# Patient Record
Sex: MALE | Race: WHITE | Employment: UNEMPLOYED
[De-identification: names, ages, dates, MRNs, and addresses within clinical notes are randomized per-mention and may not be internally consistent; named-entity substitution may affect disease eponyms.]

---

## 2017-01-05 DIAGNOSIS — M19.90 ARTHRITIS: ICD-10-CM

## 2017-01-05 RX ORDER — IBUPROFEN 800 MG/1
TABLET ORAL
Qty: 90 TABLET | Refills: 0 | Status: SHIPPED | OUTPATIENT
Start: 2017-01-05 | End: 2017-02-14 | Stop reason: SDUPTHER

## 2017-01-17 DIAGNOSIS — M54.16 LUMBAR RADICULAR PAIN: ICD-10-CM

## 2017-01-17 DIAGNOSIS — J44.9 CHRONIC OBSTRUCTIVE PULMONARY DISEASE, UNSPECIFIED COPD TYPE (HCC): ICD-10-CM

## 2017-01-17 DIAGNOSIS — J32.9 CHRONIC SINUSITIS, UNSPECIFIED LOCATION: ICD-10-CM

## 2017-01-18 RX ORDER — GABAPENTIN 400 MG/1
CAPSULE ORAL
Qty: 90 CAPSULE | Refills: 0 | Status: SHIPPED | OUTPATIENT
Start: 2017-01-18 | End: 2017-02-14 | Stop reason: SDUPTHER

## 2017-01-18 RX ORDER — MOMETASONE FUROATE AND FORMOTEROL FUMARATE DIHYDRATE 200; 5 UG/1; UG/1
AEROSOL RESPIRATORY (INHALATION)
Qty: 1 INHALER | Refills: 0 | Status: SHIPPED | OUTPATIENT
Start: 2017-01-18 | End: 2017-02-14 | Stop reason: SDUPTHER

## 2017-01-18 RX ORDER — FLUTICASONE PROPIONATE 50 MCG
SPRAY, SUSPENSION (ML) NASAL
Qty: 1 BOTTLE | Refills: 0 | Status: SHIPPED | OUTPATIENT
Start: 2017-01-18 | End: 2017-02-14 | Stop reason: ALTCHOICE

## 2017-01-18 RX ORDER — BUDESONIDE AND FORMOTEROL FUMARATE DIHYDRATE 160; 4.5 UG/1; UG/1
AEROSOL RESPIRATORY (INHALATION)
Qty: 1 INHALER | Refills: 0 | Status: SHIPPED | OUTPATIENT
Start: 2017-01-18 | End: 2017-01-19

## 2017-02-14 ENCOUNTER — OFFICE VISIT (OUTPATIENT)
Dept: INTERNAL MEDICINE | Facility: CLINIC | Age: 52
End: 2017-02-14

## 2017-02-14 VITALS
BODY MASS INDEX: 25.92 KG/M2 | HEART RATE: 85 BPM | WEIGHT: 175 LBS | HEIGHT: 69 IN | DIASTOLIC BLOOD PRESSURE: 90 MMHG | SYSTOLIC BLOOD PRESSURE: 140 MMHG

## 2017-02-14 DIAGNOSIS — E78.5 DYSLIPIDEMIA: ICD-10-CM

## 2017-02-14 DIAGNOSIS — M54.16 LUMBAR RADICULAR PAIN: ICD-10-CM

## 2017-02-14 DIAGNOSIS — R91.1 LUNG NODULE: Primary | ICD-10-CM

## 2017-02-14 DIAGNOSIS — I10 ESSENTIAL HYPERTENSION: ICD-10-CM

## 2017-02-14 DIAGNOSIS — K21.9 GASTROESOPHAGEAL REFLUX DISEASE WITHOUT ESOPHAGITIS: ICD-10-CM

## 2017-02-14 DIAGNOSIS — M19.90 ARTHRITIS: ICD-10-CM

## 2017-02-14 DIAGNOSIS — J44.9 CHRONIC OBSTRUCTIVE PULMONARY DISEASE, UNSPECIFIED COPD TYPE (HCC): ICD-10-CM

## 2017-02-14 PROCEDURE — 99214 OFFICE O/P EST MOD 30 MIN: CPT | Performed by: INTERNAL MEDICINE

## 2017-02-14 RX ORDER — ATORVASTATIN CALCIUM 80 MG/1
80 TABLET, FILM COATED ORAL DAILY
Qty: 30 TABLET | Refills: 3 | Status: SHIPPED | OUTPATIENT
Start: 2017-02-14 | End: 2017-02-14

## 2017-02-14 RX ORDER — AMLODIPINE BESYLATE 10 MG/1
TABLET ORAL
Qty: 90 TABLET | Refills: 1 | Status: SHIPPED | OUTPATIENT
Start: 2017-02-14 | End: 2017-06-07 | Stop reason: SDUPTHER

## 2017-02-14 RX ORDER — ATORVASTATIN CALCIUM 80 MG/1
TABLET, FILM COATED ORAL
Qty: 90 TABLET | Status: CANCELLED | OUTPATIENT
Start: 2017-02-14

## 2017-02-14 RX ORDER — ATORVASTATIN CALCIUM 80 MG/1
TABLET, FILM COATED ORAL
Qty: 90 TABLET | Refills: 1 | Status: SHIPPED | OUTPATIENT
Start: 2017-02-14 | End: 2017-03-15 | Stop reason: SDUPTHER

## 2017-02-14 RX ORDER — IBUPROFEN 800 MG/1
TABLET ORAL
Qty: 90 TABLET | Refills: 0 | Status: SHIPPED | OUTPATIENT
Start: 2017-02-14 | End: 2017-03-15 | Stop reason: SDUPTHER

## 2017-02-14 RX ORDER — ALBUTEROL SULFATE 90 UG/1
AEROSOL, METERED RESPIRATORY (INHALATION)
Qty: 1 INHALER | Refills: 5 | Status: SHIPPED | OUTPATIENT
Start: 2017-02-14 | End: 2017-08-04 | Stop reason: SDUPTHER

## 2017-02-14 RX ORDER — OMEPRAZOLE 20 MG/1
CAPSULE, DELAYED RELEASE ORAL
Qty: 90 CAPSULE | Refills: 1 | Status: SHIPPED | OUTPATIENT
Start: 2017-02-14 | End: 2017-05-10 | Stop reason: SDUPTHER

## 2017-02-14 RX ORDER — GABAPENTIN 400 MG/1
CAPSULE ORAL
Qty: 90 CAPSULE | Refills: 3 | Status: SHIPPED | OUTPATIENT
Start: 2017-02-14 | End: 2017-05-10 | Stop reason: SDUPTHER

## 2017-02-15 RX ORDER — AMLODIPINE BESYLATE 10 MG/1
TABLET ORAL
Qty: 30 TABLET | OUTPATIENT
Start: 2017-02-15

## 2017-02-16 DIAGNOSIS — M54.16 LUMBAR RADICULAR PAIN: ICD-10-CM

## 2017-02-16 DIAGNOSIS — J32.9 CHRONIC SINUSITIS, UNSPECIFIED LOCATION: ICD-10-CM

## 2017-02-18 RX ORDER — GABAPENTIN 400 MG/1
CAPSULE ORAL
Qty: 90 CAPSULE | OUTPATIENT
Start: 2017-02-18

## 2017-02-18 RX ORDER — FLUTICASONE PROPIONATE 50 MCG
SPRAY, SUSPENSION (ML) NASAL
OUTPATIENT
Start: 2017-02-18

## 2017-02-18 RX ORDER — MOMETASONE FUROATE AND FORMOTEROL FUMARATE DIHYDRATE 200; 5 UG/1; UG/1
AEROSOL RESPIRATORY (INHALATION)
OUTPATIENT
Start: 2017-02-18

## 2017-03-03 ENCOUNTER — HOSPITAL ENCOUNTER (OUTPATIENT)
Dept: CT IMAGING | Age: 52
Discharge: HOME | End: 2017-03-03
Payer: MEDICAID

## 2017-03-03 DIAGNOSIS — R91.1 LUNG NODULE: ICD-10-CM

## 2017-03-03 PROCEDURE — 71250 CT THORAX DX C-: CPT

## 2017-03-14 DIAGNOSIS — E78.5 DYSLIPIDEMIA: ICD-10-CM

## 2017-03-15 DIAGNOSIS — M19.90 ARTHRITIS: ICD-10-CM

## 2017-03-15 RX ORDER — ATORVASTATIN CALCIUM 80 MG/1
TABLET, FILM COATED ORAL
Qty: 90 TABLET | Refills: 0 | Status: SHIPPED | OUTPATIENT
Start: 2017-03-15 | End: 2017-10-02

## 2017-03-16 RX ORDER — IBUPROFEN 800 MG/1
TABLET ORAL
Qty: 90 TABLET | Refills: 2 | Status: SHIPPED | OUTPATIENT
Start: 2017-03-16 | End: 2017-05-10 | Stop reason: SDUPTHER

## 2017-03-29 ENCOUNTER — OFFICE VISIT (OUTPATIENT)
Dept: PAIN MANAGEMENT | Age: 52
End: 2017-03-29
Payer: MEDICAID

## 2017-03-29 VITALS
OXYGEN SATURATION: 95 % | SYSTOLIC BLOOD PRESSURE: 143 MMHG | WEIGHT: 164.6 LBS | RESPIRATION RATE: 16 BRPM | HEART RATE: 76 BPM | BODY MASS INDEX: 24.38 KG/M2 | HEIGHT: 69 IN | DIASTOLIC BLOOD PRESSURE: 94 MMHG

## 2017-03-29 DIAGNOSIS — G89.29 GROIN PAIN, CHRONIC, RIGHT: Chronic | ICD-10-CM

## 2017-03-29 DIAGNOSIS — G57.91 ILIOINGUINAL NEURALGIA, RIGHT: Primary | ICD-10-CM

## 2017-03-29 DIAGNOSIS — R10.31 GROIN PAIN, CHRONIC, RIGHT: Chronic | ICD-10-CM

## 2017-03-29 DIAGNOSIS — Z87.19 STATUS POST INGUINAL HERNIA REPAIR USING SYNTHETIC PATCH: ICD-10-CM

## 2017-03-29 DIAGNOSIS — Z98.890 STATUS POST INGUINAL HERNIA REPAIR USING SYNTHETIC PATCH: ICD-10-CM

## 2017-03-29 PROCEDURE — 99213 OFFICE O/P EST LOW 20 MIN: CPT | Performed by: ANESTHESIOLOGY

## 2017-03-29 ASSESSMENT — ENCOUNTER SYMPTOMS: BACK PAIN: 1

## 2017-04-03 ENCOUNTER — TELEPHONE (OUTPATIENT)
Dept: PAIN MANAGEMENT | Age: 52
End: 2017-04-03

## 2017-04-10 ENCOUNTER — HOSPITAL ENCOUNTER (OUTPATIENT)
Age: 52
Discharge: HOME | End: 2017-04-10
Payer: MEDICAID

## 2017-04-10 ENCOUNTER — HOSPITAL ENCOUNTER (OUTPATIENT)
Age: 52
Setting detail: OUTPATIENT SURGERY
Discharge: HOME | End: 2017-04-10
Attending: ANESTHESIOLOGY | Admitting: ANESTHESIOLOGY
Payer: MEDICAID

## 2017-04-10 VITALS
WEIGHT: 159 LBS | DIASTOLIC BLOOD PRESSURE: 90 MMHG | RESPIRATION RATE: 15 BRPM | OXYGEN SATURATION: 97 % | BODY MASS INDEX: 23.55 KG/M2 | HEIGHT: 69 IN | SYSTOLIC BLOOD PRESSURE: 125 MMHG | HEART RATE: 76 BPM | TEMPERATURE: 97.5 F

## 2017-04-10 DIAGNOSIS — E78.5 DYSLIPIDEMIA: ICD-10-CM

## 2017-04-10 PROBLEM — G57.91 ILIOINGUINAL NEURALGIA OF RIGHT SIDE: Chronic | Status: ACTIVE | Noted: 2017-04-10

## 2017-04-10 LAB
ALBUMIN SERPL-MCNC: 4.1 G/DL (ref 3.5–5.2)
ALBUMIN/GLOBULIN RATIO: NORMAL (ref 1–2.5)
ALP BLD-CCNC: 78 U/L (ref 40–129)
ALT SERPL-CCNC: 17 U/L (ref 5–41)
ANION GAP SERPL CALCULATED.3IONS-SCNC: 12 MMOL/L (ref 9–17)
AST SERPL-CCNC: 16 U/L
BILIRUB SERPL-MCNC: 0.64 MG/DL (ref 0.3–1.2)
BUN BLDV-MCNC: 8 MG/DL (ref 6–20)
BUN/CREAT BLD: 10 (ref 9–20)
CALCIUM SERPL-MCNC: 8.9 MG/DL (ref 8.6–10.4)
CHLORIDE BLD-SCNC: 105 MMOL/L (ref 98–107)
CHOLESTEROL/HDL RATIO: 1.9
CHOLESTEROL: 167 MG/DL
CO2: 25 MMOL/L (ref 20–31)
CREAT SERPL-MCNC: 0.81 MG/DL (ref 0.7–1.2)
GFR AFRICAN AMERICAN: >60 ML/MIN
GFR NON-AFRICAN AMERICAN: >60 ML/MIN
GFR SERPL CREATININE-BSD FRML MDRD: NORMAL ML/MIN/{1.73_M2}
GFR SERPL CREATININE-BSD FRML MDRD: NORMAL ML/MIN/{1.73_M2}
GLUCOSE BLD-MCNC: 87 MG/DL (ref 70–99)
HDLC SERPL-MCNC: 86 MG/DL
LDL CHOLESTEROL: 66 MG/DL (ref 0–130)
POTASSIUM SERPL-SCNC: 4.1 MMOL/L (ref 3.7–5.3)
SODIUM BLD-SCNC: 142 MMOL/L (ref 135–144)
TOTAL PROTEIN: 6.8 G/DL (ref 6.4–8.3)
TRIGL SERPL-MCNC: 76 MG/DL
VLDLC SERPL CALC-MCNC: NORMAL MG/DL (ref 1–30)

## 2017-04-10 PROCEDURE — 3600000052 HC PAIN LEVEL 2 BASE: Performed by: ANESTHESIOLOGY

## 2017-04-10 PROCEDURE — 36415 COLL VENOUS BLD VENIPUNCTURE: CPT

## 2017-04-10 PROCEDURE — 80061 LIPID PANEL: CPT

## 2017-04-10 PROCEDURE — 64425 NJX AA&/STRD II IH NERVES: CPT | Performed by: ANESTHESIOLOGY

## 2017-04-10 PROCEDURE — 99152 MOD SED SAME PHYS/QHP 5/>YRS: CPT | Performed by: ANESTHESIOLOGY

## 2017-04-10 PROCEDURE — 2580000003 HC RX 258: Performed by: ANESTHESIOLOGY

## 2017-04-10 PROCEDURE — 7100000011 HC PHASE II RECOVERY - ADDTL 15 MIN: Performed by: ANESTHESIOLOGY

## 2017-04-10 PROCEDURE — 7100000010 HC PHASE II RECOVERY - FIRST 15 MIN: Performed by: ANESTHESIOLOGY

## 2017-04-10 PROCEDURE — 76942 ECHO GUIDE FOR BIOPSY: CPT | Performed by: ANESTHESIOLOGY

## 2017-04-10 PROCEDURE — 6360000002 HC RX W HCPCS: Performed by: ANESTHESIOLOGY

## 2017-04-10 PROCEDURE — 2500000003 HC RX 250 WO HCPCS: Performed by: ANESTHESIOLOGY

## 2017-04-10 PROCEDURE — 80053 COMPREHEN METABOLIC PANEL: CPT

## 2017-04-10 PROCEDURE — 99153 MOD SED SAME PHYS/QHP EA: CPT | Performed by: ANESTHESIOLOGY

## 2017-04-10 PROCEDURE — 3600000053 HC PAIN LEVEL 2 ADDL 15 MIN: Performed by: ANESTHESIOLOGY

## 2017-04-10 RX ORDER — FENTANYL CITRATE 50 UG/ML
INJECTION, SOLUTION INTRAMUSCULAR; INTRAVENOUS PRN
Status: DISCONTINUED | OUTPATIENT
Start: 2017-04-10 | End: 2017-04-10 | Stop reason: HOSPADM

## 2017-04-10 RX ORDER — METHYLPREDNISOLONE ACETATE 40 MG/ML
INJECTION, SUSPENSION INTRA-ARTICULAR; INTRALESIONAL; INTRAMUSCULAR; SOFT TISSUE PRN
Status: DISCONTINUED | OUTPATIENT
Start: 2017-04-10 | End: 2017-04-10 | Stop reason: HOSPADM

## 2017-04-10 RX ORDER — BUPIVACAINE HYDROCHLORIDE 5 MG/ML
INJECTION, SOLUTION EPIDURAL; INTRACAUDAL PRN
Status: DISCONTINUED | OUTPATIENT
Start: 2017-04-10 | End: 2017-04-10 | Stop reason: HOSPADM

## 2017-04-10 RX ORDER — SODIUM CHLORIDE 0.9 % (FLUSH) 0.9 %
10 SYRINGE (ML) INJECTION PRN
Status: DISCONTINUED | OUTPATIENT
Start: 2017-04-10 | End: 2017-04-10 | Stop reason: HOSPADM

## 2017-04-10 RX ORDER — SODIUM CHLORIDE 0.9 % (FLUSH) 0.9 %
10 SYRINGE (ML) INJECTION EVERY 12 HOURS SCHEDULED
Status: DISCONTINUED | OUTPATIENT
Start: 2017-04-10 | End: 2017-04-10

## 2017-04-10 RX ORDER — MIDAZOLAM HYDROCHLORIDE 1 MG/ML
INJECTION INTRAMUSCULAR; INTRAVENOUS PRN
Status: DISCONTINUED | OUTPATIENT
Start: 2017-04-10 | End: 2017-04-10 | Stop reason: HOSPADM

## 2017-04-10 RX ADMIN — Medication 10 ML: at 11:46

## 2017-04-10 ASSESSMENT — PAIN SCALES - GENERAL: PAINLEVEL_OUTOF10: 0

## 2017-04-10 ASSESSMENT — PAIN - FUNCTIONAL ASSESSMENT: PAIN_FUNCTIONAL_ASSESSMENT: 0-10

## 2017-04-10 ASSESSMENT — PAIN DESCRIPTION - DESCRIPTORS: DESCRIPTORS: BURNING;SORE;SHARP

## 2017-05-10 DIAGNOSIS — M19.90 ARTHRITIS: ICD-10-CM

## 2017-05-10 DIAGNOSIS — K21.9 GASTROESOPHAGEAL REFLUX DISEASE WITHOUT ESOPHAGITIS: ICD-10-CM

## 2017-05-10 DIAGNOSIS — M54.16 LUMBAR RADICULAR PAIN: ICD-10-CM

## 2017-05-10 RX ORDER — IBUPROFEN 800 MG/1
TABLET ORAL
Qty: 90 TABLET | Refills: 2 | Status: SHIPPED | OUTPATIENT
Start: 2017-05-10 | End: 2017-08-04 | Stop reason: SDUPTHER

## 2017-05-10 RX ORDER — OMEPRAZOLE 20 MG/1
CAPSULE, DELAYED RELEASE ORAL
Qty: 90 CAPSULE | Refills: 3 | Status: SHIPPED | OUTPATIENT
Start: 2017-05-10 | End: 2017-06-07 | Stop reason: SDUPTHER

## 2017-05-10 RX ORDER — IBUPROFEN 800 MG/1
TABLET ORAL
Qty: 90 TABLET | OUTPATIENT
Start: 2017-05-10

## 2017-05-10 RX ORDER — IBUPROFEN 800 MG/1
TABLET ORAL
Qty: 90 TABLET | Refills: 2 | Status: CANCELLED | OUTPATIENT
Start: 2017-05-10

## 2017-05-10 RX ORDER — GABAPENTIN 400 MG/1
CAPSULE ORAL
Qty: 90 CAPSULE | Refills: 0 | Status: SHIPPED | OUTPATIENT
Start: 2017-05-10 | End: 2017-07-27

## 2017-05-19 ENCOUNTER — TELEPHONE (OUTPATIENT)
Dept: INTERNAL MEDICINE | Age: 52
End: 2017-05-19

## 2017-05-19 DIAGNOSIS — F11.91 HISTORY OF NARCOTIC USE: Primary | ICD-10-CM

## 2017-06-07 DIAGNOSIS — I10 ESSENTIAL HYPERTENSION: ICD-10-CM

## 2017-06-07 DIAGNOSIS — K21.9 GASTROESOPHAGEAL REFLUX DISEASE WITHOUT ESOPHAGITIS: ICD-10-CM

## 2017-06-07 RX ORDER — OMEPRAZOLE 20 MG/1
CAPSULE, DELAYED RELEASE ORAL
Qty: 90 CAPSULE | Refills: 0 | Status: SHIPPED | OUTPATIENT
Start: 2017-06-07 | End: 2017-11-28

## 2017-06-07 RX ORDER — AMLODIPINE BESYLATE 10 MG/1
TABLET ORAL
Qty: 90 TABLET | Refills: 0 | Status: SHIPPED | OUTPATIENT
Start: 2017-06-07 | End: 2017-09-29 | Stop reason: SDUPTHER

## 2017-06-12 ENCOUNTER — HOSPITAL ENCOUNTER (OUTPATIENT)
Age: 52
Setting detail: SPECIMEN
Discharge: HOME | End: 2017-06-12
Payer: MEDICAID

## 2017-06-12 ENCOUNTER — NURSE ONLY (OUTPATIENT)
Dept: INTERNAL MEDICINE | Age: 52
End: 2017-06-12
Payer: MEDICAID

## 2017-06-12 DIAGNOSIS — F11.91 HISTORY OF NARCOTIC USE: Primary | ICD-10-CM

## 2017-06-12 DIAGNOSIS — F11.91 HISTORY OF NARCOTIC USE: ICD-10-CM

## 2017-06-15 ENCOUNTER — TELEPHONE (OUTPATIENT)
Dept: INTERNAL MEDICINE | Age: 52
End: 2017-06-15

## 2017-06-15 DIAGNOSIS — Z02.83 ENCOUNTER FOR DRUG SCREENING: Primary | ICD-10-CM

## 2017-06-15 LAB
-: NORMAL
REASON FOR REJECTION: NORMAL
ZZ NTE CLEAN UP: ORDERED TEST: NORMAL
ZZ NTE WITH NAME CLEAN UP: SPECIMEN SOURCE: NORMAL

## 2017-06-19 ENCOUNTER — HOSPITAL ENCOUNTER (OUTPATIENT)
Age: 52
Setting detail: SPECIMEN
Discharge: HOME | End: 2017-06-19
Payer: MEDICAID

## 2017-06-19 DIAGNOSIS — F11.91 HISTORY OF NARCOTIC USE: ICD-10-CM

## 2017-06-19 DIAGNOSIS — Z02.83 ENCOUNTER FOR DRUG SCREENING: ICD-10-CM

## 2017-06-19 LAB
AMPHETAMINE SCREEN URINE: NEGATIVE
BARBITURATE SCREEN URINE: NEGATIVE
BENZODIAZEPINE SCREEN, URINE: NEGATIVE
BUPRENORPHINE URINE: NORMAL
CANNABINOID SCREEN URINE: NEGATIVE
COCAINE METABOLITE, URINE: NEGATIVE
MDMA URINE: NORMAL
METHADONE SCREEN, URINE: NEGATIVE
METHAMPHETAMINE, URINE: NORMAL
OPIATES, URINE: NEGATIVE
OXYCODONE SCREEN URINE: NEGATIVE
PHENCYCLIDINE, URINE: NEGATIVE
PROPOXYPHENE, URINE: NORMAL
TEST INFORMATION: NORMAL
TRICYCLIC ANTIDEPRESSANTS, UR: NORMAL

## 2017-06-19 PROCEDURE — 80307 DRUG TEST PRSMV CHEM ANLYZR: CPT

## 2017-06-21 LAB — TOXICOLOGY,BLOOD: POSITIVE

## 2017-07-27 ENCOUNTER — HOSPITAL ENCOUNTER (EMERGENCY)
Age: 52
Discharge: HOME | End: 2017-07-27
Attending: EMERGENCY MEDICINE
Payer: MEDICAID

## 2017-07-27 VITALS
OXYGEN SATURATION: 98 % | BODY MASS INDEX: 22.22 KG/M2 | DIASTOLIC BLOOD PRESSURE: 90 MMHG | WEIGHT: 150 LBS | HEIGHT: 69 IN | TEMPERATURE: 98 F | SYSTOLIC BLOOD PRESSURE: 136 MMHG | HEART RATE: 79 BPM | RESPIRATION RATE: 14 BRPM

## 2017-07-27 DIAGNOSIS — T30.0 BURN: Primary | ICD-10-CM

## 2017-07-27 PROCEDURE — 6370000000 HC RX 637 (ALT 250 FOR IP): Performed by: EMERGENCY MEDICINE

## 2017-07-27 PROCEDURE — 99283 EMERGENCY DEPT VISIT LOW MDM: CPT

## 2017-07-27 RX ORDER — BACITRACIN ZINC AND POLYMYXIN B SULFATE 500; 1000 [USP'U]/G; [USP'U]/G
OINTMENT TOPICAL
Qty: 15 G | Refills: 0 | Status: SHIPPED | OUTPATIENT
Start: 2017-07-27 | End: 2018-10-04

## 2017-07-27 RX ORDER — GINSENG 100 MG
CAPSULE ORAL ONCE
Status: COMPLETED | OUTPATIENT
Start: 2017-07-27 | End: 2017-07-27

## 2017-07-27 RX ADMIN — BACITRACIN: 500 OINTMENT TOPICAL at 08:20

## 2017-08-04 DIAGNOSIS — M19.90 ARTHRITIS: ICD-10-CM

## 2017-08-04 DIAGNOSIS — J44.9 CHRONIC OBSTRUCTIVE PULMONARY DISEASE, UNSPECIFIED COPD TYPE (HCC): ICD-10-CM

## 2017-08-05 RX ORDER — IBUPROFEN 800 MG/1
TABLET ORAL
Qty: 90 TABLET | Refills: 1 | Status: SHIPPED | OUTPATIENT
Start: 2017-08-05 | End: 2017-09-29 | Stop reason: SDUPTHER

## 2017-08-05 RX ORDER — MOMETASONE FUROATE AND FORMOTEROL FUMARATE DIHYDRATE 200; 5 UG/1; UG/1
AEROSOL RESPIRATORY (INHALATION)
Qty: 1 INHALER | Refills: 0 | Status: SHIPPED | OUTPATIENT
Start: 2017-08-05 | End: 2018-11-01 | Stop reason: SDUPTHER

## 2017-09-29 DIAGNOSIS — E78.5 DYSLIPIDEMIA: ICD-10-CM

## 2017-09-29 DIAGNOSIS — I10 ESSENTIAL HYPERTENSION: ICD-10-CM

## 2017-09-29 DIAGNOSIS — M19.90 ARTHRITIS: ICD-10-CM

## 2017-09-29 DIAGNOSIS — J44.9 CHRONIC OBSTRUCTIVE PULMONARY DISEASE, UNSPECIFIED COPD TYPE (HCC): ICD-10-CM

## 2017-09-29 NOTE — TELEPHONE ENCOUNTER
E-scribing request for medications pt has no future appt. Last seen 2/14/17. Please advise.      Health Maintenance   Topic Date Due    Flu vaccine (1) 09/01/2017    Lipid screen  04/10/2022    Colon cancer screen colonoscopy  12/30/2024    DTaP/Tdap/Td vaccine (2 - Td) 08/16/2026    Pneumococcal med risk  Completed    Hepatitis C screen  Completed    HIV screen  Completed       No results found for: LABA1C          ( goal A1C is < 7)   No results found for: LABMICR  LDL Cholesterol (mg/dL)   Date Value   04/10/2017 66       (goal LDL is <100)   AST (U/L)   Date Value   04/10/2017 16     ALT (U/L)   Date Value   04/10/2017 17     BUN (mg/dL)   Date Value   04/10/2017 8     BP Readings from Last 3 Encounters:   07/27/17 (!) 136/90   04/10/17 (!) 125/90   03/29/17 (!) 143/94          (goal 120/80)      Next Visit Date:  Visit date not found    Patient Active Problem List:     COPD (chronic obstructive pulmonary disease) (HCC)     Smoking addiction     Depression     Alcohol abuse     Dyslipidemia     Lung nodule     DDD (degenerative disc disease), lumbar     Groin pain, chronic, right     Ilioinguinal neuralgia of right side

## 2017-10-02 RX ORDER — ATORVASTATIN CALCIUM 80 MG/1
TABLET, FILM COATED ORAL
Qty: 90 TABLET | Refills: 0 | Status: SHIPPED | OUTPATIENT
Start: 2017-10-02 | End: 2017-10-31 | Stop reason: SDUPTHER

## 2017-10-02 RX ORDER — IBUPROFEN 800 MG/1
TABLET ORAL
Qty: 90 TABLET | Refills: 0 | Status: SHIPPED | OUTPATIENT
Start: 2017-10-02 | End: 2017-11-22 | Stop reason: SDUPTHER

## 2017-10-02 RX ORDER — AMLODIPINE BESYLATE 10 MG/1
TABLET ORAL
Qty: 90 TABLET | Refills: 0 | Status: SHIPPED | OUTPATIENT
Start: 2017-10-02 | End: 2017-11-22 | Stop reason: SDUPTHER

## 2017-10-31 DIAGNOSIS — E78.5 DYSLIPIDEMIA: ICD-10-CM

## 2017-11-01 RX ORDER — ATORVASTATIN CALCIUM 80 MG/1
TABLET, FILM COATED ORAL
Qty: 90 TABLET | Refills: 1 | Status: SHIPPED | OUTPATIENT
Start: 2017-11-01 | End: 2018-12-15 | Stop reason: SDUPTHER

## 2017-11-24 DIAGNOSIS — K21.9 GASTROESOPHAGEAL REFLUX DISEASE WITHOUT ESOPHAGITIS: ICD-10-CM

## 2017-11-27 NOTE — TELEPHONE ENCOUNTER
Health Maintenance   Topic Date Due    Flu vaccine (1) 09/01/2017    Lipid screen  04/10/2022    Colon cancer screen colonoscopy  12/30/2024    DTaP/Tdap/Td vaccine (2 - Td) 08/16/2026    Pneumococcal med risk  Completed    Hepatitis C screen  Completed    HIV screen  Completed             (applicable per patient's age: Cancer Screenings, Depression Screening, Fall Risk Screening, Immunizations)    LDL Cholesterol (mg/dL)   Date Value   04/10/2017 66     AST (U/L)   Date Value   04/10/2017 16     ALT (U/L)   Date Value   04/10/2017 17     BUN (mg/dL)   Date Value   04/10/2017 8      (goal A1C is < 7)   (goal LDL is <100) need 30-50% reduction from baseline     BP Readings from Last 3 Encounters:   07/27/17 (!) 136/90   04/10/17 (!) 125/90   03/29/17 (!) 143/94    (goal /80)      All Future Testing planned in [facility]:  Lab Frequency Next Occurrence   Toxicology Screen, Serum Once 12/29/2017       Next Visit Date:  No future appointments.      Message left with pharmacy that pt will need to call and schedule a future appt with clinic to get any further refills        Patient Active Problem List:     COPD (chronic obstructive pulmonary disease) ([facility] [address]     Smoking addiction     Depression     Alcohol abuse     Dyslipidemia     Lung nodule     DDD (degenerative disc disease), lumbar     Groin pain, chronic, right     Ilioinguinal neuralgia of right side

## 2017-11-28 RX ORDER — OMEPRAZOLE 20 MG/1
CAPSULE, DELAYED RELEASE ORAL
Qty: 90 CAPSULE | Refills: 0 | Status: SHIPPED | OUTPATIENT
Start: 2017-11-28 | End: 2018-08-22 | Stop reason: SDUPTHER

## 2018-06-13 ENCOUNTER — APPOINTMENT (OUTPATIENT)
Dept: GENERAL RADIOLOGY | Age: 53
End: 2018-06-13
Payer: MEDICAID

## 2018-06-13 ENCOUNTER — APPOINTMENT (OUTPATIENT)
Dept: NUCLEAR MEDICINE | Age: 53
End: 2018-06-13
Payer: MEDICAID

## 2018-06-13 ENCOUNTER — HOSPITAL ENCOUNTER (OUTPATIENT)
Age: 53
Setting detail: OBSERVATION
Discharge: HOME | End: 2018-06-13
Attending: EMERGENCY MEDICINE | Admitting: EMERGENCY MEDICINE
Payer: MEDICAID

## 2018-06-13 VITALS
RESPIRATION RATE: 18 BRPM | OXYGEN SATURATION: 95 % | DIASTOLIC BLOOD PRESSURE: 88 MMHG | SYSTOLIC BLOOD PRESSURE: 132 MMHG | HEIGHT: 69 IN | BODY MASS INDEX: 22.22 KG/M2 | HEART RATE: 74 BPM | TEMPERATURE: 97.2 F | WEIGHT: 150 LBS

## 2018-06-13 DIAGNOSIS — R55 SYNCOPE AND COLLAPSE: Primary | ICD-10-CM

## 2018-06-13 LAB
-: NORMAL
ABSOLUTE EOS #: 0.17 K/UL (ref 0–0.44)
ABSOLUTE IMMATURE GRANULOCYTE: 0.08 K/UL (ref 0–0.3)
ABSOLUTE LYMPH #: 2.35 K/UL (ref 1.1–3.7)
ABSOLUTE MONO #: 1.34 K/UL (ref 0.1–1.2)
ACETAMINOPHEN LEVEL: <5 UG/ML (ref 10–30)
AMORPHOUS: NORMAL
AMPHETAMINE SCREEN URINE: NEGATIVE
ANION GAP SERPL CALCULATED.3IONS-SCNC: 20 MMOL/L (ref 9–17)
BACTERIA: NORMAL
BARBITURATE SCREEN URINE: NEGATIVE
BASOPHILS # BLD: 1 % (ref 0–2)
BASOPHILS ABSOLUTE: 0.07 K/UL (ref 0–0.2)
BENZODIAZEPINE SCREEN, URINE: NEGATIVE
BILIRUBIN URINE: ABNORMAL
BUN BLDV-MCNC: 8 MG/DL (ref 6–20)
BUN/CREAT BLD: ABNORMAL (ref 9–20)
BUPRENORPHINE URINE: ABNORMAL
CALCIUM SERPL-MCNC: 8.9 MG/DL (ref 8.6–10.4)
CANNABINOID SCREEN URINE: POSITIVE
CASTS UA: NORMAL /LPF (ref 0–8)
CHLORIDE BLD-SCNC: 93 MMOL/L (ref 98–107)
CO2: 19 MMOL/L (ref 20–31)
COCAINE METABOLITE, URINE: NEGATIVE
COLOR: ABNORMAL
COMMENT UA: ABNORMAL
CREAT SERPL-MCNC: 0.82 MG/DL (ref 0.7–1.2)
CRYSTALS, UA: NORMAL /HPF
DIFFERENTIAL TYPE: ABNORMAL
EKG ATRIAL RATE: 107 BPM
EKG ATRIAL RATE: 73 BPM
EKG P AXIS: 17 DEGREES
EKG P AXIS: 26 DEGREES
EKG P-R INTERVAL: 142 MS
EKG P-R INTERVAL: 158 MS
EKG Q-T INTERVAL: 362 MS
EKG Q-T INTERVAL: 424 MS
EKG QRS DURATION: 84 MS
EKG QRS DURATION: 98 MS
EKG QTC CALCULATION (BAZETT): 467 MS
EKG QTC CALCULATION (BAZETT): 483 MS
EKG R AXIS: -7 DEGREES
EKG R AXIS: 7 DEGREES
EKG T AXIS: 10 DEGREES
EKG T AXIS: 7 DEGREES
EKG VENTRICULAR RATE: 107 BPM
EKG VENTRICULAR RATE: 73 BPM
EOSINOPHILS RELATIVE PERCENT: 2 % (ref 1–4)
EPITHELIAL CELLS UA: NORMAL /HPF (ref 0–5)
ETHANOL PERCENT: <0.01 %
ETHANOL: <10 MG/DL
GFR AFRICAN AMERICAN: >60 ML/MIN
GFR NON-AFRICAN AMERICAN: >60 ML/MIN
GFR SERPL CREATININE-BSD FRML MDRD: ABNORMAL ML/MIN/{1.73_M2}
GFR SERPL CREATININE-BSD FRML MDRD: ABNORMAL ML/MIN/{1.73_M2}
GLUCOSE BLD-MCNC: 164 MG/DL (ref 70–99)
GLUCOSE URINE: NEGATIVE
HCT VFR BLD CALC: 43.8 % (ref 40.7–50.3)
HEMOGLOBIN: 14.7 G/DL (ref 13–17)
IMMATURE GRANULOCYTES: 1 %
KETONES, URINE: ABNORMAL
LEUKOCYTE ESTERASE, URINE: NEGATIVE
LV EF: 47 %
LV EF: 50 %
LVEF MODALITY: NORMAL
LVEF MODALITY: NORMAL
LYMPHOCYTES # BLD: 27 % (ref 24–43)
MCH RBC QN AUTO: 32 PG (ref 25.2–33.5)
MCHC RBC AUTO-ENTMCNC: 33.6 G/DL (ref 28.4–34.8)
MCV RBC AUTO: 95.2 FL (ref 82.6–102.9)
MDMA URINE: ABNORMAL
METHADONE SCREEN, URINE: NEGATIVE
METHAMPHETAMINE, URINE: ABNORMAL
MONOCYTES # BLD: 16 % (ref 3–12)
MUCUS: NORMAL
NITRITE, URINE: NEGATIVE
NRBC AUTOMATED: 0 PER 100 WBC
OPIATES, URINE: NEGATIVE
OTHER OBSERVATIONS UA: NORMAL
OXYCODONE SCREEN URINE: NEGATIVE
PDW BLD-RTO: 13.2 % (ref 11.8–14.4)
PH UA: 5.5 (ref 5–8)
PHENCYCLIDINE, URINE: NEGATIVE
PLATELET # BLD: 176 K/UL (ref 138–453)
PLATELET ESTIMATE: ABNORMAL
PMV BLD AUTO: 10.5 FL (ref 8.1–13.5)
POC TROPONIN I: 0.01 NG/ML (ref 0–0.1)
POC TROPONIN I: 0.01 NG/ML (ref 0–0.1)
POC TROPONIN INTERP: NORMAL
POC TROPONIN INTERP: NORMAL
POTASSIUM SERPL-SCNC: 3.1 MMOL/L (ref 3.7–5.3)
PROPOXYPHENE, URINE: ABNORMAL
PROTEIN UA: ABNORMAL
RBC # BLD: 4.6 M/UL (ref 4.21–5.77)
RBC # BLD: ABNORMAL 10*6/UL
RBC UA: NORMAL /HPF (ref 0–4)
RENAL EPITHELIAL, UA: NORMAL /HPF
SALICYLATE LEVEL: <1 MG/DL (ref 3–10)
SEG NEUTROPHILS: 53 % (ref 36–65)
SEGMENTED NEUTROPHILS ABSOLUTE COUNT: 4.56 K/UL (ref 1.5–8.1)
SERUM OSMOLALITY: 289 MOSM/KG (ref 275–295)
SODIUM BLD-SCNC: 132 MMOL/L (ref 135–144)
SPECIFIC GRAVITY UA: 1.03 (ref 1–1.03)
TEST INFORMATION: ABNORMAL
TOXIC TRICYCLIC SC,BLOOD: NEGATIVE
TRICHOMONAS: NORMAL
TRICYCLIC ANTIDEPRESSANTS, UR: ABNORMAL
TURBIDITY: CLEAR
URINE HGB: NEGATIVE
UROBILINOGEN, URINE: NORMAL
WBC # BLD: 8.6 K/UL (ref 3.5–11.3)
WBC # BLD: ABNORMAL 10*3/UL
WBC UA: NORMAL /HPF (ref 0–5)
YEAST: NORMAL

## 2018-06-13 PROCEDURE — 6360000002 HC RX W HCPCS: Performed by: EMERGENCY MEDICINE

## 2018-06-13 PROCEDURE — 84484 ASSAY OF TROPONIN QUANT: CPT

## 2018-06-13 PROCEDURE — 87086 URINE CULTURE/COLONY COUNT: CPT

## 2018-06-13 PROCEDURE — 93306 TTE W/DOPPLER COMPLETE: CPT

## 2018-06-13 PROCEDURE — 93005 ELECTROCARDIOGRAM TRACING: CPT

## 2018-06-13 PROCEDURE — 85025 COMPLETE CBC W/AUTO DIFF WBC: CPT

## 2018-06-13 PROCEDURE — G0480 DRUG TEST DEF 1-7 CLASSES: HCPCS

## 2018-06-13 PROCEDURE — 3430000000 HC RX DIAGNOSTIC RADIOPHARMACEUTICAL: Performed by: INTERNAL MEDICINE

## 2018-06-13 PROCEDURE — 80307 DRUG TEST PRSMV CHEM ANLYZR: CPT

## 2018-06-13 PROCEDURE — 94664 DEMO&/EVAL PT USE INHALER: CPT

## 2018-06-13 PROCEDURE — 78452 HT MUSCLE IMAGE SPECT MULT: CPT

## 2018-06-13 PROCEDURE — 93017 CV STRESS TEST TRACING ONLY: CPT

## 2018-06-13 PROCEDURE — 2580000003 HC RX 258: Performed by: EMERGENCY MEDICINE

## 2018-06-13 PROCEDURE — 71046 X-RAY EXAM CHEST 2 VIEWS: CPT

## 2018-06-13 PROCEDURE — 6360000002 HC RX W HCPCS: Performed by: INTERNAL MEDICINE

## 2018-06-13 PROCEDURE — 83930 ASSAY OF BLOOD OSMOLALITY: CPT

## 2018-06-13 PROCEDURE — 6370000000 HC RX 637 (ALT 250 FOR IP): Performed by: EMERGENCY MEDICINE

## 2018-06-13 PROCEDURE — A9500 TC99M SESTAMIBI: HCPCS | Performed by: INTERNAL MEDICINE

## 2018-06-13 PROCEDURE — G0378 HOSPITAL OBSERVATION PER HR: HCPCS

## 2018-06-13 PROCEDURE — 80048 BASIC METABOLIC PNL TOTAL CA: CPT

## 2018-06-13 PROCEDURE — 99285 EMERGENCY DEPT VISIT HI MDM: CPT

## 2018-06-13 PROCEDURE — 2580000003 HC RX 258: Performed by: INTERNAL MEDICINE

## 2018-06-13 PROCEDURE — 81001 URINALYSIS AUTO W/SCOPE: CPT

## 2018-06-13 PROCEDURE — 6370000000 HC RX 637 (ALT 250 FOR IP): Performed by: INTERNAL MEDICINE

## 2018-06-13 PROCEDURE — 6370000000 HC RX 637 (ALT 250 FOR IP)

## 2018-06-13 PROCEDURE — 94640 AIRWAY INHALATION TREATMENT: CPT

## 2018-06-13 RX ORDER — 0.9 % SODIUM CHLORIDE 0.9 %
1000 INTRAVENOUS SOLUTION INTRAVENOUS ONCE
Status: COMPLETED | OUTPATIENT
Start: 2018-06-13 | End: 2018-06-13

## 2018-06-13 RX ORDER — POTASSIUM CHLORIDE 20 MEQ/1
TABLET, EXTENDED RELEASE ORAL
Status: COMPLETED
Start: 2018-06-13 | End: 2018-06-13

## 2018-06-13 RX ORDER — ATORVASTATIN CALCIUM 80 MG/1
80 TABLET, FILM COATED ORAL NIGHTLY
Status: DISCONTINUED | OUTPATIENT
Start: 2018-06-13 | End: 2018-06-13 | Stop reason: HOSPADM

## 2018-06-13 RX ORDER — POTASSIUM CHLORIDE 20MEQ/15ML
40 LIQUID (ML) ORAL ONCE
Status: COMPLETED | OUTPATIENT
Start: 2018-06-13 | End: 2018-06-13

## 2018-06-13 RX ORDER — SODIUM CHLORIDE 0.9 % (FLUSH) 0.9 %
10 SYRINGE (ML) INJECTION PRN
Status: DISCONTINUED | OUTPATIENT
Start: 2018-06-13 | End: 2018-06-13 | Stop reason: ALTCHOICE

## 2018-06-13 RX ORDER — AMLODIPINE BESYLATE 10 MG/1
10 TABLET ORAL DAILY
Status: DISCONTINUED | OUTPATIENT
Start: 2018-06-13 | End: 2018-06-13 | Stop reason: HOSPADM

## 2018-06-13 RX ORDER — SODIUM CHLORIDE 0.9 % (FLUSH) 0.9 %
10 SYRINGE (ML) INJECTION PRN
Status: DISCONTINUED | OUTPATIENT
Start: 2018-06-13 | End: 2018-06-13 | Stop reason: HOSPADM

## 2018-06-13 RX ORDER — AMINOPHYLLINE DIHYDRATE 25 MG/ML
100 INJECTION, SOLUTION INTRAVENOUS
Status: DISCONTINUED | OUTPATIENT
Start: 2018-06-13 | End: 2018-06-13 | Stop reason: RX

## 2018-06-13 RX ORDER — ALBUTEROL SULFATE 90 UG/1
1 AEROSOL, METERED RESPIRATORY (INHALATION) EVERY 4 HOURS PRN
Status: DISCONTINUED | OUTPATIENT
Start: 2018-06-13 | End: 2018-06-13 | Stop reason: HOSPADM

## 2018-06-13 RX ORDER — SODIUM CHLORIDE 0.9 % (FLUSH) 0.9 %
10 SYRINGE (ML) INJECTION EVERY 12 HOURS SCHEDULED
Status: DISCONTINUED | OUTPATIENT
Start: 2018-06-13 | End: 2018-06-13 | Stop reason: HOSPADM

## 2018-06-13 RX ORDER — ACETAMINOPHEN 325 MG/1
650 TABLET ORAL EVERY 4 HOURS PRN
Status: DISCONTINUED | OUTPATIENT
Start: 2018-06-13 | End: 2018-06-13 | Stop reason: HOSPADM

## 2018-06-13 RX ORDER — NITROGLYCERIN 0.4 MG/1
0.4 TABLET SUBLINGUAL EVERY 5 MIN PRN
Status: DISCONTINUED | OUTPATIENT
Start: 2018-06-13 | End: 2018-06-13 | Stop reason: ALTCHOICE

## 2018-06-13 RX ORDER — POTASSIUM CHLORIDE 1.5 G/1.77G
20 POWDER, FOR SOLUTION ORAL 2 TIMES DAILY
Status: DISCONTINUED | OUTPATIENT
Start: 2018-06-13 | End: 2018-06-13 | Stop reason: HOSPADM

## 2018-06-13 RX ORDER — METOPROLOL TARTRATE 5 MG/5ML
2.5 INJECTION INTRAVENOUS PRN
Status: DISCONTINUED | OUTPATIENT
Start: 2018-06-13 | End: 2018-06-13 | Stop reason: ALTCHOICE

## 2018-06-13 RX ORDER — PANTOPRAZOLE SODIUM 40 MG/1
40 TABLET, DELAYED RELEASE ORAL
Status: DISCONTINUED | OUTPATIENT
Start: 2018-06-13 | End: 2018-06-13 | Stop reason: HOSPADM

## 2018-06-13 RX ORDER — SODIUM CHLORIDE 9 MG/ML
INJECTION, SOLUTION INTRAVENOUS ONCE
Status: COMPLETED | OUTPATIENT
Start: 2018-06-13 | End: 2018-06-13

## 2018-06-13 RX ORDER — IBUPROFEN 400 MG/1
600 TABLET ORAL EVERY 8 HOURS PRN
Status: DISCONTINUED | OUTPATIENT
Start: 2018-06-13 | End: 2018-06-13 | Stop reason: HOSPADM

## 2018-06-13 RX ORDER — POTASSIUM CHLORIDE 1.5 G/1.77G
20 POWDER, FOR SOLUTION ORAL 2 TIMES DAILY
Status: DISCONTINUED | OUTPATIENT
Start: 2018-06-13 | End: 2018-06-13

## 2018-06-13 RX ORDER — POTASSIUM CHLORIDE 29.8 MG/ML
20 INJECTION INTRAVENOUS ONCE
Status: DISCONTINUED | OUTPATIENT
Start: 2018-06-13 | End: 2018-06-13

## 2018-06-13 RX ORDER — POTASSIUM CHLORIDE 7.45 MG/ML
20 INJECTION INTRAVENOUS ONCE
Status: COMPLETED | OUTPATIENT
Start: 2018-06-13 | End: 2018-06-13

## 2018-06-13 RX ADMIN — SODIUM CHLORIDE, PRESERVATIVE FREE 10 ML: 5 INJECTION INTRAVENOUS at 12:20

## 2018-06-13 RX ADMIN — POTASSIUM CHLORIDE 40 MEQ: 40 SOLUTION ORAL at 13:00

## 2018-06-13 RX ADMIN — Medication 10 ML: at 10:31

## 2018-06-13 RX ADMIN — SODIUM CHLORIDE: 9 INJECTION, SOLUTION INTRAVENOUS at 10:31

## 2018-06-13 RX ADMIN — REGADENOSON 0.4 MG: 0.08 INJECTION, SOLUTION INTRAVENOUS at 10:38

## 2018-06-13 RX ADMIN — Medication 10 ML: at 10:39

## 2018-06-13 RX ADMIN — SODIUM CHLORIDE, PRESERVATIVE FREE 10 ML: 5 INJECTION INTRAVENOUS at 10:40

## 2018-06-13 RX ADMIN — POTASSIUM CHLORIDE: 1500 TABLET, EXTENDED RELEASE ORAL at 04:10

## 2018-06-13 RX ADMIN — MOMETASONE FUROATE AND FORMOTEROL FUMARATE DIHYDRATE 2 PUFF: 200; 5 AEROSOL RESPIRATORY (INHALATION) at 09:18

## 2018-06-13 RX ADMIN — TETRAKIS(2-METHOXYISOBUTYLISOCYANIDE)COPPER(I) TETRAFLUOROBORATE 13 MILLICURIE: 1 INJECTION, POWDER, LYOPHILIZED, FOR SOLUTION INTRAVENOUS at 10:40

## 2018-06-13 RX ADMIN — AMLODIPINE BESYLATE 10 MG: 10 TABLET ORAL at 12:40

## 2018-06-13 RX ADMIN — TETRAKIS(2-METHOXYISOBUTYLISOCYANIDE)COPPER(I) TETRAFLUOROBORATE 44 MILLICURIE: 1 INJECTION, POWDER, LYOPHILIZED, FOR SOLUTION INTRAVENOUS at 12:20

## 2018-06-13 RX ADMIN — POTASSIUM CHLORIDE 20 MEQ: 10 INJECTION, SOLUTION INTRAVENOUS at 12:52

## 2018-06-13 RX ADMIN — Medication 10 ML: at 12:42

## 2018-06-13 RX ADMIN — SODIUM CHLORIDE 1000 ML: 9 INJECTION, SOLUTION INTRAVENOUS at 12:42

## 2018-06-13 RX ADMIN — PANTOPRAZOLE SODIUM 40 MG: 40 TABLET, DELAYED RELEASE ORAL at 12:40

## 2018-06-13 ASSESSMENT — ENCOUNTER SYMPTOMS
PHOTOPHOBIA: 0
APNEA: 0
CHOKING: 0
SHORTNESS OF BREATH: 0
CHEST TIGHTNESS: 0
VOMITING: 0
COUGH: 0
RHINORRHEA: 0
ABDOMINAL PAIN: 0
NAUSEA: 0

## 2018-06-13 ASSESSMENT — PAIN - FUNCTIONAL ASSESSMENT: PAIN_FUNCTIONAL_ASSESSMENT: 0-10

## 2018-06-13 NOTE — ED PROVIDER NOTES
[facility] ED  Emergency Department Encounter  Emergency Medicine Resident     Pt Name: Josue Balwdin  MRN: [id number]  [place] 1965  Date of evaluation: 6/13/18  PCP:  Laci Guadarrama MD    [address] [place] [address]       Chief Complaint   Patient presents with    Loss of Consciousness       HISTORY OF PRESENT ILLNESS  (Location/Symptom, Timing/Onset, Context/Setting, Quality, Duration, Modifying Factors, Severity.)      Josue Baldwin is a 52 y.o. male who presents with Syncopal episode. Patient states he was at work, in the back cutting potatoes, when the next thing he knew he woke up and had EMS tinea around him. States he was feeling well prior to going to work today. Denies any prodromal symptoms, stating he was feeling well when he went to work today. No fevers, chills, chest pain, shortness of breath, no recent illnesses, no sick contacts. One episode before. No personal or family history of seizures. No family history of early cardiac death. Patient denies any cardiac history, no history of MI or stent placement. Patient states that he is a daily smoker, has high blood pressure and high cholesterol. He does drink alcohol daily, and says that he had 2 beers at approximately noon on 6/12. Denies IV drug abuse. Per EMS, patient was a little disoriented when he came to, but has since been alert and oriented. He did not lose bowel or bladder function, did not bite his tongue. No recent surgeries, no recent travel, no leg swelling, no shortness of breath. PAST MEDICAL / SURGICAL / SOCIAL / FAMILY HISTORY      has a past medical history of Alcohol abuse; Anxiety; Arthritis; COPD (chronic obstructive pulmonary disease) ([address] DDD (degenerative disc disease), lumbar; Depression; Heart burn; Hemorrhoids; HTN (hypertension); Ilioinguinal neuralgia of right side; Psychiatric problem; Seizures ([address] and Wears glasses. has a past surgical history that includes hernia repair;  Toe Surgery; Hemorrhoid surgery (3/19/15); Nerve Block (Right, 10/10/2016); other surgical history (Right, 04/10/2017); and Nerve Block (Right, 4/10/2017). Social History     Social History    Marital status:      Spouse name: N/A    Number of children: N/A    Years of education: N/A     Occupational History    Not on file. Social History Main Topics    Smoking status: Current Every Day Smoker     Packs/day: 1.00     Years: 38.00     Types: Cigarettes    Smokeless tobacco: Never Used      Comment: 1 PPD    Alcohol use 7.2 oz/week     12 Cans of beer per week      Comment: recovering alcholic, drinks occasionally    Drug use: No      Comment: recovering drug addict    Sexual activity: Not on file     Other Topics Concern    Not on file     Social History Narrative    No narrative on file       Family History   Problem Relation Age of Onset    Cancer Mother      colon ca       Allergies:  Patient has no known allergies. Home Medications:  Prior to Admission medications    Medication Sig Start Date End Date Taking? Authorizing Provider   VENTOLIN  (90 Base) MCG/ACT inhaler USE TWO PUFFS BY MOUTH TWICE A DAY AND AS NEEDED 12/26/17  Yes Ryan Song MD   ibuprofen (ADVIL;MOTRIN) 800 MG tablet TAKE 1 TABLET BY MOUTH EVERY 8 HOURS AS NEEDED FOR PAIN 12/26/17  Yes Ryan Song MD   omeprazole (PRILOSEC) 20 MG delayed release capsule TAKE ONE CAPSULE BY MOUTH ONCE A DAY 11/28/17  Yes Kofi Traylor MD   amLODIPine (NORVASC) 10 MG tablet TAKE 1 TABLET BY MOUTH DAILY 11/24/17  Yes Kofi Traylor MD   atorvastatin (LIPITOR) 80 MG tablet TAKE ONE TABLET BY MOUTH ONCE A DAY 11/1/17  Yes Kofi Traylor MD   DULERA 200-5 MCG/ACT inhaler USE TWO PUFFS BY MOUTH TWICE A DAY 8/5/17  Yes Kofi Traylor MD   bacitracin-polymyxin b (POLYSPORIN) 500-91758 UNIT/GM ointment Apply topically 2 times daily.  7/27/17  Yes Aric Kunz MD       REVIEW OF SYSTEMS    (2-9 systems for level 4, 10 or more for level 5)      Review of Systems   Constitutional: Negative for activity change, appetite change, chills, fatigue and fever. HENT: Negative for congestion and rhinorrhea. Eyes: Negative for photophobia and visual disturbance. Respiratory: Negative for apnea, cough, choking, chest tightness and shortness of breath. Cardiovascular: Negative for chest pain, palpitations and leg swelling. Gastrointestinal: Negative for abdominal pain, nausea and vomiting. Musculoskeletal: Negative for gait problem. Skin: Negative for rash and wound. Neurological: Positive for tremors and syncope. Negative for weakness and light-headedness. PHYSICAL EXAM   (up to 7 for level 4, 8 or more for level 5)      INITIAL VITALS:   /88   Pulse 105   Temp 98.6 °F (37 °C)   Resp 18   Ht 5' 9\" (1.753 m)   Wt 150 lb (68 kg)   SpO2 93%   BMI 22.15 kg/m²     Physical Exam   Constitutional: He is oriented to person, place, and time. He appears well-developed and well-nourished. No distress. HENT:   Head: Normocephalic and atraumatic. Mouth/Throat: Oropharynx is clear and moist. No oropharyngeal exudate. Eyes: Pupils are equal, round, and reactive to light. Neck: Normal range of motion. Neck supple. Cardiovascular: Normal rate. No murmur heard. Pulmonary/Chest: Effort normal. No respiratory distress. He has no wheezes. He has no rales. Abdominal: He exhibits no distension. There is no tenderness. Musculoskeletal: Normal range of motion. He exhibits no edema, tenderness or deformity. Neurological: He is alert and oriented to person, place, and time. No cranial nerve deficit. Bilateral upper extremity intention tremor, which patient states baseline, normal finger-to-nose, normal rapid alternating movements,   Skin: Skin is warm. No erythema. Abrasion noted to the left anterior shin which patient states is old   Nursing note and vitals reviewed.       DIFFERENTIAL  DIAGNOSIS PLAN (LABS / IMAGING / EKG):  Orders Placed This Encounter   Procedures    Urine Culture    XR CHEST STANDARD (2 VW)    Urinalysis    Basic Metabolic Panel    CBC Auto Differential    TOX SCR, BLD, ED    Urine Drug Screen    Osmolality    POCT troponin    POCT troponin    EKG 12 Lead    Insert peripheral IV    PATIENT STATUS (FROM ED OR OR/PROCEDURAL) Observation       MEDICATIONS ORDERED:  No orders of the defined types were placed in this encounter.       DDX: Syncope, atypical ACS, seizure, electrolyte abnormality, alcohol abuse, drug use    DIAGNOSTIC RESULTS / EMERGENCY DEPARTMENT COURSE / MDM     LABS:  Results for orders placed or performed during the hospital encounter of 10/78/74   Basic Metabolic Panel   Result Value Ref Range    Glucose 164 (H) 70 - 99 mg/dL    BUN 8 6 - 20 mg/dL    CREATININE 0.82 0.70 - 1.20 mg/dL    Bun/Cre Ratio NOT REPORTED 9 - 20    Calcium 8.9 8.6 - 10.4 mg/dL    Sodium 132 (L) 135 - 144 mmol/L    Potassium 3.1 (L) 3.7 - 5.3 mmol/L    Chloride 93 (L) 98 - 107 mmol/L    CO2 19 (L) 20 - 31 mmol/L    Anion Gap 20 (H) 9 - 17 mmol/L    GFR Non-African American >60 >60 mL/min    GFR African American >60 >60 mL/min    GFR Comment          GFR Staging NOT REPORTED    CBC Auto Differential   Result Value Ref Range    WBC 8.6 3.5 - 11.3 k/uL    RBC 4.60 4.21 - 5.77 m/uL    Hemoglobin 14.7 13.0 - 17.0 g/dL    Hematocrit 43.8 40.7 - 50.3 %    MCV 95.2 82.6 - 102.9 fL    MCH 32.0 25.2 - 33.5 pg    MCHC 33.6 28.4 - 34.8 g/dL    RDW 13.2 11.8 - 14.4 %    Platelets 253 692 - 493 k/uL    MPV 10.5 8.1 - 13.5 fL    NRBC Automated 0.0 0.0 per 100 WBC    Differential Type NOT REPORTED     Seg Neutrophils 53 36 - 65 %    Lymphocytes 27 24 - 43 %    Monocytes 16 (H) 3 - 12 %    Eosinophils % 2 1 - 4 %    Basophils 1 0 - 2 %    Immature Granulocytes 1 (H) 0 %    Segs Absolute 4.56 1.50 - 8.10 k/uL    Absolute Lymph # 2.35 1.10 - 3.70 k/uL    Absolute Mono # 1.34 (H) 0.10 - 1.20 k/uL Absolute Eos # 0.17 0.00 - 0.44 k/uL    Basophils # 0.07 0.00 - 0.20 k/uL    Absolute Immature Granulocyte 0.08 0.00 - 0.30 k/uL    WBC Morphology NOT REPORTED     RBC Morphology NOT REPORTED     Platelet Estimate NOT REPORTED    TOX SCR, BLD, ED   Result Value Ref Range    Ethanol <10 <10 mg/dL    Ethanol percent <7.400 %    Salicylate Lvl <1 (L) 3 - 10 mg/dL    Acetaminophen Level <5 (L) 10 - 30 ug/mL    Toxic Tricyclic Sc,Blood NEGATIVE NEG   Urine Drug Screen   Result Value Ref Range    Amphetamine Screen, Ur NEGATIVE NEG    Barbiturate Screen, Ur NEGATIVE NEG    Benzodiazepine Screen, Urine NEGATIVE NEG    Cocaine Metabolite, Urine NEGATIVE NEG    Methadone Screen, Urine NEGATIVE NEG    Opiates, Urine NEGATIVE NEG    Phencyclidine, Urine NEGATIVE NEG    Propoxyphene, Urine NOT REPORTED NEG    Cannabinoid Scrn, Ur POSITIVE (A) NEG    Oxycodone Screen, Ur NEGATIVE NEG    Methamphetamine, Urine NOT REPORTED NEG    Tricyclic Antidepressants, Ur NOT REPORTED NEG    MDMA URINE NOT REPORTED NEG    Buprenorphine Urine NOT REPORTED NEG    Test Information       Assay provides medical screening only. The absence of expected drug(s) and/or   Osmolality   Result Value Ref Range    Serum Osmolality 289 275 - 295 mOsm/kg   POCT troponin   Result Value Ref Range    POC Troponin I 0.01 0.00 - 0.10 ng/mL    POC Troponin Interp       The Troponin-I (POC) results cannot be compared to the Troponin-T results. RADIOLOGY:  No results found.       EKG  EKG Interpretation    Interpreted by me    Rhythm: normal sinus   Rate: Tachycardic  Axis: normal  Ectopy: none  Conduction: normal  ST Segments: no acute change  T Waves: no acute change  Q Waves: none    Clinical Impression: Nonspecific EKG    All EKG's are interpreted by the Emergency Department Physician who either signs or Co-signs this chart in the absence of a cardiologist.    [facility]/EMERGENCY DEPARTMENT COURSE:  349 AM: 58-year-old male presenting with syncopal episode at work.  Was initially disoriented per EMS, but on arrival is alert and oriented ×4. Patient is well-appearing, only vital sign abnormality is mild tachycardia, patient does not clinically appear dehydrated, does not have any risk factors for PE and unlikely given completely asymptomatic prior. Patient does have multiple cardiac risk factors, we'll perform cardiac workup, tox screen, [place]. Mild electrolyte disturbances, otherwise workup has been negative. Discussed that patient should be admitted for evaluation by cardiology given risk factors, lack of prior episodes of syncope is no clear source, as patient did not have prodromal symptoms. Patient agreeable to admission. In discussion with attending, will place patient in ETU for eval by cardiology. PROCEDURES:  None    CONSULTS:  IP CONSULT TO CARDIOLOGY    CRITICAL CARE:  None    FINAL IMPRESSION      1.  Syncope and collapse        DISPOSITION / PLAN     DISPOSITION Admitted    PATIENT REFERRED TO:  [facility] Stewart [address] [place] [address] [address] [phone]            DISCHARGE MEDICATIONS:  Current Discharge Medication List          Joshua Saavedra MD  Emergency Medicine Resident    (Please note that portions of this note were completed with a voice recognition program.  Efforts were made to edit the dictations but occasionally words are mis-transcribed.)       Joshua Saavedra MD  06/13/18 0224

## 2018-06-13 NOTE — DISCHARGE SUMMARY
well, passing flatus, urinating adequately, ambulating and had adequate analgesia on oral pain medications. He is medically stable to be discharged. Clinical course has improved. I feel the patient can be safely discharged to home with outpatient follow up. Instructions have been given for the patient to return to the ED for any worsening of the symptoms, including but not limited to increased pain, shortness of breath, weakness, or any deterioration of their current condition . Disposition: Home    Condition: Good      Patient stable and ready for discharge home. I have discussed plan of care with patient and they are in understanding. They were instructed to read discharge paperwork. All of their questions and concerns were addressed.      Patient states that they understand the plan and agree with the plan      Time Spent: 0        Rivas Mccullough MD  Emergency Medicine Resident Physician

## 2018-06-13 NOTE — ED PROVIDER NOTES
Care assumed from Dr. Nelson Daly,     [address] presents with LOC without prodrome. Plan for admission and Syncope workup.  Pt boarding currently           Naida Angeles MD  06/13/18 3328

## 2018-06-13 NOTE — ED PROVIDER NOTES
and 100-110  Axis: normal  Ectopy: none  Conduction: normal  ST Segments: depression in  v3  T Waves: non specific changes  Q Waves: none    Clinical Impression: non-specific EKG    Imtiaz Van M.D,  Attending Emergency  Physician           Tianna Antunez MD  06/13/18 4687

## 2018-06-13 NOTE — ED NOTES
Pt resting in bed. RR and unlabored. NAD noted. Awaiting clean bed.      Elio Kirk, JOHN  06/13/18 1083

## 2018-06-13 NOTE — H&P
[facility]  CDU / OBSERVATION eNCOUnter  Resident Note     Pt Name: Laney Astudillo  MRN: [id number]  [place] 1965  Date of evaluation: 6/13/18  Patient's PCP is :  Timmy Clancy MD    [address] [place] [address]       Chief Complaint   Patient presents with    Loss of Consciousness         HISTORY OF PRESENT ILLNESS    Laney Astudillo is a 52 y.o. male who presents acute syncopal episode without prodromal symptoms. Denies chest pain or associated symptoms. Location/Symptom: syncope  Timing/Onset: prior to arrival  Provocation: none  Quality: none  Radiation: none  Severity: none  Timing/Duration: none    Modifying Factors: none    REVIEW OF SYSTEMS       General ROS - No fevers, No malaise  Psychological ROS - No Headache, No suicidal thoughts  Ophthalmic ROS - No discharge, No changes in vision  ENT ROS -  No sore throat, No rhinorrhea,   Respiratory ROS - no cough, No shortness of breath, or wheezing  Cardiovascular ROS - No chest pain or dyspnea on exertion  Gastrointestinal ROS - No abdominal pain, change in bowel habits, or black or bloody stools  Genito-Urinary ROS - No dysuria, trouble voiding, or hematuria  Musculoskeletal ROS - No myalgias, No arthalgias  Neurological ROS - No focal weakness or loss of sensation  Dermatological ROS - No lesions, No rash    (PQRS) Advance directives on face sheet per hospital policy. No change unless specifically mentioned in chart    PAST MEDICAL HISTORY    has a past medical history of Alcohol abuse; Anxiety; Arthritis; COPD (chronic obstructive pulmonary disease) ([address] DDD (degenerative disc disease), lumbar; Depression; Heart burn; Hemorrhoids; HTN (hypertension); Ilioinguinal neuralgia of right side; Psychiatric problem; Seizures ([address] and Wears glasses. I have reviewed the past medical history with the patient and it is pertinent to this complaint. SURGICAL HISTORY      has a past surgical history that includes hernia repair;  Toe Surgery; Hemorrhoid surgery (3/19/15); Nerve Block (Right, 10/10/2016); other surgical history (Right, 04/10/2017); and Nerve Block (Right, 4/10/2017). I have reviewed and agree with Surgical History entered    CURRENT MEDICATIONS       mometasone-formoterol (DULERA) 200-5 MCG/ACT inhaler 2 puff BID   atorvastatin (LIPITOR) tablet 80 mg Nightly   amLODIPine (NORVASC) tablet 10 mg Daily   pantoprazole (PROTONIX) tablet 40 mg QAM AC   albuterol sulfate  (90 Base) MCG/ACT inhaler 1 puff Q4H PRN   ibuprofen (ADVIL;MOTRIN) tablet 600 mg Q8H PRN   sodium chloride flush 0.9 % injection 10 mL 2 times per day   sodium chloride flush 0.9 % injection 10 mL PRN   acetaminophen (TYLENOL) tablet 650 mg Q4H PRN   potassium chloride (KLOR-CON) packet 20 mEq BID   0.9 % sodium chloride bolus Once       All medication charted and reviewed. ALLERGIES     has No Known Allergies. FAMILY HISTORY     indicated that his mother is alive. He indicated that his father is . family history includes Cancer in his mother. The patient denies any pertinent family history. I have reviewed and agree with the family history entered. I have reviewed the Family History and it is not significant to the case    SOCIAL HISTORY      reports that he has been smoking Cigarettes. He has a 38.00 pack-year smoking history. He has never used smokeless tobacco. He reports that he drinks about 7.2 oz of alcohol per week . He reports that he does not use drugs. I have reviewed and agree with all Social.    PHYSICAL EXAM     INITIAL VITALS:  height is 5' 9\" (1.753 m) and weight is 150 lb (68 kg). His oral temperature is 97.2 °F (36.2 °C). His blood pressure is 132/88 and his pulse is 74. His respiration is 18 and oxygen saturation is 95%.       CONSTITUTIONAL: AOx4, no apparent distress, appears stated age   HEAD: normocephalic, atraumatic   EYES: PERRLA, EOMI    ENT: moist mucous membranes, uvula midline   NECK: supple, symmetric

## 2018-06-13 NOTE — CONSULTS
DULERA 200-5 MCG/ACT inhaler USE TWO PUFFS BY MOUTH TWICE A DAY 8/5/17  Yes Adonay Min MD   bacitracin-polymyxin b (POLYSPORIN) 500-19767 UNIT/GM ointment Apply topically 2 times daily. 7/27/17  Yes Janae Petty MD   amLODIPine (NORVASC) 10 MG tablet TAKE 1 TABLET BY MOUTH DAILY 11/24/17   Ernesto Tapia MD   atorvastatin (LIPITOR) 80 MG tablet TAKE ONE TABLET BY MOUTH ONCE A DAY 11/1/17   Ernesto Tapia MD        No current facility-administered medications for this encounter. Allergies:  Patient has no known allergies. Social History:   reports that he has been smoking Cigarettes. He has a 38.00 pack-year smoking history. He has never used smokeless tobacco. He reports that he drinks about 7.2 oz of alcohol per week . He reports that he does not use drugs. Family History: family history includes Cancer in his mother. No h/o sudden cardiac death. No for premature CAD    REVIEW OF SYSTEMS:    Negative apart from stated in HPI    PHYSICAL EXAM:      /88   Pulse 74   Temp 97.2 °F (36.2 °C) (Oral)   Resp 18   Ht 5' 9\" (1.753 m)   Wt 150 lb (68 kg)   SpO2 95%   BMI 22.15 kg/m²    No intake or output data in the 24 hours ending 06/13/18 0941  PHYSICAL EXAM:      /88   Pulse 74   Temp 97.2 °F (36.2 °C) (Oral)   Resp 18   Ht 5' 9\" (1.753 m)   Wt 150 lb (68 kg)   SpO2 95%   BMI 22.15 kg/m²      HEENT: AXOX3, PERRL  Neck: Supple no JVD  Heart: Normal S1, S2, no murmur regular rhythm  Chest: CTA bilateral  Abdo: Soft not tender normal BS  LE: No edema , palpable pulses bilateral  Neuro: Grossly no focal deficit    DATA:    Diagnostics:    EKG: normal sinus rhythm, nonspecific ST and T waves changes. ECHO:  not obtained. Stress Test: not obtained. Cardiac Angiography: not obtained.     Labs:     CBC:   Recent Labs      06/13/18   0230   WBC  8.6   HGB  14.7   HCT  43.8   PLT  176     BMP:   Recent Labs      06/13/18   0230   NA  132*   K  3.1*   CO2  19*   BUN  8 CREATININE  0.82   LABGLOM  >60   GLUCOSE  164*     Pro-BNP:  No results for input(s): PROBNP in the last 72 hours. BNP: No results for input(s): BNP in the last 72 hours. PT/INR: No results for input(s): PROTIME, INR in the last 72 hours. APTT:No results for input(s): APTT in the last 72 hours. CARDIAC ENZYMES:  Recent Labs      06/13/18   0148  06/13/18   0346   TROPONINI  0.01  0.01     No results for input(s): TROPONINT in the last 72 hours. FASTING LIPID PANEL:  Lab Results   Component Value Date    HDL 86 04/10/2017    TRIG 76 04/10/2017     LIVER PROFILE:No results for input(s): AST, ALT, LABALBU in the last 72 hours. Patient's Active Problem List  Active Problems:    Syncope and collapse  Resolved Problems:    * No resolved hospital problems. *        IMPRESSION:    1. Syncope - likely volume depletion  2. Hypokalemia  3. h/o HTN    RECOMMENDATIONS:  1. Echo  2. Stress test  3. IV fluids  4. Replace K and Mg         Discussed with patient and Nurse.         Mariana Zarate MD MSc. [address] [facility]  Cardiology/Electrophysiology  [facility]  [phone]

## 2018-06-13 NOTE — ED NOTES
Bed: 34  Expected date: 6/13/18  Expected time: 1:29 AM  Means of arrival: [facility]  Comments:  SOM[facility] [address] Raad Narvaez RN  06/13/18 4958

## 2018-06-14 LAB
CULTURE: NO GROWTH
CULTURE: NORMAL
Lab: NORMAL
SPECIMEN DESCRIPTION: NORMAL
STATUS: NORMAL

## 2018-08-22 DIAGNOSIS — K21.9 GASTROESOPHAGEAL REFLUX DISEASE WITHOUT ESOPHAGITIS: ICD-10-CM

## 2018-08-22 RX ORDER — OMEPRAZOLE 20 MG/1
CAPSULE, DELAYED RELEASE ORAL
Qty: 90 CAPSULE | Refills: 0 | Status: SHIPPED | OUTPATIENT
Start: 2018-08-22 | End: 2019-05-10

## 2018-08-22 NOTE — TELEPHONE ENCOUNTER
escrib request for Omeprazole 20 MG Oral Capsule Delayed Release patient does not have scheduled apt. February of 2017 was last time pt was seen pc to number in chart and they stated wrong number and asked number be removed so they no longer receive calls. Health Maintenance   Topic Date Due    Shingles Vaccine (1 of 2 - 2 Dose Series) 01/04/2013    Flu vaccine (1) 09/01/2018    Lipid screen  04/10/2022    Colon cancer screen colonoscopy  12/30/2024    DTaP/Tdap/Td vaccine (2 - Td) 08/16/2026    Pneumococcal med risk  Completed    Hepatitis C screen  Completed    HIV screen  Completed             (applicable per patient's age: Cancer Screenings, Depression Screening, Fall Risk Screening, Immunizations)    LDL Cholesterol (mg/dL)   Date Value   04/10/2017 66     AST (U/L)   Date Value   04/10/2017 16     ALT (U/L)   Date Value   04/10/2017 17     BUN (mg/dL)   Date Value   06/13/2018 8      (goal A1C is < 7)   (goal LDL is <100) need 30-50% reduction from baseline     BP Readings from Last 3 Encounters:   06/13/18 132/88   07/27/17 (!) 136/90   04/10/17 (!) 125/90    (goal /80)      All Future Testing planned in [facility]:      Next Visit Date:  No future appointments.          Patient Active Problem List:     COPD (chronic obstructive pulmonary disease) (HCC)     Smoking addiction     Depression     Alcohol abuse     Dyslipidemia     Lung nodule     DDD (degenerative disc disease), lumbar     Groin pain, chronic, right     Ilioinguinal neuralgia of right side     Syncope and collapse

## 2018-09-20 DIAGNOSIS — I10 ESSENTIAL HYPERTENSION: ICD-10-CM

## 2018-09-21 RX ORDER — AMLODIPINE BESYLATE 10 MG/1
TABLET ORAL
Qty: 90 TABLET | Refills: 0 | Status: SHIPPED | OUTPATIENT
Start: 2018-09-21 | End: 2018-10-10 | Stop reason: SDUPTHER

## 2018-09-21 NOTE — TELEPHONE ENCOUNTER
Refill on Norvasc. Left VM for patient to call and schedule appt. Last visit:2/14/17  Last Med refill:11/24/17    Next Visit Date:  No future appointments.     Health Maintenance   Topic Date Due    Shingles Vaccine (1 of 2 - 2 Dose Series) 01/04/2013    Flu vaccine (1) 09/01/2018    Lipid screen  04/10/2022    Colon cancer screen colonoscopy  12/30/2024    DTaP/Tdap/Td vaccine (2 - Td) 08/16/2026    Pneumococcal med risk  Completed    Hepatitis C screen  Completed    HIV screen  Completed       No results found for: LABA1C          ( goal A1C is < 7)   No results found for: LABMICR  LDL Cholesterol (mg/dL)   Date Value   04/10/2017 66   01/15/2016 165 (H)       (goal LDL is <100)   AST (U/L)   Date Value   04/10/2017 16     ALT (U/L)   Date Value   04/10/2017 17     BUN (mg/dL)   Date Value   06/13/2018 8     BP Readings from Last 3 Encounters:   06/13/18 132/88   07/27/17 (!) 136/90   04/10/17 (!) 125/90          (goal 120/80)    All Future Testing planned in [facility]              Patient Active Problem List:     COPD (chronic obstructive pulmonary disease) (HCC)     Smoking addiction     Depression     Alcohol abuse     Dyslipidemia     Lung nodule     DDD (degenerative disc disease), lumbar     Groin pain, chronic, right     Ilioinguinal neuralgia of right side     Syncope and collapse

## 2018-10-04 ENCOUNTER — HOSPITAL ENCOUNTER (EMERGENCY)
Age: 53
Discharge: HOME | End: 2018-10-04
Attending: EMERGENCY MEDICINE
Payer: MEDICAID

## 2018-10-04 VITALS
WEIGHT: 160 LBS | RESPIRATION RATE: 16 BRPM | OXYGEN SATURATION: 98 % | HEIGHT: 69 IN | DIASTOLIC BLOOD PRESSURE: 102 MMHG | HEART RATE: 91 BPM | SYSTOLIC BLOOD PRESSURE: 141 MMHG | TEMPERATURE: 98 F | BODY MASS INDEX: 23.7 KG/M2

## 2018-10-04 DIAGNOSIS — L03.319 CELLULITIS OF TRUNK, UNSPECIFIED SITE OF TRUNK: ICD-10-CM

## 2018-10-04 DIAGNOSIS — R21 RASH AND OTHER NONSPECIFIC SKIN ERUPTION: Primary | ICD-10-CM

## 2018-10-04 PROCEDURE — 99281 EMR DPT VST MAYX REQ PHY/QHP: CPT

## 2018-10-04 PROCEDURE — 6370000000 HC RX 637 (ALT 250 FOR IP): Performed by: EMERGENCY MEDICINE

## 2018-10-04 RX ORDER — CEPHALEXIN 250 MG/1
500 CAPSULE ORAL ONCE
Status: COMPLETED | OUTPATIENT
Start: 2018-10-04 | End: 2018-10-04

## 2018-10-04 RX ORDER — PREDNISONE 20 MG/1
60 TABLET ORAL ONCE
Status: COMPLETED | OUTPATIENT
Start: 2018-10-04 | End: 2018-10-04

## 2018-10-04 RX ORDER — CEPHALEXIN 500 MG/1
500 CAPSULE ORAL 4 TIMES DAILY
Qty: 28 CAPSULE | Refills: 0 | Status: SHIPPED | OUTPATIENT
Start: 2018-10-04 | End: 2018-10-11

## 2018-10-04 RX ORDER — PREDNISONE 10 MG/1
TABLET ORAL
Qty: 20 TABLET | Refills: 0 | Status: SHIPPED | OUTPATIENT
Start: 2018-10-04 | End: 2018-10-14

## 2018-10-04 RX ADMIN — PREDNISONE 60 MG: 20 TABLET ORAL at 08:53

## 2018-10-04 RX ADMIN — CEPHALEXIN 500 MG: 250 CAPSULE ORAL at 08:54

## 2018-10-04 NOTE — ED PROVIDER NOTES
muscle tone. Skin: Rash noted. He is not diaphoretic. There is erythema. Psychiatric: He has a normal mood and affect. His behavior is normal. Thought content normal.   Nursing note and vitals reviewed. DIAGNOSTIC RESULTS     EKG: All EKG's are interpreted by the Emergency Department Physician who either signs or Co-signs this chart in the absence of a cardiologist.      RADIOLOGY:   All plain film, CT, MRI, and formal ultrasound images (except ED bedside ultrasound) are read by the radiologist, see reports below, unless otherwise noted in the medical decision-making or here. No orders to display           LABS:  Labs Reviewed - No data to display      EMERGENCY DEPARTMENT COURSE:   Medical Decision Making:  Rash, red, blanching, erupting, itchy. Consideration for bug bites, but patient denies fleas, bed bugs, scabies and states friend has no sx. Definite exposure to some agent, will treat sx. Concern for superinfection under belt buckle area, will start kelfex. Otherwise, low suspicion for infectious cause, will give steroids, hydroxyzine, trial of premethrin. F/u pcp. Patient instructed to follow-up with PCP or return to the nearest emergency department for uncontrolled fevers, vomiting, shortness of breath, chest pain, or any other concerns. Patient acknowledges plan, voices understanding of it and is in agreement with it. Pre-hypertension/Hypertension: Patient has been informed thatthey may have pre-hypertension or Hypertension based on a blood pressure reading in the Emergency Department. It was recommended patient calls the primary care provider listed on discharge instructions or a physician of patient's choice this week to arrange follow up for further evaluation of possible pre-hypertension or Hypertension. CRITICAL CARE:       CONSULTS:  None      FINAL IMPRESSION      1. Rash and other nonspecific skin eruption    2.  Cellulitis of trunk, unspecified site of trunk

## 2018-10-07 ASSESSMENT — ENCOUNTER SYMPTOMS
VOMITING: 0
EYE PAIN: 0
SHORTNESS OF BREATH: 0
SORE THROAT: 0
COUGH: 0
ABDOMINAL PAIN: 0
DIARRHEA: 0
NAUSEA: 0
BACK PAIN: 0

## 2018-10-10 ENCOUNTER — OFFICE VISIT (OUTPATIENT)
Dept: INTERNAL MEDICINE | Age: 53
End: 2018-10-10
Payer: MEDICAID

## 2018-10-10 VITALS
HEART RATE: 90 BPM | BODY MASS INDEX: 23.33 KG/M2 | SYSTOLIC BLOOD PRESSURE: 150 MMHG | WEIGHT: 158 LBS | DIASTOLIC BLOOD PRESSURE: 100 MMHG

## 2018-10-10 DIAGNOSIS — I10 ESSENTIAL HYPERTENSION: ICD-10-CM

## 2018-10-10 DIAGNOSIS — Z00.00 HEALTH CARE MAINTENANCE: ICD-10-CM

## 2018-10-10 DIAGNOSIS — L24.9 IRRITANT CONTACT DERMATITIS, UNSPECIFIED TRIGGER: ICD-10-CM

## 2018-10-10 DIAGNOSIS — J44.9 CHRONIC OBSTRUCTIVE PULMONARY DISEASE, UNSPECIFIED COPD TYPE (HCC): ICD-10-CM
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
## 2018-10-10 DIAGNOSIS — Z23 FLU VACCINE NEED: ICD-10-CM

## 2018-10-10 DIAGNOSIS — Z23 NEED FOR SHINGLES VACCINE: ICD-10-CM

## 2018-10-10 DIAGNOSIS — J44.9 CHRONIC OBSTRUCTIVE PULMONARY DISEASE, UNSPECIFIED COPD TYPE (HCC): Primary | ICD-10-CM

## 2018-10-10 DIAGNOSIS — R91.1 LUNG NODULE: Primary | ICD-10-CM

## 2018-10-10 DIAGNOSIS — Z87.891 PERSONAL HISTORY OF TOBACCO USE: ICD-10-CM

## 2018-10-10 LAB — HBA1C MFR BLD: 5.5 %

## 2018-10-10 PROCEDURE — 83036 HEMOGLOBIN GLYCOSYLATED A1C: CPT | Performed by: STUDENT IN AN ORGANIZED HEALTH CARE EDUCATION/TRAINING PROGRAM

## 2018-10-10 PROCEDURE — 3023F SPIROM DOC REV: CPT | Performed by: STUDENT IN AN ORGANIZED HEALTH CARE EDUCATION/TRAINING PROGRAM

## 2018-10-10 PROCEDURE — 96160 PT-FOCUSED HLTH RISK ASSMT: CPT | Performed by: STUDENT IN AN ORGANIZED HEALTH CARE EDUCATION/TRAINING PROGRAM

## 2018-10-10 PROCEDURE — G0296 VISIT TO DETERM LDCT ELIG: HCPCS | Performed by: STUDENT IN AN ORGANIZED HEALTH CARE EDUCATION/TRAINING PROGRAM

## 2018-10-10 PROCEDURE — 3017F COLORECTAL CA SCREEN DOC REV: CPT | Performed by: STUDENT IN AN ORGANIZED HEALTH CARE EDUCATION/TRAINING PROGRAM

## 2018-10-10 PROCEDURE — 4004F PT TOBACCO SCREEN RCVD TLK: CPT | Performed by: STUDENT IN AN ORGANIZED HEALTH CARE EDUCATION/TRAINING PROGRAM

## 2018-10-10 PROCEDURE — G8427 DOCREV CUR MEDS BY ELIG CLIN: HCPCS | Performed by: STUDENT IN AN ORGANIZED HEALTH CARE EDUCATION/TRAINING PROGRAM

## 2018-10-10 PROCEDURE — G8484 FLU IMMUNIZE NO ADMIN: HCPCS | Performed by: STUDENT IN AN ORGANIZED HEALTH CARE EDUCATION/TRAINING PROGRAM

## 2018-10-10 PROCEDURE — G8420 CALC BMI NORM PARAMETERS: HCPCS | Performed by: STUDENT IN AN ORGANIZED HEALTH CARE EDUCATION/TRAINING PROGRAM

## 2018-10-10 PROCEDURE — 99214 OFFICE O/P EST MOD 30 MIN: CPT | Performed by: STUDENT IN AN ORGANIZED HEALTH CARE EDUCATION/TRAINING PROGRAM

## 2018-10-10 PROCEDURE — 99211 OFF/OP EST MAY X REQ PHY/QHP: CPT | Performed by: INTERNAL MEDICINE

## 2018-10-10 PROCEDURE — G8926 SPIRO NO PERF OR DOC: HCPCS | Performed by: STUDENT IN AN ORGANIZED HEALTH CARE EDUCATION/TRAINING PROGRAM

## 2018-10-10 RX ORDER — PERMETHRIN 1 MG/100ML
LOTION TOPICAL
Refills: 0 | COMMUNITY
Start: 2018-10-04 | End: 2019-02-11

## 2018-10-10 RX ORDER — ALBUTEROL SULFATE 90 UG/1
2 AEROSOL, METERED RESPIRATORY (INHALATION) EVERY 4 HOURS PRN
Qty: 1 INHALER | Refills: 0 | Status: SHIPPED | OUTPATIENT
Start: 2018-10-10 | End: 2018-11-01 | Stop reason: SDUPTHER

## 2018-10-10 RX ORDER — CALAMINE 8% AND ZINC OXIDE 8% 160 MG/ML
1 LOTION TOPICAL 2 TIMES DAILY
Qty: 1 BOTTLE | Refills: 3 | Status: CANCELLED | OUTPATIENT
Start: 2018-10-10

## 2018-10-10 RX ORDER — CETIRIZINE HYDROCHLORIDE 10 MG/1
10 TABLET ORAL DAILY
Qty: 30 TABLET | Refills: 1 | Status: CANCELLED | OUTPATIENT
Start: 2018-10-10

## 2018-10-10 RX ORDER — HYDROXYZINE HYDROCHLORIDE 25 MG/1
25 TABLET, FILM COATED ORAL EVERY 8 HOURS PRN
Qty: 30 TABLET | Refills: 0 | Status: SHIPPED | OUTPATIENT
Start: 2018-10-10 | End: 2018-10-20

## 2018-10-10 RX ORDER — AMLODIPINE BESYLATE 10 MG/1
10 TABLET ORAL DAILY
Qty: 90 TABLET | Refills: 2 | Status: SHIPPED | OUTPATIENT
Start: 2018-10-10 | End: 2018-11-01 | Stop reason: SDUPTHER

## 2018-10-10 ASSESSMENT — PATIENT HEALTH QUESTIONNAIRE - PHQ9
2. FEELING DOWN, DEPRESSED OR HOPELESS: 3
4. FEELING TIRED OR HAVING LITTLE ENERGY: 2
6. FEELING BAD ABOUT YOURSELF - OR THAT YOU ARE A FAILURE OR HAVE LET YOURSELF OR YOUR FAMILY DOWN: 1
SUM OF ALL RESPONSES TO PHQ9 QUESTIONS 1 & 2: 3
10. IF YOU CHECKED OFF ANY PROBLEMS, HOW DIFFICULT HAVE THESE PROBLEMS MADE IT FOR YOU TO DO YOUR WORK, TAKE CARE OF THINGS AT HOME, OR GET ALONG WITH OTHER PEOPLE: 0
9. THOUGHTS THAT YOU WOULD BE BETTER OFF DEAD, OR OF HURTING YOURSELF: 0
SUM OF ALL RESPONSES TO PHQ QUESTIONS 1-9: 15
5. POOR APPETITE OR OVEREATING: 3
3. TROUBLE FALLING OR STAYING ASLEEP: 3
8. MOVING OR SPEAKING SO SLOWLY THAT OTHER PEOPLE COULD HAVE NOTICED. OR THE OPPOSITE, BEING SO FIGETY OR RESTLESS THAT YOU HAVE BEEN MOVING AROUND A LOT MORE THAN USUAL: 0
7. TROUBLE CONCENTRATING ON THINGS, SUCH AS READING THE NEWSPAPER OR WATCHING TELEVISION: 3
1. LITTLE INTEREST OR PLEASURE IN DOING THINGS: 0
SUM OF ALL RESPONSES TO PHQ QUESTIONS 1-9: 15

## 2018-10-10 NOTE — PATIENT INSTRUCTIONS
CT lung screening you will contacted by scheduling to schedule this within 5-7 business days if not you may contact them at [phone]. You have been given a lab order no need to schedule you are required to fast for 12 hours so nothing to eat, drink or smoke 12 hours prior to completing lab orders. Please complete these labs prior to your next visit. Your medications for this visit were escribed to your preferred pharmacy. Avs was given and reviewed appt card given with next appt.  MS

## 2018-10-11 ENCOUNTER — TELEPHONE (OUTPATIENT)
Dept: ONCOLOGY | Age: 53
End: 2018-10-11

## 2018-10-11 DIAGNOSIS — Z87.891 PERSONAL HISTORY OF NICOTINE DEPENDENCE: Primary | ICD-10-CM

## 2018-10-23 RX ORDER — PREDNISONE 20 MG/1
40 TABLET ORAL DAILY
Qty: 10 TABLET | Refills: 0 | Status: SHIPPED | OUTPATIENT
Start: 2018-10-23 | End: 2018-10-31 | Stop reason: SDUPTHER

## 2018-10-23 NOTE — TELEPHONE ENCOUNTER
PC to [phone] number and it just continued to ring, then called cell phone number and left message for pt to contact office in regards to message from physician.

## 2018-10-31 ENCOUNTER — TELEPHONE (OUTPATIENT)
Dept: INTERNAL MEDICINE | Age: 53
End: 2018-10-31

## 2018-10-31 RX ORDER — PREDNISONE 20 MG/1
40 TABLET ORAL DAILY
Qty: 10 TABLET | Refills: 0 | Status: SHIPPED | OUTPATIENT
Start: 2018-10-31 | End: 2019-05-10

## 2018-10-31 NOTE — TELEPHONE ENCOUNTER
PC from he states that he finished his prednisone and that his bites on his stomach are worse. He states while he was taking the prednisone it was healing but now that he is out they are worse than they were before.  Pt has appt tomorrow (11/1/18)

## 2018-10-31 NOTE — TELEPHONE ENCOUNTER
george request for PredniSONE 20 MG Oral Tablet future appointment scheduled.     Health Maintenance   Topic Date Due    Shingles Vaccine (1 of 2 - 2 Dose Series) 01/04/2013    Low dose CT lung screening  03/03/2018    Flu vaccine (1) 09/01/2018    Lipid screen  04/10/2022    Colon cancer screen colonoscopy  12/30/2024    DTaP/Tdap/Td vaccine (2 - Td) 08/16/2026    Pneumococcal med risk  Completed    Hepatitis C screen  Completed    HIV screen  Completed             (applicable per patient's age: Cancer Screenings, Depression Screening, Fall Risk Screening, Immunizations)    Hemoglobin A1C (%)   Date Value   10/10/2018 5.5     LDL Cholesterol (mg/dL)   Date Value   04/10/2017 66     AST (U/L)   Date Value   04/10/2017 16     ALT (U/L)   Date Value   04/10/2017 17     BUN (mg/dL)   Date Value   06/13/2018 8      (goal A1C is < 7)   (goal LDL is <100) need 30-50% reduction from baseline     BP Readings from Last 3 Encounters:   10/10/18 (!) 150/100   10/04/18 (!) 141/102   06/13/18 132/88    (goal /80)      All Future Testing planned in [facility]:  Lab Frequency Next Occurrence   Lipid Panel Once 11/18/2018   TSH With Reflex Ft4 Once 11/18/2018   CT LUNG SCREENING (ANNUAL) Once 12/20/2018   CT LUNG SCREENING (ANNUAL) Once 10/11/2018       Next Visit Date:  Future Appointments  Date Time Provider Mason Vizcaino   11/1/2018 9:00 AM Manuel Leong MD [facility]   11/6/2018 11:15 AM STC CT RM 1 [facility] CT SCAN 145 [facility] Radiolog            Patient Active Problem List:     COPD (chronic obstructive pulmonary disease) ([facility] [address]     Smoking addiction     Depression     Alcohol abuse     Dyslipidemia     Lung nodule     DDD (degenerative disc disease), lumbar     Groin pain, chronic, right     Ilioinguinal neuralgia of right side     Syncope and collapse

## 2018-11-01 ENCOUNTER — OFFICE VISIT (OUTPATIENT)
Dept: INTERNAL MEDICINE | Age: 53
End: 2018-11-01
Payer: MEDICAID

## 2018-11-01 VITALS
DIASTOLIC BLOOD PRESSURE: 90 MMHG | BODY MASS INDEX: 20.44 KG/M2 | SYSTOLIC BLOOD PRESSURE: 140 MMHG | HEART RATE: 81 BPM | WEIGHT: 138 LBS | HEIGHT: 69 IN

## 2018-11-01 DIAGNOSIS — Z87.891 PERSONAL HISTORY OF TOBACCO USE: ICD-10-CM

## 2018-11-01 DIAGNOSIS — I10 ESSENTIAL HYPERTENSION: ICD-10-CM

## 2018-11-01 DIAGNOSIS — J44.9 CHRONIC OBSTRUCTIVE PULMONARY DISEASE, UNSPECIFIED COPD TYPE (HCC): ICD-10-CM

## 2018-11-01 DIAGNOSIS — L25.9 CONTACT DERMATITIS, UNSPECIFIED CONTACT DERMATITIS TYPE, UNSPECIFIED TRIGGER: Primary | ICD-10-CM

## 2018-11-01 DIAGNOSIS — M54.16 LUMBAR RADICULOPATHY: ICD-10-CM

## 2018-11-01 PROCEDURE — 3017F COLORECTAL CA SCREEN DOC REV: CPT | Performed by: INTERNAL MEDICINE

## 2018-11-01 PROCEDURE — G0296 VISIT TO DETERM LDCT ELIG: HCPCS | Performed by: INTERNAL MEDICINE

## 2018-11-01 PROCEDURE — 99211 OFF/OP EST MAY X REQ PHY/QHP: CPT | Performed by: INTERNAL MEDICINE

## 2018-11-01 PROCEDURE — 4004F PT TOBACCO SCREEN RCVD TLK: CPT | Performed by: INTERNAL MEDICINE

## 2018-11-01 PROCEDURE — 99214 OFFICE O/P EST MOD 30 MIN: CPT | Performed by: INTERNAL MEDICINE

## 2018-11-01 PROCEDURE — G8926 SPIRO NO PERF OR DOC: HCPCS | Performed by: INTERNAL MEDICINE

## 2018-11-01 PROCEDURE — G8484 FLU IMMUNIZE NO ADMIN: HCPCS | Performed by: INTERNAL MEDICINE

## 2018-11-01 PROCEDURE — G8420 CALC BMI NORM PARAMETERS: HCPCS | Performed by: INTERNAL MEDICINE

## 2018-11-01 PROCEDURE — G8427 DOCREV CUR MEDS BY ELIG CLIN: HCPCS | Performed by: INTERNAL MEDICINE

## 2018-11-01 PROCEDURE — 3023F SPIROM DOC REV: CPT | Performed by: INTERNAL MEDICINE

## 2018-11-01 RX ORDER — GABAPENTIN 100 MG/1
100 CAPSULE ORAL 3 TIMES DAILY
Qty: 90 CAPSULE | Refills: 2 | Status: SHIPPED | OUTPATIENT
Start: 2018-11-01 | End: 2019-01-24 | Stop reason: SDUPTHER

## 2018-11-01 RX ORDER — LISINOPRIL 5 MG/1
5 TABLET ORAL DAILY
Qty: 30 TABLET | Refills: 3 | Status: SHIPPED | OUTPATIENT
Start: 2018-11-01 | End: 2019-02-21 | Stop reason: SDUPTHER

## 2018-11-01 RX ORDER — ALBUTEROL SULFATE 90 UG/1
2 AEROSOL, METERED RESPIRATORY (INHALATION) EVERY 4 HOURS PRN
Qty: 1 INHALER | Refills: 0 | Status: SHIPPED | OUTPATIENT
Start: 2018-11-01 | End: 2018-11-02

## 2018-11-01 RX ORDER — AMLODIPINE BESYLATE 10 MG/1
10 TABLET ORAL DAILY
Qty: 90 TABLET | Refills: 2 | Status: SHIPPED | OUTPATIENT
Start: 2018-11-01 | End: 2019-05-10

## 2018-11-01 RX ORDER — TRIAMCINOLONE ACETONIDE 1 MG/G
CREAM TOPICAL
Qty: 80 G | Refills: 2 | Status: SHIPPED | OUTPATIENT
Start: 2018-11-01 | End: 2019-02-07 | Stop reason: SDUPTHER

## 2018-11-01 NOTE — PROGRESS NOTES
Nerve Block (Right, 4/10/2017). Medications:    Current Outpatient Prescriptions   Medication Sig Dispense Refill    predniSONE (DELTASONE) 20 MG tablet Take 2 tablets by mouth daily 10 tablet 0    Umeclidinium Bromide (INCRUSE ELLIPTA) 62.5 MCG/INH AEPB Inhale 1 puff into the lungs daily 1 each 2    HM LICE TREATMENT 1 % liquid FOLLOW DIRECTIONS ON PACKAGE  0    albuterol sulfate HFA (VENTOLIN HFA) 108 (90 Base) MCG/ACT inhaler Inhale 2 puffs into the lungs every 4 hours as needed for Wheezing 1 Inhaler 0    amLODIPine (NORVASC) 10 MG tablet Take 1 tablet by mouth daily 90 tablet 2    omeprazole (PRILOSEC) 20 MG delayed release capsule TAKE ONE CAPSULE BY MOUTH ONCE A DAY 90 capsule 0    ibuprofen (ADVIL;MOTRIN) 800 MG tablet TAKE 1 TABLET BY MOUTH EVERY 8 HOURS AS NEEDED FOR PAIN 90 tablet 0    atorvastatin (LIPITOR) 80 MG tablet TAKE ONE TABLET BY MOUTH ONCE A DAY 90 tablet 1    DULERA 200-5 MCG/ACT inhaler USE TWO PUFFS BY MOUTH TWICE A DAY 1 Inhaler 0     No current facility-administered medications for this visit. Allergies:  Patient has no known allergies. Social History:   reports that he has been smoking Cigarettes. He has a 38.00 pack-year smoking history. He has never used smokeless tobacco. He reports that he drinks about 7.2 oz of alcohol per week . He reports that he does not use drugs. Family History: family history includes Cancer in his mother. REVIEW OF SYSTEMS:      Constitutional: Negative for fever and fatigue. HENT: Negative for congestion and sore throat. Eyes: Negative for eye pain and visual disturbance. Respiratory: Negative for chest tightness and shortness of breath. Cardiovascular: Negative for chest pain and orthopnea . Gastrointestinal: Negative for vomiting, abdominal pain, constipation and diarrhea. Endocrine: Negative for cold intolerance, heat intolerance, polydipsia and polyuria. Genitourinary: Negative for dysuria and frequency.

## 2018-11-02 ENCOUNTER — TELEPHONE (OUTPATIENT)
Dept: INTERNAL MEDICINE | Age: 53
End: 2018-11-02

## 2018-11-02 ENCOUNTER — HOSPITAL ENCOUNTER (OUTPATIENT)
Age: 53
Setting detail: SPECIMEN
Discharge: HOME | End: 2018-11-02
Payer: MEDICAID

## 2018-11-02 DIAGNOSIS — J44.9 CHRONIC OBSTRUCTIVE PULMONARY DISEASE, UNSPECIFIED COPD TYPE (HCC): Primary | ICD-10-CM

## 2018-11-02 DIAGNOSIS — Z00.00 HEALTH CARE MAINTENANCE: ICD-10-CM

## 2018-11-02 LAB
CHOLESTEROL/HDL RATIO: 2.2
CHOLESTEROL: 220 MG/DL
HDLC SERPL-MCNC: 98 MG/DL
LDL CHOLESTEROL: 106 MG/DL (ref 0–130)
TRIGL SERPL-MCNC: 80 MG/DL
TSH SERPL DL<=0.05 MIU/L-ACNC: 0.37 MIU/L (ref 0.3–5)
VLDLC SERPL CALC-MCNC: ABNORMAL MG/DL (ref 1–30)

## 2018-11-02 PROCEDURE — 36415 COLL VENOUS BLD VENIPUNCTURE: CPT

## 2018-11-02 PROCEDURE — 84443 ASSAY THYROID STIM HORMONE: CPT

## 2018-11-02 PROCEDURE — 80061 LIPID PANEL: CPT

## 2018-11-02 RX ORDER — FLUTICASONE FUROATE AND VILANTEROL 200; 25 UG/1; UG/1
1 POWDER RESPIRATORY (INHALATION) DAILY
Qty: 1 EACH | Refills: 2 | Status: SHIPPED | OUTPATIENT
Start: 2018-11-02 | End: 2018-11-05

## 2018-11-02 RX ORDER — ALBUTEROL SULFATE 90 UG/1
2 AEROSOL, METERED RESPIRATORY (INHALATION) EVERY 6 HOURS PRN
Qty: 1 INHALER | Refills: 3 | Status: SHIPPED | OUTPATIENT
Start: 2018-11-02 | End: 2018-11-05 | Stop reason: CLARIF

## 2018-11-05 RX ORDER — ALBUTEROL SULFATE 90 UG/1
2 AEROSOL, METERED RESPIRATORY (INHALATION) EVERY 6 HOURS PRN
Qty: 1 INHALER | Refills: 3 | COMMUNITY
Start: 2018-11-05 | End: 2019-04-16 | Stop reason: SDUPTHER

## 2018-12-15 DIAGNOSIS — E78.5 DYSLIPIDEMIA: ICD-10-CM

## 2018-12-17 NOTE — TELEPHONE ENCOUNTER
george request for atorvastatin (LIPITOR) 80 MG tablet future appointment scheduled.     Health Maintenance   Topic Date Due    Shingles Vaccine (1 of 2 - 2 Dose Series) 01/04/2013    Low dose CT lung screening  03/03/2018    Flu vaccine (1) 09/01/2018    Potassium monitoring  06/13/2019    Creatinine monitoring  06/13/2019    Lipid screen  11/02/2023    Colon cancer screen colonoscopy  12/30/2024    DTaP/Tdap/Td vaccine (2 - Td) 08/16/2026    Pneumococcal med risk  Completed    Hepatitis C screen  Completed    HIV screen  Completed             (applicable per patient's age: Cancer Screenings, Depression Screening, Fall Risk Screening, Immunizations)    Hemoglobin A1C (%)   Date Value   10/10/2018 5.5     LDL Cholesterol (mg/dL)   Date Value   11/02/2018 106     AST (U/L)   Date Value   04/10/2017 16     ALT (U/L)   Date Value   04/10/2017 17     BUN (mg/dL)   Date Value   06/13/2018 8      (goal A1C is < 7)   (goal LDL is <100) need 30-50% reduction from baseline     BP Readings from Last 3 Encounters:   11/01/18 (!) 140/90   10/10/18 (!) 150/100   10/04/18 (!) 141/102    (goal /80)      All Future Testing planned in [facility]:  Lab Frequency Next Occurrence   CT LUNG SCREENING (ANNUAL) Once 12/20/2018   CT LUNG SCREENING (ANNUAL) Once 10/11/2018   CT Lung Screening (Annual) Once 02/16/2019       Next Visit Date:  Future Appointments  Date Time Provider Mason Vizcaino   1/11/2019 3:30 PM Quirino Fall MD [facility]            Patient Active Problem List:     COPD (chronic obstructive pulmonary disease) ([facility] [address]     Smoking addiction     Depression     Alcohol abuse     Dyslipidemia     Lung nodule     DDD (degenerative disc disease), lumbar     Groin pain, chronic, right     Ilioinguinal neuralgia of right side     Syncope and collapse

## 2018-12-18 RX ORDER — ATORVASTATIN CALCIUM 80 MG/1
TABLET, FILM COATED ORAL
Qty: 90 TABLET | Refills: 0 | Status: SHIPPED | OUTPATIENT
Start: 2018-12-18 | End: 2019-01-24 | Stop reason: SDUPTHER

## 2019-01-02 RX ORDER — FLUTICASONE FUROATE 100 UG/1
1 POWDER RESPIRATORY (INHALATION) DAILY
Qty: 30 EACH | Refills: 2 | Status: SHIPPED | OUTPATIENT
Start: 2019-01-02 | End: 2019-02-22 | Stop reason: SDUPTHER

## 2019-01-24 DIAGNOSIS — E78.5 DYSLIPIDEMIA: ICD-10-CM

## 2019-01-24 DIAGNOSIS — M54.16 LUMBAR RADICULOPATHY: ICD-10-CM

## 2019-01-24 RX ORDER — ATORVASTATIN CALCIUM 80 MG/1
TABLET, FILM COATED ORAL
Qty: 90 TABLET | Refills: 0 | Status: SHIPPED | OUTPATIENT
Start: 2019-01-24 | End: 2019-05-10

## 2019-01-25 RX ORDER — GABAPENTIN 100 MG/1
100 CAPSULE ORAL 3 TIMES DAILY
Qty: 90 CAPSULE | Refills: 0 | Status: SHIPPED | OUTPATIENT
Start: 2019-01-25 | End: 2019-02-22 | Stop reason: SDUPTHER

## 2019-02-07 DIAGNOSIS — L25.9 CONTACT DERMATITIS, UNSPECIFIED CONTACT DERMATITIS TYPE, UNSPECIFIED TRIGGER: ICD-10-CM

## 2019-02-07 RX ORDER — TRIAMCINOLONE ACETONIDE 1 MG/G
CREAM TOPICAL
Qty: 80 G | Refills: 1 | Status: SHIPPED | OUTPATIENT
Start: 2019-02-07 | End: 2019-04-15 | Stop reason: SDUPTHER

## 2019-02-11 ENCOUNTER — TELEPHONE (OUTPATIENT)
Dept: ADMINISTRATIVE | Age: 54
End: 2019-02-11

## 2019-02-11 DIAGNOSIS — B86 SCABIES: Primary | ICD-10-CM

## 2019-02-11 RX ORDER — PERMETHRIN 50 MG/G
CREAM TOPICAL
Qty: 2 TUBE | Refills: 1 | OUTPATIENT
Start: 2019-02-11 | End: 2019-05-10 | Stop reason: ALTCHOICE

## 2019-02-21 DIAGNOSIS — I10 ESSENTIAL HYPERTENSION: ICD-10-CM

## 2019-02-22 DIAGNOSIS — M54.16 LUMBAR RADICULOPATHY: ICD-10-CM

## 2019-02-22 RX ORDER — FLUTICASONE FUROATE 100 UG/1
1 POWDER RESPIRATORY (INHALATION) DAILY
Qty: 30 EACH | Refills: 2 | Status: SHIPPED | OUTPATIENT
Start: 2019-02-22 | End: 2019-04-16 | Stop reason: SDUPTHER

## 2019-02-22 RX ORDER — LISINOPRIL 5 MG/1
5 TABLET ORAL DAILY
Qty: 30 TABLET | Refills: 2 | Status: SHIPPED | OUTPATIENT
Start: 2019-02-22 | End: 2019-05-10

## 2019-02-23 RX ORDER — GABAPENTIN 100 MG/1
100 CAPSULE ORAL 3 TIMES DAILY
Qty: 90 CAPSULE | Refills: 2 | Status: SHIPPED | OUTPATIENT
Start: 2019-02-23 | End: 2019-05-10 | Stop reason: SDUPTHER

## 2019-04-15 ENCOUNTER — TELEPHONE (OUTPATIENT)
Dept: INTERNAL MEDICINE | Age: 54
End: 2019-04-15

## 2019-04-15 DIAGNOSIS — L25.9 CONTACT DERMATITIS, UNSPECIFIED CONTACT DERMATITIS TYPE, UNSPECIFIED TRIGGER: ICD-10-CM

## 2019-04-15 DIAGNOSIS — J44.9 CHRONIC OBSTRUCTIVE PULMONARY DISEASE, UNSPECIFIED COPD TYPE (HCC): ICD-10-CM

## 2019-04-15 NOTE — TELEPHONE ENCOUNTER
pc said he's in [facility] and has no medication he said his blood pressures have been normal with out med's but wants dr Yanna Odell opinion. he does need some cream for stomach rash and inhalers which are pended and hell call us back in 24 hrs for information      Next Visit Date: it also said hell make appointment as soon as he has privlidges to leave facilty  No future appointments.     Health Maintenance   Topic Date Due    Shingles Vaccine (1 of 2) 01/04/2013    Low dose CT lung screening  03/03/2018    Potassium monitoring  06/13/2019    Creatinine monitoring  06/13/2019    Flu vaccine (Season Ended) 09/01/2019    Lipid screen  11/02/2023    Colon cancer screen colonoscopy  12/30/2024    DTaP/Tdap/Td vaccine (2 - Td) 08/16/2026    Pneumococcal 0-64 years Vaccine  Completed    Hepatitis C screen  Completed    HIV screen  Completed       Hemoglobin A1C (%)   Date Value   10/10/2018 5.5             ( goal A1C is < 7)   No results found for: LABMICR  LDL Cholesterol (mg/dL)   Date Value   11/02/2018 106   04/10/2017 66       (goal LDL is <100)   AST (U/L)   Date Value   04/10/2017 16     ALT (U/L)   Date Value   04/10/2017 17     BUN (mg/dL)   Date Value   06/13/2018 8     BP Readings from Last 3 Encounters:   11/01/18 (!) 140/90   10/10/18 (!) 150/100   10/04/18 (!) 141/102          (goal 120/80)    All Future Testing planned in [facility]  Lab Frequency Next Occurrence   CT LUNG SCREENING (ANNUAL) Once 10/11/2018   CT Lung Screening (Annual) Once 11/01/2019               Patient Active Problem List:     COPD (chronic obstructive pulmonary disease) (HCC)     Smoking addiction     Depression     Alcohol abuse     Dyslipidemia     Lung nodule     DDD (degenerative disc disease), lumbar     Groin pain, chronic, right     Ilioinguinal neuralgia of right side     Syncope and collapse

## 2019-04-16 RX ORDER — TRIAMCINOLONE ACETONIDE 1 MG/G
CREAM TOPICAL
Qty: 80 G | Refills: 1 | Status: SHIPPED | OUTPATIENT
Start: 2019-04-16 | End: 2019-06-20 | Stop reason: ALTCHOICE

## 2019-04-16 RX ORDER — ALBUTEROL SULFATE 90 UG/1
2 AEROSOL, METERED RESPIRATORY (INHALATION) EVERY 6 HOURS PRN
Qty: 1 INHALER | Refills: 3 | Status: SHIPPED | OUTPATIENT
Start: 2019-04-16 | End: 2019-05-10 | Stop reason: SDUPTHER

## 2019-04-16 NOTE — TELEPHONE ENCOUNTER
Inhalers and Kenalog cream ordered. No need to start antihypertensive if blood pressure is running normal.  Continue to monitor blood pressure. If BP  increases then we will send him medication.

## 2019-04-17 NOTE — TELEPHONE ENCOUNTER
Writer unable to contact patient at [phone] or [phone]. Phone numbers are no longer in sevice.

## 2019-04-18 ENCOUNTER — TELEPHONE (OUTPATIENT)
Dept: INTERNAL MEDICINE | Age: 54
End: 2019-04-18

## 2019-04-18 NOTE — LETTER
[address]  [address] [facility] [address] [facility]  Phone: [phone]  Fax: [phone]    Ezio Holley MD        April 29, 2019    [facility] [address]      Dear Laurel Luu:    We have tried to contact you on numerous occasions in regards to a call you placed about a letter and have been unable to reach you. If you could contact us at [phone] (option 3 then option 1) your earliest convenience we would greatly appreciate it. If you have any questions or concerns, please don't hesitate to call.     Sincerely,        Ezio Holley MD

## 2019-04-18 NOTE — TELEPHONE ENCOUNTER
PC from pt stating that Kenalog cream is causing him to have rash,     Requesting alternative therapy    States prior to using cream he was just itchy, now cream has caused bumps on stomach and pt is itching skin raw--     Pt unable to come into appointment at this time--didn't wish to schedule f/u at this time     Please review and advise

## 2019-04-18 NOTE — TELEPHONE ENCOUNTER
Pt calling since he no longer needs his BP medication at this time. He needs a letter stating so faxed to Nurse Sarbjit Dickson [phone]  At [facility]. If he doesn't have the letter he can get in trouble for refusing to take medication and be kicked out of the program. Pt said he will make an appt as soon as he can when he gets out. Pt can be reached at [phone] it is a min phone though. He would also liked his medication list sent over as well.

## 2019-04-19 NOTE — TELEPHONE ENCOUNTER
I'm unable to give him any letter stopping his blood pressure medication.   He can discuss this in next appointment

## 2019-04-20 ENCOUNTER — TELEPHONE (OUTPATIENT)
Dept: INTERNAL MEDICINE | Age: 54
End: 2019-04-20

## 2019-04-20 NOTE — TELEPHONE ENCOUNTER
We received a prior authorization request for the member and product listed above.  The [address] [place] [address] Prior Notification Team is not able to review this request because requested  medication does not require prior authorization

## 2019-04-25 NOTE — TELEPHONE ENCOUNTER
Writer attempted to contact patient at [phone] which is a minute phone. Patient may not have reloaded so unable to leave message at this time.

## 2019-05-09 DIAGNOSIS — B86 SCABIES: ICD-10-CM

## 2019-05-10 ENCOUNTER — OFFICE VISIT (OUTPATIENT)
Dept: INTERNAL MEDICINE | Age: 54
End: 2019-05-10
Payer: MEDICAID

## 2019-05-10 ENCOUNTER — HOSPITAL ENCOUNTER (OUTPATIENT)
Age: 54
Setting detail: SPECIMEN
Discharge: HOME | End: 2019-05-10
Payer: MEDICAID

## 2019-05-10 VITALS
DIASTOLIC BLOOD PRESSURE: 88 MMHG | SYSTOLIC BLOOD PRESSURE: 122 MMHG | WEIGHT: 150 LBS | BODY MASS INDEX: 22.15 KG/M2 | HEART RATE: 83 BPM

## 2019-05-10 DIAGNOSIS — F17.200 SMOKING ADDICTION: ICD-10-CM

## 2019-05-10 DIAGNOSIS — J44.9 CHRONIC OBSTRUCTIVE PULMONARY DISEASE, UNSPECIFIED COPD TYPE (HCC): Primary | ICD-10-CM

## 2019-05-10 DIAGNOSIS — Z00.00 HEALTH CARE MAINTENANCE: ICD-10-CM

## 2019-05-10 DIAGNOSIS — I10 ESSENTIAL HYPERTENSION: ICD-10-CM

## 2019-05-10 DIAGNOSIS — M54.16 LUMBAR RADICULOPATHY: ICD-10-CM

## 2019-05-10 DIAGNOSIS — R91.1 LUNG NODULE: ICD-10-CM

## 2019-05-10 DIAGNOSIS — R10.31 GROIN PAIN, CHRONIC, RIGHT: Chronic | ICD-10-CM

## 2019-05-10 DIAGNOSIS — R21 RASH: ICD-10-CM

## 2019-05-10 DIAGNOSIS — L25.9 CONTACT DERMATITIS, UNSPECIFIED CONTACT DERMATITIS TYPE, UNSPECIFIED TRIGGER: ICD-10-CM

## 2019-05-10 DIAGNOSIS — G89.29 GROIN PAIN, CHRONIC, RIGHT: Chronic | ICD-10-CM

## 2019-05-10 LAB
ABSOLUTE EOS #: 0.15 K/UL (ref 0–0.44)
ABSOLUTE IMMATURE GRANULOCYTE: <0.03 K/UL (ref 0–0.3)
ABSOLUTE LYMPH #: 2.24 K/UL (ref 1.1–3.7)
ABSOLUTE MONO #: 0.85 K/UL (ref 0.1–1.2)
ALBUMIN SERPL-MCNC: 4.3 G/DL (ref 3.5–5.2)
ALBUMIN SERPL-MCNC: 4.3 G/DL (ref 3.5–5.2)
ALBUMIN/GLOBULIN RATIO: 1.5 (ref 1–2.5)
ALBUMIN/GLOBULIN RATIO: 1.6 (ref 1–2.5)
ALP BLD-CCNC: 82 U/L (ref 40–129)
ALP BLD-CCNC: 83 U/L (ref 40–129)
ALT SERPL-CCNC: 12 U/L (ref 5–41)
ALT SERPL-CCNC: 12 U/L (ref 5–41)
ANION GAP SERPL CALCULATED.3IONS-SCNC: 12 MMOL/L (ref 9–17)
ANION GAP SERPL CALCULATED.3IONS-SCNC: 9 MMOL/L (ref 9–17)
AST SERPL-CCNC: 12 U/L
AST SERPL-CCNC: 12 U/L
BASOPHILS # BLD: 1 % (ref 0–2)
BASOPHILS ABSOLUTE: 0.06 K/UL (ref 0–0.2)
BILIRUB SERPL-MCNC: 0.39 MG/DL (ref 0.3–1.2)
BILIRUB SERPL-MCNC: 0.41 MG/DL (ref 0.3–1.2)
BUN BLDV-MCNC: 6 MG/DL (ref 6–20)
BUN BLDV-MCNC: 6 MG/DL (ref 6–20)
BUN/CREAT BLD: ABNORMAL (ref 9–20)
BUN/CREAT BLD: ABNORMAL (ref 9–20)
CALCIUM SERPL-MCNC: 9.4 MG/DL (ref 8.6–10.4)
CALCIUM SERPL-MCNC: 9.4 MG/DL (ref 8.6–10.4)
CHLORIDE BLD-SCNC: 105 MMOL/L (ref 98–107)
CHLORIDE BLD-SCNC: 107 MMOL/L (ref 98–107)
CHOLESTEROL, FASTING: 116 MG/DL
CHOLESTEROL/HDL RATIO: 2.4
CO2: 24 MMOL/L (ref 20–31)
CO2: 24 MMOL/L (ref 20–31)
CREAT SERPL-MCNC: 0.67 MG/DL (ref 0.7–1.2)
CREAT SERPL-MCNC: 0.68 MG/DL (ref 0.7–1.2)
DIFFERENTIAL TYPE: NORMAL
EOSINOPHILS RELATIVE PERCENT: 2 % (ref 1–4)
GFR AFRICAN AMERICAN: >60 ML/MIN
GFR AFRICAN AMERICAN: >60 ML/MIN
GFR NON-AFRICAN AMERICAN: >60 ML/MIN
GFR NON-AFRICAN AMERICAN: >60 ML/MIN
GFR SERPL CREATININE-BSD FRML MDRD: ABNORMAL ML/MIN/{1.73_M2}
GLUCOSE FASTING: 85 MG/DL (ref 70–99)
GLUCOSE FASTING: 87 MG/DL (ref 70–99)
HAV IGM SER IA-ACNC: NONREACTIVE
HCT VFR BLD CALC: 45.6 % (ref 40.7–50.3)
HDLC SERPL-MCNC: 48 MG/DL
HEMOGLOBIN: 14.9 G/DL (ref 13–17)
HEPATITIS B CORE IGM ANTIBODY: NONREACTIVE
HEPATITIS B SURFACE ANTIGEN: NONREACTIVE
HEPATITIS C ANTIBODY: NONREACTIVE
HEPATITIS C ANTIBODY: NONREACTIVE
HIV AG/AB: NONREACTIVE
IMMATURE GRANULOCYTES: 0 %
LDL CHOLESTEROL: 45 MG/DL (ref 0–130)
LYMPHOCYTES # BLD: 31 % (ref 24–43)
MCH RBC QN AUTO: 31.5 PG (ref 25.2–33.5)
MCHC RBC AUTO-ENTMCNC: 32.7 G/DL (ref 28.4–34.8)
MCV RBC AUTO: 96.4 FL (ref 82.6–102.9)
MONOCYTES # BLD: 12 % (ref 3–12)
NRBC AUTOMATED: 0 PER 100 WBC
PDW BLD-RTO: 12.1 % (ref 11.8–14.4)
PLATELET # BLD: 294 K/UL (ref 138–453)
PLATELET ESTIMATE: NORMAL
PMV BLD AUTO: 10.1 FL (ref 8.1–13.5)
POTASSIUM SERPL-SCNC: 4.1 MMOL/L (ref 3.7–5.3)
POTASSIUM SERPL-SCNC: 4.2 MMOL/L (ref 3.7–5.3)
RBC # BLD: 4.73 M/UL (ref 4.21–5.77)
RBC # BLD: NORMAL 10*6/UL
SEG NEUTROPHILS: 54 % (ref 36–65)
SEGMENTED NEUTROPHILS ABSOLUTE COUNT: 4.03 K/UL (ref 1.5–8.1)
SODIUM BLD-SCNC: 140 MMOL/L (ref 135–144)
SODIUM BLD-SCNC: 141 MMOL/L (ref 135–144)
T3 FREE: 3.42 PG/ML (ref 2.02–4.43)
THYROXINE, FREE: 1.34 NG/DL (ref 0.93–1.7)
TOTAL PROTEIN: 7 G/DL (ref 6.4–8.3)
TOTAL PROTEIN: 7.1 G/DL (ref 6.4–8.3)
TRIGLYCERIDE, FASTING: 115 MG/DL
TSH SERPL DL<=0.05 MIU/L-ACNC: 0.83 MIU/L (ref 0.3–5)
VLDLC SERPL CALC-MCNC: NORMAL MG/DL (ref 1–30)
WBC # BLD: 7.4 K/UL (ref 3.5–11.3)
WBC # BLD: NORMAL 10*3/UL

## 2019-05-10 PROCEDURE — 36415 COLL VENOUS BLD VENIPUNCTURE: CPT

## 2019-05-10 PROCEDURE — 85025 COMPLETE CBC W/AUTO DIFF WBC: CPT

## 2019-05-10 PROCEDURE — 84481 FREE ASSAY (FT-3): CPT

## 2019-05-10 PROCEDURE — 87389 HIV-1 AG W/HIV-1&-2 AB AG IA: CPT

## 2019-05-10 PROCEDURE — 86803 HEPATITIS C AB TEST: CPT

## 2019-05-10 PROCEDURE — 80053 COMPREHEN METABOLIC PANEL: CPT

## 2019-05-10 PROCEDURE — 80061 LIPID PANEL: CPT

## 2019-05-10 PROCEDURE — 4004F PT TOBACCO SCREEN RCVD TLK: CPT | Performed by: INTERNAL MEDICINE

## 2019-05-10 PROCEDURE — G8926 SPIRO NO PERF OR DOC: HCPCS | Performed by: INTERNAL MEDICINE

## 2019-05-10 PROCEDURE — G8420 CALC BMI NORM PARAMETERS: HCPCS | Performed by: INTERNAL MEDICINE

## 2019-05-10 PROCEDURE — 3017F COLORECTAL CA SCREEN DOC REV: CPT | Performed by: INTERNAL MEDICINE

## 2019-05-10 PROCEDURE — 84439 ASSAY OF FREE THYROXINE: CPT

## 2019-05-10 PROCEDURE — 3023F SPIROM DOC REV: CPT | Performed by: INTERNAL MEDICINE

## 2019-05-10 PROCEDURE — 99211 OFF/OP EST MAY X REQ PHY/QHP: CPT | Performed by: INTERNAL MEDICINE

## 2019-05-10 PROCEDURE — 80074 ACUTE HEPATITIS PANEL: CPT

## 2019-05-10 PROCEDURE — 99214 OFFICE O/P EST MOD 30 MIN: CPT | Performed by: INTERNAL MEDICINE

## 2019-05-10 PROCEDURE — G8427 DOCREV CUR MEDS BY ELIG CLIN: HCPCS | Performed by: INTERNAL MEDICINE

## 2019-05-10 PROCEDURE — 84443 ASSAY THYROID STIM HORMONE: CPT

## 2019-05-10 RX ORDER — GABAPENTIN 300 MG/1
300 CAPSULE ORAL NIGHTLY
Qty: 30 CAPSULE | Refills: 2 | Status: SHIPPED | OUTPATIENT
Start: 2019-05-10 | End: 2019-07-02 | Stop reason: SDUPTHER

## 2019-05-10 RX ORDER — CLOTRIMAZOLE AND BETAMETHASONE DIPROPIONATE 10; .64 MG/G; MG/G
CREAM TOPICAL
Qty: 30 G | Refills: 2 | Status: SHIPPED | OUTPATIENT
Start: 2019-05-10 | End: 2019-06-20 | Stop reason: ALTCHOICE

## 2019-05-10 RX ORDER — ALBUTEROL SULFATE 90 UG/1
2 AEROSOL, METERED RESPIRATORY (INHALATION) EVERY 6 HOURS PRN
Qty: 1 INHALER | Refills: 3 | Status: SHIPPED | OUTPATIENT
Start: 2019-05-10 | End: 2020-03-03 | Stop reason: SDUPTHER

## 2019-05-10 RX ORDER — PERMETHRIN 50 MG/G
CREAM TOPICAL
Qty: 120 G | Refills: 0 | Status: SHIPPED | OUTPATIENT
Start: 2019-05-10 | End: 2020-01-14

## 2019-05-10 NOTE — PATIENT INSTRUCTIONS
Dr would like to see you in 3 month(s), you were placed on a wait list and will be called to schedule. Please call the office at [phone] to schedule if needed to be seen sooner. Labs given to patient, they will have them done before their next visit. Order for Full PFT faxed to [address] they will all pt for appt. Please call [phone] in not heard within 2 weeks.      Jerzy Light

## 2019-05-10 NOTE — PROGRESS NOTES
HYPERTENSION visit     BP Readings from Last 3 Encounters:   11/01/18 (!) 140/90   10/10/18 (!) 150/100   10/04/18 (!) 141/102       LDL Cholesterol (mg/dL)   Date Value   11/02/2018 106     HDL (mg/dL)   Date Value   11/02/2018 98     BUN (mg/dL)   Date Value   06/13/2018 8     CREATININE (mg/dL)   Date Value   06/13/2018 0.82     Glucose (mg/dL)   Date Value   06/13/2018 164 (H)              Have you changed or started any medications since your last visit including any over-the-counter medicines, vitamins, or herbal medicines? no   Have you stopped taking any of your medications? Is so, why? -  no  Are you having any side effects from any of your medications? - no  How often do you miss doses of your medication? rare      Have you seen any other physician or provider since your last visit?  no   Have you had any other diagnostic tests since your last visit?  no   Have you been seen in the emergency room and/or had an admission in a hospital since we last saw you?  no   Have you had your routine dental cleaning in the past 6 months?  no     Do you have an active MyChart account? If no, what is the barrier?   Yes    Patient Care Team:  Elva Silva MD as PCP - General (Internal Medicine)  Elva Silva MD as PCP - S Attributed Provider  Tobin Tolbert DO as Consulting Physician (General Surgery)  Britt Light RN as Nurse Navigator    Medical History Review  Past Medical, Family, and Social History reviewed and does contribute to the patient presenting condition    Health Maintenance   Topic Date Due    Shingles Vaccine (1 of 2) 01/04/2013    Low dose CT lung screening  03/03/2018    Potassium monitoring  06/13/2019    Creatinine monitoring  06/13/2019    Flu vaccine (Season Ended) 09/01/2019    Lipid screen  11/02/2023    Colon cancer screen colonoscopy  12/30/2024    DTaP/Tdap/Td vaccine (2 - Td) 08/16/2026    Pneumococcal 0-64 years Vaccine  Completed    Hepatitis C screen  Completed  HIV screen  Completed

## 2019-05-10 NOTE — PROGRESS NOTES
[facility]  [facility] [address] [facility]  [address]  Dept: [phone]      Today's Date: 5/10/2019  Patient Name: Rowena Wade  Patient's age: 62 y. o., 1965        CHIEF COMPLAINT:    Chief Complaint   Patient presents with    Other     had hernia surgery, was told scar tissue wrapped around nerves    Discuss Medications     changes to medication asking to change gabapentin, possibly change the medication -- asking about something for anxiety    Rash     on stomach for months, extremely itchy     Tremors     still shaky        History Obtained From:  patient    HISTORY OF PRESENT ILLNESS:      The patient is a 62 y.o. old  male and is here to follow-up for COPD, depression, alcohol use, groin pain. His COPD is stable with current inhalers. He states that he has quit drinking and has been clean for close to 90 days. He continues to have groin pain for which he has been using gabapentin. He wants to take it only nighttime as in daytime it makes him sleepy. He also has a rash below his umbilicus. It appears to be contact dermatitis however he has tried Kenalog which did not help. He is advised to avoid continuous exposure to his belt buckle from irritating the area.   We will try Lotrisone    Patient Active Problem List   Diagnosis    COPD (chronic obstructive pulmonary disease) ([address]    Smoking addiction    Depression    Alcohol abuse    Dyslipidemia    Lung nodule    DDD (degenerative disc disease), lumbar    Groin pain, chronic, right    Ilioinguinal neuralgia of right side    Syncope and collapse       Past Medical History:   has a past medical history of Alcohol abuse, Anxiety, Arthritis, COPD (chronic obstructive pulmonary disease) ([address] DDD (degenerative disc disease), lumbar, Depression, Heart burn, Hemorrhoids, HTN (hypertension), Ilioinguinal neuralgia of right side, Psychiatric problem, Seizures ([address] and Wears glasses. Past Surgical History:   has a past surgical history that includes hernia repair; Toe Surgery; Hemorrhoid surgery (3/19/15); Nerve Block (Right, 10/10/2016); other surgical history (Right, 04/10/2017); and Anesthesia Nerve Block (Right, 4/10/2017). Medications:    Current Outpatient Medications   Medication Sig Dispense Refill    albuterol sulfate HFA (VENTOLIN HFA) 108 (90 Base) MCG/ACT inhaler Inhale 2 puffs into the lungs every 6 hours as needed for Wheezing 1 Inhaler 3    gabapentin (NEURONTIN) 100 MG capsule Take 1 capsule by mouth 3 times daily for 90 days. . 90 capsule 2    Umeclidinium Bromide (INCRUSE ELLIPTA) 62.5 MCG/INH AEPB Inhale 1 puff into the lungs daily 1 each 2    triamcinolone (KENALOG) 0.1 % cream Apply topically 2 times daily. 80 g 1    fluticasone (ARNUITY ELLIPTA) 100 MCG/ACT AEPB Inhale 1 puff into the lungs daily 30 each 2    lisinopril (PRINIVIL;ZESTRIL) 5 MG tablet Take 1 tablet by mouth daily 30 tablet 2    permethrin (ELIMITE) 5 % cream Apply all over body except for face. Repeat in 14 days if needed 2 Tube 1    atorvastatin (LIPITOR) 80 MG tablet TAKE ONE TABLET BY MOUTH ONCE A DAY 90 tablet 0    amLODIPine (NORVASC) 10 MG tablet Take 1 tablet by mouth daily 90 tablet 2    predniSONE (DELTASONE) 20 MG tablet Take 2 tablets by mouth daily 10 tablet 0    omeprazole (PRILOSEC) 20 MG delayed release capsule TAKE ONE CAPSULE BY MOUTH ONCE A DAY 90 capsule 0    ibuprofen (ADVIL;MOTRIN) 800 MG tablet TAKE 1 TABLET BY MOUTH EVERY 8 HOURS AS NEEDED FOR PAIN 90 tablet 0     No current facility-administered medications for this visit. Allergies:  Patient has no known allergies. Social History:   reports that he has been smoking cigarettes. He has a 38.00 pack-year smoking history. He has never used smokeless tobacco. He reports that he drank about 7.2 oz of alcohol per week. He reports that he does not use drugs.      Family History: family history includes hours. Lab Results   Component Value Date    WBC 8.6 06/13/2018    HGB 14.7 06/13/2018    HCT 43.8 06/13/2018    MCV 95.2 06/13/2018     06/13/2018     Lab Results   Component Value Date    TSH 0.37 11/02/2018     Lab Results   Component Value Date    LABA1C 5.5 10/10/2018     No results found for: LABMICR, [id number]  No components found for: CHLPL  Lab Results   Component Value Date    TRIG 80 11/02/2018     Lab Results   Component Value Date    HDL 98 11/02/2018     Lab Results   Component Value Date    LDLCHOLESTEROL 106 11/02/2018     The 10-year ASCVD risk score ([place], et al., 2013) is: 7.5%    Values used to calculate the score:      Age: 62 years      Sex: Male      Is Non- : No      Diabetic: No      Tobacco smoker: Yes      Systolic Blood Pressure: 068 mmHg      Is BP treated: Yes      HDL Cholesterol: 98 mg/dL      Total Cholesterol: 220 mg/dL    Health Maintenance Due   Topic Date Due    Shingles Vaccine (1 of 2) 01/04/2013    Low dose CT lung screening  03/03/2018        ASSESSMENT AND PLAN    1. Chronic obstructive pulmonary disease, unspecified COPD type ([address]    - Full PFT Study With Bronchodilator; Future  - Umeclidinium Bromide (INCRUSE ELLIPTA) 62.5 MCG/INH AEPB; Inhale 1 puff into the lungs daily  Dispense: 1 each; Refill: 2  - albuterol sulfate HFA (VENTOLIN HFA) 108 (90 Base) MCG/ACT inhaler; Inhale 2 puffs into the lungs every 6 hours as needed for Wheezing  Dispense: 1 Inhaler; Refill: 3  - fluticasone (ARNUITY ELLIPTA) 100 MCG/ACT AEPB; Inhale 1 puff into the lungs daily  Dispense: 30 each; Refill: 2    2. Smoking addiction      3. Lung nodule      4. Groin pain, chronic, right      5. Lumbar radiculopathy    - gabapentin (NEURONTIN) 300 MG capsule; Take 1 capsule by mouth nightly for 90 days. Dispense: 30 capsule; Refill: 2    6. Contact dermatitis, unspecified contact dermatitis type, unspecified trigger      7.  Rash    - clotrimazole-betamethasone (LOTRISONE) 1-0.05 % cream; Apply topically 2 times daily. Dispense: 30 g; Refill: 2    8. Essential hypertension      9. Health care maintenance    - Hepatitis C Antibody; Future      · HTN controlled with out meds   · Alcohol use - in remission   · change gabapantin to 300 mg nightly   · PFT   · Smoking cessation   · Lotrisone for rash   · CMP   · Irving Gunjan received counseling on the following healthy behaviors: exercise and medication adherence  · Discussed use, benefit, and side effects of prescribed medications. Barriers to medication compliance addressed. All patient questions answered. Pt voiced understanding. · The return visit should be in 3 months      Sahra Nance MD  Attending and Faculty Internal Medicine  [address] [place] [address]  [address] [place] [address]  Dept: [phone]  5/10/19      This note is created with the assistance of a speech-recognition program. While intending to generate a document that actually reflects the content of the visit, the document can still have some mistakes which may not have been identified and corrected by editing.

## 2019-05-13 ENCOUNTER — TELEPHONE (OUTPATIENT)
Dept: INTERNAL MEDICINE | Age: 54
End: 2019-05-13

## 2019-05-13 NOTE — TELEPHONE ENCOUNTER
Patient requesting a statement faxed to [facility] which states he was taken off his blood pressure medications. PC to patient's nurse Samantha Márquez who states an updated medication list will be appropriate. Medication list faxed to [phone] with confirmation received.

## 2019-05-28 ENCOUNTER — TELEPHONE (OUTPATIENT)
Dept: INTERNAL MEDICINE | Age: 54
End: 2019-05-28

## 2019-05-28 NOTE — TELEPHONE ENCOUNTER
Labs were reviewed and they were in normal range. He was negative for HIV and hepatitis C also. Sundeep Smart

## 2019-06-20 ENCOUNTER — TELEPHONE (OUTPATIENT)
Dept: INTERNAL MEDICINE | Age: 54
End: 2019-06-20

## 2019-06-20 DIAGNOSIS — L25.9 CONTACT DERMATITIS, UNSPECIFIED CONTACT DERMATITIS TYPE, UNSPECIFIED TRIGGER: Primary | ICD-10-CM

## 2019-06-20 RX ORDER — BETAMETHASONE DIPROPIONATE 0.5 MG/G
CREAM TOPICAL
Qty: 100 G | Refills: 5 | Status: SHIPPED | OUTPATIENT
Start: 2019-06-20 | End: 2020-03-03 | Stop reason: ALTCHOICE

## 2019-06-20 NOTE — TELEPHONE ENCOUNTER
Pt calling about bumps on are coming back. They itch really bad the cream he has been using helped a lot and was almost gone till recently. The bumps started to show 2 days ago on stomach. They look like little zits. Pt states he has been seen a few times about this. He wants to know if he can get a stronger cream or a different cream. Pt is using Lotrisone cream at this time. Please advise.

## 2019-06-20 NOTE — TELEPHONE ENCOUNTER
PC to patient, made him aware new cream was sent to pharmacy. He stated he will call them and have it delivered tomorrow. Pt voiced no further concerns at this time.

## 2019-06-24 ENCOUNTER — HOSPITAL ENCOUNTER (OUTPATIENT)
Dept: PULMONOLOGY | Age: 54
Discharge: HOME | End: 2019-06-24
Payer: MEDICAID

## 2019-06-24 DIAGNOSIS — J44.9 CHRONIC OBSTRUCTIVE PULMONARY DISEASE, UNSPECIFIED COPD TYPE (HCC): ICD-10-CM

## 2019-06-24 PROCEDURE — 94060 EVALUATION OF WHEEZING: CPT

## 2019-06-24 PROCEDURE — 94728 AIRWY RESIST BY OSCILLOMETRY: CPT

## 2019-06-24 PROCEDURE — 6360000002 HC RX W HCPCS: Performed by: INTERNAL MEDICINE

## 2019-06-24 PROCEDURE — 94729 DIFFUSING CAPACITY: CPT

## 2019-06-24 PROCEDURE — 94727 GAS DIL/WSHOT DETER LNG VOL: CPT

## 2019-06-24 PROCEDURE — 94726 PLETHYSMOGRAPHY LUNG VOLUMES: CPT

## 2019-06-24 PROCEDURE — 94664 DEMO&/EVAL PT USE INHALER: CPT

## 2019-06-24 RX ORDER — ALBUTEROL SULFATE 2.5 MG/3ML
2.5 SOLUTION RESPIRATORY (INHALATION) ONCE
Status: COMPLETED | OUTPATIENT
Start: 2019-06-24 | End: 2019-06-24

## 2019-06-24 RX ADMIN — ALBUTEROL SULFATE 2.5 MG: 2.5 SOLUTION RESPIRATORY (INHALATION) at 11:59

## 2019-07-01 NOTE — PROCEDURES
[address]                 [address] [place], [address]                               PULMONARY FUNCTION    PATIENT NAME: Claudia Oreilly                     :        1965  MED REC NO:   [id number]                              ROOM:  ACCOUNT NO:   [de-identified]                           ADMIT DATE: 2019  PROVIDER:     Suzi Garcia    DATE OF PROCEDURE:  2019    Flow volume loop showed adequate effort and tracing. Spirometry is  within normal limits. There is no improvement with bronchodilator  therapy. Static lung volumes reveal mild to moderate air trapping. The  DLCO is normal.    IMPRESSION:  There is evidence of moderate air trapping which can be  seen in early signs of obstructive airways disease. Having said this,  the FEV1/FVC ratio is normal.  Clinical correlation recommended.         Germain Andres    D: 2019 19:15:13       T: 2019 6:30:50     [id number]  Job#: [id number]     Doc#: [id number]    CC:

## 2019-07-02 DIAGNOSIS — J44.9 CHRONIC OBSTRUCTIVE PULMONARY DISEASE, UNSPECIFIED COPD TYPE (HCC): ICD-10-CM

## 2019-07-02 DIAGNOSIS — E78.5 DYSLIPIDEMIA: ICD-10-CM

## 2019-07-02 DIAGNOSIS — R21 RASH: ICD-10-CM

## 2019-07-02 DIAGNOSIS — M54.16 LUMBAR RADICULOPATHY: ICD-10-CM

## 2019-07-02 NOTE — TELEPHONE ENCOUNTER
Refill request for pended medications. If appropriate please send medication(s) to patients pharmacy. Next appt:  None currently.  Patient on wait list until 09/10/2019   Last seen 05/10/2019      Health Maintenance   Topic Date Due    Shingles Vaccine (1 of 2) 01/04/2013    Low dose CT lung screening  03/03/2018    Flu vaccine (1) 09/01/2019    Lipid screen  05/10/2024    Colon cancer screen colonoscopy  12/30/2024    DTaP/Tdap/Td vaccine (2 - Td) 08/16/2026    Pneumococcal 0-64 years Vaccine  Completed    Hepatitis C screen  Completed    HIV screen  Completed       Hemoglobin A1C (%)   Date Value   10/10/2018 5.5             ( goal A1C is < 7)   No results found for: LABMICR  LDL Cholesterol (mg/dL)   Date Value   05/10/2019 45       (goal LDL is <100)   AST (U/L)   Date Value   05/10/2019 12   05/10/2019 12     ALT (U/L)   Date Value   05/10/2019 12   05/10/2019 12     BUN (mg/dL)   Date Value   05/10/2019 6   05/10/2019 6     BP Readings from Last 3 Encounters:   05/10/19 122/88   11/01/18 (!) 140/90   10/10/18 (!) 150/100          (goal 120/80)          Patient Active Problem List:     COPD (chronic obstructive pulmonary disease) (HCC)     Smoking addiction     Depression     Alcohol abuse     Dyslipidemia     Lung nodule     DDD (degenerative disc disease), lumbar     Groin pain, chronic, right     Ilioinguinal neuralgia of right side     Syncope and collapse

## 2019-07-16 RX ORDER — GABAPENTIN 300 MG/1
300 CAPSULE ORAL NIGHTLY
Qty: 30 CAPSULE | Refills: 0 | Status: SHIPPED | OUTPATIENT
Start: 2019-07-16 | End: 2019-08-27

## 2019-07-16 RX ORDER — UMECLIDINIUM 62.5 UG/1
AEROSOL, POWDER ORAL
Qty: 1 EACH | Refills: 1 | Status: SHIPPED | OUTPATIENT
Start: 2019-07-16 | End: 2019-10-21 | Stop reason: SDUPTHER

## 2019-07-16 RX ORDER — CLOTRIMAZOLE AND BETAMETHASONE DIPROPIONATE 10; .64 MG/G; MG/G
CREAM TOPICAL
Qty: 30 G | Refills: 0 | Status: SHIPPED | OUTPATIENT
Start: 2019-07-16 | End: 2019-10-02

## 2019-07-16 RX ORDER — ATORVASTATIN CALCIUM 80 MG/1
TABLET, FILM COATED ORAL
Qty: 90 TABLET | Refills: 0 | Status: SHIPPED | OUTPATIENT
Start: 2019-07-16 | End: 2019-10-14 | Stop reason: SDUPTHER

## 2019-08-27 ENCOUNTER — OFFICE VISIT (OUTPATIENT)
Dept: INTERNAL MEDICINE | Age: 54
End: 2019-08-27
Payer: MEDICAID

## 2019-08-27 VITALS
BODY MASS INDEX: 23.52 KG/M2 | SYSTOLIC BLOOD PRESSURE: 112 MMHG | WEIGHT: 158.8 LBS | HEIGHT: 69 IN | HEART RATE: 86 BPM | DIASTOLIC BLOOD PRESSURE: 82 MMHG

## 2019-08-27 DIAGNOSIS — R91.1 INCIDENTAL LUNG NODULE, GREATER THAN OR EQUAL TO 8MM: ICD-10-CM

## 2019-08-27 DIAGNOSIS — M54.16 LUMBAR RADICULOPATHY: ICD-10-CM

## 2019-08-27 DIAGNOSIS — G89.29 GROIN PAIN, CHRONIC, RIGHT: ICD-10-CM

## 2019-08-27 DIAGNOSIS — L25.9 CONTACT DERMATITIS, UNSPECIFIED CONTACT DERMATITIS TYPE, UNSPECIFIED TRIGGER: ICD-10-CM

## 2019-08-27 DIAGNOSIS — Z23 NEED FOR PROPHYLACTIC VACCINATION AND INOCULATION AGAINST VARICELLA: ICD-10-CM

## 2019-08-27 DIAGNOSIS — R10.31 GROIN PAIN, CHRONIC, RIGHT: ICD-10-CM

## 2019-08-27 DIAGNOSIS — F17.200 SMOKING ADDICTION: ICD-10-CM

## 2019-08-27 DIAGNOSIS — J44.9 MODERATE COPD (CHRONIC OBSTRUCTIVE PULMONARY DISEASE) (HCC): Primary | ICD-10-CM

## 2019-08-27 DIAGNOSIS — F10.10 ALCOHOL ABUSE: ICD-10-CM

## 2019-08-27 PROCEDURE — 99211 OFF/OP EST MAY X REQ PHY/QHP: CPT | Performed by: INTERNAL MEDICINE

## 2019-08-27 PROCEDURE — G8926 SPIRO NO PERF OR DOC: HCPCS | Performed by: STUDENT IN AN ORGANIZED HEALTH CARE EDUCATION/TRAINING PROGRAM

## 2019-08-27 PROCEDURE — 99213 OFFICE O/P EST LOW 20 MIN: CPT | Performed by: STUDENT IN AN ORGANIZED HEALTH CARE EDUCATION/TRAINING PROGRAM

## 2019-08-27 PROCEDURE — 3017F COLORECTAL CA SCREEN DOC REV: CPT | Performed by: STUDENT IN AN ORGANIZED HEALTH CARE EDUCATION/TRAINING PROGRAM

## 2019-08-27 PROCEDURE — 4004F PT TOBACCO SCREEN RCVD TLK: CPT | Performed by: STUDENT IN AN ORGANIZED HEALTH CARE EDUCATION/TRAINING PROGRAM

## 2019-08-27 PROCEDURE — G8427 DOCREV CUR MEDS BY ELIG CLIN: HCPCS | Performed by: STUDENT IN AN ORGANIZED HEALTH CARE EDUCATION/TRAINING PROGRAM

## 2019-08-27 PROCEDURE — 3023F SPIROM DOC REV: CPT | Performed by: STUDENT IN AN ORGANIZED HEALTH CARE EDUCATION/TRAINING PROGRAM

## 2019-08-27 PROCEDURE — G8420 CALC BMI NORM PARAMETERS: HCPCS | Performed by: STUDENT IN AN ORGANIZED HEALTH CARE EDUCATION/TRAINING PROGRAM

## 2019-08-27 RX ORDER — GABAPENTIN 100 MG/1
100 CAPSULE ORAL NIGHTLY
Qty: 90 CAPSULE | Refills: 0 | Status: SHIPPED | OUTPATIENT
Start: 2019-08-27 | End: 2019-11-20 | Stop reason: SDUPTHER

## 2019-08-27 NOTE — PROGRESS NOTES
Visit Information    Have you changed or started any medications since your last visit including any over-the-counter medicines, vitamins, or herbal medicines? no   Have you stopped taking any of your medications? Is so, why? -  no  Are you having any side effects from any of your medications? - yes - Gabapentin causing tiredness    Have you seen any other physician or provider since your last visit?  no   Have you had any other diagnostic tests since your last visit? yes - Labs, PFT   Have you been seen in the emergency room and/or had an admission in a hospital since we last saw you?  no   Have you had your routine dental cleaning in the past 6 months?  no     Do you have an active MyChart account? If no, what is the barrier?   Yes    Patient Care Team:  Yobani Eller MD as PCP - General (Internal Medicine)  Sarah Eddy DO as Consulting Physician (General Surgery)  Nalini Desouza RN as Nurse Navigator    Medical History Review  Past Medical, Family, and Social History reviewed and does not contribute to the patient presenting condition    Health Maintenance   Topic Date Due    Shingles Vaccine (1 of 2) 01/04/2013    Low dose CT lung screening  03/03/2018    Flu vaccine (1) 09/01/2019    Lipid screen  05/10/2024    Colon cancer screen colonoscopy  12/30/2024    DTaP/Tdap/Td vaccine (2 - Td) 08/16/2026    Pneumococcal 0-64 years Vaccine  Completed    Hepatitis C screen  Completed    HIV screen  Completed
per day  - Advised and educated about benefits and modalities that help with smoking cessation  - Not interested at this time    7. Groin pain, chronic, right  - Gabapentin 100 mg OD    8. Lung nodule 9 mm 3/3/2017  - Needs annual follow-up with Chest CT lung screening  -Patient educated about the importance of follow-up            FOLLOW UP:   1. Sofia Ramsey received counseling on the following healthy behaviors: nutrition and exercise    2. Reviewed prior labs and health maintenance. 3.  Discussed use, benefit, and side effects of prescribed medications. Barriers to medication compliance addressed. All patient questions answered. Pt voiced understanding. 4.  Continue current medications, diet and exercise. No orders of the defined types were placed in this encounter. Completed Refills               Requested Prescriptions     Pending Prescriptions Disp Refills    zoster recombinant adjuvanted vaccine (SHINGRIX) 50 MCG/0.5ML SUSR injection 1 each 1     Sig: Inject 0.5 mLs into the muscle once for 1 dose 50 MCG IM then repeat 2-6 months. 5. Patient given educational materials - see patient instructions    6. Was a self-tracking handout given in paper form or via Posterous? Yes  If yes, see orders or list here. No orders of the defined types were placed in this encounter. No follow-ups on file. Patient voiced understanding and agreed to treatment plan. This note is created with the assistance of a speech-recognition program. While intending to generate a document that actually reflects the content of the visit, the document can still have some mistakes which may not have been identified and corrected by editing.     Dr Dana Randhawa MD  PGY-1, Department of Internal Medicine  [address]                    8/27/2019, 4:34 PM

## 2019-10-02 ENCOUNTER — TELEPHONE (OUTPATIENT)
Dept: INTERNAL MEDICINE | Age: 54
End: 2019-10-02

## 2019-10-14 DIAGNOSIS — E78.5 DYSLIPIDEMIA: ICD-10-CM

## 2019-10-15 RX ORDER — ZOSTER VACCINE LIVE 19400 [PFU]/.65ML
INJECTION, POWDER, LYOPHILIZED, FOR SUSPENSION SUBCUTANEOUS
Qty: 1 EACH | Refills: 0 | Status: SHIPPED | OUTPATIENT
Start: 2019-10-15 | End: 2020-01-14

## 2019-10-15 RX ORDER — ATORVASTATIN CALCIUM 80 MG/1
TABLET, FILM COATED ORAL
Qty: 90 TABLET | Refills: 0 | Status: SHIPPED | OUTPATIENT
Start: 2019-10-15 | End: 2020-01-09

## 2019-10-21 DIAGNOSIS — J44.9 CHRONIC OBSTRUCTIVE PULMONARY DISEASE, UNSPECIFIED COPD TYPE (HCC): ICD-10-CM

## 2019-10-22 RX ORDER — UMECLIDINIUM 62.5 UG/1
AEROSOL, POWDER ORAL
Qty: 30 EACH | Refills: 1 | Status: SHIPPED | OUTPATIENT
Start: 2019-10-22 | End: 2019-10-23 | Stop reason: SDUPTHER

## 2019-10-23 DIAGNOSIS — J44.9 CHRONIC OBSTRUCTIVE PULMONARY DISEASE, UNSPECIFIED COPD TYPE (HCC): ICD-10-CM

## 2019-10-26 DIAGNOSIS — J44.9 CHRONIC OBSTRUCTIVE PULMONARY DISEASE, UNSPECIFIED COPD TYPE (HCC): ICD-10-CM

## 2019-10-28 DIAGNOSIS — M54.16 LUMBAR RADICULOPATHY: ICD-10-CM

## 2019-10-29 RX ORDER — GABAPENTIN 300 MG/1
300 CAPSULE ORAL NIGHTLY
Qty: 30 CAPSULE | Refills: 0 | OUTPATIENT
Start: 2019-10-29 | End: 2020-01-27

## 2019-10-29 RX ORDER — UMECLIDINIUM 62.5 UG/1
AEROSOL, POWDER ORAL
Qty: 30 EACH | Refills: 1 | Status: SHIPPED | OUTPATIENT
Start: 2019-10-29 | End: 2020-01-14

## 2019-11-20 DIAGNOSIS — M54.16 LUMBAR RADICULOPATHY: ICD-10-CM

## 2019-11-20 DIAGNOSIS — G89.29 GROIN PAIN, CHRONIC, RIGHT: ICD-10-CM

## 2019-11-20 DIAGNOSIS — R10.31 GROIN PAIN, CHRONIC, RIGHT: ICD-10-CM

## 2019-11-20 RX ORDER — GABAPENTIN 100 MG/1
100 CAPSULE ORAL NIGHTLY
Qty: 90 CAPSULE | Refills: 0 | Status: SHIPPED | OUTPATIENT
Start: 2019-11-20 | End: 2020-01-14 | Stop reason: SINTOL

## 2020-01-07 NOTE — TELEPHONE ENCOUNTER
Request for atorvastatin - medication pended. Please fill if appropriate.       Next Visit Date:  Future Appointments   Date Time Provider Mason Vizcaino   1/14/2020  2:30 PM Pippa Turner MD [facility] [address] Maintenance   Topic Date Due    Shingles Vaccine (1 of 2) 01/04/2013    Low dose CT lung screening  03/03/2018    Flu vaccine (1) 09/01/2019    Lipid screen  05/10/2020    Colon cancer screen colonoscopy  12/30/2024    DTaP/Tdap/Td vaccine (2 - Td) 08/16/2026    Pneumococcal 0-64 years Vaccine  Completed    Hepatitis C screen  Completed    HIV screen  Completed       Hemoglobin A1C (%)   Date Value   10/10/2018 5.5             ( goal A1C is < 7)   No results found for: LABMICR  LDL Cholesterol (mg/dL)   Date Value   05/10/2019 45       (goal LDL is <100)   AST (U/L)   Date Value   05/10/2019 12   05/10/2019 12     ALT (U/L)   Date Value   05/10/2019 12   05/10/2019 12     BUN (mg/dL)   Date Value   05/10/2019 6   05/10/2019 6     BP Readings from Last 3 Encounters:   08/27/19 112/82   05/10/19 122/88   11/01/18 (!) 140/90          (goal 120/80)    All Future Testing planned in [facility]  Lab Frequency Next Occurrence         Patient Active Problem List:     COPD (chronic obstructive pulmonary disease) (HCC)     Smoking addiction     Depression     Alcohol abuse     Dyslipidemia     Lung nodule     DDD (degenerative disc disease), lumbar     Groin pain, chronic, right     Ilioinguinal neuralgia of right side     Syncope and collapse

## 2020-01-09 RX ORDER — ATORVASTATIN CALCIUM 80 MG/1
TABLET, FILM COATED ORAL
Qty: 90 TABLET | Refills: 0 | Status: SHIPPED | OUTPATIENT
Start: 2020-01-09 | End: 2020-01-14 | Stop reason: ALTCHOICE

## 2020-01-14 ENCOUNTER — OFFICE VISIT (OUTPATIENT)
Dept: INTERNAL MEDICINE | Age: 55
End: 2020-01-14
Payer: MEDICAID

## 2020-01-14 VITALS
BODY MASS INDEX: 23.55 KG/M2 | OXYGEN SATURATION: 98 % | HEIGHT: 69 IN | HEART RATE: 75 BPM | WEIGHT: 159 LBS | DIASTOLIC BLOOD PRESSURE: 85 MMHG | SYSTOLIC BLOOD PRESSURE: 127 MMHG

## 2020-01-14 PROCEDURE — 3017F COLORECTAL CA SCREEN DOC REV: CPT | Performed by: STUDENT IN AN ORGANIZED HEALTH CARE EDUCATION/TRAINING PROGRAM

## 2020-01-14 PROCEDURE — G8427 DOCREV CUR MEDS BY ELIG CLIN: HCPCS | Performed by: STUDENT IN AN ORGANIZED HEALTH CARE EDUCATION/TRAINING PROGRAM

## 2020-01-14 PROCEDURE — G8482 FLU IMMUNIZE ORDER/ADMIN: HCPCS | Performed by: STUDENT IN AN ORGANIZED HEALTH CARE EDUCATION/TRAINING PROGRAM

## 2020-01-14 PROCEDURE — 99213 OFFICE O/P EST LOW 20 MIN: CPT | Performed by: STUDENT IN AN ORGANIZED HEALTH CARE EDUCATION/TRAINING PROGRAM

## 2020-01-14 PROCEDURE — 99211 OFF/OP EST MAY X REQ PHY/QHP: CPT | Performed by: STUDENT IN AN ORGANIZED HEALTH CARE EDUCATION/TRAINING PROGRAM

## 2020-01-14 PROCEDURE — G8926 SPIRO NO PERF OR DOC: HCPCS | Performed by: STUDENT IN AN ORGANIZED HEALTH CARE EDUCATION/TRAINING PROGRAM

## 2020-01-14 PROCEDURE — 3023F SPIROM DOC REV: CPT | Performed by: STUDENT IN AN ORGANIZED HEALTH CARE EDUCATION/TRAINING PROGRAM

## 2020-01-14 PROCEDURE — 4004F PT TOBACCO SCREEN RCVD TLK: CPT | Performed by: STUDENT IN AN ORGANIZED HEALTH CARE EDUCATION/TRAINING PROGRAM

## 2020-01-14 PROCEDURE — G8420 CALC BMI NORM PARAMETERS: HCPCS | Performed by: STUDENT IN AN ORGANIZED HEALTH CARE EDUCATION/TRAINING PROGRAM

## 2020-01-14 RX ORDER — TIZANIDINE 4 MG/1
4 TABLET ORAL NIGHTLY PRN
Qty: 30 TABLET | Refills: 0 | Status: SHIPPED | OUTPATIENT
Start: 2020-01-14 | End: 2020-02-12 | Stop reason: SDUPTHER

## 2020-01-14 RX ORDER — IBUPROFEN 800 MG/1
800 TABLET ORAL
Qty: 90 TABLET | Refills: 0 | Status: SHIPPED | OUTPATIENT
Start: 2020-01-14 | End: 2020-01-14

## 2020-01-14 RX ORDER — IBUPROFEN 800 MG/1
800 TABLET ORAL PRN
Qty: 90 TABLET | Refills: 0 | Status: SHIPPED | OUTPATIENT
Start: 2020-01-14 | End: 2020-02-12 | Stop reason: SDUPTHER

## 2020-01-14 ASSESSMENT — PATIENT HEALTH QUESTIONNAIRE - PHQ9
1. LITTLE INTEREST OR PLEASURE IN DOING THINGS: 0
SUM OF ALL RESPONSES TO PHQ QUESTIONS 1-9: 0
SUM OF ALL RESPONSES TO PHQ QUESTIONS 1-9: 0
2. FEELING DOWN, DEPRESSED OR HOPELESS: 0
SUM OF ALL RESPONSES TO PHQ9 QUESTIONS 1 & 2: 0

## 2020-01-14 NOTE — PROGRESS NOTES
[facility]  [facility] [address] [address]  Dept: [phone]  Dept Fax: [phone]    Office Progress/Follow Up Note  Date of patient's visit: 1/14/2020  Patient's Name:  Mildred Castano YOB: 1965            Patient Care Team:  Lina Guerra MD as PCP - General (Internal Medicine)  April Coats DO as Consulting Physician (General Surgery)  Denise Painter, RN as Nurse Navigator    REASON FOR VISIT: Routine outpatient follow up    HISTORY OF PRESENT ILLNESS:      Chief Complaint   Patient presents with    Follow-up    COPD    Health Maintenance    Discuss Medications     Patient is not allowed to be on Gabapentin due to policy change at living house. Would like to discuss alternatives.  Back Pain     Lower left pain, does not radiate. Patient states he lifts at work and will have pain after sitting then standing. Repetitive movement. Causes affect at work.  Wrist Pain     Left wrist pain - believes to be from grabbing bags at work. Has tried OTC Ibuprofen.  Orders     Would like cholesterol checked to determine if atorvastatin is still needed. History was obtained from the patient. Mildred Castano is a 60 y.o. is here for a follow up visit. He doesn't have any complain or problems. Nevertheless, he reported that he is doing much better with the groin pain. 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 He states that the pain usually is severe when he gets up from lying down and sitting, he has to take 5-7 steps before the pain goes away. He is also in constant pain when he is lifting heavy weight at work. He says that its painful when he goes to lie down at night and has to change his position multiple times to get comfortable.  He is also very concerned about feeling tired all day, he thinks it is from gabapentin and wants something else for pain. He gets a little short of breath when climbing stairs but uses rescue inhalers only once or twice a week. For the rash on the belly, he was advised to wear belt with a different belt buckle and he says that he feels much better now and there is no rash. Patient Active Problem List   Diagnosis    COPD (chronic obstructive pulmonary disease) ([address]    Smoking addiction    Depression    Alcohol abuse    Dyslipidemia    Lung nodule    DDD (degenerative disc disease), lumbar    Groin pain, chronic, right    Ilioinguinal neuralgia of right side    Syncope and collapse         Health Maintenance Due   Topic Date Due    Low dose CT lung screening  03/03/2018    Shingles Vaccine (2 of 3) 12/03/2019         No Known Allergies      MEDICATIONS:      Current Outpatient Medications   Medication Sig Dispense Refill    gabapentin (NEURONTIN) 100 MG capsule Take 1 capsule by mouth nightly for 90 days. 90 capsule 0    Umeclidinium Bromide (INCRUSE ELLIPTA) 62.5 MCG/INH AEPB INHALE 1 PUFF INTO THE LUNGS DAILY 30 each 1    augmented betamethasone dipropionate (DIPROLENE-AF) 0.05 % cream Apply topically 2 times daily. 100 g 5    albuterol sulfate HFA (VENTOLIN HFA) 108 (90 Base) MCG/ACT inhaler Inhale 2 puffs into the lungs every 6 hours as needed for Wheezing 1 Inhaler 3    fluticasone (ARNUITY ELLIPTA) 100 MCG/ACT AEPB Inhale 1 puff into the lungs daily 30 each 2    atorvastatin (LIPITOR) 80 MG tablet TAKE ONE TABLET BY MOUTH ONCE A DAY (Patient not taking: Reported on 1/14/2020) 90 tablet 0    ZOSTAVAX 72908 UNT/0.65ML injection INJECT 0.5 MLS INTO THE MUSCLE ONCE FOR ONE DOSE THEN REPEAT 2-6 MONTHS (Patient not taking: Reported on 1/14/2020) 1 each 0    permethrin (ELIMITE) 5 % cream APPLY ALL OVER THE BODY EXCEPT FACE (Patient not taking: Reported on 8/27/2019) 120 g 0     No current facility-administered medications for this visit.         SOCIAL HISTORY    Reviewed and no change  05/10/2019       BMP:    Lab Results   Component Value Date     05/10/2019     05/10/2019    K 4.2 05/10/2019    K 4.1 05/10/2019     05/10/2019     05/10/2019    CO2 24 05/10/2019    CO2 24 05/10/2019    BUN 6 05/10/2019    BUN 6 05/10/2019    CREATININE 0.67 05/10/2019    CREATININE 0.68 05/10/2019    GLUCOSE 164 06/13/2018       HEMOGLOBIN A1C:   Lab Results   Component Value Date    LABA1C 5.5 10/10/2018       FASTING LIPID PANEL:  Lab Results   Component Value Date    CHOL 220 (H) 11/02/2018    HDL 48 05/10/2019    TRIG 80 11/02/2018       ASSESSMENT AND PLAN:    There are no diagnoses linked to this encounter.    · Back pain   -Zanaflex 4 mg as needed before sleeping  - Ibuprofen 800 mg PO As needed    ·  Alcohol abuse   -in rehab for 11 mths  - In [facility]     · Chronic obstructive pulmonary disease, unspecified COPD type ([address]  - PFT results -Evidence of moderate air trapping, sign of obstructive disease, Normal FEV 1/FVC  -Umeclidinium Bromide (INCRUSE ELLIPTA) 62.5 MCG/INH   -albuterol sulfate HFA (VENTOLIN HFA) 108 (90 Base) MCG/ACT  - fluticasone (ARNUITY ELLIPTA) 100 MCG/ACT AEPB;     · Lumbar radiculopathy  - Chronic back and groin pain  -Feeling much better, says the pain comes every once in a while  -No numbness   -Due to drowsiness will stop Gabapentin       · Contact dermatitis, unspecified contact dermatitis type, unspecified trigger  - Resolved, no itching   -Clotrimazole as needed     · Smoking addiction  - One pack per day  - Advised and educated about benefits and modalities that help with smoking cessation  - Not interested at this time    · Dyslipidemia   - Lipid panel was within normal range   - Will stop Statin and recheck lipid panel in 2 mths     · Groin pain, chronic, right  - Patient states it is much better and would like to stop Gabapentin       ·  Lung nodule 9 mm 3/3/2017  - Needs annual follow-up with Chest CT lung screening  -Patient agreeable

## 2020-01-14 NOTE — PROGRESS NOTES
Patient  is here for follow-up. He had a history of hypertension but has been controlled off medication since he quit drinking and smoking. He has a lung nodule that will need to be monitored. He has been on high-dose Lipitor 80 mg for years. He has no history of diabetes or coronary artery disease or strokes. Vacherie risk score is low. He has not been taking the Lipitor. We will get a lipid panel checked and decide if he needs to restart Lipitor. He has chronic groin pain and has been on gabapentin at a low dose but he still very sedated during the day. We will stop that medication. His main concern is low back stiffness especially at night when he is laying down and in the morning when he gets up. It worsens sometimes during the day as he is doing a mechanical job. He has no radiculopathy. He had previous MRIs which have shown degenerative changes. We will stop gabapentin. Switch to tizanidine at night. During the day can take some ibuprofen with food. Advised he might want to do physical therapy. Attending Physician Statement  I have discussed the care of Phoebe Ball, including pertinent history and exam findings,  with the resident. I have reviewed the key elements of all parts of the encounter with the resident. I agree with the assessment, plan and orders as documented by the resident.   (GE Modifier)

## 2020-01-15 ENCOUNTER — TELEPHONE (OUTPATIENT)
Dept: INTERNAL MEDICINE | Age: 55
End: 2020-01-15

## 2020-01-15 NOTE — TELEPHONE ENCOUNTER
Patient calling stating he lives in a recovery facility where the nurses distribute medications to him. Tizanidine directions are to take PRN NIGHTLY - issue being patient works swing shifts, so sometimes his nights are in the morning & the nurses are unable to give him the medication in the morning due to the instructions on the bottle. Regarding the ibuprofen, they would like to know how many can patient take a day? Instructions just say to take one PRN & nurse states there is usually something listed stating how many the patient can take in a day. Tizanidine instructions need to be changed to once daily PRN (I am able to give verbal on this if okayed by you). Please clarify cap on ibuprofen.          Sofía Story, NARAN - [phone]

## 2020-01-28 ENCOUNTER — HOSPITAL ENCOUNTER (OUTPATIENT)
Age: 55
Discharge: HOME | End: 2020-01-28
Payer: MEDICAID

## 2020-01-28 ENCOUNTER — HOSPITAL ENCOUNTER (OUTPATIENT)
Dept: CT IMAGING | Age: 55
Discharge: HOME | End: 2020-01-30
Payer: MEDICAID

## 2020-01-28 LAB
CHOLESTEROL/HDL RATIO: 4.7
CHOLESTEROL: 210 MG/DL
HDLC SERPL-MCNC: 45 MG/DL
LDL CHOLESTEROL: 147 MG/DL (ref 0–130)
TRIGL SERPL-MCNC: 92 MG/DL
VLDLC SERPL CALC-MCNC: ABNORMAL MG/DL (ref 1–30)

## 2020-01-28 PROCEDURE — 71250 CT THORAX DX C-: CPT

## 2020-01-28 PROCEDURE — 36415 COLL VENOUS BLD VENIPUNCTURE: CPT

## 2020-01-28 PROCEDURE — 80061 LIPID PANEL: CPT

## 2020-02-05 ENCOUNTER — TELEPHONE (OUTPATIENT)
Dept: INTERNAL MEDICINE | Age: 55
End: 2020-02-05

## 2020-02-07 NOTE — TELEPHONE ENCOUNTER
Pt is calling again about his back pain. His pain is  Left side lower back above the waist line, pain is sharp stabbing pain. Walking is the relief he gets but it takes him a few steps to get there. Pt doesn't want any narcotic pain medication as he is staying in a treatment center and he doesn't want to lose progress. He wants testing to see what is going on. Medication that was prescribe at last appt seems to help a bit but it is starting to effect his work and he doesn't want to lose his job due to back pain.

## 2020-02-12 RX ORDER — IBUPROFEN 800 MG/1
800 TABLET ORAL PRN
Qty: 90 TABLET | Refills: 0 | Status: SHIPPED | OUTPATIENT
Start: 2020-02-12 | End: 2020-03-03 | Stop reason: SDUPTHER

## 2020-02-12 RX ORDER — TIZANIDINE 4 MG/1
4 TABLET ORAL NIGHTLY PRN
Qty: 30 TABLET | Refills: 0 | Status: SHIPPED | OUTPATIENT
Start: 2020-02-12 | End: 2020-03-03 | Stop reason: SINTOL

## 2020-03-03 ENCOUNTER — OFFICE VISIT (OUTPATIENT)
Dept: INTERNAL MEDICINE | Age: 55
End: 2020-03-03
Payer: MEDICAID

## 2020-03-03 VITALS
HEIGHT: 69 IN | WEIGHT: 158 LBS | BODY MASS INDEX: 23.4 KG/M2 | SYSTOLIC BLOOD PRESSURE: 127 MMHG | OXYGEN SATURATION: 98 % | DIASTOLIC BLOOD PRESSURE: 90 MMHG | HEART RATE: 75 BPM

## 2020-03-03 PROBLEM — F10.11 HISTORY OF ALCOHOL ABUSE: Status: ACTIVE | Noted: 2020-03-03

## 2020-03-03 PROCEDURE — 4004F PT TOBACCO SCREEN RCVD TLK: CPT | Performed by: STUDENT IN AN ORGANIZED HEALTH CARE EDUCATION/TRAINING PROGRAM

## 2020-03-03 PROCEDURE — G8427 DOCREV CUR MEDS BY ELIG CLIN: HCPCS | Performed by: STUDENT IN AN ORGANIZED HEALTH CARE EDUCATION/TRAINING PROGRAM

## 2020-03-03 PROCEDURE — 3023F SPIROM DOC REV: CPT | Performed by: STUDENT IN AN ORGANIZED HEALTH CARE EDUCATION/TRAINING PROGRAM

## 2020-03-03 PROCEDURE — 3017F COLORECTAL CA SCREEN DOC REV: CPT | Performed by: STUDENT IN AN ORGANIZED HEALTH CARE EDUCATION/TRAINING PROGRAM

## 2020-03-03 PROCEDURE — 99214 OFFICE O/P EST MOD 30 MIN: CPT | Performed by: STUDENT IN AN ORGANIZED HEALTH CARE EDUCATION/TRAINING PROGRAM

## 2020-03-03 PROCEDURE — G8926 SPIRO NO PERF OR DOC: HCPCS | Performed by: STUDENT IN AN ORGANIZED HEALTH CARE EDUCATION/TRAINING PROGRAM

## 2020-03-03 PROCEDURE — G8420 CALC BMI NORM PARAMETERS: HCPCS | Performed by: STUDENT IN AN ORGANIZED HEALTH CARE EDUCATION/TRAINING PROGRAM

## 2020-03-03 PROCEDURE — G8482 FLU IMMUNIZE ORDER/ADMIN: HCPCS | Performed by: STUDENT IN AN ORGANIZED HEALTH CARE EDUCATION/TRAINING PROGRAM

## 2020-03-03 PROCEDURE — 99211 OFF/OP EST MAY X REQ PHY/QHP: CPT | Performed by: INTERNAL MEDICINE

## 2020-03-03 RX ORDER — BETAMETHASONE DIPROPIONATE 0.5 MG/G
CREAM TOPICAL
Qty: 100 G | Refills: 5 | Status: CANCELLED | OUTPATIENT
Start: 2020-03-03

## 2020-03-03 RX ORDER — IBUPROFEN 800 MG/1
800 TABLET ORAL EVERY 8 HOURS PRN
Qty: 90 TABLET | Refills: 0 | Status: SHIPPED | OUTPATIENT
Start: 2020-03-03 | End: 2020-05-03

## 2020-03-03 RX ORDER — TIZANIDINE 2 MG/1
2 TABLET ORAL NIGHTLY PRN
Qty: 30 TABLET | Refills: 0 | Status: SHIPPED | OUTPATIENT
Start: 2020-03-03 | End: 2020-04-03

## 2020-03-03 RX ORDER — BLOOD-GLUCOSE METER
EACH MISCELLANEOUS
Qty: 1 EACH | Refills: 0 | Status: SHIPPED | OUTPATIENT
Start: 2020-03-03 | End: 2021-08-11

## 2020-03-03 RX ORDER — ALBUTEROL SULFATE 90 UG/1
2 AEROSOL, METERED RESPIRATORY (INHALATION) EVERY 6 HOURS PRN
Qty: 1 INHALER | Refills: 3 | Status: SHIPPED | OUTPATIENT
Start: 2020-03-03 | End: 2020-06-07

## 2020-03-03 NOTE — PROGRESS NOTES
[facility]  [facility] [address] [address]  Dept: [phone]  Dept Fax: [phone]    Office Progress/Follow Up Note  Date of patient's visit: 3/3/2020  Patient's Name:  Minerva Carrasco YOB: 1965            Patient Care Team:  Alla Loredo MD as PCP - General (Internal Medicine)  Simona Blake DO as Consulting Physician (General Surgery)  Desiderio Primrose, RN as Nurse Navigator    REASON FOR VISIT: Routine outpatient follow up    HISTORY OF PRESENT ILLNESS:      Chief Complaint   Patient presents with    Chronic Back Pain      ,    COPD    Discuss Medications       History was obtained from the patient. Minerva Carrasco is a 60 y.o. is here for a follow up visit. He reported he has been having back pain, mostly on the lower left side, ongoing for > 6 mths, intermittant. He says that it happens mostly when he gets up in the morning and he would have to take a couple of steps before he can stand up straight. He  Denies any radiation down the leg. Denies loss of bowel or bladder control. He also says that the pain agravates on lifting heavy objects at work. Patient states tizanidine helped but it would make him dizzy on waking up in the morning so he stopped taking it. He has been normotensive since he stopped drinking alcohol, is off medications, he has abstained from drinking alcohol for the past 13 mths. Chest CT, showed a stable lung nodule, given the stable size probably benign  He says that his shortness of breath has improved, he just gets slightly dyspneic going up a flight of stairs.         Patient Active Problem List   Diagnosis    COPD (chronic obstructive pulmonary disease) ([facility] [address]    Smoking addiction    Depression    Alcohol abuse    Dyslipidemia    Lung nodule    DDD (degenerative disc disease), lumbar    Groin pain, chronic, right    Ilioinguinal neuralgia of right side    Syncope and collapse         Health (71.7 kg)   Height: 5' 9\" (1.753 m)     BP Readings from Last 3 Encounters:   03/03/20 (!) 127/90   01/14/20 127/85   08/27/19 112/82      General appearance - alert, well appearing  Mental status - alert, oriented to person, place, and time  Chest - clear to auscultation, no wheezes, rales or rhonchi, symmetric air entry  Heart - normal rate, regular rhythm, normal S1, S2  Musculoskeletal- tenderness over the left lower back,   Abdomen - soft, nontender  Neurological - alert, oriented, normal speech, no focal findings or movement disorder noted, normal muscle tone, no tremors, strength 5/5  Skin - no rash on the lower abdomen    LABORATORY FINDINGS:    CBC:  Lab Results   Component Value Date    WBC 7.4 05/10/2019    HGB 14.9 05/10/2019     05/10/2019       BMP:    Lab Results   Component Value Date     05/10/2019     05/10/2019    K 4.2 05/10/2019    K 4.1 05/10/2019     05/10/2019     05/10/2019    CO2 24 05/10/2019    CO2 24 05/10/2019    BUN 6 05/10/2019    BUN 6 05/10/2019    CREATININE 0.67 05/10/2019    CREATININE 0.68 05/10/2019    GLUCOSE 164 06/13/2018       HEMOGLOBIN A1C:   Lab Results   Component Value Date    LABA1C 5.5 10/10/2018       FASTING LIPID PANEL:  Lab Results   Component Value Date    CHOL 210 (H) 01/28/2020    HDL 45 01/28/2020    TRIG 92 01/28/2020       ASSESSMENT AND PLAN:    · Lower Back pain  -MRI lumbar spinefrom 2016 showed scoliosis, multilevel degenerative joint disease, multilevel disc bulge and multilevel stenosis  -Zanaflex 2 mg Nightly  - Ibuprofen 800 mg PO TID as needed  -referred to physical therapy and neurosurgery     ·  Alcohol abuse   -in rehab for 13 mths  - In remission     · Chronic obstructive pulmonary disease, unspecified COPD type (HCC)  - PFT results -Evidence of moderate air trapping, sign of obstructive disease, Normal FEV 1/FVC  -Umeclidinium Bromide (INCRUSE ELLIPTA) 62.5 MCG/INH   -albuterol sulfate HFA (VENTOLIN HFA) 108 (90 Base) MCG/ACT  - fluticasone (ARNUITY ELLIPTA) 100 MCG/ACT AEPB      · Smoking addiction  - One pack per day  - Advised and educated about benefits and modalities that help with smoking cessation  - Not interested at this time        ·  Lung nodule 9 mm 3/3/2017  -Repeat CT scan on 1/28/2020 showed a stable nodule  FOLLOW UP AND INSTRUCTIONS:   · No follow-ups on file. · Lidia Rodarte received counseling on the following healthy behaviors: exercise, medication adherence and tobacco cessation    · Discussed use, benefit, and side effects of prescribed medications. Barriers to medication compliance addressed. All patient questions answered. Pt voiced understanding.      · Patient given educational materials - see patient instructions    Zainab Hardwick MD  PGY-1  [facility]  3/3/2020, 2:42 PM

## 2020-03-03 NOTE — PATIENT INSTRUCTIONS
Follow-up appointment scheduled for 6/9/20 at 1:00p, AVS given to patient. Order for mammogram faxed to scheduling, they will contact patient with an appt original order given to patient along with scheduling phone number     Order for Neurosurgery, Physical Therapy faxed to [address] [place] [address] they will all pt for appt. Please call [phone] in not heard within 2 weeks. Continue motrin and zanaflex. Stop 4mg zanaflex and take 2mg nightly prn.   Follow up with neurosurgery and physical therapy  We will contact you about need to get MRI prior to NS appointment

## 2020-03-12 ENCOUNTER — HOSPITAL ENCOUNTER (OUTPATIENT)
Dept: GENERAL RADIOLOGY | Age: 55
Discharge: HOME | End: 2020-03-14
Payer: MEDICAID

## 2020-03-12 ENCOUNTER — HOSPITAL ENCOUNTER (OUTPATIENT)
Age: 55
Discharge: HOME | End: 2020-03-14
Payer: MEDICAID

## 2020-03-12 ENCOUNTER — OFFICE VISIT (OUTPATIENT)
Dept: NEUROSURGERY | Age: 55
End: 2020-03-12
Payer: MEDICAID

## 2020-03-12 VITALS
WEIGHT: 160 LBS | HEART RATE: 70 BPM | SYSTOLIC BLOOD PRESSURE: 116 MMHG | DIASTOLIC BLOOD PRESSURE: 76 MMHG | BODY MASS INDEX: 24.25 KG/M2 | HEIGHT: 68 IN | OXYGEN SATURATION: 94 %

## 2020-03-12 PROCEDURE — G8482 FLU IMMUNIZE ORDER/ADMIN: HCPCS | Performed by: NURSE PRACTITIONER

## 2020-03-12 PROCEDURE — G8427 DOCREV CUR MEDS BY ELIG CLIN: HCPCS | Performed by: NURSE PRACTITIONER

## 2020-03-12 PROCEDURE — 4004F PT TOBACCO SCREEN RCVD TLK: CPT | Performed by: NURSE PRACTITIONER

## 2020-03-12 PROCEDURE — 72082 X-RAY EXAM ENTIRE SPI 2/3 VW: CPT

## 2020-03-12 PROCEDURE — 72100 X-RAY EXAM L-S SPINE 2/3 VWS: CPT

## 2020-03-12 PROCEDURE — 3017F COLORECTAL CA SCREEN DOC REV: CPT | Performed by: NURSE PRACTITIONER

## 2020-03-12 PROCEDURE — 99203 OFFICE O/P NEW LOW 30 MIN: CPT | Performed by: NURSE PRACTITIONER

## 2020-03-12 PROCEDURE — G8420 CALC BMI NORM PARAMETERS: HCPCS | Performed by: NURSE PRACTITIONER

## 2020-03-12 NOTE — PROGRESS NOTES
[facility] [address] [address] [address] Olvera  MOB # 2 Radha Hua  [address] [place] [id number]  Dept: [phone]    Patient:  Lacho Louise  YOB: 1965  Date: 3/12/20    The patient is a 60 y.o. male who presents today for consult of the following problems:     Chief Complaint   Patient presents with    New Patient     Chronic Left sided low back pain without sciatica Lumbar Radiculopathy         HPI:     Lacho Louise is a 60 y.o. male on whom neurosurgical consultation was requested by Almas Dobbins MD for management of back and leg pain. Pt has had low back pain for many years. Pain is present in the lower back and leg pain for many years. Pain varies in intensity, can be upwards of 8/10. Described as an ache. Occasional cramping. Pain is worse with prolonged ambulation. Does have aching to his lower back with any prolonged sitting or standing. No saddle anesthesia or changes to bowel/bladder. Has not had any recent imaging or conservative measures. Previous imaging done that did show fairly significant stenosis. Symptoms will sometimes interfere with sleeping, cramping will occasionally wake him up. Groin pain: No  Saddle anesthesia: No  Hip Pain: No-stiffness  Bowel/bladder incontinence: No  Constipation or Urinary retention: No    LIMITATIONS    Pain significantly limiting from a daily standpoint. Assistive devices: none  Daily pharmacologic pain control include: ibuprofen, occasional zanaflex  Back versus leg: all back    MANAGEMENT     Prior Surgery: No  Prior to 1yr ago:   In the last year:    Physical Therapy: No   Chiropractic Interventions: No   Injections: No  Improvement: n/a    Types of injections/responses: n/a    History:     Past Medical History:   Diagnosis Date    Alcohol abuse 6/4/2014    Anxiety     Arthritis     COPD (chronic obstructive pulmonary disease) (HCC)     ASTHMA    DDD (degenerative disc disease), lumbar 9/6/2016    Depression     Heart burn     Hemorrhoids     HTN (hypertension) 1/19/2015    Ilioinguinal neuralgia of right side 4/10/2017    Psychiatric problem     depression /anxiety    Seizures ([address]     withdrawal from alcohol 2 years ago    Wears glasses     READING     Past Surgical History:   Procedure Laterality Date    ANESTHESIA NERVE BLOCK Right 4/10/2017    NERVE BLOCK US RIGHT SIDED ILIOINGUINAL performed by Carter Proctor MD at [address] [place] [address]  3/19/15    HERNIA REPAIR      NERVE BLOCK Right 10/10/2016    right groin    OTHER SURGICAL HISTORY Right 04/10/2017    US nerve block to R groin    TOE SURGERY      SCREW RIGHT BIG TOE     Family History   Problem Relation Age of Onset    Cancer Mother         colon ca     Current Outpatient Medications on File Prior to Visit   Medication Sig Dispense Refill    Umeclidinium Bromide (INCRUSE ELLIPTA) 62.5 MCG/INH AEPB INHALE 1 PUFF INTO THE LUNGS DAILY 30 each 1    ibuprofen (ADVIL;MOTRIN) 800 MG tablet Take 1 tablet by mouth every 8 hours as needed for Pain (As needed) 90 tablet 0    fluticasone (ARNUITY ELLIPTA) 100 MCG/ACT AEPB Inhale 1 puff into the lungs daily 30 each 2    albuterol sulfate HFA (VENTOLIN HFA) 108 (90 Base) MCG/ACT inhaler Inhale 2 puffs into the lungs every 6 hours as needed for Wheezing 1 Inhaler 3    tiZANidine (ZANAFLEX) 2 MG tablet Take 1 tablet by mouth nightly as needed (spasms) 30 tablet 0    Heating Pads (COMFORT-HEAL HEATING PAD) PADS Use on back as needed 1 each 0     No current facility-administered medications on file prior to visit.       Social History     Tobacco Use    Smoking status: Current Every Day Smoker     Packs/day: 1.00     Years: 38.00     Pack years: 38.00     Types: Cigarettes    Smokeless tobacco: Never Used    Tobacco comment: 1 PPD   Substance Use Topics    Alcohol use: Not Currently     Alcohol/week: 12.0 standard drinks     Types: 12 Cans of beer per week     Comment: recovering alcholic, drinks occasionally    Drug use: No     Comment: recovering drug addict       Allergies   Allergen Reactions    Nickel      Contact dermatitis       Review of Systems  Constitutional: Negative for activity change and appetite change. HENT: Negative for ear pain and facial swelling. Eyes: Negative for discharge and itching. Respiratory: Negative for choking and chest tightness. Cardiovascular: Negative for chest pain and leg swelling. Gastrointestinal: Negative for nausea and abdominal pain. Endocrine: Negative for cold intolerance and heat intolerance. Genitourinary: Negative for frequency and flank pain. Musculoskeletal: Negative for myalgias and joint swelling. Skin: Negative for rash and wound. Allergic/Immunologic: Negative for environmental allergies and food allergies. Hematological: Negative for adenopathy. Does not bruise/bleed easily. Psychiatric/Behavioral: Negative for self-injury. The patient is not nervous/anxious. Physical Exam:      /76 (Site: Right Upper Arm, Position: Sitting, Cuff Size: Medium Adult)   Pulse 70   Ht 5' 8\" (1.727 m)   Wt 160 lb (72.6 kg)   SpO2 94%   BMI 24.33 kg/m²   Estimated body mass index is 24.33 kg/m² as calculated from the following:    Height as of this encounter: 5' 8\" (1.727 m). Weight as of this encounter: 160 lb (72.6 kg). General:  Nemesio Rose is a 60y.o. year old male who appears his stated age. HEENT: Normocephalic atraumatic. Neck supple. Chest: regular rate; pulses equal  Abdomen: Soft nontender nondistended.    Ext: DP and PT pulses 2+, good cap refill  Neuro    Mentation  Appropriate affect  Registration intact  Orientation intact    Cranial Nerves:   Pupils equal and reactive to light  Extraocular motion intact  Face and shrug symmetric  Tongue midline  No dysarthria  v1-3 sensation symmetric, masseter tone symmetric  Hearing symmetric and intact    Sensation: intact    Motor  L deltoid 5/5; R deltoid 5/5  L biceps 5/5; R biceps 5/5  L triceps 5/5; R triceps 5/5  L wrist extension 5/5; R wrist extension 5/5  L intrinsics 5/5; R intrinsics 5/5     L iliopsoas 5/5 , R iliopsoas 5/5  L quadriceps 5/5; R quadriceps 5/5  L Dorsiflexion 5/5; R dorsiflexion 5/5  L Plantarflexion 5/5; R plantarflexion 5/5  L EHL 5/5; R EHL 5/5    Reflexes  L Brachioradialis 2+/4; R brachioradialis 2+/4  L Biceps 2+/4; R Biceps 2+/4  L Triceps 2+/4; R Triceps 2+/4  L Patellar 2+/4: R Patellar 2+/4  L Achilles 2+/4; R Achilles 2+/4    hoffmans L: neg  hoffmans R: neg  Clonus L: neg  Clonus R: neg  Babinski L: neg  Babinski R: neg    ROMARIO: negative  Straight leg raise: negative  SI joint tenderness: negative    +Cramping to right thigh with SLR    Studies Review:     No recent imaging    Assessment and Plan:      1. Scoliosis of lumbar spine, unspecified scoliosis type    2. Lumbar stenosis with neurogenic claudication          Plan: Patient presents with concerns regarding axial low back pain as well as claudication symptoms. This is been an ongoing issue for many years, was evaluated previously in 2016, but was actively drinking at that time and did have other concomitant medical issues that needed to be addressed. Now that the patient is sober, has been noticing his symptoms more. Occupation requires that he is standing for prolonged periods of time, this is very difficult. Developed significant aching to his lower back. Has been having increasing cramping to his bilateral thighs and calves. Previous imaging did show significant stenosis as well as spinal deformity. Patient has not however had any recent conservative measures to address these issues. Recommend initiating aggressive physical therapy, will obtain lumbar x-rays as well as scoliosis series to evaluate extent of spinal deformity. We will see the patient back in 6 weeks for reevaluation.     Followup: Return in about 6 weeks (around 4/23/2020), or if symptoms worsen or fail to improve. Prescriptions Ordered:  No orders of the defined types were placed in this encounter. Orders Placed:  Orders Placed This Encounter   Procedures    XR SPINE ENTIRE (2-3 VWS)     Standing Status:   Future     Number of Occurrences:   1     Standing Expiration Date:   3/12/2021     Scheduling Instructions:      Standing AP/lateral; include C2 to pelvis including both femoral heads     Order Specific Question:   Reason for exam:     Answer:   scoliosis    XR LUMBAR SPINE (2-3 VIEWS)     Standing Status:   Future     Number of Occurrences:   1     Standing Expiration Date:   3/12/2021     Scheduling Instructions:      Standing AP/lateral     Order Specific Question:   Reason for exam:     Answer:   stenosis    [facility]     Referral Priority:   Routine     Referral Type:   Eval and Treat     Referral Reason:   Specialty Services Required     Requested Specialty:   Physical Therapy     Number of Visits Requested:   1        Electronically signed by DONNY Cheney CNP on 3/12/2020 at 3:03 PM    Please note that this chart was generated using voice recognition Dragon dictation software. Although every effort was made to ensure the accuracy of this automated transcription, some errors in transcription may have occurred.

## 2020-04-03 RX ORDER — TIZANIDINE 2 MG/1
2 TABLET ORAL NIGHTLY PRN
Qty: 30 TABLET | Refills: 0 | Status: SHIPPED | OUTPATIENT
Start: 2020-04-03 | End: 2020-05-26

## 2020-04-23 ENCOUNTER — VIRTUAL VISIT (OUTPATIENT)
Dept: NEUROSURGERY | Age: 55
End: 2020-04-23
Payer: MEDICAID

## 2020-04-23 VITALS — WEIGHT: 165 LBS | HEIGHT: 69 IN | BODY MASS INDEX: 24.44 KG/M2

## 2020-04-23 PROCEDURE — 99214 OFFICE O/P EST MOD 30 MIN: CPT | Performed by: NEUROLOGICAL SURGERY

## 2020-04-23 NOTE — TELEPHONE ENCOUNTER
Refill request for Anurity Ellipta. If appropriate please send medication(s) to patients pharmacy.     Next appt: 06/09/2020      Health Maintenance   Topic Date Due    Shingles Vaccine (2 of 3) 12/03/2019    Low dose CT lung screening  01/28/2021    Colon cancer screen colonoscopy  12/30/2024    Lipid screen  01/28/2025    DTaP/Tdap/Td vaccine (2 - Td) 08/16/2026    Flu vaccine  Completed    Pneumococcal 0-64 years Vaccine  Completed    Hepatitis C screen  Completed    HIV screen  Completed    Hepatitis A vaccine  Aged Out    Hepatitis B vaccine  Aged Out    Hib vaccine  Aged Out    Meningococcal (ACWY) vaccine  Aged Out       Hemoglobin A1C (%)   Date Value   10/10/2018 5.5             ( goal A1C is < 7)   No results found for: LABMICR  LDL Cholesterol (mg/dL)   Date Value   01/28/2020 147 (H)       (goal LDL is <100)   AST (U/L)   Date Value   05/10/2019 12   05/10/2019 12     ALT (U/L)   Date Value   05/10/2019 12   05/10/2019 12     BUN (mg/dL)   Date Value   05/10/2019 6   05/10/2019 6     BP Readings from Last 3 Encounters:   03/12/20 116/76   03/03/20 (!) 127/90   01/14/20 127/85          (goal 120/80)          Patient Active Problem List:     COPD (chronic obstructive pulmonary disease) (HCC)     Smoking addiction     Depression     Dyslipidemia     Lung nodule     DDD (degenerative disc disease), lumbar     Groin pain, chronic, right     Ilioinguinal neuralgia of right side     Syncope and collapse     History of alcohol abuse

## 2020-04-23 NOTE — PROGRESS NOTES
([id number]) 08/16/2016    Tdap (Boostrix, Adacel) 08/16/2016    Zoster Live (Zostavax) 10/08/2019   ,   Health Maintenance   Topic Date Due    Shingles Vaccine (2 of 3) 12/03/2019    Low dose CT lung screening  01/28/2021    Colon cancer screen colonoscopy  12/30/2024    Lipid screen  01/28/2025    DTaP/Tdap/Td vaccine (2 - Td) 08/16/2026    Flu vaccine  Completed    Pneumococcal 0-64 years Vaccine  Completed    Hepatitis C screen  Completed    HIV screen  Completed    Hepatitis A vaccine  Aged Out    Hepatitis B vaccine  Aged Out    Hib vaccine  Aged Out    Meningococcal (ACWY) vaccine  Aged Out       PHYSICAL EXAMINATION   no acute distress  Moves all extremities with normal range of motion  Normal gait  Able to squat    Radiology: MRI lumbar spine reviewed, spinal stenosis worst at L 3 4 with moderate degree of central spinal stenosis and more severe right foraminal stenosis. X-ray 3/2020 scoliosis: Moderate levoscoliosis of the lumbar spine with apex at L2.  ASSESSMENT/PLAN:  This is a 57-year-old man with spinal spondylosis with neurogenic claudication  And also a lumbar scoliosis. His pain has improved due to a recent change in his job requiring less exertion of his low back. Though his back pain is improved he still has significant amount of claudication symptoms    I will order a new MRI lumbar spine since the last one was 4 years ago    And also recommend physical therapy to improve his core strength and reduce back pain  Cintia Glez is a 60 y.o. male being evaluated by a Virtual Visit (video visit) encounter to address concerns as mentioned above. A caregiver was present when appropriate. Due to this being a TeleHealth encounter (During LNLAX-26 public health emergency), evaluation of the following organ systems was limited: Vitals/Constitutional/EENT/Resp/CV/GI//MS/Neuro/Skin/Heme-Lymph-Imm.   Pursuant to the emergency declaration under the [address] Emergencies Act, 1135 waiver authority and the Coronavirus Preparedness and Response Supplemental Appropriations Act, this Virtual Visit was conducted with patient's (and/or legal guardian's) consent, to reduce the patient's risk of exposure to COVID-19 and provide necessary medical care. The patient (and/or legal guardian) has also been advised to contact this office for worsening conditions or problems, and seek emergency medical treatment and/or call 911 if deemed necessary. Patient identification was verified at the start of the visit: Yes    Total time spent on this encounter: 30    Services were provided through a video synchronous discussion virtually to substitute for in-person clinic visit. Patient and provider were located at their individual homes. --Orion Slade DO on 4/23/2020 at 8:58 AM    An electronic signature was used to authenticate this note.

## 2020-04-23 NOTE — TELEPHONE ENCOUNTER
Refill request for incruse Ellipta. If appropriate please send medication(s) to patients pharmacy.     Next appt: 06/09/2020      Health Maintenance   Topic Date Due    Shingles Vaccine (2 of 3) 12/03/2019    Low dose CT lung screening  01/28/2021    Colon cancer screen colonoscopy  12/30/2024    Lipid screen  01/28/2025    DTaP/Tdap/Td vaccine (2 - Td) 08/16/2026    Flu vaccine  Completed    Pneumococcal 0-64 years Vaccine  Completed    Hepatitis C screen  Completed    HIV screen  Completed    Hepatitis A vaccine  Aged Out    Hepatitis B vaccine  Aged Out    Hib vaccine  Aged Out    Meningococcal (ACWY) vaccine  Aged Out       Hemoglobin A1C (%)   Date Value   10/10/2018 5.5             ( goal A1C is < 7)   No results found for: LABMICR  LDL Cholesterol (mg/dL)   Date Value   01/28/2020 147 (H)       (goal LDL is <100)   AST (U/L)   Date Value   05/10/2019 12   05/10/2019 12     ALT (U/L)   Date Value   05/10/2019 12   05/10/2019 12     BUN (mg/dL)   Date Value   05/10/2019 6   05/10/2019 6     BP Readings from Last 3 Encounters:   03/12/20 116/76   03/03/20 (!) 127/90   01/14/20 127/85          (goal 120/80)          Patient Active Problem List:     COPD (chronic obstructive pulmonary disease) (HCC)     Smoking addiction     Depression     Dyslipidemia     Lung nodule     DDD (degenerative disc disease), lumbar     Groin pain, chronic, right     Ilioinguinal neuralgia of right side     Syncope and collapse     History of alcohol abuse

## 2020-04-26 RX ORDER — UMECLIDINIUM 62.5 UG/1
AEROSOL, POWDER ORAL
Qty: 30 EACH | Refills: 1 | Status: SHIPPED | OUTPATIENT
Start: 2020-04-26 | End: 2020-05-26

## 2020-04-26 RX ORDER — FLUTICASONE FUROATE 100 UG/1
POWDER RESPIRATORY (INHALATION)
Qty: 30 EACH | Refills: 2 | Status: SHIPPED | OUTPATIENT
Start: 2020-04-26 | End: 2020-06-11

## 2020-05-03 RX ORDER — IBUPROFEN 800 MG/1
800 TABLET ORAL EVERY 8 HOURS PRN
Qty: 90 TABLET | Refills: 0 | Status: SHIPPED | OUTPATIENT
Start: 2020-05-03 | End: 2020-05-26

## 2020-05-26 RX ORDER — TIZANIDINE 2 MG/1
2 TABLET ORAL NIGHTLY PRN
Qty: 30 TABLET | Refills: 0 | Status: SHIPPED | OUTPATIENT
Start: 2020-05-26 | End: 2021-08-11

## 2020-05-26 RX ORDER — IBUPROFEN 800 MG/1
TABLET ORAL
Qty: 90 TABLET | Refills: 0 | Status: SHIPPED | OUTPATIENT
Start: 2020-05-26 | End: 2020-07-28 | Stop reason: SDUPTHER

## 2020-05-26 RX ORDER — UMECLIDINIUM 62.5 UG/1
AEROSOL, POWDER ORAL
Qty: 30 EACH | Refills: 1 | Status: SHIPPED | OUTPATIENT
Start: 2020-05-26 | End: 2020-06-24

## 2020-05-26 NOTE — TELEPHONE ENCOUNTER
Refill request for pended medications. If appropriate please send medication(s) to patients pharmacy.     Next appt: 6/9/2020      Health Maintenance   Topic Date Due    Shingles Vaccine (2 of 3) 12/03/2019    Low dose CT lung screening  01/28/2021    Colon cancer screen colonoscopy  12/30/2024    Lipid screen  01/28/2025    DTaP/Tdap/Td vaccine (2 - Td) 08/16/2026    Flu vaccine  Completed    Pneumococcal 0-64 years Vaccine  Completed    Hepatitis C screen  Completed    HIV screen  Completed    Hepatitis A vaccine  Aged Out    Hepatitis B vaccine  Aged Out    Hib vaccine  Aged Out    Meningococcal (ACWY) vaccine  Aged Out       Hemoglobin A1C (%)   Date Value   10/10/2018 5.5             ( goal A1C is < 7)   No results found for: LABMICR  LDL Cholesterol (mg/dL)   Date Value   01/28/2020 147 (H)       (goal LDL is <100)   AST (U/L)   Date Value   05/10/2019 12   05/10/2019 12     ALT (U/L)   Date Value   05/10/2019 12   05/10/2019 12     BUN (mg/dL)   Date Value   05/10/2019 6   05/10/2019 6     BP Readings from Last 3 Encounters:   03/12/20 116/76   03/03/20 (!) 127/90   01/14/20 127/85          (goal 120/80)          Patient Active Problem List:     COPD (chronic obstructive pulmonary disease) (HCC)     Smoking addiction     Depression     Dyslipidemia     Lung nodule     DDD (degenerative disc disease), lumbar     Groin pain, chronic, right     Ilioinguinal neuralgia of right side     Syncope and collapse     History of alcohol abuse

## 2020-06-07 RX ORDER — ALBUTEROL SULFATE 90 UG/1
2 AEROSOL, METERED RESPIRATORY (INHALATION) EVERY 6 HOURS PRN
Qty: 18 G | Refills: 3 | Status: SHIPPED | OUTPATIENT
Start: 2020-06-07 | End: 2020-09-11

## 2020-06-08 ENCOUNTER — HOSPITAL ENCOUNTER (OUTPATIENT)
Dept: MRI IMAGING | Age: 55
Discharge: HOME | End: 2020-06-10
Payer: MEDICAID

## 2020-06-08 PROCEDURE — 72148 MRI LUMBAR SPINE W/O DYE: CPT

## 2020-06-09 ENCOUNTER — OFFICE VISIT (OUTPATIENT)
Dept: NEUROSURGERY | Age: 55
End: 2020-06-09
Payer: MEDICAID

## 2020-06-09 VITALS
BODY MASS INDEX: 24.63 KG/M2 | SYSTOLIC BLOOD PRESSURE: 124 MMHG | WEIGHT: 162.5 LBS | TEMPERATURE: 98.6 F | DIASTOLIC BLOOD PRESSURE: 84 MMHG | HEIGHT: 68 IN | HEART RATE: 66 BPM | OXYGEN SATURATION: 96 %

## 2020-06-09 PROCEDURE — 99214 OFFICE O/P EST MOD 30 MIN: CPT | Performed by: NEUROLOGICAL SURGERY

## 2020-06-09 NOTE — PROGRESS NOTES
Department of Neurosurgery                                                 Follow up patient clinic note      Reason for Consult:  Lumbar radiculopathy  Requesting Physician:  Shravan Harris MD  Neurosurgeon: Frantz Mills DO      History Obtained From:  patient    CHIEF COMPLAINT:         Chief Complaint   Patient presents with    Follow-up     1 month       HISTORY OF PRESENT ILLNESS:       The patient is a 60 y.o. male who presents for follow-up after virtual visit  For low back pain and left-sided buttock pain. Briefly patient has about now 6 month of low left back pain, left buttock pain. 2 years ago he had claudication type symptoms such as leg weakness, numbness, worsened with walking which has stabilized in last 6 months. Back pain best 6/10, worst 10/10. Back pain worse end of day, after working, any excersion. Pain in left greater trochanter has been about for  2 weeks very sharp. He denies fever chills night sweats. He finds the pain in his back and left buttock is making very difficult to have a normal life. He it also makes him difficult to work. He reports his back pain and buttock pain is about a similar intensity.      PAST MEDICAL HISTORY :       Past Medical History:        Diagnosis Date    Alcohol abuse 6/4/2014    Anxiety     Arthritis     COPD (chronic obstructive pulmonary disease) ([address]     ASTHMA    DDD (degenerative disc disease), lumbar 9/6/2016    Depression     Heart burn     Hemorrhoids     HTN (hypertension) 1/19/2015    Ilioinguinal neuralgia of right side 4/10/2017    Psychiatric problem     depression /anxiety    Seizures ([address]     withdrawal from alcohol 2 years ago    Wears glasses     READING       Past Surgical History:        Procedure Laterality Date    ANESTHESIA NERVE BLOCK Right 4/10/2017    NERVE BLOCK US RIGHT SIDED ILIOINGUINAL performed by Trish Sewell MD at [address] [place] [address]  3/19/15    HERNIA REPAIR      NERVE BLOCK Right

## 2020-06-11 RX ORDER — FLUTICASONE FUROATE 100 UG/1
POWDER RESPIRATORY (INHALATION)
Qty: 30 EACH | Refills: 2 | Status: SHIPPED | OUTPATIENT
Start: 2020-06-11 | End: 2020-09-07

## 2020-06-19 ENCOUNTER — HOSPITAL ENCOUNTER (OUTPATIENT)
Dept: PAIN MANAGEMENT | Age: 55
Discharge: HOME | End: 2020-06-19
Payer: MEDICAID

## 2020-06-19 PROCEDURE — 99203 OFFICE O/P NEW LOW 30 MIN: CPT

## 2020-06-19 PROCEDURE — 99204 OFFICE O/P NEW MOD 45 MIN: CPT | Performed by: PAIN MEDICINE

## 2020-06-19 ASSESSMENT — ENCOUNTER SYMPTOMS
EYE REDNESS: 0
COUGH: 0
DIARRHEA: 0
NAUSEA: 0
PHOTOPHOBIA: 0
CONSTIPATION: 0
VOMITING: 0
ABDOMINAL PAIN: 0
BACK PAIN: 1
BOWEL INCONTINENCE: 0
SHORTNESS OF BREATH: 0

## 2020-06-24 RX ORDER — UMECLIDINIUM 62.5 UG/1
AEROSOL, POWDER ORAL
Qty: 30 EACH | Refills: 1 | Status: SHIPPED | OUTPATIENT
Start: 2020-06-24 | End: 2020-07-28 | Stop reason: SDUPTHER

## 2020-07-22 NOTE — TELEPHONE ENCOUNTER
Request for incruse - med pended. Please fill if appropriate.       Next Visit Date:  Future Appointments   Date Time Provider Mason Vizcaino   8/11/2020 10:00 AM Bonita Palacios DO [facility] [address] Maintenance   Topic Date Due    Shingles Vaccine (2 of 3) 12/03/2019    Flu vaccine (1) 09/01/2020    Low dose CT lung screening  01/28/2021    Colon cancer screen colonoscopy  12/30/2024    Lipid screen  01/28/2025    DTaP/Tdap/Td vaccine (2 - Td) 08/16/2026    Pneumococcal 0-64 years Vaccine  Completed    Hepatitis C screen  Completed    HIV screen  Completed    Hepatitis A vaccine  Aged Out    Hepatitis B vaccine  Aged Out    Hib vaccine  Aged Out    Meningococcal (ACWY) vaccine  Aged Out       Hemoglobin A1C (%)   Date Value   10/10/2018 5.5             ( goal A1C is < 7)   No results found for: LABMICR  LDL Cholesterol (mg/dL)   Date Value   01/28/2020 147 (H)       (goal LDL is <100)   AST (U/L)   Date Value   05/10/2019 12   05/10/2019 12     ALT (U/L)   Date Value   05/10/2019 12   05/10/2019 12     BUN (mg/dL)   Date Value   05/10/2019 6   05/10/2019 6     BP Readings from Last 3 Encounters:   06/09/20 124/84   03/12/20 116/76   03/03/20 (!) 127/90          (goal 120/80)    All Future Testing planned in [facility]  Lab Frequency Next Occurrence   EMG Once 08/08/2020   XR LUMBAR SPINE FLEXION AND EXTENSION ONLY Once 06/09/2021   DEXA BONE DENSITY AXIAL SKELETON Once 06/09/2021   XR LUMBAR SPINE (MIN 6 VIEWS) Once 06/09/2021   XR SPINE ENTIRE (2-3 VIEWS) Once 06/09/2021   Saline lock IV Once 12/19/2020   Fluoro For Surgical Procedures Once 12/19/2020   Lumbar Epidural Steroid Injection/Caudal Once 06/19/2020         Patient Active Problem List:     COPD (chronic obstructive pulmonary disease) (HCC)     Smoking addiction     Depression     Dyslipidemia     Lung nodule     DDD (degenerative disc disease), lumbar     Groin pain, chronic, right     Ilioinguinal neuralgia of right side Syncope and collapse     History of alcohol abuse     Lumbar radiculopathy

## 2020-07-22 NOTE — TELEPHONE ENCOUNTER
Request for ibu - med pended. Please fill if appropriate.       Next Visit Date:  Future Appointments   Date Time Provider Mason Giffordi   8/11/2020 10:00 AM Russ Rae DO [facility] [address] Maintenance   Topic Date Due    Shingles Vaccine (2 of 3) 12/03/2019    Flu vaccine (1) 09/01/2020    Low dose CT lung screening  01/28/2021    Colon cancer screen colonoscopy  12/30/2024    Lipid screen  01/28/2025    DTaP/Tdap/Td vaccine (2 - Td) 08/16/2026    Pneumococcal 0-64 years Vaccine  Completed    Hepatitis C screen  Completed    HIV screen  Completed    Hepatitis A vaccine  Aged Out    Hepatitis B vaccine  Aged Out    Hib vaccine  Aged Out    Meningococcal (ACWY) vaccine  Aged Out       Hemoglobin A1C (%)   Date Value   10/10/2018 5.5             ( goal A1C is < 7)   No results found for: LABMICR  LDL Cholesterol (mg/dL)   Date Value   01/28/2020 147 (H)       (goal LDL is <100)   AST (U/L)   Date Value   05/10/2019 12   05/10/2019 12     ALT (U/L)   Date Value   05/10/2019 12   05/10/2019 12     BUN (mg/dL)   Date Value   05/10/2019 6   05/10/2019 6     BP Readings from Last 3 Encounters:   06/09/20 124/84   03/12/20 116/76   03/03/20 (!) 127/90          (goal 120/80)    All Future Testing planned in [facility]  Lab Frequency Next Occurrence   EMG Once 08/08/2020   XR LUMBAR SPINE FLEXION AND EXTENSION ONLY Once 06/09/2021   DEXA BONE DENSITY AXIAL SKELETON Once 06/09/2021   XR LUMBAR SPINE (MIN 6 VIEWS) Once 06/09/2021   XR SPINE ENTIRE (2-3 VIEWS) Once 06/09/2021   Saline lock IV Once 12/19/2020   Fluoro For Surgical Procedures Once 12/19/2020   Lumbar Epidural Steroid Injection/Caudal Once 06/19/2020         Patient Active Problem List:     COPD (chronic obstructive pulmonary disease) (HCC)     Smoking addiction     Depression     Dyslipidemia     Lung nodule     DDD (degenerative disc disease), lumbar     Groin pain, chronic, right     Ilioinguinal neuralgia of right side     Syncope and collapse     History of alcohol abuse     Lumbar radiculopathy

## 2020-07-28 ENCOUNTER — HOSPITAL ENCOUNTER (OUTPATIENT)
Age: 55
Discharge: HOME | End: 2020-07-30
Payer: MEDICAID

## 2020-07-28 ENCOUNTER — HOSPITAL ENCOUNTER (OUTPATIENT)
Dept: GENERAL RADIOLOGY | Age: 55
Discharge: HOME | End: 2020-07-30
Payer: MEDICAID

## 2020-07-28 PROCEDURE — 72082 X-RAY EXAM ENTIRE SPI 2/3 VW: CPT

## 2020-07-28 PROCEDURE — 72114 X-RAY EXAM L-S SPINE BENDING: CPT

## 2020-07-28 RX ORDER — UMECLIDINIUM 62.5 UG/1
AEROSOL, POWDER ORAL
Qty: 30 EACH | Refills: 1 | Status: SHIPPED | OUTPATIENT
Start: 2020-07-28 | End: 2020-08-19

## 2020-07-28 RX ORDER — IBUPROFEN 800 MG/1
TABLET ORAL
Qty: 90 TABLET | Refills: 0 | Status: SHIPPED | OUTPATIENT
Start: 2020-07-28 | End: 2020-09-01

## 2020-08-11 ENCOUNTER — OFFICE VISIT (OUTPATIENT)
Dept: NEUROSURGERY | Age: 55
End: 2020-08-11
Payer: MEDICAID

## 2020-08-11 VITALS
SYSTOLIC BLOOD PRESSURE: 112 MMHG | HEIGHT: 68 IN | OXYGEN SATURATION: 97 % | WEIGHT: 165 LBS | TEMPERATURE: 98 F | DIASTOLIC BLOOD PRESSURE: 76 MMHG | BODY MASS INDEX: 25.01 KG/M2 | HEART RATE: 72 BPM

## 2020-08-11 PROBLEM — M41.50 SCOLIOSIS DUE TO DEGENERATIVE DISEASE OF SPINE IN ADULT PATIENT: Status: ACTIVE | Noted: 2020-08-11

## 2020-08-11 PROCEDURE — G8427 DOCREV CUR MEDS BY ELIG CLIN: HCPCS | Performed by: NEUROLOGICAL SURGERY

## 2020-08-11 PROCEDURE — G8419 CALC BMI OUT NRM PARAM NOF/U: HCPCS | Performed by: NEUROLOGICAL SURGERY

## 2020-08-11 PROCEDURE — 99214 OFFICE O/P EST MOD 30 MIN: CPT | Performed by: NEUROLOGICAL SURGERY

## 2020-08-11 PROCEDURE — 3017F COLORECTAL CA SCREEN DOC REV: CPT | Performed by: NEUROLOGICAL SURGERY

## 2020-08-11 PROCEDURE — 4004F PT TOBACCO SCREEN RCVD TLK: CPT | Performed by: NEUROLOGICAL SURGERY

## 2020-08-11 NOTE — PROGRESS NOTES
Department of Neurosurgery                                                      Follow up visit      History Obtained From:  patient    CHIEF COMPLAINT:         Chief Complaint   Patient presents with    Follow-up     6 weeks     Results     EMG 08/11/20 PM 06/19/20 Bone Density 06/18/20 XR 07/28/20       HISTORY OF PRESENT ILLNESS:       The patient is a 60 y.o. male who presents for follow-up for general scoliosis. He has had a EMG done last week. He reports he left leg fall asleep occasionally but that has improved. Most of his pain is left side of low back that radiate to left buttock and hip area, that is described as sharp. The imaging was reported  negative for radiculopathy. He report his leg weakness has not been much of a problem, he reports that improved after he stopped drinking. He takes ibuprofen for pain. He is able to work however at a reduced capacity with his pain.     PAST MEDICAL HISTORY :       Past Medical History:        Diagnosis Date    Alcohol abuse 6/4/2014    Anxiety     Arthritis     COPD (chronic obstructive pulmonary disease) ([address]     ASTHMA    DDD (degenerative disc disease), lumbar 9/6/2016    Depression     Heart burn     Hemorrhoids     HTN (hypertension) 1/19/2015    Ilioinguinal neuralgia of right side 4/10/2017    Psychiatric problem     depression /anxiety    Seizures ([address]     withdrawal from alcohol 2 years ago    Wears glasses     READING       Past Surgical History:        Procedure Laterality Date    ANESTHESIA NERVE BLOCK Right 4/10/2017    NERVE BLOCK US RIGHT SIDED ILIOINGUINAL performed by Alfonzo Osorio MD at [address] [place] [address]  3/19/15    HERNIA REPAIR      NERVE BLOCK Right 10/10/2016    right groin    OTHER SURGICAL HISTORY Right 04/10/2017    US nerve block to R groin    TOE SURGERY      SCREW RIGHT BIG TOE       Social History:   Social History     Socioeconomic History    Marital status:      Spouse name: Not on file    Number of children: Not on file    Years of education: Not on file    Highest education level: Not on file   Occupational History    Not on file   Social Needs    Financial resource strain: Not on file    Food insecurity     Worry: Not on file     Inability: Not on file    Transportation needs     Medical: Not on file     Non-medical: Not on file   Tobacco Use    Smoking status: Current Every Day Smoker     Packs/day: 1.00     Years: 38.00     Pack years: 38.00     Types: Cigarettes    Smokeless tobacco: Never Used    Tobacco comment: 1 PPD   Substance and Sexual Activity    Alcohol use: Not Currently     Alcohol/week: 12.0 standard drinks     Types: 12 Cans of beer per week     Comment: recovering alcholic, drinks occasionally    Drug use: No     Comment: recovering drug addict    Sexual activity: Never   Lifestyle    Physical activity     Days per week: Not on file     Minutes per session: Not on file    Stress: Not on file   Relationships    Social connections     Talks on phone: Not on file     Gets together: Not on file     Attends Voodoo service: Not on file     Active member of club or organization: Not on file     Attends meetings of clubs or organizations: Not on file     Relationship status: Not on file    Intimate partner violence     Fear of current or ex partner: Not on file     Emotionally abused: Not on file     Physically abused: Not on file     Forced sexual activity: Not on file   Other Topics Concern    Not on file   Social History Narrative    Not on file       Family History:       Problem Relation Age of Onset    Cancer Mother         colon ca       Allergies:  Nickel    Home Medications:  Prior to Admission medications    Medication Sig Start Date End Date Taking?  Authorizing Provider   INCRUSE ELLIPTA 62.5 MCG/INH AEPB INHALE 1 PUFF INTO THE LUNGS DAILY 7/28/20  Yes Riley Turner MD   ibuprofen (ADVIL;MOTRIN) 800 MG tablet TAKE 1 TABLET BY MOUTH AS NEEDED FOR PAIN (AS NEEDED) 7/28/20  Yes Nila Turner MD   ARNUITY ELLIPTA 100 MCG/ACT AEPB INHALE 1 PUFF INTO THE LUNGS DAILY 6/11/20  Yes Nila Turner MD   albuterol sulfate  (90 Base) MCG/ACT inhaler INHALE 2 PUFFS INTO THE LUNGS EVERY 6 HOURS AS NEEDED FOR WHEEZING 6/7/20  Yes Nila Turner MD   Heating Pads (COMFORT-HEAL HEATING PAD) PADS Use on back as needed 3/3/20  Yes Keiry Cantu MD   tiZANidine (ZANAFLEX) 2 MG tablet TAKE 1 TABLET BY MOUTH NIGHTLY AS NEEDED (SPASMS)  Patient not taking: Reported on 8/11/2020 5/26/20   Sanchez Phelps MD       Current Medications:   No current facility-administered medications for this visit.        PHYSICAL EXAM:       /76 (Site: Left Upper Arm, Position: Sitting, Cuff Size: Medium Adult)   Pulse 72   Temp 98 °F (36.7 °C) (Temporal)   Ht 5' 8\" (1.727 m)   Wt 165 lb (74.8 kg)   SpO2 97%   BMI 25.09 kg/m²     Gen: NAD  HEENT: moist mucus membranes  Cardio: RRR  Pulm: chest rise symmetrically  GI: abd soft  Ext: no edema  Skin: warm    Neuro:    AOX3  CN 2-12 grossly intact  Speech articulate  Motor 5/5  No pronator drift  Sensation symmetrical   Left bisi test positive   Right PSIS pain patient  No pain pelvic extraction  Radiology Review: None new      ASSESSMENT AND PLAN:       Patient Active Problem List   Diagnosis    COPD (chronic obstructive pulmonary disease) (HCC)    Smoking addiction    Depression    Dyslipidemia    Lung nodule    DDD (degenerative disc disease), lumbar    Groin pain, chronic, right    Ilioinguinal neuralgia of right side    Syncope and collapse    History of alcohol abuse    Lumbar radiculopathy         A/P:  This is a 60 y.o. male with degenerative scoliosis with left sided pain around PSIS, with some S1/L5 referred pain vs radiculitis, EMG Negative  Recommend that he continues pain management for injection around the left PSIS  Seems his pain is mostly in the lateral left

## 2020-08-19 RX ORDER — UMECLIDINIUM 62.5 UG/1
AEROSOL, POWDER ORAL
Qty: 30 EACH | Refills: 1 | Status: SHIPPED | OUTPATIENT
Start: 2020-08-19 | End: 2020-09-16

## 2020-08-19 NOTE — TELEPHONE ENCOUNTER
Request for incruse - med pended. Please fill if appropriate.       Next Visit Date:  Future Appointments   Date Time Provider Mason Vizcaino   8/28/2020  8:20 AM Joanie Carbajal MD [address]   9/22/2020 10:00 AM Pia Green DO [facility] [address] Maintenance   Topic Date Due    Shingles Vaccine (2 of 3) 12/03/2019    Flu vaccine (1) 09/01/2020    Low dose CT lung screening  01/28/2021    Diabetes screen  05/10/2022    Colon cancer screen colonoscopy  12/30/2024    Lipid screen  01/28/2025    DTaP/Tdap/Td vaccine (2 - Td) 08/16/2026    Pneumococcal 0-64 years Vaccine  Completed    Hepatitis C screen  Completed    HIV screen  Completed    Hepatitis A vaccine  Aged Out    Hepatitis B vaccine  Aged Out    Hib vaccine  Aged Out    Meningococcal (ACWY) vaccine  Aged Out       Hemoglobin A1C (%)   Date Value   10/10/2018 5.5             ( goal A1C is < 7)   No results found for: LABMICR  LDL Cholesterol (mg/dL)   Date Value   01/28/2020 147 (H)       (goal LDL is <100)   AST (U/L)   Date Value   05/10/2019 12   05/10/2019 12     ALT (U/L)   Date Value   05/10/2019 12   05/10/2019 12     BUN (mg/dL)   Date Value   05/10/2019 6   05/10/2019 6     BP Readings from Last 3 Encounters:   08/11/20 112/76   06/09/20 124/84   03/12/20 116/76          (goal 120/80)    All Future Testing planned in [facility]  Lab Frequency Next Occurrence   XR LUMBAR SPINE FLEXION AND EXTENSION ONLY Once 06/09/2021   Saline lock IV Once 12/19/2020   Fluoro For Surgical Procedures Once 12/19/2020   Lumbar Epidural Steroid Injection/Caudal Once 06/19/2020         Patient Active Problem List:     COPD (chronic obstructive pulmonary disease) ([address]     Smoking addiction     Depression     Dyslipidemia     Lung nodule     DDD (degenerative disc disease), lumbar     Groin pain, chronic, right     Ilioinguinal neuralgia of right side     Syncope and collapse     History of alcohol abuse     Lumbar radiculopathy     Scoliosis due to degenerative disease of spine in adult patient

## 2020-08-25 NOTE — TELEPHONE ENCOUNTER
Request for ibu - med pended. Please filli f appropriate.       Next Visit Date:  Future Appointments   Date Time Provider Mason Vizcaino   8/28/2020  8:20 AM Rosaline Steele MD [address]   9/22/2020 10:00 AM Jayleneantonietta Hailesin, DO [facility] [address] Maintenance   Topic Date Due    Shingles Vaccine (2 of 3) 12/03/2019    Flu vaccine (1) 09/01/2020    Low dose CT lung screening  01/28/2021    Diabetes screen  05/10/2022    Colon cancer screen colonoscopy  12/30/2024    Lipid screen  01/28/2025    DTaP/Tdap/Td vaccine (2 - Td) 08/16/2026    Pneumococcal 0-64 years Vaccine  Completed    Hepatitis C screen  Completed    HIV screen  Completed    Hepatitis A vaccine  Aged Out    Hepatitis B vaccine  Aged Out    Hib vaccine  Aged Out    Meningococcal (ACWY) vaccine  Aged Out       Hemoglobin A1C (%)   Date Value   10/10/2018 5.5             ( goal A1C is < 7)   No results found for: LABMICR  LDL Cholesterol (mg/dL)   Date Value   01/28/2020 147 (H)       (goal LDL is <100)   AST (U/L)   Date Value   05/10/2019 12   05/10/2019 12     ALT (U/L)   Date Value   05/10/2019 12   05/10/2019 12     BUN (mg/dL)   Date Value   05/10/2019 6   05/10/2019 6     BP Readings from Last 3 Encounters:   08/11/20 112/76   06/09/20 124/84   03/12/20 116/76          (goal 120/80)    All Future Testing planned in [facility]  Lab Frequency Next Occurrence   EMG Once 06/09/2021   XR LUMBAR SPINE FLEXION AND EXTENSION ONLY Once 06/09/2021   Saline lock IV Once 12/19/2020   Fluoro For Surgical Procedures Once 12/19/2020   Lumbar Epidural Steroid Injection/Caudal Once 06/19/2020         Patient Active Problem List:     COPD (chronic obstructive pulmonary disease) ([address]     Smoking addiction     Depression     Dyslipidemia     Lung nodule     DDD (degenerative disc disease), lumbar     Groin pain, chronic, right     Ilioinguinal neuralgia of right side     Syncope and collapse     History of alcohol abuse Lumbar radiculopathy     Scoliosis due to degenerative disease of spine in adult patient

## 2020-09-01 RX ORDER — IBUPROFEN 800 MG/1
TABLET ORAL
Qty: 90 TABLET | Refills: 0 | Status: SHIPPED | OUTPATIENT
Start: 2020-09-01 | End: 2020-11-27

## 2020-09-02 NOTE — TELEPHONE ENCOUNTER
Request for arnuity ellipta - med pended. Please fill if appropriate.       Next Visit Date:  Future Appointments   Date Time Provider Mason Vizcaino   9/22/2020 10:00 AM Pauline Blancas [facility] [address] Maintenance   Topic Date Due    Shingles Vaccine (2 of 3) 12/03/2019    Flu vaccine (1) 09/01/2020    Low dose CT lung screening  01/28/2021    Diabetes screen  05/10/2022    Colon cancer screen colonoscopy  12/30/2024    Lipid screen  01/28/2025    DTaP/Tdap/Td vaccine (2 - Td) 08/16/2026    Pneumococcal 0-64 years Vaccine  Completed    Hepatitis C screen  Completed    HIV screen  Completed    Hepatitis A vaccine  Aged Out    Hepatitis B vaccine  Aged Out    Hib vaccine  Aged Out    Meningococcal (ACWY) vaccine  Aged Out       Hemoglobin A1C (%)   Date Value   10/10/2018 5.5             ( goal A1C is < 7)   No results found for: LABMICR  LDL Cholesterol (mg/dL)   Date Value   01/28/2020 147 (H)       (goal LDL is <100)   AST (U/L)   Date Value   05/10/2019 12   05/10/2019 12     ALT (U/L)   Date Value   05/10/2019 12   05/10/2019 12     BUN (mg/dL)   Date Value   05/10/2019 6   05/10/2019 6     BP Readings from Last 3 Encounters:   08/11/20 112/76   06/09/20 124/84   03/12/20 116/76          (goal 120/80)    All Future Testing planned in [facility]  Lab Frequency Next Occurrence   EMG Once 06/09/2021   XR LUMBAR SPINE FLEXION AND EXTENSION ONLY Once 06/09/2021   Saline lock IV Once 12/19/2020   Fluoro For Surgical Procedures Once 12/19/2020   Lumbar Epidural Steroid Injection/Caudal Once 06/19/2020         Patient Active Problem List:     COPD (chronic obstructive pulmonary disease) ([facility] [address]     Smoking addiction     Depression     Dyslipidemia     Lung nodule     DDD (degenerative disc disease), lumbar     Groin pain, chronic, right     Ilioinguinal neuralgia of right side     Syncope and collapse     History of alcohol abuse     Lumbar radiculopathy     Scoliosis due to degenerative disease of spine in adult patient

## 2020-09-07 RX ORDER — FLUTICASONE FUROATE 100 UG/1
POWDER RESPIRATORY (INHALATION)
Qty: 30 EACH | Refills: 2 | Status: SHIPPED | OUTPATIENT
Start: 2020-09-07

## 2020-09-08 NOTE — TELEPHONE ENCOUNTER
pain, chronic, right     Ilioinguinal neuralgia of right side     Syncope and collapse     History of alcohol abuse     Lumbar radiculopathy     Scoliosis due to degenerative disease of spine in adult patient

## 2020-09-11 RX ORDER — ALBUTEROL SULFATE 90 UG/1
2 AEROSOL, METERED RESPIRATORY (INHALATION) EVERY 6 HOURS PRN
Qty: 18 G | Refills: 3 | Status: SHIPPED | OUTPATIENT
Start: 2020-09-11 | End: 2020-12-10

## 2020-09-15 NOTE — TELEPHONE ENCOUNTER
Request for incruse - med pended. Please fill if appropriate.       Next Visit Date:  Future Appointments   Date Time Provider Mason Vizcaino   9/22/2020 10:00 AM Liban Perdomo [facility] [address] Maintenance   Topic Date Due    Shingles Vaccine (2 of 3) 12/03/2019    Flu vaccine (1) 09/01/2020    Low dose CT lung screening  01/28/2021    Diabetes screen  05/10/2022    Colon cancer screen colonoscopy  12/30/2024    Lipid screen  01/28/2025    DTaP/Tdap/Td vaccine (2 - Td) 08/16/2026    Pneumococcal 0-64 years Vaccine  Completed    Hepatitis C screen  Completed    HIV screen  Completed    Hepatitis A vaccine  Aged Out    Hepatitis B vaccine  Aged Out    Hib vaccine  Aged Out    Meningococcal (ACWY) vaccine  Aged Out       Hemoglobin A1C (%)   Date Value   10/10/2018 5.5             ( goal A1C is < 7)   No results found for: LABMICR  LDL Cholesterol (mg/dL)   Date Value   01/28/2020 147 (H)       (goal LDL is <100)   AST (U/L)   Date Value   05/10/2019 12   05/10/2019 12     ALT (U/L)   Date Value   05/10/2019 12   05/10/2019 12     BUN (mg/dL)   Date Value   05/10/2019 6   05/10/2019 6     BP Readings from Last 3 Encounters:   08/11/20 112/76   06/09/20 124/84   03/12/20 116/76          (goal 120/80)    All Future Testing planned in [facility]  Lab Frequency Next Occurrence   EMG Once 06/09/2021   XR LUMBAR SPINE FLEXION AND EXTENSION ONLY Once 06/09/2021   Saline lock IV Once 12/19/2020   Fluoro For Surgical Procedures Once 12/19/2020   Lumbar Epidural Steroid Injection/Caudal Once 06/19/2020         Patient Active Problem List:     COPD (chronic obstructive pulmonary disease) ([facility] [address]     Smoking addiction     Depression     Dyslipidemia     Lung nodule     DDD (degenerative disc disease), lumbar     Groin pain, chronic, right     Ilioinguinal neuralgia of right side     Syncope and collapse     History of alcohol abuse     Lumbar radiculopathy     Scoliosis due to degenerative disease of spine in adult patient

## 2020-09-16 RX ORDER — UMECLIDINIUM 62.5 UG/1
AEROSOL, POWDER ORAL
Qty: 30 EACH | Refills: 1 | Status: SHIPPED | OUTPATIENT
Start: 2020-09-16 | End: 2020-11-10 | Stop reason: CLARIF

## 2020-10-15 ENCOUNTER — TELEPHONE (OUTPATIENT)
Dept: INTERNAL MEDICINE | Age: 55
End: 2020-10-15

## 2020-10-15 NOTE — TELEPHONE ENCOUNTER
Received PA for Incruse Ellipta This is a non-formulary medication. Alternative medications that are covered by insurance :   Symbicort Handihaler  Symbicort Respimat  Atrovent HFA  Symbicort HFA  Combivent Respimat  Flovent HFA     Please discontinue non formulary medication and order the preferred medication, if appropriate. Thanks!

## 2020-11-10 RX ORDER — TIOTROPIUM BROMIDE 18 UG/1
18 CAPSULE ORAL; RESPIRATORY (INHALATION) DAILY
Qty: 90 CAPSULE | Refills: 3 | Status: SHIPPED | OUTPATIENT
Start: 2020-11-10

## 2020-11-24 NOTE — TELEPHONE ENCOUNTER
E-scribing request for ibuprofen  pt has no future appt. Last seen 3/3/20. Please advise. LM on VM for pt to call office to schedule appt.    Health Maintenance   Topic Date Due    Shingles Vaccine (2 of 3) 12/03/2019    Flu vaccine (1) 09/01/2020    Low dose CT lung screening  01/28/2021    Diabetes screen  05/10/2022    Colon cancer screen colonoscopy  12/30/2024    Lipid screen  01/28/2025    DTaP/Tdap/Td vaccine (2 - Td) 08/16/2026    Pneumococcal 0-64 years Vaccine  Completed    Hepatitis C screen  Completed    HIV screen  Completed    Hepatitis A vaccine  Aged Out    Hepatitis B vaccine  Aged Out    Hib vaccine  Aged Out    Meningococcal (ACWY) vaccine  Aged Out             (applicable per patient's age: Cancer Screenings, Depression Screening, Fall Risk Screening, Immunizations)    Hemoglobin A1C (%)   Date Value   10/10/2018 5.5     LDL Cholesterol (mg/dL)   Date Value   01/28/2020 147 (H)     AST (U/L)   Date Value   05/10/2019 12   05/10/2019 12     ALT (U/L)   Date Value   05/10/2019 12   05/10/2019 12     BUN (mg/dL)   Date Value   05/10/2019 6   05/10/2019 6      (goal A1C is < 7)   (goal LDL is <100) need 30-50% reduction from baseline     BP Readings from Last 3 Encounters:   08/11/20 112/76   06/09/20 124/84   03/12/20 116/76    (goal /80)      All Future Testing planned in [facility]:  Lab Frequency Next Occurrence   EMG Once 06/09/2021   XR LUMBAR SPINE FLEXION AND EXTENSION ONLY Once 06/09/2021   Saline lock IV Once 12/19/2020   Fluoro For Surgical Procedures Once 12/19/2020   Lumbar Epidural Steroid Injection/Caudal Once 06/19/2020       Next Visit Date:  Future Appointments   Date Time Provider Mason Vizcaino   12/9/2020  1:30 PM Sonia Guajardo APRN - [facility]            Patient Active Problem List:     COPD (chronic obstructive pulmonary disease) ([facility] [address]     Smoking addiction     Depression     Dyslipidemia     Lung nodule     DDD (degenerative disc disease), lumbar     Groin pain, chronic, right     Ilioinguinal neuralgia of right side     Syncope and collapse     History of alcohol abuse     Lumbar radiculopathy     Scoliosis due to degenerative disease of spine in adult patient

## 2020-11-27 RX ORDER — IBUPROFEN 800 MG/1
TABLET ORAL
Qty: 90 TABLET | Refills: 0 | Status: SHIPPED | OUTPATIENT
Start: 2020-11-27 | End: 2021-08-11

## 2020-11-27 NOTE — TELEPHONE ENCOUNTER
Medications approved, orders signed and sent to pharmacy. Thank you. Please ask patient to come in and establish with a new PCP whenever convenient for him.

## 2020-12-07 ENCOUNTER — TELEPHONE (OUTPATIENT)
Dept: PAIN MANAGEMENT | Age: 55
End: 2020-12-07

## 2020-12-10 RX ORDER — ALBUTEROL SULFATE 90 UG/1
2 AEROSOL, METERED RESPIRATORY (INHALATION) EVERY 6 HOURS PRN
Qty: 18 G | Refills: 3 | Status: SHIPPED | OUTPATIENT
Start: 2020-12-10

## 2020-12-10 NOTE — TELEPHONE ENCOUNTER
Refill request for Masks. If appropriate please send medication(s) to patients pharmacy. Next appt: None currently due to scheduling.    Last appt: 3/3/2020    Health Maintenance   Topic Date Due    Shingles Vaccine (2 of 3) 12/03/2019    Flu vaccine (1) 09/01/2020    Low dose CT lung screening  01/28/2021    Diabetes screen  05/10/2022    Colon cancer screen colonoscopy  12/30/2024    Lipid screen  01/28/2025    DTaP/Tdap/Td vaccine (2 - Td) 08/16/2026    Pneumococcal 0-64 years Vaccine  Completed    Hepatitis C screen  Completed    HIV screen  Completed    Hepatitis A vaccine  Aged Out    Hepatitis B vaccine  Aged Out    Hib vaccine  Aged Out    Meningococcal (ACWY) vaccine  Aged Out       Hemoglobin A1C (%)   Date Value   10/10/2018 5.5             ( goal A1C is < 7)   No results found for: LABMICR  LDL Cholesterol (mg/dL)   Date Value   01/28/2020 147 (H)       (goal LDL is <100)   AST (U/L)   Date Value   05/10/2019 12   05/10/2019 12     ALT (U/L)   Date Value   05/10/2019 12   05/10/2019 12     BUN (mg/dL)   Date Value   05/10/2019 6   05/10/2019 6     BP Readings from Last 3 Encounters:   08/11/20 112/76   06/09/20 124/84   03/12/20 116/76          (goal 120/80)          Patient Active Problem List:     COPD (chronic obstructive pulmonary disease) (HCC)     Smoking addiction     Depression     Dyslipidemia     Lung nodule     DDD (degenerative disc disease), lumbar     Groin pain, chronic, right     Ilioinguinal neuralgia of right side     Syncope and collapse     History of alcohol abuse     Lumbar radiculopathy     Scoliosis due to degenerative disease of spine in adult patient

## 2020-12-10 NOTE — TELEPHONE ENCOUNTER
Refill request for Albuterol. If appropriate please send medication(s) to patients pharmacy. Next appt: None currently due to scheduling.    Last appt: 3/3/2020    Health Maintenance   Topic Date Due    Shingles Vaccine (2 of 3) 12/03/2019    Flu vaccine (1) 09/01/2020    Low dose CT lung screening  01/28/2021    Diabetes screen  05/10/2022    Colon cancer screen colonoscopy  12/30/2024    Lipid screen  01/28/2025    DTaP/Tdap/Td vaccine (2 - Td) 08/16/2026    Pneumococcal 0-64 years Vaccine  Completed    Hepatitis C screen  Completed    HIV screen  Completed    Hepatitis A vaccine  Aged Out    Hepatitis B vaccine  Aged Out    Hib vaccine  Aged Out    Meningococcal (ACWY) vaccine  Aged Out       Hemoglobin A1C (%)   Date Value   10/10/2018 5.5             ( goal A1C is < 7)   No results found for: LABMICR  LDL Cholesterol (mg/dL)   Date Value   01/28/2020 147 (H)       (goal LDL is <100)   AST (U/L)   Date Value   05/10/2019 12   05/10/2019 12     ALT (U/L)   Date Value   05/10/2019 12   05/10/2019 12     BUN (mg/dL)   Date Value   05/10/2019 6   05/10/2019 6     BP Readings from Last 3 Encounters:   08/11/20 112/76   06/09/20 124/84   03/12/20 116/76          (goal 120/80)          Patient Active Problem List:     COPD (chronic obstructive pulmonary disease) (HCC)     Smoking addiction     Depression     Dyslipidemia     Lung nodule     DDD (degenerative disc disease), lumbar     Groin pain, chronic, right     Ilioinguinal neuralgia of right side     Syncope and collapse     History of alcohol abuse     Lumbar radiculopathy     Scoliosis due to degenerative disease of spine in adult patient

## 2020-12-17 ENCOUNTER — HOSPITAL ENCOUNTER (OUTPATIENT)
Dept: PAIN MANAGEMENT | Age: 55
Discharge: HOME | End: 2020-12-17
Payer: MEDICAID

## 2020-12-17 PROCEDURE — 99214 OFFICE O/P EST MOD 30 MIN: CPT | Performed by: PAIN MEDICINE

## 2020-12-17 PROCEDURE — 99213 OFFICE O/P EST LOW 20 MIN: CPT

## 2020-12-17 ASSESSMENT — ENCOUNTER SYMPTOMS
VOMITING: 0
SHORTNESS OF BREATH: 0
NAUSEA: 0
BOWEL INCONTINENCE: 0
CONSTIPATION: 0
VOICE CHANGE: 0
COUGH: 0
SINUS PRESSURE: 0
PHOTOPHOBIA: 0
EYE REDNESS: 0
BACK PAIN: 1
ABDOMINAL PAIN: 0

## 2020-12-17 NOTE — PROGRESS NOTES
tingling or weakness. (Tingling and numbness and weakness in the past) Risk factors include lack of exercise and sedentary lifestyle (Former smoker). Alleviating factors:rest   Lifestyle changes experienced with pain: Wakes from sleep, Prevents or limits ADLs, Increases w/activity. increases w/prolonged sitting/standing/walking  Mood changes,none  Patient currently employed. Physical therapy did not help the pain. Are you under psychological counseling at present: No  Goals for treatment include:  Decrease in pain  Enjoy daily and recreational activities, return to previous status. ACTIVITY/SOCIAL/EMOTIONAL:  Sleep Pattern: 9 hours per night.  generally restful sleep  Home Exercises:  no regular exercise  Activity:unchanged  Emotional Issues: normal.   Currently seeing a Psychiatrist or Psychologist:  No      Past Medical History:   Diagnosis Date    Alcohol abuse 6/4/2014    Anxiety     Arthritis     COPD (chronic obstructive pulmonary disease) ([facility] [address]     ASTHMA    DDD (degenerative disc disease), lumbar 9/6/2016    Depression     Heart burn     Hemorrhoids     HTN (hypertension) 1/19/2015    Ilioinguinal neuralgia of right side 4/10/2017    Psychiatric problem     depression /anxiety    Seizures ([facility] [address]     withdrawal from alcohol 2 years ago    Wears glasses     READING       Past Surgical History:   Procedure Laterality Date    ANESTHESIA NERVE BLOCK Right 4/10/2017    NERVE BLOCK US RIGHT SIDED ILIOINGUINAL performed by Rony Sanchez MD at [address] [place] [address]  3/19/15    HERNIA REPAIR      NERVE BLOCK Right 10/10/2016    right groin    OTHER SURGICAL HISTORY Right 04/10/2017    US nerve block to R groin    TOE SURGERY      SCREW RIGHT BIG TOE       Family History   Problem Relation Age of Onset    Cancer Mother         colon ca       Social History     Socioeconomic History    Marital status:      Spouse name: Not on file    Number of children: Not on file    Years of education: Not on file    Highest education level: Not on file   Occupational History    Not on file   Social Needs    Financial resource strain: Not on file    Food insecurity     Worry: Not on file     Inability: Not on file    Transportation needs     Medical: Not on file     Non-medical: Not on file   Tobacco Use    Smoking status: Current Every Day Smoker     Packs/day: 1.00     Years: 38.00     Pack years: 38.00     Types: Cigarettes    Smokeless tobacco: Never Used    Tobacco comment: 1 PPD   Substance and Sexual Activity    Alcohol use: Not Currently     Alcohol/week: 12.0 standard drinks     Types: 12 Cans of beer per week     Comment: recovering alcholic, drinks occasionally    Drug use: No     Comment: recovering drug addict    Sexual activity: Never   Lifestyle    Physical activity     Days per week: Not on file     Minutes per session: Not on file    Stress: Not on file   Relationships    Social connections     Talks on phone: Not on file     Gets together: Not on file     Attends Denominational service: Not on file     Active member of club or organization: Not on file     Attends meetings of clubs or organizations: Not on file     Relationship status: Not on file    Intimate partner violence     Fear of current or ex partner: Not on file     Emotionally abused: Not on file     Physically abused: Not on file     Forced sexual activity: Not on file   Other Topics Concern    Not on file   Social History Narrative    Not on file       Allergies   Allergen Reactions    Nickel      Contact dermatitis       Current Outpatient Medications on File Prior to Encounter   Medication Sig Dispense Refill    albuterol sulfate  (90 Base) MCG/ACT inhaler INHALE 2 PUFFS INTO THE LUNGS EVERY 6 HOURS AS NEEDED FOR WHEEZING 18 g 3    Masks (CLEVER CHOICE FACE MASK) MISC USE NEW FACE MASK EVERYDAY WHEN PATIENT GO OUTSIDE OF HOME DUE TO COVID PANDEMIC 90 each 0    ibuprofen (ADVIL;MOTRIN) 800 DATA:  LAB.:  5/10/2019  1:05 PM - [place] Incoming Lab Results From Locationary    Component Value Ref Range & Units Status Collected Lab   Glucose, Fasting 85  70 - 99 mg/dL Final 05/10/2019 10:21  [address]   BUN 6  6 - 20 mg/dL Final 05/10/2019 10:21  [address] 0.68Low   0.70 - 1.20 mg/dL Final 05/10/2019 10:21  [address]   Bun/Cre Ratio NOT REPORTED  9 - 20 Final 05/10/2019 10:21  [address]   Calcium 9.4  8.6 - 10.4 mg/dL Final 05/10/2019 10:21  [address]   Sodium 140  135 - 144 mmol/L Final 05/10/2019 10:21  [address]   Potassium 4.1  3.7 - 5.3 mmol/L Final 05/10/2019 10:21  [address]   Chloride 107  98 - 107 mmol/L Final 05/10/2019 10:21  [address]   CO2 24  20 - 31 mmol/L Final 05/10/2019 10:21  [address]   Anion Gap 9  9 - 17 mmol/L Final 05/10/2019 10:21  [address]   Alkaline Phosphatase 83  40 - 129 U/L Final 05/10/2019 10:21  [address]   ALT 12  5 - 41 U/L Final 05/10/2019 10:21  [address]   AST 12  <40 U/L Final 05/10/2019 10:21  [address]   Total Bilirubin 0.41  0.3 - 1.2 mg/dL Final 05/10/2019 10:21  [address]   Total Protein 7.1  6.4 - 8.3 g/dL Final 05/10/2019 10:21  [address]   Alb 4.3  3.5 - 5.2 g/dL Final 05/10/2019 10:21  [address]   Albumin/Globulin Ratio 1.5  1.0 - 2.5 Final 05/10/2019 10:21  [address]   GFR Non-African American >60  >60 mL/min Final 05/10/2019 10:21  [address]   GFR African American >60  >60 mL/min Final 05/10/2019 10:21  [address]   GFR Comment        Final         X-Ray reports:    EXAMINATION:    TWO XRAY VIEWS SCOLIOSIS SERIES; 6 XRAY VIEWS OF THE LUMBAR SPINE        7/28/2020 9:41 Spinal stenosis of lumbar region with    neurogenic claudication    TECHNOLOGIST PROVIDED HISTORY:    Reason for Exam: spinal stenosis of the lumbar region with neurogenic    claudication        FINDINGS:    BONES/ALIGNMENT:        Moderate lumbar levoscoliosis. Edema involving the L2 through L5 vertebral    bodies are most likely degenerative. Mild-to-moderate compression deformity    of L2 vertebral body appears new but chronic. Grade 1 anterolisthesis of L3    relative to L4. Minimal grade 1 retrolisthesis of L4 relative to L5. Degenerative endplate edema is most significant at L3-L4. Schmorl's node    involving the superior endplate of L2 vertebral body. No convincing evidence    of acute discitis or osteomyelitis on this noncontrast examination. SPINAL CORD:        The conus terminates normally. SOFT TISSUES:        No paraspinal mass identified. L1-L2: Posterior disc osteophyte complex extends into neural foramina    bilaterally. Mild bilateral facet degenerative changes. Mild bilateral    neural foraminal stenosis. No significant spinal canal stenosis. L2-L3: Posterior disc osteophyte complex extends into neural foramina    bilaterally. Mild bilateral facet degenerative changes. Moderate left    neural foraminal stenosis. Moderate right neural foraminal stenosis. Mild    bilateral lateral recess stenosis. Minimal spinal canal stenosis. L3-L4: Posterior disc osteophyte complex extends into the neural foramina    bilaterally. Moderate to severe bilateral facet degenerative changes. Severe right neural foraminal stenosis. Moderate left neural foraminal    stenosis. Severe bilateral lateral recess stenosis. Severe spinal canal    stenosis with mass effect on the nerve roots within the spinal canal.        L4-L5: Posterior disc osteophyte complex extends into neural foramina    bilaterally. Superimposed small central and left central disc herniation. Moderate to severe bilateral facet degenerative changes. Severe left neural    foraminal stenosis. Moderate right neural foraminal stenosis. Severe left    lateral recess stenosis. Mild right lateral recess stenosis. Mild spinal    canal stenosis. L5-S1: Posterior disc osteophyte complex extends into neural foramina    bilaterally. Severe left facet degenerative changes. Mild right facet    degenerative changes. Mild right neural foraminal stenosis. Moderate to    severe left neural foraminal stenosis. No significant spinal canal stenosis. Impression    1. Multilevel degenerative changes of the lumbar spine, most significant at    L3-L4, as detailed above. 2. Severe spinal canal stenosis and severe bilateral lateral recess stenosis    at L3-L4 with apparent mass effect on the nerve roots within the spinal canal.        3. Moderate lumbar levoscoliosis, with multiple levels of degenerative    endplate changes. 4. Chronic appearing mild to moderate vertebral body height loss at L2. EXAM:  MRI LUMBAR SPINE W WO CONTRAST       HISTORY:  Lumbar radicular pain        TECHNIQUE:  MRI examination of the lumbar spine without and with IV contrast       PRIORS:  6/3/2016       FINDINGS:      In the absence of comparison lumbar radiographs, the lumbosacral junction is assigned L5-S1 corresponding to the lowermost well formed disc. The healthcare provider must correlate with lumbar x-rays to ensure accurate vertebral numbering prior to    consideration of surgery or other lumbar intervention in this patient. The conus medullaris is normal in signal and position. Again noted is rotatory scoliosis with left apex at L3. There is again associated severe disc narrowing to the right side at L3-4. This disc again contains slight increased T2 signal suggestive of edema.  No evidence of disc enhancement with IV contrast.    Again present is extensive vertebral bone marrow edema and postcontrast hyperemia at L3 and L4 adjacent to this degenerated disc. There is also increased T2 signal suggesting edema in the paraspinal soft tissues to the right of the L3-4 disc. Signal    changes and IV contrast enhancing characteristics favor active phase of degenerative change over discitis. The latter remains not entirely excludable. No evidence of vertebral compression fracture or spondylolisthesis. No new bone marrow edema. Disc degenerative desiccation is noted at L2-3 and L4-5. Disc narrowing is slight at L4-5. There is multilevel diffuse posterior disc bulge: L2-3 moderate, L3-4 moderate, L4-5 moderate, L5-S1 slight        Multilevel central canal stenosis exacerbated by hypertrophic degenerative at the ligamentum flavum and facet joints: L2-3 slight , L3-4 moderate to severe, L4-5 slight on the left        Degenerative hypertrophic facet arthrosis, ligamentum flavum hypertrophy, vertebral endplate hypertrophy, and disc bulge contribute to neural foraminal stenosis. Right neural foraminal stenosis: L3-4 severe , L4-5 moderate       Left neural foraminal stenosis: L3-4 slight, L4-5 severe       Impression:     Findings are unchanged at L3, L4, and L3-4 which favor prominent active phase of degenerative change. Discitis considered less likely from lack of disc IV contrast enhancement but again not excludable since there is paraspinal soft tissue edema and    enhancement        Scoliosis, multilevel degenerative disc and joint disease, multilevel disc bulge, and multilevel stenosis            Electronically Signed By: Ajay Bean   on  07/08/2016  13:54         WO XRAY VIEWS SCOLIOSIS SERIES; THREE XRAY VIEWS OF THE LUMBAR SPINE       3/12/2020 2:57 pm       COMPARISON:   CT chest dated 1/28/2020. CT abdomen and pelvis dated 6/20/2016.        HISTORY:   ORDERING SYSTEM PROVIDED HISTORY: Scoliosis of lumbar spine, unspecified   scoliosis type   TECHNOLOGIST PROVIDED HISTORY: and patient understands and accepts. The following treatment plan was developed after discussion with patient:    Patient  has axial or localized     lumbar facet pain at  left L1-L2, L2-L3, L3-L4, L4-L5 and L5-S1 that is worse with hyperextension of the spine relieved by forward flexion. Palpation showed tenderness over the lumbar  facet joints which also correlate well with patients Imaging. Patient failed all conservative treatment plans which included NSAIDS, activity modifications,home exercises, over the counter remedies, ice, heat and Physical / Chiropractic therapies. Patient's symptoms are gradually worsening with current treatment, interfering with sleep and activities of daily living. We discussed Left  lumbar  facet joint injections at levels  and re-evaluate symptoms in two weeks at an office visit. Patient agreed to the procedure which will be scheduled as soon as possible. All questions satisfactorily answered by me       PDMP Monitoring:    Last PDMP [place] as Reviewed [facility]):  Review User Review Instant Review Result   Milton Eusebio 6/19/2020  5:54 AM Reviewed PDMP [1]     Counselling/Preventive measures for pain  Control:    [x]  Spine strengthening exercises are discussed with patient in detail. Orders Placed This Encounter   Procedures    FLUORO FOR SURGICAL PROCEDURES     Standing Status:   Future     Standing Expiration Date:   12/17/2021    PA INJ DX/THER AGNT PARAVERT FACET JOINT, LUMBAR/SAC, 1ST LEVEL    Saline lock IV     Standing Status:   Future     Standing Expiration Date:   6/17/2022      Patient has radicular pain and hence we will try lumbar epidural steroid injections. If visit does not help the pain then we may consider doing facet joint injections. This was explained to the patient. He will be examined at the time of the procedure and then plan accordingly.     Decision Making Process : Patient's health history and referral records thoroughly reviewed before focused physical examination and discussion with patient. I have spent 20 mins. Over 50% of today's visit is spent on examining the patient and counseling and coordinating the care. Level of complexity of date to be reviewed is Moderate. The chart date reviewed include the following: Imaging Reports. Summary of Care. Time spent reviewing with patient the below reports:   Medication safety, Treatment options. Level of diagnosis and management options of this case is multiple: involving the following management options: Interventions as needed, medication management as appropriate, future visits, activity modification, heat/ice as needed, Urine drug screen as required. [x]The patient's questions were answered to the best of my abilities. This note was created using voice recognition software. There may be inaccuracies of transcription  that are inadvertently overlooked prior to the signature. There is any questions about the transcription please contact me. Return in  4 weeks  with physician / CNP  for further plan of treatment. Due to the COVID-19 pandemic and the appropriate interventions by Kristina Scott, our non-urgent pain management patients will not be seen in the office at this time for their protection and the protection of our staff. To offer continuity of care, their prescriptions will be escribed this month after a careful chart review and review of their OARRS report  Pursuant to the emergency declaration under the [employer] waiver authority and the [employer] General Act, this Virtual Visit was conducted, with patient's consent, to reduce the patient's risk of exposure to COVID-19 and provide continuity of care for an established patient.       Services were provided through a video synchronous discussion virtually to substitute for in-person appointment. \"  Documentation:  I communicated with the patient and/or health care decision maker about plan of care  Details of this discussion including any medical advice provided: Total Time: minutes: 21-30 minutes    I affirm this is a Patient Initiated Episode with an Established Patient who has not had a related appointment within my department in the past 7 days or scheduled within the next 24 hours.     Electronically signed by Anna Valadez MD on 12/17/2020 at 9:13 PM

## 2020-12-22 ENCOUNTER — TELEPHONE (OUTPATIENT)
Dept: PAIN MANAGEMENT | Age: 55
End: 2020-12-22

## 2020-12-23 ENCOUNTER — TELEPHONE (OUTPATIENT)
Dept: PAIN MANAGEMENT | Age: 55
End: 2020-12-23

## 2020-12-23 NOTE — TELEPHONE ENCOUNTER
Patient called for an injection appointment. Chart reviewed along with medication list. Pre procedure instructions given. Covid-19 screening questions asked. Information given where to be dropped off, a person to remain in the car and which door to enter in. Temp must be taken. Patient advised to avoid vaccines 2 weeks prior to Injection. Patient acknowledges an understanding.      Appointment 1/18/2020 @ 10am

## 2021-01-18 ENCOUNTER — HOSPITAL ENCOUNTER (OUTPATIENT)
Dept: GENERAL RADIOLOGY | Age: 56
Discharge: HOME | End: 2021-01-20
Payer: MEDICAID

## 2021-01-18 DIAGNOSIS — M47.817 LUMBOSACRAL SPONDYLOSIS WITHOUT MYELOPATHY: ICD-10-CM

## 2021-01-18 DIAGNOSIS — M47.816 ARTHROPATHY OF LUMBAR FACET JOINT: ICD-10-CM

## 2021-03-03 DIAGNOSIS — R21 RASH: ICD-10-CM

## 2021-03-03 NOTE — TELEPHONE ENCOUNTER
Refill request for Lotrisone Cream. If appropriate please send medication(s) to patients pharmacy.     Next appt: 3/19/2021 - Jose Olsen  Last appt: 3/3/2020 - [place]    Health Maintenance   Topic Date Due    Shingles Vaccine (2 of 3) 12/03/2019    Flu vaccine (1) 09/01/2020    Low dose CT lung screening  01/28/2021    Diabetes screen  05/10/2022    Colon cancer screen colonoscopy  12/30/2024    Lipid screen  01/28/2025    DTaP/Tdap/Td vaccine (2 - Td) 08/16/2026    Pneumococcal 0-64 years Vaccine  Completed    Hepatitis C screen  Completed    HIV screen  Completed    Hepatitis A vaccine  Aged Out    Hepatitis B vaccine  Aged Out    Hib vaccine  Aged Out    Meningococcal (ACWY) vaccine  Aged Out       Hemoglobin A1C (%)   Date Value   10/10/2018 5.5             ( goal A1C is < 7)   No results found for: LABMICR  LDL Cholesterol (mg/dL)   Date Value   01/28/2020 147 (H)       (goal LDL is <100)   AST (U/L)   Date Value   05/10/2019 12   05/10/2019 12     ALT (U/L)   Date Value   05/10/2019 12   05/10/2019 12     BUN (mg/dL)   Date Value   05/10/2019 6   05/10/2019 6     BP Readings from Last 3 Encounters:   08/11/20 112/76   06/09/20 124/84   03/12/20 116/76          (goal 120/80)          Patient Active Problem List:     COPD (chronic obstructive pulmonary disease) (HCC)     Smoking addiction     Depression     Dyslipidemia     Lung nodule     DDD (degenerative disc disease), lumbar     Groin pain, chronic, right     Ilioinguinal neuralgia of right side     Syncope and collapse     History of alcohol abuse     Lumbar radiculopathy     Scoliosis due to degenerative disease of spine in adult patient     Lumbosacral spondylosis without myelopathy

## 2021-03-09 RX ORDER — CLOTRIMAZOLE AND BETAMETHASONE DIPROPIONATE 10; .64 MG/G; MG/G
CREAM TOPICAL
Qty: 30 G | Refills: 0 | OUTPATIENT
Start: 2021-03-09

## 2021-06-24 ENCOUNTER — HOSPITAL ENCOUNTER (EMERGENCY)
Age: 56
Discharge: HOME | End: 2021-06-24
Attending: EMERGENCY MEDICINE
Payer: MEDICAID

## 2021-06-24 VITALS
SYSTOLIC BLOOD PRESSURE: 129 MMHG | HEART RATE: 95 BPM | RESPIRATION RATE: 18 BRPM | BODY MASS INDEX: 25.01 KG/M2 | WEIGHT: 165 LBS | TEMPERATURE: 98.4 F | DIASTOLIC BLOOD PRESSURE: 80 MMHG | HEIGHT: 68 IN | OXYGEN SATURATION: 100 %

## 2021-06-24 DIAGNOSIS — F10.10 ALCOHOL ABUSE: Primary | ICD-10-CM

## 2021-06-24 LAB
ABSOLUTE EOS #: 0.2 K/UL (ref 0–0.4)
ABSOLUTE IMMATURE GRANULOCYTE: ABNORMAL K/UL (ref 0–0.3)
ABSOLUTE LYMPH #: 1.4 K/UL (ref 1–4.8)
ABSOLUTE MONO #: 0.6 K/UL (ref 0.1–1.3)
ACETAMINOPHEN LEVEL: <5 UG/ML (ref 10–30)
ALBUMIN SERPL-MCNC: 3.8 G/DL (ref 3.5–5.2)
ALBUMIN/GLOBULIN RATIO: ABNORMAL (ref 1–2.5)
ALP BLD-CCNC: 102 U/L (ref 40–129)
ALT SERPL-CCNC: 118 U/L (ref 5–41)
AMPHETAMINE SCREEN URINE: NEGATIVE
ANION GAP SERPL CALCULATED.3IONS-SCNC: 12 MMOL/L (ref 9–17)
AST SERPL-CCNC: 170 U/L
BARBITURATE SCREEN URINE: NEGATIVE
BASOPHILS # BLD: 2 % (ref 0–2)
BASOPHILS ABSOLUTE: 0.1 K/UL (ref 0–0.2)
BENZODIAZEPINE SCREEN, URINE: NEGATIVE
BILIRUB SERPL-MCNC: 0.38 MG/DL (ref 0.3–1.2)
BILIRUBIN URINE: NEGATIVE
BUN BLDV-MCNC: 4 MG/DL (ref 6–20)
BUN/CREAT BLD: ABNORMAL (ref 9–20)
BUPRENORPHINE URINE: ABNORMAL
CALCIUM SERPL-MCNC: 8.6 MG/DL (ref 8.6–10.4)
CANNABINOID SCREEN URINE: POSITIVE
CHLORIDE BLD-SCNC: 103 MMOL/L (ref 98–107)
CO2: 24 MMOL/L (ref 20–31)
COCAINE METABOLITE, URINE: NEGATIVE
COLOR: YELLOW
COMMENT UA: NORMAL
CREAT SERPL-MCNC: 0.44 MG/DL (ref 0.7–1.2)
DIFFERENTIAL TYPE: ABNORMAL
EOSINOPHILS RELATIVE PERCENT: 3 % (ref 0–4)
ETHANOL PERCENT: 0.03 %
ETHANOL: 30 MG/DL
GFR AFRICAN AMERICAN: >60 ML/MIN
GFR NON-AFRICAN AMERICAN: >60 ML/MIN
GFR SERPL CREATININE-BSD FRML MDRD: ABNORMAL ML/MIN/{1.73_M2}
GFR SERPL CREATININE-BSD FRML MDRD: ABNORMAL ML/MIN/{1.73_M2}
GLUCOSE BLD-MCNC: 129 MG/DL (ref 70–99)
GLUCOSE URINE: NEGATIVE
HCT VFR BLD CALC: 36 % (ref 41–53)
HEMOGLOBIN: 12.4 G/DL (ref 13.5–17.5)
IMMATURE GRANULOCYTES: ABNORMAL %
KETONES, URINE: NEGATIVE
LEUKOCYTE ESTERASE, URINE: NEGATIVE
LIPASE: 35 U/L (ref 13–60)
LYMPHOCYTES # BLD: 26 % (ref 24–44)
MCH RBC QN AUTO: 34.2 PG (ref 26–34)
MCHC RBC AUTO-ENTMCNC: 34.3 G/DL (ref 31–37)
MCV RBC AUTO: 99.6 FL (ref 80–100)
MDMA URINE: ABNORMAL
METHADONE SCREEN, URINE: NEGATIVE
METHAMPHETAMINE, URINE: ABNORMAL
MONOCYTES # BLD: 12 % (ref 1–7)
NITRITE, URINE: NEGATIVE
NRBC AUTOMATED: ABNORMAL PER 100 WBC
OPIATES, URINE: NEGATIVE
OXYCODONE SCREEN URINE: NEGATIVE
PDW BLD-RTO: 14.1 % (ref 11.5–14.9)
PH UA: 6.5 (ref 5–8)
PHENCYCLIDINE, URINE: NEGATIVE
PLATELET # BLD: 161 K/UL (ref 150–450)
PLATELET ESTIMATE: ABNORMAL
PMV BLD AUTO: 7.6 FL (ref 6–12)
POTASSIUM SERPL-SCNC: 3.7 MMOL/L (ref 3.7–5.3)
PROPOXYPHENE, URINE: ABNORMAL
PROTEIN UA: NEGATIVE
RBC # BLD: 3.61 M/UL (ref 4.5–5.9)
RBC # BLD: ABNORMAL 10*6/UL
SALICYLATE LEVEL: <1 MG/DL (ref 3–10)
SARS-COV-2, RAPID: NOT DETECTED
SEG NEUTROPHILS: 57 % (ref 36–66)
SEGMENTED NEUTROPHILS ABSOLUTE COUNT: 3.1 K/UL (ref 1.3–9.1)
SODIUM BLD-SCNC: 139 MMOL/L (ref 135–144)
SPECIFIC GRAVITY UA: 1.01 (ref 1–1.03)
SPECIMEN DESCRIPTION: NORMAL
TEST INFORMATION: ABNORMAL
TOTAL PROTEIN: 6.1 G/DL (ref 6.4–8.3)
TRICYCLIC ANTIDEPRESSANTS, UR: ABNORMAL
TURBIDITY: CLEAR
URINE HGB: NEGATIVE
UROBILINOGEN, URINE: NORMAL
WBC # BLD: 5.3 K/UL (ref 3.5–11)
WBC # BLD: ABNORMAL 10*3/UL

## 2021-06-24 PROCEDURE — 80179 DRUG ASSAY SALICYLATE: CPT

## 2021-06-24 PROCEDURE — G0480 DRUG TEST DEF 1-7 CLASSES: HCPCS

## 2021-06-24 PROCEDURE — 2580000003 HC RX 258: Performed by: EMERGENCY MEDICINE

## 2021-06-24 PROCEDURE — 85025 COMPLETE CBC W/AUTO DIFF WBC: CPT

## 2021-06-24 PROCEDURE — 99284 EMERGENCY DEPT VISIT MOD MDM: CPT

## 2021-06-24 PROCEDURE — 80143 DRUG ASSAY ACETAMINOPHEN: CPT

## 2021-06-24 PROCEDURE — 81003 URINALYSIS AUTO W/O SCOPE: CPT

## 2021-06-24 PROCEDURE — 36415 COLL VENOUS BLD VENIPUNCTURE: CPT

## 2021-06-24 PROCEDURE — 80053 COMPREHEN METABOLIC PANEL: CPT

## 2021-06-24 PROCEDURE — 87635 SARS-COV-2 COVID-19 AMP PRB: CPT

## 2021-06-24 PROCEDURE — 83690 ASSAY OF LIPASE: CPT

## 2021-06-24 PROCEDURE — 80307 DRUG TEST PRSMV CHEM ANLYZR: CPT

## 2021-06-24 RX ORDER — 0.9 % SODIUM CHLORIDE 0.9 %
1000 INTRAVENOUS SOLUTION INTRAVENOUS ONCE
Status: COMPLETED | OUTPATIENT
Start: 2021-06-24 | End: 2021-06-24

## 2021-06-24 RX ADMIN — SODIUM CHLORIDE 1000 ML: 9 INJECTION, SOLUTION INTRAVENOUS at 12:23

## 2021-06-24 ASSESSMENT — ENCOUNTER SYMPTOMS
BACK PAIN: 0
COLOR CHANGE: 0
ABDOMINAL PAIN: 0
SHORTNESS OF BREATH: 0
EYE PAIN: 0

## 2021-06-24 NOTE — ED PROVIDER NOTES
 HTN (hypertension) 1/19/2015    Ilioinguinal neuralgia of right side 4/10/2017    Psychiatric problem     depression /anxiety    Seizures ([address]     withdrawal from alcohol 2 years ago    Wears glasses     READING     Past Problem List  Patient Active Problem List   Diagnosis Code    COPD (chronic obstructive pulmonary disease) ([address] J44.9    Smoking addiction F17.200    Depression F32.9    Dyslipidemia E78.5    Lung nodule R91.1    DDD (degenerative disc disease), lumbar M51.36    Groin pain, chronic, right R10.31, G89.29    Ilioinguinal neuralgia of right side G57.91    Syncope and collapse R55    History of alcohol abuse F10.11    Lumbar radiculopathy M54.16    Scoliosis due to degenerative disease of spine in adult patient M41.80    Lumbosacral spondylosis without myelopathy M47.817     SURGICAL HISTORY       Past Surgical History:   Procedure Laterality Date    ANESTHESIA NERVE BLOCK Right 4/10/2017    NERVE BLOCK US RIGHT SIDED ILIOINGUINAL performed by Lacie Culp MD at [address] [place] [address]  3/19/15    HERNIA REPAIR      NERVE BLOCK Right 10/10/2016    right groin    OTHER SURGICAL HISTORY Right 04/10/2017    US nerve block to R groin    TOE SURGERY      SCREW RIGHT BIG TOE     CURRENT MEDICATIONS       Discharge Medication List as of 6/24/2021  5:54 PM      CONTINUE these medications which have NOT CHANGED    Details   albuterol sulfate  (90 Base) MCG/ACT inhaler INHALE 2 PUFFS INTO THE LUNGS EVERY 6 HOURS AS NEEDED FOR WHEEZING, Disp-18 g, R-3Normal      Masks (CLEVER CHOICE FACE MASK) MISC USE NEW FACE MASK EVERYDAY WHEN PATIENT GO OUTSIDE OF HOME DUE TO COVID PANDEMIC, Disp-90 each, R-0patient requestNormal      ibuprofen (ADVIL;MOTRIN) 800 MG tablet TAKE 1 TABLET BY MOUTH AS NEEDED FOR PAIN (AS NEEDED), Disp-90 tablet, R-0Normal      tiotropium (SPIRIVA HANDIHALER) 18 MCG inhalation capsule Inhale 1 capsule into the lungs daily, Disp-90 capsule, R-3Normal ARNUITY ELLIPTA 100 MCG/ACT AEPB INHALE 1 PUFF INTO THE LUNGS DAILY, Disp-30 each,R-2Normal      tiZANidine (ZANAFLEX) 2 MG tablet TAKE 1 TABLET BY MOUTH NIGHTLY AS NEEDED (SPASMS), Disp-30 tablet, R-0Normal      Heating Pads (COMFORT-HEAL HEATING PAD) PADS Use on back as needed, Disp-1 each, R-0Print           ALLERGIES     is allergic to nickel. FAMILY HISTORY     He indicated that his mother is alive. He indicated that his father is . SOCIAL HISTORY       Social History     Tobacco Use    Smoking status: Current Every Day Smoker     Packs/day: 1.00     Years: 38.00     Pack years: 38.00     Types: Cigarettes    Smokeless tobacco: Never Used    Tobacco comment: 1 PPD   Vaping Use    Vaping Use: Never used   Substance Use Topics    Alcohol use: Not Currently     Alcohol/week: 12.0 standard drinks     Types: 12 Cans of beer per week     Comment: recovering alcholic, drinks occasionally    Drug use: No     Comment: recovering drug addict     PHYSICAL EXAM     INITIAL VITALS: /80   Pulse 95   Temp 98.4 °F (36.9 °C)   Resp 18   Ht 5' 8\" (1.727 m)   Wt 165 lb (74.8 kg)   SpO2 100%   BMI 25.09 kg/m²    Physical Exam  Vitals and nursing note reviewed. Constitutional:       Appearance: Normal appearance. HENT:      Head: Normocephalic and atraumatic. Right Ear: External ear normal.      Left Ear: External ear normal.      Nose: Nose normal.      Mouth/Throat:      Mouth: Mucous membranes are moist.   Eyes:      Pupils: Pupils are equal, round, and reactive to light. Cardiovascular:      Rate and Rhythm: Normal rate and regular rhythm. Pulses: Normal pulses. Heart sounds: Normal heart sounds. Pulmonary:      Effort: Pulmonary effort is normal.      Breath sounds: Normal breath sounds. Abdominal:      General: Abdomen is flat. Palpations: Abdomen is soft. Tenderness: There is no abdominal tenderness. Musculoskeletal:         General: No tenderness.  Normal range of motion. Cervical back: Neck supple. Skin:     General: Skin is warm and dry. Capillary Refill: Capillary refill takes less than 2 seconds. Findings: Erythema and rash present. Rash is scaling. Neurological:      General: No focal deficit present. Mental Status: He is alert and oriented to person, place, and time. Psychiatric:         Behavior: Behavior normal.         MEDICAL DECISION MAKIN-year-old male presents for desire to detox from alcohol, on initial exam patient in no acute distress, vitals are stable, no significant tremors are noted, patient is nontachycardic, will check labs, patient will be evaluated by social work and will try to place in substance abuse rehab. Patient does have rash noted on his abdomen, looks likely consistent with a contact dermatitis secondary to his belt buckle      Labs reviewed, patient noted to have elevations of his AST and  ALT, likely secondary to his alcohol use, remaining other labs are unremarkable, patient without any acute signs of withdrawal, no tremors, non-tachycardic, no change in mental status, do not feel that patient Needs to be admitted for withdrawal at this time, patient was set up to go to [facility] recovery by social work, discuss with patient use of steroid Cream for his rash as likely contact dermatitis secondary to his belt rubbing there. Patient voices understanding in agreement with plan and will go to [facility] recovery. Discussed return precautions and need for five with primary care physician. Patient voiced understanding and is comfortable with discharge    Patient/Guardian was informed of their diagnosis and told to follow up with PCP & Mason Montgomery in 1-3 days. Patient demonstrates understanding and agreement with the plan. They were given the opportunity to ask questions and those questions were answered to the best of our ability with the available information.  Patient/Guardian told to return to the ED for any new, worsening, changing or persistent symptoms. This dictation was prepared using [employer] voice recognition software. CRITICAL CARE:       PROCEDURES:    Procedures    DIAGNOSTIC RESULTS   EKG:All EKG's are interpreted by the Emergency Department Physician who either signs or Co-signs this chart in the absence of a cardiologist.        RADIOLOGY:All plain film, CT, MRI, and formal ultrasound images (except ED bedside ultrasound) are read by the radiologist, see reports below, unless otherwisenoted in MDM or here. No orders to display     LABS: All lab results were reviewed by myself, and all abnormals are listed below.   Labs Reviewed   CBC WITH AUTO DIFFERENTIAL - Abnormal; Notable for the following components:       Result Value    RBC 3.61 (*)     Hemoglobin 12.4 (*)     Hematocrit 36.0 (*)     MCH 34.2 (*)     Monocytes 12 (*)     All other components within normal limits   COMPREHENSIVE METABOLIC PANEL W/ REFLEX TO MG FOR LOW K - Abnormal; Notable for the following components:    Glucose 129 (*)     BUN 4 (*)     CREATININE 0.44 (*)      (*)      (*)     Total Protein 6.1 (*)     All other components within normal limits   ETHANOL - Abnormal; Notable for the following components:    Ethanol 30 (*)     All other components within normal limits   ACETAMINOPHEN LEVEL - Abnormal; Notable for the following components:    Acetaminophen Level <5 (*)     All other components within normal limits   SALICYLATE LEVEL - Abnormal; Notable for the following components:    Salicylate Lvl <1 (*)     All other components within normal limits   URINE DRUG SCREEN - Abnormal; Notable for the following components:    Cannabinoid Scrn, Ur POSITIVE (*)     All other components within normal limits   COVID-19, RAPID   LIPASE   URINALYSIS, CHEM   SPECIMEN REJECTION       EMERGENCY DEPARTMENTCOURSE:         Vitals:    Vitals:    06/24/21 1133 06/24/21 1645   BP: 128/79 129/80   Pulse: 95 95   Resp: 18 18   Temp: 98.4 °F (36.9 °C)    SpO2: 97% 100%   Weight: 165 lb (74.8 kg)    Height: 5' 8\" (1.727 m)        The patient was given the following medications while in the emergency department:  Orders Placed This Encounter   Medications    0.9 % sodium chloride bolus     CONSULTS:  None    FINAL IMPRESSION      1.  Alcohol abuse          DISPOSITION/PLAN   DISPOSITION        PATIENT REFERRED TO:  Miguel Ángel Salvador MD  [facility] [address] [facility]  [phone]    Schedule an appointment as soon as possible for a visit      [facility] ED  [facility] [address]  [address] [place] [address]  [phone]    As needed, If symptoms worsen    [facility]  [phone]    Go to intake directly from [facility]ier for inpatient     DISCHARGE MEDICATIONS:  Discharge Medication List as of 6/24/2021  5:54 PM        Josiah Gregorio DO  Attending Emergency Physician                  Josiah Gregorio DO  06/25/21 9912

## 2021-06-24 NOTE — ED NOTES
Pt presents in ED requesting alcohol detox; pt stated that he drinks an unknown amount of alcohol a day for many years; pt stated that he begins drinking first thing in the morning and continues through out the day; last drink was 23:00 hrs on 6/23; pt reports that he has experienced DTs in the past while attempting to detox at home; pt reports that he is symptomatic at this moment; pt reported that he is suicidal but does not want to act on these thoughts and that is what brought him to this ED; pt denies previous suicide attempts; pt is unemployed and resides alone; pt reports that he does have a good support system in life. No further complaints.      Zehra Luevano, RN  06/24/21 5395

## 2021-06-24 NOTE — ED NOTES
Tee ESTES notified patient received information on [facility] and allowed this RN to remove IV and exited ED. Pt A&Ox4, NAD, respirations even and unlabored with no increased WOB or SOB. Ambulatory with steady gait.       Kourtney Perea RN  06/24/21 7969

## 2021-06-24 NOTE — ED NOTES
Counselor called [facility] and faxed over labs and documentation for admission process this date to [phone] / fax [phone]. Per madonna they will look over labs and forward to admissions and call counselor back with decision.

## 2021-06-24 NOTE — ED NOTES
Counselor called and spoke with Jessica Moreau at [facility], she reports she is in the middle of another assessment and Josselyn Mejia is processing patient's and will call back with doctor's decision soon. @ [phone] per Nancy Robertson at [facility] no update yet on status of patient admission. @ [phone] counselor spoke with Elpidio Montoya at [facility] whom reports Dr Jessica Frank has not returned calls for patient and others and she sent out another page for him and will call as soon as he calls back with a decision.

## 2021-08-07 ENCOUNTER — HOSPITAL ENCOUNTER (EMERGENCY)
Age: 56
Discharge: HOME | End: 2021-08-07
Attending: EMERGENCY MEDICINE
Payer: MEDICAID

## 2021-08-07 ENCOUNTER — APPOINTMENT (OUTPATIENT)
Dept: GENERAL RADIOLOGY | Age: 56
End: 2021-08-07
Payer: MEDICAID

## 2021-08-07 VITALS
SYSTOLIC BLOOD PRESSURE: 124 MMHG | WEIGHT: 155 LBS | RESPIRATION RATE: 17 BRPM | HEART RATE: 103 BPM | BODY MASS INDEX: 22.96 KG/M2 | OXYGEN SATURATION: 96 % | HEIGHT: 69 IN | DIASTOLIC BLOOD PRESSURE: 88 MMHG | TEMPERATURE: 98.1 F

## 2021-08-07 DIAGNOSIS — S22.41XA CLOSED FRACTURE OF MULTIPLE RIBS OF RIGHT SIDE, INITIAL ENCOUNTER: Primary | ICD-10-CM

## 2021-08-07 DIAGNOSIS — S62.102A CLOSED FRACTURE OF LEFT WRIST, INITIAL ENCOUNTER: ICD-10-CM

## 2021-08-07 PROCEDURE — 73130 X-RAY EXAM OF HAND: CPT

## 2021-08-07 PROCEDURE — 71101 X-RAY EXAM UNILAT RIBS/CHEST: CPT

## 2021-08-07 PROCEDURE — 6370000000 HC RX 637 (ALT 250 FOR IP): Performed by: EMERGENCY MEDICINE

## 2021-08-07 PROCEDURE — 99285 EMERGENCY DEPT VISIT HI MDM: CPT

## 2021-08-07 PROCEDURE — 73110 X-RAY EXAM OF WRIST: CPT

## 2021-08-07 RX ORDER — HYDROCODONE BITARTRATE AND ACETAMINOPHEN 5; 325 MG/1; MG/1
2 TABLET ORAL ONCE
Status: COMPLETED | OUTPATIENT
Start: 2021-08-07 | End: 2021-08-07

## 2021-08-07 RX ORDER — LORAZEPAM 1 MG/1
1 TABLET ORAL ONCE
Status: COMPLETED | OUTPATIENT
Start: 2021-08-07 | End: 2021-08-07

## 2021-08-07 RX ORDER — HYDROCODONE BITARTRATE AND ACETAMINOPHEN 5; 325 MG/1; MG/1
1 TABLET ORAL EVERY 6 HOURS PRN
Qty: 10 TABLET | Refills: 0 | Status: SHIPPED | OUTPATIENT
Start: 2021-08-07 | End: 2021-08-11

## 2021-08-07 RX ADMIN — HYDROCODONE BITARTRATE AND ACETAMINOPHEN 2 TABLET: 5; 325 TABLET ORAL at 09:19

## 2021-08-07 RX ADMIN — LORAZEPAM 1 MG: 1 TABLET ORAL at 09:19

## 2021-08-07 ASSESSMENT — ENCOUNTER SYMPTOMS
VOMITING: 0
SHORTNESS OF BREATH: 0
NAUSEA: 0

## 2021-08-07 ASSESSMENT — PAIN SCALES - GENERAL
PAINLEVEL_OUTOF10: 10
PAINLEVEL_OUTOF10: 10

## 2021-08-07 ASSESSMENT — PAIN DESCRIPTION - LOCATION: LOCATION: RIB CAGE;WRIST

## 2021-08-07 ASSESSMENT — PAIN DESCRIPTION - DESCRIPTORS: DESCRIPTORS: CONSTANT;DISCOMFORT

## 2021-08-07 ASSESSMENT — PAIN DESCRIPTION - FREQUENCY: FREQUENCY: CONTINUOUS

## 2021-08-07 ASSESSMENT — PAIN DESCRIPTION - PAIN TYPE: TYPE: ACUTE PAIN
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
## 2021-08-09 ENCOUNTER — TELEPHONE (OUTPATIENT)
Dept: ORTHOPEDIC SURGERY | Age: 56
End: 2021-08-09

## 2021-08-09 NOTE — TELEPHONE ENCOUNTER
Attempted to call patient for ED f/u.   Phone number is not a working number, unable to [facility]

## 2021-08-10 ENCOUNTER — APPOINTMENT (OUTPATIENT)
Dept: CT IMAGING | Age: 56
DRG: 351 | End: 2021-08-10
Payer: MEDICAID

## 2021-08-10 ENCOUNTER — HOSPITAL ENCOUNTER (INPATIENT)
Age: 56
LOS: 20 days | Discharge: NURSING, REHAB OR LONG-TERM CARE | DRG: 351 | End: 2021-08-31
Attending: EMERGENCY MEDICINE | Admitting: INTERNAL MEDICINE
Payer: MEDICAID

## 2021-08-10 DIAGNOSIS — S62.102A CLOSED FRACTURE OF LEFT WRIST, INITIAL ENCOUNTER: ICD-10-CM

## 2021-08-10 DIAGNOSIS — R55 SYNCOPE AND COLLAPSE: Primary | ICD-10-CM

## 2021-08-10 DIAGNOSIS — J86.9 EMPYEMA LUNG (HCC): ICD-10-CM

## 2021-08-10 DIAGNOSIS — S22.41XA CLOSED FRACTURE OF MULTIPLE RIBS OF RIGHT SIDE, INITIAL ENCOUNTER: ICD-10-CM

## 2021-08-10 DIAGNOSIS — T79.6XXA TRAUMATIC RHABDOMYOLYSIS, INITIAL ENCOUNTER (HCC): ICD-10-CM

## 2021-08-10 DIAGNOSIS — E87.1 HYPONATREMIA: ICD-10-CM

## 2021-08-10 DIAGNOSIS — E87.6 HYPOKALEMIA: ICD-10-CM

## 2021-08-10 LAB
ABSOLUTE EOS #: 0 K/UL (ref 0–0.4)
ABSOLUTE IMMATURE GRANULOCYTE: ABNORMAL K/UL (ref 0–0.3)
ABSOLUTE LYMPH #: 0.83 K/UL (ref 1–4.8)
ABSOLUTE MONO #: 1.41 K/UL (ref 0.1–1.3)
ALBUMIN SERPL-MCNC: 3.3 G/DL (ref 3.5–5.2)
ALBUMIN/GLOBULIN RATIO: ABNORMAL (ref 1–2.5)
ALP BLD-CCNC: 68 U/L (ref 40–129)
ALT SERPL-CCNC: 60 U/L (ref 5–41)
ANION GAP SERPL CALCULATED.3IONS-SCNC: 16 MMOL/L (ref 9–17)
AST SERPL-CCNC: 69 U/L
ATYPICAL LYMPHOCYTE ABSOLUTE COUNT: 0.17 K/UL
ATYPICAL LYMPHOCYTES: 2 %
BASOPHILS # BLD: 0 % (ref 0–2)
BASOPHILS ABSOLUTE: 0 K/UL (ref 0–0.2)
BILIRUB SERPL-MCNC: 1.61 MG/DL (ref 0.3–1.2)
BUN BLDV-MCNC: 17 MG/DL (ref 6–20)
BUN/CREAT BLD: ABNORMAL (ref 9–20)
CALCIUM SERPL-MCNC: 8.5 MG/DL (ref 8.6–10.4)
CHLORIDE BLD-SCNC: 87 MMOL/L (ref 98–107)
CO2: 24 MMOL/L (ref 20–31)
CREAT SERPL-MCNC: 1.15 MG/DL (ref 0.7–1.2)
DIFFERENTIAL TYPE: ABNORMAL
EOSINOPHILS RELATIVE PERCENT: 0 % (ref 0–4)
ETHANOL PERCENT: <0.01 %
ETHANOL: <10 MG/DL
GFR AFRICAN AMERICAN: >60 ML/MIN
GFR NON-AFRICAN AMERICAN: >60 ML/MIN
GFR SERPL CREATININE-BSD FRML MDRD: ABNORMAL ML/MIN/{1.73_M2}
GFR SERPL CREATININE-BSD FRML MDRD: ABNORMAL ML/MIN/{1.73_M2}
GLUCOSE BLD-MCNC: 136 MG/DL (ref 70–99)
HCT VFR BLD CALC: 36 % (ref 41–53)
HEMOGLOBIN: 12.4 G/DL (ref 13.5–17.5)
IMMATURE GRANULOCYTES: ABNORMAL %
LYMPHOCYTES # BLD: 10 % (ref 24–44)
MCH RBC QN AUTO: 32.9 PG (ref 26–34)
MCHC RBC AUTO-ENTMCNC: 34.4 G/DL (ref 31–37)
MCV RBC AUTO: 95.5 FL (ref 80–100)
MONOCYTES # BLD: 17 % (ref 1–7)
MORPHOLOGY: ABNORMAL
MYOGLOBIN: 3509 NG/ML (ref 28–72)
NRBC AUTOMATED: ABNORMAL PER 100 WBC
PDW BLD-RTO: 13.8 % (ref 11.5–14.9)
PLATELET # BLD: 164 K/UL (ref 150–450)
PLATELET ESTIMATE: ABNORMAL
PMV BLD AUTO: 7.1 FL (ref 6–12)
POTASSIUM SERPL-SCNC: 2.5 MMOL/L (ref 3.7–5.3)
RBC # BLD: 3.77 M/UL (ref 4.5–5.9)
RBC # BLD: ABNORMAL 10*6/UL
SEG NEUTROPHILS: 71 % (ref 36–66)
SEGMENTED NEUTROPHILS ABSOLUTE COUNT: 5.89 K/UL (ref 1.3–9.1)
SODIUM BLD-SCNC: 127 MMOL/L (ref 135–144)
THYROXINE, FREE: 1.29 NG/DL (ref 0.93–1.7)
TOTAL CK: 1222 U/L (ref 39–308)
TOTAL PROTEIN: 6.4 G/DL (ref 6.4–8.3)
TROPONIN INTERP: ABNORMAL
TROPONIN T: ABNORMAL NG/ML
TROPONIN, HIGH SENSITIVITY: 20 NG/L (ref 0–22)
TSH SERPL DL<=0.05 MIU/L-ACNC: 0.83 MIU/L (ref 0.3–5)
WBC # BLD: 8.3 K/UL (ref 3.5–11)
WBC # BLD: ABNORMAL 10*3/UL

## 2021-08-10 PROCEDURE — 70450 CT HEAD/BRAIN W/O DYE: CPT

## 2021-08-10 PROCEDURE — 84439 ASSAY OF FREE THYROXINE: CPT

## 2021-08-10 PROCEDURE — 36415 COLL VENOUS BLD VENIPUNCTURE: CPT

## 2021-08-10 PROCEDURE — 84443 ASSAY THYROID STIM HORMONE: CPT

## 2021-08-10 PROCEDURE — 2580000003 HC RX 258: Performed by: EMERGENCY MEDICINE

## 2021-08-10 PROCEDURE — 6360000004 HC RX CONTRAST MEDICATION: Performed by: EMERGENCY MEDICINE

## 2021-08-10 PROCEDURE — 6360000002 HC RX W HCPCS: Performed by: EMERGENCY MEDICINE

## 2021-08-10 PROCEDURE — G0480 DRUG TEST DEF 1-7 CLASSES: HCPCS

## 2021-08-10 PROCEDURE — 84484 ASSAY OF TROPONIN QUANT: CPT

## 2021-08-10 PROCEDURE — 99285 EMERGENCY DEPT VISIT HI MDM: CPT

## 2021-08-10 PROCEDURE — 82550 ASSAY OF CK (CPK): CPT

## 2021-08-10 PROCEDURE — 71260 CT THORAX DX C+: CPT

## 2021-08-10 PROCEDURE — 85025 COMPLETE CBC W/AUTO DIFF WBC: CPT

## 2021-08-10 PROCEDURE — 80053 COMPREHEN METABOLIC PANEL: CPT

## 2021-08-10 PROCEDURE — 93005 ELECTROCARDIOGRAM TRACING: CPT | Performed by: EMERGENCY MEDICINE

## 2021-08-10 PROCEDURE — 83874 ASSAY OF MYOGLOBIN: CPT

## 2021-08-10 RX ORDER — SODIUM CHLORIDE 0.9 % (FLUSH) 0.9 %
10 SYRINGE (ML) INJECTION PRN
Status: DISCONTINUED | OUTPATIENT
Start: 2021-08-10 | End: 2021-08-25

## 2021-08-10 RX ORDER — 0.9 % SODIUM CHLORIDE 0.9 %
80 INTRAVENOUS SOLUTION INTRAVENOUS ONCE
Status: COMPLETED | OUTPATIENT
Start: 2021-08-10 | End: 2021-08-10

## 2021-08-10 RX ORDER — 0.9 % SODIUM CHLORIDE 0.9 %
1000 INTRAVENOUS SOLUTION INTRAVENOUS ONCE
Status: COMPLETED | OUTPATIENT
Start: 2021-08-10 | End: 2021-08-10

## 2021-08-10 RX ORDER — POTASSIUM CHLORIDE AND SODIUM CHLORIDE 900; 300 MG/100ML; MG/100ML
INJECTION, SOLUTION INTRAVENOUS CONTINUOUS
Status: DISCONTINUED | OUTPATIENT
Start: 2021-08-10 | End: 2021-08-11

## 2021-08-10 RX ADMIN — SODIUM CHLORIDE 80 ML: 9 INJECTION, SOLUTION INTRAVENOUS at 23:00

## 2021-08-10 RX ADMIN — IOPAMIDOL 100 ML: 755 INJECTION, SOLUTION INTRAVENOUS at 23:00

## 2021-08-10 RX ADMIN — SODIUM CHLORIDE 1000 ML: 9 INJECTION, SOLUTION INTRAVENOUS at 21:47

## 2021-08-10 RX ADMIN — SODIUM CHLORIDE, PRESERVATIVE FREE 10 ML: 5 INJECTION INTRAVENOUS at 23:00

## 2021-08-10 RX ADMIN — POTASSIUM CHLORIDE AND SODIUM CHLORIDE: 900; 300 INJECTION, SOLUTION INTRAVENOUS at 23:24

## 2021-08-10 ASSESSMENT — ENCOUNTER SYMPTOMS
CONSTIPATION: 0
TROUBLE SWALLOWING: 0
RHINORRHEA: 0
SORE THROAT: 0
CHEST TIGHTNESS: 0
EYE REDNESS: 0
ABDOMINAL PAIN: 1
FACIAL SWELLING: 0
BACK PAIN: 0
WHEEZING: 0
NAUSEA: 0
SHORTNESS OF BREATH: 0
VOMITING: 0
COLOR CHANGE: 0
SINUS PRESSURE: 0
EYE DISCHARGE: 0
EYE PAIN: 0
DIARRHEA: 0
BLOOD IN STOOL: 0
COUGH: 0

## 2021-08-10 ASSESSMENT — PAIN DESCRIPTION - LOCATION: LOCATION: BACK

## 2021-08-11 ENCOUNTER — APPOINTMENT (OUTPATIENT)
Dept: CT IMAGING | Age: 56
DRG: 351 | End: 2021-08-11
Payer: MEDICAID

## 2021-08-11 PROBLEM — E87.6 HYPOKALEMIA: Status: ACTIVE | Noted: 2021-08-11

## 2021-08-11 PROBLEM — E87.1 HYPONATREMIA: Status: ACTIVE | Noted: 2021-08-11

## 2021-08-11 PROBLEM — S62.102A CLOSED FRACTURE OF LEFT WRIST: Status: ACTIVE | Noted: 2021-08-11

## 2021-08-11 PROBLEM — F10.10 ALCOHOL ABUSE: Status: ACTIVE | Noted: 2021-08-11

## 2021-08-11 PROBLEM — T79.6XXA TRAUMATIC RHABDOMYOLYSIS (HCC): Status: ACTIVE | Noted: 2021-08-11

## 2021-08-11 PROBLEM — S22.41XA CLOSED FRACTURE OF MULTIPLE RIBS OF RIGHT SIDE: Status: ACTIVE | Noted: 2021-08-11

## 2021-08-11 LAB
-: ABNORMAL
ALBUMIN SERPL-MCNC: 2.7 G/DL (ref 3.5–5.2)
ALBUMIN/GLOBULIN RATIO: ABNORMAL (ref 1–2.5)
ALLEN TEST: ABNORMAL
ALP BLD-CCNC: 59 U/L (ref 40–129)
ALT SERPL-CCNC: 54 U/L (ref 5–41)
AMORPHOUS: ABNORMAL
AMPHETAMINE SCREEN URINE: NEGATIVE
ANION GAP SERPL CALCULATED.3IONS-SCNC: 11 MMOL/L (ref 9–17)
AST SERPL-CCNC: 84 U/L
BACTERIA: ABNORMAL
BARBITURATE SCREEN URINE: NEGATIVE
BENZODIAZEPINE SCREEN, URINE: NEGATIVE
BILIRUB SERPL-MCNC: 1.04 MG/DL (ref 0.3–1.2)
BILIRUBIN URINE: NEGATIVE
BUN BLDV-MCNC: 14 MG/DL (ref 6–20)
BUN/CREAT BLD: ABNORMAL (ref 9–20)
BUPRENORPHINE URINE: ABNORMAL
CALCIUM IONIZED: 1.08 MMOL/L (ref 1.13–1.33)
CALCIUM SERPL-MCNC: 8.2 MG/DL (ref 8.6–10.4)
CANNABINOID SCREEN URINE: POSITIVE
CARBOXYHEMOGLOBIN: 1.2 % (ref 0–5)
CASTS UA: ABNORMAL /LPF
CHLORIDE BLD-SCNC: 94 MMOL/L (ref 98–107)
CO2: 24 MMOL/L (ref 20–31)
COCAINE METABOLITE, URINE: NEGATIVE
COLOR: ABNORMAL
COMMENT UA: ABNORMAL
CREAT SERPL-MCNC: 0.76 MG/DL (ref 0.7–1.2)
CRYSTALS, UA: ABNORMAL /HPF
EKG ATRIAL RATE: 107 BPM
EKG ATRIAL RATE: 92 BPM
EKG P AXIS: -8 DEGREES
EKG P-R INTERVAL: 136 MS
EKG P-R INTERVAL: 148 MS
EKG Q-T INTERVAL: 382 MS
EKG Q-T INTERVAL: 388 MS
EKG QRS DURATION: 82 MS
EKG QRS DURATION: 82 MS
EKG QTC CALCULATION (BAZETT): 479 MS
EKG QTC CALCULATION (BAZETT): 509 MS
EKG R AXIS: 26 DEGREES
EKG R AXIS: 40 DEGREES
EKG T AXIS: 32 DEGREES
EKG T AXIS: 48 DEGREES
EKG VENTRICULAR RATE: 107 BPM
EKG VENTRICULAR RATE: 92 BPM
EPITHELIAL CELLS UA: ABNORMAL /HPF
FIO2: ABNORMAL
GFR AFRICAN AMERICAN: >60 ML/MIN
GFR NON-AFRICAN AMERICAN: >60 ML/MIN
GFR SERPL CREATININE-BSD FRML MDRD: ABNORMAL ML/MIN/{1.73_M2}
GFR SERPL CREATININE-BSD FRML MDRD: ABNORMAL ML/MIN/{1.73_M2}
GLUCOSE BLD-MCNC: 147 MG/DL (ref 70–99)
GLUCOSE URINE: NEGATIVE
HCO3 ARTERIAL: 21.3 MMOL/L (ref 22–26)
HCT VFR BLD CALC: 36.4 % (ref 41–53)
HEMOGLOBIN: 12.3 G/DL (ref 13.5–17.5)
KETONES, URINE: ABNORMAL
LEUKOCYTE ESTERASE, URINE: NEGATIVE
MAGNESIUM: 2.5 MG/DL (ref 1.6–2.6)
MCH RBC QN AUTO: 33.2 PG (ref 26–34)
MCHC RBC AUTO-ENTMCNC: 33.9 G/DL (ref 31–37)
MCV RBC AUTO: 97.7 FL (ref 80–100)
MDMA URINE: ABNORMAL
METHADONE SCREEN, URINE: NEGATIVE
METHAMPHETAMINE, URINE: ABNORMAL
METHEMOGLOBIN: 1 % (ref 0–1.9)
MODE: ABNORMAL
MUCUS: ABNORMAL
MYOGLOBIN: 1413 NG/ML (ref 28–72)
MYOGLOBIN: 2884 NG/ML (ref 28–72)
MYOGLOBIN: 764 NG/ML (ref 28–72)
NEGATIVE BASE EXCESS, ART: 3.1 MMOL/L (ref 0–2)
NITRITE, URINE: NEGATIVE
NOTIFICATION TIME: ABNORMAL
NOTIFICATION: ABNORMAL
NRBC AUTOMATED: ABNORMAL PER 100 WBC
O2 DEVICE/FLOW/%: ABNORMAL
O2 SAT, ARTERIAL: 92 % (ref 95–98)
OPIATES, URINE: NEGATIVE
OTHER OBSERVATIONS UA: ABNORMAL
OXYCODONE SCREEN URINE: NEGATIVE
OXYHEMOGLOBIN: ABNORMAL % (ref 95–98)
PATIENT TEMP: 37
PCO2 ARTERIAL: 32.3 MMHG (ref 35–45)
PCO2, ART, TEMP ADJ: ABNORMAL (ref 35–45)
PDW BLD-RTO: 13.7 % (ref 11.5–14.9)
PEEP/CPAP: ABNORMAL
PH ARTERIAL: 7.43 (ref 7.35–7.45)
PH UA: 6 (ref 5–8)
PH, ART, TEMP ADJ: ABNORMAL (ref 7.35–7.45)
PHENCYCLIDINE, URINE: NEGATIVE
PHOSPHORUS: 3.7 MG/DL (ref 2.5–4.5)
PLATELET # BLD: 156 K/UL (ref 150–450)
PMV BLD AUTO: 7.4 FL (ref 6–12)
PO2 ARTERIAL: 66 MMHG (ref 80–100)
PO2, ART, TEMP ADJ: ABNORMAL MMHG (ref 80–100)
POSITIVE BASE EXCESS, ART: ABNORMAL MMOL/L (ref 0–2)
POTASSIUM SERPL-SCNC: 2.9 MMOL/L (ref 3.7–5.3)
POTASSIUM SERPL-SCNC: 3.4 MMOL/L (ref 3.7–5.3)
PROPOXYPHENE, URINE: ABNORMAL
PROTEIN UA: ABNORMAL
PSV: ABNORMAL
PT. POSITION: ABNORMAL
RBC # BLD: 3.72 M/UL (ref 4.5–5.9)
RBC UA: ABNORMAL /HPF
RENAL EPITHELIAL, UA: ABNORMAL /HPF
RESPIRATORY RATE: 40
SAMPLE SITE: ABNORMAL
SET RATE: ABNORMAL
SODIUM BLD-SCNC: 129 MMOL/L (ref 135–144)
SPECIFIC GRAVITY UA: 1.06 (ref 1–1.03)
TEST INFORMATION: ABNORMAL
TEXT FOR RESPIRATORY: ABNORMAL
TOTAL CK: 2341 U/L (ref 39–308)
TOTAL CK: 2846 U/L (ref 39–308)
TOTAL HB: ABNORMAL G/DL (ref 12–16)
TOTAL PROTEIN: 6 G/DL (ref 6.4–8.3)
TOTAL RATE: ABNORMAL
TRICHOMONAS: ABNORMAL
TRICYCLIC ANTIDEPRESSANTS, UR: ABNORMAL
TROPONIN INTERP: ABNORMAL
TROPONIN T: ABNORMAL NG/ML
TROPONIN, HIGH SENSITIVITY: 15 NG/L (ref 0–22)
TURBIDITY: CLEAR
URINE HGB: ABNORMAL
UROBILINOGEN, URINE: ABNORMAL
VT: ABNORMAL
WBC # BLD: 5.9 K/UL (ref 3.5–11)
WBC UA: ABNORMAL /HPF
YEAST: ABNORMAL

## 2021-08-11 PROCEDURE — 6360000002 HC RX W HCPCS: Performed by: NURSE PRACTITIONER

## 2021-08-11 PROCEDURE — 2580000003 HC RX 258: Performed by: NURSE PRACTITIONER

## 2021-08-11 PROCEDURE — 6370000000 HC RX 637 (ALT 250 FOR IP): Performed by: PHYSICIAN ASSISTANT

## 2021-08-11 PROCEDURE — 2500000003 HC RX 250 WO HCPCS: Performed by: NURSE PRACTITIONER

## 2021-08-11 PROCEDURE — 80307 DRUG TEST PRSMV CHEM ANLYZR: CPT

## 2021-08-11 PROCEDURE — 83874 ASSAY OF MYOGLOBIN: CPT

## 2021-08-11 PROCEDURE — 80053 COMPREHEN METABOLIC PANEL: CPT

## 2021-08-11 PROCEDURE — 99291 CRITICAL CARE FIRST HOUR: CPT | Performed by: INTERNAL MEDICINE

## 2021-08-11 PROCEDURE — 2580000003 HC RX 258: Performed by: INTERNAL MEDICINE

## 2021-08-11 PROCEDURE — 81001 URINALYSIS AUTO W/SCOPE: CPT

## 2021-08-11 PROCEDURE — 94761 N-INVAS EAR/PLS OXIMETRY MLT: CPT

## 2021-08-11 PROCEDURE — 82550 ASSAY OF CK (CPK): CPT

## 2021-08-11 PROCEDURE — 82805 BLOOD GASES W/O2 SATURATION: CPT

## 2021-08-11 PROCEDURE — 36415 COLL VENOUS BLD VENIPUNCTURE: CPT

## 2021-08-11 PROCEDURE — 2500000003 HC RX 250 WO HCPCS: Performed by: INTERNAL MEDICINE

## 2021-08-11 PROCEDURE — 36600 WITHDRAWAL OF ARTERIAL BLOOD: CPT

## 2021-08-11 PROCEDURE — 72125 CT NECK SPINE W/O DYE: CPT

## 2021-08-11 PROCEDURE — 82330 ASSAY OF CALCIUM: CPT

## 2021-08-11 PROCEDURE — 84100 ASSAY OF PHOSPHORUS: CPT

## 2021-08-11 PROCEDURE — 2000000000 HC ICU R&B

## 2021-08-11 PROCEDURE — 6370000000 HC RX 637 (ALT 250 FOR IP): Performed by: NURSE PRACTITIONER

## 2021-08-11 PROCEDURE — 94660 CPAP INITIATION&MGMT: CPT

## 2021-08-11 PROCEDURE — 84132 ASSAY OF SERUM POTASSIUM: CPT

## 2021-08-11 PROCEDURE — 83735 ASSAY OF MAGNESIUM: CPT

## 2021-08-11 PROCEDURE — 93010 ELECTROCARDIOGRAM REPORT: CPT | Performed by: INTERNAL MEDICINE

## 2021-08-11 PROCEDURE — 94640 AIRWAY INHALATION TREATMENT: CPT

## 2021-08-11 PROCEDURE — 85027 COMPLETE CBC AUTOMATED: CPT

## 2021-08-11 RX ORDER — NICOTINE 21 MG/24HR
1 PATCH, TRANSDERMAL 24 HOURS TRANSDERMAL DAILY
Status: DISCONTINUED | OUTPATIENT
Start: 2021-08-11 | End: 2021-08-27

## 2021-08-11 RX ORDER — LIDOCAINE 4 G/G
1 PATCH TOPICAL DAILY
Status: DISCONTINUED | OUTPATIENT
Start: 2021-08-11 | End: 2021-08-31 | Stop reason: HOSPADM

## 2021-08-11 RX ORDER — FENTANYL CITRATE 50 UG/ML
50 INJECTION, SOLUTION INTRAMUSCULAR; INTRAVENOUS ONCE
Status: COMPLETED | OUTPATIENT
Start: 2021-08-11 | End: 2021-08-11

## 2021-08-11 RX ORDER — SODIUM CHLORIDE 9 MG/ML
25 INJECTION, SOLUTION INTRAVENOUS PRN
Status: DISCONTINUED | OUTPATIENT
Start: 2021-08-11 | End: 2021-08-31 | Stop reason: HOSPADM

## 2021-08-11 RX ORDER — SODIUM CHLORIDE 0.9 % (FLUSH) 0.9 %
10 SYRINGE (ML) INJECTION PRN
Status: DISCONTINUED | OUTPATIENT
Start: 2021-08-11 | End: 2021-08-31 | Stop reason: HOSPADM

## 2021-08-11 RX ORDER — LORAZEPAM 1 MG/1
3 TABLET ORAL
Status: DISCONTINUED | OUTPATIENT
Start: 2021-08-11 | End: 2021-08-31 | Stop reason: HOSPADM

## 2021-08-11 RX ORDER — MAGNESIUM SULFATE 1 G/100ML
1000 INJECTION INTRAVENOUS PRN
Status: DISCONTINUED | OUTPATIENT
Start: 2021-08-11 | End: 2021-08-31 | Stop reason: HOSPADM

## 2021-08-11 RX ORDER — LORAZEPAM 2 MG/ML
2 INJECTION INTRAMUSCULAR
Status: DISCONTINUED | OUTPATIENT
Start: 2021-08-11 | End: 2021-08-31 | Stop reason: HOSPADM

## 2021-08-11 RX ORDER — ONDANSETRON 2 MG/ML
4 INJECTION INTRAMUSCULAR; INTRAVENOUS EVERY 6 HOURS PRN
Status: DISCONTINUED | OUTPATIENT
Start: 2021-08-11 | End: 2021-08-31 | Stop reason: HOSPADM

## 2021-08-11 RX ORDER — POLYETHYLENE GLYCOL 3350 17 G/17G
17 POWDER, FOR SOLUTION ORAL DAILY PRN
Status: DISCONTINUED | OUTPATIENT
Start: 2021-08-11 | End: 2021-08-31 | Stop reason: HOSPADM

## 2021-08-11 RX ORDER — DEXMEDETOMIDINE HYDROCHLORIDE 4 UG/ML
.2-1.4 INJECTION, SOLUTION INTRAVENOUS CONTINUOUS
Status: DISCONTINUED | OUTPATIENT
Start: 2021-08-11 | End: 2021-08-22

## 2021-08-11 RX ORDER — LORAZEPAM 1 MG/1
1 TABLET ORAL
Status: DISCONTINUED | OUTPATIENT
Start: 2021-08-11 | End: 2021-08-31 | Stop reason: HOSPADM

## 2021-08-11 RX ORDER — LORAZEPAM 1 MG/1
4 TABLET ORAL
Status: DISCONTINUED | OUTPATIENT
Start: 2021-08-11 | End: 2021-08-31 | Stop reason: HOSPADM

## 2021-08-11 RX ORDER — GAUZE BANDAGE 2" X 2"
100 BANDAGE TOPICAL DAILY
Status: DISCONTINUED | OUTPATIENT
Start: 2021-08-12 | End: 2021-08-12 | Stop reason: CLARIF

## 2021-08-11 RX ORDER — POTASSIUM CHLORIDE 7.45 MG/ML
10 INJECTION INTRAVENOUS PRN
Status: DISCONTINUED | OUTPATIENT
Start: 2021-08-11 | End: 2021-08-21 | Stop reason: SDUPTHER

## 2021-08-11 RX ORDER — ACETAMINOPHEN 650 MG/1
650 SUPPOSITORY RECTAL EVERY 6 HOURS PRN
Status: DISCONTINUED | OUTPATIENT
Start: 2021-08-11 | End: 2021-08-31 | Stop reason: HOSPADM

## 2021-08-11 RX ORDER — POTASSIUM CHLORIDE 7.45 MG/ML
10 INJECTION INTRAVENOUS ONCE
Status: COMPLETED | OUTPATIENT
Start: 2021-08-11 | End: 2021-08-11

## 2021-08-11 RX ORDER — POTASSIUM CHLORIDE AND SODIUM CHLORIDE 900; 300 MG/100ML; MG/100ML
INJECTION, SOLUTION INTRAVENOUS CONTINUOUS
Status: DISCONTINUED | OUTPATIENT
Start: 2021-08-11 | End: 2021-08-15

## 2021-08-11 RX ORDER — LORAZEPAM 2 MG/ML
3 INJECTION INTRAMUSCULAR
Status: DISCONTINUED | OUTPATIENT
Start: 2021-08-11 | End: 2021-08-31 | Stop reason: HOSPADM

## 2021-08-11 RX ORDER — SODIUM CHLORIDE 0.9 % (FLUSH) 0.9 %
5-40 SYRINGE (ML) INJECTION EVERY 12 HOURS SCHEDULED
Status: DISCONTINUED | OUTPATIENT
Start: 2021-08-11 | End: 2021-08-31 | Stop reason: HOSPADM

## 2021-08-11 RX ORDER — ACETAMINOPHEN 325 MG/1
650 TABLET ORAL EVERY 6 HOURS PRN
Status: DISCONTINUED | OUTPATIENT
Start: 2021-08-11 | End: 2021-08-31 | Stop reason: HOSPADM

## 2021-08-11 RX ORDER — ALBUTEROL SULFATE 90 UG/1
2 AEROSOL, METERED RESPIRATORY (INHALATION) EVERY 6 HOURS PRN
Status: DISCONTINUED | OUTPATIENT
Start: 2021-08-11 | End: 2021-08-31 | Stop reason: HOSPADM

## 2021-08-11 RX ORDER — IPRATROPIUM BROMIDE AND ALBUTEROL SULFATE 2.5; .5 MG/3ML; MG/3ML
1 SOLUTION RESPIRATORY (INHALATION) EVERY 4 HOURS PRN
Status: DISCONTINUED | OUTPATIENT
Start: 2021-08-11 | End: 2021-08-13 | Stop reason: SDUPTHER

## 2021-08-11 RX ORDER — LORAZEPAM 1 MG/1
2 TABLET ORAL
Status: DISCONTINUED | OUTPATIENT
Start: 2021-08-11 | End: 2021-08-31 | Stop reason: HOSPADM

## 2021-08-11 RX ORDER — LORAZEPAM 2 MG/ML
4 INJECTION INTRAMUSCULAR
Status: DISCONTINUED | OUTPATIENT
Start: 2021-08-11 | End: 2021-08-31 | Stop reason: HOSPADM

## 2021-08-11 RX ORDER — 0.9 % SODIUM CHLORIDE 0.9 %
500 INTRAVENOUS SOLUTION INTRAVENOUS ONCE
Status: COMPLETED | OUTPATIENT
Start: 2021-08-11 | End: 2021-08-11

## 2021-08-11 RX ORDER — FLUTICASONE PROPIONATE 110 UG/1
1 AEROSOL, METERED RESPIRATORY (INHALATION) 2 TIMES DAILY
Status: DISCONTINUED | OUTPATIENT
Start: 2021-08-11 | End: 2021-08-13

## 2021-08-11 RX ORDER — FOLIC ACID 1 MG/1
1 TABLET ORAL DAILY
Status: DISCONTINUED | OUTPATIENT
Start: 2021-08-12 | End: 2021-08-12 | Stop reason: CLARIF

## 2021-08-11 RX ORDER — POTASSIUM CHLORIDE 20 MEQ/1
40 TABLET, EXTENDED RELEASE ORAL PRN
Status: DISCONTINUED | OUTPATIENT
Start: 2021-08-11 | End: 2021-08-21 | Stop reason: SDUPTHER

## 2021-08-11 RX ORDER — FENTANYL CITRATE 50 UG/ML
50 INJECTION, SOLUTION INTRAMUSCULAR; INTRAVENOUS
Status: DISCONTINUED | OUTPATIENT
Start: 2021-08-11 | End: 2021-08-15

## 2021-08-11 RX ORDER — M-VIT,TX,IRON,MINS/CALC/FOLIC 27MG-0.4MG
1 TABLET ORAL DAILY
Status: DISCONTINUED | OUTPATIENT
Start: 2021-08-12 | End: 2021-08-12 | Stop reason: CLARIF

## 2021-08-11 RX ORDER — LORAZEPAM 2 MG/ML
1 INJECTION INTRAMUSCULAR
Status: DISCONTINUED | OUTPATIENT
Start: 2021-08-11 | End: 2021-08-31 | Stop reason: HOSPADM

## 2021-08-11 RX ADMIN — LORAZEPAM 2 MG: 2 INJECTION INTRAMUSCULAR; INTRAVENOUS at 06:16

## 2021-08-11 RX ADMIN — FENTANYL CITRATE 50 MCG: 50 INJECTION, SOLUTION INTRAMUSCULAR; INTRAVENOUS at 02:02

## 2021-08-11 RX ADMIN — FOLIC ACID: 5 INJECTION, SOLUTION INTRAMUSCULAR; INTRAVENOUS; SUBCUTANEOUS at 04:37

## 2021-08-11 RX ADMIN — POTASSIUM CHLORIDE 10 MEQ: 10 INJECTION, SOLUTION INTRAVENOUS at 07:00

## 2021-08-11 RX ADMIN — LORAZEPAM 2 MG: 2 INJECTION INTRAMUSCULAR; INTRAVENOUS at 04:36

## 2021-08-11 RX ADMIN — POTASSIUM CHLORIDE 10 MEQ: 7.46 INJECTION, SOLUTION INTRAVENOUS at 20:30

## 2021-08-11 RX ADMIN — ENOXAPARIN SODIUM 40 MG: 40 INJECTION SUBCUTANEOUS at 04:42

## 2021-08-11 RX ADMIN — FAMOTIDINE 20 MG: 10 INJECTION, SOLUTION INTRAVENOUS at 21:38

## 2021-08-11 RX ADMIN — SODIUM CHLORIDE, PRESERVATIVE FREE 5 ML: 5 INJECTION INTRAVENOUS at 08:59

## 2021-08-11 RX ADMIN — POTASSIUM CHLORIDE 10 MEQ: 10 INJECTION, SOLUTION INTRAVENOUS at 13:55

## 2021-08-11 RX ADMIN — ENOXAPARIN SODIUM 40 MG: 40 INJECTION SUBCUTANEOUS at 07:56

## 2021-08-11 RX ADMIN — POTASSIUM CHLORIDE 10 MEQ: 10 INJECTION, SOLUTION INTRAVENOUS at 06:01

## 2021-08-11 RX ADMIN — LORAZEPAM 1 MG: 2 INJECTION INTRAMUSCULAR; INTRAVENOUS at 23:14

## 2021-08-11 RX ADMIN — LORAZEPAM 3 MG: 2 INJECTION INTRAMUSCULAR; INTRAVENOUS at 09:47

## 2021-08-11 RX ADMIN — POTASSIUM CHLORIDE 10 MEQ: 10 INJECTION, SOLUTION INTRAVENOUS at 12:19

## 2021-08-11 RX ADMIN — DEXMEDETOMIDINE HYDROCHLORIDE 0.2 MCG/KG/HR: 400 INJECTION INTRAVENOUS at 23:57

## 2021-08-11 RX ADMIN — ACETAMINOPHEN 650 MG: 325 TABLET, FILM COATED ORAL at 15:30

## 2021-08-11 RX ADMIN — TIOTROPIUM BROMIDE INHALATION SPRAY 2 PUFF: 3.12 SPRAY, METERED RESPIRATORY (INHALATION) at 08:15

## 2021-08-11 RX ADMIN — POTASSIUM CHLORIDE AND SODIUM CHLORIDE: 900; 300 INJECTION, SOLUTION INTRAVENOUS at 15:24

## 2021-08-11 RX ADMIN — POTASSIUM CHLORIDE 10 MEQ: 10 INJECTION, SOLUTION INTRAVENOUS at 08:58

## 2021-08-11 RX ADMIN — FAMOTIDINE 20 MG: 10 INJECTION, SOLUTION INTRAVENOUS at 07:56

## 2021-08-11 RX ADMIN — FLUTICASONE PROPIONATE 1 PUFF: 110 AEROSOL, METERED RESPIRATORY (INHALATION) at 08:15

## 2021-08-11 RX ADMIN — LORAZEPAM 1 MG: 2 INJECTION INTRAMUSCULAR; INTRAVENOUS at 22:03

## 2021-08-11 RX ADMIN — FENTANYL CITRATE 50 MCG: 50 INJECTION, SOLUTION INTRAMUSCULAR; INTRAVENOUS at 21:50

## 2021-08-11 RX ADMIN — SODIUM CHLORIDE 500 ML: 9 INJECTION, SOLUTION INTRAVENOUS at 18:35

## 2021-08-11 RX ADMIN — LORAZEPAM 3 MG: 2 INJECTION INTRAMUSCULAR; INTRAVENOUS at 07:55

## 2021-08-11 RX ADMIN — POTASSIUM CHLORIDE AND SODIUM CHLORIDE: 900; 300 INJECTION, SOLUTION INTRAVENOUS at 06:26

## 2021-08-11 RX ADMIN — POTASSIUM CHLORIDE 10 MEQ: 10 INJECTION, SOLUTION INTRAVENOUS at 11:13

## 2021-08-11 ASSESSMENT — PAIN SCALES - GENERAL
PAINLEVEL_OUTOF10: 4
PAINLEVEL_OUTOF10: 0
PAINLEVEL_OUTOF10: 0
PAINLEVEL_OUTOF10: 10
PAINLEVEL_OUTOF10: 3

## 2021-08-11 ASSESSMENT — ENCOUNTER SYMPTOMS
NAUSEA: 1
COLOR CHANGE: 1
ALLERGIC/IMMUNOLOGIC NEGATIVE: 1
BACK PAIN: 1
DIARRHEA: 0
TROUBLE SWALLOWING: 0
VOMITING: 0
ABDOMINAL PAIN: 1
COUGH: 0
SHORTNESS OF BREATH: 0
WHEEZING: 0

## 2021-08-11 NOTE — CARE COORDINATION
DISCHARGE PLANNING NOTE:    Attempted to see this patient to obtain discharge plan, however he is being transferred to Intermediate due to change in condition. CASE MANAGEMENT NOTE:    Admission Date:  8/10/2021 Salome Perez is a 60 y.o.  male    Admitted for : Syncope and collapse [R55]  Hypokalemia [E87.6]  Hyponatremia [E87.1]  Traumatic rhabdomyolysis, initial encounter ([facility] Vall[facility] Ovens. 6XXA]  Closed fracture of multiple ribs of right side, initial encounter [S22.41XA]    Met with:  Chart Notes    PCP:  Dr. Andra Mcneill MD                                Insurance:  [email or web address]      Is patient alert and oriented at time of discussion:  NA    Current Residence/ Living Arrangements:  independently at home             Current Services PTA:  NA    Does patient go to outpatient dialysis: No  If yes, location and chair time: N/A    Is patient agreeable to VNS: NA    Freedom of choice provided:  NA    List of [address] provided: NA    VNS chosen:  NA    DME:  none    Home Oxygen: No    Nebulizer: No    CPAP/BIPAP: No    Supplier: N/A    Potential Assistance Needed: Yes    SNF needed: Maybe    Freedom of choice and list provided: NA    Pharmacy:  [address] on [facility]       Does Patient want to use MEDS to BEDS? No    Is patient currently receiving oral anticoagulation therapy? No    Is the Patient an [facility] FRANK[facility] with Readmission Risk Score greater than 14%? Yes  If yes, pt needs a follow up appointment made within 7 days. Family Members/Caregivers that pt would like involved in their care:    Yes    If yes, list name here:  Juan Manuel Padilla    Transportation Provider:  Unknown             Discharge Plan:  8/11: Lalita Alarcon - From home. DME - None. CIWA - Transferring to [facility] for decline in status. Will attempt to get discharge information tomorrow.  OH - 19%. Haseeb Hernandez                 Electronically signed by: Syed Cardozo RN on 8/11/2021 at 4:23 PM
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
## 2021-08-11 NOTE — PROGRESS NOTES
BRONCHOSPASM/BRONCHOCONSTRICTION     [x]         IMPROVE AERATION/BREATH SOUNDS  [x]   ADMINISTER BRONCHODILATOR THERAPY AS APPROPRIATE  [x]   ASSESS BREATH SOUNDS  [x]   IMPLEMENT AEROSOL/MDI PROTOCOL  [x]   PATIENT EDUCATION AS NEEDED    NONINVASIVE VENTILATION    PROVIDE OPTIMAL VENTILATION/ACCEPTABLE SPO2   IMPLEMENT NONINVASIVE VENTILATION PROTOCOL   MAINTAIN ACCEPTABLE SPO2   ASSESS SKIN INTEGRITY/BREAKDOWN SCORE   PATIENT EDUCATION AS NEEDED   BIPAP AS NEEDED        PROVIDE ADEQUATE OXYGENATION WITH ACCEPTABLE SP02/ABG'S    [x]  IDENTIFY APPROPRIATE OXYGEN THERAPY  [x]   MONITOR SP02/ABG'S AS NEEDED   [x]   PATIENT EDUCATION AS NEEDED

## 2021-08-11 NOTE — ED NOTES
Report given to JOHN Ferreira from ED. Report method by phone   The following was reviewed with receiving RN:   Current vital signs:  /78   Pulse 90   Temp 98.1 °F (36.7 °C) (Oral)   Resp 26   Ht 5' 9\" (1.753 m)   Wt 150 lb (68 kg)   SpO2 96%   BMI 22.15 kg/m²                MEWS Score: 4     Any medication or safety alerts were reviewed. Any pending diagnostics and notifications were also reviewed, as well as any safety concerns or issues, abnormal labs, abnormal imaging, and abnormal assessment findings. Questions were answered.             Jacques Closs, RN  08/11/21 9938

## 2021-08-11 NOTE — FLOWSHEET NOTE
08/11/21 1519   Vital Signs   Temp 100.9 °F (38.3 °C)   Temp Source Oral   Pulse 104   Heart Rate Source Monitor   Resp 26   BP (!) 98/56   BP Location Right upper arm   MAP (mmHg) 69   Patient Position Semi fowlers   Level of Consciousness Alert (0)   MEWS Score 4     Dr. Adry Loza notified [facility] perfect serve in the change in vitals and the patient is Tachypnic. He will see the patient at bedside. Patient seen at bedside, transfer patient to intermediate ICU, and have critical care see the patient.
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
## 2021-08-11 NOTE — ED PROVIDER NOTES
[address]  eMERGENCY dEPARTMENT eNCOUnter      Pt Name: Alejandra Lorenz  MRN: [id number]  [place] 1965  Date of evaluation: 8/10/21      CHIEF COMPLAINT       Chief Complaint   Patient presents with    Fall    Dehydration         HISTORY OF PRESENT ILLNESS    Alejandra Lorenz is a 60 y.o. male who presents complaining of fall. Patient is stating that he was walking today and just suddenly passed out and hit the sidewalk. Patient states that he has been eating and drinking okay. Patient is homeless but eats every day at the [employer]. Patient states that he did not feel any lightheadedness dizziness or palpitations before it happened and did not know what was going to happen. Patient is complaining of severe pain mostly to his right abdomen and chest which she had multiple rib fractures noted from a fall a few days ago. Patient does not know of any cardiac issues. Patient is an alcoholic and does admit to drinking today. REVIEW OF SYSTEMS       Review of Systems   Constitutional: Negative for activity change, appetite change, chills, diaphoresis and fever. HENT: Negative for congestion, ear pain, facial swelling, nosebleeds, rhinorrhea, sinus pressure, sore throat and trouble swallowing. Eyes: Negative for pain, discharge and redness. Respiratory: Negative for cough, chest tightness, shortness of breath and wheezing. Cardiovascular: Positive for chest pain. Negative for palpitations and leg swelling. Gastrointestinal: Positive for abdominal pain. Negative for blood in stool, constipation, diarrhea, nausea and vomiting. Genitourinary: Negative for difficulty urinating, dysuria, flank pain, frequency, genital sores and hematuria. Musculoskeletal: Negative for arthralgias, back pain, gait problem, joint swelling, myalgias and neck pain. Skin: Negative for color change, pallor, rash and wound. Neurological: Positive for syncope.  Negative for dizziness, tremors, seizures, speech difficulty, weakness, numbness and headaches. Psychiatric/Behavioral: Negative for confusion, decreased concentration, hallucinations, self-injury, sleep disturbance and suicidal ideas. PAST MEDICAL HISTORY     Past Medical History:   Diagnosis Date    Alcohol abuse 6/4/2014    Anxiety     Arthritis     COPD (chronic obstructive pulmonary disease) ([address]     ASTHMA    DDD (degenerative disc disease), lumbar 9/6/2016    Depression     Heart burn     Hemorrhoids     HTN (hypertension) 1/19/2015    Ilioinguinal neuralgia of right side 4/10/2017    Psychiatric problem     depression /anxiety    Seizures ([address]     withdrawal from alcohol 2 years ago    Wears glasses     READING       SURGICAL HISTORY       Past Surgical History:   Procedure Laterality Date    ANESTHESIA NERVE BLOCK Right 4/10/2017    NERVE BLOCK US RIGHT SIDED ILIOINGUINAL performed by Char Chambers MD at [address] [place] [address]  3/19/15    HERNIA REPAIR      NERVE BLOCK Right 10/10/2016    right groin    OTHER SURGICAL HISTORY Right 04/10/2017    US nerve block to R groin    TOE SURGERY      SCREW RIGHT BIG TOE       CURRENT MEDICATIONS       Previous Medications    ALBUTEROL SULFATE  (90 BASE) MCG/ACT INHALER    INHALE 2 PUFFS INTO THE LUNGS EVERY 6 HOURS AS NEEDED FOR WHEEZING    ARNUITY ELLIPTA 100 MCG/ACT AEPB    INHALE 1 PUFF INTO THE LUNGS DAILY    HEATING PADS (COMFORT-HEAL HEATING PAD) PADS    Use on back as needed    IBUPROFEN (ADVIL;MOTRIN) 800 MG TABLET    TAKE 1 TABLET BY MOUTH AS NEEDED FOR PAIN (AS NEEDED)    MASKS (CLEVER CHOICE FACE MASK) MISC    USE NEW FACE MASK EVERYDAY WHEN PATIENT GO OUTSIDE OF HOME DUE TO COVID PANDEMIC    TIOTROPIUM (SPIRIVA HANDIHALER) 18 MCG INHALATION CAPSULE    Inhale 1 capsule into the lungs daily    TIZANIDINE (ZANAFLEX) 2 MG TABLET    TAKE 1 TABLET BY MOUTH NIGHTLY AS NEEDED (SPASMS)       ALLERGIES     is allergic to nickel.     FAMILY HISTORY     [unfilled]     SOCIAL HISTORY      reports that he has been smoking cigarettes. He has a 38.00 pack-year smoking history. He has never used smokeless tobacco. He reports current alcohol use of about 12.0 standard drinks of alcohol per week. He reports current drug use. Drug: Marijuana. PHYSICAL EXAM     INITIAL VITALS: /73   Pulse 112   Temp 98.1 °F (36.7 °C) (Oral)   Resp 22   Ht 5' 9\" (1.753 m)   Wt 150 lb (68 kg)   SpO2 95%   BMI 22.15 kg/m²      Physical Exam  Vitals and nursing note reviewed. Constitutional:       General: He is not in acute distress. Appearance: He is well-developed. He is not diaphoretic. HENT:      Head: Normocephalic and atraumatic. Eyes:      General: No scleral icterus. Right eye: No discharge. Left eye: No discharge. Conjunctiva/sclera: Conjunctivae normal.      Pupils: Pupils are equal, round, and reactive to light. Cardiovascular:      Rate and Rhythm: Normal rate and regular rhythm. Heart sounds: Normal heart sounds. No murmur heard. No friction rub. No gallop. Pulmonary:      Effort: Pulmonary effort is normal. No respiratory distress. Breath sounds: Normal breath sounds. No wheezing or rales. Chest:      Chest wall: Tenderness (Right side) present. Abdominal:      General: Bowel sounds are normal. There is no distension. Palpations: Abdomen is soft. There is no mass. Tenderness: There is abdominal tenderness (Diffuse mostly on the right no ecchymosis). There is no guarding or rebound. Musculoskeletal:         General: No tenderness. Normal range of motion. Skin:     General: Skin is warm and dry. Coloration: Skin is not pale. Findings: No erythema or rash. Neurological:      Mental Status: He is alert and oriented to person, place, and time. Cranial Nerves: No cranial nerve deficit. Sensory: No sensory deficit. Motor: No abnormal muscle tone.       Coordination: Coordination normal.      Deep Tendon Reflexes: Reflexes normal.   Psychiatric:         Behavior: Behavior normal.         Thought Content: Thought content normal.         Judgment: Judgment normal.         MEDICAL DECISION MAKING:     Patient is alcoholic and states that he just passed out without any preamble. Patient states he been eating and drinking okay. We will check some basic labs EKG and get a CT of his head chest abdomen pelvis to look for any obvious injuries or any other issues. DIAGNOSTIC RESULTS     EKG: All EKG's are interpreted by the Emergency Department Physician who either signs or Co-signs this chart in the absence of a cardiologist.    EKG Interpretation    Interpreted by emergency department physician    Rhythm: sinus tachycardia  Rate: tachycardia  Axis: normal  Ectopy: premature ventricular contractions (unifocal) and premature ventricular contractions (infrequent)  Conduction: normal  ST Segments: nonspecific changes  T Waves: non specific changes  Q Waves: none    Clinical Impression: EKG: nonspecific ST and T waves changes, sinus tachycardia, occasional PVC noted, unifocal.      Calos Marshall MD        RADIOLOGY:All plain film, CT, MRI, and formal ultrasound images (except ED bedside ultrasound)are read by the radiologist and interpretations are directly viewed by the emergency physician. CT HEAD WO CONTRAST    Result Date: 8/10/2021  EXAMINATION: CT OF THE HEAD WITHOUT CONTRAST  8/10/2021 10:41 pm TECHNIQUE: CT of the head was performed without the administration of intravenous contrast. Dose modulation, iterative reconstruction, and/or weight based adjustment of the mA/kV was utilized to reduce the radiation dose to as low as reasonably achievable.  COMPARISON: CT head 03/06/2011 HISTORY: ORDERING SYSTEM PROVIDED HISTORY: Trauma TECHNOLOGIST PROVIDED HISTORY: Trauma Decision Support Exception - unselect if not a suspected or confirmed emergency medical condition->Emergency Medical Condition (MA) Reason for Exam: Trauma Acuity: Acute Type of Exam: Initial Mechanism of Injury: Pt states he was walking and then was on the ground. Pt states he hurts everywhere. FINDINGS: BRAIN/VENTRICLES: There is no acute intracranial hemorrhage, mass effect or midline shift. No abnormal extra-axial fluid collection. The gray-white differentiation is maintained without evidence of an acute infarct. There is no evidence of hydrocephalus. Vascular calcifications. ORBITS: The visualized portion of the orbits demonstrate no acute abnormality. SINUSES: Mild ethmoid sinus mucosal thickening. Mastoids clear SOFT TISSUES/SKULL:  No acute abnormality of the visualized skull or soft tissues. No acute intracranial abnormality. CT CHEST ABDOMEN PELVIS W CONTRAST    Result Date: 8/10/2021  EXAMINATION: CT OF THE CHEST, ABDOMEN, AND PELVIS WITH CONTRAST 8/10/2021 10:41 pm TECHNIQUE: CT of the chest, abdomen and pelvis was performed with the administration of intravenous contrast. Multiplanar reformatted images are provided for review. Dose modulation, iterative reconstruction, and/or weight based adjustment of the mA/kV was utilized to reduce the radiation dose to as low as reasonably achievable. COMPARISON: None HISTORY: ORDERING SYSTEM PROVIDED HISTORY: Syncope, fall, rib and abd pain TECHNOLOGIST PROVIDED HISTORY: Syncope, fall, rib and abd pain Decision Support Exception - unselect if not a suspected or confirmed emergency medical condition->Emergency Medical Condition (MA) Reason for Exam: Syncope, fall, rib and abd pain Acuity: Acute Type of Exam: Initial Mechanism of Injury: Pt states he was walking and then was on the ground, states he hurts everywhere. FINDINGS: Chest: Mediastinum: Cardiomegaly. Coronary artery calcifications. Aortic vascular calcifications. Small pericardial effusion. No suspicious mediastinal or hilar Adenopathy Lungs/pleura: Interstitial thickening suggestive edema. Small right-sided effusion.   Areas of pleural other components within normal limits   TROP/MYOGLOBIN - Abnormal; Notable for the following components:    Myoglobin 2,884 (*)     All other components within normal limits   COMPREHENSIVE METABOLIC PANEL - Abnormal; Notable for the following components:    Glucose 136 (*)     Calcium 8.5 (*)     Sodium 127 (*)     Potassium 2.5 (*)     Chloride 87 (*)     ALT 60 (*)     AST 69 (*)     Total Bilirubin 1.61 (*)     Albumin 3.3 (*)     All other components within normal limits   CK - Abnormal; Notable for the following components: Total CK 1,222 (*)     All other components within normal limits   TSH WITHOUT REFLEX   T4, FREE   ETHANOL         EMERGENCY DEPARTMENT COURSE:   Vitals:    Vitals:    08/10/21 2032   BP: 113/73   Pulse: 112   Resp: 22   Temp: 98.1 °F (36.7 °C)   TempSrc: Oral   SpO2: 95%   Weight: 150 lb (68 kg)   Height: 5' 9\" (1.753 m)       The patient was given the following medications while in the emergency department:     Orders Placed This Encounter   Medications    0.9 % sodium chloride bolus    DISCONTD: potassium chloride 40 mEq in sodium chloride 0.9 % 1,000 mL infusion    0.9% NaCl with KCl 40 mEq infusion    0.9 % sodium chloride bolus    sodium chloride flush 0.9 % injection 10 mL    iopamidol (ISOVUE-370) 76 % injection 100 mL       -------------------------  1:09 AM EDT  Patient has been evaluated and updated all results. We have started fluids on him for management of his issues. I spoke with Ashanti Escobar who will admit the patient for further treatment. CRITICAL CARE:   None    CONSULTS:  IP CONSULT TO GENERAL SURGERY    PROCEDURES:  None    FINAL IMPRESSION      1. Syncope and collapse    2. Hypokalemia    3. Hyponatremia    4.  Traumatic rhabdomyolysis, initial encounter ([address]    5. Closed fracture of multiple ribs of right side, initial encounter          DISPOSITION/PLAN   DISPOSITION Decision To Admit 08/10/2021 11:38:19 PM      PATIENT REFERRED TO:  No follow-up provider specified.     DISCHARGE MEDICATIONS:  New Prescriptions    No medications on file       (Please note that portions of this note were completed with a voice recognition program.  Efforts were made to edit the dictations but occasionally words are mis-transcribed.)    Radha Lowry MD  Attending Daphne Gregg MD  08/11/21 2386

## 2021-08-11 NOTE — ED NOTES
Bed: 12  Expected date: 8/10/21  Expected time:   Means of arrival:   Comments:     Danny Barker RN  08/10/21 2030
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
## 2021-08-12 ENCOUNTER — APPOINTMENT (OUTPATIENT)
Dept: INTERVENTIONAL RADIOLOGY/VASCULAR | Age: 56
DRG: 351 | End: 2021-08-12
Payer: MEDICAID

## 2021-08-12 ENCOUNTER — APPOINTMENT (OUTPATIENT)
Dept: GENERAL RADIOLOGY | Age: 56
DRG: 351 | End: 2021-08-12
Payer: MEDICAID

## 2021-08-12 PROBLEM — E43 SEVERE MALNUTRITION (HCC): Status: ACTIVE | Noted: 2021-08-12

## 2021-08-12 LAB
ALBUMIN SERPL-MCNC: 2.1 G/DL (ref 3.5–5.2)
ALBUMIN/GLOBULIN RATIO: ABNORMAL (ref 1–2.5)
ALLEN TEST: ABNORMAL
ALLEN TEST: ABNORMAL
ALP BLD-CCNC: 40 U/L (ref 40–129)
ALT SERPL-CCNC: 51 U/L (ref 5–41)
AMMONIA: 42 UMOL/L (ref 16–60)
ANION GAP SERPL CALCULATED.3IONS-SCNC: 14 MMOL/L (ref 9–17)
AST SERPL-CCNC: 95 U/L
BILIRUB SERPL-MCNC: 0.79 MG/DL (ref 0.3–1.2)
BUN BLDV-MCNC: 13 MG/DL (ref 6–20)
BUN/CREAT BLD: ABNORMAL (ref 9–20)
CALCIUM SERPL-MCNC: 7.6 MG/DL (ref 8.6–10.4)
CARBOXYHEMOGLOBIN: 0.7 % (ref 0–5)
CARBOXYHEMOGLOBIN: 0.9 % (ref 0–5)
CHLORIDE BLD-SCNC: 107 MMOL/L (ref 98–107)
CO2: 18 MMOL/L (ref 20–31)
CREAT SERPL-MCNC: 0.6 MG/DL (ref 0.7–1.2)
FIO2: ABNORMAL
FIO2: ABNORMAL
GFR AFRICAN AMERICAN: >60 ML/MIN
GFR NON-AFRICAN AMERICAN: >60 ML/MIN
GFR SERPL CREATININE-BSD FRML MDRD: ABNORMAL ML/MIN/{1.73_M2}
GFR SERPL CREATININE-BSD FRML MDRD: ABNORMAL ML/MIN/{1.73_M2}
GLUCOSE BLD-MCNC: 170 MG/DL (ref 75–110)
GLUCOSE BLD-MCNC: 78 MG/DL (ref 70–99)
HCO3 ARTERIAL: 17.6 MMOL/L (ref 22–26)
HCO3 ARTERIAL: 17.7 MMOL/L (ref 22–26)
HCT VFR BLD CALC: 34.6 % (ref 41–53)
HEMOGLOBIN: 11.3 G/DL (ref 13.5–17.5)
LACTIC ACID: 1.1 MMOL/L (ref 0.5–2.2)
MAGNESIUM: 2.3 MG/DL (ref 1.6–2.6)
MCH RBC QN AUTO: 32.9 PG (ref 26–34)
MCHC RBC AUTO-ENTMCNC: 32.6 G/DL (ref 31–37)
MCV RBC AUTO: 101.1 FL (ref 80–100)
METHEMOGLOBIN: 1 % (ref 0–1.9)
METHEMOGLOBIN: 2.1 % (ref 0–1.9)
MODE: ABNORMAL
MODE: ABNORMAL
NEGATIVE BASE EXCESS, ART: 7 MMOL/L (ref 0–2)
NEGATIVE BASE EXCESS, ART: 8.2 MMOL/L (ref 0–2)
NOTIFICATION TIME: ABNORMAL
NOTIFICATION TIME: ABNORMAL
NOTIFICATION: ABNORMAL
NOTIFICATION: ABNORMAL
NRBC AUTOMATED: ABNORMAL PER 100 WBC
O2 DEVICE/FLOW/%: ABNORMAL
O2 DEVICE/FLOW/%: ABNORMAL
O2 SAT, ARTERIAL: 92.6 % (ref 95–98)
O2 SAT, ARTERIAL: 94.8 % (ref 95–98)
OXYHEMOGLOBIN: ABNORMAL % (ref 95–98)
OXYHEMOGLOBIN: ABNORMAL % (ref 95–98)
PATIENT TEMP: 37
PATIENT TEMP: 37
PCO2 ARTERIAL: 28.3 MMHG (ref 35–45)
PCO2 ARTERIAL: 32.8 MMHG (ref 35–45)
PCO2, ART, TEMP ADJ: ABNORMAL (ref 35–45)
PCO2, ART, TEMP ADJ: ABNORMAL (ref 35–45)
PDW BLD-RTO: 14.2 % (ref 11.5–14.9)
PEEP/CPAP: ABNORMAL
PEEP/CPAP: ABNORMAL
PH ARTERIAL: 7.34 (ref 7.35–7.45)
PH ARTERIAL: 7.4 (ref 7.35–7.45)
PH, ART, TEMP ADJ: ABNORMAL (ref 7.35–7.45)
PH, ART, TEMP ADJ: ABNORMAL (ref 7.35–7.45)
PHOSPHORUS: 2.3 MG/DL (ref 2.5–4.5)
PLATELET # BLD: 193 K/UL (ref 150–450)
PMV BLD AUTO: 7.6 FL (ref 6–12)
PO2 ARTERIAL: 76.2 MMHG (ref 80–100)
PO2 ARTERIAL: 78.2 MMHG (ref 80–100)
PO2, ART, TEMP ADJ: ABNORMAL MMHG (ref 80–100)
PO2, ART, TEMP ADJ: ABNORMAL MMHG (ref 80–100)
POSITIVE BASE EXCESS, ART: ABNORMAL MMOL/L (ref 0–2)
POSITIVE BASE EXCESS, ART: ABNORMAL MMOL/L (ref 0–2)
POTASSIUM SERPL-SCNC: 3.7 MMOL/L (ref 3.7–5.3)
PROCALCITONIN: 73.2 NG/ML
PSV: ABNORMAL
PSV: ABNORMAL
PT. POSITION: ABNORMAL
PT. POSITION: ABNORMAL
RBC # BLD: 3.42 M/UL (ref 4.5–5.9)
RESPIRATORY RATE: 35
RESPIRATORY RATE: 38
SAMPLE SITE: ABNORMAL
SAMPLE SITE: ABNORMAL
SET RATE: ABNORMAL
SET RATE: ABNORMAL
SODIUM BLD-SCNC: 137 MMOL/L (ref 135–144)
SODIUM BLD-SCNC: 139 MMOL/L (ref 135–144)
TEXT FOR RESPIRATORY: ABNORMAL
TEXT FOR RESPIRATORY: ABNORMAL
TOTAL HB: ABNORMAL G/DL (ref 12–16)
TOTAL HB: ABNORMAL G/DL (ref 12–16)
TOTAL PROTEIN: 5.2 G/DL (ref 6.4–8.3)
TOTAL RATE: ABNORMAL
TOTAL RATE: ABNORMAL
TRIGL SERPL-MCNC: 97 MG/DL
VT: ABNORMAL
VT: ABNORMAL
WBC # BLD: 7.7 K/UL (ref 3.5–11)

## 2021-08-12 PROCEDURE — 84100 ASSAY OF PHOSPHORUS: CPT

## 2021-08-12 PROCEDURE — 94640 AIRWAY INHALATION TREATMENT: CPT

## 2021-08-12 PROCEDURE — 86738 MYCOPLASMA ANTIBODY: CPT

## 2021-08-12 PROCEDURE — 80053 COMPREHEN METABOLIC PANEL: CPT

## 2021-08-12 PROCEDURE — 6370000000 HC RX 637 (ALT 250 FOR IP): Performed by: SURGERY

## 2021-08-12 PROCEDURE — 2709999900 IR FLUORO GUIDED CVA DEVICE PLMT/REPLACE/REMOVAL

## 2021-08-12 PROCEDURE — 36573 INSJ PICC RS&I 5 YR+: CPT | Performed by: RADIOLOGY

## 2021-08-12 PROCEDURE — 84145 PROCALCITONIN (PCT): CPT

## 2021-08-12 PROCEDURE — 51701 INSERT BLADDER CATHETER: CPT

## 2021-08-12 PROCEDURE — 2000000000 HC ICU R&B

## 2021-08-12 PROCEDURE — 83735 ASSAY OF MAGNESIUM: CPT

## 2021-08-12 PROCEDURE — 36600 WITHDRAWAL OF ARTERIAL BLOOD: CPT

## 2021-08-12 PROCEDURE — 2500000003 HC RX 250 WO HCPCS: Performed by: NURSE PRACTITIONER

## 2021-08-12 PROCEDURE — 82805 BLOOD GASES W/O2 SATURATION: CPT

## 2021-08-12 PROCEDURE — 94761 N-INVAS EAR/PLS OXIMETRY MLT: CPT

## 2021-08-12 PROCEDURE — 84295 ASSAY OF SERUM SODIUM: CPT

## 2021-08-12 PROCEDURE — 85027 COMPLETE CBC AUTOMATED: CPT

## 2021-08-12 PROCEDURE — 6370000000 HC RX 637 (ALT 250 FOR IP): Performed by: PHYSICIAN ASSISTANT

## 2021-08-12 PROCEDURE — 6360000002 HC RX W HCPCS: Performed by: NURSE PRACTITIONER

## 2021-08-12 PROCEDURE — 6370000000 HC RX 637 (ALT 250 FOR IP): Performed by: NURSE PRACTITIONER

## 2021-08-12 PROCEDURE — 36415 COLL VENOUS BLD VENIPUNCTURE: CPT

## 2021-08-12 PROCEDURE — 87899 AGENT NOS ASSAY W/OPTIC: CPT

## 2021-08-12 PROCEDURE — 84478 ASSAY OF TRIGLYCERIDES: CPT

## 2021-08-12 PROCEDURE — 2700000000 HC OXYGEN THERAPY PER DAY

## 2021-08-12 PROCEDURE — 83605 ASSAY OF LACTIC ACID: CPT

## 2021-08-12 PROCEDURE — 99233 SBSQ HOSP IP/OBS HIGH 50: CPT | Performed by: INTERNAL MEDICINE

## 2021-08-12 PROCEDURE — 2580000003 HC RX 258: Performed by: NURSE PRACTITIONER

## 2021-08-12 PROCEDURE — 94660 CPAP INITIATION&MGMT: CPT

## 2021-08-12 PROCEDURE — 2500000003 HC RX 250 WO HCPCS: Performed by: INTERNAL MEDICINE

## 2021-08-12 PROCEDURE — 87449 NOS EACH ORGANISM AG IA: CPT

## 2021-08-12 PROCEDURE — 2500000003 HC RX 250 WO HCPCS: Performed by: SURGERY

## 2021-08-12 PROCEDURE — 82947 ASSAY GLUCOSE BLOOD QUANT: CPT

## 2021-08-12 PROCEDURE — 02HV33Z INSERTION OF INFUSION DEVICE INTO SUPERIOR VENA CAVA, PERCUTANEOUS APPROACH: ICD-10-PCS | Performed by: INTERNAL MEDICINE

## 2021-08-12 PROCEDURE — 71045 X-RAY EXAM CHEST 1 VIEW: CPT

## 2021-08-12 PROCEDURE — 2580000003 HC RX 258: Performed by: INTERNAL MEDICINE

## 2021-08-12 PROCEDURE — 51798 US URINE CAPACITY MEASURE: CPT

## 2021-08-12 PROCEDURE — 6360000002 HC RX W HCPCS: Performed by: INTERNAL MEDICINE

## 2021-08-12 PROCEDURE — 82140 ASSAY OF AMMONIA: CPT

## 2021-08-12 RX ORDER — SODIUM CHLORIDE 9 MG/ML
INJECTION, SOLUTION INTRAVENOUS PRN
Status: DISCONTINUED | OUTPATIENT
Start: 2021-08-12 | End: 2021-08-25

## 2021-08-12 RX ADMIN — FAMOTIDINE 20 MG: 10 INJECTION, SOLUTION INTRAVENOUS at 09:37

## 2021-08-12 RX ADMIN — FENTANYL CITRATE 50 MCG: 50 INJECTION, SOLUTION INTRAMUSCULAR; INTRAVENOUS at 19:25

## 2021-08-12 RX ADMIN — FLUTICASONE PROPIONATE 1 PUFF: 110 AEROSOL, METERED RESPIRATORY (INHALATION) at 08:16

## 2021-08-12 RX ADMIN — FAMOTIDINE 20 MG: 10 INJECTION, SOLUTION INTRAVENOUS at 20:39

## 2021-08-12 RX ADMIN — FENTANYL CITRATE 50 MCG: 50 INJECTION, SOLUTION INTRAMUSCULAR; INTRAVENOUS at 05:02

## 2021-08-12 RX ADMIN — ENOXAPARIN SODIUM 40 MG: 40 INJECTION SUBCUTANEOUS at 09:35

## 2021-08-12 RX ADMIN — TIOTROPIUM BROMIDE INHALATION SPRAY 2 PUFF: 3.12 SPRAY, METERED RESPIRATORY (INHALATION) at 08:13

## 2021-08-12 RX ADMIN — POTASSIUM CHLORIDE AND SODIUM CHLORIDE: 900; 300 INJECTION, SOLUTION INTRAVENOUS at 01:18

## 2021-08-12 RX ADMIN — LORAZEPAM 2 MG: 2 INJECTION INTRAMUSCULAR; INTRAVENOUS at 20:39

## 2021-08-12 RX ADMIN — CALCIUM GLUCONATE: 98 INJECTION, SOLUTION INTRAVENOUS at 18:26

## 2021-08-12 RX ADMIN — FENTANYL CITRATE 50 MCG: 50 INJECTION, SOLUTION INTRAMUSCULAR; INTRAVENOUS at 11:59

## 2021-08-12 RX ADMIN — SODIUM CHLORIDE, PRESERVATIVE FREE 10 ML: 5 INJECTION INTRAVENOUS at 09:37

## 2021-08-12 RX ADMIN — PHENYLEPHRINE HYDROCHLORIDE 30 MCG/MIN: 10 INJECTION INTRAVENOUS at 01:39

## 2021-08-12 RX ADMIN — LORAZEPAM 2 MG: 2 INJECTION INTRAMUSCULAR; INTRAVENOUS at 17:10

## 2021-08-12 RX ADMIN — DEXMEDETOMIDINE HYDROCHLORIDE 0.8 MCG/KG/HR: 400 INJECTION INTRAVENOUS at 19:19

## 2021-08-12 RX ADMIN — INSULIN LISPRO 1 UNITS: 100 INJECTION, SOLUTION INTRAVENOUS; SUBCUTANEOUS at 20:40

## 2021-08-12 RX ADMIN — DEXMEDETOMIDINE HYDROCHLORIDE 0.8 MCG/KG/HR: 400 INJECTION INTRAVENOUS at 11:20

## 2021-08-12 RX ADMIN — POTASSIUM CHLORIDE AND SODIUM CHLORIDE: 900; 300 INJECTION, SOLUTION INTRAVENOUS at 11:23

## 2021-08-12 RX ADMIN — FENTANYL CITRATE 50 MCG: 50 INJECTION, SOLUTION INTRAMUSCULAR; INTRAVENOUS at 22:54

## 2021-08-12 ASSESSMENT — PAIN SCALES - GENERAL
PAINLEVEL_OUTOF10: 9
PAINLEVEL_OUTOF10: 6
PAINLEVEL_OUTOF10: 6
PAINLEVEL_OUTOF10: 5
PAINLEVEL_OUTOF10: 4

## 2021-08-12 ASSESSMENT — ENCOUNTER SYMPTOMS
PHOTOPHOBIA: 0
CHEST TIGHTNESS: 0
CONSTIPATION: 0
NAUSEA: 0
VOMITING: 0
SORE THROAT: 0
TROUBLE SWALLOWING: 0

## 2021-08-12 NOTE — PROGRESS NOTES
Writer attempted to get consent for PICC line, Stanley Mojica is only a friend and states pts mothers name is Marla Ortega [phone]. Writer had to leave a voice message on [employer] phone to call IR back for consent for PICC line since pt is currently on Precedex gtt.

## 2021-08-12 NOTE — PROGRESS NOTES
RN spoke to Dr. Lexine Peabody on phone. Dr. Lexine Peabody ordered phenylephrine and said the patient will get a central line placed in the morning.
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
## 2021-08-12 NOTE — PLAN OF CARE
Nutrition Problem #1: Severe malnutrition  Intervention: Food and/or Nutrient Delivery: Continue NPO, Start Parenteral Nutrition  Nutritional Goals: Meet % of estimated nutrition needs

## 2021-08-12 NOTE — PROGRESS NOTES
[facility]                 PATIENT NAME: Norah Key     TODAY'S DATE: 8/12/2021, 1:46 PM    SUBJECTIVE:    Pt seen and examined. Afebrile, tachypneic. On BiPAP. Patient is fatigued but able to be aroused, answers questions appropriately. He denies abdominal pain. C/o right rib pain. Unable to eat at this time du to respiratory status. No N/V. Ct C-spine negative for acute traumatic abnormalities. OBJECTIVE:   VITALS:  /63   Pulse 77   Temp (P) 98.8 °F (37.1 °C)   Resp (!) 40   Ht 5' 9\" (1.753 m)   Wt 135 lb 12.9 oz (61.6 kg)   SpO2 92%   BMI 20.05 kg/m²      INTAKE/OUTPUT:      Intake/Output Summary (Last 24 hours) at 8/12/2021 1346  Last data filed at 8/12/2021 0147  Gross per 24 hour   Intake 1641.71 ml   Output 400 ml   Net 1241.71 ml                 CONSTITUTIONAL:  awake but fatigued.   No acute distress  HEART:   RRR  LUNGS:   On BiPAP, no wheezing   ABDOMEN:   Abdomen soft, non-tender, non-distended  EXTREMITIES:   No pedal edema    Data:  CBC:   Lab Results   Component Value Date    WBC 7.7 08/12/2021    RBC 3.42 08/12/2021    HGB 11.3 08/12/2021    HCT 34.6 08/12/2021    .1 08/12/2021    MCH 32.9 08/12/2021    MCHC 32.6 08/12/2021    RDW 14.2 08/12/2021     08/12/2021    MPV 7.6 08/12/2021     BMP:    Lab Results   Component Value Date     08/12/2021    K 3.7 08/12/2021     08/12/2021    CO2 18 08/12/2021    BUN 13 08/12/2021    LABALBU 2.1 08/12/2021    CREATININE 0.60 08/12/2021    CALCIUM 7.6 08/12/2021    GFRAA >60 08/12/2021    LABGLOM >60 08/12/2021    GLUCOSE 78 08/12/2021       Radiology Review:      CT CERVICAL SPINE WO CONTRAST [[id number]] Collected: 08/11/21 2256      Order Status: Completed Updated: 08/12/21 0041     Narrative:       EXAMINATION:   CT OF THE CERVICAL SPINE WITHOUT CONTRAST 8/11/2021 10:36 pm     TECHNIQUE:   CT of the cervical spine was performed without the administration of   intravenous contrast. Multiplanar reformatted images are provided for review. Dose modulation, iterative reconstruction, and/or weight based adjustment of   the mA/kV was utilized to reduce the radiation dose to as low as reasonably   achievable. COMPARISON:   None. HISTORY:   ORDERING SYSTEM PROVIDED HISTORY: syncope and collapse   TECHNOLOGIST PROVIDED HISTORY:   syncope and collapse   Reason for Exam: Syncope and collapse   Acuity: Unknown   Type of Exam: Unknown     FINDINGS:   BONES/ALIGNMENT: There is no acute fracture or traumatic malalignment. C4 on C5 anterolisthesis is seen which measures 4 mm. DEGENERATIVE CHANGES:     C5/C6 moderate disc height loss is noted in association with posterior disc   osteophyte complex which narrows the midline AP thecal sac diameter to 10 mm   consistent with borderline central canal stenosis.  Disc osteophyte complex   severely narrows the bilateral neural foramina. C6/C7: Moderate disc height loss is noted in association with posterior disc   osteophyte complex which narrows the midline AP thecal sac diameter to 10 mm   consistent with borderline central canal stenosis.  Disc osteophyte complex   encroaches upon and causes moderate left and mild right neural foraminal   stenosis. No further significant spondylosis is noted within the cervical spine. SOFT TISSUES: There is no prevertebral soft tissue swelling.  Partially   visualized posterior right upper lung pleural thickening is noted, as   described on CT chest abdomen and pelvis with contrast examination from   08/10/2021.      Impression:       1. Note: Study significantly limited by patient motion related artifact. 2. No acute abnormality of the cervical spine. 3. C4 on C5 anterolisthesis measuring 4 mm, likely degenerative. 4. C5/C6, C6/C7 borderline central canal stenosis secondary to encroachment   by posterior disc osteophyte complex.    5. C5/C6 severe bilateral, C6/C7 moderate left and mild right neural   foraminal stenosis secondary to encroachment by disc osteophyte complex. ASSESSMENT     Principal Problem:    Syncope and collapse  Active Problems:    COPD (chronic obstructive pulmonary disease) (HCC)    Smoking addiction    Alcohol abuse    Hypokalemia    Hyponatremia    Traumatic rhabdomyolysis (HCC)    Closed fracture of multiple ribs of right side    Closed fracture of left wrist  Resolved Problems:    * No resolved hospital problems. *      Plan  1. NPO  2. PICC line and TPN  3. Surgically stable  4. ETOH withdrawal management per primary  5. Continue medical management   6. Patient was seen and examined. Patient is on BiPAP. He will be getting a PICC line and will start TPN. Patient is not able to eat at this time. TPN will be continued until his mentation improves and until he is off BiPAP.       Electronically signed by Christine Marcos PA-C  [id number] [address]

## 2021-08-12 NOTE — PROGRESS NOTES
Patient is still alert and oriented, when asked why he was trying to sit up in bed, he responded, \"I\"m trying to figure out what to do with this cigarette but. \"

## 2021-08-12 NOTE — PROGRESS NOTES
08/11/21 2028   RT Protocol   Smoking Status 2   Xray 0.0   Respiratory pattern 1   Mental Status 1   Breath sounds 1   Oxygen Requirement 1   PRN Tx needed at this time. Pt resting without distress on BIPAP.   Will continue to monitor

## 2021-08-12 NOTE — PROGRESS NOTES
Writer spoke with pts mother Olay Rojas via phone call to explain PICC line procedure, risks and benefits. Questions answered and verbals consent given via phone call and verified with another IR staff member.

## 2021-08-12 NOTE — CONSULTS
Current Facility-Administered Medications   Medication Dose Route Frequency Provider Last Rate Last Admin    phenylephrine (OSVALDO-SYNEPHRINE) 50 mg in dextrose 5 % 250 mL infusion   mcg/min Intravenous Continuous Laura Helms MD   Stopped at 08/12/21 0533    0.9 % sodium chloride infusion   Intravenous PRN Jazmyne Griggs MD        insulin lispro (HUMALOG) injection vial 0-6 Units  0-6 Units Subcutaneous Q6H Beck Flowers MD        PN-Adult 2-in-1 Central Line (Standard)   Intravenous Continuous TPN Jhonatan Pope MD        sodium chloride flush 0.9 % injection 5-40 mL  5-40 mL Intravenous 2 times per day Javon Barks, APRN - CNP   10 mL at 08/12/21 0937    sodium chloride flush 0.9 % injection 10 mL  10 mL Intravenous PRN Javon Barks, APRN - CNP        0.9 % sodium chloride infusion  25 mL Intravenous PRN Javon Barks, APRN - CNP        potassium chloride (KLOR-CON M) extended release tablet 40 mEq  40 mEq Oral PRN Javon Barks, APRN - CNP        Or    potassium bicarb-citric acid (EFFER-K) effervescent tablet 40 mEq  40 mEq Oral PRN Javon Barks, APRN - CNP        Or    potassium chloride 10 mEq/100 mL IVPB (Peripheral Line)  10 mEq Intravenous PRN Javon Barks, APRN -  mL/hr at 08/11/21 1355 10 mEq at 08/11/21 1355    enoxaparin (LOVENOX) injection 40 mg  40 mg Subcutaneous Daily Javon Barks, APRN - CNP   40 mg at 08/12/21 0935    polyethylene glycol (GLYCOLAX) packet 17 g  17 g Oral Daily PRN Javon Barks, APRN - CNP        acetaminophen (TYLENOL) tablet 650 mg  650 mg Oral Q6H PRN Javon Barks, APRN - CNP   650 mg at 08/11/21 1530    Or    acetaminophen (TYLENOL) suppository 650 mg  650 mg Rectal Q6H PRN Javon Barks, APRN - CNP        magnesium sulfate 1000 mg in dextrose 5% 100 mL IVPB  1,000 mg Intravenous PRN Javon Barks, APRN - CNP        ondansetron (ZOFRAN) injection 4 mg  4 mg Intravenous Q6H PRN Javon Barks, APRN - CNP        famotidine (PEPCID) injection 20 mg  20 mg Intravenous BID Javon Barks, APRN - CNP   20 mg at 08/12/21 0937    nicotine (NICODERM CQ) 21 MG/24HR 1 patch  1 patch Transdermal Daily Melchor Decant, APRN - CNP   1 patch at 08/12/21 0936    LORazepam (ATIVAN) tablet 1 mg  1 mg Oral Q1H PRN Melchor Decant, APRN - CNP        Or    LORazepam (ATIVAN) injection 1 mg  1 mg Intravenous Q1H PRN Melchor Decant, APRN - CNP   1 mg at 08/11/21 2314    Or    LORazepam (ATIVAN) tablet 2 mg  2 mg Oral Q1H PRN Melchor Decant, APRN - CNP        Or    LORazepam (ATIVAN) injection 2 mg  2 mg Intravenous Q1H PRN Melchor Decant, APRN - CNP   2 mg at 08/11/21 8983    Or    LORazepam (ATIVAN) tablet 3 mg  3 mg Oral Q1H PRN Melchor Decant, APRN - CNP        Or    LORazepam (ATIVAN) injection 3 mg  3 mg Intravenous Q1H PRN Melchor Decant, APRN - CNP   3 mg at 08/11/21 0947    Or    LORazepam (ATIVAN) tablet 4 mg  4 mg Oral Q1H PRN Melchor Decant, APRN - CNP        Or    LORazepam (ATIVAN) injection 4 mg  4 mg Intravenous Q1H PRN Melchor Decant, APRN - CNP        fentaNYL (SUBLIMAZE) injection 50 mcg  50 mcg Intravenous Q2H PRN Melchor Decant, APRN - CNP   50 mcg at 08/12/21 1159    albuterol sulfate  (90 Base) MCG/ACT inhaler 2 puff  2 puff Inhalation Q6H PRN Melchor Decant, APRN - CNP        fluticasone (FLOVENT HFA) 110 MCG/ACT inhaler 1 puff  1 puff Inhalation BID Melchor Decant, APRN - CNP   1 puff at 08/12/21 0816    0.9% NaCl with KCl 40 mEq infusion   Intravenous Continuous Melchor Decant, APRN -  mL/hr at 08/12/21 1123 New Bag at 08/12/21 1123    lidocaine 4 % external patch 1 patch  1 patch Transdermal Daily TIEN Rodríguez   1 patch at 08/12/21 1120    dexmedetomidine (PRECEDEX) 400 mcg in sodium chloride 0.9 % 100 mL infusion  0.2-1.4 mcg/kg/hr Intravenous Continuous Fernandez Ireland MD 12.3 mL/hr at 08/12/21 1120 0.8 mcg/kg/hr at 08/12/21 1120    ipratropium-albuterol (DUONEB) nebulizer solution 1 ampule  1 ampule Inhalation Q4H PRN Fernandez Ireland MD        sodium chloride flush 0.9 % injection 10 mL  10 mL Intravenous PRN Edna De Santiago MD 10 mL at 08/10/21 2300      PULMONARY  CONSULT NOTE      Date of Admission: 8/10/2021  8:30 PM    Reason for Consult: alcohol withdrawal, rib fracture, resp failure    Referring Physician: DR Roby Thomas  PCP: Haily Tao MD     History of Present Illness:     60 y.o. male who presents complaining of fall. Patient is stating that he was walking today and just suddenly passed out and hit the sidewalk. Patient states that he has been eating and drinking okay. Patient is homeless [place] [employer]. Patient states that he did not feel any lightheadedness dizziness or palpitations before it happened and did not know what was going to happen. Patient is complaining of severe pain mostly to his right abdomen and chest which she had multiple rib fractures noted from a fall a few days ago. Patient does not know of any cardiac issues. Patient is an alcoholic and does admit to drinking today. Patient became restless, on precedex, restless on BIPAP    Problem:  Principal Problem:     PMH:   Past Medical History:   Diagnosis Date    Alcohol abuse 6/4/2014    Anxiety     Arthritis     COPD (chronic obstructive pulmonary disease) ([address]     ASTHMA    DDD (degenerative disc disease), lumbar 9/6/2016    Depression     Heart burn     Hemorrhoids     HTN (hypertension) 1/19/2015    Ilioinguinal neuralgia of right side 4/10/2017    Psychiatric problem     depression /anxiety    Seizures ([address]     withdrawal from alcohol 2 years ago    Wears glasses     READING       PSH:   Past Surgical History:   Procedure Laterality Date    ANESTHESIA NERVE BLOCK Right 4/10/2017    NERVE BLOCK US RIGHT SIDED ILIOINGUINAL performed by Neeraj Bourgeois MD at [address] [place] [address]  3/19/15    HERNIA REPAIR      NERVE BLOCK Right 10/10/2016    right groin    OTHER SURGICAL HISTORY Right 04/10/2017    US nerve block to R groin    TOE SURGERY      SCREW RIGHT BIG TOE       Allergies:    Allergies Allergen Reactions    Nickel      Contact dermatitis       Home Meds:  Medications Prior to Admission: albuterol sulfate  (90 Base) MCG/ACT inhaler, INHALE 2 PUFFS INTO THE LUNGS EVERY 6 HOURS AS NEEDED FOR WHEEZING  tiotropium (SPIRIVA HANDIHALER) 18 MCG inhalation capsule, Inhale 1 capsule into the lungs daily  ARNUITY ELLIPTA 100 MCG/ACT AEPB, INHALE 1 PUFF INTO THE LUNGS DAILY    Social History:   Social History     Socioeconomic History    Marital status:      Spouse name: Not on file    Number of children: Not on file    Years of education: Not on file    Highest education level: Not on file   Occupational History    Not on file   Tobacco Use    Smoking status: Current Every Day Smoker     Packs/day: 1.00     Years: 38.00     Pack years: 38.00     Types: Cigarettes    Smokeless tobacco: Never Used    Tobacco comment: 1 PPD   Vaping Use    Vaping Use: Never used   Substance and Sexual Activity    Alcohol use: Yes     Alcohol/week: 12.0 standard drinks     Types: 12 Cans of beer per week     Comment: 12 24 oz cans daily    Drug use: Yes     Types: Marijuana     Comment: crack occasionally    Sexual activity: Never   Other Topics Concern    Not on file   Social History Narrative    Not on file     Social Determinants of Health     Financial Resource Strain:     Difficulty of Paying Living Expenses:    Food Insecurity:     Worried About Running Out of Food in the Last Year:     Ran Out of Food in the Last Year:    Transportation Needs:     Lack of Transportation (Medical):      Lack of Transportation (Non-Medical):    Physical Activity:     Days of Exercise per Week:     Minutes of Exercise per Session:    Stress:     Feeling of Stress :    Social Connections:     Frequency of Communication with Friends and Family:     Frequency of Social Gatherings with Friends and Family:     Attends Buddhist Services:     Active Member of Clubs or Organizations:     Attends Club or Organization Meetings:     Marital Status:    Intimate Partner Violence:     Fear of Current or Ex-Partner:     Emotionally Abused:     Physically Abused:     Sexually Abused:        Family History:   Family History   Problem Relation Age of Onset    Cancer Mother         colon ca       Review of Systems    Unable to obtain    Physical Exam  Vital Signs: /75   Pulse 77   Temp 97.4 °F (36.3 °C)   Resp (!) 42   Ht 5' 9\" (1.753 m)   Wt 135 lb 12.9 oz (61.6 kg)   SpO2 93%   BMI 20.05 kg/m²       Admission Weight: Weight: 150 lb (68 kg)    General Appearance: moderate distress; on BIPAP, precedex  Head: Normocephalic, without obvious abnormality, atraumatic  Neck: no adenopathy, no JVD, supple, symmetrical, trachea midline\"thyroid not enlarged, symmetric, no tenderness/mass/nodule  Lungs: tachypnea ++, fair air entry bilaterally; breath sounds- vesicular; rhonchi- absent; rales/ crackles- absent  Heart: : regular rate and rhythm, S1, S2 normal, no murmur, click, rub or gallop  Abdomen: soft, non-tender; bowel sounds normal; no masses,  no organomegaly  Extremities: extremities normal, atraumatic, no cyanosis or edema  Skin: skin color, texture, turgor normal. No rashes or lesions  Neurologic: Grossly normal    [unfilled]    Recent labs, Imaging studies reviewed      Data ReviewCBC:   Recent Labs     08/10/21  2145 08/11/21  0432 08/12/21  0419   WBC 8.3 5.9 7.7   RBC 3.77* 3.72* 3.42*   HGB 12.4* 12.3* 11.3*   HCT 36.0* 36.4* 34.6*    156 193     BMP:   Recent Labs     08/10/21  2145 08/10/21  2145 08/11/21  0432 08/11/21  1646 08/12/21  0419 08/12/21  1213   GLUCOSE 136*  --  147*  --  78  --    *   < > 129*  --  139 137   K 2.5*   < > 2.9* 3.4* 3.7  --    BUN 17  --  14  --  13  --    CREATININE 1.15  --  0.76  --  0.60*  --    CALCIUM 8.5*  --  8.2*  --  7.6*  --     < > = values in this interval not displayed.      ABGs:   Recent Labs     08/11/21  1710 08/12/21  0555 08/12/21  1231 PHART 7.427 7.404 7.338*   PO2ART 66.0* 76.2* 78.2*   FKU2QYI 32.3* 28.3* 32.8*   RBM6IQO 21.3* 17.7* 17.6*   [id number] 92.0* 94.8* 92.6*      PT/INR:  No results found for: PTINR      ASSESSMENT / PLAN:    Alcohol withdrawal, CIWA protocol, precedex  Wean precedex, try ativan  Pain control  Thiamine, folic acid  atelectasis    Electronically signed by Laura Vargas MD on 08/12/21 at 4:59 PM.
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
## 2021-08-12 NOTE — PROGRESS NOTES
Patient's sister, Gina Vu called from the ER downstairs. RN gained permission from patient to speak to her. Sister was informed that visiting hours are over for the day but she can return tomorrow after nine. RN told sister this would be helpful for informing doctors of his history. Sister said she would be in tomorrow.
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
## 2021-08-12 NOTE — FLOWSHEET NOTE
08/12/21 1300   Encounter Summary   Services provided to: Patient   Referral/Consult From: Rounding   Complexity of Encounter Low   Length of Encounter 15 minutes   Spiritual/Oriental orthodox   Type Spiritual support   Assessment Sleeping   Intervention Prayer   Outcome Did not respond

## 2021-08-13 ENCOUNTER — APPOINTMENT (OUTPATIENT)
Dept: GENERAL RADIOLOGY | Age: 56
DRG: 351 | End: 2021-08-13
Payer: MEDICAID

## 2021-08-13 LAB
ALBUMIN SERPL-MCNC: 1.9 G/DL (ref 3.5–5.2)
ALBUMIN/GLOBULIN RATIO: ABNORMAL (ref 1–2.5)
ALLEN TEST: ABNORMAL
ALLEN TEST: ABNORMAL
ALP BLD-CCNC: 44 U/L (ref 40–129)
ALT SERPL-CCNC: 40 U/L (ref 5–41)
ANION GAP SERPL CALCULATED.3IONS-SCNC: 9 MMOL/L (ref 9–17)
AST SERPL-CCNC: 48 U/L
BILIRUB SERPL-MCNC: 0.85 MG/DL (ref 0.3–1.2)
BUN BLDV-MCNC: 14 MG/DL (ref 6–20)
BUN/CREAT BLD: ABNORMAL (ref 9–20)
CALCIUM SERPL-MCNC: 7.9 MG/DL (ref 8.6–10.4)
CARBOXYHEMOGLOBIN: 0.7 % (ref 0–5)
CARBOXYHEMOGLOBIN: 0.7 % (ref 0–5)
CHLORIDE BLD-SCNC: 111 MMOL/L (ref 98–107)
CO2: 18 MMOL/L (ref 20–31)
CREAT SERPL-MCNC: 0.54 MG/DL (ref 0.7–1.2)
CULTURE: NORMAL
DIRECT EXAM: NORMAL
ESTIMATED AVERAGE GLUCOSE: 105 MG/DL
FIO2: 40
FIO2: ABNORMAL
GFR AFRICAN AMERICAN: >60 ML/MIN
GFR NON-AFRICAN AMERICAN: >60 ML/MIN
GFR SERPL CREATININE-BSD FRML MDRD: ABNORMAL ML/MIN/{1.73_M2}
GFR SERPL CREATININE-BSD FRML MDRD: ABNORMAL ML/MIN/{1.73_M2}
GLUCOSE BLD-MCNC: 202 MG/DL (ref 75–110)
GLUCOSE BLD-MCNC: 213 MG/DL (ref 75–110)
GLUCOSE BLD-MCNC: 222 MG/DL (ref 75–110)
GLUCOSE BLD-MCNC: 244 MG/DL (ref 70–99)
HBA1C MFR BLD: 5.3 % (ref 4–6)
HCO3 ARTERIAL: 21.1 MMOL/L (ref 22–26)
HCO3 ARTERIAL: 22.5 MMOL/L (ref 22–26)
HCT VFR BLD CALC: 37 % (ref 41–53)
HEMOGLOBIN: 12.3 G/DL (ref 13.5–17.5)
Lab: NORMAL
MAGNESIUM: 1.9 MG/DL (ref 1.6–2.6)
MCH RBC QN AUTO: 32.8 PG (ref 26–34)
MCHC RBC AUTO-ENTMCNC: 33.1 G/DL (ref 31–37)
MCV RBC AUTO: 99 FL (ref 80–100)
METHEMOGLOBIN: 1 % (ref 0–1.9)
METHEMOGLOBIN: 1.1 % (ref 0–1.9)
MODE: ABNORMAL
MODE: ABNORMAL
MYCOPLASMA PNEUMONIAE IGM: 0.17
NEGATIVE BASE EXCESS, ART: 1.8 MMOL/L (ref 0–2)
NEGATIVE BASE EXCESS, ART: 2.1 MMOL/L (ref 0–2)
NOTIFICATION TIME: ABNORMAL
NOTIFICATION TIME: ABNORMAL
NOTIFICATION: ABNORMAL
NOTIFICATION: ABNORMAL
NRBC AUTOMATED: ABNORMAL PER 100 WBC
O2 DEVICE/FLOW/%: ABNORMAL
O2 DEVICE/FLOW/%: ABNORMAL
O2 SAT, ARTERIAL: 92.5 % (ref 95–98)
O2 SAT, ARTERIAL: 93.5 % (ref 95–98)
OXYHEMOGLOBIN: ABNORMAL % (ref 95–98)
OXYHEMOGLOBIN: ABNORMAL % (ref 95–98)
PATIENT TEMP: 37
PATIENT TEMP: 37
PCO2 ARTERIAL: 26.4 MMHG (ref 35–45)
PCO2 ARTERIAL: 35.1 MMHG (ref 35–45)
PCO2, ART, TEMP ADJ: ABNORMAL (ref 35–45)
PCO2, ART, TEMP ADJ: ABNORMAL (ref 35–45)
PDW BLD-RTO: 14 % (ref 11.5–14.9)
PEEP/CPAP: 8
PEEP/CPAP: ABNORMAL
PH ARTERIAL: 7.42 (ref 7.35–7.45)
PH ARTERIAL: 7.51 (ref 7.35–7.45)
PH, ART, TEMP ADJ: ABNORMAL (ref 7.35–7.45)
PH, ART, TEMP ADJ: ABNORMAL (ref 7.35–7.45)
PHOSPHORUS: 2 MG/DL (ref 2.5–4.5)
PLATELET # BLD: 235 K/UL (ref 150–450)
PMV BLD AUTO: 7.6 FL (ref 6–12)
PO2 ARTERIAL: 63.1 MMHG (ref 80–100)
PO2 ARTERIAL: 64.9 MMHG (ref 80–100)
PO2, ART, TEMP ADJ: ABNORMAL MMHG (ref 80–100)
PO2, ART, TEMP ADJ: ABNORMAL MMHG (ref 80–100)
POSITIVE BASE EXCESS, ART: ABNORMAL MMOL/L (ref 0–2)
POSITIVE BASE EXCESS, ART: ABNORMAL MMOL/L (ref 0–2)
POTASSIUM SERPL-SCNC: 3.4 MMOL/L (ref 3.7–5.3)
PREALBUMIN: <4 MG/DL (ref 20–40)
PSV: ABNORMAL
PSV: ABNORMAL
PT. POSITION: ABNORMAL
PT. POSITION: ABNORMAL
RBC # BLD: 3.74 M/UL (ref 4.5–5.9)
RESPIRATORY RATE: 30
RESPIRATORY RATE: 42
SAMPLE SITE: ABNORMAL
SAMPLE SITE: ABNORMAL
SET RATE: 20
SET RATE: ABNORMAL
SODIUM BLD-SCNC: 138 MMOL/L (ref 135–144)
SPECIMEN DESCRIPTION: NORMAL
TEXT FOR RESPIRATORY: ABNORMAL
TEXT FOR RESPIRATORY: ABNORMAL
TOTAL HB: ABNORMAL G/DL (ref 12–16)
TOTAL HB: ABNORMAL G/DL (ref 12–16)
TOTAL PROTEIN: 5.3 G/DL (ref 6.4–8.3)
TOTAL RATE: 30
TOTAL RATE: ABNORMAL
VT: 500
VT: ABNORMAL
WBC # BLD: 10.3 K/UL (ref 3.5–11)

## 2021-08-13 PROCEDURE — 71045 X-RAY EXAM CHEST 1 VIEW: CPT

## 2021-08-13 PROCEDURE — 0BH17EZ INSERTION OF ENDOTRACHEAL AIRWAY INTO TRACHEA, VIA NATURAL OR ARTIFICIAL OPENING: ICD-10-PCS | Performed by: INTERNAL MEDICINE

## 2021-08-13 PROCEDURE — 87070 CULTURE OTHR SPECIMN AEROBIC: CPT

## 2021-08-13 PROCEDURE — 6370000000 HC RX 637 (ALT 250 FOR IP): Performed by: SURGERY

## 2021-08-13 PROCEDURE — 6370000000 HC RX 637 (ALT 250 FOR IP): Performed by: NURSE PRACTITIONER

## 2021-08-13 PROCEDURE — 82805 BLOOD GASES W/O2 SATURATION: CPT

## 2021-08-13 PROCEDURE — 84100 ASSAY OF PHOSPHORUS: CPT

## 2021-08-13 PROCEDURE — 83735 ASSAY OF MAGNESIUM: CPT

## 2021-08-13 PROCEDURE — 2500000003 HC RX 250 WO HCPCS: Performed by: INTERNAL MEDICINE

## 2021-08-13 PROCEDURE — 94002 VENT MGMT INPAT INIT DAY: CPT

## 2021-08-13 PROCEDURE — 99233 SBSQ HOSP IP/OBS HIGH 50: CPT | Performed by: INTERNAL MEDICINE

## 2021-08-13 PROCEDURE — 6360000002 HC RX W HCPCS: Performed by: STUDENT IN AN ORGANIZED HEALTH CARE EDUCATION/TRAINING PROGRAM

## 2021-08-13 PROCEDURE — 2000000000 HC ICU R&B

## 2021-08-13 PROCEDURE — 6370000000 HC RX 637 (ALT 250 FOR IP): Performed by: PHYSICIAN ASSISTANT

## 2021-08-13 PROCEDURE — 89220 SPUTUM SPECIMEN COLLECTION: CPT

## 2021-08-13 PROCEDURE — 6360000002 HC RX W HCPCS: Performed by: NURSE PRACTITIONER

## 2021-08-13 PROCEDURE — 85027 COMPLETE CBC AUTOMATED: CPT

## 2021-08-13 PROCEDURE — 6370000000 HC RX 637 (ALT 250 FOR IP): Performed by: STUDENT IN AN ORGANIZED HEALTH CARE EDUCATION/TRAINING PROGRAM

## 2021-08-13 PROCEDURE — 36415 COLL VENOUS BLD VENIPUNCTURE: CPT

## 2021-08-13 PROCEDURE — 36600 WITHDRAWAL OF ARTERIAL BLOOD: CPT

## 2021-08-13 PROCEDURE — 2580000003 HC RX 258: Performed by: STUDENT IN AN ORGANIZED HEALTH CARE EDUCATION/TRAINING PROGRAM

## 2021-08-13 PROCEDURE — 86403 PARTICLE AGGLUT ANTBDY SCRN: CPT

## 2021-08-13 PROCEDURE — 87205 SMEAR GRAM STAIN: CPT

## 2021-08-13 PROCEDURE — 94660 CPAP INITIATION&MGMT: CPT

## 2021-08-13 PROCEDURE — 5A1955Z RESPIRATORY VENTILATION, GREATER THAN 96 CONSECUTIVE HOURS: ICD-10-PCS | Performed by: INTERNAL MEDICINE

## 2021-08-13 PROCEDURE — 80053 COMPREHEN METABOLIC PANEL: CPT

## 2021-08-13 PROCEDURE — 6360000002 HC RX W HCPCS: Performed by: INTERNAL MEDICINE

## 2021-08-13 PROCEDURE — 2500000003 HC RX 250 WO HCPCS: Performed by: NURSE PRACTITIONER

## 2021-08-13 PROCEDURE — 84134 ASSAY OF PREALBUMIN: CPT

## 2021-08-13 PROCEDURE — 83036 HEMOGLOBIN GLYCOSYLATED A1C: CPT

## 2021-08-13 PROCEDURE — 87186 SC STD MICRODIL/AGAR DIL: CPT

## 2021-08-13 PROCEDURE — 82947 ASSAY GLUCOSE BLOOD QUANT: CPT

## 2021-08-13 PROCEDURE — 2500000003 HC RX 250 WO HCPCS: Performed by: SURGERY

## 2021-08-13 RX ORDER — ETOMIDATE 2 MG/ML
INJECTION INTRAVENOUS
Status: COMPLETED | OUTPATIENT
Start: 2021-08-13 | End: 2021-08-13

## 2021-08-13 RX ORDER — PERMETHRIN 50 MG/G
CREAM TOPICAL ONCE
Status: COMPLETED | OUTPATIENT
Start: 2021-08-14 | End: 2021-08-13

## 2021-08-13 RX ORDER — NOREPINEPHRINE BIT/0.9 % NACL 16MG/250ML
2-100 INFUSION BOTTLE (ML) INTRAVENOUS CONTINUOUS
Status: DISCONTINUED | OUTPATIENT
Start: 2021-08-13 | End: 2021-08-19

## 2021-08-13 RX ORDER — PROPOFOL 10 MG/ML
5-50 INJECTION, EMULSION INTRAVENOUS
Status: DISCONTINUED | OUTPATIENT
Start: 2021-08-13 | End: 2021-08-16 | Stop reason: SDUPTHER

## 2021-08-13 RX ORDER — THIAMINE HYDROCHLORIDE 100 MG/ML
100 INJECTION, SOLUTION INTRAMUSCULAR; INTRAVENOUS DAILY
Status: DISCONTINUED | OUTPATIENT
Start: 2021-08-13 | End: 2021-08-13

## 2021-08-13 RX ORDER — MIDAZOLAM HYDROCHLORIDE 1 MG/ML
INJECTION INTRAMUSCULAR; INTRAVENOUS
Status: COMPLETED | OUTPATIENT
Start: 2021-08-13 | End: 2021-08-13

## 2021-08-13 RX ORDER — PROPOFOL 10 MG/ML
5-50 INJECTION, EMULSION INTRAVENOUS
Status: DISCONTINUED | OUTPATIENT
Start: 2021-08-13 | End: 2021-08-22

## 2021-08-13 RX ORDER — LEVOFLOXACIN 5 MG/ML
750 INJECTION, SOLUTION INTRAVENOUS EVERY 24 HOURS
Status: DISCONTINUED | OUTPATIENT
Start: 2021-08-13 | End: 2021-08-14

## 2021-08-13 RX ORDER — PROPOFOL 10 MG/ML
INJECTION, EMULSION INTRAVENOUS
Status: DISPENSED
Start: 2021-08-13 | End: 2021-08-14

## 2021-08-13 RX ORDER — POTASSIUM CHLORIDE 7.45 MG/ML
10 INJECTION INTRAVENOUS
Status: DISCONTINUED | OUTPATIENT
Start: 2021-08-13 | End: 2021-08-13

## 2021-08-13 RX ADMIN — LEVOFLOXACIN 750 MG: 5 INJECTION, SOLUTION INTRAVENOUS at 09:30

## 2021-08-13 RX ADMIN — FAMOTIDINE 20 MG: 10 INJECTION, SOLUTION INTRAVENOUS at 09:30

## 2021-08-13 RX ADMIN — DEXMEDETOMIDINE HYDROCHLORIDE 0.4 MCG/KG/HR: 400 INJECTION INTRAVENOUS at 03:12

## 2021-08-13 RX ADMIN — INSULIN LISPRO 2 UNITS: 100 INJECTION, SOLUTION INTRAVENOUS; SUBCUTANEOUS at 14:17

## 2021-08-13 RX ADMIN — POTASSIUM CHLORIDE 10 MEQ: 7.46 INJECTION, SOLUTION INTRAVENOUS at 05:50

## 2021-08-13 RX ADMIN — POTASSIUM CHLORIDE 10 MEQ: 7.46 INJECTION, SOLUTION INTRAVENOUS at 07:05

## 2021-08-13 RX ADMIN — ETOMIDATE INJECTION 20 MG: 2 SOLUTION INTRAVENOUS at 12:20

## 2021-08-13 RX ADMIN — LORAZEPAM 2 MG: 2 INJECTION INTRAMUSCULAR; INTRAVENOUS at 17:53

## 2021-08-13 RX ADMIN — INSULIN LISPRO 2 UNITS: 100 INJECTION, SOLUTION INTRAVENOUS; SUBCUTANEOUS at 22:08

## 2021-08-13 RX ADMIN — INSULIN LISPRO 2 UNITS: 100 INJECTION, SOLUTION INTRAVENOUS; SUBCUTANEOUS at 09:31

## 2021-08-13 RX ADMIN — POTASSIUM CHLORIDE AND SODIUM CHLORIDE: 900; 300 INJECTION, SOLUTION INTRAVENOUS at 20:13

## 2021-08-13 RX ADMIN — FENTANYL CITRATE 50 MCG: 50 INJECTION, SOLUTION INTRAMUSCULAR; INTRAVENOUS at 06:06

## 2021-08-13 RX ADMIN — FAMOTIDINE 20 MG: 10 INJECTION, SOLUTION INTRAVENOUS at 20:13

## 2021-08-13 RX ADMIN — FENTANYL CITRATE 50 MCG: 50 INJECTION, SOLUTION INTRAMUSCULAR; INTRAVENOUS at 14:17

## 2021-08-13 RX ADMIN — PROPOFOL 10 MCG/KG/MIN: 10 INJECTION, EMULSION INTRAVENOUS at 12:35

## 2021-08-13 RX ADMIN — PERMETHRIN: 50 CREAM TOPICAL at 20:13

## 2021-08-13 RX ADMIN — CALCIUM GLUCONATE: 98 INJECTION, SOLUTION INTRAVENOUS at 18:23

## 2021-08-13 RX ADMIN — INSULIN LISPRO 2 UNITS: 100 INJECTION, SOLUTION INTRAVENOUS; SUBCUTANEOUS at 03:06

## 2021-08-13 RX ADMIN — MIDAZOLAM 2 MG: 1 INJECTION INTRAMUSCULAR; INTRAVENOUS at 12:21

## 2021-08-13 RX ADMIN — THIAMINE HYDROCHLORIDE 100 MG: 100 INJECTION, SOLUTION INTRAMUSCULAR; INTRAVENOUS at 15:20

## 2021-08-13 RX ADMIN — ENOXAPARIN SODIUM 40 MG: 40 INJECTION SUBCUTANEOUS at 09:30

## 2021-08-13 RX ADMIN — POTASSIUM CHLORIDE AND SODIUM CHLORIDE: 900; 300 INJECTION, SOLUTION INTRAVENOUS at 00:33

## 2021-08-13 RX ADMIN — PROPOFOL 20 MCG/KG/MIN: 10 INJECTION, EMULSION INTRAVENOUS at 22:26

## 2021-08-13 RX ADMIN — FENTANYL CITRATE 50 MCG: 50 INJECTION, SOLUTION INTRAMUSCULAR; INTRAVENOUS at 17:53

## 2021-08-13 RX ADMIN — FENTANYL CITRATE 50 MCG: 50 INJECTION, SOLUTION INTRAMUSCULAR; INTRAVENOUS at 22:00

## 2021-08-13 ASSESSMENT — ENCOUNTER SYMPTOMS
CONSTIPATION: 0
SORE THROAT: 0
VOMITING: 0
PHOTOPHOBIA: 0
NAUSEA: 0
CHEST TIGHTNESS: 0
TROUBLE SWALLOWING: 0

## 2021-08-13 ASSESSMENT — PAIN SCALES - GENERAL
PAINLEVEL_OUTOF10: 5
PAINLEVEL_OUTOF10: 5
PAINLEVEL_OUTOF10: 0
PAINLEVEL_OUTOF10: 5
PAINLEVEL_OUTOF10: 0

## 2021-08-13 ASSESSMENT — PULMONARY FUNCTION TESTS
PIF_VALUE: 30
PIF_VALUE: 20
PIF_VALUE: 26
PIF_VALUE: 23
PIF_VALUE: 30
PIF_VALUE: 25
PIF_VALUE: 29
PIF_VALUE: 22
PIF_VALUE: 25
PIF_VALUE: 26
PIF_VALUE: 29
PIF_VALUE: 22
PIF_VALUE: 27
PIF_VALUE: 25
PIF_VALUE: 22
PIF_VALUE: 24
PIF_VALUE: 23
PIF_VALUE: 35
PIF_VALUE: 24
PIF_VALUE: 30
PIF_VALUE: 24
PIF_VALUE: 27
PIF_VALUE: 23
PIF_VALUE: 25
PIF_VALUE: 32
PIF_VALUE: 35

## 2021-08-13 NOTE — PROGRESS NOTES
[address] [facility]    OCCUPATIONAL THERAPY MISSED TREATMENT NOTE   INPATIENT   Date: 21  Patient Name: Mesha Mcghee       Room:   MRN: [id number]   Account #: [de-identified]    : 1965  (60 y.o.)  Gender: male                 REASON FOR MISSED TREATMENT:  Hold treatment per nursing request   -   Hold per RN Miesha due to patient's worsening medical status. Will re-attempt as able and appropriate.      Jade Wells, OT
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
## 2021-08-13 NOTE — PLAN OF CARE
Problem: Falls - Risk of:  Goal: Will remain free from falls  Description: Will remain free from falls  Outcome: Ongoing  Goal: Absence of physical injury  Description: Absence of physical injury  Outcome: Ongoing     Problem: Skin Integrity:  Goal: Will show no infection signs and symptoms  Description: Will show no infection signs and symptoms  Outcome: Ongoing  Goal: Absence of new skin breakdown  Description: Absence of new skin breakdown  Outcome: Ongoing     Problem: Non-Violent Restraints  Goal: Removal from restraints as soon as assessed to be safe  Outcome: Not Met This Shift  Goal: No harm/injury to patient while restraints in use  Outcome: Ongoing  Goal: Patient's dignity will be maintained  Outcome: Ongoing     Problem: Nutrition  Goal: Optimal nutrition therapy  Outcome: Ongoing

## 2021-08-13 NOTE — PROGRESS NOTES
Physician Progress Note      PATIENTCeledonio Pert  CSN #:                  [id number]  :                       1965  ADMIT DATE:       8/10/2021 8:30 PM  [address] [place] DATE:  RESPONDING  PROVIDER #:        Coby Robertson MD          QUERY TEXT:    Pt admitted with syncope. Pt noted to have elevated respiratory rate. If   possible, please document in the progress notes and discharge summary if you   are evaluating and/or treating any of the following: The medical record reflects the following:  Risk Factors: 57yo Hx COPD  Clinical Indicators: RR 25-43; pt satting 89% on RA; pt satting 91-96% on   bipap 35%, alternating with 2L O2 via NC; ABG pH 7.33 CO2 32.8 PO2 78.2 HCO3   17.6; per dietary consult note pt unable to eat due to respiratory distress  Treatment: bipap with Fio2 @ 35% alternating with 2L O2 via NC, pulm consult,   respiratory care eval, monitoring, ICU transfer  Options provided:  -- Acute respiratory failure with hypoxia  -- Other - I will add my own diagnosis  -- Disagree - Not applicable / Not valid  -- Disagree - Clinically unable to determine / Unknown  -- Refer to Clinical Documentation Reviewer    PROVIDER RESPONSE TEXT:    No desaturation or PCO2 elevation.  Respiratory distress    Query created by: Laura Kurtz on 2021 10:06 AM      Electronically signed by:  Coby Robertson MD 2021 1:01 PM

## 2021-08-13 NOTE — PROGRESS NOTES
Patient seen and examined. Afebrile, VSS off pressors. Lethargic but awakens with voice momentarily. On BiPAP still. Discussed with nursing staff. Patient denies abdominal pain. No BM. No emesis. PICC placed yesterday per IR, TPN started. Abdomen is soft, non-tender, non-distended. No pedal edema. No new general surgery issues. Remain NPO. Continue TPN via PICC. Continue medical management. Patient is now intubated on the ventilator. On TPN. Vital signs are stable. Abdomen is soft. Blood work reviewed. Potassium was replaced. Creatinine is normal.  WBC count is normal.  Hemoglobin is stable. Continue TPN. Supportive care. PICC line in place. Potentially tube feeds soon if patient remains intubated.     Sky Moncada PA-C  [address] [facility]

## 2021-08-13 NOTE — PROGRESS NOTES
tenderness  Upper Extremities  - no cyanosis, mottling; edema : absent  Lower Extremities: no cyanosis, mottling; edema : absent    Current Laboratory, Radiologic, Microbiologic, and Diagnostic studies reviewed  Data ReviewCBC:   Recent Labs     08/11/21  0432 08/12/21  0419 08/13/21  0415   WBC 5.9 7.7 10.3   RBC 3.72* 3.42* 3.74*   HGB 12.3* 11.3* 12.3*   HCT 36.4* 34.6* 37.0*    193 235     BMP:   Recent Labs     08/11/21  0432 08/11/21  0432 08/11/21  1646 08/12/21  0419 08/12/21  1213 08/13/21  0415   GLUCOSE 147*  --   --  78  --  244*   *   < >  --  139 137 138   K 2.9*   < > 3.4* 3.7  --  3.4*   BUN 14  --   --  13  --  14   CREATININE 0.76  --   --  0.60*  --  0.54*   CALCIUM 8.2*  --   --  7.6*  --  7.9*    < > = values in this interval not displayed.      ABGs:   Recent Labs     08/12/21  0555 08/12/21  1230 08/13/21  0930   PHART 7.404 7.338* 7.512*   PO2ART 76.2* 78.2* 63.1*   SNW6RMF 28.3* 32.8* 26.4*   JMJ1VAW 17.7* 17.6* 21.1*   Z5SKDYAS 94.8* 92.6* 93.5*      PT/INR:  No results found for: PTINR    ASSESSMENT / PLAN:    Alcohol withdrawal, CIWA protocol, precedex  Wean precedex, try ativan  Pain control  Thiamine, folic acid  atelectasis   RLL infiltrate -? pulm contusion  Mechanical ventilation  DW RN, RT, Medicine Team    Electronically signed by Jasmin Mcmanus MD on 08/13/21 at 12:25 PM.

## 2021-08-13 NOTE — PLAN OF CARE
Nutrition Problem #1: Severe malnutrition  Intervention: Food and/or Nutrient Delivery: Continue NPO, Modify Parenteral Nutrition  Nutritional Goals: Meet % of estimated nutrition needs

## 2021-08-13 NOTE — PROGRESS NOTES
Pt intubated with 7.5 ET at 25 cm per at the lip Dr. Justin Chiu intubated at the bedsided bilateral BS and color change present. Pt placed on vent with ordered settings PRVC 20, 500, PEEP 8, 40%. Patient is tolerating well at this time.

## 2021-08-13 NOTE — PROGRESS NOTES
Pt intubated with #7.5 ET at 25 cm per Dr. Porter Barnett, bilateral BS and color change present.  Pt placed on vent with ordered settings PRVC 20, 500, PEEP 8, 40%

## 2021-08-13 NOTE — PROGRESS NOTES
Physical Therapy        Physical Therapy Cancel Note      DATE: 2021    NAME: Maranda Reagan  MRN: [id number]   : 1965      Patient not seen this date for Physical Therapy due to:    Canceled per RN due to decreased medical status- will recheck as able       Electronically signed by Sis Boyd PT on 2021 at 9:55 AM

## 2021-08-13 NOTE — PLAN OF CARE
Problem: OXYGENATION/RESPIRATORY FUNCTION  Goal: Patient will maintain patent airway  Outcome: Ongoing     Problem: OXYGENATION/RESPIRATORY FUNCTION  Goal: Patient will achieve/maintain normal respiratory rate/effort  Description: Respiratory rate and effort will be within normal limits for the patient  Outcome: Ongoing     Problem: MECHANICAL VENTILATION  Goal: Patient will maintain patent airway  Outcome: Ongoing     Problem: MECHANICAL VENTILATION  Goal: ET tube will be managed safely  Outcome: Ongoing

## 2021-08-13 NOTE — PLAN OF CARE
Problem: Falls - Risk of:  Goal: Will remain free from falls  Description: Will remain free from falls  8/13/2021 1710 by Lary Martinez RN  Note: Patient has remained free from falls at this time   8/13/2021 0511 by Derrick Clancy RN  Outcome: Ongoing  Goal: Absence of physical injury  Description: Absence of physical injury  8/13/2021 1710 by Lary Martinez RN  Note: Patient has remained free from any physical injury at this time   8/13/2021 0511 by Derrick Clancy RN  Outcome: Ongoing     Problem: Skin Integrity:  Goal: Will show no infection signs and symptoms  Description: Will show no infection signs and symptoms  8/13/2021 0511 by Derrick Clancy RN  Outcome: Ongoing  Goal: Absence of new skin breakdown  Description: Absence of new skin breakdown  8/13/2021 1710 by Lary Martinez RN  Note: No signs of skin break down at this time  8/13/2021 0511 by Derrick Clancy RN  Outcome: Ongoing     Problem: Non-Violent Restraints  Goal: No harm/injury to patient while restraints in use  8/13/2021 1710 by Lary Martinez RN  Note: No harm or injury has occurred at this time   8/13/2021 0511 by Derrick Clancy RN  Outcome: Ongoing  Goal: Patient's dignity will be maintained  8/13/2021 0511 by Derrick Clancy RN  Outcome: Ongoing     Problem: Nutrition  Goal: Optimal nutrition therapy  8/13/2021 0511 by Derrick Clancy RN  Outcome: Ongoing     Problem: OXYGENATION/RESPIRATORY FUNCTION  Goal: Patient will maintain patent airway  8/13/2021 1414 by Misty Dejesus RCP  Outcome: Ongoing  Goal: Patient will achieve/maintain normal respiratory rate/effort  Description: Respiratory rate and effort will be within normal limits for the patient  8/13/2021 1414 by Misty Dejesus RCP  Outcome: Ongoing     Problem: MECHANICAL VENTILATION  Goal: Patient will maintain patent airway  8/13/2021 1414 by Misty Dejesus RCP  Outcome: Ongoing  Goal: ET tube will be managed safely  8/13/2021 1414 by Mahesh Harrison RCP  Outcome: Ongoing

## 2021-08-13 NOTE — PROCEDURES
Patient intubated with 7.5 ETT, B/L AE +  CXR pending  Given 2 mg ativan, 30 mg etomidate IVP  Vent settings ordered  Electronically signed by Ana Langley MD on 08/13/21 at 12:26 PM

## 2021-08-13 NOTE — PROGRESS NOTES
SW is following for possible alcohol treatment programs when pt becomes medically stable. Pt's mother is Pasha Pardo [phone].
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
## 2021-08-13 NOTE — FLOWSHEET NOTE
08/13/21 172   Encounter Summary   Services provided to: Patient   Referral/Consult From: Rounding   Complexity of Encounter Low   Length of Encounter 15 minutes   Spiritual/Mosque   Type Spiritual support   Assessment Unable to respond   Intervention Prayer

## 2021-08-13 NOTE — PROGRESS NOTES
Hnanah Elizabeth notified of no urine output since 4pm when patient was straight cathed. Bladder scan performed and found 460 in bladder. Patient straight cathed.

## 2021-08-14 ENCOUNTER — APPOINTMENT (OUTPATIENT)
Dept: GENERAL RADIOLOGY | Age: 56
DRG: 351 | End: 2021-08-14
Payer: MEDICAID

## 2021-08-14 PROBLEM — H10.9 CONJUNCTIVITIS: Status: ACTIVE | Noted: 2021-08-14

## 2021-08-14 PROBLEM — R50.9 FEVER: Status: ACTIVE | Noted: 2021-08-14

## 2021-08-14 LAB
-: ABNORMAL
ALBUMIN SERPL-MCNC: 1.5 G/DL (ref 3.5–5.2)
ALBUMIN/GLOBULIN RATIO: ABNORMAL (ref 1–2.5)
ALLEN TEST: ABNORMAL
ALP BLD-CCNC: 45 U/L (ref 40–129)
ALT SERPL-CCNC: 32 U/L (ref 5–41)
AMORPHOUS: ABNORMAL
ANION GAP SERPL CALCULATED.3IONS-SCNC: 9 MMOL/L (ref 9–17)
AST SERPL-CCNC: 42 U/L
BACTERIA: ABNORMAL
BILIRUB SERPL-MCNC: 0.61 MG/DL (ref 0.3–1.2)
BILIRUBIN URINE: NEGATIVE
BUN BLDV-MCNC: 12 MG/DL (ref 6–20)
BUN/CREAT BLD: ABNORMAL (ref 9–20)
C-REACTIVE PROTEIN: 300.9 MG/L (ref 0–5)
CALCIUM SERPL-MCNC: 8 MG/DL (ref 8.6–10.4)
CARBOXYHEMOGLOBIN: 0.4 % (ref 0–5)
CASTS UA: ABNORMAL /LPF
CHLORIDE BLD-SCNC: 112 MMOL/L (ref 98–107)
CO2: 21 MMOL/L (ref 20–31)
COLOR: YELLOW
COMMENT UA: ABNORMAL
CREAT SERPL-MCNC: 0.54 MG/DL (ref 0.7–1.2)
CRYSTALS, UA: ABNORMAL /HPF
EPITHELIAL CELLS UA: ABNORMAL /HPF
FIO2: 45
GFR AFRICAN AMERICAN: >60 ML/MIN
GFR NON-AFRICAN AMERICAN: >60 ML/MIN
GFR SERPL CREATININE-BSD FRML MDRD: ABNORMAL ML/MIN/{1.73_M2}
GFR SERPL CREATININE-BSD FRML MDRD: ABNORMAL ML/MIN/{1.73_M2}
GLUCOSE BLD-MCNC: 133 MG/DL (ref 75–110)
GLUCOSE BLD-MCNC: 142 MG/DL (ref 70–99)
GLUCOSE BLD-MCNC: 164 MG/DL (ref 75–110)
GLUCOSE BLD-MCNC: 174 MG/DL (ref 75–110)
GLUCOSE URINE: ABNORMAL
HCO3 ARTERIAL: 24.8 MMOL/L (ref 22–26)
HCT VFR BLD CALC: 37.3 % (ref 41–53)
HEMOGLOBIN: 12.5 G/DL (ref 13.5–17.5)
KETONES, URINE: NEGATIVE
LACTIC ACID: 2.6 MMOL/L (ref 0.5–2.2)
LEGIONELLA PNEUMOPHILIA AG, URINE: NEGATIVE
LEUKOCYTE ESTERASE, URINE: NEGATIVE
MAGNESIUM: 2 MG/DL (ref 1.6–2.6)
MCH RBC QN AUTO: 32.8 PG (ref 26–34)
MCHC RBC AUTO-ENTMCNC: 33.6 G/DL (ref 31–37)
MCV RBC AUTO: 97.6 FL (ref 80–100)
METHEMOGLOBIN: 1.1 % (ref 0–1.9)
MODE: ABNORMAL
MUCUS: ABNORMAL
NEGATIVE BASE EXCESS, ART: 0.5 MMOL/L (ref 0–2)
NITRITE, URINE: NEGATIVE
NOTIFICATION TIME: ABNORMAL
NOTIFICATION: ABNORMAL
NRBC AUTOMATED: ABNORMAL PER 100 WBC
O2 DEVICE/FLOW/%: ABNORMAL
O2 SAT, ARTERIAL: 96.4 % (ref 95–98)
OTHER OBSERVATIONS UA: ABNORMAL
OXYHEMOGLOBIN: ABNORMAL % (ref 95–98)
PATIENT TEMP: 38.2
PCO2 ARTERIAL: 43.2 MMHG (ref 35–45)
PCO2, ART, TEMP ADJ: 45.5 (ref 35–45)
PDW BLD-RTO: 14 % (ref 11.5–14.9)
PEEP/CPAP: 8
PH ARTERIAL: 7.37 (ref 7.35–7.45)
PH UA: 6 (ref 5–8)
PH, ART, TEMP ADJ: 7.35 (ref 7.35–7.45)
PHOSPHORUS: 2.2 MG/DL (ref 2.5–4.5)
PLATELET # BLD: 199 K/UL (ref 150–450)
PMV BLD AUTO: 8 FL (ref 6–12)
PO2 ARTERIAL: 102 MMHG (ref 80–100)
PO2, ART, TEMP ADJ: 109 MMHG (ref 80–100)
POSITIVE BASE EXCESS, ART: ABNORMAL MMOL/L (ref 0–2)
POTASSIUM SERPL-SCNC: 3.7 MMOL/L (ref 3.7–5.3)
PROTEIN UA: ABNORMAL
PSV: ABNORMAL
PT. POSITION: ABNORMAL
RBC # BLD: 3.83 M/UL (ref 4.5–5.9)
RBC UA: ABNORMAL /HPF
RENAL EPITHELIAL, UA: ABNORMAL /HPF
RESPIRATORY RATE: 20
SAMPLE SITE: ABNORMAL
SEDIMENTATION RATE, ERYTHROCYTE: 49 MM (ref 0–20)
SET RATE: 20
SODIUM BLD-SCNC: 142 MMOL/L (ref 135–144)
SOURCE: NORMAL
SPECIFIC GRAVITY UA: 1.01 (ref 1–1.03)
STREP PNEUMONIAE ANTIGEN: NEGATIVE
TEXT FOR RESPIRATORY: ABNORMAL
TOTAL HB: ABNORMAL G/DL (ref 12–16)
TOTAL PROTEIN: 5 G/DL (ref 6.4–8.3)
TOTAL RATE: 24
TRICHOMONAS: ABNORMAL
TURBIDITY: ABNORMAL
URINE HGB: ABNORMAL
UROBILINOGEN, URINE: NORMAL
VT: 500
WBC # BLD: 11.2 K/UL (ref 3.5–11)
WBC UA: ABNORMAL /HPF
YEAST: ABNORMAL

## 2021-08-14 PROCEDURE — 82947 ASSAY GLUCOSE BLOOD QUANT: CPT

## 2021-08-14 PROCEDURE — 6370000000 HC RX 637 (ALT 250 FOR IP): Performed by: SURGERY

## 2021-08-14 PROCEDURE — 83605 ASSAY OF LACTIC ACID: CPT

## 2021-08-14 PROCEDURE — 6360000002 HC RX W HCPCS: Performed by: INTERNAL MEDICINE

## 2021-08-14 PROCEDURE — 87205 SMEAR GRAM STAIN: CPT

## 2021-08-14 PROCEDURE — 2500000003 HC RX 250 WO HCPCS: Performed by: NURSE PRACTITIONER

## 2021-08-14 PROCEDURE — 86140 C-REACTIVE PROTEIN: CPT

## 2021-08-14 PROCEDURE — 87040 BLOOD CULTURE FOR BACTERIA: CPT

## 2021-08-14 PROCEDURE — 87086 URINE CULTURE/COLONY COUNT: CPT

## 2021-08-14 PROCEDURE — 80053 COMPREHEN METABOLIC PANEL: CPT

## 2021-08-14 PROCEDURE — 82805 BLOOD GASES W/O2 SATURATION: CPT

## 2021-08-14 PROCEDURE — 31500 INSERT EMERGENCY AIRWAY: CPT

## 2021-08-14 PROCEDURE — 71045 X-RAY EXAM CHEST 1 VIEW: CPT

## 2021-08-14 PROCEDURE — 85652 RBC SED RATE AUTOMATED: CPT

## 2021-08-14 PROCEDURE — 81001 URINALYSIS AUTO W/SCOPE: CPT

## 2021-08-14 PROCEDURE — 84100 ASSAY OF PHOSPHORUS: CPT

## 2021-08-14 PROCEDURE — 2000000000 HC ICU R&B

## 2021-08-14 PROCEDURE — 6370000000 HC RX 637 (ALT 250 FOR IP): Performed by: NURSE PRACTITIONER

## 2021-08-14 PROCEDURE — 6360000002 HC RX W HCPCS: Performed by: STUDENT IN AN ORGANIZED HEALTH CARE EDUCATION/TRAINING PROGRAM

## 2021-08-14 PROCEDURE — 2500000003 HC RX 250 WO HCPCS: Performed by: SURGERY

## 2021-08-14 PROCEDURE — 2500000003 HC RX 250 WO HCPCS: Performed by: STUDENT IN AN ORGANIZED HEALTH CARE EDUCATION/TRAINING PROGRAM

## 2021-08-14 PROCEDURE — 87150 DNA/RNA AMPLIFIED PROBE: CPT

## 2021-08-14 PROCEDURE — 86403 PARTICLE AGGLUT ANTBDY SCRN: CPT

## 2021-08-14 PROCEDURE — 2580000003 HC RX 258: Performed by: STUDENT IN AN ORGANIZED HEALTH CARE EDUCATION/TRAINING PROGRAM

## 2021-08-14 PROCEDURE — 83735 ASSAY OF MAGNESIUM: CPT

## 2021-08-14 PROCEDURE — 85027 COMPLETE CBC AUTOMATED: CPT

## 2021-08-14 PROCEDURE — 87641 MR-STAPH DNA AMP PROBE: CPT

## 2021-08-14 PROCEDURE — 87186 SC STD MICRODIL/AGAR DIL: CPT

## 2021-08-14 PROCEDURE — 99233 SBSQ HOSP IP/OBS HIGH 50: CPT | Performed by: INTERNAL MEDICINE

## 2021-08-14 PROCEDURE — 2580000003 HC RX 258: Performed by: NURSE PRACTITIONER

## 2021-08-14 PROCEDURE — 94003 VENT MGMT INPAT SUBQ DAY: CPT

## 2021-08-14 PROCEDURE — 36600 WITHDRAWAL OF ARTERIAL BLOOD: CPT

## 2021-08-14 PROCEDURE — 36415 COLL VENOUS BLD VENIPUNCTURE: CPT

## 2021-08-14 PROCEDURE — 6360000002 HC RX W HCPCS: Performed by: NURSE PRACTITIONER

## 2021-08-14 RX ORDER — ERYTHROMYCIN 5 MG/G
OINTMENT OPHTHALMIC NIGHTLY
Status: DISPENSED | OUTPATIENT
Start: 2021-08-14 | End: 2021-08-19

## 2021-08-14 RX ADMIN — ACETAMINOPHEN 650 MG: 325 TABLET, FILM COATED ORAL at 23:14

## 2021-08-14 RX ADMIN — CALCIUM GLUCONATE: 98 INJECTION, SOLUTION INTRAVENOUS at 18:35

## 2021-08-14 RX ADMIN — FAMOTIDINE 20 MG: 10 INJECTION, SOLUTION INTRAVENOUS at 08:49

## 2021-08-14 RX ADMIN — INSULIN LISPRO 1 UNITS: 100 INJECTION, SOLUTION INTRAVENOUS; SUBCUTANEOUS at 17:23

## 2021-08-14 RX ADMIN — PROPOFOL 35 MCG/KG/MIN: 10 INJECTION, EMULSION INTRAVENOUS at 22:28

## 2021-08-14 RX ADMIN — VANCOMYCIN HYDROCHLORIDE 1500 MG: 1.5 INJECTION, POWDER, LYOPHILIZED, FOR SOLUTION INTRAVENOUS at 10:27

## 2021-08-14 RX ADMIN — SODIUM PHOSPHATE, MONOBASIC, MONOHYDRATE AND SODIUM PHOSPHATE, DIBASIC, ANHYDROUS 19.71 MMOL: 276; 142 INJECTION, SOLUTION INTRAVENOUS at 06:22

## 2021-08-14 RX ADMIN — INSULIN LISPRO 1 UNITS: 100 INJECTION, SOLUTION INTRAVENOUS; SUBCUTANEOUS at 08:48

## 2021-08-14 RX ADMIN — FENTANYL CITRATE 50 MCG: 50 INJECTION, SOLUTION INTRAMUSCULAR; INTRAVENOUS at 08:23

## 2021-08-14 RX ADMIN — FENTANYL CITRATE 50 MCG: 50 INJECTION, SOLUTION INTRAMUSCULAR; INTRAVENOUS at 23:14

## 2021-08-14 RX ADMIN — POTASSIUM CHLORIDE AND SODIUM CHLORIDE: 900; 300 INJECTION, SOLUTION INTRAVENOUS at 05:48

## 2021-08-14 RX ADMIN — CEFEPIME HYDROCHLORIDE 2000 MG: 2 INJECTION, POWDER, FOR SOLUTION INTRAVENOUS at 13:30

## 2021-08-14 RX ADMIN — SODIUM CHLORIDE, PRESERVATIVE FREE 10 ML: 5 INJECTION INTRAVENOUS at 20:10

## 2021-08-14 RX ADMIN — POTASSIUM CHLORIDE AND SODIUM CHLORIDE 100 ML/HR: 900; 300 INJECTION, SOLUTION INTRAVENOUS at 17:26

## 2021-08-14 RX ADMIN — THIAMINE HYDROCHLORIDE 100 MG: 100 INJECTION, SOLUTION INTRAMUSCULAR; INTRAVENOUS at 17:18

## 2021-08-14 RX ADMIN — CEFEPIME HYDROCHLORIDE 2000 MG: 2 INJECTION, POWDER, FOR SOLUTION INTRAVENOUS at 20:09

## 2021-08-14 RX ADMIN — LEVOFLOXACIN 750 MG: 5 INJECTION, SOLUTION INTRAVENOUS at 08:48

## 2021-08-14 RX ADMIN — VANCOMYCIN HYDROCHLORIDE 1000 MG: 1 INJECTION, POWDER, LYOPHILIZED, FOR SOLUTION INTRAVENOUS at 22:29

## 2021-08-14 RX ADMIN — INSULIN LISPRO 1 UNITS: 100 INJECTION, SOLUTION INTRAVENOUS; SUBCUTANEOUS at 05:00

## 2021-08-14 RX ADMIN — INSULIN LISPRO 1 UNITS: 100 INJECTION, SOLUTION INTRAVENOUS; SUBCUTANEOUS at 20:10

## 2021-08-14 RX ADMIN — FAMOTIDINE 20 MG: 10 INJECTION, SOLUTION INTRAVENOUS at 20:09

## 2021-08-14 RX ADMIN — PROPOFOL 20 MCG/KG/MIN: 10 INJECTION, EMULSION INTRAVENOUS at 05:49

## 2021-08-14 RX ADMIN — ENOXAPARIN SODIUM 40 MG: 40 INJECTION SUBCUTANEOUS at 08:48

## 2021-08-14 ASSESSMENT — PULMONARY FUNCTION TESTS
PIF_VALUE: 20
PIF_VALUE: 11
PIF_VALUE: 19
PIF_VALUE: 27
PIF_VALUE: 28
PIF_VALUE: 17
PIF_VALUE: 23
PIF_VALUE: 26
PIF_VALUE: 26
PIF_VALUE: 27
PIF_VALUE: 16
PIF_VALUE: 29
PIF_VALUE: 18
PIF_VALUE: 19
PIF_VALUE: 21
PIF_VALUE: 17
PIF_VALUE: 20
PIF_VALUE: 21
PIF_VALUE: 17
PIF_VALUE: 18
PIF_VALUE: 25
PIF_VALUE: 11
PIF_VALUE: 23
PIF_VALUE: 16

## 2021-08-14 ASSESSMENT — PAIN SCALES - GENERAL
PAINLEVEL_OUTOF10: 0
PAINLEVEL_OUTOF10: 5
PAINLEVEL_OUTOF10: 0
PAINLEVEL_OUTOF10: 0

## 2021-08-14 NOTE — PROGRESS NOTES
Tried pt on Spontaneous breathing trial however pts RR went into the 40's within 1 minute, will attempt later

## 2021-08-14 NOTE — CARE COORDINATION
ONGOING DISCHARGE PLAN:    Unable to speak to pt. At this time. Pt. Remains on the Vent. Pt. Remains on TPN, has PICC. TF to start today. Pt. Remains on IV Cefepime/Vanco & Precedex GTT. CIWA Scale. LSW following for alcohol treatment options, when more stable. PT/OT on board. Per Nursing, Mom, lives in [place]. Will continue to follow for additional discharge needs.     Electronically signed by Viet Damian RN on 8/14/2021 at 2:53 PM

## 2021-08-14 NOTE — PROGRESS NOTES
Patient was seen and examined. Remains intubated on the ventilator. Afebrile vital signs are stable. Had some fevers. WBC count is mildly elevated. On TPN. Abdomen is soft. Lungs decreased air entry at bases. Left wrist is swollen and skin is tense and erythematous. We will plan on starting low-dose tube feeds today. Continue TPN for now. Recommend orthopedic evaluation for the left wrist.  Discussed with nurse Ramos taking care of the patient.
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
## 2021-08-14 NOTE — PROGRESS NOTES
PULMONARY PROGRESS NOTE:    REASON FOR VISIT: resp failure, DTs  Interval History:    Sedated, on vent  Febrile    Events since last visit: none    PAST MEDICAL HISTORY:      Scheduled Meds:   vancomycin (VANCOCIN) intermittent dosing (placeholder)   Other RX Placeholder    vancomycin  1,500 mg Intravenous Once    erythromycin   Both Eyes Nightly    cefepime  2,000 mg Intravenous Q8H    vancomycin  1,000 mg Intravenous Q12H    thiamine (VITAMIN B1) IVPB  100 mg Intravenous Q24H    insulin lispro  0-6 Units Subcutaneous Q6H    sodium chloride flush  5-40 mL Intravenous 2 times per day    enoxaparin  40 mg Subcutaneous Daily    famotidine (PEPCID) injection  20 mg Intravenous BID    nicotine  1 patch Transdermal Daily    lidocaine  1 patch Transdermal Daily     Continuous Infusions:   PN-Adult 2-in-1 Central Line (Standard) 75 mL/hr at 08/13/21 1823    propofol      propofol 20 mcg/kg/min (08/14/21 0549)    norepinephrine      phenylephrine (OSVALDO-SYNEPHRINE) 50mg/250mL infusion Stopped (08/12/21 0533)    sodium chloride      sodium chloride      0.9% NaCl with KCl 40 mEq 100 mL/hr at 08/14/21 0548    dexmedetomidine 0.6 mcg/kg/hr (08/13/21 0533)     PRN Meds:sodium phosphate IVPB **OR** sodium phosphate IVPB, sodium chloride, sodium chloride flush, sodium chloride, potassium chloride **OR** potassium alternative oral replacement **OR** potassium chloride, polyethylene glycol, acetaminophen **OR** acetaminophen, magnesium sulfate, ondansetron, LORazepam **OR** LORazepam **OR** LORazepam **OR** LORazepam **OR** LORazepam **OR** LORazepam **OR** LORazepam **OR** LORazepam, fentanNYL, albuterol sulfate HFA, sodium chloride flush        PHYSICAL EXAMINATION:  /76   Pulse 105   Temp 101.3 °F (38.5 °C) (Axillary)   Resp 23   Ht 5' 9\" (1.753 m)   Wt 142 lb 13.7 oz (64.8 kg)   SpO2 98%   BMI 21.10 kg/m²     General : sedated, orally intubated, febrile  Neck - supple, no lymphadenopathy, JVD not raised  Heart - regular rhythm, S1 and S2 normal; no additional sounds heard  Lungs - Air Entry- fair bilaterally; breath sounds : vesicular;   rales/crackles - absent  Abdomen - soft, no tenderness  Upper Extremities  - no cyanosis, mottling; edema : absent  Lower Extremities: no cyanosis, mottling; edema : absent    Current Laboratory, Radiologic, Microbiologic, and Diagnostic studies reviewed  Data ReviewCBC:   Recent Labs     08/12/21  0419 08/13/21  0415 08/14/21  0402   WBC 7.7 10.3 11.2*   RBC 3.42* 3.74* 3.83*   HGB 11.3* 12.3* 12.5*   HCT 34.6* 37.0* 37.3*    235 199     BMP:   Recent Labs     08/12/21  0419 08/12/21  0419 08/12/21  1213 08/13/21  0415 08/14/21  0402   GLUCOSE 78  --   --  244* 142*      < > 137 138 142   K 3.7  --   --  3.4* 3.7   BUN 13  --   --  14 12   CREATININE 0.60*  --   --  0.54* 0.54*   CALCIUM 7.6*  --   --  7.9* 8.0*    < > = values in this interval not displayed.      ABGs:   Recent Labs     08/13/21  0930 08/13/21  1400 08/14/21  0432   PHART 7.512* 7.415 7.368   PO2ART 63.1* 64.9* 102.0*   EBK8ZYX 26.4* 35.1 43.2   RBH2HWZ 21.1* 22.5 24.8   N1LMRJWO 93.5* 92.5* 96.4      PT/INR:  No results found for: PTINR    ASSESSMENT / PLAN:    Alcohol withdrawal  Pain control - fentanyl  Thiamine, folic acid  atelectasis   RLL infiltrate -? pulm contusion -ABx changed to cefepime/ vancomycin  Acute hypoxic resp failure - Mechanical ventilation  Tube feeds  Soft restraints  DW RN, RT,     Electronically signed by Izabella Gomez MD on 08/14/21 at 11:43 AM.

## 2021-08-14 NOTE — PROGRESS NOTES
Patient was seen and examined by me the patient long history of alcohol abuse and was admitted with acute alcohol withdrawal syndrome. He developed acute respiratory distress and was found to have bilateral infiltrates rib fractures possible lung contusion. Presently is on mechanical ventilation has bilateral pneumonia which is most likely aspiration pneumonia. Is also developed left hand swelling which is due to distal radius fracture documented on admission. Swelling involves the dorsum of his hand and wrist.  He was switched to cefepime and vancomycin. His medication and sedation protocol was reviewed. He is on Precedex. Please refer to notes by the resident which will be cosigned by me.
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
## 2021-08-14 NOTE — PLAN OF CARE
Problem: Falls - Risk of:  Goal: Will remain free from falls  Description: Will remain free from falls  Outcome: Ongoing     Problem: Falls - Risk of:  Goal: Absence of physical injury  Description: Absence of physical injury  Outcome: Ongoing   No falls this shift  Problem: Skin Integrity:  Goal: Will show no infection signs and symptoms  Description: Will show no infection signs and symptoms  Outcome: Ongoing     Problem: Skin Integrity:  Goal: Absence of new skin breakdown  Description: Absence of new skin breakdown  Outcome: Ongoing   No new skin breakdown noted. Problem: Non-Violent Restraints  Goal: Removal from restraints as soon as assessed to be safe  Outcome: Ongoing     Problem: Non-Violent Restraints  Goal: No harm/injury to patient while restraints in use  Outcome: Ongoing   Pt in wrist restraints to protect artificial airway. Pt does attempt to pull ETT and lines when restraints released for ROM.    Problem: Non-Violent Restraints  Goal: Patient's dignity will be maintained  Outcome: Ongoing     Problem: Nutrition  Goal: Optimal nutrition therapy  8/14/2021 1930 by Carson Murillo RN  Outcome: Ongoing     Problem: OXYGENATION/RESPIRATORY FUNCTION  Goal: Patient will maintain patent airway  8/14/2021 1930 by Carson Murillo RN  Outcome: Ongoing     Problem: OXYGENATION/RESPIRATORY FUNCTION  Goal: Patient will achieve/maintain normal respiratory rate/effort  Description: Respiratory rate and effort will be within normal limits for the patient  8/14/2021 1930 by Carson Murillo RN  Outcome: Ongoing     Problem: MECHANICAL VENTILATION  Goal: Patient will maintain patent airway  8/14/2021 1930 by Carson Murillo RN  Outcome: Ongoing     Problem: MECHANICAL VENTILATION  Goal: ET tube will be managed safely  8/14/2021 1930 by Carson Murillo RN  Outcome: Ongoing   Pt remains orally intubated, sedated on vent  Problem: Pain:  Goal: Pain level will decrease  Description: Pain level will

## 2021-08-14 NOTE — PLAN OF CARE
Problem: OXYGENATION/RESPIRATORY FUNCTION  Goal: Patient will maintain patent airway  Outcome: Ongoing  Goal: Patient will achieve/maintain normal respiratory rate/effort  Description: Respiratory rate and effort will be within normal limits for the patient  Outcome: Ongoing     Problem: MECHANICAL VENTILATION  Goal: Patient will maintain patent airway  8/14/2021 0859 by Viky Ludwig RCP  Outcome: Ongoing  8/14/2021 0320 by Humberto Edwards RN  Outcome: Met This Shift  Note: Mechanical ventilation continues as ordered, CXR, ABG results noted  Goal: ET tube will be managed safely  Outcome: Ongoing

## 2021-08-14 NOTE — PLAN OF CARE
Problem: Falls - Risk of:  Goal: Will remain free from falls  Description: Will remain free from falls  8/14/2021 0320 by Nicho Gao RN  Outcome: Met This Shift     Problem: Skin Integrity:  Goal: Will show no infection signs and symptoms  Description: Will show no infection signs and symptoms  Outcome: Met This Shift     Problem: Nutrition  Goal: Optimal nutrition therapy  Outcome: Met This Shift     Problem: MECHANICAL VENTILATION  Goal: Patient will maintain patent airway  8/14/2021 0320 by Nicho Gao RN  Outcome: Met This Shift  Note: Mechanical ventilation continues as ordered, CXR, ABG results noted     Problem: Non-Violent Restraints  Goal: Removal from restraints as soon as assessed to be safe  Outcome: Ongoing  Note: Bilat soft wrist restraints continued due to pt attempts to grasp at tubings with positioning     Problem: Pain:  Goal: Pain level will decrease  Description: Pain level will decrease  Outcome: Ongoing  Note: Fentanyl given once this shift due to pt grimacing, dose effective

## 2021-08-14 NOTE — CARE COORDINATION
Social work; when pt more able to consider alcohol treatment options will provide lists of treatment centers,  Richard bob

## 2021-08-15 ENCOUNTER — APPOINTMENT (OUTPATIENT)
Dept: GENERAL RADIOLOGY | Age: 56
DRG: 351 | End: 2021-08-15
Payer: MEDICAID

## 2021-08-15 LAB
ALBUMIN SERPL-MCNC: 1.4 G/DL (ref 3.5–5.2)
ALBUMIN/GLOBULIN RATIO: ABNORMAL (ref 1–2.5)
ALLEN TEST: ABNORMAL
ALP BLD-CCNC: 52 U/L (ref 40–129)
ALT SERPL-CCNC: 27 U/L (ref 5–41)
ANION GAP SERPL CALCULATED.3IONS-SCNC: 7 MMOL/L (ref 9–17)
AST SERPL-CCNC: 42 U/L
BILIRUB SERPL-MCNC: 0.68 MG/DL (ref 0.3–1.2)
BUN BLDV-MCNC: 21 MG/DL (ref 6–20)
BUN/CREAT BLD: ABNORMAL (ref 9–20)
CALCIUM SERPL-MCNC: 7.3 MG/DL (ref 8.6–10.4)
CARBOXYHEMOGLOBIN: 0.5 % (ref 0–5)
CHLORIDE BLD-SCNC: 111 MMOL/L (ref 98–107)
CO2: 21 MMOL/L (ref 20–31)
CREAT SERPL-MCNC: 1.18 MG/DL (ref 0.7–1.2)
FIO2: 35
GFR AFRICAN AMERICAN: >60 ML/MIN
GFR NON-AFRICAN AMERICAN: >60 ML/MIN
GFR SERPL CREATININE-BSD FRML MDRD: ABNORMAL ML/MIN/{1.73_M2}
GFR SERPL CREATININE-BSD FRML MDRD: ABNORMAL ML/MIN/{1.73_M2}
GLUCOSE BLD-MCNC: 218 MG/DL (ref 75–110)
GLUCOSE BLD-MCNC: 223 MG/DL (ref 75–110)
GLUCOSE BLD-MCNC: 225 MG/DL (ref 75–110)
GLUCOSE BLD-MCNC: 234 MG/DL (ref 70–99)
GLUCOSE BLD-MCNC: 239 MG/DL (ref 75–110)
HCO3 ARTERIAL: 21.1 MMOL/L (ref 22–26)
HCT VFR BLD CALC: 32.1 % (ref 41–53)
HEMOGLOBIN: 10.8 G/DL (ref 13.5–17.5)
LACTIC ACID: 1.7 MMOL/L (ref 0.5–2.2)
MAGNESIUM: 1.6 MG/DL (ref 1.6–2.6)
MCH RBC QN AUTO: 33.3 PG (ref 26–34)
MCHC RBC AUTO-ENTMCNC: 33.5 G/DL (ref 31–37)
MCV RBC AUTO: 99.4 FL (ref 80–100)
METHEMOGLOBIN: 0.4 % (ref 0–1.9)
MODE: ABNORMAL
MRSA, DNA, NASAL: NORMAL
NEGATIVE BASE EXCESS, ART: 4.9 MMOL/L (ref 0–2)
NOTIFICATION TIME: ABNORMAL
NOTIFICATION: ABNORMAL
NRBC AUTOMATED: ABNORMAL PER 100 WBC
O2 DEVICE/FLOW/%: ABNORMAL
O2 SAT, ARTERIAL: 96.8 % (ref 95–98)
OXYHEMOGLOBIN: ABNORMAL % (ref 95–98)
PATIENT TEMP: 37
PCO2 ARTERIAL: 41 MMHG (ref 35–45)
PCO2, ART, TEMP ADJ: ABNORMAL (ref 35–45)
PDW BLD-RTO: 15.1 % (ref 11.5–14.9)
PEEP/CPAP: 8
PH ARTERIAL: 7.32 (ref 7.35–7.45)
PH, ART, TEMP ADJ: ABNORMAL (ref 7.35–7.45)
PHOSPHORUS: 3.6 MG/DL (ref 2.5–4.5)
PLATELET # BLD: 190 K/UL (ref 150–450)
PMV BLD AUTO: 8.2 FL (ref 6–12)
PO2 ARTERIAL: 96.9 MMHG (ref 80–100)
PO2, ART, TEMP ADJ: ABNORMAL MMHG (ref 80–100)
POSITIVE BASE EXCESS, ART: ABNORMAL MMOL/L (ref 0–2)
POTASSIUM SERPL-SCNC: 4.1 MMOL/L (ref 3.7–5.3)
PSV: ABNORMAL
PT. POSITION: ABNORMAL
RBC # BLD: 3.23 M/UL (ref 4.5–5.9)
RESPIRATORY RATE: 20
SAMPLE SITE: ABNORMAL
SET RATE: 20
SODIUM BLD-SCNC: 139 MMOL/L (ref 135–144)
SPECIMEN DESCRIPTION: NORMAL
TEXT FOR RESPIRATORY: ABNORMAL
TOTAL CK: 22 U/L (ref 39–308)
TOTAL HB: ABNORMAL G/DL (ref 12–16)
TOTAL PROTEIN: 4.6 G/DL (ref 6.4–8.3)
TOTAL RATE: 30
VANCOMYCIN TROUGH DATE LAST DOSE: NORMAL
VANCOMYCIN TROUGH DOSE AMOUNT: 1000
VANCOMYCIN TROUGH TIME LAST DOSE: 950
VANCOMYCIN TROUGH: 18.9 UG/ML (ref 10–20)
VT: 500
WBC # BLD: 8.8 K/UL (ref 3.5–11)

## 2021-08-15 PROCEDURE — 80202 ASSAY OF VANCOMYCIN: CPT

## 2021-08-15 PROCEDURE — 80053 COMPREHEN METABOLIC PANEL: CPT

## 2021-08-15 PROCEDURE — 6360000002 HC RX W HCPCS: Performed by: STUDENT IN AN ORGANIZED HEALTH CARE EDUCATION/TRAINING PROGRAM

## 2021-08-15 PROCEDURE — 71045 X-RAY EXAM CHEST 1 VIEW: CPT

## 2021-08-15 PROCEDURE — 82805 BLOOD GASES W/O2 SATURATION: CPT

## 2021-08-15 PROCEDURE — 94761 N-INVAS EAR/PLS OXIMETRY MLT: CPT

## 2021-08-15 PROCEDURE — 83605 ASSAY OF LACTIC ACID: CPT

## 2021-08-15 PROCEDURE — 6360000002 HC RX W HCPCS: Performed by: SURGERY

## 2021-08-15 PROCEDURE — 82947 ASSAY GLUCOSE BLOOD QUANT: CPT

## 2021-08-15 PROCEDURE — 6370000000 HC RX 637 (ALT 250 FOR IP): Performed by: SURGERY

## 2021-08-15 PROCEDURE — 2000000000 HC ICU R&B

## 2021-08-15 PROCEDURE — 6370000000 HC RX 637 (ALT 250 FOR IP)

## 2021-08-15 PROCEDURE — 94003 VENT MGMT INPAT SUBQ DAY: CPT

## 2021-08-15 PROCEDURE — 84100 ASSAY OF PHOSPHORUS: CPT

## 2021-08-15 PROCEDURE — 36600 WITHDRAWAL OF ARTERIAL BLOOD: CPT

## 2021-08-15 PROCEDURE — 2580000003 HC RX 258

## 2021-08-15 PROCEDURE — 2580000003 HC RX 258: Performed by: STUDENT IN AN ORGANIZED HEALTH CARE EDUCATION/TRAINING PROGRAM

## 2021-08-15 PROCEDURE — 2500000003 HC RX 250 WO HCPCS: Performed by: NURSE PRACTITIONER

## 2021-08-15 PROCEDURE — 73130 X-RAY EXAM OF HAND: CPT

## 2021-08-15 PROCEDURE — 36415 COLL VENOUS BLD VENIPUNCTURE: CPT

## 2021-08-15 PROCEDURE — 85027 COMPLETE CBC AUTOMATED: CPT

## 2021-08-15 PROCEDURE — 83735 ASSAY OF MAGNESIUM: CPT

## 2021-08-15 PROCEDURE — 2580000003 HC RX 258: Performed by: NURSE PRACTITIONER

## 2021-08-15 PROCEDURE — 6360000002 HC RX W HCPCS: Performed by: NURSE PRACTITIONER

## 2021-08-15 PROCEDURE — 2700000000 HC OXYGEN THERAPY PER DAY

## 2021-08-15 PROCEDURE — 82550 ASSAY OF CK (CPK): CPT

## 2021-08-15 PROCEDURE — 6360000002 HC RX W HCPCS: Performed by: INTERNAL MEDICINE

## 2021-08-15 PROCEDURE — 99233 SBSQ HOSP IP/OBS HIGH 50: CPT | Performed by: INTERNAL MEDICINE

## 2021-08-15 PROCEDURE — 6370000000 HC RX 637 (ALT 250 FOR IP): Performed by: NURSE PRACTITIONER

## 2021-08-15 PROCEDURE — 2500000003 HC RX 250 WO HCPCS: Performed by: SURGERY

## 2021-08-15 RX ORDER — NICOTINE POLACRILEX 4 MG
15 LOZENGE BUCCAL PRN
Status: DISCONTINUED | OUTPATIENT
Start: 2021-08-15 | End: 2021-08-31 | Stop reason: HOSPADM

## 2021-08-15 RX ORDER — DEXTROSE MONOHYDRATE 50 MG/ML
100 INJECTION, SOLUTION INTRAVENOUS PRN
Status: DISCONTINUED | OUTPATIENT
Start: 2021-08-15 | End: 2021-08-31 | Stop reason: HOSPADM

## 2021-08-15 RX ORDER — METOCLOPRAMIDE HYDROCHLORIDE 5 MG/ML
10 INJECTION INTRAMUSCULAR; INTRAVENOUS EVERY 6 HOURS
Status: DISCONTINUED | OUTPATIENT
Start: 2021-08-15 | End: 2021-08-24

## 2021-08-15 RX ORDER — SODIUM CHLORIDE 9 MG/ML
INJECTION, SOLUTION INTRAVENOUS CONTINUOUS
Status: DISCONTINUED | OUTPATIENT
Start: 2021-08-15 | End: 2021-08-16

## 2021-08-15 RX ORDER — DEXTROSE MONOHYDRATE 25 G/50ML
12.5 INJECTION, SOLUTION INTRAVENOUS PRN
Status: DISCONTINUED | OUTPATIENT
Start: 2021-08-15 | End: 2021-08-31 | Stop reason: HOSPADM

## 2021-08-15 RX ADMIN — FENTANYL CITRATE 50 MCG: 50 INJECTION, SOLUTION INTRAMUSCULAR; INTRAVENOUS at 07:58

## 2021-08-15 RX ADMIN — VANCOMYCIN HYDROCHLORIDE 1000 MG: 1 INJECTION, POWDER, LYOPHILIZED, FOR SOLUTION INTRAVENOUS at 22:04

## 2021-08-15 RX ADMIN — SODIUM CHLORIDE: 9 INJECTION, SOLUTION INTRAVENOUS at 22:05

## 2021-08-15 RX ADMIN — PROPOFOL 35 MCG/KG/MIN: 10 INJECTION, EMULSION INTRAVENOUS at 18:06

## 2021-08-15 RX ADMIN — THIAMINE HYDROCHLORIDE 100 MG: 100 INJECTION, SOLUTION INTRAMUSCULAR; INTRAVENOUS at 13:55

## 2021-08-15 RX ADMIN — VANCOMYCIN HYDROCHLORIDE 1000 MG: 1 INJECTION, POWDER, LYOPHILIZED, FOR SOLUTION INTRAVENOUS at 09:50

## 2021-08-15 RX ADMIN — FAMOTIDINE 20 MG: 10 INJECTION, SOLUTION INTRAVENOUS at 19:43

## 2021-08-15 RX ADMIN — PROPOFOL 35 MCG/KG/MIN: 10 INJECTION, EMULSION INTRAVENOUS at 22:06

## 2021-08-15 RX ADMIN — ERYTHROMYCIN: 5 OINTMENT OPHTHALMIC at 19:18

## 2021-08-15 RX ADMIN — CALCIUM GLUCONATE: 98 INJECTION, SOLUTION INTRAVENOUS at 18:04

## 2021-08-15 RX ADMIN — PROPOFOL 35 MCG/KG/MIN: 10 INJECTION, EMULSION INTRAVENOUS at 12:34

## 2021-08-15 RX ADMIN — HYDROCORTISONE SODIUM SUCCINATE 100 MG: 100 INJECTION, POWDER, FOR SOLUTION INTRAMUSCULAR; INTRAVENOUS at 19:43

## 2021-08-15 RX ADMIN — METOCLOPRAMIDE 10 MG: 5 INJECTION, SOLUTION INTRAMUSCULAR; INTRAVENOUS at 12:34

## 2021-08-15 RX ADMIN — FAMOTIDINE 20 MG: 10 INJECTION, SOLUTION INTRAVENOUS at 08:15

## 2021-08-15 RX ADMIN — CEFEPIME HYDROCHLORIDE 2000 MG: 2 INJECTION, POWDER, FOR SOLUTION INTRAVENOUS at 12:27

## 2021-08-15 RX ADMIN — MAGNESIUM SULFATE HEPTAHYDRATE 1000 MG: 1 INJECTION, SOLUTION INTRAVENOUS at 06:35

## 2021-08-15 RX ADMIN — CEFEPIME HYDROCHLORIDE 2000 MG: 2 INJECTION, POWDER, FOR SOLUTION INTRAVENOUS at 03:29

## 2021-08-15 RX ADMIN — HYDROCORTISONE SODIUM SUCCINATE 100 MG: 100 INJECTION, POWDER, FOR SOLUTION INTRAMUSCULAR; INTRAVENOUS at 13:51

## 2021-08-15 RX ADMIN — CEFEPIME HYDROCHLORIDE 2000 MG: 2 INJECTION, POWDER, FOR SOLUTION INTRAVENOUS at 19:16

## 2021-08-15 RX ADMIN — INSULIN LISPRO 2 UNITS: 100 INJECTION, SOLUTION INTRAVENOUS; SUBCUTANEOUS at 07:57

## 2021-08-15 RX ADMIN — METOCLOPRAMIDE 10 MG: 5 INJECTION, SOLUTION INTRAMUSCULAR; INTRAVENOUS at 19:10

## 2021-08-15 RX ADMIN — SODIUM CHLORIDE 100 ML/HR: 9 INJECTION, SOLUTION INTRAVENOUS at 12:41

## 2021-08-15 RX ADMIN — SODIUM CHLORIDE, PRESERVATIVE FREE 10 ML: 5 INJECTION INTRAVENOUS at 19:35

## 2021-08-15 RX ADMIN — INSULIN LISPRO 2 UNITS: 100 INJECTION, SOLUTION INTRAVENOUS; SUBCUTANEOUS at 02:17

## 2021-08-15 RX ADMIN — PROPOFOL 35 MCG/KG/MIN: 10 INJECTION, EMULSION INTRAVENOUS at 03:31

## 2021-08-15 RX ADMIN — ENOXAPARIN SODIUM 40 MG: 40 INJECTION SUBCUTANEOUS at 08:15

## 2021-08-15 RX ADMIN — INSULIN LISPRO 4 UNITS: 100 INJECTION, SOLUTION INTRAVENOUS; SUBCUTANEOUS at 22:43

## 2021-08-15 RX ADMIN — MAGNESIUM SULFATE HEPTAHYDRATE 1000 MG: 1 INJECTION, SOLUTION INTRAVENOUS at 05:34

## 2021-08-15 RX ADMIN — SODIUM CHLORIDE, PRESERVATIVE FREE 10 ML: 5 INJECTION INTRAVENOUS at 08:15

## 2021-08-15 RX ADMIN — FENTANYL CITRATE 25 MCG/HR: 0.05 INJECTION, SOLUTION INTRAMUSCULAR; INTRAVENOUS at 13:12

## 2021-08-15 RX ADMIN — POTASSIUM CHLORIDE AND SODIUM CHLORIDE: 900; 300 INJECTION, SOLUTION INTRAVENOUS at 03:31

## 2021-08-15 RX ADMIN — INSULIN LISPRO 2 UNITS: 100 INJECTION, SOLUTION INTRAVENOUS; SUBCUTANEOUS at 16:09

## 2021-08-15 RX ADMIN — LORAZEPAM 2 MG: 2 INJECTION INTRAMUSCULAR; INTRAVENOUS at 12:27

## 2021-08-15 ASSESSMENT — PULMONARY FUNCTION TESTS
PIF_VALUE: 17
PIF_VALUE: 28
PIF_VALUE: 20
PIF_VALUE: 20
PIF_VALUE: 25
PIF_VALUE: 24
PIF_VALUE: 17
PIF_VALUE: 25
PIF_VALUE: 17
PIF_VALUE: 22
PIF_VALUE: 17
PIF_VALUE: 18
PIF_VALUE: 24
PIF_VALUE: 16
PIF_VALUE: 26
PIF_VALUE: 17
PIF_VALUE: 25
PIF_VALUE: 25
PIF_VALUE: 23
PIF_VALUE: 26

## 2021-08-15 ASSESSMENT — PAIN SCALES - GENERAL
PAINLEVEL_OUTOF10: 0
PAINLEVEL_OUTOF10: 0
PAINLEVEL_OUTOF10: 5
PAINLEVEL_OUTOF10: 0

## 2021-08-15 NOTE — PROGRESS NOTES
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 social work for rehab, discharge planning     Multiple right rib fractures  -CT scan shows Grade 2 chest wall injury with right 4th through 6th anterolateral rib fractures. Areas of pleural thickening likely areas of focal hemothorax near the rib fractures on the right. No acute inflammatory process within the abdomen pelvis. -Fentanyl as needed  -Consult general surgery: No acute general surgery intervention; conservative management.  Continue TPN via PICC line.     Closed left wrist fracture  -Velcro wrist splint in place  - left wrist was warm and swollen  - Hand is elevated. - left wrist X-ray was ordered        COPD  -SPO2 96% on room air, respirations 26 (intubated on 8/13 due to resp. failure)  -Albuterol as needed  -Spiriva daily  -Ellipta daily     Smoking   -Nicotine patch     GI prophylaxis-Pepcid 20 mg IV twice daily. DVT prophylaxis-Lovenox 40 mg subcu daily.   Diet: Regular  Disposition: Possible alcohol

## 2021-08-15 NOTE — PROGRESS NOTES
Comprehensive Nutrition Assessment    Type and Reason for Visit:  Reassess    Nutrition Recommendations/Plan: Tube Feeding: Vital High Protein with eventual goal of 50 mL per hr, if tolerated. TPN decreased to 40 mL per hr with the following adjustments to additives: Calcium Gluconate: 8 to 4 mEq, Mg Sulfate: 6 to 8 mEq, Insulin: 23 to 18 units, add MVI, 100 mg Thiamine, and 1 mg Folic Acid. Separate order for Thiamine was discontinued since added to TPN. NPO. See below for nutritional provision from various sources. Goal is for tube feeding tolerance with discontinuation of TPN. Nutrition Assessment:  Surgeon indicates tube feeding had not been tolerated. Reglan started. Tube feeding was off for some time but formula changed to Vital High Protein which may be better tolerated; RN reports rate at 40 mL per hr. TPN rate being decreased and additives adjusted. Malnutrition Assessment:  Malnutrition Status:  Severe malnutrition    Context:  Acute Illness     Findings of the 6 clinical characteristics of malnutrition:  Energy Intake:  1 - 75% or less of estimated energy requirements for 7 or more days (Prior to admission)  Weight Loss:  1 - 7.5% over 3 months     Body Fat Loss:  Unable to assess     Muscle Mass Loss:  7 - Moderate muscle mass loss Clavicles (pectoralis & deltoids), Thigh (quadraceps)  Fluid Accumulation:  No significant fluid accumulation Extremities   Strength:  Not Performed    Estimated Daily Nutrient Needs:  Energy (kcal):  1209-9033 based on 24-26 kcal per kg; Weight Used for Energy Requirements:  Admission     Protein (g):  92-99 based on 1.5-1.6 gm per kg; Weight Used for Protein Requirements:  Admission          Nutrition Related Findings:  Edema: +2 LUE (previous injury site). Labs and meds reviewed.       Wounds:   (Abrasions)       Current Nutrition Therapies:    Diet NPO  PN-Adult 2-in-1 Central Line (Standard)  ADULT TUBE FEEDING; Orogastric; Peptide Based High Protein; Continuous; 20; Yes; 10; Q 4 hours; 50; 30; Q 4 hours  PN-Adult 2-in-1 Central Line (Standard)  Current Tube Feeding (TF) Orders:  · Feeding Route: Orogastric  · Formula: Peptide Based High Protein  · Schedule: Continuous  · Goal TF & Flush Orders Provides: Vital High Protein at 50 mL per hr will provide: 1200 kcal, 105 gm protein    Current Parenteral Nutrition Orders:  · Type and Formula: Premix Central, 2-in-1 Custom   · Lipids: None  · Duration: Continuous  · Rate/Volume: 40 mL per hr/ 960 mL  · Current PN Order Provides: 845 kcal, 48 gm protein    Additional Calorie Sources:   Propofol 12.9ml/hr providing 340kcal/d    Anthropometric Measures:  · Height: 5' 9\" (175.3 cm)  · Current Body Weight: 157 lb 3 oz (71.3 kg) (Wt discrepancies noted; positive fluid balance.)   · Admission Body Weight: 135 lb 12.9 oz (61.6 kg)    · Usual Body Weight: 150 lb (68 kg) (Estimated)     · Ideal Body Weight: 160 lbs  · BMI: 23.2  · BMI Categories: Normal Weight (BMI 18.5-24. 9)       Nutrition Diagnosis:   · Severe malnutrition related to inadequate protein-energy intake as evidenced by weight loss 7.5% in 3 months, poor intake prior to admission, moderate muscle loss    Nutrition Interventions:   Food and/or Nutrient Delivery:  Continue NPO, Modify Tube Feeding, Modify Parenteral Nutrition  Nutrition Education/Counseling:  No recommendation at this time   Coordination of Nutrition Care:  Continue to monitor while inpatient    Goals:  Meet % of estimated nutrition needs       Nutrition Monitoring and Evaluation:   Behavioral-Environmental Outcomes:  None Identified   Food/Nutrient Intake Outcomes:  Enteral Nutrition Intake/Tolerance, Parenteral Nutrition Intake/Tolerance  Physical Signs/Symptoms Outcomes:  Biochemical Data, Hemodynamic Status, Weight     Discharge Planning: Too soon to determine     Some areas of assessment may be incomplete due to standard COVID-19 Precautions. Alyson Salas R.D., L.D.   Phone: [phone]

## 2021-08-15 NOTE — PLAN OF CARE
Attestation and add on       I have discussed the care of Opal Griggs , including pertinent history and exam findings,    8/15/21   with the resident. I have seen and examined the patient and the key elements of all parts of the encounter have been performed by me . I agree with the assessment, plan and orders as documented by the resident. Principal Problem:    Syncope and collapse  Active Problems:    COPD (chronic obstructive pulmonary disease) (HCC)    Smoking addiction    Dyslipidemia    Alcohol abuse    Hypokalemia    Hyponatremia    Traumatic rhabdomyolysis (HCC)    Closed fracture of multiple ribs of right side    Closed fracture of left wrist    Severe malnutrition (HCC)    Fever    Conjunctivitis  Resolved Problems:    * No resolved hospital problems. *        --Patient seen with residents on rounds  Also noted pulmonary input     Alcohol withdrawal  Pain control - add fentanyl gtt  Thiamine, folic acid  atelectasis  RLL infiltrate -? pulm contusion -ABx changed to cefepime/ vancomycin  Acute hypoxic resp failure - Mechanical ventilation  Tube feeds - currently held due to high residuals  bacteremia - continue current ABx, repeat BCx in am  Soft restraints  Hypotension - start solu-cortef. add vasopressin if needed to keep MAP 65   CLINICAL COURSE ---          clinical course has fluctuated,    -         Condition    [x] ill ,     [x] high risk , [x] critical ,        [] improved but still labile                                        [x] delirium ,      [x] --- on vent--,                 [] I----     Unit  [x] ICU           [] PICU       [] MED_SRG             []  Other  Prognosis -              Medications: Allergies:     Allergies   Allergen Reactions    Nickel      Contact dermatitis       Current Meds:   Scheduled Meds:    metoclopramide  10 mg Intravenous Q6H    hydrocortisone sodium succinate PF  100 mg Intravenous Q8H    vancomycin (VANCOCIN) intermittent dosing (placeholder) Other RX Placeholder    erythromycin   Both Eyes Nightly    cefepime  2,000 mg Intravenous Q8H    vancomycin  1,000 mg Intravenous Q12H    thiamine (VITAMIN B1) IVPB  100 mg Intravenous Q24H    insulin lispro  0-6 Units Subcutaneous Q6H    sodium chloride flush  5-40 mL Intravenous 2 times per day    enoxaparin  40 mg Subcutaneous Daily    famotidine (PEPCID) injection  20 mg Intravenous BID    nicotine  1 patch Transdermal Daily    lidocaine  1 patch Transdermal Daily     Continuous Infusions:    sodium chloride 100 mL/hr (08/15/21 1241)    fentaNYL 25 mcg/hr (08/15/21 1312)    PN-Adult 2-in-1 Central Line (Standard) 75 mL/hr at 08/14/21 1835    propofol      propofol 35 mcg/kg/min (08/15/21 1234)    norepinephrine      phenylephrine (OSVALDO-SYNEPHRINE) 50mg/250mL infusion Stopped (08/12/21 0533)    sodium chloride      sodium chloride      dexmedetomidine 0.6 mcg/kg/hr (08/13/21 0533)     PRN Meds: sodium phosphate IVPB **OR** sodium phosphate IVPB, sodium chloride, sodium chloride flush, sodium chloride, potassium chloride **OR** potassium alternative oral replacement **OR** potassium chloride, polyethylene glycol, acetaminophen **OR** acetaminophen, magnesium sulfate, ondansetron, LORazepam **OR** LORazepam **OR** LORazepam **OR** LORazepam **OR** LORazepam **OR** LORazepam **OR** LORazepam **OR** LORazepam, albuterol sulfate HFA, sodium chloride flush      ---- ;     MD JANUSZ Ma [address], [address] [place] [address].    Phone [phone]   Fax: [phone]  Answering Service: [phone]

## 2021-08-15 NOTE — PROGRESS NOTES
PULMONARY PROGRESS NOTE:    REASON FOR VISIT: resp failure, DTs  Interval History:    Sedated, on vent  Febrile    Events since last visit: positive blood cultures, TF on hold for high residuals    PAST MEDICAL HISTORY:      Scheduled Meds:   metoclopramide  10 mg Intravenous Q6H    hydrocortisone sodium succinate PF  100 mg Intravenous Q8H    vancomycin (VANCOCIN) intermittent dosing (placeholder)   Other RX Placeholder    erythromycin   Both Eyes Nightly    cefepime  2,000 mg Intravenous Q8H    vancomycin  1,000 mg Intravenous Q12H    thiamine (VITAMIN B1) IVPB  100 mg Intravenous Q24H    insulin lispro  0-6 Units Subcutaneous Q6H    sodium chloride flush  5-40 mL Intravenous 2 times per day    enoxaparin  40 mg Subcutaneous Daily    famotidine (PEPCID) injection  20 mg Intravenous BID    nicotine  1 patch Transdermal Daily    lidocaine  1 patch Transdermal Daily     Continuous Infusions:   sodium chloride      fentaNYL      PN-Adult 2-in-1 Central Line (Standard) 75 mL/hr at 08/14/21 1835    propofol      propofol 35 mcg/kg/min (08/15/21 0331)    norepinephrine      phenylephrine (OSVALDO-SYNEPHRINE) 50mg/250mL infusion Stopped (08/12/21 0533)    sodium chloride      sodium chloride      dexmedetomidine 0.6 mcg/kg/hr (08/13/21 0533)     PRN Meds:sodium phosphate IVPB **OR** sodium phosphate IVPB, sodium chloride, sodium chloride flush, sodium chloride, potassium chloride **OR** potassium alternative oral replacement **OR** potassium chloride, polyethylene glycol, acetaminophen **OR** acetaminophen, magnesium sulfate, ondansetron, LORazepam **OR** LORazepam **OR** LORazepam **OR** LORazepam **OR** LORazepam **OR** LORazepam **OR** LORazepam **OR** LORazepam, fentanNYL, albuterol sulfate HFA, sodium chloride flush        PHYSICAL EXAMINATION:  BP (!) 99/48   Pulse 101   Temp 99.9 °F (37.7 °C) (Core)   Resp (!) 37   Ht 5' 9\" (1.753 m)   Wt 157 lb 3 oz (71.3 kg)   SpO2 100%   BMI 23.21 kg/m²   tmax 101.5  General : sedated, orally intubated, febrile, labored uneven breathing  Neck - supple, no lymphadenopathy, JVD not raised  Heart -   Lungs - Air Entry- fair bilaterally; breath sounds : vesicular  Abdomen - soft, no tenderness  Upper Extremities  - no cyanosis, mottling; edema : left wrist edema  Lower Extremities: no cyanosis, mottling; edema : absent    Current Laboratory, Radiologic, Microbiologic, and Diagnostic studies reviewed  Data ReviewCBC:   Recent Labs     08/13/21  0415 08/14/21  0402 08/15/21  0355   WBC 10.3 11.2* 8.8   RBC 3.74* 3.83* 3.23*   HGB 12.3* 12.5* 10.8*   HCT 37.0* 37.3* 32.1*    199 190     BMP:   Recent Labs     08/13/21  0415 08/14/21  0402 08/15/21  0355   GLUCOSE 244* 142* 234*    142 139   K 3.4* 3.7 4.1   BUN 14 12 21*   CREATININE 0.54* 0.54* 1.18   CALCIUM 7.9* 8.0* 7.3*     ABGs:   Recent Labs     08/13/21  1400 08/14/21  0432 08/15/21  0526   PHART 7.415 7.368 7.321*   PO2ART 64.9* 102.0* 96.9   GAI4ASV 35.1 43.2 41.0   IWE7KJS 22.5 24.8 21.1*   M9ZNXPWK 92.5* 96.4 96.8      PT/INR:  No results found for: PTINR    ASSESSMENT / PLAN:    Alcohol withdrawal  Pain control - add fentanyl gtt  Thiamine, folic acid  atelectasis  RLL infiltrate -? pulm contusion -ABx changed to cefepime/ vancomycin  Acute hypoxic resp failure - Mechanical ventilation  Tube feeds - currently held due to high residuals  bacteremia - continue current ABx, repeat BCx in am  Soft restraints  Hypotension - start solu-cortef.  add vasopressin if needed to keep MAP 65    Plan of care discussed with Dr Jane Peck  Electronically signed by DONNY Patel - CNP on 08/15/21 at 12:31 PM.

## 2021-08-15 NOTE — PLAN OF CARE
Problem: Falls - Risk of:  Goal: Will remain free from falls  Description: Will remain free from falls  Outcome: Ongoing     Problem: Skin Integrity:  Goal: Will show no infection signs and symptoms  Description: Will show no infection signs and symptoms  Outcome: Ongoing   No new skin breakdown noted. Problem: Skin Integrity:  Goal: Absence of new skin breakdown  Description: Absence of new skin breakdown  Outcome: Ongoing     Problem: Non-Violent Restraints  Goal: Removal from restraints as soon as assessed to be safe  Outcome: Ongoing   Pt in wrist restraints to protect artificial airway. Pt does attempt to pull ETT and lines when restraints released for ROM.    Problem: Non-Violent Restraints  Goal: No harm/injury to patient while restraints in use  Outcome: Ongoing     Problem: Non-Violent Restraints  Goal: Patient's dignity will be maintained  Outcome: Ongoing     Problem: OXYGENATION/RESPIRATORY FUNCTION  Goal: Patient will maintain patent airway  8/15/2021 1949 by Dg Weathers RN  Outcome: Ongoing     Problem: OXYGENATION/RESPIRATORY FUNCTION  Goal: Patient will achieve/maintain normal respiratory rate/effort  Description: Respiratory rate and effort will be within normal limits for the patient  8/15/2021 1949 by Dg Weathers RN  Outcome: Ongoing     Problem: MECHANICAL VENTILATION  Goal: ET tube will be managed safely  8/15/2021 1949 by Dg Weathers RN  Outcome: Ongoing     Problem: Pain:  Goal: Pain level will decrease  Description: Pain level will decrease  Outcome: Ongoing   Fentanyl infusion started this shift with better control of pain AEB decreased resp rate  Problem: Pain:  Goal: Control of acute pain  Description: Control of acute pain  Outcome: Ongoing     Problem: Pain:  Goal: Control of chronic pain  Description: Control of chronic pain  Outcome: Ongoing

## 2021-08-15 NOTE — PLAN OF CARE
Problem: OXYGENATION/RESPIRATORY FUNCTION  Goal: Patient will maintain patent airway  8/15/2021 0906 by Eilleen Spatz, RCP  Outcome: Ongoing     Problem: OXYGENATION/RESPIRATORY FUNCTION  Goal: Patient will achieve/maintain normal respiratory rate/effort  Description: Respiratory rate and effort will be within normal limits for the patient  8/15/2021 0906 by Eilleen Spatz, RCP  Outcome: Ongoing     Problem: MECHANICAL VENTILATION  Goal: Patient will maintain patent airway  8/15/2021 0906 by Eilleen Spatz, RCP  Outcome: Ongoing     Problem: MECHANICAL VENTILATION  Goal: ET tube will be managed safely  8/15/2021 0906 by Eilleen Spatz, RCP  Outcome: Ongoing

## 2021-08-15 NOTE — PROGRESS NOTES
Diprivan returned to rate of 35mcg/kg/hr due to extreme tachypnea, rate 48-50. SBT ended and pt placed back on PRVC.

## 2021-08-15 NOTE — CARE COORDINATION
ONGOING DISCHARGE PLAN:    Unable to speak to pt. At this time. Pt. Remains on the Vent.     Pt. Remains on TPN, has PICC. TF continues.      Pt. Remains on IV Cefepime/Vanco & Precedex GTT.      IV Solucortef, 100 mg, Q8, started today. CIWA Scale.      LSW following for alcohol treatment options, when more stable.      PT/OT on board.      Per Nursing, Mom, lives in [place]. Will get good DC plan when more stable. Will continue to follow for additional discharge needs.     Electronically signed by Lucia Novak RN on 8/15/2021 at 2:44 PM

## 2021-08-15 NOTE — PROGRESS NOTES
Patient was seen and examined. Remains intubated on the ventilator. Not tolerating tube feeds. High residuals. On TPN. Fever has subsided. Vital signs are stable. Urine output is adequate. Abdomen is soft. Extremity left upper extremity elevated. Blood work reviewed. WBC count is normal.  Hemoglobin is 10.8. BMP is unremarkable. Add IV Reglan every 6 hours. Recheck residual.  Restart tube feeds if residuals are low.

## 2021-08-15 NOTE — PLAN OF CARE
Patient has tube feed and TPN. Increasing tube feed per order. Problem: OXYGENATION/RESPIRATORY FUNCTION  Goal: Patient will maintain patent airway  8/15/2021 0303 by Awa Farias RN  Outcome: Ongoing  Note: Patient remains orally intubated and sedated on vent. Airway remains patent. Goal: Patient will achieve/maintain normal respiratory rate/effort  Description: Respiratory rate and effort will be within normal limits for the patient  8/15/2021 0303 by Awa Farias RN  Outcome: Ongoing  Note: Patient's RR high. Sedation increased as shown in MAR. MD aware. Problem: MECHANICAL VENTILATION  Goal: Patient will maintain patent airway  8/15/2021 0303 by Awa Farias RN  Outcome: Ongoing  Goal: ET tube will be managed safely  8/15/2021 0303 by Awa Farias RN  Outcome: Ongoing     Problem: Pain:  Goal: Pain level will decrease  Description: Pain level will decrease  8/15/2021 0303 by Awa Farias RN  Outcome: Ongoing  Goal: Recognizes and communicates pain  Description: Recognizes and communicates pain  8/15/2021 0303 by Awa Farias RN  Outcome: Ongoing  Goal: Control of acute pain  Description: Control of acute pain  8/15/2021 0303 by Awa Farias RN  Outcome: Ongoing  Note: Pain assessed at regular intervals using CPOT. Pain medication given as shown in [facility]    Goal: Control of chronic pain  Description: Control of chronic pain  8/15/2021 0303 by Awa Farias RN  Outcome: Ongoing

## 2021-08-15 NOTE — PROGRESS NOTES
Darren Cardona Patient Age: 18 year old  MESSAGE:   Patient states he called to schedule with Psychiatry and they can not get him in until April. Patient would like to know if there is someone else he can be referred to. Patient does not want to drive to [place]     WEIGHT AND HEIGHT:   Wt Readings from Last 1 Encounters:   09/25/19 80.4 kg (177 lb 3.2 oz)     Ht Readings from Last 1 Encounters:   09/25/19 6' (1.829 m)     BMI Readings from Last 1 Encounters:   09/25/19 24.03 kg/m²       ALLERGIES:  Caffeine; Cephalexin; and Unknown  Current Outpatient Medications   Medication   • venlafaxine XR (EFFEXOR XR) 150 MG 24 hr capsule     No current facility-administered medications for this visit.      PHARMACY to use: see below          Pharmacy preference(s) on file:   [id number] - [place], [place] - [address] [place] [address] RD AT [facility] [address] [place] [address] [place] [address]  [address] [place] [id number]  Phone: [phone] Fax: [phone]      CALL BACK INFO: Ok to leave response (including medical information) on answering machine  ROUTING: Patient's physician/staff        PCP: Destin Cordoba MD         INS: Payor: [employer] / Plan: [employer] / Product Type: [employer] Risk   PATIENT ADDRESS:   [facility] [id number]   Placed pt on spontaneous weaning trial 12/8 pt getting proper volumes pt does have a RR mid 30's however pt has been breathing in mid to upper 30's all night.  Will continue to monitor pt

## 2021-08-15 NOTE — FLOWSHEET NOTE
Tricia Toney, RN set up [employer] for today with patient's grandmother in [place] [address]      08/15/21 1526   Encounter Summary   Services provided to: Patient   Referral/Consult From: Rounding   Complexity of Encounter Low   Length of Encounter 15 minutes   Spiritual Assessment Completed Yes   Spiritual/Samaritan   Type Spiritual support   Assessment Unable to respond   Intervention Prayer

## 2021-08-15 NOTE — PLAN OF CARE
Nutrition Problem #1: Severe malnutrition  Intervention: Food and/or Nutrient Delivery: Continue NPO, Modify Tube Feeding, Modify Parenteral Nutrition  Nutritional Goals: Meet % of estimated nutrition needs

## 2021-08-16 ENCOUNTER — APPOINTMENT (OUTPATIENT)
Dept: GENERAL RADIOLOGY | Age: 56
DRG: 351 | End: 2021-08-16
Payer: MEDICAID

## 2021-08-16 ENCOUNTER — APPOINTMENT (OUTPATIENT)
Dept: CT IMAGING | Age: 56
DRG: 351 | End: 2021-08-16
Payer: MEDICAID

## 2021-08-16 PROBLEM — J96.00 ACUTE RESPIRATORY FAILURE (HCC): Status: ACTIVE | Noted: 2021-08-16

## 2021-08-16 LAB
ALBUMIN SERPL-MCNC: 1.3 G/DL (ref 3.5–5.2)
ALBUMIN/GLOBULIN RATIO: ABNORMAL (ref 1–2.5)
ALLEN TEST: ABNORMAL
ALP BLD-CCNC: 58 U/L (ref 40–129)
ALT SERPL-CCNC: 24 U/L (ref 5–41)
ANION GAP SERPL CALCULATED.3IONS-SCNC: 7 MMOL/L (ref 9–17)
AST SERPL-CCNC: 26 U/L
BILIRUB SERPL-MCNC: 0.43 MG/DL (ref 0.3–1.2)
BNP INTERPRETATION: ABNORMAL
BUN BLDV-MCNC: 30 MG/DL (ref 6–20)
BUN/CREAT BLD: ABNORMAL (ref 9–20)
CALCIUM SERPL-MCNC: 7.5 MG/DL (ref 8.6–10.4)
CARBOXYHEMOGLOBIN: 0.7 % (ref 0–5)
CHLORIDE BLD-SCNC: 110 MMOL/L (ref 98–107)
CO2: 21 MMOL/L (ref 20–31)
CREAT SERPL-MCNC: 1.27 MG/DL (ref 0.7–1.2)
CULTURE: ABNORMAL
CULTURE: NO GROWTH
DIRECT EXAM: ABNORMAL
FIO2: 35
GFR AFRICAN AMERICAN: >60 ML/MIN
GFR NON-AFRICAN AMERICAN: 58 ML/MIN
GFR SERPL CREATININE-BSD FRML MDRD: ABNORMAL ML/MIN/{1.73_M2}
GFR SERPL CREATININE-BSD FRML MDRD: ABNORMAL ML/MIN/{1.73_M2}
GLUCOSE BLD-MCNC: 218 MG/DL (ref 75–110)
GLUCOSE BLD-MCNC: 220 MG/DL (ref 75–110)
GLUCOSE BLD-MCNC: 226 MG/DL (ref 75–110)
GLUCOSE BLD-MCNC: 231 MG/DL (ref 75–110)
GLUCOSE BLD-MCNC: 237 MG/DL (ref 75–110)
GLUCOSE BLD-MCNC: 274 MG/DL (ref 70–99)
HCO3 ARTERIAL: 22.5 MMOL/L (ref 22–26)
HCT VFR BLD CALC: 29.1 % (ref 41–53)
HEMOGLOBIN: 9.3 G/DL (ref 13.5–17.5)
INR BLD: 0.9
LV EF: 35 %
LVEF MODALITY: NORMAL
Lab: ABNORMAL
Lab: NORMAL
MAGNESIUM: 2.2 MG/DL (ref 1.6–2.6)
MCH RBC QN AUTO: 32.2 PG (ref 26–34)
MCHC RBC AUTO-ENTMCNC: 32.1 G/DL (ref 31–37)
MCV RBC AUTO: 100.3 FL (ref 80–100)
METHEMOGLOBIN: 2.4 % (ref 0–1.9)
MODE: ABNORMAL
NEGATIVE BASE EXCESS, ART: 2.4 MMOL/L (ref 0–2)
NOTIFICATION TIME: ABNORMAL
NOTIFICATION: ABNORMAL
NRBC AUTOMATED: ABNORMAL PER 100 WBC
O2 DEVICE/FLOW/%: ABNORMAL
O2 SAT, ARTERIAL: 93.5 % (ref 95–98)
OXYHEMOGLOBIN: ABNORMAL % (ref 95–98)
PARTIAL THROMBOPLASTIN TIME: 36.5 SEC (ref 24–36)
PATIENT TEMP: 37
PCO2 ARTERIAL: 36.8 MMHG (ref 35–45)
PCO2, ART, TEMP ADJ: ABNORMAL (ref 35–45)
PDW BLD-RTO: 15.1 % (ref 11.5–14.9)
PEEP/CPAP: 8
PH ARTERIAL: 7.39 (ref 7.35–7.45)
PH, ART, TEMP ADJ: ABNORMAL (ref 7.35–7.45)
PHOSPHORUS: 3.8 MG/DL (ref 2.5–4.5)
PLATELET # BLD: 198 K/UL (ref 150–450)
PMV BLD AUTO: 8.5 FL (ref 6–12)
PO2 ARTERIAL: 77.6 MMHG (ref 80–100)
PO2, ART, TEMP ADJ: ABNORMAL MMHG (ref 80–100)
POSITIVE BASE EXCESS, ART: ABNORMAL MMOL/L (ref 0–2)
POTASSIUM SERPL-SCNC: 5 MMOL/L (ref 3.7–5.3)
PRO-BNP: 3439 PG/ML
PROTHROMBIN TIME: 12.4 SEC (ref 11.8–14.6)
PSV: ABNORMAL
PT. POSITION: ABNORMAL
RBC # BLD: 2.9 M/UL (ref 4.5–5.9)
RESPIRATORY RATE: 20
SAMPLE SITE: ABNORMAL
SARS-COV-2, RAPID: NOT DETECTED
SET RATE: 20
SODIUM BLD-SCNC: 138 MMOL/L (ref 135–144)
SPECIMEN DESCRIPTION: ABNORMAL
SPECIMEN DESCRIPTION: NORMAL
SPECIMEN DESCRIPTION: NORMAL
TEXT FOR RESPIRATORY: ABNORMAL
TOTAL HB: ABNORMAL G/DL (ref 12–16)
TOTAL PROTEIN: 4.8 G/DL (ref 6.4–8.3)
TOTAL RATE: 26
TRIGL SERPL-MCNC: 101 MG/DL
VT: 500
WBC # BLD: 9.9 K/UL (ref 3.5–11)

## 2021-08-16 PROCEDURE — 87040 BLOOD CULTURE FOR BACTERIA: CPT

## 2021-08-16 PROCEDURE — 6370000000 HC RX 637 (ALT 250 FOR IP): Performed by: STUDENT IN AN ORGANIZED HEALTH CARE EDUCATION/TRAINING PROGRAM

## 2021-08-16 PROCEDURE — 2580000003 HC RX 258: Performed by: STUDENT IN AN ORGANIZED HEALTH CARE EDUCATION/TRAINING PROGRAM

## 2021-08-16 PROCEDURE — 36415 COLL VENOUS BLD VENIPUNCTURE: CPT

## 2021-08-16 PROCEDURE — 85730 THROMBOPLASTIN TIME PARTIAL: CPT

## 2021-08-16 PROCEDURE — 6360000002 HC RX W HCPCS: Performed by: INTERNAL MEDICINE

## 2021-08-16 PROCEDURE — 84100 ASSAY OF PHOSPHORUS: CPT

## 2021-08-16 PROCEDURE — 6360000002 HC RX W HCPCS: Performed by: STUDENT IN AN ORGANIZED HEALTH CARE EDUCATION/TRAINING PROGRAM

## 2021-08-16 PROCEDURE — 6360000002 HC RX W HCPCS: Performed by: NURSE PRACTITIONER

## 2021-08-16 PROCEDURE — 71045 X-RAY EXAM CHEST 1 VIEW: CPT

## 2021-08-16 PROCEDURE — 2700000000 HC OXYGEN THERAPY PER DAY

## 2021-08-16 PROCEDURE — 99223 1ST HOSP IP/OBS HIGH 75: CPT | Performed by: INTERNAL MEDICINE

## 2021-08-16 PROCEDURE — 36600 WITHDRAWAL OF ARTERIAL BLOOD: CPT

## 2021-08-16 PROCEDURE — 2580000003 HC RX 258: Performed by: NURSE PRACTITIONER

## 2021-08-16 PROCEDURE — 83880 ASSAY OF NATRIURETIC PEPTIDE: CPT

## 2021-08-16 PROCEDURE — 82805 BLOOD GASES W/O2 SATURATION: CPT

## 2021-08-16 PROCEDURE — 82947 ASSAY GLUCOSE BLOOD QUANT: CPT

## 2021-08-16 PROCEDURE — 71250 CT THORAX DX C-: CPT

## 2021-08-16 PROCEDURE — 85610 PROTHROMBIN TIME: CPT

## 2021-08-16 PROCEDURE — 2580000003 HC RX 258

## 2021-08-16 PROCEDURE — 94761 N-INVAS EAR/PLS OXIMETRY MLT: CPT

## 2021-08-16 PROCEDURE — 94003 VENT MGMT INPAT SUBQ DAY: CPT

## 2021-08-16 PROCEDURE — 83735 ASSAY OF MAGNESIUM: CPT

## 2021-08-16 PROCEDURE — 80053 COMPREHEN METABOLIC PANEL: CPT

## 2021-08-16 PROCEDURE — 86403 PARTICLE AGGLUT ANTBDY SCRN: CPT

## 2021-08-16 PROCEDURE — 2000000000 HC ICU R&B

## 2021-08-16 PROCEDURE — 93306 TTE W/DOPPLER COMPLETE: CPT

## 2021-08-16 PROCEDURE — 6360000002 HC RX W HCPCS: Performed by: SURGERY

## 2021-08-16 PROCEDURE — 6370000000 HC RX 637 (ALT 250 FOR IP)

## 2021-08-16 PROCEDURE — 2500000003 HC RX 250 WO HCPCS: Performed by: NURSE PRACTITIONER

## 2021-08-16 PROCEDURE — 87635 SARS-COV-2 COVID-19 AMP PRB: CPT

## 2021-08-16 PROCEDURE — 6370000000 HC RX 637 (ALT 250 FOR IP): Performed by: NURSE PRACTITIONER

## 2021-08-16 PROCEDURE — 85027 COMPLETE CBC AUTOMATED: CPT

## 2021-08-16 PROCEDURE — 6360000002 HC RX W HCPCS

## 2021-08-16 PROCEDURE — 99255 IP/OBS CONSLTJ NEW/EST HI 80: CPT | Performed by: INTERNAL MEDICINE

## 2021-08-16 PROCEDURE — 93926 LOWER EXTREMITY STUDY: CPT

## 2021-08-16 PROCEDURE — 84478 ASSAY OF TRIGLYCERIDES: CPT

## 2021-08-16 PROCEDURE — 87205 SMEAR GRAM STAIN: CPT

## 2021-08-16 RX ORDER — SODIUM CHLORIDE 450 MG/100ML
INJECTION, SOLUTION INTRAVENOUS CONTINUOUS
Status: DISCONTINUED | OUTPATIENT
Start: 2021-08-16 | End: 2021-08-17

## 2021-08-16 RX ORDER — FUROSEMIDE 10 MG/ML
40 INJECTION INTRAMUSCULAR; INTRAVENOUS ONCE
Status: COMPLETED | OUTPATIENT
Start: 2021-08-16 | End: 2021-08-16

## 2021-08-16 RX ORDER — INSULIN GLARGINE 100 [IU]/ML
10 INJECTION, SOLUTION SUBCUTANEOUS NIGHTLY
Status: DISCONTINUED | OUTPATIENT
Start: 2021-08-16 | End: 2021-08-25

## 2021-08-16 RX ADMIN — HYDROCORTISONE SODIUM SUCCINATE 100 MG: 100 INJECTION, POWDER, FOR SOLUTION INTRAMUSCULAR; INTRAVENOUS at 13:36

## 2021-08-16 RX ADMIN — SODIUM CHLORIDE, PRESERVATIVE FREE 10 ML: 5 INJECTION INTRAVENOUS at 21:42

## 2021-08-16 RX ADMIN — ERYTHROMYCIN: 5 OINTMENT OPHTHALMIC at 19:19

## 2021-08-16 RX ADMIN — PROPOFOL 30 MCG/KG/MIN: 10 INJECTION, EMULSION INTRAVENOUS at 05:34

## 2021-08-16 RX ADMIN — INSULIN LISPRO 4 UNITS: 100 INJECTION, SOLUTION INTRAVENOUS; SUBCUTANEOUS at 22:03

## 2021-08-16 RX ADMIN — VANCOMYCIN HYDROCHLORIDE 1000 MG: 1 INJECTION, POWDER, LYOPHILIZED, FOR SOLUTION INTRAVENOUS at 21:39

## 2021-08-16 RX ADMIN — ENOXAPARIN SODIUM 40 MG: 40 INJECTION SUBCUTANEOUS at 08:29

## 2021-08-16 RX ADMIN — INSULIN LISPRO 4 UNITS: 100 INJECTION, SOLUTION INTRAVENOUS; SUBCUTANEOUS at 17:41

## 2021-08-16 RX ADMIN — HYDROCORTISONE SODIUM SUCCINATE 100 MG: 100 INJECTION, POWDER, FOR SOLUTION INTRAMUSCULAR; INTRAVENOUS at 05:29

## 2021-08-16 RX ADMIN — METOCLOPRAMIDE 10 MG: 5 INJECTION, SOLUTION INTRAMUSCULAR; INTRAVENOUS at 11:06

## 2021-08-16 RX ADMIN — SODIUM CHLORIDE: 4.5 INJECTION, SOLUTION INTRAVENOUS at 13:36

## 2021-08-16 RX ADMIN — CEFEPIME HYDROCHLORIDE 2000 MG: 2 INJECTION, POWDER, FOR SOLUTION INTRAVENOUS at 04:41

## 2021-08-16 RX ADMIN — FENTANYL CITRATE 25 MCG/HR: 0.05 INJECTION, SOLUTION INTRAMUSCULAR; INTRAVENOUS at 22:03

## 2021-08-16 RX ADMIN — FAMOTIDINE 20 MG: 10 INJECTION, SOLUTION INTRAVENOUS at 19:19

## 2021-08-16 RX ADMIN — SODIUM CHLORIDE: 9 INJECTION, SOLUTION INTRAVENOUS at 08:04

## 2021-08-16 RX ADMIN — METOCLOPRAMIDE 10 MG: 5 INJECTION, SOLUTION INTRAMUSCULAR; INTRAVENOUS at 17:41

## 2021-08-16 RX ADMIN — FAMOTIDINE 20 MG: 10 INJECTION, SOLUTION INTRAVENOUS at 08:29

## 2021-08-16 RX ADMIN — SODIUM CHLORIDE, PRESERVATIVE FREE 10 ML: 5 INJECTION INTRAVENOUS at 08:30

## 2021-08-16 RX ADMIN — INSULIN LISPRO 4 UNITS: 100 INJECTION, SOLUTION INTRAVENOUS; SUBCUTANEOUS at 11:06

## 2021-08-16 RX ADMIN — INSULIN GLARGINE 10 UNITS: 100 INJECTION, SOLUTION SUBCUTANEOUS at 22:03

## 2021-08-16 RX ADMIN — INSULIN LISPRO 6 UNITS: 100 INJECTION, SOLUTION INTRAVENOUS; SUBCUTANEOUS at 05:53

## 2021-08-16 RX ADMIN — POLYETHYLENE GLYCOL 3350 17 G: 17 POWDER, FOR SOLUTION ORAL at 08:30

## 2021-08-16 RX ADMIN — PROPOFOL 30 MCG/KG/MIN: 10 INJECTION, EMULSION INTRAVENOUS at 19:34

## 2021-08-16 RX ADMIN — METOCLOPRAMIDE 10 MG: 5 INJECTION, SOLUTION INTRAMUSCULAR; INTRAVENOUS at 05:29

## 2021-08-16 RX ADMIN — METOCLOPRAMIDE 10 MG: 5 INJECTION, SOLUTION INTRAMUSCULAR; INTRAVENOUS at 00:21

## 2021-08-16 RX ADMIN — CEFEPIME HYDROCHLORIDE 2000 MG: 2 INJECTION, POWDER, FOR SOLUTION INTRAVENOUS at 11:06

## 2021-08-16 RX ADMIN — PROPOFOL 30 MCG/KG/MIN: 10 INJECTION, EMULSION INTRAVENOUS at 15:38

## 2021-08-16 RX ADMIN — METOCLOPRAMIDE 10 MG: 5 INJECTION, SOLUTION INTRAMUSCULAR; INTRAVENOUS at 23:47

## 2021-08-16 RX ADMIN — FUROSEMIDE 40 MG: 10 INJECTION, SOLUTION INTRAMUSCULAR; INTRAVENOUS at 13:37

## 2021-08-16 RX ADMIN — HYDROCORTISONE SODIUM SUCCINATE 100 MG: 100 INJECTION, POWDER, FOR SOLUTION INTRAMUSCULAR; INTRAVENOUS at 19:34

## 2021-08-16 ASSESSMENT — PULMONARY FUNCTION TESTS
PIF_VALUE: 18
PIF_VALUE: 22
PIF_VALUE: 18
PIF_VALUE: 16
PIF_VALUE: 19
PIF_VALUE: 23
PIF_VALUE: 19
PIF_VALUE: 19
PIF_VALUE: 20
PIF_VALUE: 20
PIF_VALUE: 19
PIF_VALUE: 17
PIF_VALUE: 19
PIF_VALUE: 22
PIF_VALUE: 18
PIF_VALUE: 14
PIF_VALUE: 16
PIF_VALUE: 11
PIF_VALUE: 17
PIF_VALUE: 19
PIF_VALUE: 22
PIF_VALUE: 25
PIF_VALUE: 19
PIF_VALUE: 15
PIF_VALUE: 20
PIF_VALUE: 14
PIF_VALUE: 21
PIF_VALUE: 20

## 2021-08-16 ASSESSMENT — PAIN SCALES - GENERAL
PAINLEVEL_OUTOF10: 0
PAINLEVEL_OUTOF10: 1
PAINLEVEL_OUTOF10: 0

## 2021-08-16 NOTE — PROGRESS NOTES
Comprehensive Nutrition Assessment    Type and Reason for Visit:  Reassess    Nutrition Recommendations/Plan: Change tube feeding from Vital HP to Vital AF to provide mores calories with less volume since TPN being weaned off. Nutrition Assessment:  TPN currently at 40 ml being decreased to 20 ml/hr then off after current bag. Due to fluid concerns changing tube feeding to Vital AF 1.2 to start at 20 ml increase as tolerated to 65 ml/hr to provide: 1872 kcal, 117 gm protein. Pt remains intubated with Propofol at 11.1 ml providing 293 kcal.    Malnutrition Assessment:  Malnutrition Status:  Severe malnutrition    Context:  Acute Illness     Findings of the 6 clinical characteristics of malnutrition:  Energy Intake:  1 - 75% or less of estimated energy requirements for 7 or more days (Prior to admission)  Weight Loss:  1 - 7.5% over 3 months     Body Fat Loss:  Unable to assess     Muscle Mass Loss:  7 - Moderate muscle mass loss Clavicles (pectoralis & deltoids), Thigh (quadraceps)  Fluid Accumulation:  No significant fluid accumulation Extremities   Strength:  Not Performed    Estimated Daily Nutrient Needs:  Energy (kcal):  6637-6305 kcals based on 28-30 kcal/kg using adm wt 61.6 kg; Weight Used for Energy Requirements:  Admission (revised)     Protein (g):  105-117 gm protein based on 1.7-1.9 gm/kg using adm wt; Weight Used for Protein Requirements:  Admission (revised)          Nutrition Related Findings:  Edema: +2 BUE's. Labs and meds reviewed.       Wounds:   (Abrasions)       Current Nutrition Therapies:    Diet NPO  ADULT TUBE FEEDING; Orogastric; Peptide Based; Continuous; 65; No; 30; Q 4 hours  Current Tube Feeding (TF) Orders:  · Feeding Route: Orogastric  · Formula: Peptide Based  · Schedule: Continuous  · Goal TF & Flush Orders Provides: at goal: 1872 kcal, 117 gm protein      Anthropometric Measures:  · Height: 5' 9\" (175.3 cm)  · Current Body Weight: 163 lb (73.9 kg)   · Admission Body Weight: 135 lb 12.9 oz (61.6 kg)    · Usual Body Weight: 150 lb (68 kg) (Estimated)     · Ideal Body Weight: 160 lbs; % Ideal Body Weight 84.9 %   · BMI: 24.1  · BMI Categories: Normal Weight (BMI 18.5-24. 9)       Nutrition Diagnosis:   · Severe malnutrition related to inadequate protein-energy intake as evidenced by weight loss 7.5% in 3 months, poor intake prior to admission, moderate muscle loss      Nutrition Interventions:   Food and/or Nutrient Delivery:  Continue NPO, Modify Tube Feeding, Discontinue Parenteral Nutrition  Nutrition Education/Counseling:  No recommendation at this time   Coordination of Nutrition Care:  Continue to monitor while inpatient    Goals:  Meet % of estimated nutrition needs       Nutrition Monitoring and Evaluation:   Behavioral-Environmental Outcomes:  None Identified   Food/Nutrient Intake Outcomes:  Enteral Nutrition Intake/Tolerance  Physical Signs/Symptoms Outcomes:  Biochemical Data, Fluid Status or Edema, GI Status, Hemodynamic Status, Nutrition Focused Physical Findings, Skin, Weight     Discharge Planning: Too soon to determine     Some areas of assessment may be incomplete due to COVID-19 precautions. López Cool R.D., L.D.   Clinical Dietitian  Office: [phone]

## 2021-08-16 NOTE — CONSULTS
[facility] -   Infectious diseases evaluation  admission date 8/10/2021    reason for consultation:   MRSA bacteremia    Impression :   Current:  · MRSA bacteremia suspect pulmonary source of infection  · Left hand and wrist cellulitis  · Left wrist fracture  · Syncopal episode  · Alcohol withdrawal  · Acute respiratory failure requiring intubation  · Rib fracture with possible hemothorax    Other:  · History of alcohol abuse  · COPD  · History of depression  · History of seizure  · History of hypertension. Recommendations   · Continue IV vancomycin  · Discontinue cefepime  · CT chest without contrast  · Repeat blood cultures  · Echocardiogram  · Follow CBC and renal function      History of Present Illness:   Initial history:  Dayana Martin is a 60y.o.-year-old male presented to the hospital on 8/10/2021 after a syncopal episode, was complaining of pain to the right chest wall and abdomen. Reported sharp, constant pain aggravated by movement with no alleviating factors. The patient was previously evaluated at the ER 8/7/2021 after a fall found to have multiple rib fractures and a left wrist closed fracture. Tox screen positive for cannabinoids. CT head negative for acute intracranial abnormality. CT chest/abdomen/pelvis showed rib fractures with associated focal hemothorax. No acute processes in abdomen or pelvis. Right arm PICC line was placed 8/12/2021  He was placed on alcohol withdrawal protocol, developed respiratory distress was placed on BiPAP initially then was intubated 4/13/21. Baker catheter was placed on 4/13/2021  The patient was on Levaquin 8/13/2021 and 8/14/2021 that was discontinued and started on vancomycin and cefepime. He had a fever with a temperature max of 100.6 on 8/13/2021. Blood cultures 8/14/2021 grew MRSA as well as sputum culture. Chest x-ray 8/14/2021 showed bilateral airspace disease right greater than left.   Interval changes  8/16/2021   He is on intubated on propofol, was hypothermic overnight, was noted to have left hand and wrist redness. He had generalized edema which is improving according to nursing staff, tolerating tube feed. Blood pressure stable  COVID-19 rapid test was negative today. Patient Vitals for the past 8 hrs:   BP Temp Temp src Pulse Resp SpO2 Weight   08/16/21 1100 (!) 142/84 -- -- 77 30 96 % --   08/16/21 1000 122/69 -- -- 83 (!) 35 94 % --   08/16/21 0912 -- -- -- 80 (!) 31 96 % --   08/16/21 0900 110/60 -- -- 80 (!) 31 95 % --   08/16/21 0800 111/65 98.2 °F (36.8 °C) Bladder 79 28 95 % --   08/16/21 0700 126/68 -- -- 81 29 95 % --   08/16/21 0600 (!) 109/59 -- -- 84 30 94 % 163 lb 2.3 oz (74 kg)   08/16/21 0530 -- 97.9 °F (36.6 °C) Bladder 78 27 94 % --   08/16/21 0525 -- -- -- -- 28 94 % --   08/16/21 0500 119/77 -- -- 78 27 95 % --   08/16/21 0400 107/63 97.7 °F (36.5 °C) Bladder 76 28 94 % --   08/16/21 0345 -- -- -- 77 28 96 % --           I have personally reviewed the past medical history, past surgical history, medications, social history, and family history, and I haveupdated the database accordingly. Allergies:   Nickel     Review of Systems:     Review of Systems  Intubated, sedated unable to provide  Physical Examination :       Physical Exam  Constitutional:       Comments: Intubated, sedated   HENT:      Head: Normocephalic and atraumatic. Right Ear: External ear normal.      Left Ear: External ear normal.   Eyes:      General: No scleral icterus. Right eye: No discharge. Left eye: No discharge. Cardiovascular:      Rate and Rhythm: Normal rate and regular rhythm. Heart sounds: No murmur heard. Pulmonary:      Breath sounds: Rhonchi present. No wheezing. Abdominal:      General: Abdomen is flat. There is no distension. Palpations: Abdomen is soft.    Genitourinary:     Comments: Baker catheter in place  Musculoskeletal:      Right lower leg: Edema present. Left lower leg: Edema present. Comments: Bilateral upper extremity edema   Skin:     General: Skin is warm. Coloration: Skin is not jaundiced.       Comments: Left hand and wrist erythema and warmth, no fluctuation, no crepitance   Neurological:      Comments: Sedated     Right PICC line in place    Past Medical History:     Past Medical History:   Diagnosis Date    Alcohol abuse 6/4/2014    Anxiety     Arthritis     COPD (chronic obstructive pulmonary disease) ([address]     ASTHMA    DDD (degenerative disc disease), lumbar 9/6/2016    Depression     Heart burn     Hemorrhoids     HTN (hypertension) 1/19/2015    Ilioinguinal neuralgia of right side 4/10/2017    Psychiatric problem     depression /anxiety    Seizures ([facility] [address]     withdrawal from alcohol 2 years ago    Wears glasses     READING       Past Surgical  History:     Past Surgical History:   Procedure Laterality Date    ANESTHESIA NERVE BLOCK Right 4/10/2017    NERVE BLOCK US RIGHT SIDED ILIOINGUINAL performed by Steven Lopez MD at [address] [place] [address]  3/19/15    HERNIA REPAIR      NERVE BLOCK Right 10/10/2016    right groin    OTHER SURGICAL HISTORY Right 04/10/2017    US nerve block to R groin    TOE SURGERY      SCREW RIGHT BIG TOE       Medications:      insulin glargine  10 Units Subcutaneous Nightly    metoclopramide  10 mg Intravenous Q6H    hydrocortisone sodium succinate PF  100 mg Intravenous Q8H    insulin lispro  0-12 Units Subcutaneous Q6H    vancomycin  1,000 mg Intravenous Q24H    vancomycin (VANCOCIN) intermittent dosing (placeholder)   Other RX Placeholder    erythromycin   Both Eyes Nightly    cefepime  2,000 mg Intravenous Q8H    sodium chloride flush  5-40 mL Intravenous 2 times per day    enoxaparin  40 mg Subcutaneous Daily    famotidine (PEPCID) injection  20 mg Intravenous BID    nicotine  1 patch Transdermal Daily    lidocaine  1 patch Transdermal Daily       Social History:     Social History     Socioeconomic History    Marital status:      Spouse name: Not on file    Number of children: Not on file    Years of education: Not on file    Highest education level: Not on file   Occupational History    Not on file   Tobacco Use    Smoking status: Current Every Day Smoker     Packs/day: 1.00     Years: 38.00     Pack years: 38.00     Types: Cigarettes    Smokeless tobacco: Never Used    Tobacco comment: 1 PPD   Vaping Use    Vaping Use: Never used   Substance and Sexual Activity    Alcohol use: Yes     Alcohol/week: 12.0 standard drinks     Types: 12 Cans of beer per week     Comment: 12 24 oz cans daily    Drug use: Yes     Types: Marijuana     Comment: crack occasionally    Sexual activity: Never   Other Topics Concern    Not on file   Social History Narrative    Not on file     Social Determinants of Health     Financial Resource Strain:     Difficulty of Paying Living Expenses:    Food Insecurity:     Worried About Running Out of Food in the Last Year:     Ran Out of Food in the Last Year:    Transportation Needs:     Lack of Transportation (Medical):      Lack of Transportation (Non-Medical):    Physical Activity:     Days of Exercise per Week:     Minutes of Exercise per Session:    Stress:     Feeling of Stress :    Social Connections:     Frequency of Communication with Friends and Family:     Frequency of Social Gatherings with Friends and Family:     Attends [employer] Services:     Active Member of Clubs or Organizations:     Attends Club or Organization Meetings:     Marital Status:    Intimate Partner Violence:     Fear of Current or Ex-Partner:     Emotionally Abused:     Physically Abused:     Sexually Abused:        Family History:     Family History   Problem Relation Age of Onset    Cancer Mother         colon ca      Medical Decision Making:   I have independently reviewed/ordered the following labs:    CBC with Differential:   Recent Labs     08/15/21  0355 08/16/21 0433   WBC 8.8 9.9   HGB 10.8* 9.3*   HCT 32.1* 29.1*    198     BMP:  Recent Labs     08/15/21  0355 08/16/21 0433    138   K 4.1 5.0   * 110*   CO2 21 21   BUN 21* 30*   CREATININE 1.18 1.27*   MG 1.6 2.2     Hepatic Function Panel:   Recent Labs     08/15/21  0355 08/16/21 0433   PROT 4.6* 4.8*   LABALBU 1.4* 1.3*   BILITOT 0.68 0.43   ALKPHOS 52 58   ALT 27 24   AST 42* 26     No results for input(s): RPR in the last 72 hours. No results for input(s): HIV in the last 72 hours. No results for input(s): BC in the last 72 hours. Lab Results   Component Value Date    CREATININE 1.27 08/16/2021    GLUCOSE 274 08/16/2021       Detailed results: Thank you for allowing us to participate in the care of this patient. Please call with questions. This note is created with the assistance of a speech recognition program.  While intending to generate adocument that actually reflects the content of the visit, the document can still have some errors including those of syntax and sound a like substitutions which may escape proof reading. It such instances, actual meaningcan be extrapolated by contextual diversion.     Sue Holguin MD  Office: [phone]  Perfect serve / office [phone]

## 2021-08-16 NOTE — PROGRESS NOTES
Physical Therapy        Physical Therapy Cancel Note      DATE: 2021    NAME: Kya Montanez  MRN: [id number]   : 1965      Patient not seen this date for Physical Therapy due to:    2021 at 815- pt remains on vent. Will hold PT evaluation until pt is extubated and able to actively participate in therapy.       Electronically signed by Carmenza Mcmahon PT on 2021 at 8:15 AM

## 2021-08-16 NOTE — PROGRESS NOTES
[facility]   OCCUPATIONAL THERAPY MISSED TREATMENT NOTE   INPATIENT   Date: 21  Patient Name: Nupur Raza       Room:   MRN: [id number]   Account #: [de-identified]    : 1965  (60 y.o.)  Gender: male                 REASON FOR MISSED TREATMENT:  Patient unable to participate   -   Patient remains intubated. Will hold OT evaluation until patient is extubated and able to actively participate in therapy.        Elva Huang, OT

## 2021-08-16 NOTE — PROGRESS NOTES
PULMONARY PROGRESS NOTE:    REASON FOR VISIT: resp failure, DTs  Interval History:    Sedated, on vent  Febrile    Events since last visit: positive blood cultures, Tolerating TF    PAST MEDICAL HISTORY:      Scheduled Meds:   insulin glargine  10 Units Subcutaneous Nightly    metoclopramide  10 mg Intravenous Q6H    hydrocortisone sodium succinate PF  100 mg Intravenous Q8H    insulin lispro  0-12 Units Subcutaneous Q6H    vancomycin  1,000 mg Intravenous Q24H    vancomycin (VANCOCIN) intermittent dosing (placeholder)   Other RX Placeholder    erythromycin   Both Eyes Nightly    cefepime  2,000 mg Intravenous Q8H    sodium chloride flush  5-40 mL Intravenous 2 times per day    enoxaparin  40 mg Subcutaneous Daily    famotidine (PEPCID) injection  20 mg Intravenous BID    nicotine  1 patch Transdermal Daily    lidocaine  1 patch Transdermal Daily     Continuous Infusions:   sodium chloride 100 mL/hr at 08/16/21 0943    fentaNYL 25 mcg/hr (08/16/21 0808)    PN-Adult 2-in-1 Central Line (Standard) 20 mL/hr at 08/16/21 0828    dextrose      propofol 30 mcg/kg/min (08/16/21 0809)    norepinephrine      phenylephrine (OSVALDO-SYNEPHRINE) 50mg/250mL infusion Stopped (08/12/21 0533)    sodium chloride      sodium chloride      dexmedetomidine Stopped (08/16/21 0807)     PRN Meds:glucose, dextrose, glucagon (rDNA), dextrose, sodium phosphate IVPB **OR** sodium phosphate IVPB, sodium chloride, sodium chloride flush, sodium chloride, potassium chloride **OR** potassium alternative oral replacement **OR** potassium chloride, polyethylene glycol, acetaminophen **OR** acetaminophen, magnesium sulfate, ondansetron, LORazepam **OR** LORazepam **OR** LORazepam **OR** LORazepam **OR** LORazepam **OR** LORazepam **OR** LORazepam **OR** LORazepam, albuterol sulfate HFA, sodium chloride flush        PHYSICAL EXAMINATION:  /69   Pulse 83   Temp 98.2 °F (36.8 °C) (Bladder)   Resp (!) 35   Ht 5' 9\" (1.753

## 2021-08-16 NOTE — PLAN OF CARE
Problem: Falls - Risk of:  Goal: Will remain free from falls  Description: Will remain free from falls  8/16/2021 1548 by Sincere Sylvester RN  Outcome: Ongoing  Note: Bed remains in lowest position, call light within reach. Patient remains free of falls at this time. RN will continue to monitor. 8/16/2021 0415 by Marlo Mclean RN  Outcome: Met This Shift  Goal: Absence of physical injury  Description: Absence of physical injury  Outcome: Ongoing     Problem: Skin Integrity:  Goal: Will show no infection signs and symptoms  Description: Will show no infection signs and symptoms  8/16/2021 1548 by Sincere Sylvester RN  Outcome: Ongoing  Note: Patient turned and repositioned every 2 hours and as needed for comfort. Skin remains dry and intact. No new skin breakdown noted. 8/16/2021 0415 by Marlo Mclean RN  Outcome: Ongoing  Note: No new skin breakdown noted  Goal: Absence of new skin breakdown  Description: Absence of new skin breakdown  Outcome: Ongoing     Problem: Non-Violent Restraints  Goal: Removal from restraints as soon as assessed to be safe  8/16/2021 1548 by Sincere Sylvester RN  Outcome: Ongoing  Note: Attempts to pull at tubes when released. ROM assessed every 2 hours and visual safety checks completed hourly. Restraints maintained to preserve the patient's airway.    8/16/2021 0415 by Marlo Mclean RN  Outcome: Met This Shift  Note: Bilat soft wrist restraints continued to prevent accidental dislodging of ET  Goal: No harm/injury to patient while restraints in use  Outcome: Ongoing  Goal: Patient's dignity will be maintained  Outcome: Ongoing     Problem: Nutrition  Goal: Optimal nutrition therapy  8/16/2021 1548 by Sincere Sylvester RN  Outcome: Ongoing  8/16/2021 0415 by Marlo Mclean RN  Outcome: Ongoing  Note: Tubefeeds continue this shift, residual 30 and 100ml this shift, reglan continues as ordered     Problem: OXYGENATION/RESPIRATORY FUNCTION  Goal: Patient will maintain patent airway  8/16/2021 1548 by Wade Romero RN  Outcome: Ongoing  8/16/2021 0415 by Claire Rubio RN  Outcome: Ongoing  Note: Mechanical ventilation continues as ordered, CXR, ABg results noted, continuous pulse ox monitored  Goal: Patient will achieve/maintain normal respiratory rate/effort  Description: Respiratory rate and effort will be within normal limits for the patient  Outcome: Ongoing     Problem: MECHANICAL VENTILATION  Goal: Patient will maintain patent airway  Outcome: Ongoing  Note: Failed wean with elevated RR  Goal: ET tube will be managed safely  Outcome: Ongoing     Problem: Pain:  Goal: Pain level will decrease  Description: Pain level will decrease  8/16/2021 1548 by Wade Romero RN  Outcome: Ongoing  Note: Pain assessed at regular intervals. Medications administered as requested for comfort. Pain remains at a tolerable level.    8/16/2021 0415 by Claire Rubio RN  Outcome: Ongoing  Note: Fentanyl gtt continues for pain relief, dose can be titrated as needed  Goal: Recognizes and communicates pain  Description: Recognizes and communicates pain  Outcome: Ongoing  Goal: Control of acute pain  Description: Control of acute pain  Outcome: Ongoing  Goal: Control of chronic pain  Description: Control of chronic pain  Outcome: Ongoing

## 2021-08-16 NOTE — PROGRESS NOTES
Pharmacy Vancomycin Consult     Vancomycin Day: 2  Current Dosin mg every 12 hours. Current indication: Pneumonia    Temp max:  101.5 F    Recent Labs     21  0402 08/15/21  0355   BUN 12 21*   CREATININE 0.54* 1.18   WBC 11.2* 8.8       Intake/Output Summary (Last 24 hours) at 8/15/2021 2138  Last data filed at 8/15/2021 1804  Gross per 24 hour   Intake 6326.18 ml   Output 1750 ml   Net 4576.18 ml     Culture Date      Source                       Results  See micro    Ht Readings from Last 1 Encounters:   21 5' 9\" (1.753 m)        Wt Readings from Last 1 Encounters:   08/15/21 157 lb 3 oz (71.3 kg)       Body mass index is 23.21 kg/m². Estimated Creatinine Clearance: 68 mL/min (based on SCr of 1.18 mg/dL). Trough: 18.9 at 2055, last dose of 1 gm at 0950 this morning. Assessment/Plan: Serum creatinine elevated from 0.54 to 1.18 with resultant crcl of 68. Trough level within range of 15-20, but with so quick an elevation of serum creatinine, am reluctant to keep an every 12 hour schedule. I will change to 1000 mg every 24 hours and have morning pharmacist check to see which way the serum creatinine is going. If it returns to baseline, I would go back to an every 12 hour schedule. Diony Mckeon. Ph.  8/15/2021  9:42 PM

## 2021-08-16 NOTE — PROGRESS NOTES
Resident team and Surgical PA rounding at bedside. Teams updated to current care plans. Surgical team would like TPN to begin weaning, rate adjusted to 20 ml/hr. Tube feed residual 20 mls, rate increase to 50 ml/hr at this time which is goal. Resident team would like to keep normal saline rate to 100 ml/hr at this time until attending rounds start as patient has significant upper extremity swelling and total fluids already being over 200 ml/hr.

## 2021-08-16 NOTE — PROGRESS NOTES
Patient seen and examined. Afebrile, VSS. No leukocytosis, hemoglobin stable. Urine output adequate, creatinine trending up. Patient remains intubated on ventilator. Discussed with RN at bedside. Tube feeds started over weekend. Patient initially had high residuals however was started on Reglan. Residuals this morning only 20mL. Rate increased to goal. No BM yet. Abdomen soft, non-distended. Trace pedal edema, left wrist swelling increased over weekend as well. CXR reviewed. Surgically stable. Continue tube feeds as tolerated. Wean and DC TPN. Continue medical management. Patient was seen and examined. Tolerating tube feeds. Abdomen is soft. Blood work reviewed. Continue medical management. No acute general surgical issues. Wean and DC TPN.     Sandra Mandel PA-C  [facility]

## 2021-08-16 NOTE — PLAN OF CARE
Problem: Falls - Risk of:  Goal: Will remain free from falls  Description: Will remain free from falls  8/16/2021 0415 by Hayes Carvajal RN  Outcome: Met This Shift     Problem: Non-Violent Restraints  Goal: Removal from restraints as soon as assessed to be safe  8/16/2021 0415 by Hayes Carvajal RN  Outcome: Met This Shift  Note: Bilat soft wrist restraints continued to prevent accidental dislodging of ET     Problem: Skin Integrity:  Goal: Will show no infection signs and symptoms  Description: Will show no infection signs and symptoms  8/16/2021 0415 by Hayes Carvajal RN  Outcome: Ongoing  Note: No new skin breakdown noted     Problem: Nutrition  Goal: Optimal nutrition therapy  8/16/2021 0415 by Hayes Carvajal RN  Outcome: Ongoing  Note: Tubefeeds continue this shift, residual 30 and 100ml this shift, reglan continues as ordered     Problem: OXYGENATION/RESPIRATORY FUNCTION  Goal: Patient will maintain patent airway  8/16/2021 0415 by Hayes Carvajal RN  Outcome: Ongoing  Note: Mechanical ventilation continues as ordered, CXR, ABg results noted, continuous pulse ox monitored     Problem: Pain:  Goal: Pain level will decrease  Description: Pain level will decrease  8/16/2021 0415 by Hayes Carvajal RN  Outcome: Ongoing  Note: Fentanyl gtt continues for pain relief, dose can be titrated as needed
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
## 2021-08-17 ENCOUNTER — APPOINTMENT (OUTPATIENT)
Dept: ULTRASOUND IMAGING | Age: 56
DRG: 351 | End: 2021-08-17
Payer: MEDICAID

## 2021-08-17 ENCOUNTER — APPOINTMENT (OUTPATIENT)
Dept: GENERAL RADIOLOGY | Age: 56
DRG: 351 | End: 2021-08-17
Payer: MEDICAID

## 2021-08-17 ENCOUNTER — APPOINTMENT (OUTPATIENT)
Dept: INTERVENTIONAL RADIOLOGY/VASCULAR | Age: 56
DRG: 351 | End: 2021-08-17
Payer: MEDICAID

## 2021-08-17 LAB
ALBUMIN SERPL-MCNC: 1.5 G/DL (ref 3.5–5.2)
ALBUMIN/GLOBULIN RATIO: ABNORMAL (ref 1–2.5)
ALLEN TEST: ABNORMAL
ALP BLD-CCNC: 72 U/L (ref 40–129)
ALT SERPL-CCNC: 21 U/L (ref 5–41)
ANION GAP SERPL CALCULATED.3IONS-SCNC: 9 MMOL/L (ref 9–17)
APPEARANCE FLUID: NORMAL
AST SERPL-CCNC: 17 U/L
BILIRUB SERPL-MCNC: 0.35 MG/DL (ref 0.3–1.2)
BUN BLDV-MCNC: 40 MG/DL (ref 6–20)
BUN/CREAT BLD: ABNORMAL (ref 9–20)
CALCIUM IONIZED: 1.16 MMOL/L (ref 1.13–1.33)
CALCIUM SERPL-MCNC: 7.8 MG/DL (ref 8.6–10.4)
CARBOXYHEMOGLOBIN: 0.7 % (ref 0–5)
CHLORIDE BLD-SCNC: 113 MMOL/L (ref 98–107)
CO2: 23 MMOL/L (ref 20–31)
COLOR FLUID: NORMAL
COMPLEMENT C3: 114 MG/DL (ref 90–180)
COMPLEMENT C4: 20 MG/DL (ref 10–40)
CREAT SERPL-MCNC: 1.38 MG/DL (ref 0.7–1.2)
CREATININE URINE: 56 MG/DL (ref 39–259)
FIO2: 30
GFR AFRICAN AMERICAN: >60 ML/MIN
GFR NON-AFRICAN AMERICAN: 53 ML/MIN
GFR SERPL CREATININE-BSD FRML MDRD: ABNORMAL ML/MIN/{1.73_M2}
GFR SERPL CREATININE-BSD FRML MDRD: ABNORMAL ML/MIN/{1.73_M2}
GLUCOSE BLD-MCNC: 115 MG/DL (ref 75–110)
GLUCOSE BLD-MCNC: 120 MG/DL (ref 75–110)
GLUCOSE BLD-MCNC: 124 MG/DL (ref 75–110)
GLUCOSE BLD-MCNC: 136 MG/DL (ref 75–110)
GLUCOSE BLD-MCNC: 173 MG/DL (ref 70–99)
GLUCOSE BLD-MCNC: 93 MG/DL (ref 75–110)
GLUCOSE, FLUID: 4 MG/DL
HCO3 ARTERIAL: 24.9 MMOL/L (ref 22–26)
HCT VFR BLD CALC: 29.2 % (ref 41–53)
HEMOGLOBIN: 9.5 G/DL (ref 13.5–17.5)
MAGNESIUM: 2.1 MG/DL (ref 1.6–2.6)
MCH RBC QN AUTO: 31.7 PG (ref 26–34)
MCHC RBC AUTO-ENTMCNC: 32.4 G/DL (ref 31–37)
MCV RBC AUTO: 98.1 FL (ref 80–100)
METHEMOGLOBIN: 0.7 % (ref 0–1.9)
MODE: ABNORMAL
NEGATIVE BASE EXCESS, ART: ABNORMAL MMOL/L (ref 0–2)
NOTIFICATION TIME: ABNORMAL
NOTIFICATION: ABNORMAL
NRBC AUTOMATED: ABNORMAL PER 100 WBC
O2 DEVICE/FLOW/%: ABNORMAL
O2 SAT, ARTERIAL: 95 % (ref 95–98)
OXYHEMOGLOBIN: ABNORMAL % (ref 95–98)
PATIENT TEMP: 37.1
PCO2 ARTERIAL: 37.2 MMHG (ref 35–45)
PCO2, ART, TEMP ADJ: ABNORMAL (ref 35–45)
PDW BLD-RTO: 15 % (ref 11.5–14.9)
PEEP/CPAP: 5
PH ARTERIAL: 7.43 (ref 7.35–7.45)
PH, ART, TEMP ADJ: ABNORMAL (ref 7.35–7.45)
PLATELET # BLD: 244 K/UL (ref 150–450)
PMV BLD AUTO: 8.8 FL (ref 6–12)
PO2 ARTERIAL: 76.4 MMHG (ref 80–100)
PO2, ART, TEMP ADJ: ABNORMAL MMHG (ref 80–100)
POSITIVE BASE EXCESS, ART: 0.7 MMOL/L (ref 0–2)
POTASSIUM SERPL-SCNC: 4.3 MMOL/L (ref 3.7–5.3)
PSV: ABNORMAL
PT. POSITION: ABNORMAL
RBC # BLD: 2.98 M/UL (ref 4.5–5.9)
RBC FLUID: NORMAL /MM3
RESPIRATORY RATE: 20
SAMPLE SITE: ABNORMAL
SET RATE: 20
SODIUM BLD-SCNC: 145 MMOL/L (ref 135–144)
SODIUM,UR: 21 MMOL/L
SPECIMEN TYPE: NORMAL
TEXT FOR RESPIRATORY: ABNORMAL
TOTAL HB: ABNORMAL G/DL (ref 12–16)
TOTAL PROTEIN, BODY FLUID: 3.3 G/DL
TOTAL PROTEIN: 5.2 G/DL (ref 6.4–8.3)
TOTAL RATE: 28
VANCOMYCIN TROUGH DATE LAST DOSE: NORMAL
VANCOMYCIN TROUGH DOSE AMOUNT: 1000
VANCOMYCIN TROUGH TIME LAST DOSE: 2139
VANCOMYCIN TROUGH: 14.3 UG/ML (ref 10–20)
VT: 500
WBC # BLD: 11.4 K/UL (ref 3.5–11)
WBC FLUID: NORMAL /MM3

## 2021-08-17 PROCEDURE — 86403 PARTICLE AGGLUT ANTBDY SCRN: CPT

## 2021-08-17 PROCEDURE — 84300 ASSAY OF URINE SODIUM: CPT

## 2021-08-17 PROCEDURE — 87070 CULTURE OTHR SPECIMN AEROBIC: CPT

## 2021-08-17 PROCEDURE — 36415 COLL VENOUS BLD VENIPUNCTURE: CPT

## 2021-08-17 PROCEDURE — 6360000002 HC RX W HCPCS: Performed by: NURSE PRACTITIONER

## 2021-08-17 PROCEDURE — 6360000002 HC RX W HCPCS: Performed by: INTERNAL MEDICINE

## 2021-08-17 PROCEDURE — 84157 ASSAY OF PROTEIN OTHER: CPT

## 2021-08-17 PROCEDURE — 2700000000 HC OXYGEN THERAPY PER DAY

## 2021-08-17 PROCEDURE — 84165 PROTEIN E-PHORESIS SERUM: CPT

## 2021-08-17 PROCEDURE — 2580000003 HC RX 258: Performed by: NURSE PRACTITIONER

## 2021-08-17 PROCEDURE — 86038 ANTINUCLEAR ANTIBODIES: CPT

## 2021-08-17 PROCEDURE — 2000000000 HC ICU R&B

## 2021-08-17 PROCEDURE — 87075 CULTR BACTERIA EXCEPT BLOOD: CPT

## 2021-08-17 PROCEDURE — 6370000000 HC RX 637 (ALT 250 FOR IP)

## 2021-08-17 PROCEDURE — 0W993ZZ DRAINAGE OF RIGHT PLEURAL CAVITY, PERCUTANEOUS APPROACH: ICD-10-PCS | Performed by: RADIOLOGY

## 2021-08-17 PROCEDURE — 6360000002 HC RX W HCPCS

## 2021-08-17 PROCEDURE — 80202 ASSAY OF VANCOMYCIN: CPT

## 2021-08-17 PROCEDURE — 84155 ASSAY OF PROTEIN SERUM: CPT

## 2021-08-17 PROCEDURE — 71045 X-RAY EXAM CHEST 1 VIEW: CPT

## 2021-08-17 PROCEDURE — 2500000003 HC RX 250 WO HCPCS: Performed by: NURSE PRACTITIONER

## 2021-08-17 PROCEDURE — 82570 ASSAY OF URINE CREATININE: CPT

## 2021-08-17 PROCEDURE — 99233 SBSQ HOSP IP/OBS HIGH 50: CPT | Performed by: INTERNAL MEDICINE

## 2021-08-17 PROCEDURE — 2580000003 HC RX 258

## 2021-08-17 PROCEDURE — 6370000000 HC RX 637 (ALT 250 FOR IP): Performed by: STUDENT IN AN ORGANIZED HEALTH CARE EDUCATION/TRAINING PROGRAM

## 2021-08-17 PROCEDURE — 86160 COMPLEMENT ANTIGEN: CPT

## 2021-08-17 PROCEDURE — 80053 COMPREHEN METABOLIC PANEL: CPT

## 2021-08-17 PROCEDURE — 87186 SC STD MICRODIL/AGAR DIL: CPT

## 2021-08-17 PROCEDURE — 86162 COMPLEMENT TOTAL (CH50): CPT

## 2021-08-17 PROCEDURE — 82330 ASSAY OF CALCIUM: CPT

## 2021-08-17 PROCEDURE — 93971 EXTREMITY STUDY: CPT

## 2021-08-17 PROCEDURE — 94761 N-INVAS EAR/PLS OXIMETRY MLT: CPT

## 2021-08-17 PROCEDURE — 6370000000 HC RX 637 (ALT 250 FOR IP): Performed by: INTERNAL MEDICINE

## 2021-08-17 PROCEDURE — 94003 VENT MGMT INPAT SUBQ DAY: CPT

## 2021-08-17 PROCEDURE — 83735 ASSAY OF MAGNESIUM: CPT

## 2021-08-17 PROCEDURE — 6370000000 HC RX 637 (ALT 250 FOR IP): Performed by: NURSE PRACTITIONER

## 2021-08-17 PROCEDURE — 6360000002 HC RX W HCPCS: Performed by: SURGERY

## 2021-08-17 PROCEDURE — 86225 DNA ANTIBODY NATIVE: CPT

## 2021-08-17 PROCEDURE — 6370000000 HC RX 637 (ALT 250 FOR IP): Performed by: PHYSICIAN ASSISTANT

## 2021-08-17 PROCEDURE — 32555 ASPIRATE PLEURA W/ IMAGING: CPT

## 2021-08-17 PROCEDURE — 82945 GLUCOSE OTHER FLUID: CPT

## 2021-08-17 PROCEDURE — P9047 ALBUMIN (HUMAN), 25%, 50ML: HCPCS | Performed by: INTERNAL MEDICINE

## 2021-08-17 PROCEDURE — 82805 BLOOD GASES W/O2 SATURATION: CPT

## 2021-08-17 PROCEDURE — 6360000002 HC RX W HCPCS: Performed by: STUDENT IN AN ORGANIZED HEALTH CARE EDUCATION/TRAINING PROGRAM

## 2021-08-17 PROCEDURE — 2709999900 IR GUIDED THORACENTESIS PLEURAL

## 2021-08-17 PROCEDURE — 2580000003 HC RX 258: Performed by: INTERNAL MEDICINE

## 2021-08-17 PROCEDURE — 87205 SMEAR GRAM STAIN: CPT

## 2021-08-17 PROCEDURE — 82947 ASSAY GLUCOSE BLOOD QUANT: CPT

## 2021-08-17 PROCEDURE — 36600 WITHDRAWAL OF ARTERIAL BLOOD: CPT

## 2021-08-17 PROCEDURE — 85027 COMPLETE CBC AUTOMATED: CPT

## 2021-08-17 PROCEDURE — 76770 US EXAM ABDO BACK WALL COMP: CPT

## 2021-08-17 RX ORDER — DIGOXIN 0.25 MG/ML
125 INJECTION INTRAMUSCULAR; INTRAVENOUS ONCE
Status: CANCELLED | OUTPATIENT
Start: 2021-08-17 | End: 2021-08-17

## 2021-08-17 RX ORDER — DIGOXIN 0.25 MG/ML
125 INJECTION INTRAMUSCULAR; INTRAVENOUS ONCE
Status: COMPLETED | OUTPATIENT
Start: 2021-08-17 | End: 2021-08-17

## 2021-08-17 RX ORDER — CHLOROTHIAZIDE SODIUM 500 MG/1
500 INJECTION INTRAVENOUS ONCE
Status: COMPLETED | OUTPATIENT
Start: 2021-08-17 | End: 2021-08-17

## 2021-08-17 RX ORDER — METOPROLOL SUCCINATE 25 MG/1
25 TABLET, EXTENDED RELEASE ORAL DAILY
Status: DISCONTINUED | OUTPATIENT
Start: 2021-08-17 | End: 2021-08-17

## 2021-08-17 RX ORDER — ALBUMIN (HUMAN) 12.5 G/50ML
25 SOLUTION INTRAVENOUS ONCE
Status: COMPLETED | OUTPATIENT
Start: 2021-08-17 | End: 2021-08-17

## 2021-08-17 RX ADMIN — METOCLOPRAMIDE 10 MG: 5 INJECTION, SOLUTION INTRAMUSCULAR; INTRAVENOUS at 05:49

## 2021-08-17 RX ADMIN — METOCLOPRAMIDE 10 MG: 5 INJECTION, SOLUTION INTRAMUSCULAR; INTRAVENOUS at 18:18

## 2021-08-17 RX ADMIN — ERYTHROMYCIN: 5 OINTMENT OPHTHALMIC at 20:54

## 2021-08-17 RX ADMIN — PROPOFOL 40 MCG/KG/MIN: 10 INJECTION, EMULSION INTRAVENOUS at 16:31

## 2021-08-17 RX ADMIN — METOCLOPRAMIDE 10 MG: 5 INJECTION, SOLUTION INTRAMUSCULAR; INTRAVENOUS at 12:51

## 2021-08-17 RX ADMIN — CHLOROTHIAZIDE SODIUM 500 MG: 500 INJECTION, POWDER, LYOPHILIZED, FOR SOLUTION INTRAVENOUS at 15:39

## 2021-08-17 RX ADMIN — FAMOTIDINE 20 MG: 10 INJECTION, SOLUTION INTRAVENOUS at 08:13

## 2021-08-17 RX ADMIN — METOCLOPRAMIDE 10 MG: 5 INJECTION, SOLUTION INTRAMUSCULAR; INTRAVENOUS at 23:22

## 2021-08-17 RX ADMIN — METOPROLOL TARTRATE 12.5 MG: 25 TABLET, FILM COATED ORAL at 20:38

## 2021-08-17 RX ADMIN — PROPOFOL 40 MCG/KG/MIN: 10 INJECTION, EMULSION INTRAVENOUS at 11:00

## 2021-08-17 RX ADMIN — HYDROCORTISONE SODIUM SUCCINATE 100 MG: 100 INJECTION, POWDER, FOR SOLUTION INTRAMUSCULAR; INTRAVENOUS at 20:38

## 2021-08-17 RX ADMIN — INSULIN LISPRO 2 UNITS: 100 INJECTION, SOLUTION INTRAVENOUS; SUBCUTANEOUS at 06:44

## 2021-08-17 RX ADMIN — SODIUM CHLORIDE, PRESERVATIVE FREE 10 ML: 5 INJECTION INTRAVENOUS at 08:12

## 2021-08-17 RX ADMIN — METOPROLOL TARTRATE 12.5 MG: 25 TABLET, FILM COATED ORAL at 15:39

## 2021-08-17 RX ADMIN — HYDROCORTISONE SODIUM SUCCINATE 100 MG: 100 INJECTION, POWDER, FOR SOLUTION INTRAMUSCULAR; INTRAVENOUS at 12:51

## 2021-08-17 RX ADMIN — DIGOXIN 125 MCG: 0.25 INJECTION INTRAMUSCULAR; INTRAVENOUS at 15:38

## 2021-08-17 RX ADMIN — ENOXAPARIN SODIUM 70 MG: 80 INJECTION SUBCUTANEOUS at 20:37

## 2021-08-17 RX ADMIN — ALBUMIN (HUMAN) 25 G: 0.25 INJECTION, SOLUTION INTRAVENOUS at 15:38

## 2021-08-17 RX ADMIN — PROPOFOL 35 MCG/KG/MIN: 10 INJECTION, EMULSION INTRAVENOUS at 03:07

## 2021-08-17 RX ADMIN — VANCOMYCIN HYDROCHLORIDE 1000 MG: 1 INJECTION, POWDER, LYOPHILIZED, FOR SOLUTION INTRAVENOUS at 23:26

## 2021-08-17 RX ADMIN — HYDROCORTISONE SODIUM SUCCINATE 100 MG: 100 INJECTION, POWDER, FOR SOLUTION INTRAMUSCULAR; INTRAVENOUS at 05:50

## 2021-08-17 RX ADMIN — SODIUM CHLORIDE, PRESERVATIVE FREE 10 ML: 5 INJECTION INTRAVENOUS at 20:43

## 2021-08-17 RX ADMIN — FAMOTIDINE 20 MG: 10 INJECTION, SOLUTION INTRAVENOUS at 20:37

## 2021-08-17 RX ADMIN — PROPOFOL 40 MCG/KG/MIN: 10 INJECTION, EMULSION INTRAVENOUS at 23:23

## 2021-08-17 RX ADMIN — INSULIN GLARGINE 10 UNITS: 100 INJECTION, SOLUTION SUBCUTANEOUS at 20:37

## 2021-08-17 RX ADMIN — SODIUM CHLORIDE: 4.5 INJECTION, SOLUTION INTRAVENOUS at 05:49

## 2021-08-17 ASSESSMENT — PULMONARY FUNCTION TESTS
PIF_VALUE: 16
PIF_VALUE: 16
PIF_VALUE: 20
PIF_VALUE: 18
PIF_VALUE: 13
PIF_VALUE: 16
PIF_VALUE: 19
PIF_VALUE: 17
PIF_VALUE: 17
PIF_VALUE: 24
PIF_VALUE: 20
PIF_VALUE: 17
PIF_VALUE: 21
PIF_VALUE: 19
PIF_VALUE: 11
PIF_VALUE: 20

## 2021-08-17 ASSESSMENT — PAIN SCALES - GENERAL: PAINLEVEL_OUTOF10: 0

## 2021-08-17 NOTE — PROGRESS NOTES
Physical Therapy        Physical Therapy Cancel Note      DATE: 2021    NAME: Andrea Morales  MRN: [id number]   : 1965      Patient not seen this date for Physical Therapy due to:    Pt remains sedated on vent- will follow      Electronically signed by Tara Krishnan PT on 2021 at 12:01 PM

## 2021-08-17 NOTE — PLAN OF CARE
Problem: Falls - Risk of:  Goal: Will remain free from falls  Description: Will remain free from falls  8/17/2021 0412 by Lalo Arana RN  Outcome: Met This Shift     Problem: Skin Integrity:  Goal: Will show no infection signs and symptoms  Description: Will show no infection signs and symptoms  8/17/2021 0412 by Lalo Arana RN  Outcome: Met This Shift     Problem: Non-Violent Restraints  Goal: Removal from restraints as soon as assessed to be safe  8/17/2021 0412 by Lalo Arana RN  Outcome: Met This Shift  Note: Bilat soft wrist restraints continued to prevent accidental dislodging of ET     Problem: OXYGENATION/RESPIRATORY FUNCTION  Goal: Patient will maintain patent airway  8/17/2021 0412 by Lalo Arana RN  Outcome: Met This Shift  Note: Mechanical ventilation continues as ordered, CXR, ABG results noted     Problem: OXYGENATION/RESPIRATORY FUNCTION  Goal: Patient will maintain patent airway  8/17/2021 0412 by Lalo Arana RN  Outcome: Met This Shift  Note: Mechanical ventilation continues as ordered, CXR, ABG results noted     Problem: Pain:  Goal: Pain level will decrease  Description: Pain level will decrease  8/17/2021 0412 by Lalo Arana RN  Outcome: Ongoing  Note: Fentanyl gtt continues for pain relief and vent control, no grimacing noted
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
## 2021-08-17 NOTE — PROGRESS NOTES
PULMONARY PROGRESS NOTE:    REASON FOR VISIT: resp failure, DTs  Interval History:    Sedated, on vent  Febrile    Events since last visit: positive blood cultures, Tolerating TF, for thoracentesis today    PAST MEDICAL HISTORY:      Scheduled Meds:   insulin glargine  10 Units Subcutaneous Nightly    metoclopramide  10 mg Intravenous Q6H    hydrocortisone sodium succinate PF  100 mg Intravenous Q8H    insulin lispro  0-12 Units Subcutaneous Q6H    [Held by provider] vancomycin  1,000 mg Intravenous Q24H    vancomycin (VANCOCIN) intermittent dosing (placeholder)   Other RX Placeholder    erythromycin   Both Eyes Nightly    sodium chloride flush  5-40 mL Intravenous 2 times per day    enoxaparin  40 mg Subcutaneous Daily    famotidine (PEPCID) injection  20 mg Intravenous BID    nicotine  1 patch Transdermal Daily    lidocaine  1 patch Transdermal Daily     Continuous Infusions:   sodium chloride 50 mL/hr at 08/17/21 0549    fentaNYL 25 mcg/hr (08/16/21 2203)    dextrose      propofol 35 mcg/kg/min (08/17/21 0307)    norepinephrine      phenylephrine (OSVALDO-SYNEPHRINE) 50mg/250mL infusion Stopped (08/12/21 0533)    sodium chloride      sodium chloride      dexmedetomidine Stopped (08/16/21 0807)     PRN Meds:perflutren lipid microspheres, glucose, dextrose, glucagon (rDNA), dextrose, sodium phosphate IVPB **OR** sodium phosphate IVPB, sodium chloride, sodium chloride flush, sodium chloride, potassium chloride **OR** potassium alternative oral replacement **OR** potassium chloride, polyethylene glycol, acetaminophen **OR** acetaminophen, magnesium sulfate, ondansetron, LORazepam **OR** LORazepam **OR** LORazepam **OR** LORazepam **OR** LORazepam **OR** LORazepam **OR** LORazepam **OR** LORazepam, albuterol sulfate HFA, sodium chloride flush        PHYSICAL EXAMINATION:  /79   Pulse 66   Temp 98.2 °F (36.8 °C)   Resp 25   Ht 5' 9\" (1.753 m)   Wt 157 lb 10.1 oz (71.5 kg)   SpO2 93% BMI 23.28 kg/m²     General : sedated, orally intubated, febrile, labored uneven breathing  Neck - supple, no lymphadenopathy, JVD not raised  Heart -   Lungs - Air Entry- fair bilaterally; breath sounds : vesicular  Abdomen - soft, no tenderness  Upper Extremities  - no cyanosis, mottling; edema : left wrist edema  Lower Extremities: no cyanosis, mottling; edema : absent    Current Laboratory, Radiologic, Microbiologic, and Diagnostic studies reviewed  Data ReviewCBC:   Recent Labs     08/15/21  0355 08/16/21  0433 08/17/21  0405   WBC 8.8 9.9 11.4*   RBC 3.23* 2.90* 2.98*   HGB 10.8* 9.3* 9.5*   HCT 32.1* 29.1* 29.2*    198 244     BMP:   Recent Labs     08/15/21  0355 08/16/21  0433 08/17/21  0405   GLUCOSE 234* 274* 173*    138 145*   K 4.1 5.0 4.3   BUN 21* 30* 40*   CREATININE 1.18 1.27* 1.38*   CALCIUM 7.3* 7.5* 7.8*     ABGs:   Recent Labs     08/15/21  0526 08/16/21  0425 08/17/21  0438   PHART 7.321* 7.394 7.434   PO2ART 96.9 77.6* 76.4*   STA0NPA 41.0 36.8 37.2   DHM9DHK 21.1* 22.5 24.9   J1AACEYK 96.8 93.5* 95.0      PT/INR:  No results found for: PTINR    ASSESSMENT / PLAN:    Alcohol withdrawal  Pain control - add fentanyl gtt  Thiamine, folic acid  atelectasis  RLL infiltrate -? pulm contusion -ABx changed to cefepime/ vancomycin  Acute hypoxic resp failure - Mechanical ventilation  Tube feeds - currently held due to high residuals  bacteremia - continue current ABx, repeat BCx in am  Soft restraints  Hypotension - ont solu-cortef.  add vasopressin if needed to keep MAP 65  Check CT chest/ for thoracentesis today  SBT with PS 12    Electronically signed by Laura Vargas MD on 08/17/21 at 9:59 AM

## 2021-08-17 NOTE — PLAN OF CARE
Problem: OXYGENATION/RESPIRATORY FUNCTION  Goal: Patient will maintain patent airway  8/17/2021 1909 by Kelly Howe RCP  Outcome: Ongoing     Problem: OXYGENATION/RESPIRATORY FUNCTION  Goal: Patient will achieve/maintain normal respiratory rate/effort  Description: Respiratory rate and effort will be within normal limits for the patient  8/17/2021 1909 by Kelly Howe RCP  Outcome: Ongoing     Problem: MECHANICAL VENTILATION  Goal: ET tube will be managed safely  8/17/2021 1909 by Kelly Howe RCP  Outcome: Ongoing

## 2021-08-17 NOTE — PROGRESS NOTES
[address] is following pt for possible discharge to alcohol treatment program when stable. Pt may also need SNF. Will await input from therapy as discharge nears. Pt is still sedated and on vent. Pt has [facility] insurance which typically takes 2 days to approve SNF.
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
## 2021-08-17 NOTE — PROGRESS NOTES
Patient seen and examined. Afebrile, VSS. WBC increased however patient is on IV steroids. Hemoglobin stable. Patient remains intubated on ventilator. Discussed with nursing staff. Patient to have IR thoracentesis for large right pleural effusion. Patient was tolerating tube feeds at goal prior to NPO for procedure. No BM yet. Abdomen soft, non-tender, non-distended. No pedal edema. Left wrist/ahdn still swollen. Surgically stable. Resume tube feeds following thoracentesis. Continue medical management. Patient was seen and examined. Remains intubated. Blood work was reviewed. For thoracentesis today. Resume tube feeds after thoracentesis.     Carl Hansen PA-C  [facility]

## 2021-08-17 NOTE — PROGRESS NOTES
RN updated the patient's girlfriend Ibrahima Jewell. All questions answered. Ibrahima Jewell did bring it to our attention that the patient does have a daughter Odalys Marcos but does not know her phone number.

## 2021-08-17 NOTE — PROGRESS NOTES
Pt prepped and draped. Numbed and accessed per Dr. Pee Garza. 1.85 liters ml cloudy, purulent red fluid obtained and sent to lab. Access removed. Dressing applied. Fluid to lab. Pt tolerated well.

## 2021-08-17 NOTE — PROGRESS NOTES
[facility]    OCCUPATIONAL THERAPY MISSED TREATMENT NOTE   INPATIENT   Date: 21  Patient Name: Marina Zhong       Room:   MRN: [id number]   Account #: [de-identified]    : 1965  (60 y.o.)  Gender: male                 REASON FOR MISSED TREATMENT:  Patient unable to participate   -   Patient remains sedated and on vent support. Defer OT evaluation until patient is able to functionally participate in OT assessment.       Allison Masterson, OT

## 2021-08-17 NOTE — PROGRESS NOTES
[facility] -   Infectious diseases evaluation  admission date 8/10/2021    reason for consultation:   MRSA bacteremia    Impression :   Current:  · MRSA bacteremia suspect pulmonary source of infection  · Left hand and wrist cellulitis  · Left wrist fracture  · Syncopal episode  · Alcohol withdrawal  · Acute respiratory failure requiring intubation  · Rib fracture with possible hemothorax    Other:  · History of alcohol abuse  · COPD  · History of depression  · History of seizure  · History of hypertension. Recommendations   · IV vancomycin on hold due to worsening renal function, Vanco level this afternoon, redosing according to the level. · CT chest without contrast from yesterday reviewed, showed increased right pleural effusion with an area of loculated fluid. · Plan for thoracentesis today  · Repeat blood cultures no growth for 23 hours  · Echocardiogram noted, no reported vegetations  · Follow CBC and renal function  · Left upper arm Doppler report pending. History of Present Illness:   Initial history:  Elvira Majano is a 60y.o.-year-old male presented to the hospital on 8/10/2021 after a syncopal episode, was complaining of pain to the right chest wall and abdomen. Reported sharp, constant pain aggravated by movement with no alleviating factors. The patient was previously evaluated at the ER 8/7/2021 after a fall found to have multiple rib fractures and a left wrist closed fracture. Tox screen positive for cannabinoids. CT head negative for acute intracranial abnormality. CT chest/abdomen/pelvis showed rib fractures with associated focal hemothorax. No acute processes in abdomen or pelvis. Right arm PICC line was placed 8/12/2021  He was placed on alcohol withdrawal protocol, developed respiratory distress was placed on BiPAP initially then was intubated 4/13/21.   Baker catheter was placed on 4/13/2021  The patient was on Levaquin 8/13/2021 and lower leg: Edema present. Left lower leg: Edema present. Comments: Bilateral upper extremity edema   Skin:     General: Skin is warm. Coloration: Skin is not jaundiced.       Comments: Left hand and wrist erythema and warmth, no fluctuation, no crepitance   Neurological:      Comments: Sedated     Right PICC line in place    Past Medical History:     Past Medical History:   Diagnosis Date    Alcohol abuse 6/4/2014    Anxiety     Arthritis     COPD (chronic obstructive pulmonary disease) ([facility] [address]     ASTHMA    DDD (degenerative disc disease), lumbar 9/6/2016    Depression     Heart burn     Hemorrhoids     HTN (hypertension) 1/19/2015    Ilioinguinal neuralgia of right side 4/10/2017    Psychiatric problem     depression /anxiety    Seizures ([address]     withdrawal from alcohol 2 years ago    Wears glasses     READING       Past Surgical  History:     Past Surgical History:   Procedure Laterality Date    ANESTHESIA NERVE BLOCK Right 4/10/2017    NERVE BLOCK US RIGHT SIDED ILIOINGUINAL performed by Jovanny Biggs MD at [address] [place] [address]  3/19/15    HERNIA REPAIR      NERVE BLOCK Right 10/10/2016    right groin    OTHER SURGICAL HISTORY Right 04/10/2017    US nerve block to R groin    TOE SURGERY      SCREW RIGHT BIG TOE       Medications:      metoprolol tartrate  12.5 mg Oral BID    insulin glargine  10 Units Subcutaneous Nightly    metoclopramide  10 mg Intravenous Q6H    hydrocortisone sodium succinate PF  100 mg Intravenous Q8H    insulin lispro  0-12 Units Subcutaneous Q6H    [Held by provider] vancomycin  1,000 mg Intravenous Q24H    vancomycin (VANCOCIN) intermittent dosing (placeholder)   Other RX Placeholder    erythromycin   Both Eyes Nightly    sodium chloride flush  5-40 mL Intravenous 2 times per day    enoxaparin  40 mg Subcutaneous Daily    famotidine (PEPCID) injection  20 mg Intravenous BID    nicotine  1 patch Transdermal Daily    lidocaine  1 patch Transdermal Daily       Social History:     Social History     Socioeconomic History    Marital status:      Spouse name: Not on file    Number of children: Not on file    Years of education: Not on file    Highest education level: Not on file   Occupational History    Not on file   Tobacco Use    Smoking status: Current Every Day Smoker     Packs/day: 1.00     Years: 38.00     Pack years: 38.00     Types: Cigarettes    Smokeless tobacco: Never Used    Tobacco comment: 1 PPD   Vaping Use    Vaping Use: Never used   Substance and Sexual Activity    Alcohol use: Yes     Alcohol/week: 12.0 standard drinks     Types: 12 Cans of beer per week     Comment: 12 24 oz cans daily    Drug use: Yes     Types: Marijuana     Comment: crack occasionally    Sexual activity: Never   Other Topics Concern    Not on file   Social History Narrative    Not on file     Social Determinants of Health     Financial Resource Strain:     Difficulty of Paying Living Expenses:    Food Insecurity:     Worried About Running Out of Food in the Last Year:     Ran Out of Food in the Last Year:    Transportation Needs:     Lack of Transportation (Medical):      Lack of Transportation (Non-Medical):    Physical Activity:     Days of Exercise per Week:     Minutes of Exercise per Session:    Stress:     Feeling of Stress :    Social Connections:     Frequency of Communication with Friends and Family:     Frequency of Social Gatherings with Friends and Family:     Attends [employer] Services:     Active Member of Clubs or Organizations:     Attends Club or Organization Meetings:     Marital Status:    Intimate Partner Violence:     Fear of Current or Ex-Partner:     Emotionally Abused:     Physically Abused:     Sexually Abused:        Family History:     Family History   Problem Relation Age of Onset    Cancer Mother         colon ca      Medical Decision Making:   I have independently reviewed/ordered the following labs:    CBC with Differential:   Recent Labs     08/16/21  0433 08/17/21  0405   WBC 9.9 11.4*   HGB 9.3* 9.5*   HCT 29.1* 29.2*    244     BMP:  Recent Labs     08/16/21  0433 08/17/21  0405    145*   K 5.0 4.3   * 113*   CO2 21 23   BUN 30* 40*   CREATININE 1.27* 1.38*   MG 2.2 2.1     Hepatic Function Panel:   Recent Labs     08/16/21  0433 08/17/21  0405   PROT 4.8* 5.2*   LABALBU 1.3* 1.5*   BILITOT 0.43 0.35   ALKPHOS 58 72   ALT 24 21   AST 26 17     No results for input(s): RPR in the last 72 hours. No results for input(s): HIV in the last 72 hours. No results for input(s): BC in the last 72 hours. Lab Results   Component Value Date    CREATININE 1.38 08/17/2021    GLUCOSE 173 08/17/2021       Detailed results: Thank you for allowing us to participate in the care of this patient. Please call with questions. This note is created with the assistance of a speech recognition program.  While intending to generate adocument that actually reflects the content of the visit, the document can still have some errors including those of syntax and sound a like substitutions which may escape proof reading. It such instances, actual meaningcan be extrapolated by contextual diversion.     Leighann Love MD  Office: [phone]  Perfect serve / office [phone]

## 2021-08-17 NOTE — CONSULTS
NEPHROLOGY CONSULT       Reason for Consult: Acute kidney injury      Chief Complaint: Fall and syncope  History Obtained From: EHR records and nursing staff. Patient is intubated and sedated on mechanical ventilator. History of Present Illness: This is a 60 y.o. male with history of alcohol abuse, COPD, Hypertension who presented on 8/10/2021 with syncope and collapse. He was witnessed walking on the sidewalk and he collapsed all of a sudden. Bystanders called 911 and patient was evaluated in the ER. He had presented with pain in the right chest wall and abdomen. He initially received chest abdomen CT with IV contrast on 8/10/2021 showing grade 2 chest wall injury with right fourth through 6 anterior lateral rib fractures. Patient was initially placed on alcohol withdrawal protocol. Patient  developed respiratory distress and was placed on BiPAP and subsequently intubated on 8/13/2021 for airway protection. Chest x-ray was suggestive of bilateral pleural effusion right greater than left with significant right-sided pleural effusion. Patient is scheduled for thoracentesis today. Blood cultures are growing MRSA. Heddy  Patient is receiving IV vancomycin. He was started on Lovenox for DVT of the left upper extremity. Nephrology is consulted for acute kidney injury. Serum creatinine was 0.54 mg/dl on 8/14/2021 and progressively worsened to 1.27 on 8/16/2021 and 1.38 today with BUN of 40 mg/dl. He did receive IV Lasix 40 mg x 1 yesterday as patient is fluid overloaded. Echocardiogram shows a EF of 35% with increased IVC diameter. He has had good urine output of 3.3 L over the  last 24 hours after IV Lasix. Patient is however 13 L positive balance since admission.     Past Medical History:        Diagnosis Date    Alcohol abuse 6/4/2014    Anxiety     Arthritis     COPD (chronic obstructive pulmonary disease) ([facility] [address]     ASTHMA    DDD (degenerative disc disease), lumbar 9/6/2016    Depression     Heart burn     Hemorrhoids     HTN (hypertension) 1/19/2015    Ilioinguinal neuralgia of right side 4/10/2017    Psychiatric problem     depression /anxiety    Seizures ([facility])     withdrawal from alcohol 2 years ago    Wears glasses     READING       Past Surgical History:        Procedure Laterality Date    ANESTHESIA NERVE BLOCK Right 4/10/2017    NERVE BLOCK US RIGHT SIDED ILIOINGUINAL performed by Janelle Rae MD at [address] [place] [address]  3/19/15    HERNIA REPAIR      NERVE BLOCK Right 10/10/2016    right groin    OTHER SURGICAL HISTORY Right 04/10/2017    US nerve block to R groin    TOE SURGERY      SCREW RIGHT BIG TOE       Current Medications:    metoprolol tartrate (LOPRESSOR) tablet 12.5 mg, BID  insulin glargine (LANTUS) injection vial 10 Units, Nightly  perflutren lipid microspheres (DEFINITY) injection 2.2 mg, ONCE PRN  fentaNYL (SUBLIMAZE) 1,000 mcg in sodium chloride 0.9 % 50 mL Infusion, Continuous  metoclopramide (REGLAN) injection 10 mg, Q6H  hydrocortisone sodium succinate PF (SOLU-CORTEF) injection 100 mg, Q8H  glucose (GLUTOSE) 40 % oral gel 15 g, PRN  dextrose 50 % IV solution, PRN  glucagon (rDNA) injection 1 mg, PRN  dextrose 5 % solution, PRN  insulin lispro (HUMALOG) injection vial 0-12 Units, Q6H  [Held by provider] vancomycin 1000 mg IVPB in 250 mL D5W addavial, Q24H  vancomycin (VANCOCIN) intermittent dosing (placeholder), RX Placeholder  erythromycin (ROMYCIN) ophthalmic ointment, Nightly  sodium phosphate 9.87 mmol in dextrose 5 % 250 mL IVPB, PRN   Or  sodium phosphate 19.71 mmol in dextrose 5 % 250 mL IVPB, PRN  propofol injection, Titrated  norepinephrine (LEVOPHED) 16 mg in sodium chloride 0.9 % 250 mL infusion, Continuous  0.9 % sodium chloride infusion, PRN  sodium chloride flush 0.9 % injection 5-40 mL, 2 times per day  sodium chloride flush 0.9 % injection 10 mL, PRN  0.9 % sodium chloride infusion, PRN  potassium chloride (KLOR-CON M) extended release tablet 40 mEq, PRN Or  potassium bicarb-citric acid (EFFER-K) effervescent tablet 40 mEq, PRN   Or  potassium chloride 10 mEq/100 mL IVPB (Peripheral Line), PRN  enoxaparin (LOVENOX) injection 40 mg, Daily  polyethylene glycol (GLYCOLAX) packet 17 g, Daily PRN  acetaminophen (TYLENOL) tablet 650 mg, Q6H PRN   Or  acetaminophen (TYLENOL) suppository 650 mg, Q6H PRN  magnesium sulfate 1000 mg in dextrose 5% 100 mL IVPB, PRN  ondansetron (ZOFRAN) injection 4 mg, Q6H PRN  famotidine (PEPCID) injection 20 mg, BID  nicotine (NICODERM CQ) 21 MG/24HR 1 patch, Daily  LORazepam (ATIVAN) tablet 1 mg, Q1H PRN   Or  LORazepam (ATIVAN) injection 1 mg, Q1H PRN   Or  LORazepam (ATIVAN) tablet 2 mg, Q1H PRN   Or  LORazepam (ATIVAN) injection 2 mg, Q1H PRN   Or  LORazepam (ATIVAN) tablet 3 mg, Q1H PRN   Or  LORazepam (ATIVAN) injection 3 mg, Q1H PRN   Or  LORazepam (ATIVAN) tablet 4 mg, Q1H PRN   Or  LORazepam (ATIVAN) injection 4 mg, Q1H PRN  albuterol sulfate  (90 Base) MCG/ACT inhaler 2 puff, Q6H PRN  lidocaine 4 % external patch 1 patch, Daily  dexmedetomidine (PRECEDEX) 400 mcg in sodium chloride 0.9 % 100 mL infusion, Continuous  sodium chloride flush 0.9 % injection 10 mL, PRN        Allergies:  Nickel    Social History:   Social History     Socioeconomic History    Marital status:      Spouse name: Not on file    Number of children: Not on file    Years of education: Not on file    Highest education level: Not on file   Occupational History    Not on file   Tobacco Use    Smoking status: Current Every Day Smoker     Packs/day: 1.00     Years: 38.00     Pack years: 38.00     Types: Cigarettes    Smokeless tobacco: Never Used    Tobacco comment: 1 PPD   Vaping Use    Vaping Use: Never used   Substance and Sexual Activity    Alcohol use:  Yes     Alcohol/week: 12.0 standard drinks     Types: 12 Cans of beer per week     Comment: 12 24 oz cans daily    Drug use: Yes     Types: Marijuana     Comment: crack occasionally    Sexual activity: Never   Other Topics Concern    Not on file   Social History Narrative    Not on file     Social Determinants of Health     Financial Resource Strain:     Difficulty of Paying Living Expenses:    Food Insecurity:     Worried About Running Out of Food in the Last Year:     [address] in the Last Year:    Transportation Needs:     Lack of Transportation (Medical):      Lack of Transportation (Non-Medical):    Physical Activity:     Days of Exercise per Week:     Minutes of Exercise per Session:    Stress:     Feeling of Stress :    Social Connections:     Frequency of Communication with Friends and Family:     Frequency of Social Gatherings with Friends and Family:     Attends [employer] Services:     Active Member of Clubs or Organizations:     Attends Club or Organization Meetings:     Marital Status:    Intimate Partner Violence:     Fear of Current or Ex-Partner:     Emotionally Abused:     Physically Abused:     Sexually Abused:        Family History:   Family History   Problem Relation Age of Onset    Cancer Mother         colon ca       Review of Systems:          Objective:  CURRENT TEMPERATURE:  Temp: 98.2 °F (36.8 °C)  MAXIMUM TEMPERATURE OVER 24HRS:  Temp (24hrs), Av.9 °F (36.6 °C), Min:97 °F (36.1 °C), Max:98.2 °F (36.8 °C)    CURRENT RESPIRATORY RATE:  Resp: 28  CURRENT PULSE:  Pulse: 81  CURRENT BLOOD PRESSURE:  BP: 132/83  24HR BLOOD PRESSURE RANGE:  Systolic (88OYL), KVF:468 , Min:114 , NLS:252   ; Diastolic (58ZAL), RENÉE:05, Min:59, Max:93    24HR INTAKE/OUTPUT:      Intake/Output Summary (Last 24 hours) at 2021 1156  Last data filed at 2021 0345  Gross per 24 hour   Intake 3989.17 ml   Output 3075 ml   Net 914.17 ml     Patient Vitals for the past 96 hrs (Last 3 readings):   Weight   21 0430 157 lb 10.1 oz (71.5 kg)   21 0600 163 lb 2.3 oz (74 kg)   21 0100 163 lb 2.3 oz (74 kg)         Physical Exam:  GENERAL APPEARANCE: Patient is intubated on mechanical ventilator. HEAD: normocephalic  EYES: PERRL, EOMI. Not pale, anicteric   NOSE:  No nasal discharge. NECK:  JVD-neg  CARDIAC: Normal S1 and S2. No S3, S4 or murmurs. Rhythm is regular. LUNGS:-decreased air entry bilaterally with rhonchi and rales. ABD-Soft non distended, BS+ Non tender no organomegally  EXTREMITIES: No edema. Peripheral pulses intact. NEURO: Sedated but grimaces to painful stimulus. Labs:   CBC:  Recent Labs     08/15/21  0355 08/16/21  0433 08/17/21  0405   WBC 8.8 9.9 11.4*   RBC 3.23* 2.90* 2.98*   HGB 10.8* 9.3* 9.5*   HCT 32.1* 29.1* 29.2*   MCV 99.4 100.3* 98.1   MCH 33.3 32.2 31.7   MCHC 33.5 32.1 32.4   RDW 15.1* 15.1* 15.0*    198 244   MPV 8.2 8.5 8.8      BMP:   Recent Labs     08/15/21  0355 08/16/21  0433 08/17/21  0405    138 145*   K 4.1 5.0 4.3   * 110* 113*   CO2 21 21 23   BUN 21* 30* 40*   CREATININE 1.18 1.27* 1.38*   GLUCOSE 234* 274* 173*   CALCIUM 7.3* 7.5* 7.8*        Phosphorus:    Recent Labs     08/15/21  0355 08/16/21  0433   PHOS 3.6 3.8     Magnesium:   Recent Labs     08/15/21  0355 08/16/21  0433 08/17/21  0405   MG 1.6 2.2 2.1     Albumin:   Recent Labs     08/15/21  0355 08/16/21  0433 08/17/21  0405   LABALBU 1.4* 1.3* 1.5*       IRON:  No results found for: IRON  Iron Saturation:  No components found for: PERCENTFE  TIBC:  No results found for: TIBC  FERRITIN:  No results found for: FERRITIN  SPEP:   Lab Results   Component Value Date    PROT 5.2 08/17/2021     UPEP: No results found for: TPU     C3: No results found for: C3  C4: No results found for: C4  MPO ANCA:  No results found for: MPO .   PR3 ANCA:  No results found for: PR3  Urine Sodium:  No results found for: SUE   Urine Potassium:  No results found for: KUR  Urine Chloride:  No components found for: CLU  Urine Ph:  No components found for: PO4U  Urine Osmolarity:  No results found for: OSMOU  Urine Creatinine:  No results found for: LABCREA  Urine Eosinophils: No components found for: EOSU  Urine Protein:  No results found for: TPU  Urinalysis:  U/A:   Lab Results   Component Value Date    NITRU NEGATIVE 08/14/2021    COLORU YELLOW 08/14/2021    PHUR 6.0 08/14/2021    WBCUA 10 TO 20 08/14/2021    RBCUA 10 TO 20 08/14/2021    MUCUS 1+ 08/14/2021    TRICHOMONAS NOT REPORTED 08/14/2021    YEAST NOT REPORTED 08/14/2021    BACTERIA MODERATE 08/14/2021    SPECGRAV 1.013 08/14/2021    LEUKOCYTESUR NEGATIVE 08/14/2021    UROBILINOGEN Normal 08/14/2021    BILIRUBINUR NEGATIVE 08/14/2021    GLUCOSEU TRACE 08/14/2021    [address] NEGATIVE 08/14/2021    AMORPHOUS NOT REPORTED 08/14/2021         Radiology:  Reviewed as available. Assessment:  1. Acute kidney injury, nonoliguric secondary to decreased effective arterial volume, severe hypoalbuminemia and sepsis. No evidence of acute rhabdomyolysis. Rule out drug nephrotoxicity. 2. Acute respiratory failure intubated on mechanical ventilator    3. Severe protein energy malnutrition    4. MRSA bacteremia    5. Cardiomyopathy with systolic heart failure EF 35% and evidence of volume overload    6. Large right-sided pleural effusion, possibly loculated on chest CT-plan for thoracentesis today     7. Mild hypernatremia  Plan:  1. IV diuril 500 mg  + IV albumin 25 gm today. 2. Avoid hypotension  3. Monitor Vanco trough levels for  potential nephrotoxicity. 4. Renal ultrasound  5. Check SPEP, complements, ASTRID  6. Plans for right thoracentesis today  7. Urinalysis with microscopy, random urine electrolytes creatinine, urine eosinophils  8. Okay to continue Lovenox for left arm DVT as GFR is above 30 mils per minute  9. Check ionized calcium  10. Continue tube feeding      Thank you for the consultation.       Electronically signed by Christiane Rivera MD on 8/17/2021 at 11:56 AM

## 2021-08-17 NOTE — PROGRESS NOTES
Vancomycin Day: 4    Srcr continues to increase, hold vancomycin pending trough level tonight, discuss with ID change to alternative agent. Nila Mendoza. [facility]

## 2021-08-17 NOTE — BRIEF OP NOTE
Brief Postoperative Note    Hector Demar  YOB: 1965  [id number]    Pre-operative Diagnosis: Right pleural effusion    Post-operative Diagnosis: Same    Procedure: Right thoracenteis    Anesthesia: Local    Surgeons/Assistants: ROMANA Lai    Estimated Blood Loss: less than 50     Complications: None    Specimens: Was Obtained: 1L of fluid sent to lab. Findings: Successful right thoracentesis with 1.85L purulent-sanguinous fluid aspirated. CXR pending.     Electronically signed by Kate Whelan MD on 8/17/2021 at 3:11 PM

## 2021-08-17 NOTE — PROGRESS NOTES
Writer spoke with Dr. Lexine Peabody regarding pt CXR after thoracentesis showing pneumothorax.  Dr. Lexine Peabody states to have respiratory decrease PEEP to 0 and get CXR in AM.

## 2021-08-17 NOTE — PLAN OF CARE
Problem: Falls - Risk of:  Goal: Will remain free from falls  Description: Will remain free from falls  8/17/2021 1912 by Luis Moreno RN  Outcome: Ongoing     Problem: Falls - Risk of:  Goal: Absence of physical injury  Description: Absence of physical injury  8/17/2021 1912 by Luis Moreno RN  Outcome: Ongoing     Problem: Skin Integrity:  Goal: Will show no infection signs and symptoms  Description: Will show no infection signs and symptoms  8/17/2021 1912 by Luis Moreno RN  Outcome: Ongoing     Problem: Skin Integrity:  Goal: Absence of new skin breakdown  Description: Absence of new skin breakdown  8/17/2021 1912 by Luis Moreno RN  Outcome: Ongoing     Problem: Non-Violent Restraints  Goal: Removal from restraints as soon as assessed to be safe  8/17/2021 1912 by Luis Moreno RN  Outcome: Ongoing     Problem: Non-Violent Restraints  Goal: No harm/injury to patient while restraints in use  8/17/2021 1912 by Luis Moreno RN  Outcome: Ongoing     Problem: Non-Violent Restraints  Goal: Patient's dignity will be maintained  8/17/2021 1912 by Luis Moreno RN  Outcome: Ongoing     Problem: Nutrition  Goal: Optimal nutrition therapy  8/17/2021 1912 by Luis Moreno RN  Outcome: Ongoing     Problem: OXYGENATION/RESPIRATORY FUNCTION  Goal: Patient will maintain patent airway  8/17/2021 1912 by Luis Moreno RN  Outcome: Ongoing     Problem: OXYGENATION/RESPIRATORY FUNCTION  Goal: Patient will achieve/maintain normal respiratory rate/effort  Description: Respiratory rate and effort will be within normal limits for the patient  8/17/2021 1912 by Luis Moreno RN  Outcome: Ongoing     Problem: MECHANICAL VENTILATION  Goal: Patient will maintain patent airway  8/17/2021 1912 by Luis Moreno RN  Outcome: Ongoing     Problem: MECHANICAL VENTILATION  Goal: ET tube will be managed safely  8/17/2021 1912 by Luis Moreno RN  Outcome: Ongoing     Problem: Pain:  Goal: Pain level will decrease  Description: Pain level will decrease  8/17/2021 1912 by Morenita Cooper RN  Outcome: Ongoing     Problem: Pain:  Goal: Recognizes and communicates pain  Description: Recognizes and communicates pain  8/17/2021 1912 by Morenita Cooper RN  Outcome: Ongoing     Problem: Pain:  Goal: Control of acute pain  Description: Control of acute pain  8/17/2021 1912 by Morenita Cooper RN  Outcome: Ongoing     Problem: Pain:  Goal: Control of chronic pain  Description: Control of chronic pain  8/17/2021 1912 by Morenita Cooper RN  Outcome: Ongoing

## 2021-08-18 ENCOUNTER — APPOINTMENT (OUTPATIENT)
Dept: GENERAL RADIOLOGY | Age: 56
DRG: 351 | End: 2021-08-18
Payer: MEDICAID

## 2021-08-18 PROBLEM — I82.612 SUPERFICIAL VENOUS THROMBOSIS OF ARM, LEFT: Status: ACTIVE | Noted: 2021-08-18

## 2021-08-18 LAB
ALBUMIN SERPL-MCNC: 1.8 G/DL (ref 3.5–5.2)
ALBUMIN/GLOBULIN RATIO: ABNORMAL (ref 1–2.5)
ALLEN TEST: ABNORMAL
ALP BLD-CCNC: 72 U/L (ref 40–129)
ALT SERPL-CCNC: 17 U/L (ref 5–41)
ANION GAP SERPL CALCULATED.3IONS-SCNC: 9 MMOL/L (ref 9–17)
ANTI DNA DOUBLE STRANDED: 2.5 IU/ML
ANTI-NUCLEAR ANTIBODY (ANA): NEGATIVE
AST SERPL-CCNC: 17 U/L
BASO FLUID: ABNORMAL %
BILIRUB SERPL-MCNC: 0.35 MG/DL (ref 0.3–1.2)
BUN BLDV-MCNC: 44 MG/DL (ref 6–20)
BUN/CREAT BLD: ABNORMAL (ref 9–20)
C-REACTIVE PROTEIN: 140 MG/L (ref 0–5)
CALCIUM SERPL-MCNC: 7.9 MG/DL (ref 8.6–10.4)
CARBOXYHEMOGLOBIN: 0.6 % (ref 0–5)
CHLORIDE BLD-SCNC: 111 MMOL/L (ref 98–107)
CHLORIDE, UR: 103 MMOL/L
CO2: 24 MMOL/L (ref 20–31)
CREAT SERPL-MCNC: 1.33 MG/DL (ref 0.7–1.2)
CREATININE URINE: 38.6 MG/DL (ref 39–259)
CULTURE: ABNORMAL
ENA ANTIBODIES SCREEN: 0.3 U/ML
EOSINOPHIL FLUID: ABNORMAL %
FIO2: 40
FLUID DIFF COMMENT: ABNORMAL
GFR AFRICAN AMERICAN: >60 ML/MIN
GFR NON-AFRICAN AMERICAN: 55 ML/MIN
GFR SERPL CREATININE-BSD FRML MDRD: ABNORMAL ML/MIN/{1.73_M2}
GFR SERPL CREATININE-BSD FRML MDRD: ABNORMAL ML/MIN/{1.73_M2}
GLUCOSE BLD-MCNC: 128 MG/DL (ref 75–110)
GLUCOSE BLD-MCNC: 158 MG/DL (ref 75–110)
GLUCOSE BLD-MCNC: 162 MG/DL (ref 75–110)
GLUCOSE BLD-MCNC: 172 MG/DL (ref 70–99)
GLUCOSE BLD-MCNC: 177 MG/DL (ref 75–110)
HCO3 ARTERIAL: 27.9 MMOL/L (ref 22–26)
HCT VFR BLD CALC: 28.6 % (ref 41–53)
HEMOGLOBIN: 9.6 G/DL (ref 13.5–17.5)
LYMPHOCYTES, BODY FLUID: 42 %
Lab: ABNORMAL
MAGNESIUM: 2.3 MG/DL (ref 1.6–2.6)
MCH RBC QN AUTO: 32.5 PG (ref 26–34)
MCHC RBC AUTO-ENTMCNC: 33.4 G/DL (ref 31–37)
MCV RBC AUTO: 97.3 FL (ref 80–100)
METHEMOGLOBIN: 1.1 % (ref 0–1.9)
MODE: ABNORMAL
MONOCYTE, FLUID: ABNORMAL %
NEGATIVE BASE EXCESS, ART: ABNORMAL MMOL/L (ref 0–2)
NEUTROPHIL, FLUID: 2 %
NOTIFICATION TIME: ABNORMAL
NOTIFICATION: ABNORMAL
NRBC AUTOMATED: ABNORMAL PER 100 WBC
O2 DEVICE/FLOW/%: ABNORMAL
O2 SAT, ARTERIAL: 92.5 % (ref 95–98)
OTHER CELLS FLUID: 56 %
OXYHEMOGLOBIN: ABNORMAL % (ref 95–98)
PATIENT TEMP: 37
PCO2 ARTERIAL: 34.5 MMHG (ref 35–45)
PCO2, ART, TEMP ADJ: ABNORMAL (ref 35–45)
PDW BLD-RTO: 15 % (ref 11.5–14.9)
PEEP/CPAP: 0
PH ARTERIAL: 7.52 (ref 7.35–7.45)
PH, ART, TEMP ADJ: ABNORMAL (ref 7.35–7.45)
PLATELET # BLD: 272 K/UL (ref 150–450)
PMV BLD AUTO: 8.5 FL (ref 6–12)
PO2 ARTERIAL: 62.9 MMHG (ref 80–100)
PO2, ART, TEMP ADJ: ABNORMAL MMHG (ref 80–100)
POSITIVE BASE EXCESS, ART: 4.9 MMOL/L (ref 0–2)
POTASSIUM SERPL-SCNC: 3.2 MMOL/L (ref 3.7–5.3)
POTASSIUM, UR: 77 MMOL/L
PSV: ABNORMAL
PT. POSITION: ABNORMAL
RBC # BLD: 2.94 M/UL (ref 4.5–5.9)
RESPIRATORY RATE: 32
SAMPLE SITE: ABNORMAL
SET RATE: 20
SODIUM BLD-SCNC: 144 MMOL/L (ref 135–144)
SODIUM,UR: 59 MMOL/L
SPECIMEN DESCRIPTION: ABNORMAL
TEXT FOR RESPIRATORY: ABNORMAL
TOTAL HB: ABNORMAL G/DL (ref 12–16)
TOTAL PROTEIN: 5.1 G/DL (ref 6.4–8.3)
TOTAL RATE: 32
VT: 500
WBC # BLD: 8 K/UL (ref 3.5–11)

## 2021-08-18 PROCEDURE — 82570 ASSAY OF URINE CREATININE: CPT

## 2021-08-18 PROCEDURE — 71045 X-RAY EXAM CHEST 1 VIEW: CPT

## 2021-08-18 PROCEDURE — 94003 VENT MGMT INPAT SUBQ DAY: CPT

## 2021-08-18 PROCEDURE — 99233 SBSQ HOSP IP/OBS HIGH 50: CPT | Performed by: INTERNAL MEDICINE

## 2021-08-18 PROCEDURE — 80053 COMPREHEN METABOLIC PANEL: CPT

## 2021-08-18 PROCEDURE — 6370000000 HC RX 637 (ALT 250 FOR IP): Performed by: STUDENT IN AN ORGANIZED HEALTH CARE EDUCATION/TRAINING PROGRAM

## 2021-08-18 PROCEDURE — 2580000003 HC RX 258: Performed by: INTERNAL MEDICINE

## 2021-08-18 PROCEDURE — 6360000002 HC RX W HCPCS

## 2021-08-18 PROCEDURE — 6360000002 HC RX W HCPCS: Performed by: NURSE PRACTITIONER

## 2021-08-18 PROCEDURE — 84133 ASSAY OF URINE POTASSIUM: CPT

## 2021-08-18 PROCEDURE — 6370000000 HC RX 637 (ALT 250 FOR IP): Performed by: NURSE PRACTITIONER

## 2021-08-18 PROCEDURE — 2580000003 HC RX 258: Performed by: NURSE PRACTITIONER

## 2021-08-18 PROCEDURE — 84300 ASSAY OF URINE SODIUM: CPT

## 2021-08-18 PROCEDURE — 83735 ASSAY OF MAGNESIUM: CPT

## 2021-08-18 PROCEDURE — 85027 COMPLETE CBC AUTOMATED: CPT

## 2021-08-18 PROCEDURE — 6360000002 HC RX W HCPCS: Performed by: INTERNAL MEDICINE

## 2021-08-18 PROCEDURE — 82805 BLOOD GASES W/O2 SATURATION: CPT

## 2021-08-18 PROCEDURE — 6370000000 HC RX 637 (ALT 250 FOR IP)

## 2021-08-18 PROCEDURE — 6370000000 HC RX 637 (ALT 250 FOR IP): Performed by: INTERNAL MEDICINE

## 2021-08-18 PROCEDURE — 6370000000 HC RX 637 (ALT 250 FOR IP): Performed by: PHYSICIAN ASSISTANT

## 2021-08-18 PROCEDURE — 36415 COLL VENOUS BLD VENIPUNCTURE: CPT

## 2021-08-18 PROCEDURE — 2500000003 HC RX 250 WO HCPCS: Performed by: NURSE PRACTITIONER

## 2021-08-18 PROCEDURE — 36600 WITHDRAWAL OF ARTERIAL BLOOD: CPT

## 2021-08-18 PROCEDURE — 6360000002 HC RX W HCPCS: Performed by: SURGERY

## 2021-08-18 PROCEDURE — 82947 ASSAY GLUCOSE BLOOD QUANT: CPT

## 2021-08-18 PROCEDURE — 86140 C-REACTIVE PROTEIN: CPT

## 2021-08-18 PROCEDURE — 2000000000 HC ICU R&B

## 2021-08-18 PROCEDURE — 82436 ASSAY OF URINE CHLORIDE: CPT

## 2021-08-18 PROCEDURE — P9047 ALBUMIN (HUMAN), 25%, 50ML: HCPCS | Performed by: INTERNAL MEDICINE

## 2021-08-18 RX ORDER — CHLOROTHIAZIDE SODIUM 500 MG/1
500 INJECTION INTRAVENOUS DAILY
Status: DISCONTINUED | OUTPATIENT
Start: 2021-08-18 | End: 2021-08-18

## 2021-08-18 RX ORDER — ALBUMIN (HUMAN) 12.5 G/50ML
25 SOLUTION INTRAVENOUS DAILY
Status: COMPLETED | OUTPATIENT
Start: 2021-08-18 | End: 2021-08-19

## 2021-08-18 RX ADMIN — CHLOROTHIAZIDE SODIUM 500 MG: 500 INJECTION, POWDER, LYOPHILIZED, FOR SOLUTION INTRAVENOUS at 15:31

## 2021-08-18 RX ADMIN — METOCLOPRAMIDE 10 MG: 5 INJECTION, SOLUTION INTRAMUSCULAR; INTRAVENOUS at 17:31

## 2021-08-18 RX ADMIN — METOPROLOL TARTRATE 12.5 MG: 25 TABLET, FILM COATED ORAL at 21:53

## 2021-08-18 RX ADMIN — HYDROCORTISONE SODIUM SUCCINATE 100 MG: 100 INJECTION, POWDER, FOR SOLUTION INTRAMUSCULAR; INTRAVENOUS at 21:53

## 2021-08-18 RX ADMIN — METOCLOPRAMIDE 10 MG: 5 INJECTION, SOLUTION INTRAMUSCULAR; INTRAVENOUS at 05:27

## 2021-08-18 RX ADMIN — HYDROCORTISONE SODIUM SUCCINATE 100 MG: 100 INJECTION, POWDER, FOR SOLUTION INTRAMUSCULAR; INTRAVENOUS at 14:27

## 2021-08-18 RX ADMIN — METOCLOPRAMIDE 10 MG: 5 INJECTION, SOLUTION INTRAMUSCULAR; INTRAVENOUS at 12:19

## 2021-08-18 RX ADMIN — INSULIN LISPRO 2 UNITS: 100 INJECTION, SOLUTION INTRAVENOUS; SUBCUTANEOUS at 17:31

## 2021-08-18 RX ADMIN — HYDROCORTISONE SODIUM SUCCINATE 100 MG: 100 INJECTION, POWDER, FOR SOLUTION INTRAMUSCULAR; INTRAVENOUS at 05:27

## 2021-08-18 RX ADMIN — INSULIN GLARGINE 10 UNITS: 100 INJECTION, SOLUTION SUBCUTANEOUS at 21:53

## 2021-08-18 RX ADMIN — SODIUM CHLORIDE, PRESERVATIVE FREE 10 ML: 5 INJECTION INTRAVENOUS at 21:53

## 2021-08-18 RX ADMIN — ENOXAPARIN SODIUM 70 MG: 80 INJECTION SUBCUTANEOUS at 21:55

## 2021-08-18 RX ADMIN — INSULIN LISPRO 2 UNITS: 100 INJECTION, SOLUTION INTRAVENOUS; SUBCUTANEOUS at 05:27

## 2021-08-18 RX ADMIN — ALBUMIN (HUMAN) 25 G: 0.25 INJECTION, SOLUTION INTRAVENOUS at 12:19

## 2021-08-18 RX ADMIN — PROPOFOL 40 MCG/KG/MIN: 10 INJECTION, EMULSION INTRAVENOUS at 05:19

## 2021-08-18 RX ADMIN — METOCLOPRAMIDE 10 MG: 5 INJECTION, SOLUTION INTRAMUSCULAR; INTRAVENOUS at 23:15

## 2021-08-18 RX ADMIN — FENTANYL CITRATE 100 MCG/HR: 0.05 INJECTION, SOLUTION INTRAMUSCULAR; INTRAVENOUS at 20:35

## 2021-08-18 RX ADMIN — PROPOFOL 35 MCG/KG/MIN: 10 INJECTION, EMULSION INTRAVENOUS at 17:24

## 2021-08-18 RX ADMIN — ERYTHROMYCIN: 5 OINTMENT OPHTHALMIC at 23:12

## 2021-08-18 RX ADMIN — INSULIN LISPRO 2 UNITS: 100 INJECTION, SOLUTION INTRAVENOUS; SUBCUTANEOUS at 12:29

## 2021-08-18 RX ADMIN — FAMOTIDINE 20 MG: 10 INJECTION, SOLUTION INTRAVENOUS at 07:56

## 2021-08-18 RX ADMIN — SODIUM CHLORIDE, PRESERVATIVE FREE 10 ML: 5 INJECTION INTRAVENOUS at 08:18

## 2021-08-18 RX ADMIN — POTASSIUM BICARBONATE 40 MEQ: 782 TABLET, EFFERVESCENT ORAL at 05:50

## 2021-08-18 RX ADMIN — VANCOMYCIN HYDROCHLORIDE 1000 MG: 1 INJECTION, POWDER, LYOPHILIZED, FOR SOLUTION INTRAVENOUS at 21:53

## 2021-08-18 RX ADMIN — METOPROLOL TARTRATE 12.5 MG: 25 TABLET, FILM COATED ORAL at 07:57

## 2021-08-18 RX ADMIN — FAMOTIDINE 20 MG: 10 INJECTION, SOLUTION INTRAVENOUS at 21:53

## 2021-08-18 RX ADMIN — FENTANYL CITRATE 75 MCG/HR: 0.05 INJECTION, SOLUTION INTRAMUSCULAR; INTRAVENOUS at 09:02

## 2021-08-18 RX ADMIN — PROPOFOL 40 MCG/KG/MIN: 10 INJECTION, EMULSION INTRAVENOUS at 12:19

## 2021-08-18 RX ADMIN — PROPOFOL 50 MCG/KG/MIN: 10 INJECTION, EMULSION INTRAVENOUS at 23:11

## 2021-08-18 ASSESSMENT — PULMONARY FUNCTION TESTS
PIF_VALUE: 13
PIF_VALUE: 16
PIF_VALUE: 19
PIF_VALUE: 9
PIF_VALUE: 12
PIF_VALUE: 19
PIF_VALUE: 7
PIF_VALUE: 14
PIF_VALUE: 19
PIF_VALUE: 12
PIF_VALUE: 11

## 2021-08-18 NOTE — PROGRESS NOTES
PULMONARY PROGRESS NOTE:    REASON FOR VISIT: resp failure, DTs  Interval History:    Sedated, on vent  Febrile    Events since last visit: positive blood cultures, Tolerating TF,     PAST MEDICAL HISTORY:      Scheduled Meds:   vancomycin  1,000 mg Intravenous Q24H    albumin human  25 g Intravenous Daily    chlorothiazide (DIURIL) IVPB  500 mg Intravenous Q24H    metoprolol tartrate  12.5 mg Oral BID    enoxaparin  1 mg/kg Subcutaneous BID    insulin glargine  10 Units Subcutaneous Nightly    metoclopramide  10 mg Intravenous Q6H    hydrocortisone sodium succinate PF  100 mg Intravenous Q8H    insulin lispro  0-12 Units Subcutaneous Q6H    vancomycin (VANCOCIN) intermittent dosing (placeholder)   Other RX Placeholder    erythromycin   Both Eyes Nightly    sodium chloride flush  5-40 mL Intravenous 2 times per day    famotidine (PEPCID) injection  20 mg Intravenous BID    nicotine  1 patch Transdermal Daily    lidocaine  1 patch Transdermal Daily     Continuous Infusions:   fentaNYL 100 mcg/hr (08/18/21 1032)    dextrose      propofol 45 mcg/kg/min (08/18/21 1231)    norepinephrine      sodium chloride      sodium chloride      dexmedetomidine Stopped (08/16/21 0807)     PRN Meds:perflutren lipid microspheres, glucose, dextrose, glucagon (rDNA), dextrose, sodium phosphate IVPB **OR** sodium phosphate IVPB, sodium chloride, sodium chloride flush, sodium chloride, potassium chloride **OR** potassium alternative oral replacement **OR** potassium chloride, polyethylene glycol, acetaminophen **OR** acetaminophen, magnesium sulfate, ondansetron, LORazepam **OR** LORazepam **OR** LORazepam **OR** LORazepam **OR** LORazepam **OR** LORazepam **OR** LORazepam **OR** LORazepam, albuterol sulfate HFA, sodium chloride flush        PHYSICAL EXAMINATION:  BP (!) 175/73   Pulse 66   Temp 98.6 °F (37 °C)   Resp 29   Ht 5' 9\" (1.753 m)   Wt 157 lb 6.5 oz (71.4 kg)   SpO2 94%   BMI 23.25 kg/m² General : sedated, orally intubated, febrile, labored uneven breathing  Neck - supple, no lymphadenopathy, JVD not raised  Heart -   Lungs - Air Entry- fair bilaterally; breath sounds : vesicular  Abdomen - soft, no tenderness  Upper Extremities  - no cyanosis, mottling; edema : left wrist edema  Lower Extremities: no cyanosis, mottling; edema : absent    Current Laboratory, Radiologic, Microbiologic, and Diagnostic studies reviewed  Data ReviewCBC:   Recent Labs     08/16/21  0433 08/17/21  0405 08/18/21  0415   WBC 9.9 11.4* 8.0   RBC 2.90* 2.98* 2.94*   HGB 9.3* 9.5* 9.6*   HCT 29.1* 29.2* 28.6*    244 272     BMP:   Recent Labs     08/16/21  0433 08/17/21  0405 08/18/21  0415   GLUCOSE 274* 173* 172*    145* 144   K 5.0 4.3 3.2*   BUN 30* 40* 44*   CREATININE 1.27* 1.38* 1.33*   CALCIUM 7.5* 7.8* 7.9*     ABGs:   Recent Labs     08/16/21  0425 08/17/21  0438 08/18/21  0541   PHART 7.394 7.434 7.516*   PO2ART 77.6* 76.4* 62.9*   PCA5THA 36.8 37.2 34.5*   HWN8ORL 22.5 24.9 27.9*   J3OHGFHA 93.5* 95.0 92.5*      PT/INR:  No results found for: PTINR    ASSESSMENT / PLAN:    Alcohol withdrawal  Pain control - add fentanyl gtt  Thiamine, folic acid  atelectasis  RLL infiltrate -? pulm contusion -ABx changed to cefepime/ vancomycin  Acute hypoxic resp failure - Mechanical ventilation  Tube feeds - currently held due to high residuals  bacteremia - continue current ABx,  Soft restraints  Hypotension - ont solu-cortef.  add vasopressin if needed to keep MAP 65  Empyema + culture pending  DW IR - not enough fluid to do chest tube - may consider if fluid recurs    Electronically signed by Zulma Marte MD on 08/18/21 at 4:20 PM

## 2021-08-18 NOTE — PLAN OF CARE
Problem: Falls - Risk of:  Goal: Will remain free from falls  8/18/2021 0703 by Pete Borjas RN  Outcome: Ongoing  8/17/2021 1912 by Terell Escobar RN  Outcome: Ongoing  8/17/2021 1903 by Ema Quiroz RN  Outcome: Ongoing  Goal: Absence of physical injury  8/18/2021 0703 by Pete Borjas RN  Outcome: Ongoing  8/17/2021 1912 by Terell Escobar RN  Outcome: Ongoing  8/17/2021 1903 by Ema Quiroz RN  Outcome: Ongoing     Problem: Skin Integrity:  Goal: Will show no infection signs and symptoms  8/18/2021 0703 by Pete Borjas RN  Outcome: Ongoing  8/17/2021 1912 by Terell Escobar RN  Outcome: Ongoing  8/17/2021 1903 by Ema Quiroz RN  Outcome: Ongoing  Goal: Absence of new skin breakdown  8/18/2021 0703 by Pete Borjas RN  Outcome: Ongoing  8/17/2021 1912 by Terell Escobar RN  Outcome: Ongoing  8/17/2021 1903 by Ema Quiroz RN  Outcome: Ongoing     Problem: Non-Violent Restraints  Goal: Removal from restraints as soon as assessed to be safe  8/18/2021 0703 by Pete Borjas RN  Outcome: Ongoing  8/17/2021 1912 by Terell Escobar RN  Outcome: Ongoing  8/17/2021 1903 by Ema Quiroz RN  Outcome: Ongoing  Goal: No harm/injury to patient while restraints in use  8/18/2021 0703 by Pete Borjas RN  Outcome: Ongoing  8/17/2021 1912 by Terell Escobar RN  Outcome: Ongoing  8/17/2021 1903 by Ema Quiroz RN  Outcome: Ongoing  Goal: Patient's dignity will be maintained  8/18/2021 0703 by Pete Borjas RN  Outcome: Ongoing  8/17/2021 1912 by Terell Escobar RN  Outcome: Ongoing  8/17/2021 1903 by Ema Quiroz RN  Outcome: Ongoing     Problem: Nutrition  Goal: Optimal nutrition therapy  8/18/2021 0703 by Pete Borjas RN  Outcome: Ongoing  8/17/2021 1912 by Terell Escobar RN  Outcome: Ongoing  8/17/2021 1903 by Ema Quiroz RN  Outcome: Ongoing     Problem: OXYGENATION/RESPIRATORY FUNCTION  Goal: Patient will maintain patent airway  8/18/2021 0703 by Pete Borjas RN  Outcome: Ongoing  8/17/2021 1912 by Lena Wiley Barbara Mackey RN  Outcome: Ongoing  8/17/2021 1909 by Maribel Bailey, RCP  Outcome: Ongoing  8/17/2021 1903 by Bridgett Martinez RN  Outcome: Ongoing  8/17/2021 1731 by GET ElkinsP  Outcome: Ongoing  Goal: Patient will achieve/maintain normal respiratory rate/effort  8/18/2021 0703 by Oneal Stallings RN  Outcome: Ongoing  8/17/2021 1912 by Babatunde Agustin RN  Outcome: Ongoing  8/17/2021 1909 by Maribel Bailey, RCP  Outcome: Ongoing  8/17/2021 1903 by Bridgett Martinez RN  Outcome: Ongoing  8/17/2021 1731 by Eh Arguelles RCP  Outcome: Ongoing     Problem: MECHANICAL VENTILATION  Goal: Patient will maintain patent airway  8/18/2021 0703 by Oneal Stallings RN  Outcome: Ongoing  8/17/2021 1912 by Babatunde Agustin RN  Outcome: Ongoing  8/17/2021 1909 by Maribel Bailey, RCP  Outcome: Ongoing  8/17/2021 1903 by Bridgett Martinez RN  Outcome: Ongoing  8/17/2021 1731 by Eh Arguelles RCP  Outcome: Ongoing  Goal: ET tube will be managed safely  8/18/2021 0703 by Oneal Stallings RN  Outcome: Ongoing  8/17/2021 1912 by Babatunde Agustin RN  Outcome: Ongoing  8/17/2021 1909 by Maribel Bailey, RCP  Outcome: Ongoing  8/17/2021 1903 by Bridgett Martinez RN  Outcome: Ongoing  8/17/2021 1731 by Eh Arguelles RCP  Outcome: Ongoing     Problem: Pain:  Goal: Pain level will decrease  8/18/2021 0703 by Oneal Stallings RN  Outcome: Ongoing  8/17/2021 1912 by Babatunde Agustin RN  Outcome: Ongoing  8/17/2021 1903 by Bridgett Martinez RN  Outcome: Ongoing  Goal: Recognizes and communicates pain  8/18/2021 0703 by Oneal Stallings RN  Outcome: Ongoing  8/17/2021 1912 by Babatunde Agustin RN  Outcome: Ongoing  8/17/2021 1903 by Bridgett Martinez RN  Outcome: Ongoing  Goal: Control of acute pain  8/18/2021 0703 by Oneal Stallings RN  Outcome: Ongoing  8/17/2021 1912 by Babatunde Agustin RN  Outcome: Ongoing  8/17/2021 1903 by Bridgett Martinez RN  Outcome: Ongoing  Goal: Control of chronic pain  8/18/2021 0703 by Oneal Stallings RN  Outcome: Ongoing  8/17/2021 1912 by Babatunde Agustin, RN  Outcome: Ongoing  8/17/2021 1903 by Trina Oconnor RN  Outcome: Ongoing

## 2021-08-18 NOTE — PROGRESS NOTES
Comprehensive Nutrition Assessment    Type and Reason for Visit:  Reassess    Nutrition Recommendations/Plan: Continue tube feeding regimen as ordered. Nutrition Assessment:  Pt tolerating Vital AF at 65 ml/hr. Pt remains intubated with Propofol at 16.6 ml providin kcal. Pt had thoracentesis done yesterday with 1.8 liters removed. Mild pneumothorax with decision made not to place chest tube at this time. Malnutrition Assessment:  Malnutrition Status:  Severe malnutrition    Context:  Acute Illness     Findings of the 6 clinical characteristics of malnutrition:  Energy Intake:  1 - 75% or less of estimated energy requirements for 7 or more days (Prior to admission)  Weight Loss:  1 - 7.5% over 3 months     Body Fat Loss:  Unable to assess     Muscle Mass Loss:  7 - Moderate muscle mass loss Clavicles (pectoralis & deltoids), Thigh (quadraceps)  Fluid Accumulation:  No significant fluid accumulation Extremities   Strength:  Not Performed    Estimated Daily Nutrient Needs:  Energy (kcal):  9305-6673 kcals based on 28-30 kcal/kg using adm wt 61.6 kg; Weight Used for Energy Requirements:  Admission (revised)     Protein (g):  105-117 gm protein based on 1.7-1.9 gm/kg using adm wt; Weight Used for Protein Requirements:  Admission (revised)           Nutrition Related Findings:  Edema: + 2 non-pitting RUE, +3 pitting LUE. Labs and meds reviewed.       Wounds:   (Abrasions)       Current Nutrition Therapies:    Diet NPO  ADULT TUBE FEEDING; Orogastric; Peptide Based; Continuous; 65; No; 30; Q 4 hours  Current Tube Feeding (TF) Orders:  · Feeding Route: Orogastric  · Formula: Peptide Based  · Schedule: Continuous  · Goal TF & Flush Orders Provides: at goal: 1872 kcal, 117 gm protein      Anthropometric Measures:  · Height: 5' 9\" (175.3 cm)  · Current Body Weight: 157 lb (71.2 kg)   · Admission Body Weight: 135 lb 12.9 oz (61.6 kg)    · Usual Body Weight: 150 lb (68 kg) (Estimated)     · Ideal Body Weight: 160 lbs; % Ideal Body Weight 84.9 %   · BMI: 23.2   · BMI Categories: Normal Weight (BMI 18.5-24. 9)       Nutrition Diagnosis:   · Severe malnutrition related to inadequate protein-energy intake as evidenced by weight loss 7.5% in 3 months, poor intake prior to admission, moderate muscle loss      Nutrition Interventions:   Food and/or Nutrient Delivery:  Continue NPO, Continue Current Tube Feeding  Nutrition Education/Counseling:  No recommendation at this time   Coordination of Nutrition Care:  Continue to monitor while inpatient    Goals:  Meet % of estimated nutrition needs       Nutrition Monitoring and Evaluation:   Behavioral-Environmental Outcomes:  None Identified   Food/Nutrient Intake Outcomes:  Enteral Nutrition Intake/Tolerance  Physical Signs/Symptoms Outcomes:  Biochemical Data, Fluid Status or Edema, GI Status, Hemodynamic Status, Nutrition Focused Physical Findings, Skin, Weight     Discharge Planning: Too soon to determine     Some areas of assessment may be incomplete due to COVID-19 precautions. Mariza Tobin R.D., L.D.   Clinical Dietitian  Office: [phone]

## 2021-08-18 NOTE — PROGRESS NOTES
Writer spoke with Dr. Kenia Lal regarding pt ABGs this AM. Dr. Kenia Lal states to increase fentanyl gtt from 25 mcg to 75 mcg and no changes to vent settings.

## 2021-08-18 NOTE — PROGRESS NOTES
[facility] [address]                 PATIENT NAME: Salome Perez     TODAY'S DATE: 8/18/2021, 9:50 AM    SUBJECTIVE:    Pt seen and examined. Afebrile, VSS. No leukocytosis, hemoglobin stable. Patient remains intubated on ventilator. Discussed with nursing staff. CXR showing pneumothorax s/p right IR thoracentesis. No plans for chest tube at this time. Tolerating tube feeds at goal.     OBJECTIVE:   VITALS:  /72   Pulse 73   Temp 98.6 °F (37 °C)   Resp 23   Ht 5' 9\" (1.753 m)   Wt 157 lb 6.5 oz (71.4 kg)   SpO2 94%   BMI 23.25 kg/m²      INTAKE/OUTPUT:      Intake/Output Summary (Last 24 hours) at 8/18/2021 0950  Last data filed at 8/18/2021 0800  Gross per 24 hour   Intake 1087.63 ml   Output 2850 ml   Net -1762.37 ml                 CONSTITUTIONAL:  Sedated and intubated on vent.   No acute distress  HEART:   RRR  LUNGS:   Intubated on ventilator, ptx on CXR  ABDOMEN:   Abdomen soft, non-tender, non-distended  EXTREMITIES:   No pedal edema    Data:  CBC:   Lab Results   Component Value Date    WBC 8.0 08/18/2021    RBC 2.94 08/18/2021    HGB 9.6 08/18/2021    HCT 28.6 08/18/2021    MCV 97.3 08/18/2021    MCH 32.5 08/18/2021    MCHC 33.4 08/18/2021    RDW 15.0 08/18/2021     08/18/2021    MPV 8.5 08/18/2021     BMP:    Lab Results   Component Value Date     08/18/2021    K 3.2 08/18/2021     08/18/2021    CO2 24 08/18/2021    BUN 44 08/18/2021    LABALBU 1.8 08/18/2021    CREATININE 1.33 08/18/2021    CALCIUM 7.9 08/18/2021    GFRAA >60 08/18/2021    LABGLOM 55 08/18/2021    GLUCOSE 172 08/18/2021       Radiology Review:       XR CHEST PORTABLE [[id number]] Collected: 08/18/21 0607     Order Status: Completed Updated: 08/18/21 0629     Narrative:       EXAMINATION:   ONE XRAY VIEW OF THE CHEST     8/18/2021 6:02 am     COMPARISON:   08/17/2021     HISTORY:   ORDERING SYSTEM PROVIDED HISTORY: genie   TECHNOLOGIST PROVIDED HISTORY:   genie   Reason for Exam: vent   Acuity: Acute   Type of Exam: Ongoing     FINDINGS:   Cardiac size and mediastinal structures appear similar.  The patient is   rotated. [place] is a right-sided pneumothorax, with the pleural to pleural   surface measuring 12 mm at the apicolateral aspect, previously measuring 16   mm.  There is a right-sided PICC with the catheter tip overlying the right   atrium.  Scattered parenchymal infiltrates identified within the lungs   bilaterally, right greater than left.  Mild increased infiltrates seen at the   right lung base.  Otherwise similar appearing chest.      Impression:       Multifocal infiltrates are identified within the lungs bilaterally, with mild   interval worsening at the right lung base. Minimally improved right-sided pneumothorax, measuring 12 mm from pleural to   pleural surface, previously measuring 16 mm. ASSESSMENT     Principal Problem:    Syncope and collapse  Active Problems:    COPD (chronic obstructive pulmonary disease) (HCC)    Smoking addiction    Dyslipidemia    Alcohol abuse    Hypokalemia    Hyponatremia    Traumatic rhabdomyolysis (HCC)    Closed fracture of multiple ribs of right side    Closed fracture of left wrist    Severe malnutrition (HCC)    Fever    Conjunctivitis    Acute respiratory failure (HCC)    Superficial venous thrombosis of arm, left  Resolved Problems:    * No resolved hospital problems. *      Plan  1. Tube feeds as tolerated  2. Right-sided pneumothorax; if chest tube is needed recommend IR consult  3. Antibiotics per ID  4. Surgically stable  5.  Continue medical management       Electronically signed by Darian Waggoner PA-C  [id number] [address]

## 2021-08-18 NOTE — PROGRESS NOTES
Physical Therapy        Physical Therapy Cancel Note      DATE: 2021    NAME: Gregorio Abdi  MRN: [id number]   : 1965      Patient not seen this date for Physical Therapy due to: Other: Pt remains on vent - will continue to follow for skilled PT needs when pt able to participate.       Electronically signed by Azar Jordan PT on 2021 at 8:07 AM

## 2021-08-18 NOTE — FLOWSHEET NOTE
08/18/21 1123   Encounter Summary   Services provided to: Patient   Complexity of Encounter Low   Length of Encounter 15 minutes   Spiritual/Gnosticist   Type Spiritual support   Assessment Unable to respond   Intervention Prayer

## 2021-08-18 NOTE — PROGRESS NOTES
Klaus Soni MD, 2 Units at 08/18/21 1238    vancomycin (VANCOCIN) intermittent dosing (placeholder), , Other, Nay Perales MD, Given at 08/14/21 9596    erythromycin [facility]) ophthalmic ointment, , Both Eyes, Nightly, Sky Calvo MD, Given at 08/17/21 2054    sodium phosphate 9.87 mmol in dextrose 5 % 250 mL IVPB, 0.16 mmol/kg, Intravenous, PRN **OR** sodium phosphate 19.71 mmol in dextrose 5 % 250 mL IVPB, 0.32 mmol/kg, Intravenous, PRN, Ashley Bhatt MD, Stopped at 08/14/21 1230    propofol injection, 5-50 mcg/kg/min, Intravenous, Titrated, Kellie Luna MD, Last Rate: 14.8 mL/hr at 08/18/21 0519, 40 mcg/kg/min at 08/18/21 0519    norepinephrine (LEVOPHED) 16 mg in sodium chloride 0.9 % 250 mL infusion, 2-100 mcg/min, Intravenous, Continuous, Kellie Luna MD    0.9 % sodium chloride infusion, , Intravenous, PRN, Ashley Bhatt MD    sodium chloride flush 0.9 % injection 5-40 mL, 5-40 mL, Intravenous, 2 times per day, Alverna Yamilex, APRN - CNP, 10 mL at 08/18/21 0818    sodium chloride flush 0.9 % injection 10 mL, 10 mL, Intravenous, PRN, Alverna Yamilex, APRN - CNP    0.9 % sodium chloride infusion, 25 mL, Intravenous, PRN, Alverna Yamilex, APRN - CNP    potassium chloride (KLOR-CON M) extended release tablet 40 mEq, 40 mEq, Oral, PRN **OR** potassium bicarb-citric acid (EFFER-K) effervescent tablet 40 mEq, 40 mEq, Oral, PRN, 40 mEq at 08/18/21 0550 **OR** potassium chloride 10 mEq/100 mL IVPB (Peripheral Line), 10 mEq, Intravenous, PRN, Alverna Yamilex, APRN - CNP, Last Rate: 100 mL/hr at 08/11/21 1355, 10 mEq at 08/11/21 1355    polyethylene glycol (GLYCOLAX) packet 17 g, 17 g, Oral, Daily PRN, Alverna Yamilex, APRN - CNP, 17 g at 08/16/21 0830    acetaminophen (TYLENOL) tablet 650 mg, 650 mg, Oral, Q6H PRN, 650 mg at 08/14/21 2314 **OR** acetaminophen (TYLENOL) suppository 650 mg, 650 mg, Rectal, Q6H PRN, Alverna Yamilex, APRN - CNP    magnesium sulfate 1000 mg in dextrose 5% 100 mL IVPB, 1,000 mg, Intravenous, PRN, Maricela Baumgarten, APRN - CNP, Stopped at 08/15/21 0734    ondansetron (ZOFRAN) injection 4 mg, 4 mg, Intravenous, Q6H PRN, Maricela Baumgarten, APRN - CNP    famotidine (PEPCID) injection 20 mg, 20 mg, Intravenous, BID, Maricela Baumgarten, APRN - CNP, 20 mg at 08/18/21 0756    nicotine (NICODERM CQ) 21 MG/24HR 1 patch, 1 patch, Transdermal, Daily, Maricela Baumgarten, APRN - CNP, 1 patch at 08/18/21 0757    LORazepam (ATIVAN) tablet 1 mg, 1 mg, Oral, Q1H PRN **OR** LORazepam (ATIVAN) injection 1 mg, 1 mg, Intravenous, Q1H PRN, 1 mg at 08/11/21 2314 **OR** LORazepam (ATIVAN) tablet 2 mg, 2 mg, Oral, Q1H PRN **OR** LORazepam (ATIVAN) injection 2 mg, 2 mg, Intravenous, Q1H PRN, 2 mg at 08/15/21 1227 **OR** LORazepam (ATIVAN) tablet 3 mg, 3 mg, Oral, Q1H PRN **OR** LORazepam (ATIVAN) injection 3 mg, 3 mg, Intravenous, Q1H PRN, 3 mg at 08/11/21 0947 **OR** LORazepam (ATIVAN) tablet 4 mg, 4 mg, Oral, Q1H PRN **OR** LORazepam (ATIVAN) injection 4 mg, 4 mg, Intravenous, Q1H PRN, Maricela Baumgarten, APRN - CNP    albuterol sulfate  (90 Base) MCG/ACT inhaler 2 puff, 2 puff, Inhalation, Q6H PRN, Maricela Baumgarten, APRN - CNP    lidocaine 4 % external patch 1 patch, 1 patch, Transdermal, Daily, TIEN Rodríguez, 1 patch at 08/17/21 0757    dexmedetomidine (PRECEDEX) 400 mcg in sodium chloride 0.9 % 100 mL infusion, 0.2-1.4 mcg/kg/hr, Intravenous, Continuous, Diane Sazn MD, Stopped at 08/16/21 0807    sodium chloride flush 0.9 % injection 10 mL, 10 mL, Intravenous, PRN, Karey Braxton MD, 10 mL at 08/10/21 2300   Continuous Infusions:   fentaNYL 75 mcg/hr (08/18/21 0626)    dextrose      propofol 40 mcg/kg/min (08/18/21 0519)    norepinephrine      sodium chloride      sodium chloride      dexmedetomidine Stopped (08/16/21 0807)     CBC:   Recent Labs     08/16/21  0433 08/17/21  0405 08/18/21  0415   WBC 9.9 11.4* 8.0   HGB 9.3* 9.5* 9.6*    244 272     BMP:    Recent Labs     08/16/21  0433 08/17/21  0405 08/18/21  0415    145* 144 K 5.0 4.3 3.2*   * 113* 111*   CO2 21 23 24   BUN 30* 40* 44*   CREATININE 1.27* 1.38* 1.33*   GLUCOSE 274* 173* 172*     Hepatic:   Recent Labs     08/16/21  0433 08/17/21  0405 08/18/21  0415   AST 26 17 17   ALT 24 21 17   BILITOT 0.43 0.35 0.35   ALKPHOS 58 72 72     Troponin: No results for input(s): TROPHS in the last 72 hours. BNP: No results for input(s): BNP in the last 72 hours. Lipids: No results for input(s): CHOL, HDL in the last 72 hours. Invalid input(s): LDLCALCU  INR:   Recent Labs     08/16/21  1747   INR 0.9       Objective:   Vitals: /72   Pulse 73   Temp 98.6 °F (37 °C)   Resp 23   Ht 5' 9\" (1.753 m)   Wt 157 lb 6.5 oz (71.4 kg)   SpO2 94%   BMI 23.25 kg/m²   General appearance: intubated, sedated  HEENT: Head: Normocephalic, no lesions, without obvious abnormality.   Neck: no adenopathy, no carotid bruit, no JVD, supple, symmetrical, trachea midline and thyroid not enlarged, symmetric, no tenderness/mass/nodules  Lungs: clear to auscultation bilaterally  Heart: regular rate and rhythm, S1, S2 normal, no murmur, click, rub or gallop  Abdomen: soft, non-tender; bowel sounds normal; no masses,  no organomegaly  Extremities: extremities normal, atraumatic, no cyanosis or edema  Neurologic: intubated, sedated    EKG:   Results for orders placed or performed during the hospital encounter of 08/10/21   EKG 12 Lead   Result Value Ref Range    Ventricular Rate 92 BPM    Atrial Rate 92 BPM    P-R Interval 148 ms    QRS Duration 82 ms    Q-T Interval 388 ms    QTc Calculation (Bazett) 479 ms    P Axis -8 degrees    R Axis 26 degrees    T Axis 48 degrees    Narrative     Poor data quality, interpretation may be adversely affected  Normal sinus rhythm  Minimal voltage criteria for LVH, may be normal variant  Borderline ECG  When compared with ECG of 10-AUG-2021 21:28, (unconfirmed)  Premature ventricular complexes are no longer Present  ST less depressed in Inferior leads  ST no longer depressed in Anterior leads     ECHO:   Results for orders placed or performed during the hospital encounter of 08/10/21   ECHO Complete 2D W Doppler W Color    Narrative    [facility]    Transthoracic Echocardiography Report (TTE)     Patient Name Qing Hickey       Date of Study                 08/16/2021                JUN FRAGA      Date of      1965  Gender                        Male   Birth      Age          60 year(s)  Race                                Room Number  2004        Height:                       68.9 inch, 175 cm      Corporate ID [id number]    Weight:                       163 pounds, 74 kg   #      Patient Acct [de-identified]   BSA:           1.89 m^2       BMI:      24.16   #                                                                kg/m^2      MR #         [id number]      Sonographer                   Lorenza Chester      Accession #  [id number]  Interpreting Physician        [address]      Fellow                   Referring Nurse Practitioner      Interpreting             Referring Physician           Blu Soliz   Fellow     Type of Study      TTE procedure:2D Echocardiogram, M-Mode, Doppler, Color Doppler. Procedure Date  Date: 08/16/2021 Start: 02:55 PM    Study Location: [address] [place] [address]  Technical Quality: Fair visualization    Indications:Elevated BNP. History / Tech. Comments:  COPD ETOH ABUSE    Patient Status: Inpatient    Height: 68.9 inches Weight: 163.14 pounds BSA: 1.89 m^2 BMI: 24.16 kg/m^2    Rhythm: Within normal limits HR: 92 bpm BP: 122/75 mmHg    CONCLUSIONS    Summary  Patient supine, on ventilator. Left ventricle is normal in size. Estimated LV EF 35 %. Mild left ventricular hypertrophy. Normal right ventricular size and function. No significant valvular regurgitation or stenosis seen. Aortic root is mildly dilated. (4.1 cm)  IVC Increased diameter and impaired or no inspiratory variation.   No significant pericardial effusion is seen. Signature  ----------------------------------------------------------------------------   Electronically signed by Lorenza Chester(Sonographer) on 2021 03:45   PM  ----------------------------------------------------------------------------    ----------------------------------------------------------------------------   Electronically signed by Lopez Gray(Interpreting physician) on 2021   04:09 PM  ----------------------------------------------------------------------------  FINDINGS  Left Atrium  Left atrium is normal in size. Left Ventricle  Left ventricle is normal in size. Estimated LV EF 35 %. Mild left ventricular hypertrophy. Right Atrium  Right atrium is normal in size. Right Ventricle  Normal right ventricular size and function. Mitral Valve  Normal mitral valve structure and function. Trivial mitral regurgitation. Aortic Valve  Normal aortic valve structure and function without stenosis or  regurgitation. Tricuspid Valve  Normal tricuspid valve structure and function. Insignificant tricuspid regurgitation, unable to estimate RVSP. Pulmonic Valve  The pulmonic valve is normal in structure. No pulmonic insufficiency. Pericardial Effusion  No significant pericardial effusion is seen. Miscellaneous  Aortic root is mildly dilated. (4.1 cm)  E/e' average 8.5  IVC Increased diameter and impaired or no inspiratory variation.     M-mode / 2D Measurements & Calculations:      LVIDd:4.82 cm(3.7 - 5.6 cm)      Diastolic HTDNPM:066 ml   KQRQK:8.27 cm(2.2 - 4.0 cm)      Systolic CGRNVF:68.1 ml   IVSd:1.28 cm(0.6 - 1.1 cm)       Aortic Root:4.1 cm(2.0 - 3.7 cm)   LVPWd:1.2 cm(0.6 - 1.1 cm)       LA Dimension: 4.1 cm(1.9 - 4.0 cm)   Fractional Shortenin.33 %    LA volume/Index: 35.2 ml /19m^2   Calculated LVEF (%): 39.24 %     LVOT:2.5 cm      Mitral:                                Aortic      Peak E-Wave: 0.74 m/s                  Peak Velocity: 1.18 m/s   Peak A-Wave: 0.99 m/s                  Mean Velocity: 0.80 m/s   E/A Ratio: 0.75                        Peak Gradient: 5.57 mmHg   Peak Gradient: 2.2 mmHg                Mean Gradient: 3 mmHg   Deceleration Time: 183 msec                                             Area (continuity): 3.33 cm^2                                          AV VTI: 25.8 cm      Estimated RA Pressure: 15 mmHg     Septal Wall E' velocity:0.07 m/s  Lateral Wall E' velocity:0.14 m/s       Assessment / Acute Cardiac Problems:       Acute Systolic CHF, HFrEF- ? Due to ETOH. Newly reduced LVEF 35%. AMS, ETOH, Withdrawals  Resp failure- on vent  Bacteremia  Pleural effusion- s/p Thora on 8/17/21- now with PTX  ? PNA    Plan of Treatment:     - continue small dose BB  - currently due to elevated Cr- cannot consider ACE/ARB/ARNi  - once extubated will consider stress test vs cardiac cath  - agree with IV diuresis as able  - Pulm/CC following-  - ID following- on IV ABX    Will follow from a distance, place call once infection cleared and out of ICU for consideration of stress testing prior to d/c. D/w nurse in detail     Thank you for allowing me to participate in the care of this patient, please do not hesitate to call if you have any questions. Johny Wagner DO, [facility] [place], [address], [address] [facility]  [email or web address]  [phone]

## 2021-08-18 NOTE — PROGRESS NOTES
[facility] -   Infectious diseases evaluation  admission date 8/10/2021    reason for consultation:   MRSA bacteremia    Impression :   Current:  · MRSA bacteremia suspect pulmonary source of infection  · Left hand and wrist cellulitis  · Left wrist fracture  · Right pleural effusion /empyema status post thoracentesis with gram-positive cocci in clusters on Gram stain  · Pneumothorax  · Superficial vein thrombophlebitis left arm  · Syncopal episode  · Alcohol withdrawal  · Acute respiratory failure requiring intubation  · Rib fracture with possible hemothorax    Other:  · History of alcohol abuse  · COPD  · History of depression  · History of seizure  · History of hypertension. Recommendations   · Continue IV vancomycin ,monitor renal function and vancomycin levels closely  · Consider chest tube placement, will discuss with pulmonary. · Repeat blood cultures 8/16/2021 grew MRSA  · Echocardiogram noted, no reported vegetations  · Repeat blood cultures tomorrow  · Consider ERICKA ,will discuss with cardiology  · Follow CBC and renal function  · Discussed with nursing staff        History of Present Illness:   Initial history:  Alejandra Lorenz is a 60y.o.-year-old male presented to the hospital on 8/10/2021 after a syncopal episode, was complaining of pain to the right chest wall and abdomen. Reported sharp, constant pain aggravated by movement with no alleviating factors. The patient was previously evaluated at the ER 8/7/2021 after a fall found to have multiple rib fractures and a left wrist closed fracture. Tox screen positive for cannabinoids. CT head negative for acute intracranial abnormality. CT chest/abdomen/pelvis showed rib fractures with associated focal hemothorax. No acute processes in abdomen or pelvis.    Right arm PICC line was placed 8/12/2021  He was placed on alcohol withdrawal protocol, developed respiratory distress was placed on BiPAP initially then was intubated 4/13/21. Baker catheter was placed on 4/13/2021  The patient was on Levaquin 8/13/2021 and 8/14/2021 that was discontinued and started on vancomycin and cefepime. He had a fever with a temperature max of 100.6 on 8/13/2021. Blood cultures 8/14/2021 grew MRSA as well as sputum culture. Chest x-ray 8/14/2021 showed bilateral airspace disease right greater than left. Interval changes  8/18/2021   He remains intubated, sedated, no reported fever or diarrhea. CT chest without contrast 8/16/2021 showed increased right pleural effusion with an area of loculated fluid status post thoracentesis yesterday with gram-positive cocci in clusters Gram stain.   Renal function stable  Blood pressure stable  COVID-19 rapid test was negative   Patient Vitals for the past 8 hrs:   BP Temp Temp src Pulse Resp SpO2 Weight   08/18/21 0800 138/72 -- -- 73 23 94 % --   08/18/21 0716 -- -- -- 72 (!) 33 93 % --   08/18/21 0700 (!) 158/72 98.6 °F (37 °C) -- 73 27 93 % --   08/18/21 0645 -- -- -- 73 (!) 32 95 % --   08/18/21 0630 -- -- -- 72 (!) 34 93 % --   08/18/21 0615 -- -- -- 73 27 95 % --   08/18/21 0600 137/70 -- -- 76 (!) 32 90 % --   08/18/21 0545 -- -- -- 76 29 91 % --   08/18/21 0544 -- -- -- -- 25 90 % --   08/18/21 0530 -- -- -- 68 28 92 % --   08/18/21 0519 -- -- -- -- -- -- 157 lb 6.5 oz (71.4 kg)   08/18/21 0515 -- -- -- 83 28 94 % --   08/18/21 0400 (!) 162/61 98.4 °F (36.9 °C) Bladder 64 26 95 % --   08/18/21 0345 -- -- -- 67 27 95 % --   08/18/21 0340 -- -- -- 71 26 96 % --   08/18/21 0330 -- -- -- 58 23 95 % --   08/18/21 0315 -- -- -- 59 26 93 % --   08/18/21 0300 (!) 141/62 -- -- 68 29 90 % --   08/18/21 0245 -- -- -- 79 (!) 32 94 % --   08/18/21 0230 -- -- -- 85 25 94 % --   08/18/21 0215 -- -- -- 81 28 93 % --           I have personally reviewed the past medical history, past surgical history, medications, social history, and family history, and I haveupdated the database accordingly. Allergies:   Nickel     Review of Systems:     Review of Systems  Intubated, sedated unable to provide  Physical Examination :       Physical Exam  Constitutional:       Comments: Intubated, sedated   HENT:      Head: Normocephalic and atraumatic. Right Ear: External ear normal.      Left Ear: External ear normal.   Eyes:      General: No scleral icterus. Right eye: No discharge. Left eye: No discharge. Cardiovascular:      Rate and Rhythm: Normal rate and regular rhythm. Heart sounds: No murmur heard. Pulmonary:      Breath sounds: Rhonchi present. No wheezing. Abdominal:      General: Abdomen is flat. There is no distension. Palpations: Abdomen is soft. Genitourinary:     Comments: Baker catheter in place  Musculoskeletal:      Right lower leg: Edema present. Left lower leg: Edema present. Comments: Bilateral upper extremity edema   Skin:     General: Skin is warm. Coloration: Skin is not jaundiced.       Comments: Left hand and wrist erythema and warmth, no fluctuation, no crepitance   Neurological:      Comments: Sedated     Right PICC line in place    Past Medical History:     Past Medical History:   Diagnosis Date    Alcohol abuse 6/4/2014    Anxiety     Arthritis     COPD (chronic obstructive pulmonary disease) ([address]     ASTHMA    DDD (degenerative disc disease), lumbar 9/6/2016    Depression     Heart burn     Hemorrhoids     HTN (hypertension) 1/19/2015    Ilioinguinal neuralgia of right side 4/10/2017    Psychiatric problem     depression /anxiety    Seizures ([address]     withdrawal from alcohol 2 years ago    Wears glasses     READING       Past Surgical  History:     Past Surgical History:   Procedure Laterality Date    ANESTHESIA NERVE BLOCK Right 4/10/2017    NERVE BLOCK US RIGHT SIDED ILIOINGUINAL performed by Jovanny Biggs MD at [address] [place] [address]  3/19/15    HERNIA REPAIR      NERVE BLOCK Right 10/10/2016    right groin    OTHER SURGICAL HISTORY Right 04/10/2017    US nerve block to R groin    TOE SURGERY      SCREW RIGHT BIG TOE       Medications:      vancomycin  1,000 mg Intravenous Q24H    metoprolol tartrate  12.5 mg Oral BID    enoxaparin  1 mg/kg Subcutaneous BID    insulin glargine  10 Units Subcutaneous Nightly    metoclopramide  10 mg Intravenous Q6H    hydrocortisone sodium succinate PF  100 mg Intravenous Q8H    insulin lispro  0-12 Units Subcutaneous Q6H    vancomycin (VANCOCIN) intermittent dosing (placeholder)   Other RX Placeholder    erythromycin   Both Eyes Nightly    sodium chloride flush  5-40 mL Intravenous 2 times per day    famotidine (PEPCID) injection  20 mg Intravenous BID    nicotine  1 patch Transdermal Daily    lidocaine  1 patch Transdermal Daily       Social History:     Social History     Socioeconomic History    Marital status:      Spouse name: Not on file    Number of children: Not on file    Years of education: Not on file    Highest education level: Not on file   Occupational History    Not on file   Tobacco Use    Smoking status: Current Every Day Smoker     Packs/day: 1.00     Years: 38.00     Pack years: 38.00     Types: Cigarettes    Smokeless tobacco: Never Used    Tobacco comment: 1 PPD   Vaping Use    Vaping Use: Never used   Substance and Sexual Activity    Alcohol use:  Yes     Alcohol/week: 12.0 standard drinks     Types: 12 Cans of beer per week     Comment: 12 24 oz cans daily    Drug use: Yes     Types: Marijuana     Comment: crack occasionally    Sexual activity: Never   Other Topics Concern    Not on file   Social History Narrative    Not on file     Social Determinants of Health     Financial Resource Strain:     Difficulty of Paying Living Expenses:    Food Insecurity:     Worried About Running Out of Food in the Last Year:     Sal of Food in the Last Year:    Transportation Needs:     Lack of Transportation (Medical):  Lack of Transportation (Non-Medical):    Physical Activity:     Days of Exercise per Week:     Minutes of Exercise per Session:    Stress:     Feeling of Stress :    Social Connections:     Frequency of Communication with Friends and Family:     Frequency of Social Gatherings with Friends and Family:     Attends Jew Services:     Active Member of Clubs or Organizations:     Attends Club or Organization Meetings:     Marital Status:    Intimate Partner Violence:     Fear of Current or Ex-Partner:     Emotionally Abused:     Physically Abused:     Sexually Abused:        Family History:     Family History   Problem Relation Age of Onset    Cancer Mother         colon ca      Medical Decision Making:   I have independently reviewed/ordered the following labs:    CBC with Differential:   Recent Labs     08/17/21  0405 08/18/21  0415   WBC 11.4* 8.0   HGB 9.5* 9.6*   HCT 29.2* 28.6*    272     BMP:  Recent Labs     08/17/21  0405 08/18/21  0415   * 144   K 4.3 3.2*   * 111*   CO2 23 24   BUN 40* 44*   CREATININE 1.38* 1.33*   MG 2.1 2.3     Hepatic Function Panel:   Recent Labs     08/17/21  0405 08/17/21  0405 08/17/21  1406 08/18/21  0415   PROT 5.2*   < > 5.0* 5.1*   LABALBU 1.5*  --   --  1.8*   BILITOT 0.35  --   --  0.35   ALKPHOS 72  --   --  72   ALT 21  --   --  17   AST 17  --   --  17    < > = values in this interval not displayed. No results for input(s): RPR in the last 72 hours. No results for input(s): HIV in the last 72 hours. No results for input(s): BC in the last 72 hours. Lab Results   Component Value Date    CREATININE 1.33 08/18/2021    GLUCOSE 172 08/18/2021       Detailed results: Thank you for allowing us to participate in the care of this patient. Please call with questions.     This note is created with the assistance of a speech recognition program.  While intending to generate adocument that actually reflects the content of the visit, the document can still have some errors including those of syntax and sound a like substitutions which may escape proof reading. It such instances, actual meaningcan be extrapolated by contextual diversion.     Jim Holley MD  Office: [phone]  Perfect serve / office [phone]
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
## 2021-08-18 NOTE — PROGRESS NOTES
Pharmacy Vancomycin Consult     Vancomycin Day: 4  Current Dosin mg every 24 hours. Current indication: positive blood/sputum MRSA    Temp max:  98.2 F    Recent Labs     21  0433 21  0405   BUN 30* 40*   CREATININE 1.27* 1.38*   WBC 9.9 11.4*       Intake/Output Summary (Last 24 hours) at 2021  Last data filed at 2021 1859  Gross per 24 hour   Intake 2388.8 ml   Output 2200 ml   Net 188.8 ml     Culture Date      Source                       Results  See micro    Ht Readings from Last 1 Encounters:   21 5' 9\" (1.753 m)        Wt Readings from Last 1 Encounters:   21 157 lb 10.1 oz (71.5 kg)       Body mass index is 23.28 kg/m². Estimated Creatinine Clearance: 58 mL/min (A) (based on SCr of 1.38 mg/dL (H)). Trough: 14.3 at , last dose of 1000 mg at  ;last night. Assessment/Plan:   Trough within range of  ~ 15-20 even though the serum creatinine continues to increase. I will give a one time dose of 1000 mg tonight and wait on what infection disease wants to do with further vancomycin doses or change to linezolid. Chris Brown. Ph.  2021  9:39 PM

## 2021-08-18 NOTE — PLAN OF CARE
Nutrition Problem #1: Severe malnutrition  Intervention: Food and/or Nutrient Delivery: Continue NPO, Continue Current Tube Feeding  Nutritional Goals: Meet % of estimated nutrition needs

## 2021-08-18 NOTE — PROGRESS NOTES
Nephrology Progress Note    Subjective/   60y.o. year old male who we are seeing in consultation for acute kidney injury      Interval history:  Patient remains intubated on mechanical ventilator. Follows commands. Patient had left thoracentesis yesterday of 1.1L isolating staph aureus. He responded to diuresis with IV Diuril and albumin. Renal function stable. 1.3 L negative balance yesterday. History of Present Illness: This is a 60 y.o. male with history of alcohol abuse, COPD, Hypertension who presented on 8/10/2021 with syncope and collapse. He was witnessed walking on the sidewalk and he collapsed all of a sudden. Bystanders called 911 and patient was evaluated in the ER. He had presented with pain in the right chest wall and abdomen. He initially received chest abdomen CT with IV contrast on 8/10/2021 showing grade 2 chest wall injury with right fourth through 6 anterior lateral rib fractures. Patient was initially placed on alcohol withdrawal protocol. Patient  developed respiratory distress and was placed on BiPAP and subsequently intubated on 8/13/2021 for airway protection. Chest x-ray was suggestive of bilateral pleural effusion right greater than left with significant right-sided pleural effusion. Patient is scheduled for thoracentesis today. Blood cultures are growing MRSA. Viet June Patient is receiving IV vancomycin. He was started on Lovenox for DVT of the left upper extremity. Nephrology is consulted for acute kidney injury. Serum creatinine was 0.54 mg/dl on 8/14/2021 and progressively worsened to 1.27 on 8/16/2021 and 1.38 today with BUN of 40 mg/dl. He did receive IV Lasix 40 mg x 1 yesterday as patient is fluid overloaded. Echocardiogram shows a EF of 35% with increased IVC diameter. He has had good urine output of 3.3 L over the  last 24 hours after IV Lasix. Patient is however 13 L positive balance since admission.   Objective/     Vitals:    08/18/21 0900 08/18/21 1000 08/18/21 1100 08/18/21 1105   BP: (!) 147/59 (!) 163/69 (!) 161/76    Pulse: 53 85 91 94   Resp: 27 (!) 33 (!) 38 20   Temp:       TempSrc:       SpO2:    93%   Weight:       Height:         24HR INTAKE/OUTPUT:      Intake/Output Summary (Last 24 hours) at 8/18/2021 1128  Last data filed at 8/18/2021 0800  Gross per 24 hour   Intake 1087.63 ml   Output 2850 ml   Net -1762.37 ml     Patient Vitals for the past 96 hrs (Last 3 readings):   Weight   08/18/21 0519 157 lb 6.5 oz (71.4 kg)   08/17/21 0430 157 lb 10.1 oz (71.5 kg)   08/16/21 0600 163 lb 2.3 oz (74 kg)       Constitutional:  Alert, awake, no apparent distress  Cardiovascular:  S1, S2 without m/r/g  Respiratory:  CTA B without w/r/r  Abdomen: +bs, soft, nt  Ext:  LE edema    Data/  Recent Labs     08/16/21  0433 08/17/21  0405 08/18/21  0415   WBC 9.9 11.4* 8.0   HGB 9.3* 9.5* 9.6*   HCT 29.1* 29.2* 28.6*   .3* 98.1 97.3    244 272     Recent Labs     08/16/21  0433 08/17/21  0405 08/18/21  0415    145* 144   K 5.0 4.3 3.2*   * 113* 111*   CO2 21 23 24   GLUCOSE 274* 173* 172*   PHOS 3.8  --   --    MG 2.2 2.1 2.3   BUN 30* 40* 44*   CREATININE 1.27* 1.38* 1.33*   LABGLOM 58* 53* 55*   GFRAA >60 >60 >60         Assessment/   1. Acute kidney injury, nonoliguric secondary to decreased effective arterial volume, severe hypoalbuminemia and sepsis. No evidence of acute rhabdomyolysis. Rule out drug nephrotoxicity-renal function stable, nonoliguric creatinine 1.3  Renal ultrasound showed no hydronephrosis or obstruction-serologies pending. 2. Acute respiratory failure intubated on mechanical ventilator     3. Severe protein energy malnutrition     4. MRSA bacteremia     5. Cardiomyopathy with systolic heart failure EF 35% and evidence of volume overload-negative  balance of 1.3L yesterday     6. Large right-sided parapneumonic pleural effusion, possibly loculated on chest CT-S/P  thoracentesis 8/17/21-plan for chest tube today    7. Hypokalemia-sliding scale replacement    Plan/   Plan:  1. Continue IV diuril 500 mg  + IV albumin 25 gm  daily. 2  continue tube feeding+ supportive management  3. Monitor Vanco trough levels for  potential nephrotoxicity. 4.  Left chest tube placement planned for today  5. OK to continue Lovenox for left arm DVT as GFR is above 30 mils per minute  6. Avoid nephrotoxic drugs  7   replace potassium.       Prognosis is guarded         Fermin Aviles MD

## 2021-08-19 ENCOUNTER — APPOINTMENT (OUTPATIENT)
Dept: GENERAL RADIOLOGY | Age: 56
DRG: 351 | End: 2021-08-19
Payer: MEDICAID

## 2021-08-19 LAB
ALBUMIN (CALCULATED): 1.7 G/DL (ref 3.2–5.2)
ALBUMIN PERCENT: 35 % (ref 45–65)
ALBUMIN SERPL-MCNC: 2.1 G/DL (ref 3.5–5.2)
ALBUMIN/GLOBULIN RATIO: ABNORMAL (ref 1–2.5)
ALLEN TEST: ABNORMAL
ALP BLD-CCNC: 83 U/L (ref 40–129)
ALPHA 1 PERCENT: 10 % (ref 3–6)
ALPHA 2 PERCENT: 17 % (ref 6–13)
ALPHA-1-GLOBULIN: 0.5 G/DL (ref 0.1–0.4)
ALPHA-2-GLOBULIN: 0.9 G/DL (ref 0.5–0.9)
ALT SERPL-CCNC: 23 U/L (ref 5–41)
ANION GAP SERPL CALCULATED.3IONS-SCNC: 11 MMOL/L (ref 9–17)
AST SERPL-CCNC: 24 U/L
BETA GLOBULIN: 0.7 G/DL (ref 0.5–1.1)
BETA PERCENT: 15 % (ref 11–19)
BILIRUB SERPL-MCNC: 0.36 MG/DL (ref 0.3–1.2)
BUN BLDV-MCNC: 46 MG/DL (ref 6–20)
BUN/CREAT BLD: ABNORMAL (ref 9–20)
CALCIUM SERPL-MCNC: 7.8 MG/DL (ref 8.6–10.4)
CARBOXYHEMOGLOBIN: 0.6 % (ref 0–5)
CHLORIDE BLD-SCNC: 108 MMOL/L (ref 98–107)
CO2: 28 MMOL/L (ref 20–31)
CREAT SERPL-MCNC: 1.2 MG/DL (ref 0.7–1.2)
CREATININE URINE: 57.8 MG/DL (ref 39–259)
FIO2: 40
GAMMA GLOBULIN %: 24 % (ref 9–20)
GAMMA GLOBULIN: 1.2 G/DL (ref 0.5–1.5)
GFR AFRICAN AMERICAN: >60 ML/MIN
GFR NON-AFRICAN AMERICAN: >60 ML/MIN
GFR SERPL CREATININE-BSD FRML MDRD: ABNORMAL ML/MIN/{1.73_M2}
GFR SERPL CREATININE-BSD FRML MDRD: ABNORMAL ML/MIN/{1.73_M2}
GLUCOSE BLD-MCNC: 152 MG/DL (ref 75–110)
GLUCOSE BLD-MCNC: 176 MG/DL (ref 75–110)
GLUCOSE BLD-MCNC: 182 MG/DL (ref 70–99)
GLUCOSE BLD-MCNC: 183 MG/DL (ref 75–110)
GLUCOSE BLD-MCNC: 185 MG/DL (ref 75–110)
GLUCOSE BLD-MCNC: 189 MG/DL (ref 75–110)
HCO3 ARTERIAL: 31.9 MMOL/L (ref 22–26)
HCT VFR BLD CALC: 27.3 % (ref 41–53)
HEMOGLOBIN: 9.3 G/DL (ref 13.5–17.5)
MAGNESIUM: 2.5 MG/DL (ref 1.6–2.6)
MCH RBC QN AUTO: 33 PG (ref 26–34)
MCHC RBC AUTO-ENTMCNC: 34.1 G/DL (ref 31–37)
MCV RBC AUTO: 96.8 FL (ref 80–100)
METHEMOGLOBIN: 1.1 % (ref 0–1.9)
MODE: ABNORMAL
NEGATIVE BASE EXCESS, ART: ABNORMAL MMOL/L (ref 0–2)
NOTIFICATION TIME: ABNORMAL
NOTIFICATION: ABNORMAL
NRBC AUTOMATED: ABNORMAL PER 100 WBC
O2 DEVICE/FLOW/%: ABNORMAL
O2 SAT, ARTERIAL: 93.9 % (ref 95–98)
OXYHEMOGLOBIN: ABNORMAL % (ref 95–98)
PATHOLOGIST: ABNORMAL
PATIENT TEMP: 37.1
PCO2 ARTERIAL: 46.1 MMHG (ref 35–45)
PCO2, ART, TEMP ADJ: ABNORMAL (ref 35–45)
PDW BLD-RTO: 15 % (ref 11.5–14.9)
PEEP/CPAP: 0
PH ARTERIAL: 7.45 (ref 7.35–7.45)
PH, ART, TEMP ADJ: ABNORMAL (ref 7.35–7.45)
PLATELET # BLD: 308 K/UL (ref 150–450)
PMV BLD AUTO: 8.7 FL (ref 6–12)
PO2 ARTERIAL: 77.6 MMHG (ref 80–100)
PO2, ART, TEMP ADJ: ABNORMAL MMHG (ref 80–100)
POSITIVE BASE EXCESS, ART: 7.8 MMOL/L (ref 0–2)
POTASSIUM SERPL-SCNC: 2.6 MMOL/L (ref 3.7–5.3)
POTASSIUM SERPL-SCNC: 3.3 MMOL/L (ref 3.7–5.3)
POTASSIUM, UR: 60 MMOL/L
PROTEIN ELECTROPHORESIS, SERUM: ABNORMAL
PSV: ABNORMAL
PT. POSITION: ABNORMAL
RBC # BLD: 2.82 M/UL (ref 4.5–5.9)
RESPIRATORY RATE: 20
SAMPLE SITE: ABNORMAL
SET RATE: 20
SODIUM BLD-SCNC: 147 MMOL/L (ref 135–144)
TEXT FOR RESPIRATORY: ABNORMAL
TOTAL HB: ABNORMAL G/DL (ref 12–16)
TOTAL PROT. SUM,%: 101 % (ref 98–102)
TOTAL PROT. SUM: 5 G/DL (ref 6.3–8.2)
TOTAL PROTEIN: 5 G/DL (ref 6.4–8.3)
TOTAL PROTEIN: 5.4 G/DL (ref 6.4–8.3)
TOTAL RATE: 20
TRIGL SERPL-MCNC: 185 MG/DL
VT: 500
WBC # BLD: 8.3 K/UL (ref 3.5–11)

## 2021-08-19 PROCEDURE — 94761 N-INVAS EAR/PLS OXIMETRY MLT: CPT

## 2021-08-19 PROCEDURE — 6360000002 HC RX W HCPCS: Performed by: INTERNAL MEDICINE

## 2021-08-19 PROCEDURE — 6370000000 HC RX 637 (ALT 250 FOR IP): Performed by: PHYSICIAN ASSISTANT

## 2021-08-19 PROCEDURE — 6360000002 HC RX W HCPCS: Performed by: NURSE PRACTITIONER

## 2021-08-19 PROCEDURE — P9047 ALBUMIN (HUMAN), 25%, 50ML: HCPCS | Performed by: INTERNAL MEDICINE

## 2021-08-19 PROCEDURE — 71045 X-RAY EXAM CHEST 1 VIEW: CPT

## 2021-08-19 PROCEDURE — 82570 ASSAY OF URINE CREATININE: CPT

## 2021-08-19 PROCEDURE — 84478 ASSAY OF TRIGLYCERIDES: CPT

## 2021-08-19 PROCEDURE — 2580000003 HC RX 258: Performed by: NURSE PRACTITIONER

## 2021-08-19 PROCEDURE — 87040 BLOOD CULTURE FOR BACTERIA: CPT

## 2021-08-19 PROCEDURE — 85027 COMPLETE CBC AUTOMATED: CPT

## 2021-08-19 PROCEDURE — 2700000000 HC OXYGEN THERAPY PER DAY

## 2021-08-19 PROCEDURE — 89220 SPUTUM SPECIMEN COLLECTION: CPT

## 2021-08-19 PROCEDURE — 82805 BLOOD GASES W/O2 SATURATION: CPT

## 2021-08-19 PROCEDURE — 6370000000 HC RX 637 (ALT 250 FOR IP)

## 2021-08-19 PROCEDURE — 6360000002 HC RX W HCPCS

## 2021-08-19 PROCEDURE — 84132 ASSAY OF SERUM POTASSIUM: CPT

## 2021-08-19 PROCEDURE — 99233 SBSQ HOSP IP/OBS HIGH 50: CPT | Performed by: INTERNAL MEDICINE

## 2021-08-19 PROCEDURE — 80053 COMPREHEN METABOLIC PANEL: CPT

## 2021-08-19 PROCEDURE — 83735 ASSAY OF MAGNESIUM: CPT

## 2021-08-19 PROCEDURE — 36600 WITHDRAWAL OF ARTERIAL BLOOD: CPT

## 2021-08-19 PROCEDURE — 94003 VENT MGMT INPAT SUBQ DAY: CPT

## 2021-08-19 PROCEDURE — 82947 ASSAY GLUCOSE BLOOD QUANT: CPT

## 2021-08-19 PROCEDURE — 6370000000 HC RX 637 (ALT 250 FOR IP): Performed by: STUDENT IN AN ORGANIZED HEALTH CARE EDUCATION/TRAINING PROGRAM

## 2021-08-19 PROCEDURE — 84133 ASSAY OF URINE POTASSIUM: CPT

## 2021-08-19 PROCEDURE — 2500000003 HC RX 250 WO HCPCS: Performed by: NURSE PRACTITIONER

## 2021-08-19 PROCEDURE — 2000000000 HC ICU R&B

## 2021-08-19 PROCEDURE — 6370000000 HC RX 637 (ALT 250 FOR IP): Performed by: NURSE PRACTITIONER

## 2021-08-19 PROCEDURE — 36415 COLL VENOUS BLD VENIPUNCTURE: CPT

## 2021-08-19 PROCEDURE — 6360000002 HC RX W HCPCS: Performed by: SURGERY

## 2021-08-19 PROCEDURE — 94640 AIRWAY INHALATION TREATMENT: CPT

## 2021-08-19 PROCEDURE — 2580000003 HC RX 258: Performed by: INTERNAL MEDICINE

## 2021-08-19 RX ORDER — POTASSIUM CHLORIDE 20 MEQ/1
20 TABLET, EXTENDED RELEASE ORAL 2 TIMES DAILY
Status: DISCONTINUED | OUTPATIENT
Start: 2021-08-20 | End: 2021-08-21

## 2021-08-19 RX ADMIN — ENOXAPARIN SODIUM 70 MG: 80 INJECTION SUBCUTANEOUS at 21:51

## 2021-08-19 RX ADMIN — METOCLOPRAMIDE 10 MG: 5 INJECTION, SOLUTION INTRAMUSCULAR; INTRAVENOUS at 17:17

## 2021-08-19 RX ADMIN — METOCLOPRAMIDE 10 MG: 5 INJECTION, SOLUTION INTRAMUSCULAR; INTRAVENOUS at 06:06

## 2021-08-19 RX ADMIN — FAMOTIDINE 20 MG: 10 INJECTION, SOLUTION INTRAVENOUS at 21:50

## 2021-08-19 RX ADMIN — PROPOFOL 50 MCG/KG/MIN: 10 INJECTION, EMULSION INTRAVENOUS at 04:06

## 2021-08-19 RX ADMIN — FENTANYL CITRATE 50 MCG/HR: 0.05 INJECTION, SOLUTION INTRAMUSCULAR; INTRAVENOUS at 20:50

## 2021-08-19 RX ADMIN — ALBUMIN (HUMAN) 25 G: 0.25 INJECTION, SOLUTION INTRAVENOUS at 08:33

## 2021-08-19 RX ADMIN — ENOXAPARIN SODIUM 70 MG: 80 INJECTION SUBCUTANEOUS at 08:33

## 2021-08-19 RX ADMIN — INSULIN LISPRO 2 UNITS: 100 INJECTION, SOLUTION INTRAVENOUS; SUBCUTANEOUS at 06:04

## 2021-08-19 RX ADMIN — PROPOFOL 20 MCG/KG/MIN: 10 INJECTION, EMULSION INTRAVENOUS at 08:31

## 2021-08-19 RX ADMIN — HYDROCORTISONE SODIUM SUCCINATE 50 MG: 100 INJECTION, POWDER, FOR SOLUTION INTRAMUSCULAR; INTRAVENOUS at 23:50

## 2021-08-19 RX ADMIN — SODIUM CHLORIDE, PRESERVATIVE FREE 10 ML: 5 INJECTION INTRAVENOUS at 21:52

## 2021-08-19 RX ADMIN — ALBUTEROL SULFATE 2 PUFF: 90 AEROSOL, METERED RESPIRATORY (INHALATION) at 15:54

## 2021-08-19 RX ADMIN — POTASSIUM CHLORIDE: 2 INJECTION, SOLUTION, CONCENTRATE INTRAVENOUS at 06:05

## 2021-08-19 RX ADMIN — METOCLOPRAMIDE 10 MG: 5 INJECTION, SOLUTION INTRAMUSCULAR; INTRAVENOUS at 11:26

## 2021-08-19 RX ADMIN — POTASSIUM BICARBONATE 40 MEQ: 782 TABLET, EFFERVESCENT ORAL at 17:46

## 2021-08-19 RX ADMIN — SODIUM CHLORIDE 25 ML: 9 INJECTION, SOLUTION INTRAVENOUS at 17:17

## 2021-08-19 RX ADMIN — INSULIN LISPRO 2 UNITS: 100 INJECTION, SOLUTION INTRAVENOUS; SUBCUTANEOUS at 16:06

## 2021-08-19 RX ADMIN — PROPOFOL 40 MCG/KG/MIN: 10 INJECTION, EMULSION INTRAVENOUS at 18:58

## 2021-08-19 RX ADMIN — HYDROCORTISONE SODIUM SUCCINATE 100 MG: 100 INJECTION, POWDER, FOR SOLUTION INTRAMUSCULAR; INTRAVENOUS at 06:04

## 2021-08-19 RX ADMIN — METOCLOPRAMIDE 10 MG: 5 INJECTION, SOLUTION INTRAMUSCULAR; INTRAVENOUS at 23:53

## 2021-08-19 RX ADMIN — FAMOTIDINE 20 MG: 10 INJECTION, SOLUTION INTRAVENOUS at 08:32

## 2021-08-19 RX ADMIN — FENTANYL CITRATE 100 MCG/HR: 0.05 INJECTION, SOLUTION INTRAMUSCULAR; INTRAVENOUS at 06:45

## 2021-08-19 RX ADMIN — HYDROCORTISONE SODIUM SUCCINATE 100 MG: 100 INJECTION, POWDER, FOR SOLUTION INTRAMUSCULAR; INTRAVENOUS at 11:25

## 2021-08-19 RX ADMIN — INSULIN LISPRO 2 UNITS: 100 INJECTION, SOLUTION INTRAVENOUS; SUBCUTANEOUS at 23:49

## 2021-08-19 RX ADMIN — VANCOMYCIN HYDROCHLORIDE 1000 MG: 1 INJECTION, POWDER, LYOPHILIZED, FOR SOLUTION INTRAVENOUS at 22:03

## 2021-08-19 RX ADMIN — INSULIN LISPRO 2 UNITS: 100 INJECTION, SOLUTION INTRAVENOUS; SUBCUTANEOUS at 11:19

## 2021-08-19 RX ADMIN — INSULIN GLARGINE 10 UNITS: 100 INJECTION, SOLUTION SUBCUTANEOUS at 22:07

## 2021-08-19 ASSESSMENT — PULMONARY FUNCTION TESTS
PIF_VALUE: 14
PIF_VALUE: 10
PIF_VALUE: 13
PIF_VALUE: 12
PIF_VALUE: 14
PIF_VALUE: 14
PIF_VALUE: 11
PIF_VALUE: 14
PIF_VALUE: 12
PIF_VALUE: 16
PIF_VALUE: 3
PIF_VALUE: 13
PIF_VALUE: 11
PIF_VALUE: 11
PIF_VALUE: 15
PIF_VALUE: 16
PIF_VALUE: 12
PIF_VALUE: 12
PIF_VALUE: 10
PIF_VALUE: 12
PIF_VALUE: 10
PIF_VALUE: 11
PIF_VALUE: 12

## 2021-08-19 ASSESSMENT — PAIN SCALES - GENERAL: PAINLEVEL_OUTOF10: 0

## 2021-08-19 NOTE — PROGRESS NOTES
PULMONARY PROGRESS NOTE:    REASON FOR VISIT: resp failure, DTs  Interval History:    Sedated, on vent  Febrile    Events since last visit: positive blood cultures, Tolerating TF,     PAST MEDICAL HISTORY:      Scheduled Meds:   [START ON 8/20/2021] potassium chloride  20 mEq Oral BID    vancomycin  1,000 mg Intravenous Q24H    [Held by provider] chlorothiazide (DIURIL) IVPB  500 mg Intravenous Q24H    [Held by provider] metoprolol tartrate  12.5 mg Oral BID    enoxaparin  1 mg/kg Subcutaneous BID    insulin glargine  10 Units Subcutaneous Nightly    metoclopramide  10 mg Intravenous Q6H    hydrocortisone sodium succinate PF  100 mg Intravenous Q8H    insulin lispro  0-12 Units Subcutaneous Q6H    vancomycin (VANCOCIN) intermittent dosing (placeholder)   Other RX Placeholder    erythromycin   Both Eyes Nightly    sodium chloride flush  5-40 mL Intravenous 2 times per day    famotidine (PEPCID) injection  20 mg Intravenous BID    nicotine  1 patch Transdermal Daily    lidocaine  1 patch Transdermal Daily     Continuous Infusions:   fentaNYL 100 mcg/hr (08/19/21 0645)    dextrose      propofol Stopped (08/19/21 1212)    norepinephrine      sodium chloride      sodium chloride      dexmedetomidine Stopped (08/16/21 0807)     PRN Meds:perflutren lipid microspheres, glucose, dextrose, glucagon (rDNA), dextrose, sodium phosphate IVPB **OR** sodium phosphate IVPB, sodium chloride, sodium chloride flush, sodium chloride, potassium chloride **OR** potassium alternative oral replacement **OR** potassium chloride, polyethylene glycol, acetaminophen **OR** acetaminophen, magnesium sulfate, ondansetron, LORazepam **OR** LORazepam **OR** LORazepam **OR** LORazepam **OR** LORazepam **OR** LORazepam **OR** LORazepam **OR** LORazepam, albuterol sulfate HFA, sodium chloride flush        PHYSICAL EXAMINATION:  BP (!) 130/49   Pulse 56   Temp 98.7 °F (37.1 °C) (Axillary)   Resp 14   Ht 5' 9\" (1.753 m) Wt 157 lb 6.5 oz (71.4 kg)   SpO2 98%   BMI 23.25 kg/m²     General : sedated, orally intubated, arouseable, following simple commands  Neck - supple, no lymphadenopathy, JVD not raised  Heart -   Lungs - Air Entry- fair bilaterally; breath sounds : vesicular  Abdomen - soft, no tenderness  Upper Extremities  - no cyanosis, mottling; edema : left wrist edema  Lower Extremities: no cyanosis, mottling; edema : absent    Current Laboratory, Radiologic, Microbiologic, and Diagnostic studies reviewed  Data ReviewCBC:   Recent Labs     08/17/21  0405 08/18/21  0415 08/19/21  0408   WBC 11.4* 8.0 8.3   RBC 2.98* 2.94* 2.82*   HGB 9.5* 9.6* 9.3*   HCT 29.2* 28.6* 27.3*    272 308     BMP:   Recent Labs     08/17/21  0405 08/18/21  0415 08/19/21  0408   GLUCOSE 173* 172* 182*   * 144 147*   K 4.3 3.2* 2.6*   BUN 40* 44* 46*   CREATININE 1.38* 1.33* 1.20   CALCIUM 7.8* 7.9* 7.8*     ABGs:   Recent Labs     08/17/21  0438 08/18/21  0541 08/19/21  0423   PHART 7.434 7.516* 7.448   PO2ART 76.4* 62.9* 77.6*   [id number] 37.2 34.5* 46.1*   [id number] 24.9 27.9* 31.9*   [id number] 95.0 92.5* 93.9*      PT/INR:  No results found for: PTINR    ASSESSMENT / PLAN:    Alcohol withdrawal  Pain control - add fentanyl gtt  Thiamine, folic acid  atelectasis  RLL infiltrate -? pulm contusion -ABx changed to cefepime/ vancomycin  Acute hypoxic resp failure - Mechanical ventilation  Tube feeds - currently held due to high residuals  bacteremia - continue current ABx,  Soft restraints  Hypotension - ont solu-cortef.  add vasopressin if needed to keep MAP 65  Empyema + culture pending  DW IR - not enough fluid to do chest tube - may consider if fluid recurs  On SBT- OK to extubate    Electronically signed by Haily Solitario MD on 08/19/21 at 12:30 PM

## 2021-08-19 NOTE — PROGRESS NOTES
Patient seen and examined. Afebrile, VSS. No leukocytosis, hemoglobin stable. Potassium 2.6, being replaced. Patient remains intubated on ventilator. Discussed with RN at bedside. Patient is tolerating tube feeds at goal. Bowels are moving. Abdomen soft, non-tender, non-distended. No pedal edema. Urine output adequate. CXR today showing stable small right pneumothorax. No new general surgery issues. Tube feeds as tolerated. Continue medical management.      Beatrice Starr PA-C  [facility]

## 2021-08-19 NOTE — PROGRESS NOTES
g, 15 g, Oral, PRN, Jh Grant MD    dextrose 50 % IV solution, 12.5 g, Intravenous, PRN, Jh Grant MD    glucagon (rDNA) injection 1 mg, 1 mg, Intramuscular, PRN, Jh Grant MD    dextrose 5 % solution, 100 mL/hr, Intravenous, PRN, Jh Grant MD    insulin lispro (HUMALOG) injection vial 0-12 Units, 0-12 Units, Subcutaneous, Q6H, Kelly Silver MD, 2 Units at 08/19/21 1119    vancomycin (VANCOCIN) intermittent dosing (placeholder), , Other, James Samaniego, Jonel Carpenter MD, Given at 08/14/21 0844    erythromycin [facility]) ophthalmic ointment, , Both Eyes, Nightly, Kelly Silver MD, Given at 08/18/21 2312    sodium phosphate 9.87 mmol in dextrose 5 % 250 mL IVPB, 0.16 mmol/kg, Intravenous, PRN **OR** sodium phosphate 19.71 mmol in dextrose 5 % 250 mL IVPB, 0.32 mmol/kg, Intravenous, PRN, Elvis Shelton MD, Stopped at 08/14/21 1230    propofol injection, 5-50 mcg/kg/min, Intravenous, Titrated, Marily Hubbard MD, Paused at 08/19/21 1212    norepinephrine (LEVOPHED) 16 mg in sodium chloride 0.9 % 250 mL infusion, 2-100 mcg/min, Intravenous, Continuous, Marily Hubbard MD    0.9 % sodium chloride infusion, , Intravenous, PRN, Elvis Shelton MD    sodium chloride flush 0.9 % injection 5-40 mL, 5-40 mL, Intravenous, 2 times per day, Axel Saldaña APRN - CNP, 10 mL at 08/18/21 2153    sodium chloride flush 0.9 % injection 10 mL, 10 mL, Intravenous, PRN, Axel Charist, APRN - CNP    0.9 % sodium chloride infusion, 25 mL, Intravenous, PRN, Axel Sleet, APRN - CNP    potassium chloride (KLOR-CON M) extended release tablet 40 mEq, 40 mEq, Oral, PRN **OR** potassium bicarb-citric acid (EFFER-K) effervescent tablet 40 mEq, 40 mEq, Oral, PRN, 40 mEq at 08/18/21 0550 **OR** potassium chloride 10 mEq/100 mL IVPB (Peripheral Line), 10 mEq, Intravenous, PRN, DONNY Kirby - CNP, Last Rate: 100 mL/hr at 08/11/21 1355, 10 mEq at 08/11/21 1355    polyethylene glycol (GLYCOLAX) packet 17 g, 17 g, Oral, Daily PRN, Tori Bird, APRN - CNP, 17 g at 08/16/21 0830    acetaminophen (TYLENOL) tablet 650 mg, 650 mg, Oral, Q6H PRN, 650 mg at 08/14/21 2314 **OR** acetaminophen (TYLENOL) suppository 650 mg, 650 mg, Rectal, Q6H PRN, DONNY Quintanilla CNP    magnesium sulfate 1000 mg in dextrose 5% 100 mL IVPB, 1,000 mg, Intravenous, PRN, DONNY Quintanilla CNP, Stopped at 08/15/21 0734    ondansetron (ZOFRAN) injection 4 mg, 4 mg, Intravenous, Q6H PRN, DONNY Quintanilla CNP    famotidine (PEPCID) injection 20 mg, 20 mg, Intravenous, BID, DONNY Quintanilla CNP, 20 mg at 08/19/21 7419    nicotine (NICODERM CQ) 21 MG/24HR 1 patch, 1 patch, Transdermal, Daily, DONNY Quintanilla CNP, 1 patch at 08/19/21 0833    LORazepam (ATIVAN) tablet 1 mg, 1 mg, Oral, Q1H PRN **OR** LORazepam (ATIVAN) injection 1 mg, 1 mg, Intravenous, Q1H PRN, 1 mg at 08/11/21 2314 **OR** LORazepam (ATIVAN) tablet 2 mg, 2 mg, Oral, Q1H PRN **OR** LORazepam (ATIVAN) injection 2 mg, 2 mg, Intravenous, Q1H PRN, 2 mg at 08/15/21 1227 **OR** LORazepam (ATIVAN) tablet 3 mg, 3 mg, Oral, Q1H PRN **OR** LORazepam (ATIVAN) injection 3 mg, 3 mg, Intravenous, Q1H PRN, 3 mg at 08/11/21 0947 **OR** LORazepam (ATIVAN) tablet 4 mg, 4 mg, Oral, Q1H PRN **OR** LORazepam (ATIVAN) injection 4 mg, 4 mg, Intravenous, Q1H PRN, DONNY Quintanilla CNP    albuterol sulfate  (90 Base) MCG/ACT inhaler 2 puff, 2 puff, Inhalation, Q6H PRN, Tori Bird, APRN - CNP    lidocaine 4 % external patch 1 patch, 1 patch, Transdermal, Daily, TIEN Rodríguez, 1 patch at 08/19/21 1004    dexmedetomidine (PRECEDEX) 400 mcg in sodium chloride 0.9 % 100 mL infusion, 0.2-1.4 mcg/kg/hr, Intravenous, Continuous, Damon Villegas MD, Stopped at 08/16/21 0807    sodium chloride flush 0.9 % injection 10 mL, 10 mL, Intravenous, PRN, Jesús Salazar MD, 10 mL at 08/10/21 2300   Continuous Infusions:   fentaNYL 100 mcg/hr (08/19/21 0645)    dextrose      propofol Stopped (08/19/21 1212)   Ardyth Moritz norepinephrine      sodium chloride      sodium chloride      dexmedetomidine Stopped (08/16/21 0807)     CBC:   Recent Labs     08/17/21  0405 08/18/21  0415 08/19/21  0408   WBC 11.4* 8.0 8.3   HGB 9.5* 9.6* 9.3*    272 308     BMP:    Recent Labs     08/17/21  0405 08/18/21  0415 08/19/21  0408   * 144 147*   K 4.3 3.2* 2.6*   * 111* 108*   CO2 23 24 28   BUN 40* 44* 46*   CREATININE 1.38* 1.33* 1.20   GLUCOSE 173* 172* 182*     Hepatic:   Recent Labs     08/17/21  0405 08/18/21 0415 08/19/21  0408   AST 17 17 24   ALT 21 17 23   BILITOT 0.35 0.35 0.36   ALKPHOS 72 72 83     Troponin: No results for input(s): TROPHS in the last 72 hours. BNP: No results for input(s): BNP in the last 72 hours. Lipids: No results for input(s): CHOL, HDL in the last 72 hours. Invalid input(s): LDLCALCU  INR:   Recent Labs     08/16/21  1747   INR 0.9       Objective:   Vitals: BP (!) 130/49   Pulse 56   Temp 98.7 °F (37.1 °C) (Axillary)   Resp 14   Ht 5' 9\" (1.753 m)   Wt 157 lb 6.5 oz (71.4 kg)   SpO2 98%   BMI 23.25 kg/m²   General appearance: intubated, sedated  HEENT: Head: Normocephalic, no lesions, without obvious abnormality.   Neck: no adenopathy, no carotid bruit, no JVD, supple, symmetrical, trachea midline and thyroid not enlarged, symmetric, no tenderness/mass/nodules  Lungs: clear to auscultation bilaterally  Heart: regular rate and rhythm, S1, S2 normal, no murmur, click, rub or gallop  Abdomen: soft, non-tender; bowel sounds normal; no masses,  no organomegaly  Extremities: extremities normal, atraumatic, no cyanosis or edema  Neurologic: intubated, sedated    EKG:   Results for orders placed or performed during the hospital encounter of 08/10/21   EKG 12 Lead   Result Value Ref Range    Ventricular Rate 92 BPM    Atrial Rate 92 BPM    P-R Interval 148 ms    QRS Duration 82 ms    Q-T Interval 388 ms    QTc Calculation (Bazett) 479 ms    P Axis -8 degrees    R Axis 26 degrees    T Axis 48 degrees    Narrative     Poor data quality, interpretation may be adversely affected  Normal sinus rhythm  Minimal voltage criteria for LVH, may be normal variant  Borderline ECG  When compared with ECG of 10-AUG-2021 21:28, (unconfirmed)  Premature ventricular complexes are no longer Present  ST less depressed in Inferior leads  ST no longer depressed in Anterior leads     ECHO:   Results for orders placed or performed during the hospital encounter of 08/10/21   ECHO Complete 2D W Doppler W Color    Narrative    [facility]    Transthoracic Echocardiography Report (TTE)     Patient Name Qing Hickey       Date of Study                 08/16/2021                JUN FRAGA      Date of      1965  Gender                        Male   Birth      Age          60 year(s)  Race                                Room Number  2004        Height:                       68.9 inch, 175 cm      Corporate ID [id number]    Weight:                       163 pounds, 74 kg   #      Patient Pina Luna [de-identified]   BSA:           1.89 m^2       BMI:      24.16   #                                                                kg/m^2      MR #         [id number]      Sonographer                   Lorenza Chester      Accession #  [id number]  Interpreting Physician        [address]      Fellow                   Referring Nurse Practitioner      Interpreting             Referring Physician           Blu Soliz   Fellow     Type of Study      TTE procedure:2D Echocardiogram, M-Mode, Doppler, Color Doppler. Procedure Date  Date: 08/16/2021 Start: 02:55 PM    Study Location: [address] [place] [address]  Technical Quality: Fair visualization    Indications:Elevated BNP. History / Tech.  Comments:  COPD ETOH ABUSE    Patient Status: Inpatient    Height: 68.9 inches Weight: 163.14 pounds BSA: 1.89 m^2 BMI: 24.16 kg/m^2    Rhythm: Within normal limits HR: 92 bpm BP: 122/75 mmHg    CONCLUSIONS    Summary  Patient supine, on ventilator. Left ventricle is normal in size. Estimated LV EF 35 %. Mild left ventricular hypertrophy. Normal right ventricular size and function. No significant valvular regurgitation or stenosis seen. Aortic root is mildly dilated. (4.1 cm)  IVC Increased diameter and impaired or no inspiratory variation. No significant pericardial effusion is seen. Signature  ----------------------------------------------------------------------------   Electronically signed by Lorenza Chester(Sonographer) on 08/16/2021 03:45   PM  ----------------------------------------------------------------------------    ----------------------------------------------------------------------------   Electronically signed by Lopez Gray(Interpreting physician) on 08/16/2021   04:09 PM  ----------------------------------------------------------------------------  FINDINGS  Left Atrium  Left atrium is normal in size. Left Ventricle  Left ventricle is normal in size. Estimated LV EF 35 %. Mild left ventricular hypertrophy. Right Atrium  Right atrium is normal in size. Right Ventricle  Normal right ventricular size and function. Mitral Valve  Normal mitral valve structure and function. Trivial mitral regurgitation. Aortic Valve  Normal aortic valve structure and function without stenosis or  regurgitation. Tricuspid Valve  Normal tricuspid valve structure and function. Insignificant tricuspid regurgitation, unable to estimate RVSP. Pulmonic Valve  The pulmonic valve is normal in structure. No pulmonic insufficiency. Pericardial Effusion  No significant pericardial effusion is seen. Miscellaneous  Aortic root is mildly dilated. (4.1 cm)  E/e' average 8.5  IVC Increased diameter and impaired or no inspiratory variation.     M-mode / 2D Measurements & Calculations:      LVIDd:4.82 cm(3.7 - 5.6 cm)      Diastolic GGNUEK:911 ml   XQLXW:4.41 cm(2.2 - 4.0 cm)      Systolic RRHHZX:07.6 ml   IVSd:1.28 cm(0.6 - 1.1 cm)       Aortic Root:4.1 cm(2.0 - 3.7 cm)   LVPWd:1.2 cm(0.6 - 1.1 cm)       LA Dimension: 4.1 cm(1.9 - 4.0 cm)   Fractional Shortenin.33 %    LA volume/Index: 35.2 ml /19m^2   Calculated LVEF (%): 39.24 %     LVOT:2.5 cm      Mitral:                                Aortic      Peak E-Wave: 0.74 m/s                  Peak Velocity: 1.18 m/s   Peak A-Wave: 0.99 m/s                  Mean Velocity: 0.80 m/s   E/A Ratio: 0.75                        Peak Gradient: 5.57 mmHg   Peak Gradient: 2.2 mmHg                Mean Gradient: 3 mmHg   Deceleration Time: 183 msec                                             Area (continuity): 3.33 cm^2                                          AV VTI: 25.8 cm      Estimated RA Pressure: 15 mmHg     Septal Wall E' velocity:0.07 m/s  Lateral Wall E' velocity:0.14 m/s       Assessment / Acute Cardiac Problems:       Acute Systolic CHF, HFrEF- ? Due to ETOH. Newly reduced LVEF 35%. AMS, ETOH, Withdrawals  Acute Resp failure- on vent  Bacteremia  Pleural effusion- s/p Thora on 21- now with PTX  PNA    Plan of Treatment:     - Stable from CV standpoint. Tolerating PO BB. No ACE/ARB/ARNi d/t SHANNAN which is improving. Will continue to follow and add when able. - replacing K+. Keep K >4 and Mg >2.  - Fluid management per nephro recommendations - appreciate assistance.    - once extubated and stable will consider stress test vs cardiac cath  - Will plan for bedside ERICKA tomorrow around 10am per ID recommendation.  -Wean trials with possible extubation later today      DONNY Harris - CNP

## 2021-08-19 NOTE — PROGRESS NOTES
The patient was seen and examined by me he has made significant clinical improvement, he is more responsive to my questions today \"close your eyes  my fingers\". Chest x-ray was reviewed by me with radiologist and that there is no significant change in the right side. He did have bradycardia and \"blockers were reduced. His left hand swelling is also improved. His abdomen is soft and nontender.,  He has a bilateral air entry on chest auscultation. He does have a bradycardia but no other's change. He will be kept on present regimen.   Please refer to notes by the resident which will be cosigned by me

## 2021-08-19 NOTE — PROGRESS NOTES
ERICKA planned for 8/20 @ 10/1030 at bedside with Dr. Haritha Wagner. [facility], RN notified.

## 2021-08-19 NOTE — FLOWSHEET NOTE
08/19/21 1128   Encounter Summary   Services provided to: Patient not available  (patient with staff)
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
## 2021-08-19 NOTE — PROGRESS NOTES
Nephrology Progress Note    Subjective/   60y.o. year old male who we are seeing in consultation for acute kidney injury      Interval history:  Patient remains intubated on mechanical ventilator. Follows commands. Chest tube was not placed yesterday-He has stable right sided pneumothorax. Patient had left thoracentesis  of 1.1L isolating staph aureus. He responded to diuresis with IV Diuril and albumin. Renal function stable. Patient has a negative balance. Potassium 2.6. History of Present Illness: This is a 60 y.o. male with history of alcohol abuse, COPD, Hypertension who presented on 8/10/2021 with syncope and collapse. He was witnessed walking on the sidewalk and he collapsed all of a sudden. Bystanders called 911 and patient was evaluated in the ER. He had presented with pain in the right chest wall and abdomen. He initially received chest abdomen CT with IV contrast on 8/10/2021 showing grade 2 chest wall injury with right fourth through 6 anterior lateral rib fractures. Patient was initially placed on alcohol withdrawal protocol. Patient  developed respiratory distress and was placed on BiPAP and subsequently intubated on 8/13/2021 for airway protection. Chest x-ray was suggestive of bilateral pleural effusion right greater than left with significant right-sided pleural effusion. Patient is scheduled for thoracentesis today. Blood cultures are growing MRSA. [facility] [place] [facility] is receiving IV vancomycin. He was started on Lovenox for DVT of the left upper extremity. Nephrology is consulted for acute kidney injury. Serum creatinine was 0.54 mg/dl on 8/14/2021 and progressively worsened to 1.27 on 8/16/2021 and 1.38 today with BUN of 40 mg/dl. He did receive IV Lasix 40 mg x 1 yesterday as patient is fluid overloaded. Echocardiogram shows a EF of 35% with increased IVC diameter. He has had good urine output of 3.3 L over the  last 24 hours after IV Lasix.  Patient is however 13 L positive balance since admission. Objective/     Vitals:    08/19/21 0748 08/19/21 0749 08/19/21 0800 08/19/21 0832   BP:       Pulse: 52 55  70   Resp: 19 18 21   Temp:   98.7 °F (37.1 °C)    TempSrc:   Temporal    SpO2: 100% 100%     Weight:       Height:         24HR INTAKE/OUTPUT:      Intake/Output Summary (Last 24 hours) at 8/19/2021 1126  Last data filed at 8/19/2021 0800  Gross per 24 hour   Intake 1479.54 ml   Output 2900 ml   Net -1420.46 ml     Patient Vitals for the past 96 hrs (Last 3 readings):   Weight   08/18/21 0519 157 lb 6.5 oz (71.4 kg)   08/17/21 0430 157 lb 10.1 oz (71.5 kg)   08/16/21 0600 163 lb 2.3 oz (74 kg)       Constitutional:  Alert, awake, no apparent distress  Cardiovascular:  S1, S2 without m/r/g  Respiratory:  CTA B without w/r/r  Abdomen: +bs, soft, nt  Ext:  LE edema    Data/  Recent Labs     08/17/21  0405 08/18/21  0415 08/19/21  0408   WBC 11.4* 8.0 8.3   HGB 9.5* 9.6* 9.3*   HCT 29.2* 28.6* 27.3*   MCV 98.1 97.3 96.8    272 308     Recent Labs     08/17/21  0405 08/18/21  0415 08/19/21  0408   * 144 147*   K 4.3 3.2* 2.6*   * 111* 108*   CO2 23 24 28   GLUCOSE 173* 172* 182*   MG 2.1 2.3 2.5   BUN 40* 44* 46*   CREATININE 1.38* 1.33* 1.20   LABGLOM 53* 55* >60   GFRAA >60 >60 >60         Assessment/   1. Acute kidney injury, nonoliguric secondary to decreased effective arterial volume, severe hypoalbuminemia and sepsis. No evidence of acute rhabdomyolysis. Rule out drug nephrotoxicity-renal function stable, nonoliguric creatinine 1.3  Renal ultrasound showed no hydronephrosis or obstruction-serologies pending. 2. Acute respiratory failure intubated on mechanical ventilator     3. Severe protein energy malnutrition-     4.  MRSA bacteremia     5. Cardiomyopathy with systolic heart failure EF 35% and evidence of volume overload-negative  balance of 1.1L yesterday     6. Large right-sided parapneumonic pleural effusion, possibly loculated on chest CT-S/P  Left thoracentesis 8/17/21.    7.   Hypokalemia-sliding scale replacement-start potassium chloride 20 twice daily through the NG tube a.m. Plan/   Plan:  1. hold  IV diuril 500 mg  + IV albumin 25 gm today and replace potassium. We will resume diuretics tomorrow once potassium level is optimized  2  continue tube feeding+ supportive management  3. Monitor Vanco trough levels for  potential nephrotoxicity. 4  Avoid nephrotoxic drugs  5. Replace potassium--start potassium chloride 20 twice daily through the NG tube a.m.         Prognosis is guarded         Ольга Louise MD

## 2021-08-19 NOTE — PROGRESS NOTES
[facility]   OCCUPATIONAL THERAPY MISSED TREATMENT NOTE   INPATIENT   Date: 21  Patient Name: Dayana Martin       Room:   MRN: [id number]   Account #: [de-identified]    : 1965  (60 y.o.)  Gender: male                 REASON FOR MISSED TREATMENT:  Per RN, patient is on a wean trial currently. Will defer OT evaluation until patient is extubated and able to functionally participate in therapy assessment. Will re-attempt.    -   Weaning trial.     Kole Fontanez, OT

## 2021-08-19 NOTE — PROGRESS NOTES
[facility] -   Infectious diseases evaluation  admission date 8/10/2021    reason for consultation:   MRSA bacteremia    Impression :   Current:  · MRSA bacteremia suspect pulmonary source of infection  · Left hand and wrist cellulitis  · Left wrist fracture  · Right pleural effusion /empyema status post thoracentesis with staph aureus growth on culture. · Pneumothorax  · Superficial vein thrombophlebitis left arm  · Syncopal episode  · Alcohol withdrawal  · Acute respiratory failure requiring intubation  · Rib fracture with possible hemothorax    Other:  · History of alcohol abuse  · COPD  · History of depression  · History of seizure  · History of hypertension. Recommendations   · Continue IV vancomycin ,monitor renal function and vancomycin levels closely  · Reportedly not enough pleural fluid to drain by IR. · Repeat blood cultures 8/16/2021 grew MRSA  · Echocardiogram noted, no reported vegetations  · Repeat blood cultures   · Discussed with cardiology Dr. Emanuel Mckeon, will plan for ERICKA tomorrow. · Follow CBC and renal function          History of Present Illness:   Initial history:  Ayden Gann is a 60y.o.-year-old male presented to the hospital on 8/10/2021 after a syncopal episode, was complaining of pain to the right chest wall and abdomen. Reported sharp, constant pain aggravated by movement with no alleviating factors. The patient was previously evaluated at the ER 8/7/2021 after a fall found to have multiple rib fractures and a left wrist closed fracture. Tox screen positive for cannabinoids. CT head negative for acute intracranial abnormality. CT chest/abdomen/pelvis showed rib fractures with associated focal hemothorax. No acute processes in abdomen or pelvis. Right arm PICC line was placed 8/12/2021  He was placed on alcohol withdrawal protocol, developed respiratory distress was placed on BiPAP initially then was intubated 4/13/21.   Olivia catheter was placed on 4/13/2021  The patient was on Levaquin 8/13/2021 and 8/14/2021 that was discontinued and started on vancomycin and cefepime. He had a fever with a temperature max of 100.6 on 8/13/2021. Blood cultures 8/14/2021 grew MRSA as well as sputum culture. Chest x-ray 8/14/2021 showed bilateral airspace disease right greater than left. Interval changes  8/19/2021   He remains intubated, sedated, no reported fever, follow simple commands, no new events. CT chest without contrast 8/16/2021 showed increased right pleural effusion with an area of loculated fluid status post thoracentesis   Renal function stable  Blood pressure stable  COVID-19 rapid test was negative   Patient Vitals for the past 8 hrs:   BP Temp Temp src Pulse Resp SpO2   08/19/21 0832 -- -- -- 70 21 --   08/19/21 0800 -- 98.7 °F (37.1 °C) Temporal -- -- --   08/19/21 0749 -- -- -- 55 18 100 %   08/19/21 0748 -- -- -- 52 19 100 %   08/19/21 0747 -- -- -- -- 20 100 %   08/19/21 0700 (!) 111/52 -- -- 55 19 98 %   08/19/21 0600 -- -- -- 50 -- --   08/19/21 0500 (!) 125/54 -- -- 61 20 99 %   08/19/21 0400 137/61 97.6 °F (36.4 °C) Axillary 52 20 93 %   08/19/21 0347 -- -- -- -- 21 --           I have personally reviewed the past medical history, past surgical history, medications, social history, and family history, and I haveupdated the database accordingly. Allergies:   Nickel     Review of Systems:     Review of Systems  Intubated, sedated unable to provide  Physical Examination :       Physical Exam  Constitutional:       Comments: Intubated, awake, responds to simple commands   HENT:      Head: Normocephalic and atraumatic. Right Ear: External ear normal.      Left Ear: External ear normal.   Eyes:      General: No scleral icterus. Right eye: No discharge. Left eye: No discharge. Cardiovascular:      Rate and Rhythm: Normal rate and regular rhythm. Heart sounds: No murmur heard.      Pulmonary:  hydrocortisone sodium succinate PF  100 mg Intravenous Q8H    insulin lispro  0-12 Units Subcutaneous Q6H    vancomycin (VANCOCIN) intermittent dosing (placeholder)   Other RX Placeholder    erythromycin   Both Eyes Nightly    sodium chloride flush  5-40 mL Intravenous 2 times per day    famotidine (PEPCID) injection  20 mg Intravenous BID    nicotine  1 patch Transdermal Daily    lidocaine  1 patch Transdermal Daily       Social History:     Social History     Socioeconomic History    Marital status:      Spouse name: Not on file    Number of children: Not on file    Years of education: Not on file    Highest education level: Not on file   Occupational History    Not on file   Tobacco Use    Smoking status: Current Every Day Smoker     Packs/day: 1.00     Years: 38.00     Pack years: 38.00     Types: Cigarettes    Smokeless tobacco: Never Used    Tobacco comment: 1 PPD   Vaping Use    Vaping Use: Never used   Substance and Sexual Activity    Alcohol use: Yes     Alcohol/week: 12.0 standard drinks     Types: 12 Cans of beer per week     Comment: 12 24 oz cans daily    Drug use: Yes     Types: Marijuana     Comment: crack occasionally    Sexual activity: Never   Other Topics Concern    Not on file   Social History Narrative    Not on file     Social Determinants of Health     Financial Resource Strain:     Difficulty of Paying Living Expenses:    Food Insecurity:     Worried About Running Out of Food in the Last Year:     Ran Out of Food in the Last Year:    Transportation Needs:     Lack of Transportation (Medical):      Lack of Transportation (Non-Medical):    Physical Activity:     Days of Exercise per Week:     Minutes of Exercise per Session:    Stress:     Feeling of Stress :    Social Connections:     Frequency of Communication with Friends and Family:     Frequency of Social Gatherings with Friends and Family:     Attends Christian Services:     Active Member of Clubs or Organizations:     Attends Club or Organization Meetings:     Marital Status:    Intimate Partner Violence:     Fear of Current or Ex-Partner:     Emotionally Abused:     Physically Abused:     Sexually Abused:        Family History:     Family History   Problem Relation Age of Onset    Cancer Mother         colon ca      Medical Decision Making:   I have independently reviewed/ordered the following labs:    CBC with Differential:   Recent Labs     08/18/21 0415 08/19/21  0408   WBC 8.0 8.3   HGB 9.6* 9.3*   HCT 28.6* 27.3*    308     BMP:  Recent Labs     08/18/21  0415 08/19/21  0408    147*   K 3.2* 2.6*   * 108*   CO2 24 28   BUN 44* 46*   CREATININE 1.33* 1.20   MG 2.3 2.5     Hepatic Function Panel:   Recent Labs     08/18/21 0415 08/19/21  0408   PROT 5.1* 5.4*   LABALBU 1.8* 2.1*   BILITOT 0.35 0.36   ALKPHOS 72 83   ALT 17 23   AST 17 24     No results for input(s): RPR in the last 72 hours. No results for input(s): HIV in the last 72 hours. No results for input(s): BC in the last 72 hours. Lab Results   Component Value Date    CREATININE 1.20 08/19/2021    GLUCOSE 182 08/19/2021       Detailed results: Thank you for allowing us to participate in the care of this patient. Please call with questions. This note is created with the assistance of a speech recognition program.  While intending to generate adocument that actually reflects the content of the visit, the document can still have some errors including those of syntax and sound a like substitutions which may escape proof reading. It such instances, actual meaningcan be extrapolated by contextual diversion.     Silvestre Islas MD  Office: [phone]  Perfect [facility] / office [phone]

## 2021-08-19 NOTE — PLAN OF CARE
Continues in restraints with q 2 hourly turns, ett at 26 cm as charted,  aprom continues. Nods head yes or no, able to follow simple commands, wrote the word \"coffee\" during communication, Tolerating ventilator well. Thick secretions per inline and oral. Pt squeezes mouth closed during oral care but this nurse able to complete regardless. No s/s of pain or distress. Skin intact with misc bruising. No injuries this shift.

## 2021-08-19 NOTE — PLAN OF CARE
Problem: Skin Integrity:  Goal: Will show no infection signs and symptoms  Description: Will show no infection signs and symptoms  Outcome: Ongoing     Problem: Skin Integrity:  Goal: Absence of new skin breakdown  Description: Absence of new skin breakdown  Outcome: Ongoing     Problem: OXYGENATION/RESPIRATORY FUNCTION  Goal: Patient will maintain patent airway  Outcome: Ongoing     Problem: OXYGENATION/RESPIRATORY FUNCTION  Goal: Patient will achieve/maintain normal respiratory rate/effort  Description: Respiratory rate and effort will be within normal limits for the patient  Outcome: Ongoing     Problem: MECHANICAL VENTILATION  Goal: Patient will maintain patent airway  Outcome: Ongoing     Problem: MECHANICAL VENTILATION  Goal: ET tube will be managed safely  Outcome: Ongoing

## 2021-08-19 NOTE — PROGRESS NOTES
Dr Albina Novak on perfect serve. Cardiologist has requested not to extubate until tomorrow in order to perform ERICKA tomorrow while stable. SBT almost completed. pt doing very well. Spelling word \"coffee\" to this nurse.

## 2021-08-19 NOTE — PROGRESS NOTES
Physical Therapy        Physical Therapy Cancel Note      DATE: 2021    NAME: Andrea Morales  MRN: [id number]   : 1965      Patient not seen this date for Physical Therapy due to: Other: 21: Pt on wean trial per JOHN Archuleta. 928. May extubate later. Will continue to follow.        Electronically signed by Jose Emerson PT on 2021 at 10:30 AM

## 2021-08-20 ENCOUNTER — APPOINTMENT (OUTPATIENT)
Dept: GENERAL RADIOLOGY | Age: 56
DRG: 351 | End: 2021-08-20
Payer: MEDICAID

## 2021-08-20 ENCOUNTER — APPOINTMENT (OUTPATIENT)
Dept: INTERVENTIONAL RADIOLOGY/VASCULAR | Age: 56
DRG: 351 | End: 2021-08-20
Payer: MEDICAID

## 2021-08-20 ENCOUNTER — APPOINTMENT (OUTPATIENT)
Dept: NON INVASIVE DIAGNOSTICS | Age: 56
DRG: 351 | End: 2021-08-20
Payer: MEDICAID

## 2021-08-20 PROBLEM — B95.62 MRSA BACTEREMIA: Status: ACTIVE | Noted: 2021-08-20

## 2021-08-20 PROBLEM — R78.81 MRSA BACTEREMIA: Status: ACTIVE | Noted: 2021-08-20

## 2021-08-20 LAB
ALBUMIN SERPL-MCNC: 2.4 G/DL (ref 3.5–5.2)
ALBUMIN/GLOBULIN RATIO: ABNORMAL (ref 1–2.5)
ALLEN TEST: ABNORMAL
ALP BLD-CCNC: 75 U/L (ref 40–129)
ALT SERPL-CCNC: 16 U/L (ref 5–41)
ANION GAP SERPL CALCULATED.3IONS-SCNC: 6 MMOL/L (ref 9–17)
ANION GAP SERPL CALCULATED.3IONS-SCNC: 7 MMOL/L (ref 9–17)
ANION GAP SERPL CALCULATED.3IONS-SCNC: 7 MMOL/L (ref 9–17)
AST SERPL-CCNC: 23 U/L
BILIRUB SERPL-MCNC: 0.36 MG/DL (ref 0.3–1.2)
BUN BLDV-MCNC: 41 MG/DL (ref 6–20)
BUN/CREAT BLD: ABNORMAL (ref 9–20)
CALCIUM SERPL-MCNC: 7.5 MG/DL (ref 8.6–10.4)
CARBOXYHEMOGLOBIN: 0.7 % (ref 0–5)
CHLORIDE BLD-SCNC: 112 MMOL/L (ref 98–107)
CHLORIDE BLD-SCNC: 114 MMOL/L (ref 98–107)
CHLORIDE BLD-SCNC: 115 MMOL/L (ref 98–107)
CO2: 27 MMOL/L (ref 20–31)
CO2: 28 MMOL/L (ref 20–31)
CO2: 29 MMOL/L (ref 20–31)
COMPLEMENT TOTAL (CH50): 62 U/ML (ref 38.7–89.9)
CREAT SERPL-MCNC: 1.13 MG/DL (ref 0.7–1.2)
CULTURE: ABNORMAL
DIRECT EXAM: ABNORMAL
FIO2: 40
GFR AFRICAN AMERICAN: >60 ML/MIN
GFR NON-AFRICAN AMERICAN: >60 ML/MIN
GFR SERPL CREATININE-BSD FRML MDRD: ABNORMAL ML/MIN/{1.73_M2}
GFR SERPL CREATININE-BSD FRML MDRD: ABNORMAL ML/MIN/{1.73_M2}
GLUCOSE BLD-MCNC: 105 MG/DL (ref 70–99)
GLUCOSE BLD-MCNC: 122 MG/DL (ref 75–110)
GLUCOSE BLD-MCNC: 66 MG/DL (ref 75–110)
GLUCOSE BLD-MCNC: 73 MG/DL (ref 75–110)
GLUCOSE BLD-MCNC: 74 MG/DL (ref 75–110)
GLUCOSE BLD-MCNC: 93 MG/DL (ref 75–110)
HCO3 ARTERIAL: 31.9 MMOL/L (ref 22–26)
HCT VFR BLD CALC: 26.4 % (ref 41–53)
HCT VFR BLD CALC: 27.6 % (ref 41–53)
HEMOGLOBIN: 8.7 G/DL (ref 13.5–17.5)
HEMOGLOBIN: 9.2 G/DL (ref 13.5–17.5)
Lab: ABNORMAL
MAGNESIUM: 2.5 MG/DL (ref 1.6–2.6)
MCH RBC QN AUTO: 31.9 PG (ref 26–34)
MCHC RBC AUTO-ENTMCNC: 32.9 G/DL (ref 31–37)
MCV RBC AUTO: 96.8 FL (ref 80–100)
METHEMOGLOBIN: 1.1 % (ref 0–1.9)
MODE: ABNORMAL
NEGATIVE BASE EXCESS, ART: ABNORMAL MMOL/L (ref 0–2)
NOTIFICATION TIME: ABNORMAL
NOTIFICATION: ABNORMAL
NRBC AUTOMATED: ABNORMAL PER 100 WBC
O2 DEVICE/FLOW/%: ABNORMAL
O2 SAT, ARTERIAL: 93.8 % (ref 95–98)
OXYHEMOGLOBIN: ABNORMAL % (ref 95–98)
PATIENT TEMP: 37
PCO2 ARTERIAL: 40.7 MMHG (ref 35–45)
PCO2, ART, TEMP ADJ: ABNORMAL (ref 35–45)
PDW BLD-RTO: 14.8 % (ref 11.5–14.9)
PEEP/CPAP: 0
PH ARTERIAL: 7.5 (ref 7.35–7.45)
PH, ART, TEMP ADJ: ABNORMAL (ref 7.35–7.45)
PLATELET # BLD: 339 K/UL (ref 150–450)
PMV BLD AUTO: 8.7 FL (ref 6–12)
PO2 ARTERIAL: 71.3 MMHG (ref 80–100)
PO2, ART, TEMP ADJ: ABNORMAL MMHG (ref 80–100)
POSITIVE BASE EXCESS, ART: 8.8 MMOL/L (ref 0–2)
POTASSIUM SERPL-SCNC: 3 MMOL/L (ref 3.7–5.3)
POTASSIUM SERPL-SCNC: 3.5 MMOL/L (ref 3.7–5.3)
POTASSIUM SERPL-SCNC: 3.6 MMOL/L (ref 3.7–5.3)
PSV: ABNORMAL
PT. POSITION: ABNORMAL
RBC # BLD: 2.73 M/UL (ref 4.5–5.9)
RESPIRATORY RATE: 20
SAMPLE SITE: ABNORMAL
SET RATE: 20
SODIUM BLD-SCNC: 146 MMOL/L (ref 135–144)
SODIUM BLD-SCNC: 148 MMOL/L (ref 135–144)
SODIUM BLD-SCNC: 151 MMOL/L (ref 135–144)
SPECIMEN DESCRIPTION: ABNORMAL
TEXT FOR RESPIRATORY: ABNORMAL
TOTAL HB: ABNORMAL G/DL (ref 12–16)
TOTAL PROTEIN: 5.1 G/DL (ref 6.4–8.3)
TOTAL RATE: 20
VANCOMYCIN TROUGH DATE LAST DOSE: NORMAL
VANCOMYCIN TROUGH DOSE AMOUNT: 1000
VANCOMYCIN TROUGH TIME LAST DOSE: 2303
VANCOMYCIN TROUGH: 13.4 UG/ML (ref 10–20)
VT: 500
WBC # BLD: 11.2 K/UL (ref 3.5–11)

## 2021-08-20 PROCEDURE — 94003 VENT MGMT INPAT SUBQ DAY: CPT

## 2021-08-20 PROCEDURE — 6360000002 HC RX W HCPCS: Performed by: INTERNAL MEDICINE

## 2021-08-20 PROCEDURE — 2580000003 HC RX 258: Performed by: NURSE PRACTITIONER

## 2021-08-20 PROCEDURE — 6360000002 HC RX W HCPCS: Performed by: STUDENT IN AN ORGANIZED HEALTH CARE EDUCATION/TRAINING PROGRAM

## 2021-08-20 PROCEDURE — 82805 BLOOD GASES W/O2 SATURATION: CPT

## 2021-08-20 PROCEDURE — 32557 INSERT CATH PLEURA W/ IMAGE: CPT

## 2021-08-20 PROCEDURE — 5A2204Z RESTORATION OF CARDIAC RHYTHM, SINGLE: ICD-10-PCS | Performed by: INTERNAL MEDICINE

## 2021-08-20 PROCEDURE — 94761 N-INVAS EAR/PLS OXIMETRY MLT: CPT

## 2021-08-20 PROCEDURE — 2709999900 IR CHEST TUBE INSERTION

## 2021-08-20 PROCEDURE — 2700000000 HC OXYGEN THERAPY PER DAY

## 2021-08-20 PROCEDURE — 71045 X-RAY EXAM CHEST 1 VIEW: CPT

## 2021-08-20 PROCEDURE — 2000000000 HC ICU R&B

## 2021-08-20 PROCEDURE — 2580000003 HC RX 258: Performed by: INTERNAL MEDICINE

## 2021-08-20 PROCEDURE — B246ZZ4 ULTRASONOGRAPHY OF RIGHT AND LEFT HEART, TRANSESOPHAGEAL: ICD-10-PCS | Performed by: INTERNAL MEDICINE

## 2021-08-20 PROCEDURE — 0W9930Z DRAINAGE OF RIGHT PLEURAL CAVITY WITH DRAINAGE DEVICE, PERCUTANEOUS APPROACH: ICD-10-PCS | Performed by: RADIOLOGY

## 2021-08-20 PROCEDURE — 2580000003 HC RX 258: Performed by: STUDENT IN AN ORGANIZED HEALTH CARE EDUCATION/TRAINING PROGRAM

## 2021-08-20 PROCEDURE — 6370000000 HC RX 637 (ALT 250 FOR IP): Performed by: NURSE PRACTITIONER

## 2021-08-20 PROCEDURE — 6360000002 HC RX W HCPCS

## 2021-08-20 PROCEDURE — 2500000003 HC RX 250 WO HCPCS: Performed by: NURSE PRACTITIONER

## 2021-08-20 PROCEDURE — 85018 HEMOGLOBIN: CPT

## 2021-08-20 PROCEDURE — 85027 COMPLETE CBC AUTOMATED: CPT

## 2021-08-20 PROCEDURE — 99233 SBSQ HOSP IP/OBS HIGH 50: CPT | Performed by: INTERNAL MEDICINE

## 2021-08-20 PROCEDURE — 36415 COLL VENOUS BLD VENIPUNCTURE: CPT

## 2021-08-20 PROCEDURE — 93312 ECHO TRANSESOPHAGEAL: CPT

## 2021-08-20 PROCEDURE — 80051 ELECTROLYTE PANEL: CPT

## 2021-08-20 PROCEDURE — 6360000002 HC RX W HCPCS: Performed by: SURGERY

## 2021-08-20 PROCEDURE — 85014 HEMATOCRIT: CPT

## 2021-08-20 PROCEDURE — 80202 ASSAY OF VANCOMYCIN: CPT

## 2021-08-20 PROCEDURE — 6360000002 HC RX W HCPCS: Performed by: NURSE PRACTITIONER

## 2021-08-20 PROCEDURE — 80053 COMPREHEN METABOLIC PANEL: CPT

## 2021-08-20 PROCEDURE — 36600 WITHDRAWAL OF ARTERIAL BLOOD: CPT

## 2021-08-20 PROCEDURE — 82947 ASSAY GLUCOSE BLOOD QUANT: CPT

## 2021-08-20 PROCEDURE — 83735 ASSAY OF MAGNESIUM: CPT

## 2021-08-20 PROCEDURE — 2500000003 HC RX 250 WO HCPCS: Performed by: SURGERY

## 2021-08-20 PROCEDURE — 6370000000 HC RX 637 (ALT 250 FOR IP): Performed by: PHYSICIAN ASSISTANT

## 2021-08-20 RX ORDER — DEXTROSE MONOHYDRATE 50 MG/ML
INJECTION, SOLUTION INTRAVENOUS CONTINUOUS
Status: DISCONTINUED | OUTPATIENT
Start: 2021-08-20 | End: 2021-08-20

## 2021-08-20 RX ADMIN — SODIUM CHLORIDE, PRESERVATIVE FREE 10 ML: 5 INJECTION INTRAVENOUS at 11:17

## 2021-08-20 RX ADMIN — ENOXAPARIN SODIUM 70 MG: 80 INJECTION SUBCUTANEOUS at 07:52

## 2021-08-20 RX ADMIN — PROPOFOL 45 MCG/KG/MIN: 10 INJECTION, EMULSION INTRAVENOUS at 13:09

## 2021-08-20 RX ADMIN — POTASSIUM CHLORIDE 10 MEQ: 10 INJECTION, SOLUTION INTRAVENOUS at 09:07

## 2021-08-20 RX ADMIN — SODIUM CHLORIDE, PRESERVATIVE FREE 10 ML: 5 INJECTION INTRAVENOUS at 13:25

## 2021-08-20 RX ADMIN — POTASSIUM CHLORIDE 10 MEQ: 10 INJECTION, SOLUTION INTRAVENOUS at 07:57

## 2021-08-20 RX ADMIN — SODIUM CHLORIDE, PRESERVATIVE FREE 10 ML: 5 INJECTION INTRAVENOUS at 08:09

## 2021-08-20 RX ADMIN — POTASSIUM CHLORIDE 10 MEQ: 10 INJECTION, SOLUTION INTRAVENOUS at 11:16

## 2021-08-20 RX ADMIN — SODIUM CHLORIDE, PRESERVATIVE FREE 10 ML: 5 INJECTION INTRAVENOUS at 18:31

## 2021-08-20 RX ADMIN — POTASSIUM CHLORIDE 10 MEQ: 10 INJECTION, SOLUTION INTRAVENOUS at 10:14

## 2021-08-20 RX ADMIN — POTASSIUM CHLORIDE 10 MEQ: 10 INJECTION, SOLUTION INTRAVENOUS at 13:07

## 2021-08-20 RX ADMIN — FAMOTIDINE 20 MG: 10 INJECTION, SOLUTION INTRAVENOUS at 07:53

## 2021-08-20 RX ADMIN — POTASSIUM CHLORIDE 10 MEQ: 10 INJECTION, SOLUTION INTRAVENOUS at 05:25

## 2021-08-20 RX ADMIN — HYDROCORTISONE SODIUM SUCCINATE 50 MG: 100 INJECTION, POWDER, FOR SOLUTION INTRAMUSCULAR; INTRAVENOUS at 11:07

## 2021-08-20 RX ADMIN — DEXTROSE MONOHYDRATE 12.5 G: 25 INJECTION, SOLUTION INTRAVENOUS at 13:24

## 2021-08-20 RX ADMIN — VANCOMYCIN HYDROCHLORIDE 1250 MG: 1.25 INJECTION, POWDER, LYOPHILIZED, FOR SOLUTION INTRAVENOUS at 23:25

## 2021-08-20 RX ADMIN — PROPOFOL 50 MCG/KG/MIN: 10 INJECTION, EMULSION INTRAVENOUS at 07:50

## 2021-08-20 RX ADMIN — PROPOFOL 15 MCG/KG/MIN: 10 INJECTION, EMULSION INTRAVENOUS at 19:18

## 2021-08-20 RX ADMIN — METOCLOPRAMIDE 10 MG: 5 INJECTION, SOLUTION INTRAMUSCULAR; INTRAVENOUS at 18:29

## 2021-08-20 RX ADMIN — PROPOFOL 50 MCG/KG/MIN: 10 INJECTION, EMULSION INTRAVENOUS at 03:12

## 2021-08-20 RX ADMIN — METOCLOPRAMIDE 10 MG: 5 INJECTION, SOLUTION INTRAMUSCULAR; INTRAVENOUS at 05:23

## 2021-08-20 RX ADMIN — POTASSIUM CHLORIDE: 2 INJECTION, SOLUTION, CONCENTRATE INTRAVENOUS at 07:56

## 2021-08-20 RX ADMIN — METOCLOPRAMIDE 10 MG: 5 INJECTION, SOLUTION INTRAMUSCULAR; INTRAVENOUS at 11:10

## 2021-08-20 RX ADMIN — FAMOTIDINE 20 MG: 10 INJECTION, SOLUTION INTRAVENOUS at 21:01

## 2021-08-20 RX ADMIN — POTASSIUM CHLORIDE: 2 INJECTION, SOLUTION, CONCENTRATE INTRAVENOUS at 21:31

## 2021-08-20 RX ADMIN — FENTANYL CITRATE 50 MCG/HR: 0.05 INJECTION, SOLUTION INTRAMUSCULAR; INTRAVENOUS at 15:13

## 2021-08-20 ASSESSMENT — PULMONARY FUNCTION TESTS
PIF_VALUE: 10
PIF_VALUE: 9
PIF_VALUE: 19
PIF_VALUE: 10
PIF_VALUE: 13
PIF_VALUE: 12
PIF_VALUE: 8
PIF_VALUE: 9
PIF_VALUE: 12
PIF_VALUE: 12
PIF_VALUE: 14
PIF_VALUE: 14
PIF_VALUE: 12
PIF_VALUE: 12
PIF_VALUE: 16
PIF_VALUE: 18
PIF_VALUE: 14
PIF_VALUE: 9
PIF_VALUE: 14
PIF_VALUE: 10
PIF_VALUE: 10
PIF_VALUE: 6
PIF_VALUE: 4
PIF_VALUE: 12
PIF_VALUE: 8
PIF_VALUE: 8
PIF_VALUE: 10
PIF_VALUE: 12
PIF_VALUE: 13
PIF_VALUE: 9
PIF_VALUE: 15
PIF_VALUE: 14
PIF_VALUE: 9
PIF_VALUE: 12
PIF_VALUE: 14
PIF_VALUE: 10
PIF_VALUE: 6
PIF_VALUE: 12
PIF_VALUE: 14
PIF_VALUE: 2
PIF_VALUE: 13
PIF_VALUE: 13
PIF_VALUE: 14
PIF_VALUE: 10
PIF_VALUE: 9
PIF_VALUE: 41
PIF_VALUE: 4
PIF_VALUE: 18
PIF_VALUE: 14
PIF_VALUE: 5
PIF_VALUE: 4
PIF_VALUE: 8
PIF_VALUE: 14
PIF_VALUE: 8
PIF_VALUE: 15
PIF_VALUE: 13
PIF_VALUE: 10
PIF_VALUE: 14
PIF_VALUE: 13
PIF_VALUE: 7
PIF_VALUE: 10

## 2021-08-20 ASSESSMENT — PAIN SCALES - WONG BAKER
WONGBAKER_NUMERICALRESPONSE: 0

## 2021-08-20 ASSESSMENT — PAIN SCALES - GENERAL
PAINLEVEL_OUTOF10: 0

## 2021-08-20 ASSESSMENT — ENCOUNTER SYMPTOMS: ABDOMINAL PAIN: 0

## 2021-08-20 NOTE — OP NOTE
[facility]  ERICKA / Cardioversion procedure Note         Today's Date: 8/20/2021  Primary/Ordering Cardiologist:   Indication: Bacteremia    Operators:  Primary: Dr. Eduardo Lee    Pre Procedure Conscious Sedation Data:    ASA Class:    [] I [x] II [] III [] IV    Mallampati Class:  [] I [] II [] III [] IV    Procedure:    Patient seen and examined. History and Physical reviewed. Labs reviewed. Patient is intubated and sedated. Extra bolus of Iv propofol 20mg given. The oropharynx was pre anesthetisized with cetacaine spray. ERICKA:    Structures:    LA: Normal  CLEMENTE: No thrombus  RA: Normal  RV:  Normal  LV: Normal  Estimated LVEF: 55%  Aorta: Mild atheromatous disease arch  Percardium: No pericardial effusion  Septum: No intracardiac shunt via color Doppler. No intracardiac shunt via injection of agitated saline. Valves:    Mitral Valve: Structurally normal. Trivi regurgitation is identified. Aortic Valve: The aortic valve is trileaflet and opens adequately. Trivial regurgitiation is identified. Tricuspid valve: Structurally normal. Mild regurgitation is identified. Pulmonary valve: Normal. No significant regurgitation    No valvular vegetations or thrombus identified. Summary:     1. A ERICKA was performed without complications. 2. LVEF 55%  3.  No thrombus or valvular vegetation identified        Electronically signed by Vicente Lyles MD on 8/20/2021 at 10:29 [address] [place] [address] [facility]  [phone]

## 2021-08-20 NOTE — PROGRESS NOTES
Writer had attempted to call patient's mother this am regarding ERICKA procedure. Mother called back. Writer updated her on procedure and findings. Mother stated she want's everything done for her son and we have permission to do anything that is needed. Writer also updated patient's mother on his condition, treatment and plan of care. All questions and concerns were addressed.

## 2021-08-20 NOTE — BRIEF OP NOTE
Brief Postoperative Note    Landon Moore  YOB: 1965  [id number]    Pre-operative Diagnosis: Empyema    Post-operative Diagnosis: Same    Procedure: Right chest tube placement    Anesthesia: Local    Surgeons/Assistants: ROMANA Munson    Estimated Blood Loss: less than 50     Complications: None    Specimens: Was Not Obtained    Findings: Successful placement of 10f chest tube. Attached to atrium. Management per pulmonology.     Electronically signed by Jose Guadalupe Woods MD on 8/20/2021 at 3:57 PM

## 2021-08-20 NOTE — PLAN OF CARE
Problem: Falls - Risk of:  Goal: Will remain free from falls  8/20/2021 0719 by Asaf Harris RN  Outcome: Ongoing  8/19/2021 1733 by Sanjay Hancock  Outcome: Ongoing  8/19/2021 1730 by Sanjay Hancock  Outcome: Ongoing  Goal: Absence of physical injury  8/20/2021 0719 by Asaf Harris RN  Outcome: Ongoing  8/19/2021 1733 by Sanjay Hancock  Outcome: Ongoing  8/19/2021 1730 by NILO Melara  Outcome: Ongoing     Problem: Skin Integrity:  Goal: Will show no infection signs and symptoms  8/20/2021 0719 by Asaf Harris RN  Outcome: Ongoing  8/19/2021 1733 by Sanjay Hancock  Outcome: Ongoing  8/19/2021 1730 by Sanjay Hancock  Outcome: Ongoing  Goal: Absence of new skin breakdown  8/20/2021 0719 by Asaf Harris RN  Outcome: Ongoing  8/19/2021 1733 by Sanjay Hancock  Outcome: Ongoing  8/19/2021 1730 by Sanjay Hancock  Outcome: Ongoing     Problem: Non-Violent Restraints  Goal: Removal from restraints as soon as assessed to be safe  8/20/2021 0719 by Asaf Harris RN  Outcome: Ongoing  8/19/2021 1733 by Sanjay Hancock  Outcome: Ongoing  8/19/2021 1730 by Sanjay Hancock  Outcome: Ongoing  Goal: No harm/injury to patient while restraints in use  8/20/2021 0719 by Asaf Harris RN  Outcome: Ongoing  8/19/2021 1733 by Sanjay Hancock  Outcome: Ongoing  8/19/2021 1730 by Sanjay Hancock  Outcome: Ongoing  Goal: Patient's dignity will be maintained  8/20/2021 0719 by Asaf Harris RN  Outcome: Ongoing  8/19/2021 1733 by Sanjay Hancock  Outcome: Ongoing  8/19/2021 1730 by NILO Melara  Outcome: Ongoing     Problem: Nutrition  Goal: Optimal nutrition therapy  8/20/2021 0719 by Asaf Harris RN  Outcome: Ongoing  8/19/2021 1733 by Sanjay Hancock  Outcome: Ongoing  8/19/2021 1730 by Sanjay Hancock  Outcome: Ongoing     Problem: OXYGENATION/RESPIRATORY FUNCTION  Goal: Patient will maintain patent airway  8/20/2021 0719 by Asaf Harris RN  Outcome: Ongoing  8/19/2021 1733 by Sanjay Hancock  Outcome: Ongoing  8/19/2021 1730 by NILO MARY  Outcome: Ongoing  Goal: Patient will achieve/maintain normal respiratory rate/effort  8/20/2021 0719 by Abdi Chao RN  Outcome: Ongoing  8/19/2021 1733 by Kaylaantonietta White  Outcome: Ongoing  8/19/2021 1730 by Kayla Cindy  Outcome: Ongoing     Problem: MECHANICAL VENTILATION  Goal: Patient will maintain patent airway  8/20/2021 0719 by Abdi Chao RN  Outcome: Ongoing  8/19/2021 1733 by Kayal Cindy  Outcome: Ongoing  8/19/2021 1730 by Kayla Cindy  Outcome: Ongoing  Goal: ET tube will be managed safely  8/20/2021 0719 by Abdi Chao RN  Outcome: Ongoing  8/19/2021 1733 by Kayla White  Outcome: Ongoing  8/19/2021 1730 by NILO Caal  Outcome: Ongoing     Problem: Pain:  Goal: Pain level will decrease  8/20/2021 0719 by Abdi Chao RN  Outcome: Ongoing  8/19/2021 1733 by Kaylaantonietta White  Outcome: Ongoing  8/19/2021 1730 by Kayla White  Outcome: Ongoing  Goal: Recognizes and communicates pain  8/20/2021 0719 by Abdi Chao RN  Outcome: Ongoing  8/19/2021 1733 by Kayla White  Outcome: Ongoing  8/19/2021 1730 by NILO Caal  Outcome: Ongoing  Goal: Control of acute pain  8/20/2021 0719 by Abdi Chao RN  Outcome: Ongoing  8/19/2021 1733 by Kayla White  Outcome: Ongoing  8/19/2021 1730 by NILO Caal  Outcome: Ongoing  Goal: Control of chronic pain  8/20/2021 0719 by Abdi Chao RN  Outcome: Ongoing  8/19/2021 1733 by Kayla White  Outcome: Ongoing  8/19/2021 1730 by NILO Caal  Outcome: Ongoing     Problem: Confusion - Acute:  Goal: Absence of continued neurological deterioration signs and symptoms  8/20/2021 0719 by Abdi Chao RN  Outcome: Ongoing  8/19/2021 1733 by Kayla White  Outcome: Ongoing  8/19/2021 1730 by Kayla White  Outcome: Ongoing  Goal: Mental status will be restored to baseline  8/20/2021 0719 by Abdi Chao RN  Outcome: Ongoing  8/19/2021 1733 by Kayla White  Outcome: Ongoing  8/19/2021 1730 by Kayla White  Outcome: Ongoing Problem: Discharge Planning:  Goal: Ability to perform activities of daily living will improve  8/20/2021 0719 by Sean Bustillos RN  Outcome: Ongoing  8/19/2021 1733 by Casper Butts  Outcome: Ongoing  8/19/2021 1730 by Casper Butts  Outcome: Ongoing  Goal: Participates in care planning  8/20/2021 0719 by eSan Bustillos RN  Outcome: Ongoing  8/19/2021 1733 by Casper Butts  Outcome: Ongoing  8/19/2021 1730 by Casper Butts  Outcome: Ongoing     Problem: Injury - Risk of, Physical Injury:  Goal: Will remain free from falls  8/20/2021 0719 by Sean Bustillos RN  Outcome: Ongoing  8/19/2021 1733 by Casper Butts  Outcome: Ongoing  8/19/2021 1730 by Casper Butts  Outcome: Ongoing  Goal: Absence of physical injury  8/20/2021 0719 by Sean Bustillos RN  Outcome: Ongoing  8/19/2021 1733 by Casper Butts  Outcome: Ongoing  8/19/2021 1730 by Casper Butts  Outcome: Ongoing     Problem: Mood - Altered:  Goal: Mood stable  8/20/2021 0719 by Sean Bustillos RN  Outcome: Ongoing  8/19/2021 1733 by Casper Butts  Outcome: Ongoing  8/19/2021 1730 by Casper Butts  Outcome: Ongoing  Goal: Absence of abusive behavior  8/20/2021 0719 by Sean Bustillos RN  Outcome: Ongoing  8/19/2021 1733 by Casper Butts  Outcome: Ongoing  8/19/2021 1730 by Casper Butts  Outcome: Ongoing  Goal: Verbalizations of feeling emotionally comfortable while being cared for will increase  8/20/2021 0719 by Sean Bustillos RN  Outcome: Ongoing  8/19/2021 1733 by Casper Butts  Outcome: Ongoing  8/19/2021 1730 by Casper Butts  Outcome: Ongoing     Problem: Psychomotor Activity - Altered:  Goal: Absence of psychomotor disturbance signs and symptoms  8/20/2021 0719 by Sean Bustillos RN  Outcome: Ongoing  8/19/2021 1733 by Casper Butts  Outcome: Ongoing  8/19/2021 1730 by Casper Butts  Outcome: Ongoing     Problem: Sensory Perception - Impaired:  Goal: Demonstrations of improved sensory functioning will increase  8/20/2021 0719 by Sean Bustillos, RN  Outcome: Ongoing  8/19/2021 1733 by Sahra Clark  Outcome: Ongoing  8/19/2021 1730 by Sahra Clark  Outcome: Ongoing  Goal: Decrease in sensory misperception frequency  8/20/2021 0719 by Jerrol Spurling, RN  Outcome: Ongoing  8/19/2021 1733 by Sahra Clark  Outcome: Ongoing  8/19/2021 1730 by Sahra Clakr  Outcome: Ongoing  Goal: Able to refrain from responding to false sensory perceptions  8/20/2021 0719 by Jerrol Spurling, RN  Outcome: Ongoing  8/19/2021 1733 by Sahra Clark  Outcome: Ongoing  8/19/2021 1730 by Sahra Clark  Outcome: Ongoing  Goal: Demonstrates accurate environmental perceptions  8/20/2021 0719 by Jerrol Spurling, RN  Outcome: Ongoing  8/19/2021 1733 by Sahra Clark  Outcome: Ongoing  8/19/2021 1730 by Sahra Clark  Outcome: Ongoing  Goal: Able to distinguish between reality-based and nonreality-based thinking  8/20/2021 0719 by Jerrol Spurling, RN  Outcome: Ongoing  8/19/2021 1733 by Sahra Clark  Outcome: Ongoing  8/19/2021 1730 by Sahra Clark  Outcome: Ongoing  Goal: Able to interrupt nonreality-based thinking  8/20/2021 0719 by Jerrol Spurling, RN  Outcome: Ongoing  8/19/2021 1733 by Sahra Clark  Outcome: Ongoing  8/19/2021 1730 by NILO Alarcon  Outcome: Ongoing     Problem: Sleep Pattern Disturbance:  Goal: Appears well-rested  8/20/2021 0719 by Jerrol Spurling, RN  Outcome: Ongoing  8/19/2021 1733 by Sahra Clark  Outcome: Ongoing  8/19/2021 1730 by Sahra Clark  Outcome: Ongoing

## 2021-08-20 NOTE — PROGRESS NOTES
Dr. Karilyn Pallas called regarding possible extubation for patient. Order's received to keep pt on vent over night and switch over to rate control with light sedation. Dr. Karilyn Pallas will see pt in morning.

## 2021-08-20 NOTE — PROGRESS NOTES
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 have been performed by me. I agree with the assessment, plan, and orders as documented by the resident. Patient's ERICKA was normal. He has made some clinical improvement he is responsive to our commands and hopefully he can be weaned off mechanical ventilation over the next few days. He still has left hand swelling due to previous fracture but he has had normal radial pulse on the left side  Patient had a right-sided empyema he is a candidate for chest tube drainage.   Was discussed with interventional radiology  Electronically signed by Abdi Cornell MD on 8/20/2021 at 1:22 PM

## 2021-08-20 NOTE — PROGRESS NOTES
PULMONARY PROGRESS NOTE:    REASON FOR VISIT: resp failure, DTs  Interval History:    Sedated, on vent  On SBT    Events since last visit: chest tube 8/20/21    PAST MEDICAL HISTORY:      Scheduled Meds:   [START ON 8/21/2021] enoxaparin  40 mg Subcutaneous Daily    potassium chloride  20 mEq Oral BID    vancomycin  1,000 mg Intravenous Q24H    [Held by provider] metoprolol tartrate  12.5 mg Oral BID    insulin glargine  10 Units Subcutaneous Nightly    metoclopramide  10 mg Intravenous Q6H    insulin lispro  0-12 Units Subcutaneous Q6H    vancomycin (VANCOCIN) intermittent dosing (placeholder)   Other RX Placeholder    sodium chloride flush  5-40 mL Intravenous 2 times per day    famotidine (PEPCID) injection  20 mg Intravenous BID    nicotine  1 patch Transdermal Daily    lidocaine  1 patch Transdermal Daily     Continuous Infusions:   IV infusion builder 75 mL/hr at 08/20/21 0756    fentaNYL 50 mcg/hr (08/20/21 1513)    dextrose      propofol 45 mcg/kg/min (08/20/21 1309)    sodium chloride      sodium chloride 25 mL (08/19/21 1717)    dexmedetomidine Stopped (08/16/21 0807)     PRN Meds:perflutren lipid microspheres, glucose, dextrose, glucagon (rDNA), dextrose, sodium phosphate IVPB **OR** sodium phosphate IVPB, sodium chloride, sodium chloride flush, sodium chloride, potassium chloride **OR** potassium alternative oral replacement **OR** potassium chloride, polyethylene glycol, acetaminophen **OR** acetaminophen, magnesium sulfate, ondansetron, LORazepam **OR** LORazepam **OR** LORazepam **OR** LORazepam **OR** LORazepam **OR** LORazepam **OR** LORazepam **OR** LORazepam, albuterol sulfate HFA, sodium chloride flush        PHYSICAL EXAMINATION:  BP (!) 147/73   Pulse 63   Temp 98.2 °F (36.8 °C) (Bladder)   Resp 23   Ht 5' 9\" (1.753 m)   Wt 157 lb 6.5 oz (71.4 kg)   SpO2 99%   BMI 23.25 kg/m²     General : sedated, orally intubated, arouseable, following simple commands  Neck - supple, no lymphadenopathy, JVD not raised  Heart -   Lungs - Air Entry- fair bilaterally; breath sounds : vesicular  Abdomen - soft, no tenderness  Upper Extremities  - no cyanosis, mottling; edema : left wrist edema  Lower Extremities: no cyanosis, mottling; edema : absent    Current Laboratory, Radiologic, Microbiologic, and Diagnostic studies reviewed  Data ReviewCBC:   Recent Labs     08/18/21  0415 08/18/21  0415 08/19/21  0408 08/20/21  0405 08/20/21  1448   WBC 8.0  --  8.3 11.2*  --    RBC 2.94*  --  2.82* 2.73*  --    HGB 9.6*   < > 9.3* 8.7* 9.2*   HCT 28.6*   < > 27.3* 26.4* 27.6*     --  308 339  --     < > = values in this interval not displayed. BMP:   Recent Labs     08/18/21  0415 08/18/21  0415 08/19/21  0408 08/19/21  0408 08/19/21  1527 08/20/21  0405 08/20/21  1448   GLUCOSE 172*  --  182*  --   --  105*  --       < > 147*  --   --  151* 148*   K 3.2*   < > 2.6*   < > 3.3* 3.0* 3.5*   BUN 44*  --  46*  --   --  41*  --    CREATININE 1.33*  --  1.20  --   --  1.13  --    CALCIUM 7.9*  --  7.8*  --   --  7.5*  --     < > = values in this interval not displayed.      ABGs:   Recent Labs     08/18/21  0541 08/19/21  0423 08/20/21  0500   PHART 7.516* 7.448 7.503*   PO2ART 62.9* 77.6* 71.3*   MRF7SLH 34.5* 46.1* 40.7   DQQ6ICN 27.9* 31.9* 31.9*   H8MJBUBR 92.5* 93.9* 93.8*      PT/INR:  No results found for: PTINR    ASSESSMENT / PLAN:    Alcohol withdrawal  Pain control - add fentanyl gtt  Thiamine, folic acid  atelectasis  RLL infiltrate -? pulm contusion -ABx changed to cefepime/ vancomycin  Acute hypoxic resp failure - Mechanical ventilation  Tube feeds - currently held due to high residuals  bacteremia - continue current ABx,  Soft restraints  Hypotension - resolved  Empyema right - ABx per ID; chest tube by IR 8/20/21    On SBT- OK to extubate    Electronically signed by Fernandez Shaikh MD on 08/20/21 at 4:13 PM

## 2021-08-20 NOTE — PLAN OF CARE
Problem: Falls - Risk of:  Goal: Will remain free from falls  8/20/2021 0719 by Anais Marshall RN  Outcome: Ongoing  Goal: Absence of physical injury  8/20/2021 0719 by Anais Marshall RN  Outcome: Ongoing     Problem: Skin Integrity:  Goal: Will show no infection signs and symptoms  8/20/2021 0719 by Anais Marshall RN  Outcome: Ongoing  Goal: Absence of new skin breakdown  8/20/2021 0719 by Anais Marshall RN  Outcome: Ongoing     Problem: Non-Violent Restraints  Goal: Removal from restraints as soon as assessed to be safe  8/20/2021 0719 by Anais Marshall RN  Outcome: Ongoing  Goal: No harm/injury to patient while restraints in use  8/20/2021 0719 by Anais Marshall RN  Outcome: Ongoing  Goal: Patient's dignity will be maintained  8/20/2021 0719 by Anais Marshall RN  Outcome: Ongoing     Problem: Nutrition  Goal: Optimal nutrition therapy  8/20/2021 0719 by Anais Marshall RN  Outcome: Ongoing     Problem: OXYGENATION/RESPIRATORY FUNCTION  Goal: Patient will maintain patent airway  8/20/2021 1858 by Sarah Rodriguez RCP  Outcome: Ongoing  8/20/2021 0719 by Anais Marshall RN  Outcome: Ongoing  Goal: Patient will achieve/maintain normal respiratory rate/effort  8/20/2021 1858 by Sarah Rodriguez RCP  Outcome: Ongoing  8/20/2021 0719 by Anais Marshall RN  Outcome: Ongoing     Problem: MECHANICAL VENTILATION  Goal: Patient will maintain patent airway  8/20/2021 1858 by Sarah Rodriguez RCP  Outcome: Ongoing  8/20/2021 0719 by Anais Marsahll RN  Outcome: Ongoing  Goal: ET tube will be managed safely  8/20/2021 1858 by Sarah Rodriguez RCP  Outcome: Ongoing  8/20/2021 0719 by Anais Marshall RN  Outcome: Ongoing     Problem: Pain:  Goal: Pain level will decrease  8/20/2021 0719 by Anais Marshall RN  Outcome: Ongoing  Goal: Recognizes and communicates pain  8/20/2021 0719 by Anais Marshall RN  Outcome: Ongoing  Goal: Control of acute pain  8/20/2021 0719 by Meri Segura RN  Outcome: Ongoing  Goal: Control of chronic pain  8/20/2021 0719 by Meri Segura RN  Outcome: Ongoing     Problem: Confusion - Acute:  Goal: Absence of continued neurological deterioration signs and symptoms  8/20/2021 0719 by Meri Segura RN  Outcome: Ongoing  Goal: Mental status will be restored to baseline  8/20/2021 0719 by Meri Segura RN  Outcome: Ongoing     Problem: Discharge Planning:  Goal: Ability to perform activities of daily living will improve  8/20/2021 0719 by Meri Segura RN  Outcome: Ongoing  Goal: Participates in care planning  8/20/2021 0719 by Meri Segura RN  Outcome: Ongoing     Problem: Injury - Risk of, Physical Injury:  Goal: Will remain free from falls  8/20/2021 0719 by Meri Segura RN  Outcome: Ongoing  Goal: Absence of physical injury  8/20/2021 0719 by Meri Segura RN  Outcome: Ongoing     Problem: Mood - Altered:  Goal: Mood stable  8/20/2021 0719 by Meri Segura RN  Outcome: Ongoing  Goal: Absence of abusive behavior  8/20/2021 0719 by Meri Segura RN  Outcome: Ongoing  Goal: Verbalizations of feeling emotionally comfortable while being cared for will increase  8/20/2021 0719 by Meri Segura RN  Outcome: Ongoing     Problem: Psychomotor Activity - Altered:  Goal: Absence of psychomotor disturbance signs and symptoms  8/20/2021 0719 by Meri Segura RN  Outcome: Ongoing     Problem: Sensory Perception - Impaired:  Goal: Demonstrations of improved sensory functioning will increase  8/20/2021 0719 by Meri Segura RN  Outcome: Ongoing  Goal: Decrease in sensory misperception frequency  8/20/2021 0719 by Meri Segura RN  Outcome: Ongoing  Goal: Able to refrain from responding to false sensory perceptions  8/20/2021 0719 by Meri Segura RN  Outcome: Ongoing  Goal: Demonstrates accurate environmental perceptions  8/20/2021 0719 by Meri Segura RN  Outcome:

## 2021-08-20 NOTE — PROGRESS NOTES
[facility]    Date: 2021  Patient Name: Hector Benz        : 1965       [] Pt Refusal           [x] Pt Unavailable due to:        ERICKA this am and on weaning trial.     Hope Espinoza OTR/L Date: 2021

## 2021-08-20 NOTE — CARE COORDINATION
ONGOING DISCHARGE PLAN:    Pt. Remains on the Vent. Per Notes, Possible Extubation in 27 Min. Pt. Had Rt. Chest tube placed today for Empyema. Pulmonary following. ID following, Remains on IV Vanco.    Remains on TF.     LSW to continue to follow for needs, when more stable. Will continue to follow for additional discharge needs.     Electronically signed by Isaías Banda RN on 8/20/2021 at 6:06 PM

## 2021-08-20 NOTE — PROGRESS NOTES
[facility]                 PATIENT NAME: Rah Salazar     TODAY'S DATE: 8/20/2021, 8:17 AM    SUBJECTIVE:    Pt seen and examined. Afebrile, VSS. Mild leukocytosis, patient is on IV steroids, VSS. Patient awake off sedation, answering questions/nodding head appropriately. He denies abdominal pain. Bowels are moving. Was tolerating tube feeds at goal prior to NPO for ERICKA today. Small right pneumothorax stable on CXR. OBJECTIVE:   VITALS:  BP (!) 161/75   Pulse 57   Temp 98.1 °F (36.7 °C) (Bladder)   Resp 20   Ht 5' 9\" (1.753 m)   Wt 157 lb 6.5 oz (71.4 kg)   SpO2 95%   BMI 23.25 kg/m²      INTAKE/OUTPUT:      Intake/Output Summary (Last 24 hours) at 8/20/2021 0817  Last data filed at 8/20/2021 0529  Gross per 24 hour   Intake 1494 ml   Output 1975 ml   Net -481 ml                 CONSTITUTIONAL:  awake and alert.   No acute distress  HEART:   RRR  LUNGS:   Intubated on ventilator, sedation off  ABDOMEN:   Abdomen soft, non-tender, non-distended  EXTREMITIES:   No pedal edema    Data:  CBC:   Lab Results   Component Value Date    WBC 11.2 08/20/2021    RBC 2.73 08/20/2021    HGB 8.7 08/20/2021    HCT 26.4 08/20/2021    MCV 96.8 08/20/2021    MCH 31.9 08/20/2021    MCHC 32.9 08/20/2021    RDW 14.8 08/20/2021     08/20/2021    MPV 8.7 08/20/2021     BMP:    Lab Results   Component Value Date     08/20/2021    K 3.0 08/20/2021     08/20/2021    CO2 29 08/20/2021    BUN 41 08/20/2021    LABALBU 2.4 08/20/2021    CREATININE 1.13 08/20/2021    CALCIUM 7.5 08/20/2021    GFRAA >60 08/20/2021    LABGLOM >60 08/20/2021    GLUCOSE 105 08/20/2021       Radiology Review:       XR CHEST PORTABLE [[id number]] Collected: 08/20/21 0541     Order Status: Completed Updated: 08/20/21 0742     Narrative:       EXAMINATION:   ONE XRAY VIEW OF THE CHEST     8/20/2021 5:34 am     COMPARISON:   August 19, 2021 chest exam     HISTORY:   ORDERING SYSTEM PROVIDED HISTORY: vent

## 2021-08-20 NOTE — PROGRESS NOTES
Nephrology Progress Note    Reason for consultation: Management of acute kidney injury. Consulting physician: Donavan Ponce MD.    Interval history: Patient was seen and examined today in the ICU where he remains intubated and on ventilator support. He is awake and is currently n.p.o. and scheduled for transesophageal echocardiogram given persistent MRSA bacteremia and empyema. He is s/p left thoracentesis  of 1.1L isolating staph aureus. Wade White History of Present Illness: This is a 60 y.o. male with history of alcohol abuse, COPD, Hypertension who presented on 8/10/2021 with syncope and collapse. He was witnessed walking on the sidewalk and he collapsed all of a sudden. Bystanders called 911 and patient was evaluated in the ER. He had presented with pain in the right chest wall and abdomen. He initially received chest abdomen CT with IV contrast on 8/10/2021 showing grade 2 chest wall injury with right fourth through 6 anterior lateral rib fractures. Patient was initially placed on alcohol withdrawal protocol. Patient  developed respiratory distress and was placed on BiPAP and subsequently intubated on 8/13/2021 for airway protection. Chest x-ray was suggestive of bilateral pleural effusion right greater than left with significant right-sided pleural effusion. Patient is scheduled for thoracentesis today. Blood cultures are growing MRSA. Wade Bullockri Patient is receiving IV vancomycin. He was started on Lovenox for DVT of the left upper extremity. Nephrology is consulted for acute kidney injury. Serum creatinine was 0.54 mg/dl on 8/14/2021 and progressively worsened to 1.27 on 8/16/2021 and 1.38 today with BUN of 40 mg/dl. He did receive IV Lasix 40 mg x 1 yesterday as patient is fluid overloaded. Echocardiogram shows a EF of 35% with increased IVC diameter. He has had good urine output of 3.3 L over the  last 24 hours after IV Lasix. Patient is however 13 L positive balance since admission.     Objective/ Vitals:    08/20/21 0645 08/20/21 0700 08/20/21 0733 08/20/21 0800   BP:  (!) 161/75  (!) 161/68   Pulse: 54 60 57 56   Resp: 18 20 20 19   Temp:    97.9 °F (36.6 °C)   TempSrc:       SpO2: 99% 98% 95% 99%   Weight:       Height:         24HR INTAKE/OUTPUT:      Intake/Output Summary (Last 24 hours) at 8/20/2021 0931  Last data filed at 8/20/2021 0529  Gross per 24 hour   Intake 1494 ml   Output 1975 ml   Net -481 ml     Patient Vitals for the past 96 hrs (Last 3 readings):   Weight   08/18/21 0519 157 lb 6.5 oz (71.4 kg)   08/17/21 0430 157 lb 10.1 oz (71.5 kg)     Constitutional: Awake and responds to verbal stimuli; intubated and on ventilator support. Cardiovascular:  S1, S2 without pericardial rub or gallop. Respiratory: Clinically clear. Abdomen: Full, soft with normal bowel sounds. Extremities:  LE edema    Data/  Recent Labs     08/18/21  0415 08/19/21  0408 08/20/21  0405   WBC 8.0 8.3 11.2*   HGB 9.6* 9.3* 8.7*   HCT 28.6* 27.3* 26.4*   MCV 97.3 96.8 96.8    308 339     Recent Labs     08/18/21  0415 08/18/21  0415 08/19/21  0408 08/19/21  1527 08/20/21  0405     --  147*  --  151*   K 3.2*   < > 2.6* 3.3* 3.0*   *  --  108*  --  115*   CO2 24  --  28  --  29   GLUCOSE 172*  --  182*  --  105*   MG 2.3  --  2.5  --  2.5   BUN 44*  --  46*  --  41*   CREATININE 1.33*  --  1.20  --  1.13   LABGLOM 55*  --  >60  --  >60   GFRAA >60  --  >60  --  >60    < > = values in this interval not displayed. Assessment/plan:     1. Acute kidney injury - nonoliguric secondary to decreased effective arterial volume, severe hypoalbuminemia and sepsis. No evidence of acute rhabdomyolysis. Renal ultrasound showed no hydronephrosis or obstruction. Serologic studies are negative with negative ASTRID and normal complements. Continue to monitor urine output closely. Avoid nephrotoxic agents. 2. Acute respiratory failure - intubated on mechanical ventilator     3.   Hypokalemia secondary to diuretic therapy. Diuril has been placed on hold and patient is currently receiving IV potassium chloride 60 mEq over 6 hours.     4. MRSA bacteremia -patient is on IV vancomycin. Monitor Vanco trough levels for  potential nephrotoxicity.     5. Cardiomyopathy with systolic heart failure EF 35% and evidence of volume overload-negative  balance of 1.1L yesterday     6. Large right-sided parapneumonic pleural effusion, possibly loculated on chest CT-S/P  Left  thoracentesis 8/17/21.    7.  Hypernatremia estimated free water deficit 3 L. Will start D5 water at 75 mL/h.     Prognosis is guarded     eS Bartholomew FACP  Attending Clinical Nephrologist

## 2021-08-20 NOTE — PROGRESS NOTES
[facility]   Progress Note                   Date:   8/20/2021  Patient name: Marina Zhong  Date of admission:  8/10/2021  8:30 PM  MRN:   [id number]  YOB: 1965  PCP: Marion Weinstein MD    Reason for Admission: Syncope and collapse [R55]  Hypokalemia [E87.6]  Hyponatremia [E87.1]  Traumatic rhabdomyolysis, initial encounter ([address] Guadalupe Vasquez. [id number]]  Closed fracture of multiple ribs of right side, initial encounter [S22.41XA]    Subjective:       Clinical Changes / Abnormalities:Remains intubated and sedated.  Following commands      K 3.0  Hgb 8.7    Medications:   Scheduled Meds:   potassium chloride  20 mEq Oral BID    hydrocortisone sodium succinate PF  50 mg Intravenous Q12H    vancomycin  1,000 mg Intravenous Q24H    [Held by provider] chlorothiazide (DIURIL) IVPB  500 mg Intravenous Q24H    [Held by provider] metoprolol tartrate  12.5 mg Oral BID    enoxaparin  1 mg/kg Subcutaneous BID    insulin glargine  10 Units Subcutaneous Nightly    metoclopramide  10 mg Intravenous Q6H    insulin lispro  0-12 Units Subcutaneous Q6H    vancomycin (VANCOCIN) intermittent dosing (placeholder)   Other RX Placeholder    sodium chloride flush  5-40 mL Intravenous 2 times per day    famotidine (PEPCID) injection  20 mg Intravenous BID    nicotine  1 patch Transdermal Daily    lidocaine  1 patch Transdermal Daily     Continuous Infusions:   IV infusion builder 75 mL/hr at 08/20/21 0756    fentaNYL 50 mcg/hr (08/19/21 2050)    dextrose      propofol 50 mcg/kg/min (08/20/21 0750)    sodium chloride      sodium chloride 25 mL (08/19/21 1717)    dexmedetomidine Stopped (08/16/21 0807)     CBC:   Recent Labs     08/18/21  0415 08/19/21  0408 08/20/21  0405   WBC 8.0 8.3 11.2*   HGB 9.6* 9.3* 8.7*    308 339     BMP:    Recent Labs     08/18/21  0415 08/18/21  0415 08/19/21  0408 08/19/21  1527 08/20/21  0405     --  147*  --  151*   K 3.2*   < > 2.6* 3.3* 3.0* *  --  108*  --  115*   CO2 24  --  28  --  29   BUN 44*  --  46*  --  41*   CREATININE 1.33*  --  1.20  --  1.13   GLUCOSE 172*  --  182*  --  105*    < > = values in this interval not displayed. Hepatic:   Recent Labs     08/18/21  0415 08/19/21  0408 08/20/21  0405   AST 17 24 23   ALT 17 23 16   BILITOT 0.35 0.36 0.36   ALKPHOS 72 83 75     Troponin: No results for input(s): TROPONINI in the last 72 hours. BNP: No results for input(s): BNP in the last 72 hours. Lipids: No results for input(s): CHOL, HDL in the last 72 hours. Invalid input(s): LDLCALCU  INR: No results for input(s): INR in the last 72 hours. Objective:   Vitals: BP (!) 161/68   Pulse 56   Temp 97.9 °F (36.6 °C)   Resp 19   Ht 5' 9\" (1.753 m)   Wt 157 lb 6.5 oz (71.4 kg)   SpO2 99%   BMI 23.25 kg/m²   General appearance: alert and cooperative with exam  HEENT: Head: Normocephalic, no lesions, without obvious abnormality. Neck: no JVD  Lungs: CTAB  Heart: RRR s1+s2, no murmurs  Abdomen: soft, non-tender  Extremities: no edema  Neurologic: not done    TTE 8/16/21  Summary  Patient supine, on ventilator. Left ventricle is normal in size. Estimated LV EF 35 %. Mild left ventricular hypertrophy. Normal right ventricular size and function. No significant valvular regurgitation or stenosis seen. Aortic root is mildly dilated. (4.1 cm)  IVC Increased diameter and impaired or no inspiratory variation. No significant pericardial effusion is seen. Assessment / Acute Cardiac Problems:   Acute Systolic CHF, HFrEF- ? Due to ETOH. Newly reduced LVEF 35%.    AMS, ETOH, Withdrawals  Acute Resp failure- on vent  Bacteremia  Pleural effusion- s/p Thora on 8/17/21- now with PTX  PNA  Hypernatremia  Hypokalemia  Anemia    Patient Active Problem List:     COPD (chronic obstructive pulmonary disease) ([address]     Smoking addiction     Depression     Dyslipidemia     Lung nodule     DDD (degenerative disc disease), lumbar     Groin pain, chronic, right     Ilioinguinal neuralgia of right side     Syncope and collapse     History of alcohol abuse     Lumbar radiculopathy     Scoliosis due to degenerative disease of spine in adult patient     Lumbosacral spondylosis without myelopathy     Alcohol abuse     Hypokalemia     Hyponatremia     Traumatic rhabdomyolysis (HCC)     Closed fracture of multiple ribs of right side     Closed fracture of left wrist     Severe malnutrition (HCC)     Fever     Conjunctivitis     Acute respiratory failure (HCC)     Superficial venous thrombosis of arm, left     MRSA bacteremia      Plan of Treatment:   1. Volume and electrolytes management per nephrology. 2. Replace K  3. ERICKA per ID  4. Cardiomyopathy work up- can be done as OP due to infection issues at present  5.  ID on board- on antibiotics    Gail Buchanan [facility] Cardiology  [phone]

## 2021-08-20 NOTE — PROGRESS NOTES
[facility] -   Infectious diseases evaluation  admission date 8/10/2021    reason for consultation:   MRSA bacteremia    Impression :   Current:  · MRSA bacteremia suspect pulmonary source of infection  · Left hand and wrist cellulitis  · Left wrist fracture  · Right pleural effusion /empyema status post thoracentesis with MRSA growth on culture. · Pneumothorax  · Superficial vein thrombophlebitis left arm  · Syncopal episode  · Alcohol withdrawal  · Acute respiratory failure requiring intubation  · Rib fracture with possible hemothorax    Other:  · History of alcohol abuse  · COPD  · History of depression  · History of seizure  · History of hypertension. Recommendations   · Continue IV vancomycin ,monitor renal function and vancomycin levels closely  · Chest tube placement by IR  · Repeat blood cultures 8/16/2021 grew MRSA  · Echocardiogram , no reported vegetations  · Repeat blood cultures from 8/19/2021 pending  · ERICKA was done earlier today showed no vegetations. · Follow CBC and renal function          History of Present Illness:   Initial history:  Capri Wood is a 60y.o.-year-old male presented to the hospital on 8/10/2021 after a syncopal episode, was complaining of pain to the right chest wall and abdomen. Reported sharp, constant pain aggravated by movement with no alleviating factors. The patient was previously evaluated at the ER 8/7/2021 after a fall found to have multiple rib fractures and a left wrist closed fracture. Tox screen positive for cannabinoids. CT head negative for acute intracranial abnormality. CT chest/abdomen/pelvis showed rib fractures with associated focal hemothorax. No acute processes in abdomen or pelvis. Right arm PICC line was placed 8/12/2021  He was placed on alcohol withdrawal protocol, developed respiratory distress was placed on BiPAP initially then was intubated 4/13/21.   Baker catheter was placed on 4/13/2021  The patient was on Levaquin 8/13/2021 and 8/14/2021 that was discontinued and started on vancomycin and cefepime. He had a fever with a temperature max of 100.6 on 8/13/2021. Blood cultures 8/14/2021 grew MRSA as well as sputum culture. Chest x-ray 8/14/2021 showed bilateral airspace disease right greater than left. Interval changes  8/20/2021   He remains intubated, awake, responds to simple commands, large amount of respiratory secretions, no new events. CT chest without contrast 8/16/2021 showed increased right pleural effusion with an area of loculated fluid status post thoracentesis   Renal function stable  Blood pressure stable  COVID-19 rapid test was negative   Patient Vitals for the past 8 hrs:   BP Temp Temp src Pulse Resp SpO2   08/20/21 1330 (!) 149/79 98.2 °F (36.8 °C) Bladder 71 25 90 %   08/20/21 1144 -- -- -- 64 24 98 %   08/20/21 1138 -- -- -- 65 15 98 %   08/20/21 0800 (!) 161/68 97.9 °F (36.6 °C) -- 56 19 99 %           I have personally reviewed the past medical history, past surgical history, medications, social history, and family history, and I haveupdated the database accordingly. Allergies:   Nickel     Review of Systems:     Review of Systems  Intubated, sedated unable to provide  Physical Examination :       Physical Exam  Constitutional:       Comments: Intubated, awake, responds to simple commands   HENT:      Head: Normocephalic and atraumatic. Right Ear: External ear normal.      Left Ear: External ear normal.   Eyes:      General: No scleral icterus. Right eye: No discharge. Left eye: No discharge. Cardiovascular:      Rate and Rhythm: Normal rate and regular rhythm. Heart sounds: No murmur heard. Pulmonary:      Breath sounds: Rhonchi present. No wheezing. Abdominal:      General: Abdomen is flat. There is no distension. Palpations: Abdomen is soft.    Genitourinary:     Comments: Baker catheter in place  Musculoskeletal: Right lower leg: Edema present. Left lower leg: Edema present. Comments: Bilateral upper extremity edema   Skin:     General: Skin is warm. Coloration: Skin is not jaundiced.       Comments: Left hand and wrist erythema and warmth improved, no fluctuation, no crepitance   Neurological:      Comments: Sedated     Right PICC line in place    Past Medical History:     Past Medical History:   Diagnosis Date    Alcohol abuse 6/4/2014    Anxiety     Arthritis     COPD (chronic obstructive pulmonary disease) ([address]     ASTHMA    DDD (degenerative disc disease), lumbar 9/6/2016    Depression     Heart burn     Hemorrhoids     HTN (hypertension) 1/19/2015    Ilioinguinal neuralgia of right side 4/10/2017    Psychiatric problem     depression /anxiety    Seizures ([address]     withdrawal from alcohol 2 years ago    Wears glasses     READING       Past Surgical  History:     Past Surgical History:   Procedure Laterality Date    ANESTHESIA NERVE BLOCK Right 4/10/2017    NERVE BLOCK US RIGHT SIDED ILIOINGUINAL performed by Saadia Brown MD at [address] [place] [address]  3/19/15    HERNIA REPAIR      NERVE BLOCK Right 10/10/2016    right groin    OTHER SURGICAL HISTORY Right 04/10/2017    US nerve block to R groin    TOE SURGERY      SCREW RIGHT BIG TOE       Medications:      [START ON 8/21/2021] enoxaparin  40 mg Subcutaneous Daily    potassium chloride  20 mEq Oral BID    vancomycin  1,000 mg Intravenous Q24H    [Held by provider] chlorothiazide (DIURIL) IVPB  500 mg Intravenous Q24H    [Held by provider] metoprolol tartrate  12.5 mg Oral BID    insulin glargine  10 Units Subcutaneous Nightly    metoclopramide  10 mg Intravenous Q6H    insulin lispro  0-12 Units Subcutaneous Q6H    vancomycin (VANCOCIN) intermittent dosing (placeholder)   Other RX Placeholder    sodium chloride flush  5-40 mL Intravenous 2 times per day    famotidine (PEPCID) injection  20 mg Intravenous BID    nicotine  1 patch Transdermal Daily    lidocaine  1 patch Transdermal Daily       Social History:     Social History     Socioeconomic History    Marital status:      Spouse name: Not on file    Number of children: Not on file    Years of education: Not on file    Highest education level: Not on file   Occupational History    Not on file   Tobacco Use    Smoking status: Current Every Day Smoker     Packs/day: 1.00     Years: 38.00     Pack years: 38.00     Types: Cigarettes    Smokeless tobacco: Never Used    Tobacco comment: 1 PPD   Vaping Use    Vaping Use: Never used   Substance and Sexual Activity    Alcohol use: Yes     Alcohol/week: 12.0 standard drinks     Types: 12 Cans of beer per week     Comment: 12 24 oz cans daily    Drug use: Yes     Types: Marijuana     Comment: crack occasionally    Sexual activity: Never   Other Topics Concern    Not on file   Social History Narrative    Not on file     Social Determinants of Health     Financial Resource Strain:     Difficulty of Paying Living Expenses:    Food Insecurity:     Worried About Running Out of Food in the Last Year:     Ran Out of Food in the Last Year:    Transportation Needs:     Lack of Transportation (Medical):      Lack of Transportation (Non-Medical):    Physical Activity:     Days of Exercise per Week:     Minutes of Exercise per Session:    Stress:     Feeling of Stress :    Social Connections:     Frequency of Communication with Friends and Family:     Frequency of Social Gatherings with Friends and Family:     Attends Yazdanism Services:     Active Member of Clubs or Organizations:     Attends Club or Organization Meetings:     Marital Status:    Intimate Partner Violence:     Fear of Current or Ex-Partner:     Emotionally Abused:     Physically Abused:     Sexually Abused:        Family History:     Family History   Problem Relation Age of Onset    Cancer Mother         colon ca      Medical Decision Making:   I have independently reviewed/ordered the following labs:    CBC with Differential:   Recent Labs     08/19/21  0408 08/19/21  0408 08/20/21  0405 08/20/21  1448   WBC 8.3  --  11.2*  --    HGB 9.3*   < > 8.7* 9.2*   HCT 27.3*   < > 26.4* 27.6*     --  339  --     < > = values in this interval not displayed. BMP:  Recent Labs     08/19/21  0408 08/19/21  1527 08/20/21  0405 08/20/21  1448   *  --  151* 148*   K 2.6*   < > 3.0* 3.5*   *  --  115* 114*   CO2 28  --  29 28   BUN 46*  --  41*  --    CREATININE 1.20  --  1.13  --    MG 2.5  --  2.5  --     < > = values in this interval not displayed. Hepatic Function Panel:   Recent Labs     08/19/21  0408 08/20/21  0405   PROT 5.4* 5.1*   LABALBU 2.1* 2.4*   BILITOT 0.36 0.36   ALKPHOS 83 75   ALT 23 16   AST 24 23     No results for input(s): RPR in the last 72 hours. No results for input(s): HIV in the last 72 hours. No results for input(s): BC in the last 72 hours. Lab Results   Component Value Date    CREATININE 1.13 08/20/2021    GLUCOSE 105 08/20/2021       Detailed results: Thank you for allowing us to participate in the care of this patient. Please call with questions. This note is created with the assistance of a speech recognition program.  While intending to generate adocument that actually reflects the content of the visit, the document can still have some errors including those of syntax and sound a like substitutions which may escape proof reading. It such instances, actual meaningcan be extrapolated by contextual diversion.     Zandra PresMD celeste  Office: [phone]  Perfect serve / office [phone]

## 2021-08-20 NOTE — PROGRESS NOTES
Physical Therapy        Physical Therapy Cancel Note      DATE: 2021    NAME: Tania Christianson  MRN: [id number]   : 1965      Patient not seen this date for Physical Therapy due to:    Testing: Per JOHN Cruz pt plans to have ERICKA soon. Remains on vent but sedation is lessened. May extubate after ERICKA per RN.  1000      Electronically signed by Pati Hanson PT on 2021 at 10:06 AM

## 2021-08-20 NOTE — PLAN OF CARE
Problem: OXYGENATION/RESPIRATORY FUNCTION  Goal: Patient will maintain patent airway  8/20/2021 1858 by Maribel Bailey RCP  Outcome: Ongoing     Problem: OXYGENATION/RESPIRATORY FUNCTION  Goal: Patient will achieve/maintain normal respiratory rate/effort  Description: Respiratory rate and effort will be within normal limits for the patient  8/20/2021 1858 by Maribel Bailey RCP  Outcome: Ongoing     Problem: MECHANICAL VENTILATION  Goal: Patient will maintain patent airway  8/20/2021 1858 by Maribel Bailey RCP  Outcome: Ongoing     Problem: MECHANICAL VENTILATION  Goal: ET tube will be managed safely  8/20/2021 1858 by Maribel Bailey RCP  Outcome: Ongoing

## 2021-08-21 ENCOUNTER — APPOINTMENT (OUTPATIENT)
Dept: GENERAL RADIOLOGY | Age: 56
DRG: 351 | End: 2021-08-21
Payer: MEDICAID

## 2021-08-21 ENCOUNTER — APPOINTMENT (OUTPATIENT)
Dept: CT IMAGING | Age: 56
DRG: 351 | End: 2021-08-21
Payer: MEDICAID

## 2021-08-21 PROBLEM — J86.9 EMPYEMA LUNG (HCC): Status: ACTIVE | Noted: 2021-08-21

## 2021-08-21 LAB
ALBUMIN SERPL-MCNC: 2.2 G/DL (ref 3.5–5.2)
ALBUMIN/GLOBULIN RATIO: ABNORMAL (ref 1–2.5)
ALLEN TEST: ABNORMAL
ALP BLD-CCNC: 68 U/L (ref 40–129)
ALT SERPL-CCNC: 17 U/L (ref 5–41)
ANION GAP SERPL CALCULATED.3IONS-SCNC: 6 MMOL/L (ref 9–17)
ANION GAP SERPL CALCULATED.3IONS-SCNC: 7 MMOL/L (ref 9–17)
AST SERPL-CCNC: 19 U/L
BILIRUB SERPL-MCNC: 0.47 MG/DL (ref 0.3–1.2)
BUN BLDV-MCNC: 32 MG/DL (ref 6–20)
BUN/CREAT BLD: ABNORMAL (ref 9–20)
CALCIUM SERPL-MCNC: 7.5 MG/DL (ref 8.6–10.4)
CARBOXYHEMOGLOBIN: 0.7 % (ref 0–5)
CHLORIDE BLD-SCNC: 111 MMOL/L (ref 98–107)
CHLORIDE BLD-SCNC: 114 MMOL/L (ref 98–107)
CO2: 27 MMOL/L (ref 20–31)
CO2: 28 MMOL/L (ref 20–31)
CREAT SERPL-MCNC: 1.11 MG/DL (ref 0.7–1.2)
FIO2: 40
GFR AFRICAN AMERICAN: >60 ML/MIN
GFR NON-AFRICAN AMERICAN: >60 ML/MIN
GFR SERPL CREATININE-BSD FRML MDRD: ABNORMAL ML/MIN/{1.73_M2}
GFR SERPL CREATININE-BSD FRML MDRD: ABNORMAL ML/MIN/{1.73_M2}
GLUCOSE BLD-MCNC: 100 MG/DL (ref 75–110)
GLUCOSE BLD-MCNC: 73 MG/DL (ref 75–110)
GLUCOSE BLD-MCNC: 85 MG/DL (ref 70–99)
GLUCOSE BLD-MCNC: 85 MG/DL (ref 75–110)
GLUCOSE BLD-MCNC: 93 MG/DL (ref 75–110)
GLUCOSE BLD-MCNC: 99 MG/DL (ref 75–110)
HCO3 ARTERIAL: 30.6 MMOL/L (ref 22–26)
HCT VFR BLD CALC: 30 % (ref 41–53)
HEMOGLOBIN: 9.7 G/DL (ref 13.5–17.5)
MAGNESIUM: 2.2 MG/DL (ref 1.6–2.6)
MCH RBC QN AUTO: 32.2 PG (ref 26–34)
MCHC RBC AUTO-ENTMCNC: 32.5 G/DL (ref 31–37)
MCV RBC AUTO: 99.2 FL (ref 80–100)
METHEMOGLOBIN: 1.2 % (ref 0–1.9)
MODE: ABNORMAL
NEGATIVE BASE EXCESS, ART: ABNORMAL MMOL/L (ref 0–2)
NOTIFICATION TIME: ABNORMAL
NOTIFICATION: ABNORMAL
NRBC AUTOMATED: ABNORMAL PER 100 WBC
O2 DEVICE/FLOW/%: ABNORMAL
O2 SAT, ARTERIAL: 94.5 % (ref 95–98)
OXYHEMOGLOBIN: ABNORMAL % (ref 95–98)
PATIENT TEMP: 37
PCO2 ARTERIAL: 39.2 MMHG (ref 35–45)
PCO2, ART, TEMP ADJ: ABNORMAL (ref 35–45)
PDW BLD-RTO: 15.1 % (ref 11.5–14.9)
PEEP/CPAP: 0
PH ARTERIAL: 7.5 (ref 7.35–7.45)
PH, ART, TEMP ADJ: ABNORMAL (ref 7.35–7.45)
PLATELET # BLD: 386 K/UL (ref 150–450)
PMV BLD AUTO: 8 FL (ref 6–12)
PO2 ARTERIAL: 75.7 MMHG (ref 80–100)
PO2, ART, TEMP ADJ: ABNORMAL MMHG (ref 80–100)
POSITIVE BASE EXCESS, ART: 7.4 MMOL/L (ref 0–2)
POTASSIUM SERPL-SCNC: 3.4 MMOL/L (ref 3.7–5.3)
POTASSIUM SERPL-SCNC: 3.5 MMOL/L (ref 3.7–5.3)
PSV: ABNORMAL
PT. POSITION: ABNORMAL
RBC # BLD: 3.02 M/UL (ref 4.5–5.9)
RESPIRATORY RATE: 17
SAMPLE SITE: ABNORMAL
SET RATE: 16
SODIUM BLD-SCNC: 145 MMOL/L (ref 135–144)
SODIUM BLD-SCNC: 148 MMOL/L (ref 135–144)
TEXT FOR RESPIRATORY: ABNORMAL
TOTAL HB: ABNORMAL G/DL (ref 12–16)
TOTAL PROTEIN: 4.9 G/DL (ref 6.4–8.3)
TOTAL RATE: 17
VT: 500
WBC # BLD: 11.6 K/UL (ref 3.5–11)

## 2021-08-21 PROCEDURE — 97161 PT EVAL LOW COMPLEX 20 MIN: CPT

## 2021-08-21 PROCEDURE — 2580000003 HC RX 258: Performed by: NURSE PRACTITIONER

## 2021-08-21 PROCEDURE — 2580000003 HC RX 258: Performed by: INTERNAL MEDICINE

## 2021-08-21 PROCEDURE — 6360000002 HC RX W HCPCS: Performed by: INTERNAL MEDICINE

## 2021-08-21 PROCEDURE — 94640 AIRWAY INHALATION TREATMENT: CPT

## 2021-08-21 PROCEDURE — 94761 N-INVAS EAR/PLS OXIMETRY MLT: CPT

## 2021-08-21 PROCEDURE — 72132 CT LUMBAR SPINE W/DYE: CPT

## 2021-08-21 PROCEDURE — 82805 BLOOD GASES W/O2 SATURATION: CPT

## 2021-08-21 PROCEDURE — 6360000002 HC RX W HCPCS: Performed by: NURSE PRACTITIONER

## 2021-08-21 PROCEDURE — 36415 COLL VENOUS BLD VENIPUNCTURE: CPT

## 2021-08-21 PROCEDURE — 85027 COMPLETE CBC AUTOMATED: CPT

## 2021-08-21 PROCEDURE — 71045 X-RAY EXAM CHEST 1 VIEW: CPT

## 2021-08-21 PROCEDURE — 2000000000 HC ICU R&B

## 2021-08-21 PROCEDURE — 6370000000 HC RX 637 (ALT 250 FOR IP): Performed by: PHYSICIAN ASSISTANT

## 2021-08-21 PROCEDURE — 72129 CT CHEST SPINE W/DYE: CPT

## 2021-08-21 PROCEDURE — 6370000000 HC RX 637 (ALT 250 FOR IP): Performed by: NURSE PRACTITIONER

## 2021-08-21 PROCEDURE — 6360000004 HC RX CONTRAST MEDICATION: Performed by: STUDENT IN AN ORGANIZED HEALTH CARE EDUCATION/TRAINING PROGRAM

## 2021-08-21 PROCEDURE — 99291 CRITICAL CARE FIRST HOUR: CPT | Performed by: INTERNAL MEDICINE

## 2021-08-21 PROCEDURE — 2500000003 HC RX 250 WO HCPCS: Performed by: NURSE PRACTITIONER

## 2021-08-21 PROCEDURE — 6360000002 HC RX W HCPCS: Performed by: STUDENT IN AN ORGANIZED HEALTH CARE EDUCATION/TRAINING PROGRAM

## 2021-08-21 PROCEDURE — 2700000000 HC OXYGEN THERAPY PER DAY

## 2021-08-21 PROCEDURE — 99233 SBSQ HOSP IP/OBS HIGH 50: CPT | Performed by: INTERNAL MEDICINE

## 2021-08-21 PROCEDURE — 6370000000 HC RX 637 (ALT 250 FOR IP): Performed by: INTERNAL MEDICINE

## 2021-08-21 PROCEDURE — 2580000003 HC RX 258: Performed by: STUDENT IN AN ORGANIZED HEALTH CARE EDUCATION/TRAINING PROGRAM

## 2021-08-21 PROCEDURE — 83735 ASSAY OF MAGNESIUM: CPT

## 2021-08-21 PROCEDURE — 94003 VENT MGMT INPAT SUBQ DAY: CPT

## 2021-08-21 PROCEDURE — 80051 ELECTROLYTE PANEL: CPT

## 2021-08-21 PROCEDURE — 6360000002 HC RX W HCPCS: Performed by: SURGERY

## 2021-08-21 PROCEDURE — 36600 WITHDRAWAL OF ARTERIAL BLOOD: CPT

## 2021-08-21 PROCEDURE — 80053 COMPREHEN METABOLIC PANEL: CPT

## 2021-08-21 RX ORDER — IPRATROPIUM BROMIDE AND ALBUTEROL SULFATE 2.5; .5 MG/3ML; MG/3ML
1 SOLUTION RESPIRATORY (INHALATION)
Status: DISCONTINUED | OUTPATIENT
Start: 2021-08-21 | End: 2021-08-31 | Stop reason: HOSPADM

## 2021-08-21 RX ORDER — SODIUM CHLORIDE 0.9 % (FLUSH) 0.9 %
10 SYRINGE (ML) INJECTION PRN
Status: DISCONTINUED | OUTPATIENT
Start: 2021-08-21 | End: 2021-08-25

## 2021-08-21 RX ORDER — 0.9 % SODIUM CHLORIDE 0.9 %
80 INTRAVENOUS SOLUTION INTRAVENOUS ONCE
Status: COMPLETED | OUTPATIENT
Start: 2021-08-21 | End: 2021-08-21

## 2021-08-21 RX ORDER — MORPHINE SULFATE 2 MG/ML
2 INJECTION, SOLUTION INTRAMUSCULAR; INTRAVENOUS
Status: DISCONTINUED | OUTPATIENT
Start: 2021-08-21 | End: 2021-08-31 | Stop reason: HOSPADM

## 2021-08-21 RX ORDER — TAMSULOSIN HYDROCHLORIDE 0.4 MG/1
0.4 CAPSULE ORAL DAILY
Status: DISCONTINUED | OUTPATIENT
Start: 2021-08-21 | End: 2021-08-31 | Stop reason: HOSPADM

## 2021-08-21 RX ORDER — POTASSIUM CHLORIDE 7.45 MG/ML
10 INJECTION INTRAVENOUS PRN
Status: DISCONTINUED | OUTPATIENT
Start: 2021-08-21 | End: 2021-08-31 | Stop reason: HOSPADM

## 2021-08-21 RX ORDER — MORPHINE SULFATE 4 MG/ML
4 INJECTION, SOLUTION INTRAMUSCULAR; INTRAVENOUS
Status: DISCONTINUED | OUTPATIENT
Start: 2021-08-21 | End: 2021-08-31 | Stop reason: HOSPADM

## 2021-08-21 RX ADMIN — POTASSIUM CHLORIDE 10 MEQ: 10 INJECTION, SOLUTION INTRAVENOUS at 22:09

## 2021-08-21 RX ADMIN — IPRATROPIUM BROMIDE AND ALBUTEROL SULFATE 1 AMPULE: 2.5; .5 SOLUTION RESPIRATORY (INHALATION) at 10:45

## 2021-08-21 RX ADMIN — IPRATROPIUM BROMIDE AND ALBUTEROL SULFATE 1 AMPULE: 2.5; .5 SOLUTION RESPIRATORY (INHALATION) at 19:04

## 2021-08-21 RX ADMIN — IPRATROPIUM BROMIDE AND ALBUTEROL SULFATE 1 AMPULE: 2.5; .5 SOLUTION RESPIRATORY (INHALATION) at 15:16

## 2021-08-21 RX ADMIN — METOCLOPRAMIDE 10 MG: 5 INJECTION, SOLUTION INTRAMUSCULAR; INTRAVENOUS at 23:47

## 2021-08-21 RX ADMIN — SODIUM CHLORIDE, PRESERVATIVE FREE 10 ML: 5 INJECTION INTRAVENOUS at 19:47

## 2021-08-21 RX ADMIN — POTASSIUM CHLORIDE 10 MEQ: 10 INJECTION, SOLUTION INTRAVENOUS at 21:02

## 2021-08-21 RX ADMIN — FAMOTIDINE 20 MG: 10 INJECTION, SOLUTION INTRAVENOUS at 19:48

## 2021-08-21 RX ADMIN — POTASSIUM CHLORIDE: 2 INJECTION, SOLUTION, CONCENTRATE INTRAVENOUS at 12:06

## 2021-08-21 RX ADMIN — SODIUM CHLORIDE, PRESERVATIVE FREE 10 ML: 5 INJECTION INTRAVENOUS at 08:31

## 2021-08-21 RX ADMIN — SODIUM CHLORIDE 80 ML: 9 INJECTION, SOLUTION INTRAVENOUS at 14:57

## 2021-08-21 RX ADMIN — POTASSIUM CHLORIDE: 2 INJECTION, SOLUTION, CONCENTRATE INTRAVENOUS at 23:51

## 2021-08-21 RX ADMIN — MORPHINE SULFATE 4 MG: 4 INJECTION, SOLUTION INTRAMUSCULAR; INTRAVENOUS at 19:52

## 2021-08-21 RX ADMIN — METOCLOPRAMIDE 10 MG: 5 INJECTION, SOLUTION INTRAMUSCULAR; INTRAVENOUS at 18:19

## 2021-08-21 RX ADMIN — POTASSIUM CHLORIDE 10 MEQ: 10 INJECTION, SOLUTION INTRAVENOUS at 19:41

## 2021-08-21 RX ADMIN — VANCOMYCIN HYDROCHLORIDE 1250 MG: 1.25 INJECTION, POWDER, LYOPHILIZED, FOR SOLUTION INTRAVENOUS at 23:15

## 2021-08-21 RX ADMIN — ENOXAPARIN SODIUM 40 MG: 40 INJECTION SUBCUTANEOUS at 08:31

## 2021-08-21 RX ADMIN — SODIUM CHLORIDE, PRESERVATIVE FREE 10 ML: 5 INJECTION INTRAVENOUS at 14:58

## 2021-08-21 RX ADMIN — FAMOTIDINE 20 MG: 10 INJECTION, SOLUTION INTRAVENOUS at 08:31

## 2021-08-21 RX ADMIN — METOCLOPRAMIDE 10 MG: 5 INJECTION, SOLUTION INTRAMUSCULAR; INTRAVENOUS at 06:26

## 2021-08-21 RX ADMIN — IOPAMIDOL 75 ML: 755 INJECTION, SOLUTION INTRAVENOUS at 14:57

## 2021-08-21 RX ADMIN — FENTANYL CITRATE 50 MCG/HR: 0.05 INJECTION, SOLUTION INTRAMUSCULAR; INTRAVENOUS at 09:57

## 2021-08-21 RX ADMIN — POTASSIUM CHLORIDE 10 MEQ: 10 INJECTION, SOLUTION INTRAVENOUS at 18:19

## 2021-08-21 RX ADMIN — PROPOFOL 35 MCG/KG/MIN: 10 INJECTION, EMULSION INTRAVENOUS at 01:26

## 2021-08-21 RX ADMIN — METOCLOPRAMIDE 10 MG: 5 INJECTION, SOLUTION INTRAMUSCULAR; INTRAVENOUS at 00:52

## 2021-08-21 ASSESSMENT — PULMONARY FUNCTION TESTS
PIF_VALUE: 15
PIF_VALUE: 10
PIF_VALUE: 10
PIF_VALUE: 9
PIF_VALUE: 9
PIF_VALUE: 17
PIF_VALUE: 10
PIF_VALUE: 9
PIF_VALUE: 11
PIF_VALUE: 9
PIF_VALUE: 15
PIF_VALUE: 9
PIF_VALUE: 9
PIF_VALUE: 18
PIF_VALUE: 12
PIF_VALUE: 10
PIF_VALUE: 10
PIF_VALUE: 12
PIF_VALUE: 18
PIF_VALUE: 12
PIF_VALUE: 11
PIF_VALUE: 46
PIF_VALUE: 11
PIF_VALUE: 11
PIF_VALUE: 10
PIF_VALUE: 12
PIF_VALUE: 13
PIF_VALUE: 14
PIF_VALUE: 9
PIF_VALUE: 9
PIF_VALUE: 13

## 2021-08-21 ASSESSMENT — PAIN SCALES - GENERAL
PAINLEVEL_OUTOF10: 10
PAINLEVEL_OUTOF10: 0
PAINLEVEL_OUTOF10: 10
PAINLEVEL_OUTOF10: 10
PAINLEVEL_OUTOF10: 0

## 2021-08-21 ASSESSMENT — ENCOUNTER SYMPTOMS
VOMITING: 0
ABDOMINAL PAIN: 0
NAUSEA: 0

## 2021-08-21 ASSESSMENT — PAIN SCALES - WONG BAKER

## 2021-08-21 ASSESSMENT — PAIN DESCRIPTION - LOCATION
LOCATION: BACK
LOCATION: RIB CAGE;WRIST

## 2021-08-21 ASSESSMENT — PAIN DESCRIPTION - FREQUENCY: FREQUENCY: CONTINUOUS

## 2021-08-21 ASSESSMENT — PAIN DESCRIPTION - ORIENTATION
ORIENTATION: RIGHT
ORIENTATION: LEFT

## 2021-08-21 ASSESSMENT — PAIN DESCRIPTION - PAIN TYPE: TYPE: ACUTE PAIN;SURGICAL PAIN

## 2021-08-21 NOTE — PROGRESS NOTES
[facility] -   Infectious diseases evaluation  admission date 8/10/2021    reason for consultation:   MRSA bacteremia    Impression :   Current:  · MRSA bacteremia suspect pulmonary source of infection  · Left hand and wrist cellulitis  · Left wrist fracture  · Right pleural effusion /empyema status post thoracentesis with MRSA growth on culture. · Pneumothorax  · Superficial vein thrombophlebitis left arm  · Syncopal episode  · Alcohol withdrawal  · Acute respiratory failure requiring intubation  · Rib fracture with possible hemothorax    Other:  · History of alcohol abuse  · COPD  · History of depression  · History of seizure  · History of hypertension. Recommendations   · Continue IV vancomycin ,monitor renal function and vancomycin levels closely  · Repeat blood cultures 8/16/2021 grew MRSA  · Echocardiogram , no reported vegetations  · Repeat blood cultures from 8/19/2021   · ERICKA showed no vegetations. · Follow CBC and renal function          History of Present Illness:   Initial history:  Mesha Mcghee is a 60y.o.-year-old male presented to the hospital on 8/10/2021 after a syncopal episode, was complaining of pain to the right chest wall and abdomen. Reported sharp, constant pain aggravated by movement with no alleviating factors. The patient was previously evaluated at the ER 8/7/2021 after a fall found to have multiple rib fractures and a left wrist closed fracture. Tox screen positive for cannabinoids. CT head negative for acute intracranial abnormality. CT chest/abdomen/pelvis showed rib fractures with associated focal hemothorax. No acute processes in abdomen or pelvis. Right arm PICC line was placed 8/12/2021  He was placed on alcohol withdrawal protocol, developed respiratory distress was placed on BiPAP initially then was intubated 4/13/21.   Baker catheter was placed on 4/13/2021  COVID-19 rapid test was negative  The patient was on Levaquin 8/13/2021 and 8/14/2021 that was discontinued and started on vancomycin and cefepime. He had a fever with a temperature max of 100.6 on 8/13/2021. Blood cultures 8/14/2021 grew MRSA as well as sputum culture. Chest x-ray 8/14/2021 showed bilateral airspace disease right greater than left. CT chest without contrast 8/16/2021 showed increased right pleural effusion with an area of loculated fluid status post thoracentesis with MRSA growth on culture  Interval changes  8/21/2021   He was extubated, awake respond to simple commands, had right chest tube placed yesterday, no reported fever, blood pressure stable, no diarrhea. Right arm PICC line in place, Baker catheter in place. Renal function stable  Blood pressure stable     Patient Vitals for the past 8 hrs:   BP Temp Pulse Resp SpO2   08/21/21 1048 -- -- -- 20 97 %   08/21/21 1014 -- -- 85 17 98 %   08/21/21 1000 128/62 -- 84 25 --   08/21/21 0900 138/69 -- 84 (!) 31 --   08/21/21 0816 -- -- 69 15 96 %   08/21/21 0815 -- -- 68 18 96 %   08/21/21 0814 -- -- 67 14 96 %   08/21/21 0807 -- -- -- 17 96 %   08/21/21 0800 (!) 115/59 98.7 °F (37.1 °C) 67 19 --   08/21/21 0657 -- -- 68 15 97 %   08/21/21 0645 -- -- 71 11 96 %   08/21/21 0630 133/65 -- 71 19 98 %   08/21/21 0615 -- -- 77 22 99 %   08/21/21 0600 133/67 -- 75 23 99 %   08/21/21 0545 -- -- 59 17 99 %   08/21/21 0530 131/62 -- 63 19 98 %   08/21/21 0515 -- -- 68 16 98 %           I have personally reviewed the past medical history, past surgical history, medications, social history, and family history, and I haveupdated the database accordingly. Allergies:   Nickel     Review of Systems:     Review of Systems  As per history present illness, other than above 14 system review was negative  Physical Examination :       Physical Exam  Constitutional:       General: He is not in acute distress. HENT:      Head: Normocephalic and atraumatic.       Right Ear: External ear normal.      Left Ear: External ear normal.   Eyes:      General: No scleral icterus. Right eye: No discharge. Left eye: No discharge. Cardiovascular:      Rate and Rhythm: Normal rate and regular rhythm. Heart sounds: No murmur heard. Pulmonary:      Breath sounds: Rhonchi present. No wheezing. Abdominal:      General: Abdomen is flat. There is no distension. Palpations: Abdomen is soft. Genitourinary:     Comments: Baker catheter in place  Musculoskeletal:      Right lower leg: Edema present. Left lower leg: Edema present. Comments: Bilateral upper extremity edema   Skin:     General: Skin is warm. Coloration: Skin is not jaundiced. Comments: Left hand and wrist erythema and warmth improved, no fluctuation, no crepitance   Neurological:      General: No focal deficit present. Mental Status: He is alert.      Right PICC line in place    Past Medical History:     Past Medical History:   Diagnosis Date    Alcohol abuse 6/4/2014    Anxiety     Arthritis     COPD (chronic obstructive pulmonary disease) ([address]     ASTHMA    DDD (degenerative disc disease), lumbar 9/6/2016    Depression     Heart burn     Hemorrhoids     HTN (hypertension) 1/19/2015    Ilioinguinal neuralgia of right side 4/10/2017    Psychiatric problem     depression /anxiety    Seizures ([address]     withdrawal from alcohol 2 years ago    Wears glasses     READING       Past Surgical  History:     Past Surgical History:   Procedure Laterality Date    ANESTHESIA NERVE BLOCK Right 4/10/2017    NERVE BLOCK US RIGHT SIDED ILIOINGUINAL performed by Jose Alejandro Del Valle MD at [address] [place] [address]  3/19/15    HERNIA REPAIR      IR CHEST TUBE INSERTION  8/20/2021    IR CHEST TUBE INSERTION 8/20/2021 [facility]    NERVE BLOCK Right 10/10/2016    right groin    OTHER SURGICAL HISTORY Right 04/10/2017    US nerve block to R groin    TOE SURGERY      SCREW RIGHT BIG TOE       Medications:      ipratropium-albuterol  1 ampule Inhalation Q4H While awake    enoxaparin  40 mg Subcutaneous Daily    vancomycin  1,250 mg Intravenous Q24H    [Held by provider] metoprolol tartrate  12.5 mg Oral BID    insulin glargine  10 Units Subcutaneous Nightly    metoclopramide  10 mg Intravenous Q6H    insulin lispro  0-12 Units Subcutaneous Q6H    vancomycin (VANCOCIN) intermittent dosing (placeholder)   Other RX Placeholder    sodium chloride flush  5-40 mL Intravenous 2 times per day    famotidine (PEPCID) injection  20 mg Intravenous BID    nicotine  1 patch Transdermal Daily    lidocaine  1 patch Transdermal Daily       Social History:     Social History     Socioeconomic History    Marital status:      Spouse name: Not on file    Number of children: Not on file    Years of education: Not on file    Highest education level: Not on file   Occupational History    Not on file   Tobacco Use    Smoking status: Current Every Day Smoker     Packs/day: 1.00     Years: 38.00     Pack years: 38.00     Types: Cigarettes    Smokeless tobacco: Never Used    Tobacco comment: 1 PPD   Vaping Use    Vaping Use: Never used   Substance and Sexual Activity    Alcohol use: Yes     Alcohol/week: 12.0 standard drinks     Types: 12 Cans of beer per week     Comment: 12 24 oz cans daily    Drug use: Yes     Types: Marijuana     Comment: crack occasionally    Sexual activity: Never   Other Topics Concern    Not on file   Social History Narrative    Not on file     Social Determinants of Health     Financial Resource Strain:     Difficulty of Paying Living Expenses:    Food Insecurity:     Worried About Running Out of Food in the Last Year:     Ran Out of Food in the Last Year:    Transportation Needs:     Lack of Transportation (Medical):      Lack of Transportation (Non-Medical):    Physical Activity:     Days of Exercise per Week:     Minutes of Exercise per Session:    Stress:     Feeling of Stress : Social Connections:     Frequency of Communication with Friends and Family:     Frequency of Social Gatherings with Friends and Family:     Attends Spiritism Services:     Active Member of Clubs or Organizations:     Attends Club or Organization Meetings:     Marital Status:    Intimate Partner Violence:     Fear of Current or Ex-Partner:     Emotionally Abused:     Physically Abused:     Sexually Abused:        Family History:     Family History   Problem Relation Age of Onset    Cancer Mother         colon ca      Medical Decision Making:   I have independently reviewed/ordered the following labs:    CBC with Differential:   Recent Labs     08/20/21  0405 08/20/21  0405 08/20/21  1448 08/21/21  0440   WBC 11.2*  --   --  11.6*   HGB 8.7*   < > 9.2* 9.7*   HCT 26.4*   < > 27.6* 30.0*     --   --  386    < > = values in this interval not displayed. BMP:  Recent Labs     08/20/21  0405 08/20/21  1448 08/21/21  0440 08/21/21  1234   *   < > 148* 145*   K 3.0*   < > 3.4* 3.5*   *   < > 114* 111*   CO2 29   < > 28 27   BUN 41*  --  32*  --    CREATININE 1.13  --  1.11  --    MG 2.5  --  2.2  --     < > = values in this interval not displayed. Hepatic Function Panel:   Recent Labs     08/20/21  0405 08/21/21  0440   PROT 5.1* 4.9*   LABALBU 2.4* 2.2*   BILITOT 0.36 0.47   ALKPHOS 75 68   ALT 16 17   AST 23 19     No results for input(s): RPR in the last 72 hours. No results for input(s): HIV in the last 72 hours. No results for input(s): BC in the last 72 hours. Lab Results   Component Value Date    CREATININE 1.11 08/21/2021    GLUCOSE 85 08/21/2021       Detailed results: Thank you for allowing us to participate in the care of this patient. Please call with questions.     This note is created with the assistance of a speech recognition program.  While intending to generate adocument that actually reflects the content of the visit, the document can still have some errors including those of syntax and sound a like substitutions which may escape proof reading. It such instances, actual meaningcan be extrapolated by contextual diversion.     Cristobal Cronin MD  Office: [phone]  Perfect serve / office [phone]

## 2021-08-21 NOTE — PLAN OF CARE
Order obtain for extubation. SpO2 of 94 on 40% FiO2. Patient extubated and placed on 5 liters/min via nasal cannula. Post extubation SpO2 is 93% with HR  74 bpm and RR 21 breaths/min. Patient had strong cough that was productive of clear  sputum. Extubation Well tolerated by patient. Thomas CROWELL RCP   10:19 AM

## 2021-08-21 NOTE — PROGRESS NOTES
Pharmacy Vancomycin Consult     Vancomycin Day: 7  Current Dosin mg daily  Current indication: Pneumonia, MRSA bacteremia    Temp max:  98.4    Recent Labs     21  0408 21  0405   BUN 46* 41*   CREATININE 1.20 1.13   WBC 8.3 11.2*       Intake/Output Summary (Last 24 hours) at 2021 2333  Last data filed at 2021 1830  Gross per 24 hour   Intake 2088. 45 ml   Output 2225 ml   Net -136.55 ml     Culture Date      Source                       Results  See micro reports    Ht Readings from Last 1 Encounters:   21 5' 9\" (1.753 m)        Wt Readings from Last 1 Encounters:   21 157 lb 6.5 oz (71.4 kg)       Body mass index is 23.25 kg/m². Estimated Creatinine Clearance: 71 mL/min (based on SCr of 1.13 mg/dL). Trough: 13.4 mcg/ml    Assessment/Plan:  Trough is slightly below target range. Will increase dose to 1250 mg daily and recheck level after 2 doses.

## 2021-08-21 NOTE — PROGRESS NOTES
General Surgery Progress Note  IP Day: 10       Subjective:     24 Hour Events:  S/p right chest tube insertion by IR for empyema yesterday. Extubated this AM.     Objective: Allergies   Allergen Reactions    Nickel      Contact dermatitis       Intake/Output last 3 shifts:  I/O last 3 completed shifts: In: 2864.8 [I.V.:2014.8; IV Piggyback:850]  Out: 2665 [Urine:1625; Chest Tube:1040]    Vitals:    08/21/21 0816   BP:    Pulse: 69   Resp: 15   Temp:    SpO2: 96%       Physical Exam  General Appearance: Awake, Alert & Oriented x3, No Acute Distress  Pulmonary: Bilateral rhonchi. Lines, Drains, Tubes: Right chest tube in place without airleak. Draining serosang fluid. Laboratory Data:          CBC:  Lab Results   Component Value Date    WBC 11.6 08/21/2021    WBC 11.2 08/20/2021    WBC 8.3 08/19/2021    HGB 9.7 08/21/2021    HGB 9.2 08/20/2021    HGB 8.7 08/20/2021    HCT 30.0 08/21/2021    HCT 27.6 08/20/2021    HCT 26.4 08/20/2021    MCV 99.2 08/21/2021     08/21/2021     08/20/2021     08/19/2021        BMP:    Lab Results   Component Value Date     08/21/2021    K 3.4 08/21/2021     08/21/2021    CO2 28 08/21/2021    BUN 32 08/21/2021    CREATININE 1.11 08/21/2021    GLUCOSE 85 08/21/2021        Cultures: Blood cx NG x 2d.    Thoracentesis fluid- GPCs       Medications:   ipratropium-albuterol  1 ampule Inhalation Q4H While awake    enoxaparin  40 mg Subcutaneous Daily    vancomycin  1,250 mg Intravenous Q24H    potassium chloride  20 mEq Oral BID    [Held by provider] metoprolol tartrate  12.5 mg Oral BID    insulin glargine  10 Units Subcutaneous Nightly    metoclopramide  10 mg Intravenous Q6H    insulin lispro  0-12 Units Subcutaneous Q6H    vancomycin (VANCOCIN) intermittent dosing (placeholder)   Other RX Placeholder    sodium chloride flush  5-40 mL Intravenous 2 times per day    famotidine (PEPCID) injection  20 mg Intravenous BID    nicotine  1 CHEST PORTABLE    Result Date: 8/20/2021  EXAMINATION: ONE XRAY VIEW OF THE CHEST 8/20/2021 4:10 pm COMPARISON: Portable chest x-ray 5:22 a.m. Today. HISTORY: ORDERING SYSTEM PROVIDED HISTORY: rule out ptx TECHNOLOGIST PROVIDED HISTORY: rule out ptx Reason for Exam: R/o PTX Acuity: Unknown Type of Exam: Unknown Additional signs and symptoms: R/o PTX FINDINGS: There has been interval placement of a chest tube in the right base. Small pneumothorax is stable. There is a rounded area of opacification measuring up to 4 cm in the right base which is more prominent than on comparison radiographs. No pneumothorax within the left lung. Bibasilar patchy opacities are overall stable from earlier today. Right upper extremity central line tip is at the cavoatrial junction. Enteric tube is been removed. Endotracheal tube is approximately 2 cm above the kristen in appropriate position. 1 anterior rib fracture on the right is seen, likely the 6th rib. Interval chest tube placement in the right lower lobe with stable small pneumothorax. Rounded opacification in the right base seen to better advantage on today's study. May represent consolidation, loculated effusion, or mass. Recommend attention on follow-up. XR CHEST PORTABLE    Result Date: 8/20/2021  EXAMINATION: ONE XRAY VIEW OF THE CHEST 8/20/2021 5:34 am COMPARISON: August 19, 2021 chest exam HISTORY: ORDERING SYSTEM PROVIDED HISTORY: vent TECHNOLOGIST PROVIDED HISTORY: vent Reason for Exam: vent Acuity: Unknown Type of Exam: Ongoing Additional signs and symptoms: vent Relevant Medical/Surgical History: vent FINDINGS: There are satisfactorily positioned endotracheal nasogastric tubes. The endotracheal tube tip is 1.5 cm cranial to the kristen. Stable cardiopericardial silhouette/mild tortuosity of the thoracic aorta Considering technical differences, there is a small right pneumothorax with maximal 12 mm transverse dimension at the upper lung. . Present film is taken in greater expiration than the previous. Slight interval progression in moderate diffuse bilateral interstitial/alveolar infiltrates with increasing right lower lung consolidation     Stable small right pneumothorax Satisfactorily positioned support lines Slight progression in moderate diffuse bilateral interstitial/alveolar infiltrates suggesting increasing edema and/or infection Increasing now moderate right lower lung consolidation/infiltrate     IR CHEST TUBE INSERTION    Result Date: 8/20/2021  PROCEDURE: ULTRASOUND GUIDED CHEST TUBE PLACEMENT 8/20/2021 HISTORY: ORDERING SYSTEM PROVIDED HISTORY: increased plural effusion TECHNOLOGIST PROVIDED HISTORY: increased plural effusion Which side should the procedure be performed?->Right Pneumothorax SEDATION: None. 8 mL 1% lidocaine local TECHNIQUE: The patient is not consentable. His immediate family was not available so 2 physician emergent consent was obtained including myself and Internal Medicine attending. [place] protocol was followed. A time-out was performed prior to commencing the procedure. A suitable skin site was prepped and draped in sterile fashion following ultrasound localization. A 10 [employer] [place] multipurpose drain was advanced into the collection utilizing the trocar technique. An ultrasound image of the tip within the collection was saved and sent to PACs. The drain was advanced off the inner stylet, looped, and locked within the collection. Aspiration of sanguinous and purulent material was aspirated to confirm location. The drain was sutured in place and attached to an [facility] chest tube device. Sterile dressing was applied. The patient tolerated the procedure well without immediate complication. FINDINGS: Highly complex pleural effusion consistent with empyema. Successful ultrasound guided placement of a right chest tube. Chest x-ray pending.         Patient Active Problem List   Diagnosis    COPD (chronic obstructive pulmonary disease) ([facility] [address]    Smoking addiction    Depression    Dyslipidemia    Lung nodule    DDD (degenerative disc disease), lumbar    Groin pain, chronic, right    Ilioinguinal neuralgia of right side    Syncope and collapse    History of alcohol abuse    Lumbar radiculopathy    Scoliosis due to degenerative disease of spine in adult patient    Lumbosacral spondylosis without myelopathy    Alcohol abuse    Hypokalemia    Hyponatremia    Traumatic rhabdomyolysis (HCC)    Closed fracture of multiple ribs of right side    Closed fracture of left wrist    Severe malnutrition (HCC)    Fever    Conjunctivitis    Acute respiratory failure (HCC)    Superficial venous thrombosis of arm, left    MRSA bacteremia    Empyema lung (HCC)       Assessment and Plan:       1. MRSA bacteremia with right-sided empyema. Cx pending  2. Abx per ID  3. S/p right-sided chest tube and thoracentesis with persistent PTX  4. Extubated today  5.  Consider increased chest tube suction to -40 cm H2O    - Electronically signed by Radha Mahoney MD, FACS  at 8/21/2021 10:02 AM

## 2021-08-21 NOTE — PROGRESS NOTES
Vancomycin Day: 8    Patient's labs, cultures, vitals, and vancomycin regimen reviewed. No changes today.    [address] Nabila Walsh RPH,RPIGNACIO, MS   8/21/2021  9:57 AM
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
## 2021-08-21 NOTE — PROGRESS NOTES
Nephrology Progress Note    Reason for consultation: Management of acute kidney injury. Consulting physician: Tahira Mejia MD.    Interval history: Patient was seen and examined today in the ICU where he remains intubated and on ventilator support. Plans are for extubation later today. Patient had ERICKA yesterday with no findings of vegetations. He is s/p left thoracentesis  of 1.1L isolating staph aureus. Belkys Heard History of Present Illness: This is a 60 y.o. male with history of alcohol abuse, COPD, Hypertension who presented on 8/10/2021 with syncope and collapse. He was witnessed walking on the sidewalk and he collapsed all of a sudden. Bystanders called 911 and patient was evaluated in the ER. He had presented with pain in the right chest wall and abdomen. He initially received chest abdomen CT with IV contrast on 8/10/2021 showing grade 2 chest wall injury with right fourth through 6 anterior lateral rib fractures. Patient was initially placed on alcohol withdrawal protocol. Patient  developed respiratory distress and was placed on BiPAP and subsequently intubated on 8/13/2021 for airway protection. Chest x-ray was suggestive of bilateral pleural effusion right greater than left with significant right-sided pleural effusion. Patient is scheduled for thoracentesis today. Blood cultures are growing MRSA. [facility] Patient is receiving IV vancomycin. He was started on Lovenox for DVT of the left upper extremity. Nephrology is consulted for acute kidney injury. Serum creatinine was 0.54 mg/dl on 8/14/2021 and progressively worsened to 1.27 on 8/16/2021 and 1.38 today with BUN of 40 mg/dl. He did receive IV Lasix 40 mg x 1 yesterday as patient is fluid overloaded. Echocardiogram shows a EF of 35% with increased IVC diameter. He has had good urine output of 3.3 L over the  last 24 hours after IV Lasix. Patient is however 13 L positive balance since admission.     Objective/     Vitals:    08/21/21 0900 08/21/21 monitor urine output closely. Avoid nephrotoxic agents. 2. Acute respiratory failure - Patient was extubated today.     3. Hypokalemia - secondary to diuretic therapy. Replace with potassium sliding scale.     4. MRSA bacteremia -patient is on IV vancomycin. Monitor Vanco trough levels for  potential nephrotoxicity.     5. Hypernatremia - increase IV fluid D5 water to 100 mL/h.     6. Large right-sided parapneumonic pleural effusion, possibly loculated on chest CT-S/P  Left  thoracentesis 8/17/21.     Prognosis is guarded     Tulio Armendariz FACP  Attending Clinical Nephrologist

## 2021-08-21 NOTE — PLAN OF CARE
Problem: Falls - Risk of:  Goal: Will remain free from falls  Description: Will remain free from falls  Outcome: Ongoing  Goal: Absence of physical injury  Description: Absence of physical injury  Outcome: Ongoing     Problem: Skin Integrity:  Goal: Will show no infection signs and symptoms  Description: Will show no infection signs and symptoms  Outcome: Ongoing  Goal: Absence of new skin breakdown  Description: Absence of new skin breakdown  Outcome: Ongoing     Problem: Non-Violent Restraints  Goal: Removal from restraints as soon as assessed to be safe  Outcome: Ongoing  Goal: No harm/injury to patient while restraints in use  Outcome: Ongoing  Goal: Patient's dignity will be maintained  Outcome: Ongoing     Problem: Nutrition  Goal: Optimal nutrition therapy  Outcome: Ongoing     Problem: OXYGENATION/RESPIRATORY FUNCTION  Goal: Patient will maintain patent airway  Outcome: Ongoing  Goal: Patient will achieve/maintain normal respiratory rate/effort  Description: Respiratory rate and effort will be within normal limits for the patient  Outcome: Ongoing     Problem: MECHANICAL VENTILATION  Goal: Patient will maintain patent airway  Outcome: Ongoing  Goal: ET tube will be managed safely  Outcome: Ongoing     Problem: Pain:  Goal: Pain level will decrease  Description: Pain level will decrease  Outcome: Ongoing  Goal: Recognizes and communicates pain  Description: Recognizes and communicates pain  Outcome: Ongoing  Goal: Control of acute pain  Description: Control of acute pain  Outcome: Ongoing  Goal: Control of chronic pain  Description: Control of chronic pain  Outcome: Ongoing     Problem: Confusion - Acute:  Goal: Absence of continued neurological deterioration signs and symptoms  Description: Absence of continued neurological deterioration signs and symptoms  Outcome: Ongoing  Goal: Mental status will be restored to baseline  Description: Mental status will be restored to baseline  Outcome: Ongoing Problem: Discharge Planning:  Goal: Ability to perform activities of daily living will improve  Description: Ability to perform activities of daily living will improve  Outcome: Ongoing  Goal: Participates in care planning  Description: Participates in care planning  Outcome: Ongoing     Problem: Injury - Risk of, Physical Injury:  Goal: Will remain free from falls  Description: Will remain free from falls  Outcome: Ongoing  Goal: Absence of physical injury  Description: Absence of physical injury  Outcome: Ongoing     Problem: Mood - Altered:  Goal: Mood stable  Description: Mood stable  Outcome: Ongoing  Goal: Absence of abusive behavior  Description: Absence of abusive behavior  Outcome: Ongoing  Goal: Verbalizations of feeling emotionally comfortable while being cared for will increase  Description: Verbalizations of feeling emotionally comfortable while being cared for will increase  Outcome: Ongoing     Problem: Psychomotor Activity - Altered:  Goal: Absence of psychomotor disturbance signs and symptoms  Description: Absence of psychomotor disturbance signs and symptoms  Outcome: Ongoing     Problem: Sensory Perception - Impaired:  Goal: Demonstrations of improved sensory functioning will increase  Description: Demonstrations of improved sensory functioning will increase  Outcome: Ongoing  Goal: Decrease in sensory misperception frequency  Description: Decrease in sensory misperception frequency  Outcome: Ongoing  Goal: Able to refrain from responding to false sensory perceptions  Description: Able to refrain from responding to false sensory perceptions  Outcome: Ongoing  Goal: Demonstrates accurate environmental perceptions  Description: Demonstrates accurate environmental perceptions  Outcome: Ongoing  Goal: Able to distinguish between reality-based and nonreality-based thinking  Description: Able to distinguish between reality-based and nonreality-based thinking  Outcome: Ongoing  Goal: Able to interrupt nonreality-based thinking  Description: Able to interrupt nonreality-based thinking  Outcome: Ongoing     Problem: Sleep Pattern Disturbance:  Goal: Appears well-rested  Description: Appears well-rested  Outcome: Ongoing     Problem: Pain:  Goal: Pain level will decrease  Description: Pain level will decrease  Outcome: Ongoing  Goal: Control of acute pain  Description: Control of acute pain  Outcome: Ongoing  Goal: Control of chronic pain  Description: Control of chronic pain  Outcome: Ongoing

## 2021-08-21 NOTE — PROGRESS NOTES
[facility]   Progress Note                   Date:   8/21/2021  Patient name: Anmol Monreal  Date of admission:  8/10/2021  8:30 PM  MRN:   [id number]  YOB: 1965  PCP: Robert Erickson MD    Reason for Admission: Syncope and collapse [R55]  Hypokalemia [E87.6]  Hyponatremia [E87.1]  Traumatic rhabdomyolysis, initial encounter ([facility] [place]. 6XXA]  Closed fracture of multiple ribs of right side, initial encounter [S22.41XA]    Subjective:       Clinical Changes / Abnormalities: Remains intubated, but awake and follows commands     Medications:   Scheduled Meds:   enoxaparin  40 mg Subcutaneous Daily    vancomycin  1,250 mg Intravenous Q24H    potassium chloride  20 mEq Oral BID    [Held by provider] metoprolol tartrate  12.5 mg Oral BID    insulin glargine  10 Units Subcutaneous Nightly    metoclopramide  10 mg Intravenous Q6H    insulin lispro  0-12 Units Subcutaneous Q6H    vancomycin (VANCOCIN) intermittent dosing (placeholder)   Other RX Placeholder    sodium chloride flush  5-40 mL Intravenous 2 times per day    famotidine (PEPCID) injection  20 mg Intravenous BID    nicotine  1 patch Transdermal Daily    lidocaine  1 patch Transdermal Daily     Continuous Infusions:   IV infusion builder 75 mL/hr at 08/20/21 2131    fentaNYL 50 mcg/hr (08/20/21 1940)    dextrose      propofol 35 mcg/kg/min (08/21/21 0126)    sodium chloride      sodium chloride 25 mL (08/19/21 1717)    dexmedetomidine Stopped (08/16/21 0807)     CBC:   Recent Labs     08/19/21  0408 08/19/21  0408 08/20/21  0405 08/20/21  1448 08/21/21  0440   WBC 8.3  --  11.2*  --  11.6*   HGB 9.3*   < > 8.7* 9.2* 9.7*     --  339  --  386    < > = values in this interval not displayed.      BMP:    Recent Labs     08/19/21  0408 08/19/21  1527 08/20/21  0405 08/20/21  0405 08/20/21  1448 08/20/21  2146 08/21/21  0440   *  --  151*   < > 148* 146* 148*   K 2.6*   < > 3.0*   < > 3.5* 3.6* 3.4* *  --  115*   < > 114* 112* 114*   CO2 28  --  29   < > 28 27 28   BUN 46*  --  41*  --   --   --  32*   CREATININE 1.20  --  1.13  --   --   --  1.11   GLUCOSE 182*  --  105*  --   --   --  85    < > = values in this interval not displayed. Hepatic:   Recent Labs     08/19/21  0408 08/20/21  0405 08/21/21  0440   AST 24 23 19   ALT 23 16 17   BILITOT 0.36 0.36 0.47   ALKPHOS 83 75 68     Troponin: No results for input(s): TROPONINI in the last 72 hours. BNP: No results for input(s): BNP in the last 72 hours. Lipids: No results for input(s): CHOL, HDL in the last 72 hours. Invalid input(s): LDLCALCU  INR: No results for input(s): INR in the last 72 hours. Objective:   Vitals: BP (!) 115/59   Pulse 69   Temp 98.7 °F (37.1 °C)   Resp 15   Ht 5' 9\" (1.753 m)   Wt 157 lb 6.5 oz (71.4 kg)   SpO2 96%   BMI 23.25 kg/m²   General appearance: alert and cooperative with exam  HEENT: Head: Normocephalic, no lesions, without obvious abnormality. Neck: no JVD  Lungs: CTAB  Heart: RRR s1+s2, no murmurs  Abdomen: soft, non-tender  Extremities: no edema  Neurologic: not done    TTE 8/16/21  Summary  Patient supine, on ventilator. Left ventricle is normal in size. Estimated LV EF 35 %. Mild left ventricular hypertrophy. Normal right ventricular size and function. No significant valvular regurgitation or stenosis seen. Aortic root is mildly dilated. (4.1 cm)  IVC Increased diameter and impaired or no inspiratory variation. No significant pericardial effusion is seen.       Patient Active Problem List:     COPD (chronic obstructive pulmonary disease) (HCC)     Smoking addiction     Depression     Dyslipidemia     Lung nodule     DDD (degenerative disc disease), lumbar     Groin pain, chronic, right     Ilioinguinal neuralgia of right side     Syncope and collapse     History of alcohol abuse     Lumbar radiculopathy     Scoliosis due to degenerative disease of spine in adult patient     Lumbosacral spondylosis without myelopathy     Alcohol abuse     Hypokalemia     Hyponatremia     Traumatic rhabdomyolysis (HCC)     Closed fracture of multiple ribs of right side     Closed fracture of left wrist     Severe malnutrition (HCC)     Fever     Conjunctivitis     Acute respiratory failure (HCC)     Superficial venous thrombosis of arm, left     MRSA bacteremia      Assessment & Plan:     Acute Systolic CHF, HFrEF- Due to ETOH. Newly reduced LVEF 35%. EF on ERICKA 55%- improved, may have been due to excessive EtOH. Optimize emds  AMS, ETOH, Withdrawals  Acute Resp failure- on vent  Bacteremia  Pleural effusion- s/p Thora on 8/17/21- now with PTX  PNA  Hypernatremia  Hypokalemia  Anemia  Cardiomyopathy work up- can be done as OP due to infection issues at present  ID on board- on antibiotics  Will follow peripherally, please call back if needed. Thank you for allowing me to participate in the care of this patient, please do not hesitate to call if you have any questions. Mele Clancy, [id number] [facility]  [facility]. [facility]  [phone]
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
## 2021-08-21 NOTE — PROGRESS NOTES
Pulmonary Critical Care Progress Note  Juwan Vargas MD     Patient seen for the follow up of acute hypoxic respiratory failure, pneumonia, right pneumothorax, EtOH withdrawal, Syncope and collapse     Subjective:  Patient is awake and tolerating CPAP trial very well. He denies any chest pain. He has mild tracheal secretions. No significant overnight events. Examination:  Vitals: BP (!) 115/59   Pulse 69   Temp 98.7 °F (37.1 °C)   Resp 15   Ht 5' 9\" (1.753 m)   Wt 157 lb 6.5 oz (71.4 kg)   SpO2 96%   BMI 23.25 kg/m²   General appearance:  alert and cooperative with exam, intubated   Neck: No JVD  Lungs: Moderate air exchange, no wheezing  Heart: regular rate and rhythm, S1, S2 normal, no gallop  Abdomen: Soft, non tender, + BS  Extremities: no cyanosis or clubbing. No significant edema    LABs:  CBC:   Recent Labs     08/20/21  0405 08/20/21  0405 08/20/21  1448 08/21/21  0440   WBC 11.2*  --   --  11.6*   HGB 8.7*   < > 9.2* 9.7*   HCT 26.4*   < > 27.6* 30.0*     --   --  386    < > = values in this interval not displayed. BMP:   Recent Labs     08/20/21  0405 08/20/21  1448 08/20/21  2146 08/21/21  0440   *   < > 146* 148*   K 3.0*   < > 3.6* 3.4*   CO2 29   < > 27 28   BUN 41*  --   --  32*   CREATININE 1.13  --   --  1.11   LABGLOM >60  --   --  >60   GLUCOSE 105*  --   --  85    < > = values in this interval not displayed.      LIVER PROFILE:  Recent Labs     08/20/21  0405 08/21/21  0440   AST 23 19   ALT 16 17   LABALBU 2.4* 2.2*     ABG:  Lab Results   Component Value Date    PHART 7.500 08/21/2021    UEW4CBR 39.2 08/21/2021    PO2ART 75.7 08/21/2021    CFT5RLU 30.6 08/21/2021    S7KCQIQT 94.5 08/21/2021    FIO2 40 08/21/2021       Lab Results   Component Value Date    NBEA NOT REPORTED 08/21/2021    PBEA 7.4 08/21/2021    FIO2 40 08/21/2021     Radiology:  X-ray chest: 8/21/2021  Improving right pneumothorax/pigtail catheter placement      Impression:  · Acute hypoxic respiratory failure  · Right empyema  · Right pneumothorax, s/p chest tube placement  · EtOH withdrawal  · Atelectasis  · Right lower lobe infiltrate/pneumonia  · Hypotension   · Hyponatremia    Recommendations:  · Proceed with extubation.   · Bedside swallow evaluation after extubation, if passes start patient on orals and water  · Monitor sodium  · Continue IV antibiotics, vancomycin  · Finished IV hydrocortisone  · Albuterol and Ipratropium Q 4 hours and prn  · Symbicort 160/4.5  · X-ray chest in am  · Labs: CBC and BMP in am  · Nicotine patch  · Discontinue Baker catheter  · 2 liters/min via nasal cannula  · DVT prophylaxis with SQ Lovenox  · Will follow with you    Hoa Cannon MD, [facility]  Pulmonary Critical Care and Sleep Medicine,  [facility]  Cell: [phone]  Office: [phone]

## 2021-08-22 ENCOUNTER — APPOINTMENT (OUTPATIENT)
Dept: GENERAL RADIOLOGY | Age: 56
DRG: 351 | End: 2021-08-22
Payer: MEDICAID

## 2021-08-22 LAB
-: NORMAL
ABSOLUTE EOS #: 0.25 K/UL (ref 0–0.4)
ABSOLUTE IMMATURE GRANULOCYTE: ABNORMAL K/UL (ref 0–0.3)
ABSOLUTE LYMPH #: 1 K/UL (ref 1–4.8)
ABSOLUTE MONO #: 0.5 K/UL (ref 0.1–1.3)
ALBUMIN SERPL-MCNC: 2 G/DL (ref 3.5–5.2)
ALBUMIN/GLOBULIN RATIO: ABNORMAL (ref 1–2.5)
ALP BLD-CCNC: 77 U/L (ref 40–129)
ALT SERPL-CCNC: 16 U/L (ref 5–41)
ANION GAP SERPL CALCULATED.3IONS-SCNC: 4 MMOL/L (ref 9–17)
ANION GAP SERPL CALCULATED.3IONS-SCNC: 6 MMOL/L (ref 9–17)
ANION GAP SERPL CALCULATED.3IONS-SCNC: 6 MMOL/L (ref 9–17)
ANION GAP SERPL CALCULATED.3IONS-SCNC: 9 MMOL/L (ref 9–17)
ANION GAP SERPL CALCULATED.3IONS-SCNC: 9 MMOL/L (ref 9–17)
AST SERPL-CCNC: 15 U/L
BASOPHILS # BLD: 0 % (ref 0–2)
BASOPHILS ABSOLUTE: 0 K/UL (ref 0–0.2)
BILIRUB SERPL-MCNC: 0.74 MG/DL (ref 0.3–1.2)
BUN BLDV-MCNC: 22 MG/DL (ref 6–20)
BUN/CREAT BLD: ABNORMAL (ref 9–20)
CALCIUM SERPL-MCNC: 7.6 MG/DL (ref 8.6–10.4)
CHLORIDE BLD-SCNC: 105 MMOL/L (ref 98–107)
CHLORIDE BLD-SCNC: 106 MMOL/L (ref 98–107)
CHLORIDE BLD-SCNC: 109 MMOL/L (ref 98–107)
CHLORIDE BLD-SCNC: 109 MMOL/L (ref 98–107)
CHLORIDE BLD-SCNC: 111 MMOL/L (ref 98–107)
CO2: 24 MMOL/L (ref 20–31)
CO2: 25 MMOL/L (ref 20–31)
CO2: 25 MMOL/L (ref 20–31)
CO2: 26 MMOL/L (ref 20–31)
CO2: 26 MMOL/L (ref 20–31)
CREAT SERPL-MCNC: 1.01 MG/DL (ref 0.7–1.2)
CULTURE: NORMAL
DIFFERENTIAL TYPE: ABNORMAL
EOSINOPHILS RELATIVE PERCENT: 2 % (ref 0–4)
GFR AFRICAN AMERICAN: >60 ML/MIN
GFR NON-AFRICAN AMERICAN: >60 ML/MIN
GFR SERPL CREATININE-BSD FRML MDRD: ABNORMAL ML/MIN/{1.73_M2}
GFR SERPL CREATININE-BSD FRML MDRD: ABNORMAL ML/MIN/{1.73_M2}
GLUCOSE BLD-MCNC: 111 MG/DL (ref 75–110)
GLUCOSE BLD-MCNC: 114 MG/DL (ref 70–99)
GLUCOSE BLD-MCNC: 76 MG/DL (ref 75–110)
GLUCOSE BLD-MCNC: 86 MG/DL (ref 75–110)
GLUCOSE BLD-MCNC: 99 MG/DL (ref 75–110)
HCT VFR BLD CALC: 26.8 % (ref 41–53)
HEMOGLOBIN: 8.8 G/DL (ref 13.5–17.5)
IMMATURE GRANULOCYTES: ABNORMAL %
LYMPHOCYTES # BLD: 8 % (ref 24–44)
Lab: NORMAL
MAGNESIUM: 2.2 MG/DL (ref 1.6–2.6)
MCH RBC QN AUTO: 31.9 PG (ref 26–34)
MCHC RBC AUTO-ENTMCNC: 32.9 G/DL (ref 31–37)
MCV RBC AUTO: 97 FL (ref 80–100)
MONOCYTES # BLD: 4 % (ref 1–7)
MORPHOLOGY: NORMAL
NRBC AUTOMATED: ABNORMAL PER 100 WBC
PDW BLD-RTO: 14.8 % (ref 11.5–14.9)
PLATELET # BLD: 409 K/UL (ref 150–450)
PLATELET ESTIMATE: ABNORMAL
PMV BLD AUTO: 8.5 FL (ref 6–12)
POTASSIUM SERPL-SCNC: 3.8 MMOL/L (ref 3.7–5.3)
POTASSIUM SERPL-SCNC: 4 MMOL/L (ref 3.7–5.3)
POTASSIUM SERPL-SCNC: 4.1 MMOL/L (ref 3.7–5.3)
POTASSIUM SERPL-SCNC: 4.1 MMOL/L (ref 3.7–5.3)
POTASSIUM SERPL-SCNC: 4.2 MMOL/L (ref 3.7–5.3)
RBC # BLD: 2.76 M/UL (ref 4.5–5.9)
RBC # BLD: ABNORMAL 10*6/UL
REASON FOR REJECTION: NORMAL
SEDIMENTATION RATE, ERYTHROCYTE: 22 MM (ref 0–20)
SEG NEUTROPHILS: 86 % (ref 36–66)
SEGMENTED NEUTROPHILS ABSOLUTE COUNT: 10.75 K/UL (ref 1.3–9.1)
SODIUM BLD-SCNC: 138 MMOL/L (ref 135–144)
SODIUM BLD-SCNC: 140 MMOL/L (ref 135–144)
SODIUM BLD-SCNC: 140 MMOL/L (ref 135–144)
SODIUM BLD-SCNC: 141 MMOL/L (ref 135–144)
SODIUM BLD-SCNC: 141 MMOL/L (ref 135–144)
SPECIMEN DESCRIPTION: NORMAL
TOTAL PROTEIN: 5.1 G/DL (ref 6.4–8.3)
VANCOMYCIN TROUGH DATE LAST DOSE: NORMAL
VANCOMYCIN TROUGH DOSE AMOUNT: NORMAL
VANCOMYCIN TROUGH TIME LAST DOSE: 2315
VANCOMYCIN TROUGH: 15.9 UG/ML (ref 10–20)
WBC # BLD: 12.5 K/UL (ref 3.5–11)
WBC # BLD: ABNORMAL 10*3/UL
ZZ NTE CLEAN UP: ORDERED TEST: NORMAL
ZZ NTE WITH NAME CLEAN UP: SPECIMEN SOURCE: NORMAL

## 2021-08-22 PROCEDURE — 99233 SBSQ HOSP IP/OBS HIGH 50: CPT | Performed by: INTERNAL MEDICINE

## 2021-08-22 PROCEDURE — 2500000003 HC RX 250 WO HCPCS: Performed by: NURSE PRACTITIONER

## 2021-08-22 PROCEDURE — 6360000002 HC RX W HCPCS: Performed by: STUDENT IN AN ORGANIZED HEALTH CARE EDUCATION/TRAINING PROGRAM

## 2021-08-22 PROCEDURE — 85025 COMPLETE CBC W/AUTO DIFF WBC: CPT

## 2021-08-22 PROCEDURE — 6360000002 HC RX W HCPCS: Performed by: INTERNAL MEDICINE

## 2021-08-22 PROCEDURE — 99221 1ST HOSP IP/OBS SF/LOW 40: CPT | Performed by: ORTHOPAEDIC SURGERY

## 2021-08-22 PROCEDURE — 83735 ASSAY OF MAGNESIUM: CPT

## 2021-08-22 PROCEDURE — 2700000000 HC OXYGEN THERAPY PER DAY

## 2021-08-22 PROCEDURE — 6360000002 HC RX W HCPCS: Performed by: SURGERY

## 2021-08-22 PROCEDURE — 94640 AIRWAY INHALATION TREATMENT: CPT

## 2021-08-22 PROCEDURE — 80051 ELECTROLYTE PANEL: CPT

## 2021-08-22 PROCEDURE — 6370000000 HC RX 637 (ALT 250 FOR IP): Performed by: INTERNAL MEDICINE

## 2021-08-22 PROCEDURE — 36415 COLL VENOUS BLD VENIPUNCTURE: CPT

## 2021-08-22 PROCEDURE — 2580000003 HC RX 258: Performed by: INTERNAL MEDICINE

## 2021-08-22 PROCEDURE — 99232 SBSQ HOSP IP/OBS MODERATE 35: CPT | Performed by: NURSE PRACTITIONER

## 2021-08-22 PROCEDURE — 2060000000 HC ICU INTERMEDIATE R&B

## 2021-08-22 PROCEDURE — 6370000000 HC RX 637 (ALT 250 FOR IP): Performed by: PHYSICIAN ASSISTANT

## 2021-08-22 PROCEDURE — 71045 X-RAY EXAM CHEST 1 VIEW: CPT

## 2021-08-22 PROCEDURE — 94761 N-INVAS EAR/PLS OXIMETRY MLT: CPT

## 2021-08-22 PROCEDURE — 85652 RBC SED RATE AUTOMATED: CPT

## 2021-08-22 PROCEDURE — 80202 ASSAY OF VANCOMYCIN: CPT

## 2021-08-22 PROCEDURE — 80053 COMPREHEN METABOLIC PANEL: CPT

## 2021-08-22 PROCEDURE — 82947 ASSAY GLUCOSE BLOOD QUANT: CPT

## 2021-08-22 PROCEDURE — 51798 US URINE CAPACITY MEASURE: CPT

## 2021-08-22 PROCEDURE — 94660 CPAP INITIATION&MGMT: CPT

## 2021-08-22 PROCEDURE — 6370000000 HC RX 637 (ALT 250 FOR IP): Performed by: NURSE PRACTITIONER

## 2021-08-22 RX ORDER — SODIUM CHLORIDE 450 MG/100ML
INJECTION, SOLUTION INTRAVENOUS CONTINUOUS
Status: DISCONTINUED | OUTPATIENT
Start: 2021-08-22 | End: 2021-08-31 | Stop reason: HOSPADM

## 2021-08-22 RX ADMIN — METOCLOPRAMIDE 10 MG: 5 INJECTION, SOLUTION INTRAMUSCULAR; INTRAVENOUS at 11:45

## 2021-08-22 RX ADMIN — FAMOTIDINE 20 MG: 10 INJECTION, SOLUTION INTRAVENOUS at 20:05

## 2021-08-22 RX ADMIN — IPRATROPIUM BROMIDE AND ALBUTEROL SULFATE 1 AMPULE: 2.5; .5 SOLUTION RESPIRATORY (INHALATION) at 07:05

## 2021-08-22 RX ADMIN — MORPHINE SULFATE 4 MG: 4 INJECTION, SOLUTION INTRAMUSCULAR; INTRAVENOUS at 16:52

## 2021-08-22 RX ADMIN — FAMOTIDINE 20 MG: 10 INJECTION, SOLUTION INTRAVENOUS at 07:44

## 2021-08-22 RX ADMIN — IPRATROPIUM BROMIDE AND ALBUTEROL SULFATE 1 AMPULE: 2.5; .5 SOLUTION RESPIRATORY (INHALATION) at 15:20

## 2021-08-22 RX ADMIN — METOCLOPRAMIDE 10 MG: 5 INJECTION, SOLUTION INTRAMUSCULAR; INTRAVENOUS at 05:17

## 2021-08-22 RX ADMIN — MORPHINE SULFATE 4 MG: 4 INJECTION, SOLUTION INTRAMUSCULAR; INTRAVENOUS at 12:38

## 2021-08-22 RX ADMIN — SODIUM CHLORIDE: 4.5 INJECTION, SOLUTION INTRAVENOUS at 10:25

## 2021-08-22 RX ADMIN — METOCLOPRAMIDE 10 MG: 5 INJECTION, SOLUTION INTRAMUSCULAR; INTRAVENOUS at 16:52

## 2021-08-22 RX ADMIN — POTASSIUM CHLORIDE: 2 INJECTION, SOLUTION, CONCENTRATE INTRAVENOUS at 09:29

## 2021-08-22 RX ADMIN — ENOXAPARIN SODIUM 40 MG: 40 INJECTION SUBCUTANEOUS at 07:43

## 2021-08-22 RX ADMIN — MORPHINE SULFATE 4 MG: 4 INJECTION, SOLUTION INTRAMUSCULAR; INTRAVENOUS at 05:24

## 2021-08-22 RX ADMIN — IPRATROPIUM BROMIDE AND ALBUTEROL SULFATE 1 AMPULE: 2.5; .5 SOLUTION RESPIRATORY (INHALATION) at 11:52

## 2021-08-22 RX ADMIN — IPRATROPIUM BROMIDE AND ALBUTEROL SULFATE 1 AMPULE: 2.5; .5 SOLUTION RESPIRATORY (INHALATION) at 20:06

## 2021-08-22 ASSESSMENT — ENCOUNTER SYMPTOMS
ALLERGIC/IMMUNOLOGIC NEGATIVE: 1
BACK PAIN: 1
TROUBLE SWALLOWING: 1
CHEST TIGHTNESS: 0
VOMITING: 0
ABDOMINAL PAIN: 0
PHOTOPHOBIA: 0
EYES NEGATIVE: 1
DIARRHEA: 0
NAUSEA: 1
RESPIRATORY NEGATIVE: 1
NAUSEA: 0
SHORTNESS OF BREATH: 0

## 2021-08-22 ASSESSMENT — PAIN SCALES - GENERAL
PAINLEVEL_OUTOF10: 10
PAINLEVEL_OUTOF10: 10
PAINLEVEL_OUTOF10: 5
PAINLEVEL_OUTOF10: 10
PAINLEVEL_OUTOF10: 5
PAINLEVEL_OUTOF10: 10
PAINLEVEL_OUTOF10: 3

## 2021-08-22 NOTE — PROGRESS NOTES
General Surgery Progress Note  IP Day: 11       Subjective:     24 Hour Events:  Failed bedside swallow eval.     Objective: Allergies   Allergen Reactions    Nickel      Contact dermatitis       Intake/Output last 3 shifts:  I/O last 3 completed shifts: In: 2470.9 [I.V.:2220.9; IV Piggyback:250]  Out: 2125 [Urine:1900; Chest Tube:225]    Vitals:    08/22/21 1122   BP: (!) 153/76   Pulse: 78   Resp: 18   Temp: 98.2 °F (36.8 °C)   SpO2: 90%       Physical Exam  General Appearance: Awake, No Acute Distress  Pulmonary: Unlabored breathing. Clear to auscultation. No expiratory wheezes. CT in place to -20 suction. No airleak but patient is unable to cough  Cardiac: Regular rate, and rhythm. No murmurs.          Laboratory Data:          CBC:  Lab Results   Component Value Date    WBC 12.5 08/22/2021    WBC 11.6 08/21/2021    WBC 11.2 08/20/2021    HGB 8.8 08/22/2021    HGB 9.7 08/21/2021    HGB 9.2 08/20/2021    HCT 26.8 08/22/2021    HCT 30.0 08/21/2021    HCT 27.6 08/20/2021    MCV 97.0 08/22/2021     08/22/2021     08/21/2021     08/20/2021        BMP:    Lab Results   Component Value Date     08/22/2021    K 4.0 08/22/2021     08/22/2021    CO2 26 08/22/2021    BUN 22 08/22/2021    CREATININE 1.01 08/22/2021    GLUCOSE 114 08/22/2021          Cultures: BCX no growth x3d       Medications:   ipratropium-albuterol  1 ampule Inhalation Q4H While awake    tamsulosin  0.4 mg Oral Daily    enoxaparin  40 mg Subcutaneous Daily    vancomycin  1,250 mg Intravenous Q24H    [Held by provider] metoprolol tartrate  12.5 mg Oral BID    insulin glargine  10 Units Subcutaneous Nightly    metoclopramide  10 mg Intravenous Q6H    insulin lispro  0-12 Units Subcutaneous Q6H    vancomycin (VANCOCIN) intermittent dosing (placeholder)   Other RX Placeholder    sodium chloride flush  5-40 mL Intravenous 2 times per day    famotidine (PEPCID) injection  20 mg Intravenous BID    nicotine  1 patch Transdermal Daily    lidocaine  1 patch Transdermal Daily     morphine **OR** morphine, sodium chloride flush, potassium chloride, perflutren lipid microspheres, glucose, dextrose, glucagon (rDNA), dextrose, sodium phosphate IVPB **OR** sodium phosphate IVPB, sodium chloride, sodium chloride flush, sodium chloride, polyethylene glycol, acetaminophen **OR** acetaminophen, magnesium sulfate, ondansetron, LORazepam **OR** LORazepam **OR** LORazepam **OR** LORazepam **OR** LORazepam **OR** LORazepam **OR** LORazepam **OR** LORazepam, albuterol sulfate HFA, sodium chloride flush    Radiology:  XR CHEST PORTABLE    Result Date: 8/22/2021  EXAMINATION: ONE XRAY VIEW OF THE CHEST 8/22/2021 6:43 am COMPARISON: 21 August 2021 HISTORY: ORDERING SYSTEM PROVIDED HISTORY: infiltrate TECHNOLOGIST PROVIDED HISTORY: infiltrate Reason for Exam: infiltrate Acuity: Unknown Type of Exam: Unknown Additional signs and symptoms: infiltrate FINDINGS: AP portable view of the chest time stamped at 549 hours demonstrates overlying cardiac monitoring electrodes. Right PICC line terminates in the right atrium. Pigtail terminus small bore chest tube remains at the right base. Prior right-sided extrapleural air is not redemonstrated. However, there is fluid in that space consistent with loculated effusion. Rounded opacity at the right base is unchanged with surrounding haziness. Vascularity appears increased over prior study with interval development of left perihilar and basilar opacities. Right-sided extrapleural air not demonstrated replaced with fluid. Continued nodular density at the right base. Interval increase in vascularity development of left-sided opacities.          Patient Active Problem List   Diagnosis    [facility] (chronic obstructive [facility] disease) ([address]    Smoking addiction    Depression    Dyslipidemia    Lung nodule    DDD (degenerative disc disease), lumbar    Groin pain, chronic, right    Ilioinguinal neuralgia of right side    Syncope and collapse    History of alcohol abuse    Lumbar radiculopathy    Scoliosis due to degenerative disease of spine in adult patient    Lumbosacral spondylosis without myelopathy    Alcohol abuse    Hypokalemia    Hyponatremia    Traumatic rhabdomyolysis (HCC)    Closed fracture of multiple ribs of right side    Closed fracture of left wrist    Severe malnutrition (HCC)    Fever    Conjunctivitis    Acute respiratory failure (HCC)    Superficial venous thrombosis of arm, left    MRSA bacteremia    Empyema lung (HCC)       Assessment and Plan:       1. MRSA bacteremia with right-sided empyema. Cx pending  2. Abx per ID  3. S/p right-sided chest tube and thoracentesis with resolved PTX. Persistent loculated effusion. May benefit from thoracic surgery eval.  4. Failed bedside swallow. Speech therapy eval and video swallow planned.        - Electronically signed by Laura Toney MD, FACS  at 8/22/2021 11:32 AM
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
## 2021-08-22 NOTE — PROGRESS NOTES
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 changes as documented by ER physician  -Troponin 20, 15  - urinalysis and urine drug screen unremarkable.      ~Traumatic rhabdomyolysis (resolved)  -CK 1,222-> 22, myoglobin 3,509-->2884.      ~Hypokalemia (stable)  -Initial potassium 2.5->3.7-3.4->2.6-> 3.0-> 3.4  -Patient received potassium supplement in the ED  -Recheck potassium this morning  -Potassium sliding scale     ~Acute mild alcoholic hepatitis  - Non reactive Hep A, B and C (2019)  - AST>ALT (95:51) ==> (45:32) ==> 42:27     Hyponatremia   -Initial sodium 127->139>144->147->142  -Daily BMP     Alcohol abuse (resolved)  -VJAZUYR <75  -Thiamine, folic acid, multivitamin daily.  -Seizure and fall precautions  -Consult social work for rehab, discharge planning     ~Multiple right rib fractures (stable)  -CT scan shows Grade 2 chest wall injury with right 4th through 6th anterolateral rib fractures.  Areas of pleural thickening likely areas of focal hemothorax near the rib fractures on the right. No acute inflammatory process within the abdomen pelvis. -Fentanyl as needed  -Consult general surgery: Resume tube feeds following procedures. Surgically stable.     ~Closed left wrist fracture (stable)  -Velcro wrist splint in place  - left wrist was warm and swollen  - Hand is elevated. - left wrist X-ray: Subacute impacted fracture at the distal metaphysis of the left radius, wit visualized fracture line and partial healing of the fracture, grossly stable. -Worsening swelling. VL dup US upper extremity.         `COPD (stable)  -SPO2 96%  (intubated on 8/13 due to resp. failure)  -Albuterol as needed  -Spiriva daily  -Ellipta daily     Smoking (stable)  -Nicotine patch     GI prophylaxis-Pepcid 20 mg IV twice daily. DVT prophylaxis--Lovenox 40 mg once daily   Diet: TPN   Disposition: Transfer to PCU. Diet: Awaiting SLP eval and treat. Arcadio Glez MD  8/22/2021  7:44 AM     Attending Physician Statement  I have discussed the care of Amnol Monreal and I have examined the patient myselft and taken ros and hpi , including pertinent history and exam findings,  with the resident. I have reviewed the key elements of all parts of the encounter with the resident. I agree with the assessment, plan and orders as documented by the resident.     Ct spine no osteomylitis  dvt scan  Chest tube draining puss  Ct surg consult      Electronically signed by Albert Ramirez MD

## 2021-08-22 NOTE — PROGRESS NOTES
[facility]   Progress Note                   Date:   8/22/2021  Patient name: Yeyo Grace  Date of admission:  8/10/2021  8:30 PM  MRN:   [id number]  YOB: 1965  PCP: Sridevi Roberts MD    Reason for Admission: Syncope and collapse [R55]  Hypokalemia [E87.6]  Hyponatremia [E87.1]  Traumatic rhabdomyolysis, initial encounter ([facility] Kip Cruzkim. 6XXA]  Closed fracture of multiple ribs of right side, initial encounter [S22.41XA]    Subjective:       Clinical Changes / Abnormalities: No new issues. Stable, feels much better    Medications:   Scheduled Meds:   ipratropium-albuterol  1 ampule Inhalation Q4H While awake    tamsulosin  0.4 mg Oral Daily    enoxaparin  40 mg Subcutaneous Daily    vancomycin  1,250 mg Intravenous Q24H    [Held by provider] metoprolol tartrate  12.5 mg Oral BID    insulin glargine  10 Units Subcutaneous Nightly    metoclopramide  10 mg Intravenous Q6H    insulin lispro  0-12 Units Subcutaneous Q6H    vancomycin (VANCOCIN) intermittent dosing (placeholder)   Other RX Placeholder    sodium chloride flush  5-40 mL Intravenous 2 times per day    famotidine (PEPCID) injection  20 mg Intravenous BID    nicotine  1 patch Transdermal Daily    lidocaine  1 patch Transdermal Daily     Continuous Infusions:   sodium chloride 50 mL/hr at 08/22/21 1025    dextrose      sodium chloride      sodium chloride 25 mL (08/19/21 1717)     CBC:   Recent Labs     08/20/21  0405 08/20/21  0405 08/20/21  1448 08/21/21  0440 08/22/21  0412   WBC 11.2*  --   --  11.6* 12.5*   HGB 8.7*   < > 9.2* 9.7* 8.8*     --   --  386 409    < > = values in this interval not displayed.      BMP:    Recent Labs     08/20/21  0405 08/20/21  1448 08/21/21  0440 08/21/21  0440 08/21/21  1234 08/22/21  0009 08/22/21  0412   *   < > 148*   < > 145* 141 141   K 3.0*   < > 3.4*   < > 3.5* 4.1 4.0   *   < > 114*   < > 111* 111* 109*   CO2 29   < > 28   < > 27 26 26   BUN 41* --  32*  --   --   --  22*   CREATININE 1.13  --  1.11  --   --   --  1.01   GLUCOSE 105*  --  85  --   --   --  114*    < > = values in this interval not displayed. Hepatic:   Recent Labs     08/20/21  0405 08/21/21  0440 08/22/21  0412   AST 23 19 15   ALT 16 17 16   BILITOT 0.36 0.47 0.74   ALKPHOS 75 68 77     Troponin: No results for input(s): TROPONINI in the last 72 hours. BNP: No results for input(s): BNP in the last 72 hours. Lipids: No results for input(s): CHOL, HDL in the last 72 hours. Invalid input(s): LDLCALCU  INR: No results for input(s): INR in the last 72 hours. Objective:   Vitals: BP (!) 153/76   Pulse 78   Temp 98.2 °F (36.8 °C) (Oral)   Resp 18   Ht 5' 9\" (1.753 m)   Wt 157 lb 6.5 oz (71.4 kg)   SpO2 91%   BMI 23.25 kg/m²   General appearance: alert and cooperative with exam  HEENT: Head: Normocephalic, no lesions, without obvious abnormality. Eyes: PERRL, EOMI  Ears: Not obvious deformations or lack of hearing  Neck: no carotid bruit, no JVD  Lungs: clear to auscultation bilaterally  Heart: regular rate and rhythm, S1, S2 normal, no murmur, click, rub or gallop  Abdomen: soft, non-tender; bowel sounds normal; no masses,  no organomegaly  Extremities: extremities normal, atraumatic, no cyanosis or edema  Neurologic: Mental status: Alert, oriented, thought content appropriate  Skin: WNL for age and condition, no obvious rashes or leasions      TTE 8/16/21  Summary  Patient supine, on ventilator. Left ventricle is normal in size. Estimated LV EF 35 %. Mild left ventricular hypertrophy. Normal right ventricular size and function. No significant valvular regurgitation or stenosis seen. Aortic root is mildly dilated. (4.1 cm)  IVC Increased diameter and impaired or no inspiratory variation. No significant pericardial effusion is seen.       Patient Active Problem List:     COPD (chronic obstructive pulmonary disease) ([address]     Smoking addiction     Depression Dyslipidemia     Lung nodule     DDD (degenerative disc disease), lumbar     Groin pain, chronic, right     Ilioinguinal neuralgia of right side     Syncope and collapse     History of alcohol abuse     Lumbar radiculopathy     Scoliosis due to degenerative disease of spine in adult patient     Lumbosacral spondylosis without myelopathy     Alcohol abuse     Hypokalemia     Hyponatremia     Traumatic rhabdomyolysis (HCC)     Closed fracture of multiple ribs of right side     Closed fracture of left wrist     Severe malnutrition (HCC)     Fever     Conjunctivitis     Acute respiratory failure (HCC)     Superficial venous thrombosis of arm, left     MRSA bacteremia      Assessment & Plan:     Acute Systolic CHF, HFrEF- Due to ETOH. Newly reduced LVEF 35%. EF on ERICKA 55%- improved, may have been due to excessive EtOH. Optimize emds  AMS, ETOH, Withdrawals  Acute Resp failure- on vent  Bacteremia  Pleural effusion- s/p Thora on 8/17/21- now with PTX  PNA  Hypernatremia  Hypokalemia  Anemia  Cardiomyopathy work up- can be done as OP due to infection issues at present  ID on board- on antibiotics  Will follow peripherally, please call back if needed. Thank you for allowing me to participate in the care of this patient, please do not hesitate to call if you have any questions. Juan Aleman, [id number] [facility]  [facility]. [facility]  [phone]

## 2021-08-22 NOTE — PROGRESS NOTES
Vancomycin Day: 9    Patient's labs, cultures, vitals, and vancomycin regimen reviewed. No changes today.    Plan Level at [facility], [facility], [place]   8/22/2021  11:19 AM

## 2021-08-22 NOTE — PLAN OF CARE
Problem: Falls - Risk of:  Goal: Will remain free from falls  Description: Will remain free from falls  8/22/2021 0311 by Archie Sparks RN  Outcome: Ongoing  Note: Bed remains in lowest position, call light within reach. Patient remains free from falls at this time. Goal: Absence of physical injury  Description: Absence of physical injury  8/22/2021 1382 by Archie Sparks RN  Outcome: Ongoing  Note: Fall assessment performed and appropriate measures implemented. Room freed from clutter. Bed in lowest position with wheels locked. Call light in place. ID band in place. Problem: Skin Integrity:  Goal: Will show no infection signs and symptoms  Description: Will show no infection signs and symptoms  8/22/2021 0311 by Archie Sparks RN  Outcome: Ongoing  Note: Patient has low grade fevers. On antibiotics. No other signs of infection noted. Goal: Absence of new skin breakdown  Description: Absence of new skin breakdown  8/22/2021 0311 by Archie Sparks RN  Outcome: Ongoing  Note: Patient turned and repositioned every two hours. No signs of breakdown noted. Problem: Nutrition  Goal: Optimal nutrition therapy  8/22/2021 0311 by Archie Sparks RN  Outcome: Ongoing  Note: Patient NPO due to failing swallow studies. Speech therapy to see patient today. Problem: OXYGENATION/RESPIRATORY FUNCTION  Goal: Patient will maintain patent airway  8/22/2021 0311 by Archie Sparks RN  Outcome: Ongoing  Note: Patient extubated yesterday. Encouraged to cough and take deep breaths. Able to clear airway. Goal: Patient will achieve/maintain normal respiratory rate/effort  Description: Respiratory rate and effort will be within normal limits for the patient  8/22/2021 1962 by Archie Sparks RN  Outcome: Ongoing  Note: RR remains within normal limits.       Problem: MECHANICAL VENTILATION  Goal: Patient will maintain patent airway  8/22/2021 0311 by Archie Sparks RN  Outcome: Ongoing  Goal: ET tube will be managed safely  8/22/2021 0311 by Debra Malik RN  Outcome: Ongoing     Problem: Pain:  Goal: Pain level will decrease  Description: Pain level will decrease  8/22/2021 0311 by Debra Malik RN  Outcome: Ongoing  Goal: Recognizes and communicates pain  Description: Recognizes and communicates pain  8/22/2021 0311 by Debra Malik RN  Outcome: Ongoing  Goal: Control of acute pain  Description: Control of acute pain  8/22/2021 0311 by Debra Malik RN  Outcome: Ongoing  Note: Pain assessed at regular intervals. Pain medication given as shown in [facility] Goal: Control of chronic pain  Description: Control of chronic pain  8/22/2021 0311 by Debra Malik RN     Problem: Confusion - Acute:  Goal: Absence of continued neurological deterioration signs and symptoms  Description: Absence of continued neurological deterioration signs and symptoms  8/22/2021 0311 by Debra Malik RN  Outcome: Ongoing  Goal: Mental status will be restored to baseline  Description: Mental status will be restored to baseline  8/22/2021 0311 by Debra Malik RN  Outcome: Ongoing     Problem: Discharge Planning:  Goal: Ability to perform activities of daily living will improve  Description: Ability to perform activities of daily living will improve  8/22/2021 0311 by Debra Malik RN  Outcome: Ongoing  Goal: Participates in care planning  Description: Participates in care planning  8/22/2021 0311 by Debra Malik RN  Outcome: Ongoing     Problem: Injury - Risk of, Physical Injury:  Goal: Will remain free from falls  Description: Will remain free from falls  8/22/2021 0311 by Debra Malik RN  Outcome: Ongoing  Note: Bed remains in lowest position, call light within reach. Patient remains free of falls at this time. RN will continue to monitor. Goal: Absence of physical injury  Description: Absence of physical injury  8/22/2021 9596 by Debra Malik RN  Outcome: Ongoing  Note: Fall assessment performed and appropriate measures implemented. Room freed from clutter.  Bed in lowest position with wheels locked. Call light in place. ID band in place.        Problem: Mood - Altered:  Goal: Mood stable  Description: Mood stable  8/22/2021 0311 by Esther Perales RN  Outcome: Ongoing  Goal: Absence of abusive behavior  Description: Absence of abusive behavior  8/22/2021 0311 by Esther Perales RN  Outcome: Ongoing  Goal: Verbalizations of feeling emotionally comfortable while being cared for will increase  Description: Verbalizations of feeling emotionally comfortable while being cared for will increase  8/22/2021 0311 by Esther Perales RN  Outcome: Ongoing     Problem: Psychomotor Activity - Altered:  Goal: Absence of psychomotor disturbance signs and symptoms  Description: Absence of psychomotor disturbance signs and symptoms  8/22/2021 0311 by Esther Perales RN  Outcome: Ongoing     Problem: Sensory Perception - Impaired:  Goal: Demonstrations of improved sensory functioning will increase  Description: Demonstrations of improved sensory functioning will increase  8/22/2021 0311 by Esther Perales RN  Outcome: Ongoing  Goal: Decrease in sensory misperception frequency  Description: Decrease in sensory misperception frequency  8/22/2021 0311 by Esther Perales RN  Outcome: Ongoing  Goal: Able to refrain from responding to false sensory perceptions  Description: Able to refrain from responding to false sensory perceptions  8/22/2021 0311 by Esther Perales RN  Outcome: Ongoing  Goal: Demonstrates accurate environmental perceptions  Description: Demonstrates accurate environmental perceptions  8/22/2021 0311 by Esther Perales RN  Outcome: Ongoing  Goal: Able to distinguish between reality-based and nonreality-based thinking  Description: Able to distinguish between reality-based and nonreality-based thinking  8/22/2021 0311 by Esther Perales RN  Outcome: Ongoing  Goal: Able to interrupt nonreality-based thinking  Description: Able to interrupt nonreality-based thinking  8/22/2021 0311 by Esther Perales RN  Outcome: Ongoing     Problem: Sleep Pattern Disturbance:  Goal: Appears well-rested  Description: Appears well-rested  8/22/2021 0311 by Demarco Lacey RN  Outcome: Ongoing     Problem: Pain:  Goal: Pain level will decrease  Description: Pain level will decrease  8/22/2021 0311 by Demarco Lacey RN  Outcome: Ongoing  Goal: Control of acute pain  Description: Control of acute pain  8/22/2021 0311 by Demarco Lacey RN  Outcome: Ongoing  Goal: Control of chronic pain  Description: Control of chronic pain  8/22/2021 0311 by Demarco Lacey RN  Outcome: Ongoing

## 2021-08-22 NOTE — PROGRESS NOTES
Pulmonary Critical Care Progress Note  Jane Santana MD     Patient seen for the follow up of acute hypoxic respiratory failure, pneumonia, right pneumothorax, EtOH withdrawal, Syncope and collapse     Subjective:  Patient was successfully extubated yesterday, 8/21/2021. Patient failed bedside swallow test.  He has right-sided pleuritic type chest pain. He denies any significant cough. He denies any shortness of breath. He is mildly confused. He never needed Precedex drip. Examination:  Vitals: BP (!) 123/55   Pulse 71   Temp 97.6 °F (36.4 °C)   Resp 27   Ht 5' 9\" (1.753 m)   Wt 157 lb 6.5 oz (71.4 kg)   SpO2 92%   BMI 23.25 kg/m²   General appearance:  alert and cooperative with exam, intubated   Neck: No JVD  Lungs: Moderate air exchange, no wheezing  Heart: regular rate and rhythm, S1, S2 normal, no gallop  Abdomen: Soft, non tender, + BS  Extremities: no cyanosis or clubbing. No significant edema    LABs:  CBC:   Recent Labs     08/21/21 0440 08/22/21 0412   WBC 11.6* 12.5*   HGB 9.7* 8.8*   HCT 30.0* 26.8*    409     BMP:   Recent Labs     08/21/21  0440 08/21/21  1234 08/22/21  0009 08/22/21  0412   *   < > 141 141   K 3.4*   < > 4.1 4.0   CO2 28   < > 26 26   BUN 32*  --   --  22*   CREATININE 1.11  --   --  1.01   LABGLOM >60  --   --  >60   GLUCOSE 85  --   --  114*    < > = values in this interval not displayed.      LIVER PROFILE:  Recent Labs     08/21/21  0440 08/22/21  0412   AST 19 15   ALT 17 16   LABALBU 2.2* 2.0*     ABG:  Lab Results   Component Value Date    PHART 7.500 08/21/2021    PZZ8NRE 39.2 08/21/2021    PO2ART 75.7 08/21/2021    BIP9RXA 30.6 08/21/2021    E0EEAPAY 94.5 08/21/2021    FIO2 40 08/21/2021     Radiology:  X-ray chest: 8/22/2021  Improving right pneumothorax/pigtail catheter placement      Impression:  · Acute hypoxic respiratory failure  · Right empyema  · Right pneumothorax, s/p chest tube placement  · EtOH withdrawal  · Atelectasis  · Right lower lobe infiltrate/pneumonia  · Hypotension   · Hyponatremia    Recommendations:  · Oxygen by nasal cannula at 2 L/min  · Patient failed bedside swallow test.  Keep patient n.p.o. and to speech evaluation/video swallow   · Monitor sodium  · Continue IV antibiotics, vancomycin  · Finished IV hydrocortisone  · Albuterol and Ipratropium Q 4 hours and prn  · Symbicort 160/4.5  · X-ray chest in am  · Labs: CBC and BMP in am  · Nicotine patch  · Discontinue Baker catheter  · DVT prophylaxis with SQ Lovenox  · OK to move to step down unit from pulmonary stand point  · Discussed with RN  · Will follow with you    Jamar Calzada MD, [facility]  Pulmonary Critical Care and Sleep Medicine,  [facility]  Cell: [phone]  Office: [phone]

## 2021-08-22 NOTE — PROGRESS NOTES
Nephrology Progress Note    Reason for consultation: Management of acute kidney injury. Consulting physician: Lori Wang MD.    Interval history: Patient was seen and examined today and he appears comfortable on oxygen 2 L via nasal cannula. He was extubated successfully on 8/21/2021 he complains of right-sided pleuritic chest pain. .  Patient had ERICKA 8/20/2021 with no findings of vegetations. He is s/p left thoracentesis of 1.1L isolating staph aureus. Yfn Gonzales History of Present Illness: This is a 60 y.o. male with history of alcohol abuse, COPD, Hypertension who presented on 8/10/2021 with syncope and collapse. He was witnessed walking on the sidewalk and he collapsed all of a sudden. Bystanders called 911 and patient was evaluated in the ER. He had presented with pain in the right chest wall and abdomen. He initially received chest abdomen CT with IV contrast on 8/10/2021 showing grade 2 chest wall injury with right fourth through 6 anterior lateral rib fractures. Patient was initially placed on alcohol withdrawal protocol. Patient  developed respiratory distress and was placed on BiPAP and subsequently intubated on 8/13/2021 for airway protection. Chest x-ray was suggestive of bilateral pleural effusion right greater than left with significant right-sided pleural effusion. Patient is scheduled for thoracentesis today. Blood cultures are growing MRSA. Yfn Gonzales Patient is receiving IV vancomycin. He was started on Lovenox for DVT of the left upper extremity. Nephrology is consulted for acute kidney injury. Serum creatinine was 0.54 mg/dl on 8/14/2021 and progressively worsened to 1.27 on 8/16/2021 and 1.38 today with BUN of 40 mg/dl. He did receive IV Lasix 40 mg x 1 yesterday as patient is fluid overloaded. Echocardiogram shows a EF of 35% with increased IVC diameter. He has had good urine output of 3.3 L over the  last 24 hours after IV Lasix.  Patient is however 13 L positive balance since admission. Objective/     Vitals:    08/22/21 0700 08/22/21 0707 08/22/21 0716 08/22/21 0800   BP: (!) 116/53   (!) 123/55   Pulse: 66   71   Resp: 25 21 22 27   Temp:    97.6 °F (36.4 °C)   TempSrc:       SpO2:  93% 94% 92%   Weight:       Height:         24HR INTAKE/OUTPUT:      Intake/Output Summary (Last 24 hours) at 8/22/2021 1012  Last data filed at 8/22/2021 9273  Gross per 24 hour   Intake 2470.89 ml   Output 3175 ml   Net -704.11 ml     No data found. Constitutional: Awake and responds to verbal stimuli; intubated and on ventilator support. Cardiovascular:  S1, S2 without pericardial rub or gallop. Respiratory: Clinically clear. Abdomen: Full, soft with normal bowel sounds. Extremities:  LE edema    Data/  Recent Labs     08/20/21  0405 08/20/21  0405 08/20/21  1448 08/21/21  0440 08/22/21  0412   WBC 11.2*  --   --  11.6* 12.5*   HGB 8.7*   < > 9.2* 9.7* 8.8*   HCT 26.4*   < > 27.6* 30.0* 26.8*   MCV 96.8  --   --  99.2 97.0     --   --  386 409    < > = values in this interval not displayed. Recent Labs     08/20/21  0405 08/20/21  1448 08/21/21  0440 08/21/21  0440 08/21/21  1234 08/22/21  0009 08/22/21  0412   *   < > 148*   < > 145* 141 141   K 3.0*   < > 3.4*   < > 3.5* 4.1 4.0   *   < > 114*   < > 111* 111* 109*   CO2 29   < > 28   < > 27 26 26   GLUCOSE 105*  --  85  --   --   --  114*   MG 2.5  --  2.2  --   --   --  2.2   BUN 41*  --  32*  --   --   --  22*   CREATININE 1.13  --  1.11  --   --   --  1.01   LABGLOM >60  --  >60  --   --   --  >60   GFRAA >60  --  >60  --   --   --  >60    < > = values in this interval not displayed. Assessment/plan:     1. Acute kidney injury - nonoliguric secondary to decreased effective arterial volume, severe hypoalbuminemia and sepsis. No evidence of acute rhabdomyolysis. Renal ultrasound showed no hydronephrosis or obstruction. Serologic studies are negative with negative ASTRID and normal complements.   Continue to monitor urine output closely. Avoid nephrotoxic agents.     2. Hypokalemia - secondary to diuretic therapy. Resolved.     3. MRSA bacteremia -patient is on IV vancomycin. Monitor Vanco trough levels for  potential nephrotoxicity.     4. Hypernatremia - Serum sodium is improved to 141 mmol/L today on IV fluid D5 water at 100 mL/h. We will change IV fluid to 0.45 normal saline at 50 mL/h.     5. Large right-sided parapneumonic pleural effusion, possibly loculated on chest CT-S/P  Left  thoracentesis 8/17/21.     Prognosis is guarded     Reshma Chavez FACP  Attending Clinical Nephrologist

## 2021-08-22 NOTE — PROGRESS NOTES
[facility] -   Infectious diseases evaluation  admission date 8/10/2021    reason for consultation:   MRSA bacteremia    Impression :   Current:  · MRSA bacteremia suspect pulmonary source of infection  · Left hand and wrist cellulitis  · Elevated CRP  · Leukocytosis  · Left wrist fracture  · Right pleural effusion /empyema status post thoracentesis with MRSA growth on culture. · Pneumothorax  · Superficial vein thrombophlebitis left arm  · Syncopal episode  · Alcohol withdrawal  · Acute respiratory failure requiring intubation  · Rib fracture with possible hemothorax    Other:  · History of alcohol abuse  · COPD  · History of depression  · History of seizure  · History of hypertension. Recommendations   · Continue Vancomycin IV, pharmacy to dose. · Follow repeat BC from 8/19/21. · Follow CBC and renal function closely. · No osteomyelitis/discitis per CT thoracic, lumbar spine. · Echocardiogram , no reported vegetations  · Discussed with patient. History of Present Illness:   Initial history:  Mesha Mcghee is a 60y.o.-year-old male presented to the hospital on 8/10/2021 after a syncopal episode, was complaining of pain to the right chest wall and abdomen. Reported sharp, constant pain aggravated by movement with no alleviating factors. The patient was previously evaluated at the ER 8/7/2021 after a fall found to have multiple rib fractures and a left wrist closed fracture. Tox screen positive for cannabinoids. CT head negative for acute intracranial abnormality. CT chest/abdomen/pelvis showed rib fractures with associated focal hemothorax. No acute processes in abdomen or pelvis. Right arm PICC line was placed 8/12/2021  He was placed on alcohol withdrawal protocol, developed respiratory distress was placed on BiPAP initially then was intubated 4/13/21.   Baker catheter was placed on 4/13/2021  COVID-19 rapid test was negative  The patient was on Levaquin 2021 and 2021 that was discontinued and started on vancomycin and cefepime. He had a fever with a temperature max of 100.6 on 2021. Blood cultures 2021 grew MRSA as well as sputum culture. Chest x-ray 2021 showed bilateral airspace disease right greater than left. CT chest without contrast 2021 showed increased right pleural effusion with an area of loculated fluid status post thoracentesis with MRSA growth on culture    Interval changes  2021   Afebrile. C/o back pain. C/o some nausea. No vomiting, diarrhea. R chest tube with serous drainage. No air leak , crepitus. RUE PICC site clean. Labs:  Cre: 1.13-1.11-1.01  CRP: 140  WBC: 11.2-11.6-12.5    Micro:   Sputum Cx- MRSA   BC x 2-MRSA.  BC x2-MRSA.  Thoracentesis Fluid-MRSA.  BC x 2-Negative to date. Imagin/21 CT Thoracic Spine-No evidence of osteomyelitis/discitis. Multiple loculated pleural effusions on the right. Chest tube is present. Heterogeneous enhancement of the kidneys, mild pelvic free fluid.  CT Lumbar spine-No CT evidence of osteomyelitis/discitis. Patient Vitals for the past 8 hrs:   BP Temp Temp src Pulse Resp SpO2   21 1154 -- -- -- -- -- 91 %   21 1122 (!) 153/76 98.2 °F (36.8 °C) Oral 78 18 90 %   21 1100 (!) 127/52 -- -- 73 17 93 %   21 1000 (!) 134/53 -- -- 71 26 93 %   21 0900 (!) 116/49 -- -- 65 22 93 %   21 0800 (!) 123/55 97.6 °F (36.4 °C) -- 71 27 92 %   21 0716 -- -- -- -- 22 94 %   21 0707 -- -- -- -- 21 93 %   21 0700 (!) 116/53 -- --  --           I have personally reviewed the past medical history, past surgical history, medications, social history, and family history, and I haveupdated the database accordingly. Allergies:   Nickel     Review of Systems:     Review of Systems   Constitutional: Positive for appetite change. Negative for chills and fever. HENT: Negative. Eyes: Negative. Respiratory: Negative. Cardiovascular: Negative. Gastrointestinal: Positive for nausea. Negative for diarrhea and vomiting. Endocrine: Negative. Genitourinary: Negative. Musculoskeletal: Positive for back pain. Skin: Negative. Allergic/Immunologic: Negative. Neurological: Negative. Hematological: Negative. Psychiatric/Behavioral: Negative. Physical Examination :       Physical Exam  Vitals and nursing note reviewed. Constitutional:       General: He is not in acute distress. Appearance: Normal appearance. HENT:      Head: Normocephalic and atraumatic. Right Ear: External ear normal.      Left Ear: External ear normal.      Nose: Nose normal.      Mouth/Throat:      Mouth: Mucous membranes are moist.      Pharynx: Oropharynx is clear. Eyes:      General:         Right eye: No discharge. Left eye: No discharge. Extraocular Movements: Extraocular movements intact. Conjunctiva/sclera: Conjunctivae normal.      Pupils: Pupils are equal, round, and reactive to light. Cardiovascular:      Rate and Rhythm: Normal rate and regular rhythm. Pulses: Normal pulses. Heart sounds: Normal heart sounds. No murmur heard. Pulmonary:      Effort: Pulmonary effort is normal.      Breath sounds: Normal breath sounds. No wheezing or rhonchi. Abdominal:      General: Abdomen is flat. Bowel sounds are normal. There is no distension. Palpations: Abdomen is soft. Genitourinary:     Comments: No hair. Musculoskeletal:      Cervical back: Neck supple. Right lower leg: Edema present. Left lower leg: Edema present. Skin:     General: Skin is warm and dry. Capillary Refill: Capillary refill takes less than 2 seconds. Coloration: Skin is not jaundiced. Neurological:      Mental Status: He is alert and oriented to person, place, and time.    Psychiatric:         Mood and Affect: Mood normal.         Behavior: Behavior normal.         Past Medical History:     Past Medical History:   Diagnosis Date    Alcohol abuse 6/4/2014    Anxiety     Arthritis     COPD (chronic obstructive pulmonary disease) ([facility] [address]     ASTHMA    DDD (degenerative disc disease), lumbar 9/6/2016    Depression     Heart burn     Hemorrhoids     HTN (hypertension) 1/19/2015    Ilioinguinal neuralgia of right side 4/10/2017    Psychiatric problem     depression /anxiety    Seizures ([address]     withdrawal from alcohol 2 years ago    Wears glasses     READING       Past Surgical  History:     Past Surgical History:   Procedure Laterality Date    ANESTHESIA NERVE BLOCK Right 4/10/2017    NERVE BLOCK US RIGHT SIDED ILIOINGUINAL performed by Jose Alejandro Del Valle MD at [address] [place] [address]  3/19/15    HERNIA REPAIR      IR CHEST TUBE INSERTION  8/20/2021    IR CHEST TUBE INSERTION 8/20/2021 [facility] PROCED[facility]    NERVE BLOCK Right 10/10/2016    right groin    OTHER SURGICAL HISTORY Right 04/10/2017    US nerve block to R groin    TOE SURGERY      SCREW RIGHT BIG TOE       Medications:      ipratropium-albuterol  1 ampule Inhalation Q4H While awake    tamsulosin  0.4 mg Oral Daily    enoxaparin  40 mg Subcutaneous Daily    vancomycin  1,250 mg Intravenous Q24H    [Held by provider] metoprolol tartrate  12.5 mg Oral BID    insulin glargine  10 Units Subcutaneous Nightly    metoclopramide  10 mg Intravenous Q6H    insulin lispro  0-12 Units Subcutaneous Q6H    vancomycin (VANCOCIN) intermittent dosing (placeholder)   Other RX Placeholder    sodium chloride flush  5-40 mL Intravenous 2 times per day    famotidine (PEPCID) injection  20 mg Intravenous BID    nicotine  1 patch Transdermal Daily    lidocaine  1 patch Transdermal Daily       Social History:     Social History     Socioeconomic History    Marital status:      Spouse name: Not on file    Number of children: Not on file    Years of education: Not on file   [employer] Highest education level: Not on file   Occupational History    Not on file   Tobacco Use    Smoking status: Current Every Day Smoker     Packs/day: 1.00     Years: 38.00     Pack years: 38.00     Types: Cigarettes    Smokeless tobacco: Never Used    Tobacco comment: 1 PPD   Vaping Use    Vaping Use: Never used   Substance and Sexual Activity    Alcohol use: Yes     Alcohol/week: 12.0 standard drinks     Types: 12 Cans of beer per week     Comment: 12 24 oz cans daily    Drug use: Yes     Types: Marijuana     Comment: crack occasionally    Sexual activity: Never   Other Topics Concern    Not on file   Social History Narrative    Not on file     Social Determinants of Health     Financial Resource Strain:     Difficulty of Paying Living Expenses:    Food Insecurity:     Worried About Running Out of Food in the Last Year:     Ran Out of Food in the Last Year:    Transportation Needs:     Lack of Transportation (Medical):      Lack of Transportation (Non-Medical):    Physical Activity:     Days of Exercise per Week:     Minutes of Exercise per Session:    Stress:     Feeling of Stress :    Social Connections:     Frequency of Communication with Friends and Family:     Frequency of Social Gatherings with Friends and Family:     Attends Holiness Services:     Active Member of Clubs or Organizations:     Attends Club or Organization Meetings:     Marital Status:    Intimate Partner Violence:     Fear of Current or Ex-Partner:     Emotionally Abused:     Physically Abused:     Sexually Abused:        Family History:     Family History   Problem Relation Age of Onset    Cancer Mother         colon ca      Medical Decision Making:   I have independently reviewed/ordered the following labs:    CBC with Differential:   Recent Labs     08/21/21  0440 08/22/21  0412   WBC 11.6* 12.5*   HGB 9.7* 8.8*   HCT 30.0* 26.8*    409   LYMPHOPCT  --  8*   MONOPCT  --  4     BMP:  Recent Labs 08/21/21  0440 08/21/21  1234 08/22/21  0009 08/22/21  0412   *   < > 141 141   K 3.4*   < > 4.1 4.0   *   < > 111* 109*   CO2 28   < > 26 26   BUN 32*  --   --  22*   CREATININE 1.11  --   --  1.01   MG 2.2  --   --  2.2    < > = values in this interval not displayed. Hepatic Function Panel:   Recent Labs     08/21/21  0440 08/22/21 0412   PROT 4.9* 5.1*   LABALBU 2.2* 2.0*   BILITOT 0.47 0.74   ALKPHOS 68 77   ALT 17 16   AST 19 15     No results for input(s): RPR in the last 72 hours. No results for input(s): HIV in the last 72 hours. No results for input(s): BC in the last 72 hours. Lab Results   Component Value Date    CREATININE 1.01 08/22/2021    GLUCOSE 114 08/22/2021       Detailed results: Thank you for allowing us to participate in the care of this patient. Please call with questions. This note is created with the assistance of a speech recognition program.  While intending to generate adocument that actually reflects the content of the visit, the document can still have some errors including those of syntax and sound a like substitutions which may escape proof reading. It such instances, actual meaningcan be extrapolated by contextual diversion.     DONNY Espinoza - REINALDO  Office: [phone]  Perfect serve / office [phone]

## 2021-08-23 ENCOUNTER — APPOINTMENT (OUTPATIENT)
Dept: GENERAL RADIOLOGY | Age: 56
DRG: 351 | End: 2021-08-23
Payer: MEDICAID

## 2021-08-23 ENCOUNTER — APPOINTMENT (OUTPATIENT)
Dept: INTERVENTIONAL RADIOLOGY/VASCULAR | Age: 56
DRG: 351 | End: 2021-08-23
Payer: MEDICAID

## 2021-08-23 ENCOUNTER — APPOINTMENT (OUTPATIENT)
Dept: CT IMAGING | Age: 56
DRG: 351 | End: 2021-08-23
Payer: MEDICAID

## 2021-08-23 LAB
ABSOLUTE EOS #: 0.2 K/UL (ref 0–0.4)
ABSOLUTE IMMATURE GRANULOCYTE: ABNORMAL K/UL (ref 0–0.3)
ABSOLUTE LYMPH #: 1 K/UL (ref 1–4.8)
ABSOLUTE MONO #: 0.7 K/UL (ref 0.1–1.3)
ALBUMIN SERPL-MCNC: 2.2 G/DL (ref 3.5–5.2)
ALBUMIN/GLOBULIN RATIO: ABNORMAL (ref 1–2.5)
ALP BLD-CCNC: 71 U/L (ref 40–129)
ALT SERPL-CCNC: 13 U/L (ref 5–41)
ANION GAP SERPL CALCULATED.3IONS-SCNC: 10 MMOL/L (ref 9–17)
ANION GAP SERPL CALCULATED.3IONS-SCNC: 11 MMOL/L (ref 9–17)
ANION GAP SERPL CALCULATED.3IONS-SCNC: 9 MMOL/L (ref 9–17)
AST SERPL-CCNC: 15 U/L
BASOPHILS # BLD: 0 % (ref 0–2)
BASOPHILS ABSOLUTE: 0 K/UL (ref 0–0.2)
BILIRUB SERPL-MCNC: 0.83 MG/DL (ref 0.3–1.2)
BUN BLDV-MCNC: 17 MG/DL (ref 6–20)
BUN/CREAT BLD: ABNORMAL (ref 9–20)
CALCIUM SERPL-MCNC: 7.8 MG/DL (ref 8.6–10.4)
CHLORIDE BLD-SCNC: 102 MMOL/L (ref 98–107)
CHLORIDE BLD-SCNC: 103 MMOL/L (ref 98–107)
CHLORIDE BLD-SCNC: 108 MMOL/L (ref 98–107)
CO2: 25 MMOL/L (ref 20–31)
CREAT SERPL-MCNC: 1.02 MG/DL (ref 0.7–1.2)
DIFFERENTIAL TYPE: ABNORMAL
EOSINOPHILS RELATIVE PERCENT: 2 % (ref 0–4)
GFR AFRICAN AMERICAN: >60 ML/MIN
GFR NON-AFRICAN AMERICAN: >60 ML/MIN
GFR SERPL CREATININE-BSD FRML MDRD: ABNORMAL ML/MIN/{1.73_M2}
GFR SERPL CREATININE-BSD FRML MDRD: ABNORMAL ML/MIN/{1.73_M2}
GLUCOSE BLD-MCNC: 136 MG/DL (ref 75–110)
GLUCOSE BLD-MCNC: 69 MG/DL (ref 75–110)
GLUCOSE BLD-MCNC: 71 MG/DL (ref 75–110)
GLUCOSE BLD-MCNC: 72 MG/DL (ref 75–110)
GLUCOSE BLD-MCNC: 81 MG/DL (ref 70–99)
GLUCOSE, FLUID: 92 MG/DL
HCT VFR BLD CALC: 28.1 % (ref 41–53)
HEMOGLOBIN: 9.4 G/DL (ref 13.5–17.5)
IMMATURE GRANULOCYTES: ABNORMAL %
LACTATE DEHYDROGENASE, FLUID: 82 U/L
LYMPHOCYTES # BLD: 8 % (ref 24–44)
MAGNESIUM: 2 MG/DL (ref 1.6–2.6)
MCH RBC QN AUTO: 32.6 PG (ref 26–34)
MCHC RBC AUTO-ENTMCNC: 33.5 G/DL (ref 31–37)
MCV RBC AUTO: 97.3 FL (ref 80–100)
MONOCYTES # BLD: 6 % (ref 1–7)
NRBC AUTOMATED: ABNORMAL PER 100 WBC
PDW BLD-RTO: 14.3 % (ref 11.5–14.9)
PH FLUID: 8
PLATELET # BLD: 405 K/UL (ref 150–450)
PLATELET ESTIMATE: ABNORMAL
PMV BLD AUTO: 7.7 FL (ref 6–12)
POTASSIUM SERPL-SCNC: 3.6 MMOL/L (ref 3.7–5.3)
POTASSIUM SERPL-SCNC: 3.7 MMOL/L (ref 3.7–5.3)
POTASSIUM SERPL-SCNC: 3.8 MMOL/L (ref 3.7–5.3)
RBC # BLD: 2.88 M/UL (ref 4.5–5.9)
RBC # BLD: ABNORMAL 10*6/UL
SEG NEUTROPHILS: 84 % (ref 36–66)
SEGMENTED NEUTROPHILS ABSOLUTE COUNT: 10.8 K/UL (ref 1.3–9.1)
SODIUM BLD-SCNC: 138 MMOL/L (ref 135–144)
SODIUM BLD-SCNC: 138 MMOL/L (ref 135–144)
SODIUM BLD-SCNC: 142 MMOL/L (ref 135–144)
SPECIMEN TYPE: NORMAL
TOTAL PROTEIN, BODY FLUID: <1 G/DL
TOTAL PROTEIN: 5.3 G/DL (ref 6.4–8.3)
WBC # BLD: 12.8 K/UL (ref 3.5–11)
WBC # BLD: ABNORMAL 10*3/UL

## 2021-08-23 PROCEDURE — 87205 SMEAR GRAM STAIN: CPT

## 2021-08-23 PROCEDURE — 87116 MYCOBACTERIA CULTURE: CPT

## 2021-08-23 PROCEDURE — 88305 TISSUE EXAM BY PATHOLOGIST: CPT

## 2021-08-23 PROCEDURE — 6360000002 HC RX W HCPCS: Performed by: SURGERY

## 2021-08-23 PROCEDURE — 88185 FLOWCYTOMETRY/TC ADD-ON: CPT

## 2021-08-23 PROCEDURE — 87206 SMEAR FLUORESCENT/ACID STAI: CPT

## 2021-08-23 PROCEDURE — 6360000002 HC RX W HCPCS: Performed by: STUDENT IN AN ORGANIZED HEALTH CARE EDUCATION/TRAINING PROGRAM

## 2021-08-23 PROCEDURE — 2580000003 HC RX 258: Performed by: NURSE PRACTITIONER

## 2021-08-23 PROCEDURE — 74230 X-RAY XM SWLNG FUNCJ C+: CPT

## 2021-08-23 PROCEDURE — 6370000000 HC RX 637 (ALT 250 FOR IP): Performed by: INTERNAL MEDICINE

## 2021-08-23 PROCEDURE — 2580000003 HC RX 258: Performed by: INTERNAL MEDICINE

## 2021-08-23 PROCEDURE — 85025 COMPLETE CBC W/AUTO DIFF WBC: CPT

## 2021-08-23 PROCEDURE — 2700000000 HC OXYGEN THERAPY PER DAY

## 2021-08-23 PROCEDURE — 6370000000 HC RX 637 (ALT 250 FOR IP): Performed by: NURSE PRACTITIONER

## 2021-08-23 PROCEDURE — 71045 X-RAY EXAM CHEST 1 VIEW: CPT

## 2021-08-23 PROCEDURE — 94640 AIRWAY INHALATION TREATMENT: CPT

## 2021-08-23 PROCEDURE — 83615 LACTATE (LD) (LDH) ENZYME: CPT

## 2021-08-23 PROCEDURE — 94761 N-INVAS EAR/PLS OXIMETRY MLT: CPT

## 2021-08-23 PROCEDURE — 36415 COLL VENOUS BLD VENIPUNCTURE: CPT

## 2021-08-23 PROCEDURE — 87075 CULTR BACTERIA EXCEPT BLOOD: CPT

## 2021-08-23 PROCEDURE — 82947 ASSAY GLUCOSE BLOOD QUANT: CPT

## 2021-08-23 PROCEDURE — 0W9B3ZZ DRAINAGE OF LEFT PLEURAL CAVITY, PERCUTANEOUS APPROACH: ICD-10-PCS | Performed by: RADIOLOGY

## 2021-08-23 PROCEDURE — 6360000002 HC RX W HCPCS: Performed by: INTERNAL MEDICINE

## 2021-08-23 PROCEDURE — 6360000002 HC RX W HCPCS: Performed by: NURSE PRACTITIONER

## 2021-08-23 PROCEDURE — 80053 COMPREHEN METABOLIC PANEL: CPT

## 2021-08-23 PROCEDURE — 87102 FUNGUS ISOLATION CULTURE: CPT

## 2021-08-23 PROCEDURE — 92611 MOTION FLUOROSCOPY/SWALLOW: CPT

## 2021-08-23 PROCEDURE — 2060000000 HC ICU INTERMEDIATE R&B

## 2021-08-23 PROCEDURE — 99253 IP/OBS CNSLTJ NEW/EST LOW 45: CPT | Performed by: THORACIC SURGERY (CARDIOTHORACIC VASCULAR SURGERY)

## 2021-08-23 PROCEDURE — 87070 CULTURE OTHR SPECIMN AEROBIC: CPT

## 2021-08-23 PROCEDURE — 84157 ASSAY OF PROTEIN OTHER: CPT

## 2021-08-23 PROCEDURE — 99254 IP/OBS CNSLTJ NEW/EST MOD 60: CPT | Performed by: NURSE PRACTITIONER

## 2021-08-23 PROCEDURE — 6370000000 HC RX 637 (ALT 250 FOR IP): Performed by: PHYSICIAN ASSISTANT

## 2021-08-23 PROCEDURE — 2500000003 HC RX 250 WO HCPCS: Performed by: SURGERY

## 2021-08-23 PROCEDURE — 88112 CYTOPATH CELL ENHANCE TECH: CPT

## 2021-08-23 PROCEDURE — 99232 SBSQ HOSP IP/OBS MODERATE 35: CPT | Performed by: INTERNAL MEDICINE

## 2021-08-23 PROCEDURE — 71250 CT THORAX DX C-: CPT

## 2021-08-23 PROCEDURE — 83735 ASSAY OF MAGNESIUM: CPT

## 2021-08-23 PROCEDURE — 2500000003 HC RX 250 WO HCPCS: Performed by: NURSE PRACTITIONER

## 2021-08-23 PROCEDURE — 88184 FLOWCYTOMETRY/ TC 1 MARKER: CPT

## 2021-08-23 PROCEDURE — 32555 ASPIRATE PLEURA W/ IMAGING: CPT | Performed by: RADIOLOGY

## 2021-08-23 PROCEDURE — 80051 ELECTROLYTE PANEL: CPT

## 2021-08-23 PROCEDURE — 83986 ASSAY PH BODY FLUID NOS: CPT

## 2021-08-23 PROCEDURE — 99233 SBSQ HOSP IP/OBS HIGH 50: CPT | Performed by: INTERNAL MEDICINE

## 2021-08-23 PROCEDURE — 82945 GLUCOSE OTHER FLUID: CPT

## 2021-08-23 PROCEDURE — 2709999900 IR GUIDED THORACENTESIS PLEURAL

## 2021-08-23 RX ORDER — METOPROLOL SUCCINATE 25 MG/1
12.5 TABLET, EXTENDED RELEASE ORAL NIGHTLY
Status: DISCONTINUED | OUTPATIENT
Start: 2021-08-23 | End: 2021-08-31 | Stop reason: HOSPADM

## 2021-08-23 RX ADMIN — SODIUM CHLORIDE: 4.5 INJECTION, SOLUTION INTRAVENOUS at 07:03

## 2021-08-23 RX ADMIN — VANCOMYCIN HYDROCHLORIDE 1250 MG: 1.25 INJECTION, POWDER, LYOPHILIZED, FOR SOLUTION INTRAVENOUS at 02:19

## 2021-08-23 RX ADMIN — MORPHINE SULFATE 4 MG: 4 INJECTION, SOLUTION INTRAMUSCULAR; INTRAVENOUS at 12:15

## 2021-08-23 RX ADMIN — METOCLOPRAMIDE 10 MG: 5 INJECTION, SOLUTION INTRAMUSCULAR; INTRAVENOUS at 23:15

## 2021-08-23 RX ADMIN — IPRATROPIUM BROMIDE AND ALBUTEROL SULFATE 1 AMPULE: 2.5; .5 SOLUTION RESPIRATORY (INHALATION) at 07:48

## 2021-08-23 RX ADMIN — WATER 5 MG: 1 INJECTION INTRAMUSCULAR; INTRAVENOUS; SUBCUTANEOUS at 12:25

## 2021-08-23 RX ADMIN — ALTEPLASE 5 MG: 2.2 INJECTION, POWDER, LYOPHILIZED, FOR SOLUTION INTRAVENOUS at 12:25

## 2021-08-23 RX ADMIN — MORPHINE SULFATE 4 MG: 4 INJECTION, SOLUTION INTRAMUSCULAR; INTRAVENOUS at 08:59

## 2021-08-23 RX ADMIN — METOCLOPRAMIDE 10 MG: 5 INJECTION, SOLUTION INTRAMUSCULAR; INTRAVENOUS at 01:41

## 2021-08-23 RX ADMIN — LORAZEPAM 2 MG: 2 INJECTION INTRAMUSCULAR; INTRAVENOUS at 00:38

## 2021-08-23 RX ADMIN — FAMOTIDINE 20 MG: 10 INJECTION, SOLUTION INTRAVENOUS at 08:11

## 2021-08-23 RX ADMIN — SODIUM CHLORIDE: 4.5 INJECTION, SOLUTION INTRAVENOUS at 16:58

## 2021-08-23 RX ADMIN — IPRATROPIUM BROMIDE AND ALBUTEROL SULFATE 1 AMPULE: 2.5; .5 SOLUTION RESPIRATORY (INHALATION) at 20:31

## 2021-08-23 RX ADMIN — VANCOMYCIN HYDROCHLORIDE 1250 MG: 1.25 INJECTION, POWDER, LYOPHILIZED, FOR SOLUTION INTRAVENOUS at 23:14

## 2021-08-23 RX ADMIN — SODIUM CHLORIDE, PRESERVATIVE FREE 10 ML: 5 INJECTION INTRAVENOUS at 20:01

## 2021-08-23 RX ADMIN — FAMOTIDINE 20 MG: 10 INJECTION, SOLUTION INTRAVENOUS at 20:00

## 2021-08-23 RX ADMIN — IPRATROPIUM BROMIDE AND ALBUTEROL SULFATE 1 AMPULE: 2.5; .5 SOLUTION RESPIRATORY (INHALATION) at 11:11

## 2021-08-23 RX ADMIN — METOCLOPRAMIDE 10 MG: 5 INJECTION, SOLUTION INTRAMUSCULAR; INTRAVENOUS at 05:43

## 2021-08-23 RX ADMIN — ENOXAPARIN SODIUM 40 MG: 40 INJECTION SUBCUTANEOUS at 08:11

## 2021-08-23 RX ADMIN — METOPROLOL SUCCINATE 12.5 MG: 25 TABLET, EXTENDED RELEASE ORAL at 20:00

## 2021-08-23 RX ADMIN — DEXTROSE MONOHYDRATE 12.5 G: 25 INJECTION, SOLUTION INTRAVENOUS at 13:18

## 2021-08-23 RX ADMIN — IPRATROPIUM BROMIDE AND ALBUTEROL SULFATE 1 AMPULE: 2.5; .5 SOLUTION RESPIRATORY (INHALATION) at 16:03

## 2021-08-23 RX ADMIN — METOCLOPRAMIDE 10 MG: 5 INJECTION, SOLUTION INTRAMUSCULAR; INTRAVENOUS at 17:40

## 2021-08-23 RX ADMIN — METOCLOPRAMIDE 10 MG: 5 INJECTION, SOLUTION INTRAMUSCULAR; INTRAVENOUS at 11:40

## 2021-08-23 RX ADMIN — MORPHINE SULFATE 4 MG: 4 INJECTION, SOLUTION INTRAMUSCULAR; INTRAVENOUS at 20:52

## 2021-08-23 RX ADMIN — MORPHINE SULFATE 4 MG: 4 INJECTION, SOLUTION INTRAMUSCULAR; INTRAVENOUS at 17:41

## 2021-08-23 ASSESSMENT — ENCOUNTER SYMPTOMS
CONSTIPATION: 0
NAUSEA: 0
SHORTNESS OF BREATH: 0
BACK PAIN: 1
CHEST TIGHTNESS: 0
DIARRHEA: 0
VOMITING: 0
PHOTOPHOBIA: 0
TROUBLE SWALLOWING: 1
ABDOMINAL PAIN: 0

## 2021-08-23 ASSESSMENT — PAIN SCALES - GENERAL
PAINLEVEL_OUTOF10: 0
PAINLEVEL_OUTOF10: 0
PAINLEVEL_OUTOF10: 5
PAINLEVEL_OUTOF10: 10
PAINLEVEL_OUTOF10: 10
PAINLEVEL_OUTOF10: 5
PAINLEVEL_OUTOF10: 10
PAINLEVEL_OUTOF10: 10

## 2021-08-23 NOTE — PLAN OF CARE
Problem: Falls - Risk of:  Goal: Will remain free from falls  Description: Will remain free from falls  8/23/2021 1747 by Celso Vasquez RN  Outcome: Ongoing   High fall risk. Pt working with pt    Problem: Skin Integrity:  Goal: Will show no infection signs and symptoms  Description: Will show no infection signs and symptoms  8/23/2021 1747 by Celso Vasquez RN  Outcome: Ongoing   Head to toe charted. No new signs of breakdown    Problem: Nutrition  Goal: Optimal nutrition therapy  8/23/2021 1747 by Celso Vasquez RN  Outcome: Ongoing  Video swallow done today. Nectar thick liquids recommended. Diet resumed    Problem: Pain:  Goal: Pain level will decrease  Description: Pain level will decrease  8/23/2021 1747 by Celso Vasquez RN  Outcome: Ongoing   Non pharm and meds utilized    Problem: Injury - Risk of, Physical Injury:  Goal: Will remain free from falls  Description: Will remain free from falls  8/23/2021 1747 by Celso Vasquez RN  Outcome: Ongoing   Right rib fractures, and left wrist broken.  Brace on wrist

## 2021-08-23 NOTE — PLAN OF CARE
Problem: Falls - Risk of:  Goal: Will remain free from falls  Description: Will remain free from falls  8/23/2021 0445 by Shahnaz Roberts RN  Outcome: Ongoing     Problem: Falls - Risk of:  Goal: Absence of physical injury  Description: Absence of physical injury  8/23/2021 0445 by Shahnaz Roberts RN  Outcome: Ongoing     Problem: Skin Integrity:  Goal: Will show no infection signs and symptoms  Description: Will show no infection signs and symptoms  8/23/2021 0445 by Shahnaz Roberts RN  Outcome: Ongoing     Problem: Skin Integrity:  Goal: Absence of new skin breakdown  Description: Absence of new skin breakdown  8/23/2021 0445 by Shahnaz Roberts RN  Outcome: Ongoing     Problem: Nutrition  Goal: Optimal nutrition therapy  8/23/2021 0445 by Shahnaz Roberts RN  Outcome: Ongoing     Problem: Pain:  Goal: Pain level will decrease  Description: Pain level will decrease  8/23/2021 0445 by Shahnaz Roberts RN  Outcome: Ongoing     Problem: Confusion - Acute:  Goal: Absence of continued neurological deterioration signs and symptoms  Description: Absence of continued neurological deterioration signs and symptoms  8/23/2021 0445 by Shahnaz Roberts RN  Outcome: Ongoing     Problem: Injury - Risk of, Physical Injury:  Goal: Will remain free from falls  Description: Will remain free from falls  8/23/2021 0445 by Shahnaz Roberts RN  Outcome: Ongoing     Problem: Injury - Risk of, Physical Injury:  Goal: Absence of physical injury  Description: Absence of physical injury  8/23/2021 0445 by Shahnaz Roberts RN  Outcome: Ongoing

## 2021-08-23 NOTE — PROGRESS NOTES
[address]   OCCUPATIONAL THERAPY MISSED TREATMENT NOTE   INPATIENT   Date: 21  Patient Name: Salome Perez       Room: 8137/5524-99  MRN: [id number]   Account #: [de-identified]    : 1965  (60 y.o.)  Gender: male                 REASON FOR MISSED TREATMENT:  Patient unable to participate   -   Hold OT evaluation pending results of bilateral LE venous duplex and L UE venous duplex. Will attempt OT evaluation once results are known.         Jose Sampson, OT

## 2021-08-23 NOTE — PROGRESS NOTES
NP Dariela Felipe performed TPA on chest tube. Inserted 60 ml in at 12:20 pm. Med is to dwell for 3 hrs, unclamping chest tube at 320 pm. When output is charted minus the 60 ml med that was inserted. Output is expected to be serosanguineous. Alert NP if bright red output occurs. Any questions or concerns she is on call all night. 345 pm- chest tube unclamped when pt returned from IR for thoracentesis.  Light pink output started.   (pt was off floor since 2p-345p for the barium swallow study and thoracentesis)

## 2021-08-23 NOTE — PROGRESS NOTES
Pharmacy Vancomycin Consult     Vancomycin Day: 9  Current Dosin mg daily  Current indication: Pneumonia, MRSA bacteremia    Temp max:  98.1    Recent Labs     21  0440 21  0412   BUN 32* 22*   CREATININE 1.11 1.01   WBC 11.6* 12.5*       Intake/Output Summary (Last 24 hours) at 2021 2314  Last data filed at 2021 1930  Gross per 24 hour   Intake 1571.67 ml   Output 3008 ml   Net -1436.33 ml     Culture Date      Source                       Results  See micro reports    Ht Readings from Last 1 Encounters:   21 5' 9\" (1.753 m)        Wt Readings from Last 1 Encounters:   21 157 lb 6.5 oz (71.4 kg)       Body mass index is 23.25 kg/m². Estimated Creatinine Clearance: 80 mL/min (based on SCr of 1.01 mg/dL). Trough: 15.9 mcg/ml    Assessment/Plan:  Trough is therapeutic. Continue current regimen.

## 2021-08-23 NOTE — CARE COORDINATION
Cardiothoracic team aware of consult and will see patient this morning.     Anne Marie Lima, APRN - CNP

## 2021-08-23 NOTE — PROGRESS NOTES
Hypokalemia    Hyponatremia    Traumatic rhabdomyolysis (HCC)    Closed fracture of multiple ribs of right side    Closed fracture of left wrist    Severe malnutrition (HCC)    Fever    Conjunctivitis    Acute respiratory failure (HCC)    Superficial venous thrombosis of arm, left    MRSA bacteremia    Empyema lung (HCC)    Elevated C-reactive protein (CRP)    Leukocytosis  Resolved Problems:    * No resolved hospital problems. *      Plan  1. NPO  2. Barium swallow today  3. Chest tube to suction   4. Await cardiothoracic surgery input  5. Surgically stable  6. Antibiotics per ID  7. Continue medical management   8. Patient was seen and examined. Passed barium swallow study. Tolerating diet. No acute general surgical issues. Continue management per admitting service.       Electronically signed by Christine Marcos PA-C  [address] [address]

## 2021-08-23 NOTE — PROGRESS NOTES
Residents notified barium swallow study completed.  Recommending regular diet with nectar thick liquids  Order to follow

## 2021-08-23 NOTE — PROGRESS NOTES
[facility]   Progress Note                   Date:   8/23/2021  Patient name: Wendi Rock  Date of admission:  8/10/2021  8:30 PM  MRN:   [id number]  YOB: 1965  PCP: Meena Valles MD    Reason for Admission:     Subjective:       Clinical Changes / Abnormalities: Seen & examined in room preparing to leave for video swallow study. Labs, vitals, & tele reviewed. Moved to step-down unit and doing much better. Denies any SOB today. Denies any chest pain. States still has cough and \"just want to eat. \" Tele SR 77      Medications:   Scheduled Meds:   Current Facility-Administered Medications:     insulin lispro (HUMALOG) injection vial 0-12 Units, 0-12 Units, Subcutaneous, TID WC, Caterina Peel, APRN - CNP    insulin lispro (HUMALOG) injection vial 0-6 Units, 0-6 Units, Subcutaneous, Nightly, Caterina Peel, APRN - CNP    0.45 % sodium chloride infusion, , Intravenous, Continuous, Keiko Camejo MD, Last Rate: 50 mL/hr at 08/23/21 0703, New Bag at 08/23/21 0703    ipratropium-albuterol (DUONEB) nebulizer solution 1 ampule, 1 ampule, Inhalation, Q4H While awake, Abraham Raines MD, 1 ampule at 08/23/21 0748    morphine (PF) injection 2 mg, 2 mg, Intravenous, Q2H PRN **OR** morphine sulfate (PF) injection 4 mg, 4 mg, Intravenous, Q2H PRN, Rabia Reed MD, 4 mg at 08/23/21 0859    tamsulosin (FLOMAX) capsule 0.4 mg, 0.4 mg, Oral, Daily, Blossom Anderson MD    sodium chloride flush 0.9 % injection 10 mL, 10 mL, Intravenous, PRN, Rabia Reed MD, 10 mL at 08/21/21 1458    potassium chloride 10 mEq/100 mL IVPB (Peripheral Line), 10 mEq, Intravenous, PRN, Sandrita Obregon MD, Last Rate: 100 mL/hr at 08/21/21 2209, 10 mEq at 08/21/21 2209    enoxaparin (LOVENOX) injection 40 mg, 40 mg, Subcutaneous, Daily, Angelika Allen MD, 40 mg at 08/23/21 0811    vancomycin (VANCOCIN) 1,250 mg in dextrose 5 % 250 mL IVPB (ADDAVIAL), 1,250 mg, Intravenous, Q24H, Omkar Lee MD, Stopped at 08/23/21 0349    [Held by provider] metoprolol tartrate (LOPRESSOR) tablet 12.5 mg, 12.5 mg, Oral, BID, Jose Luis Sykes DO, 12.5 mg at 08/18/21 2153    insulin glargine (LANTUS) injection vial 10 Units, 10 Units, Subcutaneous, Nightly, Mahsa Baldwin MD, 10 Units at 08/19/21 2207    perflutren lipid microspheres (DEFINITY) injection 2.2 mg, 2 mL, Intravenous, ONCE PRN, Mariaelena Wilson MD    metoclopramide (REGLAN) injection 10 mg, 10 mg, Intravenous, Q6H, Aide Robert MD, 10 mg at 08/23/21 0543    glucose (GLUTOSE) 40 % oral gel 15 g, 15 g, Oral, PRN, Aide Robert MD    dextrose 50 % IV solution, 12.5 g, Intravenous, PRN, Aide Robert MD, 12.5 g at 08/20/21 1324    glucagon (rDNA) injection 1 mg, 1 mg, Intramuscular, PRN, Aide Robert MD    dextrose 5 % solution, 100 mL/hr, Intravenous, PRN, Aide Robert MD    vancomycin [place] [employer]) intermittent dosing (placeholder), , Other, Summer Ramirez, Karen Cheng MD, Given at 08/14/21 0844    sodium phosphate 9.87 mmol in dextrose 5 % 250 mL IVPB, 0.16 mmol/kg, Intravenous, PRN **OR** sodium phosphate 19.71 mmol in dextrose 5 % 250 mL IVPB, 0.32 mmol/kg, Intravenous, PRN, Mahsa Baldwin MD, Stopped at 08/14/21 1230    0.9 % sodium chloride infusion, , Intravenous, PRN, Mahsa Baldwin MD    sodium chloride flush 0.9 % injection 5-40 mL, 5-40 mL, Intravenous, 2 times per day, DONNY Valle - CNP, 10 mL at 08/21/21 1947    sodium chloride flush 0.9 % injection 10 mL, 10 mL, Intravenous, PRN, DONNY Valle - CNP, 10 mL at 08/20/21 1831    0.9 % sodium chloride infusion, 25 mL, Intravenous, PRN, DONNY Valle CNP, Last Rate: 100 mL/hr at 08/19/21 1717, 25 mL at 08/19/21 1717    polyethylene glycol (GLYCOLAX) packet 17 g, 17 g, Oral, Daily PRN, DONNY Valle CNP, 17 g at 08/16/21 0830    acetaminophen (TYLENOL) tablet 650 mg, 650 mg, Oral, Q6H PRN, 650 mg at 08/14/21 2314 **OR** acetaminophen (TYLENOL) suppository 650 mg, 650 mg, Rectal, Q6H PRN, Shorty Pickles, APRN - CNP    magnesium sulfate 1000 mg in dextrose 5% 100 mL IVPB, 1,000 mg, Intravenous, PRN, Shorty Pickles, APRN - CNP, Stopped at 08/15/21 0734    ondansetron (ZOFRAN) injection 4 mg, 4 mg, Intravenous, Q6H PRN, Shorty Pickles, APRN - CNP    famotidine (PEPCID) injection 20 mg, 20 mg, Intravenous, BID, Shorty Pickles, APRN - CNP, 20 mg at 08/23/21 0811    nicotine (NICODERM CQ) 21 MG/24HR 1 patch, 1 patch, Transdermal, Daily, Shorty Pickles, APRN - CNP, 1 patch at 08/23/21 0813    LORazepam (ATIVAN) tablet 1 mg, 1 mg, Oral, Q1H PRN **OR** LORazepam (ATIVAN) injection 1 mg, 1 mg, Intravenous, Q1H PRN, 1 mg at 08/11/21 2314 **OR** LORazepam (ATIVAN) tablet 2 mg, 2 mg, Oral, Q1H PRN **OR** LORazepam (ATIVAN) injection 2 mg, 2 mg, Intravenous, Q1H PRN, 2 mg at 08/23/21 0038 **OR** LORazepam (ATIVAN) tablet 3 mg, 3 mg, Oral, Q1H PRN **OR** LORazepam (ATIVAN) injection 3 mg, 3 mg, Intravenous, Q1H PRN, 3 mg at 08/11/21 0947 **OR** LORazepam (ATIVAN) tablet 4 mg, 4 mg, Oral, Q1H PRN **OR** LORazepam (ATIVAN) injection 4 mg, 4 mg, Intravenous, Q1H PRN, Shorty Pickles, APRN - CNP    albuterol sulfate  (90 Base) MCG/ACT inhaler 2 puff, 2 puff, Inhalation, Q6H PRN, Shorty Pickles, APRN - CNP, 2 puff at 08/19/21 1554    lidocaine 4 % external patch 1 patch, 1 patch, Transdermal, Daily, TIEN Rodírguez, 1 patch at 08/22/21 0925    sodium chloride flush 0.9 % injection 10 mL, 10 mL, Intravenous, PRN, Damien Olivas MD, 10 mL at 08/10/21 2300   Continuous Infusions:   sodium chloride 50 mL/hr at 08/23/21 0703    dextrose      sodium chloride      sodium chloride 25 mL (08/19/21 1717)     CBC:   Recent Labs     08/21/21  0440 08/22/21  0412 08/23/21  0511   WBC 11.6* 12.5* 12.8*   HGB 9.7* 8.8* 9.4*    409 405     BMP:    Recent Labs     08/21/21  0440 08/21/21  1234 08/22/21  0412 08/22/21  1404 08/22/21  1734 08/22/21  2234 08/23/21  0511   *   < > 141   < > 140 138 142   K 3.4*   < > 4.0   < > 4.2 3.8 3.8   *   < > 109*   < > 109* 105 108*   CO2 28   < > 26   < > 25 24 25   BUN 32*  --  22*  --   --   --  17   CREATININE 1.11  --  1.01  --   --   --  1.02   GLUCOSE 85  --  114*  --   --   --  81    < > = values in this interval not displayed. Hepatic:   Recent Labs     08/21/21  0440 08/22/21  0412 08/23/21  0511   AST 19 15 15   ALT 17 16 13   BILITOT 0.47 0.74 0.83   ALKPHOS 68 77 71     Troponin: No results for input(s): TROPHS in the last 72 hours. BNP: No results for input(s): BNP in the last 72 hours. Lipids: No results for input(s): CHOL, HDL in the last 72 hours. Invalid input(s): LDLCALCU  INR:   No results for input(s): INR in the last 72 hours. Objective:   Vitals: /68   Pulse 78   Temp 98.1 °F (36.7 °C) (Axillary)   Resp 18   Ht 5' 9\" (1.753 m)   Wt 150 lb 2.1 oz (68.1 kg)   SpO2 90%   BMI 22.17 kg/m²   General appearance: intubated, sedated  HEENT: Head: Normocephalic, no lesions, without obvious abnormality. Neck: no adenopathy, no carotid bruit, no JVD, supple, symmetrical, trachea midline and thyroid not enlarged, symmetric, no tenderness/mass/nodules  Lungs: Course rhonchi to auscultation bilaterally. NC oxygen.  No audible rales or wheezing  Heart: regular rate and rhythm, S1, S2 normal, no murmur, click, rub or gallop/ SR 77  Abdomen: soft, non-tender; bowel sounds normal; no masses,  no organomegaly  Extremities: extremities normal, atraumatic, no cyanosis or edema  Neurologic: intubated, sedated    EKG:   Results for orders placed or performed during the hospital encounter of 08/10/21   EKG 12 Lead   Result Value Ref Range    Ventricular Rate 92 BPM    Atrial Rate 92 BPM    P-R Interval 148 ms    QRS Duration 82 ms    Q-T Interval 388 ms    QTc Calculation (Bazett) 479 ms    P Axis -8 degrees    R Axis 26 degrees    T Axis 48 degrees    Narrative     Poor data quality, interpretation may be adversely affected  Normal sinus rhythm  Minimal ventricular size and function. No significant valvular regurgitation or stenosis seen. Aortic root is mildly dilated. (4.1 cm)  IVC Increased diameter and impaired or no inspiratory variation. No significant pericardial effusion is seen. Signature  ----------------------------------------------------------------------------   Electronically signed by Lorenza Chester(Sonographer) on 08/16/2021 03:45   PM  ----------------------------------------------------------------------------    ----------------------------------------------------------------------------   Electronically signed by Lopez Gray(Interpreting physician) on 08/16/2021   04:09 PM  ----------------------------------------------------------------------------  FINDINGS  Left Atrium  Left atrium is normal in size. Left Ventricle  Left ventricle is normal in size. Estimated LV EF 35 %. Mild left ventricular hypertrophy. Right Atrium  Right atrium is normal in size. Right Ventricle  Normal right ventricular size and function. Mitral Valve  Normal mitral valve structure and function. Trivial mitral regurgitation. Aortic Valve  Normal aortic valve structure and function without stenosis or  regurgitation. Tricuspid Valve  Normal tricuspid valve structure and function. Insignificant tricuspid regurgitation, unable to estimate RVSP. Pulmonic Valve  The pulmonic valve is normal in structure. No pulmonic insufficiency. Pericardial Effusion  No significant pericardial effusion is seen. Miscellaneous  Aortic root is mildly dilated. (4.1 cm)  E/e' average 8.5  IVC Increased diameter and impaired or no inspiratory variation.     M-mode / 2D Measurements & Calculations:      LVIDd:4.82 cm(3.7 - 5.6 cm)      Diastolic YCVBAD:037 ml   NLGLP:0.71 cm(2.2 - 4.0 cm)      Systolic ILIUAH:36.4 ml   IVSd:1.28 cm(0.6 - 1.1 cm)       Aortic Root:4.1 cm(2.0 - 3.7 cm)   LVPWd:1.2 cm(0.6 - 1.1 cm)       LA Dimension: 4.1 cm(1.9 - 4.0 cm)   Fractional Shortenin.33 %    LA volume/Index: 35.2 ml /19m^2   Calculated LVEF (%): 39.24 %     LVOT:2.5 cm      Mitral:                                Aortic      Peak E-Wave: 0.74 m/s                  Peak Velocity: 1.18 m/s   Peak A-Wave: 0.99 m/s                  Mean Velocity: 0.80 m/s   E/A Ratio: 0.75                        Peak Gradient: 5.57 mmHg   Peak Gradient: 2.2 mmHg                Mean Gradient: 3 mmHg   Deceleration Time: 183 msec                                             Area (continuity): 3.33 cm^2                                          AV VTI: 25.8 cm      Estimated RA Pressure: 15 mmHg     Septal Wall E' velocity:0.07 m/s  Lateral Wall E' velocity:0.14 m/s       ERICKA 21:     Structures:     LA: Normal  CLEMENTE: No thrombus  RA: Normal  RV:  Normal  LV: Normal  Estimated LVEF: 55%  Aorta: Mild atheromatous disease arch  Percardium: No pericardial effusion  Septum: No intracardiac shunt via color Doppler. No intracardiac shunt via injection of agitated saline.     Valves:     Mitral Valve: Structurally normal. Trivi regurgitation is identified. Aortic Valve: The aortic valve is trileaflet and opens adequately. Trivial regurgitiation is identified. Tricuspid valve: Structurally normal. Mild regurgitation is identified. Pulmonary valve: Normal. No significant regurgitation     No valvular vegetations or thrombus identified.     Summary:      1. A ERICKA was performed without complications. 2. LVEF 55%  3. No thrombus or valvular vegetation identified    Assessment / Acute Cardiac Problems:       Acute Systolic CHF, HFrEF- ? Due to ETOH. Newly reduced LVEF 35%. AMS, ETOH, Withdrawals  Acute Resp failure- on vent  Bacteremia  Pleural effusion- s/p Thora on 21- now with PTX  PNA    Plan of Treatment:     - Stable from CV standpoint. BB held d/t bradycarda- will switch to Toprol XL @ HS.  EF improved on ERICKA.   -  Keep K >4 and Mg >2.  - Fluid management per nephro recommendations - appreciate assistance. - Ischemic evaluation/work=up will be done as OP once acute issues resolve/stabilize.   - Bacteremia persistent. IV ATB per ID recommendations. No veg noted on ERICKA  - CTS consulted for right empyema  - will sign off at this time. Please call with further questions/concerns. Pt. To f/u in clinic in 2 weeks upon discharge.        DONNY Faria - CNP

## 2021-08-23 NOTE — PROGRESS NOTES
[facility] -   Infectious diseases evaluation  admission date 8/10/2021    reason for consultation:   MRSA bacteremia    Impression :   Current:  · MRSA bacteremia suspect pulmonary source of infection  · Left hand and wrist cellulitis  · Left wrist fracture  · Right pleural effusion /empyema status post thoracentesis with MRSA growth on culture. · Pneumothorax  · Superficial vein thrombophlebitis left arm  · Syncopal episode  · Alcohol withdrawal  · Acute respiratory failure requiring intubation  · Rib fracture with possible hemothorax    Other:  · History of alcohol abuse  · COPD  · History of depression  · History of seizure  · History of hypertension. Recommendations   · Continue IV vancomycin ,monitor renal function and vancomycin levels closely  · Repeat blood cultures 8/16/2021 grew MRSA  · CT chest pending  · Echocardiogram , no reported vegetations  · Repeat blood cultures from 8/19/2021   · ERICKA showed no vegetations. · CT thoracic and lumbar spine reviewed showed no discitis or osteomyelitis  · Follow CBC and renal function  · Cardiothoracic surgery has been consulted  · Swallow study planned for later today          History of Present Illness:   Initial history:  Capri Wood is a 60y.o.-year-old male presented to the hospital on 8/10/2021 after a syncopal episode, was complaining of pain to the right chest wall and abdomen. Reported sharp, constant pain aggravated by movement with no alleviating factors. The patient was previously evaluated at the ER 8/7/2021 after a fall found to have multiple rib fractures and a left wrist closed fracture. Tox screen positive for cannabinoids. CT head negative for acute intracranial abnormality. CT chest/abdomen/pelvis showed rib fractures with associated focal hemothorax. No acute processes in abdomen or pelvis.    Right arm PICC line was placed 8/12/2021  He was placed on alcohol withdrawal protocol, developed respiratory distress was placed on BiPAP initially then was intubated 4/13/21. Baker catheter was placed on 4/13/2021  COVID-19 rapid test was negative  The patient was on Levaquin 8/13/2021 and 8/14/2021 that was discontinued and started on vancomycin and cefepime. He had a fever with a temperature max of 100.6 on 8/13/2021. Blood cultures 8/14/2021 grew MRSA as well as sputum culture. Chest x-ray 8/14/2021 showed bilateral airspace disease right greater than left. CT chest without contrast 8/16/2021 showed increased right pleural effusion with an area of loculated fluid status post thoracentesis with MRSA growth on culture  Right chest tube was placed 8/20/2021  Interval changes  8/23/2021   He is awake, complaining of back pain, denied fever or chills, denied shortness of breath, remains oxygen per nasal cannula, occasional cough, denied vomiting or diarrhea. Right arm PICC line in place, Baker catheter in place. Renal function stable  Blood pressure stable     Patient Vitals for the past 8 hrs:   BP Temp Temp src Pulse Resp SpO2   08/23/21 1111 -- -- -- -- 18 94 %   08/23/21 0748 -- -- -- -- 18 90 %   08/23/21 0700 137/68 98.1 °F (36.7 °C) Axillary 78 16 91 %           I have personally reviewed the past medical history, past surgical history, medications, social history, and family history, and I haveupdated the database accordingly. Allergies:   Nickel     Review of Systems:     Review of Systems  As per history present illness, other than above 14 system review was negative  Physical Examination :       Physical Exam  Constitutional:       General: He is not in acute distress. HENT:      Head: Normocephalic and atraumatic. Right Ear: External ear normal.      Left Ear: External ear normal.   Eyes:      General: No scleral icterus. Right eye: No discharge. Left eye: No discharge. Cardiovascular:      Rate and Rhythm: Normal rate and regular rhythm. Heart sounds:  No murmur heard. Pulmonary:      Breath sounds: Rhonchi present. No wheezing. Abdominal:      General: Abdomen is flat. There is no distension. Palpations: Abdomen is soft. Genitourinary:     Comments: Baker catheter in place  Musculoskeletal:      Right lower leg: Edema present. Left lower leg: Edema present. Comments: Bilateral upper extremity edema   Skin:     General: Skin is warm. Coloration: Skin is not jaundiced. Comments: Left hand and wrist erythema and warmth improved, no fluctuation, no crepitance   Neurological:      General: No focal deficit present. Mental Status: He is alert.      Right PICC line in place    Past Medical History:     Past Medical History:   Diagnosis Date    Alcohol abuse 6/4/2014    Anxiety     Arthritis     COPD (chronic obstructive pulmonary disease) ([address]     ASTHMA    DDD (degenerative disc disease), lumbar 9/6/2016    Depression     Heart burn     Hemorrhoids     HTN (hypertension) 1/19/2015    Ilioinguinal neuralgia of right side 4/10/2017    Psychiatric problem     depression /anxiety    Seizures ([address]     withdrawal from alcohol 2 years ago    Wears glasses     READING       Past Surgical  History:     Past Surgical History:   Procedure Laterality Date    ANESTHESIA NERVE BLOCK Right 4/10/2017    NERVE BLOCK US RIGHT SIDED ILIOINGUINAL performed by Padmaja Ayala MD at [address] [place] [address]  3/19/15    HERNIA REPAIR      IR CHEST TUBE INSERTION  8/20/2021    IR CHEST TUBE INSERTION 8/20/2021 [facility]    NERVE BLOCK Right 10/10/2016    right groin    OTHER SURGICAL HISTORY Right 04/10/2017    US nerve block to R groin    TOE SURGERY      SCREW RIGHT BIG TOE       Medications:      insulin lispro  0-12 Units Subcutaneous TID WC    insulin lispro  0-6 Units Subcutaneous Nightly    alteplase (ACTIVASE) syringe  5 mg Intrapleural Once    And    dornase (PULMOZYME) syringe  5 mg Intrapleural Once    ipratropium-albuterol  1 ampule Inhalation Q4H While awake    tamsulosin  0.4 mg Oral Daily    enoxaparin  40 mg Subcutaneous Daily    vancomycin  1,250 mg Intravenous Q24H    [Held by provider] metoprolol tartrate  12.5 mg Oral BID    insulin glargine  10 Units Subcutaneous Nightly    metoclopramide  10 mg Intravenous Q6H    vancomycin (VANCOCIN) intermittent dosing (placeholder)   Other RX Placeholder    sodium chloride flush  5-40 mL Intravenous 2 times per day    famotidine (PEPCID) injection  20 mg Intravenous BID    nicotine  1 patch Transdermal Daily    lidocaine  1 patch Transdermal Daily       Social History:     Social History     Socioeconomic History    Marital status:      Spouse name: Not on file    Number of children: Not on file    Years of education: Not on file    Highest education level: Not on file   Occupational History    Not on file   Tobacco Use    Smoking status: Current Every Day Smoker     Packs/day: 1.00     Years: 38.00     Pack years: 38.00     Types: Cigarettes    Smokeless tobacco: Never Used    Tobacco comment: 1 PPD   Vaping Use    Vaping Use: Never used   Substance and Sexual Activity    Alcohol use: Yes     Alcohol/week: 12.0 standard drinks     Types: 12 Cans of beer per week     Comment: 12 24 oz cans daily    Drug use: Yes     Types: Marijuana     Comment: crack occasionally    Sexual activity: Never   Other Topics Concern    Not on file   Social History Narrative    Not on file     Social Determinants of Health     Financial Resource Strain:     Difficulty of Paying Living Expenses:    Food Insecurity:     Worried About Running Out of Food in the Last Year:     Ran Out of Food in the Last Year:    Transportation Needs:     Lack of Transportation (Medical):      Lack of Transportation (Non-Medical):    Physical Activity:     Days of Exercise per Week:     Minutes of Exercise per Session:    Stress:     Feeling of Stress :    Social Connections:     Frequency of Communication with Friends and Family:     Frequency of Social Gatherings with Friends and Family:     Attends Denominational Services:     Active Member of Clubs or Organizations:     Attends Club or Organization Meetings:     Marital Status:    Intimate Partner Violence:     Fear of Current or Ex-Partner:     Emotionally Abused:     Physically Abused:     Sexually Abused:        Family History:     Family History   Problem Relation Age of Onset    Cancer Mother         colon ca      Medical Decision Making:   I have independently reviewed/ordered the following labs:    CBC with Differential:   Recent Labs     08/22/21 0412 08/23/21  0511   WBC 12.5* 12.8*   HGB 8.8* 9.4*   HCT 26.8* 28.1*    405   LYMPHOPCT 8* 8*   MONOPCT 4 6     BMP:  Recent Labs     08/22/21  0412 08/22/21  1404 08/22/21  2234 08/23/21  0511      < > 138 142   K 4.0   < > 3.8 3.8   *   < > 105 108*   CO2 26   < > 24 25   BUN 22*  --   --  17   CREATININE 1.01  --   --  1.02   MG 2.2  --   --  2.0    < > = values in this interval not displayed. Hepatic Function Panel:   Recent Labs     08/22/21 0412 08/23/21  0511   PROT 5.1* 5.3*   LABALBU 2.0* 2.2*   BILITOT 0.74 0.83   ALKPHOS 77 71   ALT 16 13   AST 15 15     No results for input(s): RPR in the last 72 hours. No results for input(s): HIV in the last 72 hours. No results for input(s): BC in the last 72 hours. Lab Results   Component Value Date    CREATININE 1.02 08/23/2021    GLUCOSE 81 08/23/2021       Detailed results: Thank you for allowing us to participate in the care of this patient. Please call with questions. This note is created with the assistance of a speech recognition program.  While intending to generate adocument that actually reflects the content of the visit, the document can still have some errors including those of syntax and sound a like substitutions which may escape proof reading.   It such instances, actual meaningcan be extrapolated by contextual diversion.     Amarilys Hernandez MD  Office: [phone]  Perfect serve / office [phone]
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
## 2021-08-23 NOTE — PROGRESS NOTES
PULMONARY PROGRESS NOTE:    REASON FOR VISIT: resp failure, DTs  Interval History:    Events since last visit: none  490 ml out of CT yesterday  Increased bilat fluid    PAST MEDICAL HISTORY:      Scheduled Meds:   insulin lispro  0-12 Units Subcutaneous TID WC    insulin lispro  0-6 Units Subcutaneous Nightly    alteplase (ACTIVASE) syringe  5 mg Intrapleural Once    And    dornase (PULMOZYME) syringe  5 mg Intrapleural Once    ipratropium-albuterol  1 ampule Inhalation Q4H While awake    tamsulosin  0.4 mg Oral Daily    enoxaparin  40 mg Subcutaneous Daily    vancomycin  1,250 mg Intravenous Q24H    [Held by provider] metoprolol tartrate  12.5 mg Oral BID    insulin glargine  10 Units Subcutaneous Nightly    metoclopramide  10 mg Intravenous Q6H    vancomycin (VANCOCIN) intermittent dosing (placeholder)   Other RX Placeholder    sodium chloride flush  5-40 mL Intravenous 2 times per day    famotidine (PEPCID) injection  20 mg Intravenous BID    nicotine  1 patch Transdermal Daily    lidocaine  1 patch Transdermal Daily     Continuous Infusions:   sodium chloride 50 mL/hr at 08/23/21 0703    dextrose      sodium chloride      sodium chloride 25 mL (08/19/21 1717)     PRN Meds:morphine **OR** morphine, sodium chloride flush, potassium chloride, perflutren lipid microspheres, glucose, dextrose, glucagon (rDNA), dextrose, sodium phosphate IVPB **OR** sodium phosphate IVPB, sodium chloride, sodium chloride flush, sodium chloride, polyethylene glycol, acetaminophen **OR** acetaminophen, magnesium sulfate, ondansetron, LORazepam **OR** LORazepam **OR** LORazepam **OR** LORazepam **OR** LORazepam **OR** LORazepam **OR** LORazepam **OR** LORazepam, albuterol sulfate HFA, sodium chloride flush        PHYSICAL EXAMINATION:  /68   Pulse 78   Temp 98.1 °F (36.7 °C) (Axillary)   Resp 18   Ht 5' 9\" (1.753 m)   Wt 150 lb 2.1 oz (68.1 kg)   SpO2 90%   BMI 22.17 kg/m²   afebrile  General :

## 2021-08-23 NOTE — PROGRESS NOTES
had good urine output of 3.3 L over the  last 24 hours after IV Lasix. Patient is however 13 L positive balance since admission. Objective/     Vitals:    08/23/21 0330 08/23/21 0700 08/23/21 0748 08/23/21 1111   BP:  137/68     Pulse:  78     Resp:  16 18 18   Temp:  98.1 °F (36.7 °C)     TempSrc:  Axillary     SpO2:  91% 90% 94%   Weight: 150 lb 2.1 oz (68.1 kg)      Height:         24HR INTAKE/OUTPUT:      Intake/Output Summary (Last 24 hours) at 8/23/2021 1251  Last data filed at 8/23/2021 1150  Gross per 24 hour   Intake 2109.67 ml   Output 1440 ml   Net 669.67 ml     Patient Vitals for the past 96 hrs (Last 3 readings):   Weight   08/23/21 0330 150 lb 2.1 oz (68.1 kg)     Constitutional: Awake and responds to verbal stimuli; intubated and on ventilator support. Cardiovascular:  S1, S2 without pericardial rub or gallop. Respiratory: Clinically clear. Abdomen: Full, soft with normal bowel sounds. Extremities:  LE edema    Data/  Recent Labs     08/21/21  0440 08/22/21  0412 08/23/21  0511   WBC 11.6* 12.5* 12.8*   HGB 9.7* 8.8* 9.4*   HCT 30.0* 26.8* 28.1*   MCV 99.2 97.0 97.3    409 405     Recent Labs     08/21/21  0440 08/21/21  1234 08/22/21  0412 08/22/21  1404 08/22/21  1734 08/22/21  2234 08/23/21  0511   *   < > 141   < > 140 138 142   K 3.4*   < > 4.0   < > 4.2 3.8 3.8   *   < > 109*   < > 109* 105 108*   CO2 28   < > 26   < > 25 24 25   GLUCOSE 85  --  114*  --   --   --  81   MG 2.2  --  2.2  --   --   --  2.0   BUN 32*  --  22*  --   --   --  17   CREATININE 1.11  --  1.01  --   --   --  1.02   LABGLOM >60  --  >60  --   --   --  >60   GFRAA >60  --  >60  --   --   --  >60    < > = values in this interval not displayed. Assessment/plan:     1. Acute kidney injury - nonoliguric secondary to decreased effective arterial volume, severe hypoalbuminemia and sepsis. No evidence of acute rhabdomyolysis. Renal ultrasound showed no hydronephrosis or obstruction.   Serologic studies are negative with negative ASTRID and normal complements. Continue to monitor urine output closely. Avoid nephrotoxic agents.     2. Hypokalemia - secondary to diuretic therapy. Resolved.     3. MRSA bacteremia -patient is on IV vancomycin. Monitor Vanco trough levels for  potential nephrotoxicity.     4. Hypernatremia - Serum sodium is improved to 142 mmol/L today continue IV fluid  0.45 normal saline at 50 mL/h.     5. Large right-sided parapneumonic pleural effusion, possibly loculated on chest CT-S/P  Left  thoracentesis 8/17/21and right chest tube placement.     Prognosis is guarded
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
## 2021-08-24 ENCOUNTER — APPOINTMENT (OUTPATIENT)
Dept: GENERAL RADIOLOGY | Age: 56
DRG: 351 | End: 2021-08-24
Payer: MEDICAID

## 2021-08-24 LAB
ALBUMIN SERPL-MCNC: 2 G/DL (ref 3.5–5.2)
ALBUMIN/GLOBULIN RATIO: ABNORMAL (ref 1–2.5)
ALP BLD-CCNC: 70 U/L (ref 40–129)
ALT SERPL-CCNC: 13 U/L (ref 5–41)
ANION GAP SERPL CALCULATED.3IONS-SCNC: 8 MMOL/L (ref 9–17)
AST SERPL-CCNC: 17 U/L
BILIRUB SERPL-MCNC: 0.79 MG/DL (ref 0.3–1.2)
BUN BLDV-MCNC: 13 MG/DL (ref 6–20)
BUN/CREAT BLD: ABNORMAL (ref 9–20)
CALCIUM SERPL-MCNC: 7.6 MG/DL (ref 8.6–10.4)
CHLORIDE BLD-SCNC: 101 MMOL/L (ref 98–107)
CHLORIDE BLD-SCNC: 104 MMOL/L (ref 98–107)
CHLORIDE BLD-SCNC: 104 MMOL/L (ref 98–107)
CO2: 25 MMOL/L (ref 20–31)
CO2: 26 MMOL/L (ref 20–31)
CO2: 26 MMOL/L (ref 20–31)
CREAT SERPL-MCNC: 1.08 MG/DL (ref 0.7–1.2)
DIRECT EXAM: NORMAL
DIRECT EXAM: NORMAL
GFR AFRICAN AMERICAN: >60 ML/MIN
GFR NON-AFRICAN AMERICAN: >60 ML/MIN
GFR SERPL CREATININE-BSD FRML MDRD: ABNORMAL ML/MIN/{1.73_M2}
GFR SERPL CREATININE-BSD FRML MDRD: ABNORMAL ML/MIN/{1.73_M2}
GLUCOSE BLD-MCNC: 83 MG/DL (ref 75–110)
GLUCOSE BLD-MCNC: 89 MG/DL (ref 70–99)
GLUCOSE BLD-MCNC: 91 MG/DL (ref 75–110)
GLUCOSE BLD-MCNC: 91 MG/DL (ref 75–110)
HCT VFR BLD CALC: 25 % (ref 41–53)
HEMOGLOBIN: 8.4 G/DL (ref 13.5–17.5)
Lab: NORMAL
Lab: NORMAL
MAGNESIUM: 1.9 MG/DL (ref 1.6–2.6)
MCH RBC QN AUTO: 32.5 PG (ref 26–34)
MCHC RBC AUTO-ENTMCNC: 33.7 G/DL (ref 31–37)
MCV RBC AUTO: 96.4 FL (ref 80–100)
NRBC AUTOMATED: ABNORMAL PER 100 WBC
PDW BLD-RTO: 14.6 % (ref 11.5–14.9)
PLATELET # BLD: 408 K/UL (ref 150–450)
PMV BLD AUTO: 7.5 FL (ref 6–12)
POTASSIUM SERPL-SCNC: 3.5 MMOL/L (ref 3.7–5.3)
POTASSIUM SERPL-SCNC: 3.8 MMOL/L (ref 3.7–5.3)
POTASSIUM SERPL-SCNC: 3.8 MMOL/L (ref 3.7–5.3)
RBC # BLD: 2.59 M/UL (ref 4.5–5.9)
SODIUM BLD-SCNC: 135 MMOL/L (ref 135–144)
SODIUM BLD-SCNC: 137 MMOL/L (ref 135–144)
SODIUM BLD-SCNC: 138 MMOL/L (ref 135–144)
SPECIMEN DESCRIPTION: NORMAL
SPECIMEN DESCRIPTION: NORMAL
SURGICAL PATHOLOGY REPORT: NORMAL
TOTAL PROTEIN: 5.1 G/DL (ref 6.4–8.3)
WBC # BLD: 13 K/UL (ref 3.5–11)

## 2021-08-24 PROCEDURE — 2060000000 HC ICU INTERMEDIATE R&B

## 2021-08-24 PROCEDURE — 6370000000 HC RX 637 (ALT 250 FOR IP): Performed by: PHYSICIAN ASSISTANT

## 2021-08-24 PROCEDURE — 94640 AIRWAY INHALATION TREATMENT: CPT

## 2021-08-24 PROCEDURE — 2580000003 HC RX 258: Performed by: STUDENT IN AN ORGANIZED HEALTH CARE EDUCATION/TRAINING PROGRAM

## 2021-08-24 PROCEDURE — 99232 SBSQ HOSP IP/OBS MODERATE 35: CPT | Performed by: NURSE PRACTITIONER

## 2021-08-24 PROCEDURE — 6370000000 HC RX 637 (ALT 250 FOR IP): Performed by: INTERNAL MEDICINE

## 2021-08-24 PROCEDURE — 92526 ORAL FUNCTION THERAPY: CPT

## 2021-08-24 PROCEDURE — 93971 EXTREMITY STUDY: CPT

## 2021-08-24 PROCEDURE — 6360000002 HC RX W HCPCS: Performed by: NURSE PRACTITIONER

## 2021-08-24 PROCEDURE — 99232 SBSQ HOSP IP/OBS MODERATE 35: CPT | Performed by: INTERNAL MEDICINE

## 2021-08-24 PROCEDURE — 6360000002 HC RX W HCPCS: Performed by: SURGERY

## 2021-08-24 PROCEDURE — 6360000002 HC RX W HCPCS: Performed by: INTERNAL MEDICINE

## 2021-08-24 PROCEDURE — 2580000003 HC RX 258: Performed by: NURSE PRACTITIONER

## 2021-08-24 PROCEDURE — 6360000002 HC RX W HCPCS: Performed by: STUDENT IN AN ORGANIZED HEALTH CARE EDUCATION/TRAINING PROGRAM

## 2021-08-24 PROCEDURE — 2580000003 HC RX 258: Performed by: INTERNAL MEDICINE

## 2021-08-24 PROCEDURE — 80051 ELECTROLYTE PANEL: CPT

## 2021-08-24 PROCEDURE — 85027 COMPLETE CBC AUTOMATED: CPT

## 2021-08-24 PROCEDURE — 6370000000 HC RX 637 (ALT 250 FOR IP): Performed by: STUDENT IN AN ORGANIZED HEALTH CARE EDUCATION/TRAINING PROGRAM

## 2021-08-24 PROCEDURE — 94761 N-INVAS EAR/PLS OXIMETRY MLT: CPT

## 2021-08-24 PROCEDURE — 6370000000 HC RX 637 (ALT 250 FOR IP): Performed by: NURSE PRACTITIONER

## 2021-08-24 PROCEDURE — 93970 EXTREMITY STUDY: CPT

## 2021-08-24 PROCEDURE — 99233 SBSQ HOSP IP/OBS HIGH 50: CPT | Performed by: INTERNAL MEDICINE

## 2021-08-24 PROCEDURE — 82947 ASSAY GLUCOSE BLOOD QUANT: CPT

## 2021-08-24 PROCEDURE — 83735 ASSAY OF MAGNESIUM: CPT

## 2021-08-24 PROCEDURE — 36415 COLL VENOUS BLD VENIPUNCTURE: CPT

## 2021-08-24 PROCEDURE — 80053 COMPREHEN METABOLIC PANEL: CPT

## 2021-08-24 PROCEDURE — 71045 X-RAY EXAM CHEST 1 VIEW: CPT

## 2021-08-24 RX ORDER — POTASSIUM CHLORIDE 20 MEQ/1
40 TABLET, EXTENDED RELEASE ORAL ONCE
Status: COMPLETED | OUTPATIENT
Start: 2021-08-24 | End: 2021-08-24

## 2021-08-24 RX ORDER — HYDROCODONE BITARTRATE AND ACETAMINOPHEN 5; 325 MG/1; MG/1
1 TABLET ORAL EVERY 6 HOURS PRN
Status: DISCONTINUED | OUTPATIENT
Start: 2021-08-24 | End: 2021-08-31 | Stop reason: HOSPADM

## 2021-08-24 RX ORDER — FAMOTIDINE 20 MG/1
20 TABLET, FILM COATED ORAL 2 TIMES DAILY
Status: DISCONTINUED | OUTPATIENT
Start: 2021-08-24 | End: 2021-08-31 | Stop reason: HOSPADM

## 2021-08-24 RX ORDER — FENTANYL CITRATE 50 UG/ML
50 INJECTION, SOLUTION INTRAMUSCULAR; INTRAVENOUS ONCE
Status: COMPLETED | OUTPATIENT
Start: 2021-08-24 | End: 2021-08-24

## 2021-08-24 RX ADMIN — IPRATROPIUM BROMIDE AND ALBUTEROL SULFATE 1 AMPULE: 2.5; .5 SOLUTION RESPIRATORY (INHALATION) at 14:44

## 2021-08-24 RX ADMIN — HYDROCODONE BITARTRATE AND ACETAMINOPHEN 1 TABLET: 5; 325 TABLET ORAL at 18:33

## 2021-08-24 RX ADMIN — METOCLOPRAMIDE 10 MG: 5 INJECTION, SOLUTION INTRAMUSCULAR; INTRAVENOUS at 06:07

## 2021-08-24 RX ADMIN — SODIUM CHLORIDE: 4.5 INJECTION, SOLUTION INTRAVENOUS at 15:31

## 2021-08-24 RX ADMIN — METOPROLOL SUCCINATE 12.5 MG: 25 TABLET, EXTENDED RELEASE ORAL at 21:57

## 2021-08-24 RX ADMIN — SODIUM CHLORIDE: 4.5 INJECTION, SOLUTION INTRAVENOUS at 23:54

## 2021-08-24 RX ADMIN — SODIUM CHLORIDE, PRESERVATIVE FREE 10 ML: 5 INJECTION INTRAVENOUS at 22:01

## 2021-08-24 RX ADMIN — IPRATROPIUM BROMIDE AND ALBUTEROL SULFATE 1 AMPULE: 2.5; .5 SOLUTION RESPIRATORY (INHALATION) at 08:06

## 2021-08-24 RX ADMIN — POTASSIUM CHLORIDE 40 MEQ: 1500 TABLET, EXTENDED RELEASE ORAL at 10:01

## 2021-08-24 RX ADMIN — IPRATROPIUM BROMIDE AND ALBUTEROL SULFATE 1 AMPULE: 2.5; .5 SOLUTION RESPIRATORY (INHALATION) at 10:59

## 2021-08-24 RX ADMIN — WATER 5 MG: 1 INJECTION INTRAMUSCULAR; INTRAVENOUS; SUBCUTANEOUS at 12:05

## 2021-08-24 RX ADMIN — ALTEPLASE 5 MG: 2.2 INJECTION, POWDER, LYOPHILIZED, FOR SOLUTION INTRAVENOUS at 12:05

## 2021-08-24 RX ADMIN — IPRATROPIUM BROMIDE AND ALBUTEROL SULFATE 1 AMPULE: 2.5; .5 SOLUTION RESPIRATORY (INHALATION) at 19:05

## 2021-08-24 RX ADMIN — ENOXAPARIN SODIUM 40 MG: 40 INJECTION SUBCUTANEOUS at 09:47

## 2021-08-24 RX ADMIN — VANCOMYCIN HYDROCHLORIDE 1250 MG: 1.25 INJECTION, POWDER, LYOPHILIZED, FOR SOLUTION INTRAVENOUS at 23:54

## 2021-08-24 RX ADMIN — FAMOTIDINE 20 MG: 20 TABLET, FILM COATED ORAL at 10:01

## 2021-08-24 RX ADMIN — TAMSULOSIN HYDROCHLORIDE 0.4 MG: 0.4 CAPSULE ORAL at 09:46

## 2021-08-24 RX ADMIN — FENTANYL CITRATE 50 MCG: 0.05 INJECTION, SOLUTION INTRAMUSCULAR; INTRAVENOUS at 11:57

## 2021-08-24 RX ADMIN — SODIUM CHLORIDE, PRESERVATIVE FREE 10 ML: 5 INJECTION INTRAVENOUS at 09:50

## 2021-08-24 ASSESSMENT — ENCOUNTER SYMPTOMS
TROUBLE SWALLOWING: 1
VOMITING: 0
ABDOMINAL PAIN: 0
PHOTOPHOBIA: 0
SHORTNESS OF BREATH: 0
CHEST TIGHTNESS: 0
NAUSEA: 0

## 2021-08-24 ASSESSMENT — PAIN DESCRIPTION - DESCRIPTORS: DESCRIPTORS: ACHING

## 2021-08-24 ASSESSMENT — PAIN DESCRIPTION - FREQUENCY: FREQUENCY: CONTINUOUS

## 2021-08-24 ASSESSMENT — PAIN DESCRIPTION - PAIN TYPE: TYPE: CHRONIC PAIN

## 2021-08-24 ASSESSMENT — PAIN SCALES - GENERAL
PAINLEVEL_OUTOF10: 0
PAINLEVEL_OUTOF10: 10
PAINLEVEL_OUTOF10: 10

## 2021-08-24 ASSESSMENT — PAIN DESCRIPTION - LOCATION: LOCATION: GENERALIZED

## 2021-08-24 NOTE — CARE COORDINATION
ОЛЬГА received a request to see if this patient would meet criteria to go to [facility] AT [facility]. ОЛЬГА spoke with Jorge Alberto Peacock in admissions and she did report that he meets criteria for LTACH at this time. ОЛЬГА informed Zelda Hollis RN Clinical lead that he does meet criteria and then ОЛЬГА spoke with Montana Mohr RN Physician Navigator about this to see if the doctors would be ok with this as a plan. Montana Mohr reported that this is a good plan and that ОЛЬГА should go forward with this plan.   ОЛЬГА then faxed the referral to [facility] AT [facility] and called Mindee in admissions and informed her that it was now an official referral.

## 2021-08-24 NOTE — PROGRESS NOTES
Attending Physician Statement  I have discussed the care of Hazel Persaud with the resident team. I have examined the patient myself and taken ros and hpi , including pertinent history and exam findings,  with the resident. I have reviewed the key elements of all parts of the encounter with the resident. I agree with the assessment, plan and orders as documented by the resident. Principal Problem:    Syncope and collapse  Active Problems:    COPD (chronic obstructive pulmonary disease) (HCC)    Smoking addiction    Dyslipidemia    Alcohol abuse    Hypokalemia    Hyponatremia    Traumatic rhabdomyolysis (HCC)    Closed fracture of multiple ribs of right side    Closed fracture of left wrist    Severe malnutrition (HCC)    Fever    Conjunctivitis    Acute respiratory failure (HCC)    Superficial venous thrombosis of arm, left    MRSA bacteremia    Empyema lung (HCC)    Elevated C-reactive protein (CRP)    Leukocytosis  Resolved Problems:    * No resolved hospital problems. *    2nd dose tPA given today  800mg drainage sinice yesterday  US LE doppler awaited  toni follow    Full note to follow.       Electronically signed by Tunde Hollis MD

## 2021-08-24 NOTE — PROGRESS NOTES
Physical Therapy        Physical Therapy Cancel Note      DATE: 2021    NAME: Alondra Dillard  MRN: [id number]   : 1965      Patient not seen this date for Physical Therapy due to:    Testin2021 at 1310- HOLD PT reassessment. Venous scan is ordered for bilateral LEs and left UE. Will await results to be posted. - Pt will need re-assessment for mobility at next treatment.       Electronically signed by Philomena Ahumada, PT on 2021 at 10:33 AM

## 2021-08-24 NOTE — PROGRESS NOTES
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        ~Difficulty swallowing  SLP eval and treat  VL barium swallow study patient qualified for dysphagia soft bite-size thin liquid nectar. ~MRSA Bacteremia (stable)  Temperature 99  (8/14) ESR 49, ->140s  Blood cultures were positive for MRSA through PCR. Elevated leukocyte count 12.8  Patient is on Vancomycin. ID consulted 8/21:  Continue IV vancomycin ,monitor renal function and vancomycin levels closely. Repeat blood cultures 8/16 grew MRSA. CT thoracic and lumbar spine w contrast: No CT evidence of discitis-osteomyelitis.     ~Superficial Venous Thrombophlebitis (stable)  -VL upper extremity left: Superficial vein thrombophlebitis is noted in 2 braches fromthe basilar vein near the antecubital fossa.  -Lovenox 40 mg once daily. DVT scan repeat.     ~Conjunctivitis (stable)  Erythema bilaterally, with some exudate bilaterally,PERRL. Erythromycin ophthalmic ointment for 5 days     ~Acute urinary retention (resolved)  ( 8/12) Bladder scan: 460 mL  Straight cath twice  Baker's catheter in place. Flomax 0.4 mg.     ~Acute kidney injury  Nephrology on board 8/21: Natalya Minks on due to hypokalemia. Will start D5 water at 75 mL/h. Hyponatremia estimated free Water deficit 3 L.     ~Moderate Acute Alcohol withdrawal  On propofol drip.   On fentanyl drip.     ~Acute respiratory failure 2/2 empyema   CXR (August 16): Large right pleural effusion and right basilar opacities not significantlychanged from the previous study. Pro-Vasquez: 73 (8/12)   Pulmonology on board: Patient intubated at 1230.  No need for thoracocentesis  Trial of spontaneous breathing, RR 40s  Pneumonia work-up was negative for any strep antigens, Legionella, and mycoplasma antigens  Sputum culture in progress, direct exam showed mixed bacterial morphotypes. Blood culture positive for MRSA. Echo 35% EF. Cardio consulted: Plan for ERICKA. Keep K>4 and Mg>2. Pulmonology: Continue to follow up. Monitor sodium. Symbicort 160/4.5  On vancomycin.  -Chest tube output: 2.8 L.   -Inpatient consult to CT surgery: Intrapleural TPA for 3 days     ~Syncope and collapse (stable)  -CT head shows no acute intracranial abnormality  -EKG shows sinus tachycardia with unifocal PVCs, no acute ST segment changes as documented by ER physician  -Troponin 20, 15  - urinalysis and urine drug screen unremarkable.      ~Traumatic rhabdomyolysis (resolved)  -CK 1,222-> 22, myoglobin 3,509-->2884.      ~Hypokalemia (stable)  -Initial potassium 2.5->3.7-3.4->2.6-> 3.0-> 3.4  -Patient received potassium supplement in the ED.   -Recheck potassium this morning.  -Potassium sliding scale.     ~Acute mild alcoholic hepatitis  - Non reactive Hep A, B and C (2019)  - AST>ALT (95:51) ==> (45:32) ==> 42:27     Hyponatremia   -Initial sodium 127->139>144->147->142  -Daily BMP.     Alcohol abuse (resolved)  -LWZCAMR <14  -Thiamine, folic acid, multivitamin daily.  -Seizure and fall precautions.  -Consult social work for rehab, discharge planning.     ~Multiple right rib fractures (stable)  -CT scan shows Grade 2 chest wall injury with right 4th through 6th anterolateral rib fractures. Areas of pleural thickening likely areas of focal hemothorax near the rib fractures on the right. No acute inflammatory process within the abdomen pelvis. -Fentanyl as needed.  -Consult general surgery: Resume tube feeds following procedures. Surgically stable.     ~Closed left wrist fracture (moderate worsening )  -Velcro wrist splint in place  - left wrist is swollen. - left wrist X-ray: Subacute impacted fracture at the distal metaphysis of the left radius, wit visualized fracture line and partial healing of the fracture, grossly stable. -Worsening swelling. VL dup US upper extremity. Inpatient consult to Orthopedic surgery: Continue with the splint. No surgical intervention needed.        `COPD (stable)  -SPO2 96%  (intubated on 8/13 due to resp. Failure.  -Albuterol as needed  -Spiriva daily  -Ellipta daily     Smoking (stable)  -Nicotine patch     GI prophylaxis-Pepcid 20 mg IV twice daily. DVT prophylaxis--Lovenox 40 mg once daily   Diet: TPN . Disposition: PCU.   Diet: Dysphagia thin liquids soft bite sized  Keron Pruett MD  8/24/2021  8:43 AM

## 2021-08-24 NOTE — PROGRESS NOTES
[facility]  Daily Progress Note    Surgeon: Dr Rafael Rodrigues   CC:  Empyema, right  Plan: Second dose of intrapleural TPA today      Subjective:  Mr. Evie De Souza feels well today with no acute complaints. Pain is controlled on current medication regimen. He is currently on room air with no shortness of breath or acute signs of distress. Patient reports improvement in breathing pattern following initial dose of intrapleural TPA and is no longer requiring oxygen via nasal cannula. Second dose of TPA to be administered today with 3 hour dwell time. Encourage OOBTC and ambulating. Denies chest pain or shortness of breath. Plan of care reviewed and questions answered. Physical Exam  Vital Signs: BP (!) 96/55   Pulse 96   Temp 99 °F (37.2 °C) (Oral)   Resp 18   Ht 5' 9\" (1.753 m)   Wt 150 lb 2.1 oz (68.1 kg)   SpO2 90%   BMI 22.17 kg/m²  O2 Flow Rate (L/min): 1 L/min   Admit Weight: Weight: 150 lb (68 kg)   WEIGHTWeight: 150 lb 2.1 oz (68.1 kg)     General: alert and oriented to person, place and time, thin/underweight, in no acute distress. Resting in bed  Heart: Normal S1 and S2.  Regular rhythm. No murmurs, gallops, or rubs. Lungs: clear to auscultation bilaterally and diminished breath sounds bibasilar  Abdomen: soft, non tender, non distended, BSx4  Extremities: no edema  Skin: warm and dry. Chest tube dressing clean, dry and intact.      Scheduled Meds:    famotidine  20 mg Oral BID    insulin lispro  0-12 Units Subcutaneous TID     insulin lispro  0-6 Units Subcutaneous Nightly    metoprolol succinate  12.5 mg Oral Nightly    ipratropium-albuterol  1 ampule Inhalation Q4H While awake    tamsulosin  0.4 mg Oral Daily    enoxaparin  40 mg Subcutaneous Daily    vancomycin  1,250 mg IntraVENous Q24H    insulin glargine  10 Units Subcutaneous Nightly    vancomycin (VANCOCIN) intermittent dosing (placeholder)   Other RX Placeholder    sodium chloride flush  5-40 mL IntraVENous 2 times per day    nicotine  1 patch Transdermal Daily    lidocaine  1 patch Transdermal Daily     Continuous Infusions:    sodium chloride 75 mL/hr at 08/24/21 1208    dextrose      sodium chloride      sodium chloride 25 mL (08/19/21 1717)       Data:  CBC:   Recent Labs     08/22/21  0412 08/23/21  0511 08/24/21  0506   WBC 12.5* 12.8* 13.0*   HGB 8.8* 9.4* 8.4*   HCT 26.8* 28.1* 25.0*   MCV 97.0 97.3 96.4    405 408     BMP:   Recent Labs     08/22/21  0412 08/22/21  1404 08/23/21  0511 08/23/21  0511 08/23/21  1859 08/23/21  2251 08/24/21  0506      < > 142   < > 138 138 135   K 4.0   < > 3.8   < > 3.6* 3.7 3.5*   *   < > 108*   < > 102 103 101   CO2 26   < > 25   < > 25 25 26   BUN 22*  --  17  --   --   --  13   CREATININE 1.01  --  1.02  --   --   --  1.08    < > = values in this interval not displayed. PT/INR: No results for input(s): PROTIME, INR in the last 72 hours. APTT: No results for input(s): APTT in the last 72 hours. Chest X-Ray:    ONE XRAY VIEW OF THE CHEST       8/24/2021 7:59 am       COMPARISON:   Chest radiograph performed 08/23/2021.       HISTORY:   ORDERING SYSTEM PROVIDED HISTORY: empyema   TECHNOLOGIST PROVIDED HISTORY:   empyema   Reason for Exam: sob   Acuity: Unknown   Type of Exam: Unknown       FINDINGS:   There is consolidation over the right lung base that is stable compared to   prior.  Stable right-sided effusion. Jose Bell is a stable indwelling right   basilar drain.  The heart is prominent.  The upper abdomen is unremarkable. The extrathoracic soft tissues are unremarkable.           Impression   Similar right lower lung consolidation and effusion with indwelling drain.             I/O:  I/O last 3 completed shifts: In: 4699 [P.O.:440; I.V.:600]  Out: 1915 [Urine:1100;  Chest Tube:815]      Assessment:  Empyema, right  Pleural effusion, left  MRSA Bacteremia  Infectious Disease following  Acute respiratory failure  Pulmonology following  COPD  Superficial Venous Thrombophlebitis  Conjunctivitis  Acute urinary retention  Acute kidney Injury  Nephrology following  Alcohol abuse with moderate acute withdrawal   Acute mild alcoholic hepatitis secondary to above  Syncope and collapse  Cardiology following  Traumatic rhabdomyolysis, resolved  Hypertension  Severe malnutrition  Depression  Tobacco dependence      Plan:   Maintain right chest tube to wall suction  Plan intrapleural TPA administration  Once daily x 3 days, second dose today  Fentanyl 50 mcg once prior to procedure  3 hour dwell time  Chart output twice daily at 06:00 and 18:00  Change chest tube dressing daily with occlusive gauze  Prefer Zero Form if available  Repeat CT chest Thursday morning, tentatively  Thank you for including us in the care of this patient    The above recommendations including medications and orders were discussed and agreed upon with Dr. Olu Stroud, the attending on service for the cardiothoracic surgery group today. Electronically signed by DONNY Gilmore CNP on 8/24/2021 at 12:16 PM    On this date 8/24/2021 I have spent 35 minutes reviewing previous notes, test results and face to face with the patient discussing the diagnosis and importance of compliance with the treatment plan as well as documenting on the day of the visit. At least 50% of the time documented was spent with the patient to provide counseling and/or coordination of care. This note was created with the assistance of a speech-recognition program.  Although the intention is to generate a document that actually reflects the content of the visit, no guarantees can be provided that every mistake has been identified and corrected by editing. Note was updated later by me after  physical examination and  completion of the assessment.

## 2021-08-24 NOTE — PLAN OF CARE
Nutrition Problem #1: Severe malnutrition  Intervention: Food and/or Nutrient Delivery: Continue Current Diet, Start Oral Nutrition Supplement  Nutritional Goals: Meet % of estimated nutrition needs

## 2021-08-24 NOTE — PLAN OF CARE
Problem: Falls - Risk of:  Goal: Will remain free from falls  Description: Will remain free from falls  8/24/2021 0425 by Gadiel Barrow RN  Outcome: Ongoing  8/23/2021 1747 by Thompson Noriega RN  Outcome: Ongoing  Goal: Absence of physical injury  Description: Absence of physical injury  8/24/2021 0425 by Gadiel Barrow RN  Outcome: Ongoing  8/23/2021 1747 by Thompson Noriega RN  Outcome: Ongoing     Problem: Skin Integrity:  Goal: Will show no infection signs and symptoms  Description: Will show no infection signs and symptoms  8/24/2021 0425 by Gadiel Barrow RN  Outcome: Ongoing  8/23/2021 1747 by Thompson Noriega RN  Outcome: Ongoing  Goal: Absence of new skin breakdown  Description: Absence of new skin breakdown  8/24/2021 0425 by Gadiel Barrow RN  Outcome: Ongoing  8/23/2021 1747 by Thompson Noriega RN  Outcome: Ongoing

## 2021-08-24 NOTE — PROGRESS NOTES
3.3 L over the  last 24 hours after IV Lasix. Patient is however 13 L positive balance since admission. Objective/     Vitals:    08/23/21 2340 08/24/21 0800 08/24/21 0806 08/24/21 1059   BP: 124/65 (!) 96/55     Pulse: 78 96     Resp: 16 18 18 18   Temp: 98.2 °F (36.8 °C) 99 °F (37.2 °C)     TempSrc: Oral Oral     SpO2: 92% 90% 90% 90%   Weight:       Height:         24HR INTAKE/OUTPUT:      Intake/Output Summary (Last 24 hours) at 8/24/2021 1215  Last data filed at 8/24/2021 5473  Gross per 24 hour   Intake 1040 ml   Output 1915 ml   Net -875 ml     Patient Vitals for the past 96 hrs (Last 3 readings):   Weight   08/23/21 0330 150 lb 2.1 oz (68.1 kg)     Constitutional: Awake and responds to verbal stimuli; intubated and on ventilator support. Cardiovascular:  S1, S2 without pericardial rub or gallop. Respiratory: Clinically clear. Abdomen: Full, soft with normal bowel sounds. Extremities:  LE edema    Data/  Recent Labs     08/22/21  0412 08/23/21  0511 08/24/21  0506   WBC 12.5* 12.8* 13.0*   HGB 8.8* 9.4* 8.4*   HCT 26.8* 28.1* 25.0*   MCV 97.0 97.3 96.4    405 408     Recent Labs     08/22/21  0412 08/22/21  1404 08/23/21  0511 08/23/21  0511 08/23/21  1859 08/23/21  2251 08/24/21  0506      < > 142   < > 138 138 135   K 4.0   < > 3.8   < > 3.6* 3.7 3.5*   *   < > 108*   < > 102 103 101   CO2 26   < > 25   < > 25 25 26   GLUCOSE 114*  --  81  --   --   --  89   MG 2.2  --  2.0  --   --   --  1.9   BUN 22*  --  17  --   --   --  13   CREATININE 1.01  --  1.02  --   --   --  1.08   LABGLOM >60  --  >60  --   --   --  >60   GFRAA >60  --  >60  --   --   --  >60    < > = values in this interval not displayed. Assessment/plan:     1. Acute kidney injury - nonoliguric secondary to decreased effective arterial volume, severe hypoalbuminemia and sepsis. No evidence of acute rhabdomyolysis. Renal ultrasound showed no hydronephrosis or obstruction.   Serologic studies are negative with negative ASTRID and normal complements. Continue to monitor urine output closely. Avoid nephrotoxic agents.     2. Hypokalemia - secondary to diuretic therapy-40 of KCl given today     3. MRSA bacteremia -patient is on IV vancomycin. Monitor Vanco trough levels for  potential nephrotoxicity.     4. Hypernatremia - Serum sodium is improved to 135 mmol/L-discontinue IV fluids    5. Large right-sided parapneumonic pleural effusion, possibly loculated on chest CT-S/P  Left  thoracentesis 8/17/21and right chest tube placement.       We will sign off

## 2021-08-24 NOTE — PROGRESS NOTES
PULMONARY PROGRESS NOTE:    REASON FOR VISIT: resp failure, DTs  Interval History:    Events since last visit: none  Minimal CT drainage  Had pulmozyme/ TPA instilled 8/23/by CT surgery    PAST MEDICAL HISTORY:      Scheduled Meds:   famotidine  20 mg Oral BID    alteplase (ACTIVASE) syringe  5 mg Intrapleural Once    And    dornase (PULMOZYME) syringe  5 mg Intrapleural Once    insulin lispro  0-12 Units Subcutaneous TID WC    insulin lispro  0-6 Units Subcutaneous Nightly    metoprolol succinate  12.5 mg Oral Nightly    ipratropium-albuterol  1 ampule Inhalation Q4H While awake    tamsulosin  0.4 mg Oral Daily    enoxaparin  40 mg Subcutaneous Daily    vancomycin  1,250 mg Intravenous Q24H    insulin glargine  10 Units Subcutaneous Nightly    vancomycin (VANCOCIN) intermittent dosing (placeholder)   Other RX Placeholder    sodium chloride flush  5-40 mL Intravenous 2 times per day    nicotine  1 patch Transdermal Daily    lidocaine  1 patch Transdermal Daily     Continuous Infusions:   sodium chloride 50 mL/hr at 08/23/21 1658    dextrose      sodium chloride      sodium chloride 25 mL (08/19/21 1717)     PRN Meds:morphine **OR** morphine, sodium chloride flush, potassium chloride, perflutren lipid microspheres, glucose, dextrose, glucagon (rDNA), dextrose, sodium phosphate IVPB **OR** sodium phosphate IVPB, sodium chloride, sodium chloride flush, sodium chloride, polyethylene glycol, acetaminophen **OR** acetaminophen, magnesium sulfate, ondansetron, LORazepam **OR** LORazepam **OR** LORazepam **OR** LORazepam **OR** LORazepam **OR** LORazepam **OR** LORazepam **OR** LORazepam, albuterol sulfate HFA, sodium chloride flush        PHYSICAL EXAMINATION:  BP (!) 96/55   Pulse 96   Temp 99 °F (37.2 °C) (Oral)   Resp 18   Ht 5' 9\" (1.753 m)   Wt 150 lb 2.1 oz (68.1 kg)   SpO2 90%   BMI 22.17 kg/m²   afebrile  General : awake,alert,oriented on RA  Neck - supple, no lymphadenopathy, JVD not raised  Heart - SR  Lungs - Air Entry- fair bilaterally; breath sounds : vesicular, 90% on 1 l nc  Abdomen - soft, no tenderness  Upper Extremities  - no cyanosis, mottling; edema : edema  Lower Extremities: no cyanosis, mottling; edema : absent    Current Laboratory, Radiologic, Microbiologic, and Diagnostic studies reviewed  Data ReviewCBC:   Recent Labs     08/22/21  0412 08/23/21  0511 08/24/21  0506   WBC 12.5* 12.8* 13.0*   RBC 2.76* 2.88* 2.59*   HGB 8.8* 9.4* 8.4*   HCT 26.8* 28.1* 25.0*    405 408     BMP:   Recent Labs     08/22/21  0412 08/22/21  1404 08/23/21  0511 08/23/21  0511 08/23/21  1859 08/23/21  2251 08/24/21  0506   GLUCOSE 114*  --  81  --   --   --  89      < > 142   < > 138 138 135   K 4.0   < > 3.8   < > 3.6* 3.7 3.5*   BUN 22*  --  17  --   --   --  13   CREATININE 1.01  --  1.02  --   --   --  1.08   CALCIUM 7.6*  --  7.8*  --   --   --  7.6*    < > = values in this interval not displayed. ABGs:   No results for input(s): PHART, PO2ART, KYF7UXC, YPA4XOU, BEART, O6SFYGZJ, JHD3HMH in the last 72 hours.    PT/INR:  No results found for: PTINR    ASSESSMENT / PLAN:    Alcohol withdrawal - resolved  Thiamine, folic acid  atelectasis  RLL infiltrate / empyema  Acute hypoxic resp failure - Mechanical ventilation, extubated 8-20-21  bacteremia - continue current ABx  ABx per ID; rt chest tube   TPA/ pulmozyme per CT surgery    Electronically signed by Jax Stack MD on 08/24/21 at 10:10 AM

## 2021-08-24 NOTE — PROGRESS NOTES
[facility]   OCCUPATIONAL THERAPY MISSED TREATMENT NOTE   INPATIENT   Date: 21  Patient Name: Opal Griggs       Room: 7165/6709-86  MRN: [id number]   Account #: [de-identified]    : 1965  (60 y.o.)  Gender: male                 REASON FOR MISSED TREATMENT:  Other   -   Hold Occupational therapy evaluation. Venous scan is ordered for bilateral LEs and left UE. Occupational therapy will await results.        Kathy eVra, OT

## 2021-08-24 NOTE — PROGRESS NOTES
Comprehensive Nutrition Assessment    Type and Reason for Visit:  Reassess    Nutrition Recommendations/Plan: Continue diet as ordered. Provide Magic Cup at lunch & dinner. Nutrition Assessment:  Pt went for modified barium swallow study yesterday with recommendation for Soft & Bite Sized with Nectar thick liquids. Pt happy to get real food and eating %, adding Magic Cup at lunch & dinner. Malnutrition Assessment:  Malnutrition Status:  Severe malnutrition    Context:  Acute Illness     Findings of the 6 clinical characteristics of malnutrition:  Energy Intake:  1 - 75% or less of estimated energy requirements for 7 or more days (Prior to admission)  Weight Loss:  1 - 7.5% over 3 months     Body Fat Loss:  Unable to assess     Muscle Mass Loss:  7 - Moderate muscle mass loss Clavicles (pectoralis & deltoids), Thigh (quadraceps)  Fluid Accumulation:  No significant fluid accumulation Extremities   Strength:  Not Performed    Estimated Daily Nutrient Needs:  Energy (kcal):  5429-0690 kcals based on 28-30 kcal/kg using adm wt 61.6 kg; Weight Used for Energy Requirements:  Admission (revised)     Protein (g):  105-117 gm protein based on 1.7-1.9 gm/kg using adm wt; Weight Used for Protein Requirements:  Admission (revised)          Nutrition Related Findings:  Edema: +1 RUE, +1 pitting LUE, Trace-BLE's. Labs & meds reviewed. Last BM- 8/22. Wounds:   (Abrasions)       Current Nutrition Therapies:    ADULT DIET; Dysphagia - Soft and Bite Sized; Mildly Thick ([place])  Adult Oral Nutrition Supplement; Frozen Oral Supplement    Anthropometric Measures:  · Height: 5' 9\" (175.3 cm)  · Current Body Weight: 150 lb (68 kg)   · Admission Body Weight: 135 lb 12.9 oz (61.6 kg)    · Usual Body Weight: 150 lb (68 kg) (Estimated)     · Ideal Body Weight: 160 lbs; % Ideal Body Weight 84.9 %   · BMI: 22.1  · BMI Categories: Normal Weight (BMI 18.5-24. 9)       Nutrition Diagnosis:   · Severe malnutrition related to inadequate protein-energy intake as evidenced by weight loss 7.5% in 3 months, poor intake prior to admission, moderate muscle loss      Nutrition Interventions:   Food and/or Nutrient Delivery:  Continue Current Diet, Start Oral Nutrition Supplement  Nutrition Education/Counseling:  No recommendation at this time   Coordination of Nutrition Care:  Continue to monitor while inpatient    Goals:  Meet % of estimated nutrition needs       Nutrition Monitoring and Evaluation:   Behavioral-Environmental Outcomes:  None Identified   Food/Nutrient Intake Outcomes:  Food and Nutrient Intake, Supplement Intake  Physical Signs/Symptoms Outcomes:  Biochemical Data, Fluid Status or Edema, GI Status, Hemodynamic Status, Nutrition Focused Physical Findings, Skin, Weight     Discharge Planning: Too soon to determine     Some areas of assessment may be incomplete due to COVID-19 precautions. Maritza Turner R.D., L.D.   Clinical Dietitian  Office: [phone]

## 2021-08-24 NOTE — PLAN OF CARE
Problem: Falls - Risk of:  Goal: Will remain free from falls  Description: Will remain free from falls  8/24/2021 1618 by Lennox Us RN  Outcome: Ongoing  Note: The patient remained free from falls this shift, call light within reach, bed in locked and lowest position. Side rails up x2. Continue to monitor closely. Problem: Skin Integrity:  Goal: Will show no infection signs and symptoms  Description: Will show no infection signs and symptoms  8/24/2021 1618 by Lennox Us RN  Outcome: Ongoing  Note: Skin assessment complete. Pt turned and repositioned per self. Area kept free from moisture. Proper nourishment and fluids encouraged, as appropriate. Skin remains clean, dry, and intact. Will continue to monitor for additional needs and changes in skin breakdown. Problem: Pain:  Goal: Pain level will decrease  Description: Pain level will decrease  8/24/2021 1618 by Lennox Us RN  Outcome: Ongoing  Note: Pt got a one time dose of fentanyl per the cardiothoracic NP this shift and pt reported more relief with that. Pt reports that the morphine does not help. RN notified residents at this time.

## 2021-08-24 NOTE — PROGRESS NOTES
[facility] [address]                 PATIENT NAME: Kya Montanez     TODAY'S DATE: 8/24/2021, 9:00 AM    SUBJECTIVE:    Pt seen and examined. Afebrile, VSS. Leukocytosis about the same, hemoglobin decreased but stable. Patient breathing improved today however still having mild rib pain. Receiving breathing treatment. S/p left thoracentesis per IR yesterday. Right sided chest tube in place. No air leak noted. Passed barium swallow yesterday; tolerated regular dinner and breakfast this morning. OBJECTIVE:   VITALS:  BP (!) 96/55   Pulse 96   Temp 99 °F (37.2 °C) (Oral)   Resp 18   Ht 5' 9\" (1.753 m)   Wt 150 lb 2.1 oz (68.1 kg)   SpO2 90%   BMI 22.17 kg/m²      INTAKE/OUTPUT:      Intake/Output Summary (Last 24 hours) at 8/24/2021 0900  Last data filed at 8/24/2021 7402  Gross per 24 hour   Intake 1040 ml   Output 1915 ml   Net -875 ml                 CONSTITUTIONAL:  awake and alert.   No acute distress  HEART:   RRR  LUNGS:   CTA following breathing treatment, no wheezing   ABDOMEN:   Abdomen soft, non-tender, non-distended  EXTREMITIES:   No pedal edema, LUE in brace     Data:  CBC:   Lab Results   Component Value Date    WBC 13.0 08/24/2021    RBC 2.59 08/24/2021    HGB 8.4 08/24/2021    HCT 25.0 08/24/2021    MCV 96.4 08/24/2021    MCH 32.5 08/24/2021    MCHC 33.7 08/24/2021    RDW 14.6 08/24/2021     08/24/2021    MPV 7.5 08/24/2021     BMP:    Lab Results   Component Value Date     08/24/2021    K 3.5 08/24/2021     08/24/2021    CO2 26 08/24/2021    BUN 13 08/24/2021    LABALBU 2.0 08/24/2021    CREATININE 1.08 08/24/2021    CALCIUM 7.6 08/24/2021    GFRAA >60 08/24/2021    LABGLOM >60 08/24/2021    GLUCOSE 89 08/24/2021       Radiology Review:       CT CHEST WO CONTRAST [[id number]] Collected: 08/23/21 1032     Order Status: Completed Updated: 08/23/21 1907     Narrative:       EXAMINATION:   CT OF THE CHEST WITHOUT CONTRAST 8/23/2021 10:12 am TECHNIQUE:   CT of the chest was performed without the administration of intravenous   contrast. Multiplanar reformatted images are provided for review. Dose   modulation, iterative reconstruction, and/or weight based adjustment of the   mA/kV was utilized to reduce the radiation dose to as low as reasonably   achievable. COMPARISON:   16 August 2021     HISTORY:   ORDERING SYSTEM PROVIDED HISTORY: right empyema   TECHNOLOGIST PROVIDED HISTORY:   right empyema   Reason for Exam: right empyema   Acuity: Unknown   Type of Exam: Unknown   Additional signs and symptoms: follow up chest tube placement     FINDINGS:   Mediastinum: Prior intestinal tube has been removed.  Prominent mediastinal   lymph nodes are present similar to prior exam.  Heart size is stable,   borderline to mildly enlarged with trace epicardial fluid.  No epicardial   adenopathy. Lungs/pleura: The patient has loculated extrapleural fluid and air collection   consistent with empyema on the right.  Amount of fluid has reduced somewhat. Pigtail terminus drainage tube is present at the right base posteriorly. Compressive atelectasis is noted as well as scattered ground-glass pulmonary   opacities in the right hemithorax consistent with multifocal pneumonitis. Moderate to large uncomplicated left pleural effusion is noted with   compressive atelectasis and consolidative type process left lower lobe as   well as some ground-glass lingular opacities in the mid lung. Tracheobronchial tree is patent centrally. Upper Abdomen: Atherosclerotic disease in the aorta and branches is noted. Gallbladder appears hyperdense, possibly vicarious excretion of contrast.   Upper abdominal included structures otherwise unremarkable.      Soft Tissues/Bones: Fractures right anterior 5th and 6th ribs again noted.      Impression:       1.  Interval placement of a pigtail terminus drainage catheter inferiorly in   the right posterior costophrenic pineda. 2.  Reduction in loculated extrapleural complicated collection, right side,   now containing some air bubbles consistent with empyema.  Significant fluid   still remains. 3.  Compressive atelectasis right lower lobe with mild consolidation. 4.  Scattered pulmonary opacities right hemithorax consistent with multifocal   pneumonitis. 5.  Sizable left pleural effusion with compressive atelectasis and some   consolidative process in the left lower lobe. 6.  Pulmonary opacity left mid lung field consistent with airspace disease in   the lingula. 7.  Stable mediastinal adenopathy. 8.  Right-sided rib fractures anteriorly.      IR GUIDED THORACENTESIS PLEURAL [[id number]] Collected: 08/23/21 1549     Order Status: Completed Updated: 08/23/21 1651     Narrative:       PROCEDURE:   ULTRASOUNDGUIDED LEFT THORACENTESIS     8/23/2021     HISTORY:   ORDERING SYSTEM PROVIDED HISTORY: left pleural effusion, thoracentesis vs   pigtail placement   TECHNOLOGIST PROVIDED HISTORY:   left pleural effusion, thoracentesis vs pigtail placement   Which side should the procedure be performed? ->Left     Small left pleural effusion     TECHNIQUE:   This procedure was performed by Dr. Kathryn Galeas.  Informed consent was obtained   after a detailed explanation of the procedure including risks, benefits, and   alternatives.  Universal protocol was performed. The left chest was prepped   and draped in sterile fashion and local anesthesia was achieved with   lidocaine.  Initial ultrasound images show small left pleural effusion.  A 5   Spanish needle sheath was advanced under ultrasound guidance into pleural   effusion and thoracentesis was performed. The patient tolerated the procedure   well. The planned procedure was discussed with CAMERON MCKEON at approximately 2   pm on 8/23/2021. David Singer was made to perform thoracentesis only due to the   relatively small quantity of fluid present.      EBL: None FINDINGS:   A total of 100 mL of clear yellow fluid was removed.  Fluid was sent for the   requested studies.      Impression:       Successful ultrasound guided left thoracentesis. ASSESSMENT     Principal Problem:    Syncope and collapse  Active Problems:    COPD (chronic obstructive pulmonary disease) ([facility])    Smoking addiction    Dyslipidemia    Alcohol abuse    Hypokalemia    Hyponatremia    Traumatic rhabdomyolysis ([facility])    Closed fracture of multiple ribs of right side    Closed fracture of left wrist    Severe malnutrition ([facility])    Fever    Conjunctivitis    Acute respiratory failure ([facility])    Superficial venous thrombosis of arm, left    MRSA bacteremia    Empyema lung ([facility])    Elevated C-reactive protein (CRP)    Leukocytosis  Resolved Problems:    * No resolved hospital problems. *      Plan  1. Diet as tolerated  2. Chest tube to suction   3. Cardiothoracic surgery following  4. Antibiotics per ID  5. Surgically stable  6.  Continue medical management       Electronically signed by Rina Casper PA-C  [address] [address]

## 2021-08-25 ENCOUNTER — APPOINTMENT (OUTPATIENT)
Dept: GENERAL RADIOLOGY | Age: 56
DRG: 351 | End: 2021-08-25
Payer: MEDICAID

## 2021-08-25 LAB
ALBUMIN SERPL-MCNC: 2.1 G/DL (ref 3.5–5.2)
ALBUMIN/GLOBULIN RATIO: ABNORMAL (ref 1–2.5)
ALP BLD-CCNC: 76 U/L (ref 40–129)
ALT SERPL-CCNC: 17 U/L (ref 5–41)
ANION GAP SERPL CALCULATED.3IONS-SCNC: 8 MMOL/L (ref 9–17)
ANION GAP SERPL CALCULATED.3IONS-SCNC: 9 MMOL/L (ref 9–17)
ANION GAP SERPL CALCULATED.3IONS-SCNC: 9 MMOL/L (ref 9–17)
APPEARANCE FLUID: NORMAL
AST SERPL-CCNC: 24 U/L
BILIRUB SERPL-MCNC: 0.67 MG/DL (ref 0.3–1.2)
BUN BLDV-MCNC: 11 MG/DL (ref 6–20)
BUN/CREAT BLD: ABNORMAL (ref 9–20)
CALCIUM SERPL-MCNC: 7.6 MG/DL (ref 8.6–10.4)
CHLORIDE BLD-SCNC: 103 MMOL/L (ref 98–107)
CHLORIDE BLD-SCNC: 105 MMOL/L (ref 98–107)
CHLORIDE BLD-SCNC: 105 MMOL/L (ref 98–107)
CO2: 22 MMOL/L (ref 20–31)
CO2: 23 MMOL/L (ref 20–31)
CO2: 25 MMOL/L (ref 20–31)
COLOR FLUID: YELLOW
CREAT SERPL-MCNC: 1.1 MG/DL (ref 0.7–1.2)
CULTURE: NORMAL
CULTURE: NORMAL
FLOW CYTOMETRY SOURCE: NORMAL
FLOW CYTOMETRY, NODE/FLUID: NORMAL
GFR AFRICAN AMERICAN: >60 ML/MIN
GFR NON-AFRICAN AMERICAN: >60 ML/MIN
GFR SERPL CREATININE-BSD FRML MDRD: ABNORMAL ML/MIN/{1.73_M2}
GFR SERPL CREATININE-BSD FRML MDRD: ABNORMAL ML/MIN/{1.73_M2}
GLUCOSE BLD-MCNC: 92 MG/DL (ref 75–110)
GLUCOSE BLD-MCNC: 93 MG/DL (ref 75–110)
GLUCOSE BLD-MCNC: 96 MG/DL (ref 70–99)
HCT VFR BLD CALC: 24.6 % (ref 41–53)
HEMOGLOBIN: 8.2 G/DL (ref 13.5–17.5)
Lab: NORMAL
Lab: NORMAL
MAGNESIUM: 1.9 MG/DL (ref 1.6–2.6)
MCH RBC QN AUTO: 32.2 PG (ref 26–34)
MCHC RBC AUTO-ENTMCNC: 33.3 G/DL (ref 31–37)
MCV RBC AUTO: 96.8 FL (ref 80–100)
NRBC AUTOMATED: ABNORMAL PER 100 WBC
PDW BLD-RTO: 14.3 % (ref 11.5–14.9)
PLATELET # BLD: 398 K/UL (ref 150–450)
PMV BLD AUTO: 7.4 FL (ref 6–12)
POTASSIUM SERPL-SCNC: 3.6 MMOL/L (ref 3.7–5.3)
POTASSIUM SERPL-SCNC: 3.9 MMOL/L (ref 3.7–5.3)
POTASSIUM SERPL-SCNC: 3.9 MMOL/L (ref 3.7–5.3)
RBC # BLD: 2.54 M/UL (ref 4.5–5.9)
RBC FLUID: NORMAL /MM3
SODIUM BLD-SCNC: 134 MMOL/L (ref 135–144)
SODIUM BLD-SCNC: 137 MMOL/L (ref 135–144)
SODIUM BLD-SCNC: 138 MMOL/L (ref 135–144)
SPECIMEN DESCRIPTION: NORMAL
SPECIMEN DESCRIPTION: NORMAL
SPECIMEN TYPE: NORMAL
SURGICAL PATHOLOGY REPORT: NORMAL
TOTAL PROTEIN: 5.1 G/DL (ref 6.4–8.3)
WBC # BLD: 12.7 K/UL (ref 3.5–11)
WBC FLUID: NORMAL /MM3

## 2021-08-25 PROCEDURE — 80051 ELECTROLYTE PANEL: CPT

## 2021-08-25 PROCEDURE — 6360000002 HC RX W HCPCS: Performed by: INTERNAL MEDICINE

## 2021-08-25 PROCEDURE — 36415 COLL VENOUS BLD VENIPUNCTURE: CPT

## 2021-08-25 PROCEDURE — 6370000000 HC RX 637 (ALT 250 FOR IP): Performed by: STUDENT IN AN ORGANIZED HEALTH CARE EDUCATION/TRAINING PROGRAM

## 2021-08-25 PROCEDURE — 6370000000 HC RX 637 (ALT 250 FOR IP): Performed by: INTERNAL MEDICINE

## 2021-08-25 PROCEDURE — 2580000003 HC RX 258: Performed by: INTERNAL MEDICINE

## 2021-08-25 PROCEDURE — 2580000003 HC RX 258: Performed by: NURSE PRACTITIONER

## 2021-08-25 PROCEDURE — 92526 ORAL FUNCTION THERAPY: CPT

## 2021-08-25 PROCEDURE — 94664 DEMO&/EVAL PT USE INHALER: CPT

## 2021-08-25 PROCEDURE — 80053 COMPREHEN METABOLIC PANEL: CPT

## 2021-08-25 PROCEDURE — 6370000000 HC RX 637 (ALT 250 FOR IP): Performed by: NURSE PRACTITIONER

## 2021-08-25 PROCEDURE — 97166 OT EVAL MOD COMPLEX 45 MIN: CPT

## 2021-08-25 PROCEDURE — 94761 N-INVAS EAR/PLS OXIMETRY MLT: CPT

## 2021-08-25 PROCEDURE — 94660 CPAP INITIATION&MGMT: CPT

## 2021-08-25 PROCEDURE — 2580000003 HC RX 258: Performed by: STUDENT IN AN ORGANIZED HEALTH CARE EDUCATION/TRAINING PROGRAM

## 2021-08-25 PROCEDURE — 2700000000 HC OXYGEN THERAPY PER DAY

## 2021-08-25 PROCEDURE — 85027 COMPLETE CBC AUTOMATED: CPT

## 2021-08-25 PROCEDURE — 94640 AIRWAY INHALATION TREATMENT: CPT

## 2021-08-25 PROCEDURE — 6360000002 HC RX W HCPCS: Performed by: STUDENT IN AN ORGANIZED HEALTH CARE EDUCATION/TRAINING PROGRAM

## 2021-08-25 PROCEDURE — 82947 ASSAY GLUCOSE BLOOD QUANT: CPT

## 2021-08-25 PROCEDURE — 97530 THERAPEUTIC ACTIVITIES: CPT

## 2021-08-25 PROCEDURE — 99232 SBSQ HOSP IP/OBS MODERATE 35: CPT | Performed by: INTERNAL MEDICINE

## 2021-08-25 PROCEDURE — 6370000000 HC RX 637 (ALT 250 FOR IP): Performed by: PHYSICIAN ASSISTANT

## 2021-08-25 PROCEDURE — 99232 SBSQ HOSP IP/OBS MODERATE 35: CPT | Performed by: NURSE PRACTITIONER

## 2021-08-25 PROCEDURE — 89220 SPUTUM SPECIMEN COLLECTION: CPT

## 2021-08-25 PROCEDURE — 71045 X-RAY EXAM CHEST 1 VIEW: CPT

## 2021-08-25 PROCEDURE — 2060000000 HC ICU INTERMEDIATE R&B

## 2021-08-25 PROCEDURE — 97164 PT RE-EVAL EST PLAN CARE: CPT

## 2021-08-25 PROCEDURE — 83735 ASSAY OF MAGNESIUM: CPT

## 2021-08-25 PROCEDURE — 6360000002 HC RX W HCPCS: Performed by: NURSE PRACTITIONER

## 2021-08-25 RX ORDER — DIPHENHYDRAMINE HCL 25 MG
25 TABLET ORAL EVERY 6 HOURS PRN
Status: DISCONTINUED | OUTPATIENT
Start: 2021-08-25 | End: 2021-08-31 | Stop reason: HOSPADM

## 2021-08-25 RX ORDER — POTASSIUM CHLORIDE 20 MEQ/1
40 TABLET, EXTENDED RELEASE ORAL ONCE
Status: COMPLETED | OUTPATIENT
Start: 2021-08-25 | End: 2021-08-25

## 2021-08-25 RX ORDER — FENTANYL CITRATE 50 UG/ML
25 INJECTION, SOLUTION INTRAMUSCULAR; INTRAVENOUS ONCE
Status: COMPLETED | OUTPATIENT
Start: 2021-08-25 | End: 2021-08-25

## 2021-08-25 RX ORDER — DOCUSATE SODIUM 100 MG/1
100 CAPSULE, LIQUID FILLED ORAL DAILY
Status: DISCONTINUED | OUTPATIENT
Start: 2021-08-25 | End: 2021-08-31 | Stop reason: HOSPADM

## 2021-08-25 RX ADMIN — ACETAMINOPHEN 650 MG: 325 TABLET, FILM COATED ORAL at 10:48

## 2021-08-25 RX ADMIN — METOPROLOL SUCCINATE 12.5 MG: 25 TABLET, EXTENDED RELEASE ORAL at 21:11

## 2021-08-25 RX ADMIN — IPRATROPIUM BROMIDE AND ALBUTEROL SULFATE 1 AMPULE: 2.5; .5 SOLUTION RESPIRATORY (INHALATION) at 15:15

## 2021-08-25 RX ADMIN — FAMOTIDINE 20 MG: 20 TABLET, FILM COATED ORAL at 21:12

## 2021-08-25 RX ADMIN — WATER 5 MG: 1 INJECTION INTRAMUSCULAR; INTRAVENOUS; SUBCUTANEOUS at 16:19

## 2021-08-25 RX ADMIN — MORPHINE SULFATE 4 MG: 4 INJECTION, SOLUTION INTRAMUSCULAR; INTRAVENOUS at 14:42

## 2021-08-25 RX ADMIN — SODIUM CHLORIDE, PRESERVATIVE FREE 10 ML: 5 INJECTION INTRAVENOUS at 21:12

## 2021-08-25 RX ADMIN — VANCOMYCIN HYDROCHLORIDE 1250 MG: 1.25 INJECTION, POWDER, LYOPHILIZED, FOR SOLUTION INTRAVENOUS at 23:26

## 2021-08-25 RX ADMIN — IPRATROPIUM BROMIDE AND ALBUTEROL SULFATE 1 AMPULE: 2.5; .5 SOLUTION RESPIRATORY (INHALATION) at 10:39

## 2021-08-25 RX ADMIN — POTASSIUM CHLORIDE 40 MEQ: 1500 TABLET, EXTENDED RELEASE ORAL at 07:56

## 2021-08-25 RX ADMIN — HYDROCODONE BITARTRATE AND ACETAMINOPHEN 1 TABLET: 5; 325 TABLET ORAL at 21:11

## 2021-08-25 RX ADMIN — FENTANYL CITRATE 25 MCG: 0.05 INJECTION, SOLUTION INTRAMUSCULAR; INTRAVENOUS at 16:11

## 2021-08-25 RX ADMIN — DIPHENHYDRAMINE HCL 25 MG: 25 TABLET ORAL at 23:26

## 2021-08-25 RX ADMIN — TAMSULOSIN HYDROCHLORIDE 0.4 MG: 0.4 CAPSULE ORAL at 07:46

## 2021-08-25 RX ADMIN — IPRATROPIUM BROMIDE AND ALBUTEROL SULFATE 1 AMPULE: 2.5; .5 SOLUTION RESPIRATORY (INHALATION) at 19:50

## 2021-08-25 RX ADMIN — SODIUM CHLORIDE, PRESERVATIVE FREE 10 ML: 5 INJECTION INTRAVENOUS at 07:46

## 2021-08-25 RX ADMIN — IPRATROPIUM BROMIDE AND ALBUTEROL SULFATE 1 AMPULE: 2.5; .5 SOLUTION RESPIRATORY (INHALATION) at 08:46

## 2021-08-25 RX ADMIN — FAMOTIDINE 20 MG: 20 TABLET, FILM COATED ORAL at 07:46

## 2021-08-25 RX ADMIN — ALTEPLASE 5 MG: 2.2 INJECTION, POWDER, LYOPHILIZED, FOR SOLUTION INTRAVENOUS at 16:19

## 2021-08-25 RX ADMIN — MORPHINE SULFATE 4 MG: 4 INJECTION, SOLUTION INTRAMUSCULAR; INTRAVENOUS at 10:01

## 2021-08-25 RX ADMIN — MORPHINE SULFATE 2 MG: 2 INJECTION, SOLUTION INTRAMUSCULAR; INTRAVENOUS at 07:56

## 2021-08-25 RX ADMIN — MORPHINE SULFATE 4 MG: 4 INJECTION, SOLUTION INTRAMUSCULAR; INTRAVENOUS at 18:00

## 2021-08-25 RX ADMIN — SODIUM CHLORIDE: 4.5 INJECTION, SOLUTION INTRAVENOUS at 13:14

## 2021-08-25 RX ADMIN — DOCUSATE SODIUM 100 MG: 100 CAPSULE, LIQUID FILLED ORAL at 13:14

## 2021-08-25 RX ADMIN — ENOXAPARIN SODIUM 40 MG: 40 INJECTION SUBCUTANEOUS at 07:46

## 2021-08-25 ASSESSMENT — PAIN DESCRIPTION - INTENSITY: RATING_2: 8

## 2021-08-25 ASSESSMENT — PAIN DESCRIPTION - PAIN TYPE
TYPE: ACUTE PAIN
TYPE: CHRONIC PAIN
TYPE: ACUTE PAIN
TYPE: ACUTE PAIN
TYPE: CHRONIC PAIN

## 2021-08-25 ASSESSMENT — PAIN DESCRIPTION - ORIENTATION
ORIENTATION: RIGHT
ORIENTATION: LEFT
ORIENTATION_2: RIGHT

## 2021-08-25 ASSESSMENT — PAIN SCALES - GENERAL
PAINLEVEL_OUTOF10: 10
PAINLEVEL_OUTOF10: 9
PAINLEVEL_OUTOF10: 8
PAINLEVEL_OUTOF10: 5
PAINLEVEL_OUTOF10: 8
PAINLEVEL_OUTOF10: 10
PAINLEVEL_OUTOF10: 9
PAINLEVEL_OUTOF10: 9
PAINLEVEL_OUTOF10: 10
PAINLEVEL_OUTOF10: 10
PAINLEVEL_OUTOF10: 9

## 2021-08-25 ASSESSMENT — PAIN DESCRIPTION - PROGRESSION: CLINICAL_PROGRESSION: NOT CHANGED

## 2021-08-25 ASSESSMENT — PAIN DESCRIPTION - DESCRIPTORS
DESCRIPTORS: ACHING
DESCRIPTORS: ACHING
DESCRIPTORS_2: ACHING
DESCRIPTORS: ACHING

## 2021-08-25 ASSESSMENT — PAIN DESCRIPTION - LOCATION
LOCATION: WRIST
LOCATION: RIB CAGE
LOCATION: WRIST;RIB CAGE
LOCATION: GENERALIZED
LOCATION_2: RIB CAGE
LOCATION: GENERALIZED

## 2021-08-25 ASSESSMENT — ENCOUNTER SYMPTOMS
NAUSEA: 0
ABDOMINAL PAIN: 0
PHOTOPHOBIA: 0
TROUBLE SWALLOWING: 1
CHEST TIGHTNESS: 0
VOMITING: 0
SHORTNESS OF BREATH: 0

## 2021-08-25 ASSESSMENT — PAIN - FUNCTIONAL ASSESSMENT: PAIN_FUNCTIONAL_ASSESSMENT: PREVENTS OR INTERFERES SOME ACTIVE ACTIVITIES AND ADLS

## 2021-08-25 ASSESSMENT — PAIN DESCRIPTION - FREQUENCY
FREQUENCY: CONTINUOUS

## 2021-08-25 ASSESSMENT — PAIN DESCRIPTION - ONSET: ONSET: ON-GOING

## 2021-08-25 NOTE — PROGRESS NOTES
Overnight: Patient met the criteria to qualify for LTAC. [facility] has been contacted. Pt was started on [address] prn. Patient was seen and evaluated at the bedside. Alert and oriented. Complains of generalized pain. Currently on norco Q6h. KCL ordered for low potassium. No blood culture or fluid culture growth seen. VL lower extremity Chronic superficial venous thrombosis identified in the great saphenous vein at the level of ankle. Currently on lovenox 40mg daily (plan to keep for 45 days). Pt also has superficial thormbophelibitis in left upper arm. Leucocyte count stable at 12.7 (yesterday was 13.0). Afebrile today. Plan: Pt will receive third dose of intrpleural TPA. Chest Xray and CT chest pending.

## 2021-08-25 NOTE — PROGRESS NOTES
PULMONARY PROGRESS NOTE:    REASON FOR VISIT: resp failure, DTs  Interval History:    Events since last visit: none  Minimal CT drainage  Had  TPA instilled 8/23-25/by CT surgery    PAST MEDICAL HISTORY:      Scheduled Meds:   vancomycin (VANCOCIN) intermittent dosing (placeholder)   Other RX Placeholder    docusate sodium  100 mg Oral Daily    famotidine  20 mg Oral BID    metoprolol succinate  12.5 mg Oral Nightly    ipratropium-albuterol  1 ampule Inhalation Q4H While awake    tamsulosin  0.4 mg Oral Daily    enoxaparin  40 mg Subcutaneous Daily    vancomycin  1,250 mg IntraVENous Q24H    sodium chloride flush  5-40 mL IntraVENous 2 times per day    nicotine  1 patch Transdermal Daily    lidocaine  1 patch Transdermal Daily     Continuous Infusions:   sodium chloride 75 mL/hr at 08/24/21 2354    dextrose      sodium chloride 25 mL (08/19/21 1717)     PRN Meds:HYDROcodone 5 mg - acetaminophen, morphine **OR** morphine, potassium chloride, perflutren lipid microspheres, glucose, dextrose, glucagon (rDNA), dextrose, sodium phosphate IVPB **OR** sodium phosphate IVPB, sodium chloride flush, sodium chloride, polyethylene glycol, acetaminophen **OR** acetaminophen, magnesium sulfate, ondansetron, LORazepam **OR** LORazepam **OR** LORazepam **OR** LORazepam **OR** LORazepam **OR** LORazepam **OR** LORazepam **OR** LORazepam, albuterol sulfate HFA        PHYSICAL EXAMINATION:  /65   Pulse 92   Temp 100.8 °F (38.2 °C) (Oral)   Resp 20   Ht 5' 9\" (1.753 m)   Wt 143 lb 11.8 oz (65.2 kg)   SpO2 92%   BMI 21.23 kg/m²   afebrile  General : awake,alert,oriented on RA  Neck - supple, no lymphadenopathy, JVD not raised  Heart - SR  Lungs - Air Entry- fair bilaterally; breath sounds : vesicular, 90% on 1 l nc  Abdomen - soft, no tenderness  Upper Extremities  - no cyanosis, mottling; edema : edema  Lower Extremities: no cyanosis, mottling; edema : absent    Current Laboratory, Radiologic, Microbiologic, and Diagnostic studies reviewed  Data ReviewCBC:   Recent Labs     08/23/21  0511 08/24/21  0506 08/25/21  0503   WBC 12.8* 13.0* 12.7*   RBC 2.88* 2.59* 2.54*   HGB 9.4* 8.4* 8.2*   HCT 28.1* 25.0* 24.6*    408 398     BMP:   Recent Labs     08/23/21  0511 08/23/21  1859 08/24/21  0506 08/24/21  0506 08/24/21  1355 08/24/21  2236 08/25/21  0503   GLUCOSE 81  --  89  --   --   --  96      < > 135   < > 137 138 138   K 3.8   < > 3.5*   < > 3.8 3.8 3.6*   BUN 17  --  13  --   --   --  11   CREATININE 1.02  --  1.08  --   --   --  1.10   CALCIUM 7.8*  --  7.6*  --   --   --  7.6*    < > = values in this interval not displayed. ABGs:   No results for input(s): PHART, PO2ART, SXD9VXZ, UTS8ZJY, BEART, G9PPECMO, VPV8YHF in the last 72 hours.    PT/INR:  No results found for: PTINR    ASSESSMENT / PLAN:    Alcohol withdrawal - resolved  Thiamine, folic acid  atelectasis  RLL infiltrate / empyema  Acute hypoxic resp failure - Mechanical ventilation, extubated 8-20-21  bacteremia - continue current ABx  ABx per ID; rt chest tube   TPA per CT surgery  Possible CT chest today  DW Dr Analilia Man    Electronically signed by Kellie Luna MD on 08/25/21 at 11:50 AM

## 2021-08-25 NOTE — PROGRESS NOTES
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 Bacteremia (stable)  Temperature 97.6 to get Soluspan  (8/14) ESR 49, ->140s  Blood cultures were positive for MRSA through PCR. Elevated leukocyte count 12.8  Patient is on Vancomycin. ID consulted 8/21:  IV vancomycin through September 10, 2021 ,monitor renal function and vancomycin levels closely. Repeat blood cultures 8/16 grew MRSA. CT thoracic and lumbar spine w contrast: No CT evidence of discitis-osteomyelitis. ~Superficial Venous Thrombophlebitis (stable)  - upper extremity left: Superficial vein thrombophlebitis is noted in 2 braches fromthe basilar vein near the antecubital fossa.  -Lovenox 40 mg once daily. DVT scan repeat.     ~Conjunctivitis (stable)  Erythema bilaterally, with some exudate bilaterally,PERRL.   Erythromycin ophthalmic ointment for 5 days     ~Acute urinary retention (resolved)  ( 8/12) Bladder scan: 460 mL  Straight cath twice  Baker's catheter in place. Flomax 0.4 mg.     ~Acute kidney injury  Nephrology on board 8/21: Kaye Thompson on due to hypokalemia. Will start D5 water at 75 mL/h. Hyponatremia estimated free Water deficit 3 L.     ~Moderate Acute Alcohol withdrawal  On propofol drip.   On fentanyl drip.     ~Acute respiratory failure 2/2 empyema   CXR (August 16): Large right pleural effusion and right basilar opacities not significantlychanged from the previous study. Pro-Vasquez: 73 (8/12)   Pulmonology on board: Patient intubated at 1230.  No need for thoracocentesis  Trial of spontaneous breathing, RR 40s  Pneumonia work-up was negative for any strep antigens, Legionella, and mycoplasma antigens  Sputum culture in progress, direct exam showed mixed bacterial morphotypes. Blood culture positive for MRSA. Echo 35% EF. Cardio consulted: Plan for ERICKA. Keep K>4 and Mg>2. Pulmonology: Continue to follow up. Monitor sodium. Symbicort 160/4.5  On vancomycin.  -Chest tube output: 3.2L (460 in the last 24 hour)     ~Syncope and collapse (stable)  -CT head shows no acute intracranial abnormality  -EKG shows sinus tachycardia with unifocal PVCs, no acute ST segment changes as documented by ER physician  -Troponin 20, 15  - urinalysis and urine drug screen unremarkable.      ~Traumatic rhabdomyolysis (resolved)  -CK 1,222-> 22, myoglobin 3,509-->2884.      ~Hypokalemia (stable)  -Initial potassium 2.5->3.7-3.4->2.6-> 3.0-> 3.4  -Patient received potassium supplement in the ED.   -Recheck potassium this morning.  -Potassium sliding scale.     ~Acute mild alcoholic hepatitis  - Non reactive Hep A, B and C (2019)  - AST>ALT (95:51) ==> (45:32) ==> 42:27     Hyponatremia   -Initial sodium 127->139>144->147->142>138  -Daily BMP.     Alcohol abuse (resolved)  -GADJCLF <56  -Thiamine, folic acid, multivitamin daily.  -Seizure and fall precautions.  -Consult social work for rehab, discharge planning.     ~Multiple right rib fractures (stable)  -CT scan shows Grade 2 chest wall injury with right 4th through 6th anterolateral rib fractures. Areas of pleural thickening likely areas of focal hemothorax near the rib fractures on the right. No acute inflammatory process within the abdomen pelvis. -Fentanyl as needed.  -Consult general surgery: Resume tube feeds following procedures. Surgically stable.  -[place] Q6prn.      ~Closed left wrist fracture (moderate worsening )  -Velcro wrist splint in place  - left wrist is swollen. - left wrist X-ray: Subacute impacted fracture at the distal metaphysis of the left radius, wit visualized fracture line and partial healing of the fracture, grossly stable. -Worsening swelling. VL dup US upper extremity. Inpatient consult to Orthopedic surgery: No intervention planned. Splint only.        `COPD (stable)  -SPO2 96%  (intubated on 8/13 due to resp. Failure.  -Albuterol as needed  -Spiriva daily  -Ellipta daily     Smoking (stable)  -Nicotine patch     GI prophylaxis-Pepcid 20 mg IV twice daily. DVT prophylaxis--Lovenox 40 mg once daily   Diet: TPN . Disposition: PCU. CT surgery ST Vs consulted for eval.  Diet: Awaiting SLP eval and treat. Pending barium swallow w video.      Maria R Lim MD  8/25/2021  12:39 PM

## 2021-08-25 NOTE — PROGRESS NOTES
[facility]. Syncope and collapse [R55]  Hypokalemia [E87.6]  Hyponatremia [E87.1]  Traumatic rhabdomyolysis, initial encounter ([facility] [address] [T79. 6XXA]  Closed fracture of multiple ribs of right side, initial encounter [S22.41XA] . Vitals:    21 1039   BP:    Pulse:    Resp:    Temp:    SpO2: 92%   . Patients home meds are   Prior to Admission medications    Medication Sig Start Date End Date Taking?  Authorizing Provider   albuterol sulfate  (90 Base) MCG/ACT inhaler INHALE 2 PUFFS INTO THE LUNGS EVERY 6 HOURS AS NEEDED FOR WHEEZING 12/10/20   Amandeep Pérez MD   tiotropium (Jatin Apgar) 18 MCG inhalation capsule Inhale 1 capsule into the lungs daily 11/10/20   Brian Ferreira MD   ARNUITY ELLIPTA 100 MCG/ACT AEPB INHALE 1 PUFF INTO THE LUNGS DAILY 20   Obinna Turner MD       Assessment:     21 1044   RT Protocol   Smoking Status 2   Surgical status 0   Xray 2   Respiratory pattern 1   Mental Status 0   Breath sounds 1   Cough 1   Activity level 1   Oxygen Requirement 0   Indications for Bronchodilator Therapy Decreased or absent breath sounds   Bronchodilator Assessment Score 7   Indications for Bronchial Hygiene None   Bronchial Hygiene Assessment Score 7   Indications for Volume Expansion Predisposition for atelectasis   Volume Expansion Assessment Score 4           RR 22  Breath Sounds: Clear; Diminished      Bronchodilator assessment at level  3  Hyperinflation assessment at level  0  Secretion Management assessment at level  1    [x]    Bronchodilator Assessment  BRONCHODILATOR ASSESSMENT SCORE  Score 0 1 2 3 4 5   Breath Sounds   []  Patient Baseline []  No Wheeze good aeration [x]  Faint, scattered wheezing, good aeration []  Expiratory Wheezing and or moderately diminished []  Insp/Exp wheeze and/or very diminished []  Insp/Exp and/ or marked distress   Respiratory Rate   []  Patient Baseline []  Less than 20 []  Less than 20 [x]  20-25 []  Greater than 25 []  Greater than 25   Peak flow % of Pred or PB [x]  NA   []  Greater than 90%  []  81-90% []  71-80% []  Less than or equal to 70%  or unable to perform []  Unable due to Respiratory Distress   Dyspnea re []  Patient Baseline []  No SOB []  No SOB [x]  SOB on exertion []  SOB min activity []  At rest/acute   e FEV% Predicted       [x]  NA []  Above 69%  []  Unable []  Above 60-69%  []  Unable []  Above 50-59%  []  Unable []  Above 35-49%  []  Unable []  Less than 35%  []  Unable                 [x]  Hyperinflation Assessment  Score 1 2 3   CXR and Breath Sounds   []  Clear []  No atelectasis  Basilar aeration []  Atelectasis or absent basilar breath sounds   Incentive Spirometry Volume  (Per IBW)   []  Greater than or equal to 15ml/Kg []  less than 15ml/Kg []  less than 15ml/Kg   Surgery within last 2 weeks [x]  None or general   []  Abdominal or thoracic surgery  []  Abdominal or thoracic   Chronic Pulmonary Historyre []  No [x]  Yes []  Yes     [x]  Secretion Management Assessment  Score 1 2 3   Bilateral Breath Sounds   []  Occasional Rhonchi []  Scattered Rhonchi []  Course Rhonchi and/or poor aeration   Sputum    []  Small amount of thin secretions []  Moderate amount of viscous secretions []  Copius, Viscious Yellow/ Secretions   CXR as reported by physician [x]  clear  []  Unavailable []  Infiltrates and/or consolidation  []  Unavailable []  Mucus Plugging and or lobar consolidation  []  Unavailable   Cough [x]  Strong, productive cough []  Weak productive cough []  No cough or weak non-productive cough   Kym Prince RCP  10:45 AM

## 2021-08-25 NOTE — PROGRESS NOTES
Attending Physician Statement  I have discussed the care of Alejandra Lorenz with the resident team. I have examined the patient myself and taken ros and hpi , including pertinent history and exam findings,  with the resident. I have reviewed the key elements of all parts of the encounter with the resident. I agree with the assessment, plan and orders as documented by the resident. Principal Problem:    Syncope and collapse  Active Problems:    COPD (chronic obstructive pulmonary disease) (HCC)    Smoking addiction    Dyslipidemia    Alcohol abuse    Hypokalemia    Hyponatremia    Traumatic rhabdomyolysis (HCC)    Closed fracture of multiple ribs of right side    Closed fracture of left wrist    Severe malnutrition (HCC)    Fever    Conjunctivitis    Acute respiratory failure (HCC)    Superficial venous thrombosis of arm, left    MRSA bacteremia    Empyema lung (HCC)    Elevated C-reactive protein (CRP)    Leukocytosis  Resolved Problems:    * No resolved hospital problems. *  Large right-sided parapneumonic pleural effusion  C/w tPA per CT surgery, CT chest pending  Lower extremity ultrasound shows chronic superficial thrombophlebitis  SHANNAN and hypernatremia resolving    Full note to follow.       Electronically signed by Monica Mullins MD

## 2021-08-25 NOTE — PROGRESS NOTES
Writer talked to [employer]. She was updated on plan of care. All questions and concerns answered.

## 2021-08-25 NOTE — PROGRESS NOTES
[facility]  Daily Progress Note    Surgeon: Dr Opal Hamm   CC:  Empyema, right  Plan: Final dose of intrapleural TPA today      Subjective:  Mr. Deonna Goldman feels well today with no acute complaints. Pain is controlled on current medication regimen. He is currently on room air with no shortness of breath or acute signs of distress. Third dose of TPA to be administered today with 3 hour dwell time. Plan for repeat CT chest tomorrow morning. Encourage OOBTC and ambulating. Denies chest pain or shortness of breath. Plan of care reviewed and questions answered. Physical Exam  Vital Signs: BP 98/64   Pulse 84   Temp 98.4 °F (36.9 °C) (Oral)   Resp 19   Ht 5' 9\" (1.753 m)   Wt 143 lb 11.8 oz (65.2 kg)   SpO2 94%   BMI 21.23 kg/m²  O2 Flow Rate (L/min): 1 L/min   Admit Weight: Weight: 150 lb (68 kg)   WEIGHTWeight: 143 lb 11.8 oz (65.2 kg)     General: alert and oriented to person, place and time, thin/underweight, in no acute distress. Resting in bed  Heart: Normal S1 and S2.  Regular rhythm. No murmurs, gallops, or rubs. Lungs: clear to auscultation bilaterally and diminished breath sounds bibasilar  Abdomen: soft, non tender, non distended, BSx4  Extremities: no edema  Skin: warm and dry. Chest tube dressing clean, dry and intact.      Scheduled Meds:    vancomycin (VANCOCIN) intermittent dosing (placeholder)   Other RX Placeholder    docusate sodium  100 mg Oral Daily    alteplase (ACTIVASE) syringe  5 mg Intrapleural Once    And    dornase (PULMOZYME) syringe  5 mg Intrapleural Once    fentanNYL  25 mcg IntraVENous Once    famotidine  20 mg Oral BID    metoprolol succinate  12.5 mg Oral Nightly    ipratropium-albuterol  1 ampule Inhalation Q4H While awake    tamsulosin  0.4 mg Oral Daily    enoxaparin  40 mg Subcutaneous Daily    vancomycin  1,250 mg IntraVENous Q24H    sodium chloride flush  5-40 mL IntraVENous 2 times per day    nicotine  1 patch Transdermal Daily  lidocaine  1 patch Transdermal Daily     Continuous Infusions:    sodium chloride 75 mL/hr at 08/25/21 1314    dextrose      sodium chloride 25 mL (08/19/21 1717)       Data:  CBC:   Recent Labs     08/23/21  0511 08/24/21  0506 08/25/21  0503   WBC 12.8* 13.0* 12.7*   HGB 9.4* 8.4* 8.2*   HCT 28.1* 25.0* 24.6*   MCV 97.3 96.4 96.8    408 398     BMP:   Recent Labs     08/23/21  0511 08/23/21  1859 08/24/21  0506 08/24/21  1355 08/24/21  2236 08/25/21  0503 08/25/21  1440      < > 135   < > 138 138 137   K 3.8   < > 3.5*   < > 3.8 3.6* 3.9   *   < > 101   < > 104 105 105   CO2 25   < > 26   < > 26 25 23   BUN 17  --  13  --   --  11  --    CREATININE 1.02  --  1.08  --   --  1.10  --     < > = values in this interval not displayed. PT/INR: No results for input(s): PROTIME, INR in the last 72 hours. APTT: No results for input(s): APTT in the last 72 hours. Chest X-Ray:    ONE XRAY VIEW OF THE CHEST       8/25/2021 9:27 am       COMPARISON:   08/24/2021       HISTORY:   ORDERING SYSTEM PROVIDED HISTORY: empyema   TECHNOLOGIST PROVIDED HISTORY:   empyema   Reason for Exam: empyema   Acuity: Acute   Type of Exam: Initial       FINDINGS:   There is a small bore locking loop chest tube identified at the right lung   base.  There is a right-sided pleural effusion, predominantly laterally,   without significant interval change compared to the previous examination.    Multifocal infiltrates are seen throughout the lungs bilaterally.  There is a   right-sided PICC with the tip overlying the cavoatrial junction.  Multiple   areas of loculated pleural fluid again noted within the right chest with a   rounded appearance.           Impression   Multifocal loculated pleural effusion in the right chest, with overall   appearance similar to the previous examination.  Multifocal parenchymal   infiltrates are again identified, similar to the prior study.             I/O:  I/O last 3 completed shifts: In: 1671.1 [P.O.:660; I.V.:761.1; IV Piggyback:250]  Out: 8803 [Urine:1380; Chest Tube:460]      Assessment:  Empyema, right  Pleural effusion, left  MRSA Bacteremia  Infectious Disease following  Acute respiratory failure  Pulmonology following  COPD  Superficial Venous Thrombophlebitis  Conjunctivitis  Acute urinary retention  Acute kidney Injury  Nephrology following  Alcohol abuse with moderate acute withdrawal   Acute mild alcoholic hepatitis secondary to above  Syncope and collapse  Cardiology following  Traumatic rhabdomyolysis, resolved  Hypertension  Severe malnutrition  Depression  Tobacco dependence      Plan:   Maintain right chest tube to wall suction  Plan intrapleural TPA administration  Once daily x 3 days, final dose today  Fentanyl 25 mcg once prior to procedure  3 hour dwell time  Chart output twice daily at 06:00 and 18:00  Change chest tube dressing daily with occlusive gauze  Prefer Zero Form if available  Repeat CT chest Thursday morning, tentatively  Thank you for including us in the care of this patient    The above recommendations including medications and orders were discussed and agreed upon with Dr. Roland Mejia, the attending on service for the cardiothoracic surgery group today. Electronically signed by DONNY Garrett CNP on 8/25/2021 at 3:36 PM    On this date 8/25/2021 I have spent 35 minutes reviewing previous notes, test results and face to face with the patient discussing the diagnosis and importance of compliance with the treatment plan as well as documenting on the day of the visit. At least 50% of the time documented was spent with the patient to provide counseling and/or coordination of care. This note was created with the assistance of a speech-recognition program.  Although the intention is to generate a document that actually reflects the content of the visit, no guarantees can be provided that every mistake has been identified and corrected by editing. Note was updated later by me after  physical examination and  completion of the assessment.

## 2021-08-25 NOTE — PROGRESS NOTES
Speech Language Pathology  Speech Language Pathology  [facility]  Dysphagia Treatment Note    Date: 8/25/2021  Patients Name: Hazel Persaud  MRN: [id number]  Diagnosis: dysphagia  Patient Active Problem List   Diagnosis Code    COPD (chronic obstructive pulmonary disease) ([facility] [address] J44.9    Smoking addiction F17.200    Depression F32.9    Dyslipidemia E78.5    Lung nodule R91.1    DDD (degenerative disc disease), lumbar M51.36    Groin pain, chronic, right R10.31, G89.29    Ilioinguinal neuralgia of right side G57.91    Syncope and collapse R55    History of alcohol abuse F10.11    Lumbar radiculopathy M54.16    Scoliosis due to degenerative disease of spine in adult patient M41.80    Lumbosacral spondylosis without myelopathy M47.817    Alcohol abuse F10.10    Hypokalemia E87.6    Hyponatremia E87.1    Traumatic rhabdomyolysis ([facility] [address] T79. 6XXA    Closed fracture of multiple ribs of right side S22.41XA    Closed fracture of left wrist S62.102A    Severe malnutrition (HCC) E43    Fever R50.9    Conjunctivitis H10.9    Acute respiratory failure (HCC) J96.00    Superficial venous thrombosis of arm, left I82.612    MRSA bacteremia R78.81, B95.62    Empyema lung (HCC) J86.9    Elevated C-reactive protein (CRP) R79.82    Leukocytosis D72.829       Pain: pt. denies    Dysphagia Treatment  Treatment time: 5805-4684    Subjective: [x] Alert [x] Cooperative     [] Confused     [] Agitated    [] Lethargic    Objective/Assessment:    Pt. Completed Evangelina maneuver x5, effortful swallow x10, simple tongue base strengthening exercises x1, x10. ST encouraged pt. To continue to practice tongue base strengthening exercises Nella on own, pt. Verbalized understanding.        Plan:  [x] Continue ST services    [] Discharge from :        Discharge recommendations: [] Inpatient Rehab   [] [place]   [] Outpatient Therapy  [] Follow up at trauma clinic   [] Other:         Treatment completed by: Jodie Carlson. NAVEED/SLP

## 2021-08-25 NOTE — CARE COORDINATION
Mimi reviewed this patient's chart and are ready to start pre-cert when directed to or when the patient is closer to being ready to DC.

## 2021-08-25 NOTE — PLAN OF CARE
Problem: Falls - Risk of:  Goal: Will remain free from falls  Description: Will remain free from falls  Outcome: Ongoing  Note: No falls this shift. Call light within reach and siderails x2. Bed in lowest position. Patient safety maintained. Goal: Absence of physical injury  Description: Absence of physical injury  Outcome: Ongoing  Note: No falls this shift. Call light within reach and siderails x2. Bed in lowest position. Patient safety maintained. Problem: Skin Integrity:  Goal: Will show no infection signs and symptoms  Description: Will show no infection signs and symptoms  Outcome: Ongoing  Goal: Absence of new skin breakdown  Description: Absence of new skin breakdown  Outcome: Ongoing  Note: Skin assessment as charted. No new areas of breakdown. Problem: Nutrition  Goal: Optimal nutrition therapy  Outcome: Ongoing     Problem: OXYGENATION/RESPIRATORY FUNCTION  Goal: Patient will maintain patent airway  Outcome: Ongoing  Note: Respiratory assessment as charted. No signs or symptoms of distress. Goal: Patient will achieve/maintain normal respiratory rate/effort  Description: Respiratory rate and effort will be within normal limits for the patient  Outcome: Ongoing  Note: Respiratory assessment as charted. No signs or symptoms of distress. Problem: MECHANICAL VENTILATION  Goal: Patient will maintain patent airway  Outcome: Ongoing  Note: Respiratory assessment as charted. No signs or symptoms of distress. Goal: ET tube will be managed safely  Outcome: Ongoing     Problem: Pain:  Goal: Pain level will decrease  Description: Pain level will decrease  Outcome: Ongoing  Note: Pain decreased with prescribed medication.        Problem: Discharge Planning:  Goal: Participates in care planning  Description: Participates in care planning  Outcome: Ongoing  Note: Case management is following for further discharge needs      Problem: Injury - Risk of, Physical Injury:  Goal: Will remain free from falls  Description: Will remain free from falls  Outcome: Ongoing  Note: No falls this shift. Call light within reach and siderails x2. Bed in lowest position. Patient safety maintained. Goal: Absence of physical injury  Description: Absence of physical injury  Outcome: Ongoing  Note: No falls this shift. Call light within reach and siderails x2. Bed in lowest position. Patient safety maintained. Problem: Pain:  Goal: Pain level will decrease  Description: Pain level will decrease  Outcome: Ongoing  Note: Pain decreased with prescribed medication. Goal: Control of acute pain  Description: Control of acute pain  Outcome: Ongoing  Goal: Control of chronic pain  Description: Control of chronic pain  Outcome: Ongoing     Problem: Musculor/Skeletal Functional Status  Goal: Highest potential functional level  Outcome: Ongoing  Goal: Absence of falls  Outcome: Ongoing  Note: No falls this shift. Call light within reach and siderails x2. Bed in lowest position. Patient safety maintained.
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
## 2021-08-25 NOTE — PLAN OF CARE
Problem: Falls - Risk of:  Goal: Will remain free from falls  Description: Will remain free from falls  8/25/2021 0145 by Asia Connor RN  Outcome: Ongoing  Note: Pt assessed as a fall risk this shift. Remains free from falls and accidental injury at this time. Fall precautions in place, including falling star sign and fall risk band on pt. Floor free from obstacles, and bed is locked and in lowest position. Adequate lighting provided. Pt encouraged to call before getting OOB for any need. Bed alarm activated. Needs monitored during hourly rounding. Problem: Skin Integrity:  Goal: Absence of new skin breakdown  Description: Absence of new skin breakdown  Outcome: Ongoing  Note: Skin assessment complete. Waffle mattress in place. Pt turned and repositioned every two hours per self. Area kept free from moisture. Proper nourishment and fluids encouraged, as appropriate. Skin remains clean, dry, and intact. Problem: OXYGENATION/RESPIRATORY FUNCTION  Goal: Patient will maintain patent airway  Outcome: Ongoing  Note: Chest tube to suction, producing purulent drainage. Pt started shift on RA, now on 2L O2 NC. Problem: Pain:  Goal: Pain level will decrease  Description: Pain level will decrease  8/25/2021 0145 by Asia Connor RN  Outcome: Ongoing  Note: No pain at this time.   0/10 pain scale

## 2021-08-25 NOTE — PROGRESS NOTES
Physical Therapy    Facility/Department: [facility]   Assessment    NAME: Maranda Reagan  : 1965  MRN: [id number]    Date of Service: 2021    Discharge Recommendations:  Patient would benefit from continued therapy after discharge   PT Equipment Recommendations  Equipment Needed: No    Assessment   Body structures, Functions, Activity limitations: Decreased functional mobility ; Decreased strength;Decreased endurance;Decreased balance  Assessment: Impaired mobility due to decreased tolerance to activity  REQUIRES PT FOLLOW UP: Yes  Activity Tolerance  Activity Tolerance: Patient limited by fatigue;Patient limited by endurance       Patient Diagnosis(es): The primary encounter diagnosis was Syncope and collapse. Diagnoses of Hypokalemia, Hyponatremia, Traumatic rhabdomyolysis, initial encounter ([facility] [address] and Closed fracture of multiple ribs of right side, initial encounter were also pertinent to this visit. has a past medical history of Alcohol abuse, Anxiety, Arthritis, COPD (chronic obstructive pulmonary disease) ([address] DDD (degenerative disc disease), lumbar, Depression, Heart burn, Hemorrhoids, HTN (hypertension), Ilioinguinal neuralgia of right side, Psychiatric problem, Seizures ([address] and Wears glasses. has a past surgical history that includes hernia repair; Toe Surgery; Hemorrhoid surgery (3/19/15); Nerve Block (Right, 10/10/2016); other surgical history (Right, 04/10/2017); Anesthesia Nerve Block (Right, 4/10/2017); and IR CHEST TUBE INSERTION (2021).     Restrictions  Restrictions/Precautions  Restrictions/Precautions: Fall Risk (L wrist fracture, R rib fractures, chest tube)  Required Braces or Orthoses?: Yes (L wrist splint)        Subjective  General  Family / Caregiver Present: No  General Comment  Comments: pleasant and cooperative  Subjective  Subjective: pt eager to get out of bed  Pain Screening  Patient Currently in Pain: Yes  Pain Assessment  Pain Assessment: 0-10  Pain (modA)        Plan   Plan  Times per week: 6-7x/wk  Times per day: Daily  Current Treatment Recommendations: Strengthening, Balance Training, Functional Mobility Training, Transfer Training, Endurance Training, Safety Education & Training  Safety Devices  Type of devices: Left in chair, Chair alarm in place, Call light within reach, Patient at risk for falls, Nurse notified      Goals  Short term goals  Time Frame for Short term goals: 5-7 days  Short term goal 1: bed mobility with CGA  Short term goal 2: transfer to chair with Black x1  Short term goal 3: ambulate 10-20ft with device and Black  Short term goal 4: pt able to tolerate 20min of therapeutic activity/exercises       Therapy Time   Individual Concurrent Group Co-treatment   Time In 1238         Time Out 1331         Minutes [place], [place]

## 2021-08-25 NOTE — PROGRESS NOTES
[facility] -   Infectious diseases evaluation  admission date 8/10/2021    reason for consultation:   MRSA bacteremia    Impression :   Current:  · MRSA bacteremia suspect pulmonary source of infection  · Left hand and wrist cellulitis  · Left wrist fracture  · Right pleural effusion /empyema status post thoracentesis with MRSA growth on culture. · Pneumothorax  · Superficial vein thrombophlebitis left arm  · Syncopal episode  · Alcohol withdrawal  · Acute respiratory failure requiring intubation  · Rib fracture with possible hemothorax    Other:  · History of alcohol abuse  · COPD  · History of depression  · History of seizure  · History of hypertension. Recommendations   · Continue IV vancomycin through September 10, 2021,monitor renal function and vancomycin levels closely  · Repeat blood cultures from 8/19/2021 no growth  · Repeat blood cultures 8/16/2021 grew MRSA  · Echocardiogram , no reported vegetations  · ERICKA showed no vegetations. · CT thoracic and lumbar spine reviewed showed no discitis or osteomyelitis  · Follow CBC and renal function  · Cardiothoracic surgery evaluation noted              History of Present Illness:   Initial history:  Hazel Persaud is a 60y.o.-year-old male presented to the hospital on 8/10/2021 after a syncopal episode, was complaining of pain to the right chest wall and abdomen. Reported sharp, constant pain aggravated by movement with no alleviating factors. The patient was previously evaluated at the ER 8/7/2021 after a fall found to have multiple rib fractures and a left wrist closed fracture. Tox screen positive for cannabinoids. CT head negative for acute intracranial abnormality. CT chest/abdomen/pelvis showed rib fractures with associated focal hemothorax. No acute processes in abdomen or pelvis.    Right arm PICC line was placed 8/12/2021  He was placed on alcohol withdrawal protocol, developed respiratory distress was placed on BiPAP initially then was intubated 4/13/21. Baker catheter was placed on 4/13/2021  COVID-19 rapid test was negative  The patient was on Levaquin 8/13/2021 and 8/14/2021 that was discontinued and started on vancomycin and cefepime. He had a fever with a temperature max of 100.6 on 8/13/2021. Blood cultures 8/14/2021 grew MRSA as well as sputum culture. Chest x-ray 8/14/2021 showed bilateral airspace disease right greater than left. CT chest without contrast 8/16/2021 showed increased right pleural effusion with an area of loculated fluid status post thoracentesis with MRSA growth on culture  Right chest tube was placed 8/20/2021  Interval changes  8/25/2021   He is awake, feeling better, pain under control, status post thoracentesis 8/23/2021 ,  on TPA day 3  He still coughing up yellow phlegm, complaining of right chest wall pain with deep breath, chest tube in place. Lower extremity venous Doppler showed superficial thrombosis on Lovenox  Right arm PICC line in place  Renal function stable  Blood pressure stable     Patient Vitals for the past 8 hrs:   BP Temp Temp src Pulse Resp SpO2 Weight   08/25/21 0728 137/64 98.6 °F (37 °C) Oral 83 17 91 % --   08/25/21 0323 -- -- -- -- -- -- 143 lb 11.8 oz (65.2 kg)   08/25/21 0209 129/76 -- -- 82 -- -- --           I have personally reviewed the past medical history, past surgical history, medications, social history, and family history, and I haveupdated the database accordingly. Allergies:   Nickel     Review of Systems:     Review of Systems  As per history present illness, other than above 14 system review was negative  Physical Examination :       Physical Exam  Constitutional:       General: He is not in acute distress. HENT:      Head: Normocephalic and atraumatic. Right Ear: External ear normal.      Left Ear: External ear normal.   Eyes:      General: No scleral icterus. Right eye: No discharge. Left eye: No discharge. Cardiovascular:      Rate and Rhythm: Normal rate and regular rhythm. Heart sounds: No murmur heard. Pulmonary:      Breath sounds: No wheezing. Comments: Right chest tube in place  Abdominal:      General: Abdomen is flat. There is no distension. Palpations: Abdomen is soft. Genitourinary:     Comments: Baker catheter in place  Musculoskeletal:      Right lower leg: Edema present. Left lower leg: Edema present. Skin:     General: Skin is warm. Coloration: Skin is not jaundiced. Comments: Left hand and wrist splint   Neurological:      General: No focal deficit present. Mental Status: He is alert.      Right PICC line in place    Past Medical History:     Past Medical History:   Diagnosis Date    Alcohol abuse 6/4/2014    Anxiety     Arthritis     COPD (chronic obstructive pulmonary disease) ([address]     ASTHMA    DDD (degenerative disc disease), lumbar 9/6/2016    Depression     Heart burn     Hemorrhoids     HTN (hypertension) 1/19/2015    Ilioinguinal neuralgia of right side 4/10/2017    Psychiatric problem     depression /anxiety    Seizures ([address]     withdrawal from alcohol 2 years ago    Wears glasses     READING       Past Surgical  History:     Past Surgical History:   Procedure Laterality Date    ANESTHESIA NERVE BLOCK Right 4/10/2017    NERVE BLOCK US RIGHT SIDED ILIOINGUINAL performed by Neeraj Bourgeois MD at [address] [place] [address]  3/19/15    HERNIA REPAIR      IR CHEST TUBE INSERTION  8/20/2021    IR CHEST TUBE INSERTION 8/20/2021 [facility] PROCED[facility]    NERVE BLOCK Right 10/10/2016    right groin    OTHER SURGICAL HISTORY Right 04/10/2017    US nerve block to R groin    TOE SURGERY      SCREW RIGHT BIG TOE       Medications:      famotidine  20 mg Oral BID    insulin lispro  0-12 Units Subcutaneous TID WC    insulin lispro  0-6 Units Subcutaneous Nightly    metoprolol succinate  12.5 mg Oral Nightly    ipratropium-albuterol  1 ampule Inhalation Q4H While awake    tamsulosin  0.4 mg Oral Daily    enoxaparin  40 mg Subcutaneous Daily    vancomycin  1,250 mg IntraVENous Q24H    insulin glargine  10 Units Subcutaneous Nightly    sodium chloride flush  5-40 mL IntraVENous 2 times per day    nicotine  1 patch Transdermal Daily    lidocaine  1 patch Transdermal Daily       Social History:     Social History     Socioeconomic History    Marital status:      Spouse name: Not on file    Number of children: Not on file    Years of education: Not on file    Highest education level: Not on file   Occupational History    Not on file   Tobacco Use    Smoking status: Current Every Day Smoker     Packs/day: 1.00     Years: 38.00     Pack years: 38.00     Types: Cigarettes    Smokeless tobacco: Never Used    Tobacco comment: 1 PPD   Vaping Use    Vaping Use: Never used   Substance and Sexual Activity    Alcohol use: Yes     Alcohol/week: 12.0 standard drinks     Types: 12 Cans of beer per week     Comment: 12 24 oz cans daily    Drug use: Yes     Types: Marijuana     Comment: crack occasionally    Sexual activity: Never   Other Topics Concern    Not on file   Social History Narrative    Not on file     Social Determinants of Health     Financial Resource Strain:     Difficulty of Paying Living Expenses:    Food Insecurity:     Worried About Running Out of Food in the Last Year:     Ran Out of Food in the Last Year:    Transportation Needs:     Lack of Transportation (Medical):      Lack of Transportation (Non-Medical):    Physical Activity:     Days of Exercise per Week:     Minutes of Exercise per Session:    Stress:     Feeling of Stress :    Social Connections:     Frequency of Communication with Friends and Family:     Frequency of Social Gatherings with Friends and Family:     Attends Bahai Services:     Active Member of Clubs or Organizations:     Attends Club or Organization Meetings:     Marital Status: Intimate Partner Violence:     Fear of Current or Ex-Partner:     Emotionally Abused:     Physically Abused:     Sexually Abused:        Family History:     Family History   Problem Relation Age of Onset    Cancer Mother         colon ca      Medical Decision Making:   I have independently reviewed/ordered the following labs:    CBC with Differential:   Recent Labs     08/23/21  0511 08/23/21  0511 08/24/21  0506 08/25/21  0503   WBC 12.8*   < > 13.0* 12.7*   HGB 9.4*   < > 8.4* 8.2*   HCT 28.1*   < > 25.0* 24.6*      < > 408 398   LYMPHOPCT 8*  --   --   --    MONOPCT 6  --   --   --     < > = values in this interval not displayed. BMP:  Recent Labs     08/24/21  0506 08/24/21  1355 08/24/21  2236 08/25/21  0503      < > 138 138   K 3.5*   < > 3.8 3.6*      < > 104 105   CO2 26   < > 26 25   BUN 13  --   --  11   CREATININE 1.08  --   --  1.10   MG 1.9  --   --  1.9    < > = values in this interval not displayed. Hepatic Function Panel:   Recent Labs     08/24/21  0506 08/25/21  0503   PROT 5.1* 5.1*   LABALBU 2.0* 2.1*   BILITOT 0.79 0.67   ALKPHOS 70 76   ALT 13 17   AST 17 24     No results for input(s): RPR in the last 72 hours. No results for input(s): HIV in the last 72 hours. No results for input(s): BC in the last 72 hours. Lab Results   Component Value Date    CREATININE 1.10 08/25/2021    GLUCOSE 96 08/25/2021       Detailed results: Thank you for allowing us to participate in the care of this patient. Please call with questions. This note is created with the assistance of a speech recognition program.  While intending to generate adocument that actually reflects the content of the visit, the document can still have some errors including those of syntax and sound a like substitutions which may escape proof reading. It such instances, actual meaningcan be extrapolated by contextual diversion.     Cristobal Cronin MD  Office: [phone]  Perfect serve / office [phone]

## 2021-08-26 ENCOUNTER — APPOINTMENT (OUTPATIENT)
Dept: CT IMAGING | Age: 56
DRG: 351 | End: 2021-08-26
Payer: MEDICAID

## 2021-08-26 ENCOUNTER — APPOINTMENT (OUTPATIENT)
Dept: GENERAL RADIOLOGY | Age: 56
DRG: 351 | End: 2021-08-26
Payer: MEDICAID

## 2021-08-26 LAB
ALBUMIN SERPL-MCNC: 2.2 G/DL (ref 3.5–5.2)
ALBUMIN/GLOBULIN RATIO: ABNORMAL (ref 1–2.5)
ALP BLD-CCNC: 76 U/L (ref 40–129)
ALT SERPL-CCNC: 15 U/L (ref 5–41)
ANION GAP SERPL CALCULATED.3IONS-SCNC: 10 MMOL/L (ref 9–17)
ANION GAP SERPL CALCULATED.3IONS-SCNC: 8 MMOL/L (ref 9–17)
ANION GAP SERPL CALCULATED.3IONS-SCNC: 8 MMOL/L (ref 9–17)
AST SERPL-CCNC: 17 U/L
BASO FLUID: ABNORMAL %
BILIRUB SERPL-MCNC: 0.71 MG/DL (ref 0.3–1.2)
BUN BLDV-MCNC: 11 MG/DL (ref 6–20)
BUN/CREAT BLD: ABNORMAL (ref 9–20)
CALCIUM SERPL-MCNC: 7.6 MG/DL (ref 8.6–10.4)
CHLORIDE BLD-SCNC: 100 MMOL/L (ref 98–107)
CHLORIDE BLD-SCNC: 102 MMOL/L (ref 98–107)
CHLORIDE BLD-SCNC: 102 MMOL/L (ref 98–107)
CO2: 23 MMOL/L (ref 20–31)
CO2: 23 MMOL/L (ref 20–31)
CO2: 24 MMOL/L (ref 20–31)
CREAT SERPL-MCNC: 1.2 MG/DL (ref 0.7–1.2)
EOSINOPHIL FLUID: ABNORMAL %
FLUID DIFF COMMENT: ABNORMAL
GFR AFRICAN AMERICAN: >60 ML/MIN
GFR NON-AFRICAN AMERICAN: >60 ML/MIN
GFR SERPL CREATININE-BSD FRML MDRD: ABNORMAL ML/MIN/{1.73_M2}
GFR SERPL CREATININE-BSD FRML MDRD: ABNORMAL ML/MIN/{1.73_M2}
GLUCOSE BLD-MCNC: 77 MG/DL (ref 70–99)
GLUCOSE BLD-MCNC: 80 MG/DL (ref 75–110)
GLUCOSE BLD-MCNC: 99 MG/DL (ref 75–110)
HCT VFR BLD CALC: 24.9 % (ref 41–53)
HEMOGLOBIN: 8.1 G/DL (ref 13.5–17.5)
LYMPHOCYTES, BODY FLUID: 27 %
MAGNESIUM: 1.7 MG/DL (ref 1.6–2.6)
MCH RBC QN AUTO: 32.3 PG (ref 26–34)
MCHC RBC AUTO-ENTMCNC: 32.6 G/DL (ref 31–37)
MCV RBC AUTO: 99.2 FL (ref 80–100)
MONOCYTE, FLUID: ABNORMAL %
NEUTROPHIL, FLUID: 9 %
NRBC AUTOMATED: ABNORMAL PER 100 WBC
OTHER CELLS FLUID: 64 %
PDW BLD-RTO: 14.7 % (ref 11.5–14.9)
PLATELET # BLD: 371 K/UL (ref 150–450)
PMV BLD AUTO: 7.1 FL (ref 6–12)
POTASSIUM SERPL-SCNC: 3.7 MMOL/L (ref 3.7–5.3)
POTASSIUM SERPL-SCNC: 3.8 MMOL/L (ref 3.7–5.3)
POTASSIUM SERPL-SCNC: 3.9 MMOL/L (ref 3.7–5.3)
RBC # BLD: 2.51 M/UL (ref 4.5–5.9)
SODIUM BLD-SCNC: 131 MMOL/L (ref 135–144)
SODIUM BLD-SCNC: 134 MMOL/L (ref 135–144)
SODIUM BLD-SCNC: 135 MMOL/L (ref 135–144)
TOTAL PROTEIN: 5.5 G/DL (ref 6.4–8.3)
WBC # BLD: 12.6 K/UL (ref 3.5–11)

## 2021-08-26 PROCEDURE — 6370000000 HC RX 637 (ALT 250 FOR IP): Performed by: INTERNAL MEDICINE

## 2021-08-26 PROCEDURE — 2580000003 HC RX 258: Performed by: STUDENT IN AN ORGANIZED HEALTH CARE EDUCATION/TRAINING PROGRAM

## 2021-08-26 PROCEDURE — 6360000002 HC RX W HCPCS: Performed by: INTERNAL MEDICINE

## 2021-08-26 PROCEDURE — 71250 CT THORAX DX C-: CPT

## 2021-08-26 PROCEDURE — 6370000000 HC RX 637 (ALT 250 FOR IP): Performed by: PHYSICIAN ASSISTANT

## 2021-08-26 PROCEDURE — 2700000000 HC OXYGEN THERAPY PER DAY

## 2021-08-26 PROCEDURE — 36415 COLL VENOUS BLD VENIPUNCTURE: CPT

## 2021-08-26 PROCEDURE — 99232 SBSQ HOSP IP/OBS MODERATE 35: CPT | Performed by: INTERNAL MEDICINE

## 2021-08-26 PROCEDURE — 82947 ASSAY GLUCOSE BLOOD QUANT: CPT

## 2021-08-26 PROCEDURE — 2580000003 HC RX 258: Performed by: NURSE PRACTITIONER

## 2021-08-26 PROCEDURE — 99232 SBSQ HOSP IP/OBS MODERATE 35: CPT | Performed by: NURSE PRACTITIONER

## 2021-08-26 PROCEDURE — 97535 SELF CARE MNGMENT TRAINING: CPT

## 2021-08-26 PROCEDURE — 83735 ASSAY OF MAGNESIUM: CPT

## 2021-08-26 PROCEDURE — 2580000003 HC RX 258: Performed by: INTERNAL MEDICINE

## 2021-08-26 PROCEDURE — 80051 ELECTROLYTE PANEL: CPT

## 2021-08-26 PROCEDURE — 85027 COMPLETE CBC AUTOMATED: CPT

## 2021-08-26 PROCEDURE — 2060000000 HC ICU INTERMEDIATE R&B

## 2021-08-26 PROCEDURE — 6360000002 HC RX W HCPCS: Performed by: STUDENT IN AN ORGANIZED HEALTH CARE EDUCATION/TRAINING PROGRAM

## 2021-08-26 PROCEDURE — 6370000000 HC RX 637 (ALT 250 FOR IP): Performed by: STUDENT IN AN ORGANIZED HEALTH CARE EDUCATION/TRAINING PROGRAM

## 2021-08-26 PROCEDURE — 71045 X-RAY EXAM CHEST 1 VIEW: CPT

## 2021-08-26 PROCEDURE — 94640 AIRWAY INHALATION TREATMENT: CPT

## 2021-08-26 PROCEDURE — 80053 COMPREHEN METABOLIC PANEL: CPT

## 2021-08-26 PROCEDURE — 6370000000 HC RX 637 (ALT 250 FOR IP): Performed by: NURSE PRACTITIONER

## 2021-08-26 PROCEDURE — 94761 N-INVAS EAR/PLS OXIMETRY MLT: CPT

## 2021-08-26 RX ORDER — POTASSIUM CHLORIDE 20 MEQ/1
40 TABLET, EXTENDED RELEASE ORAL ONCE
Status: COMPLETED | OUTPATIENT
Start: 2021-08-26 | End: 2021-08-26

## 2021-08-26 RX ORDER — GUAIFENESIN 600 MG/1
600 TABLET, EXTENDED RELEASE ORAL 2 TIMES DAILY
Status: DISCONTINUED | OUTPATIENT
Start: 2021-08-26 | End: 2021-08-31 | Stop reason: HOSPADM

## 2021-08-26 RX ADMIN — IPRATROPIUM BROMIDE AND ALBUTEROL SULFATE 1 AMPULE: 2.5; .5 SOLUTION RESPIRATORY (INHALATION) at 20:12

## 2021-08-26 RX ADMIN — FAMOTIDINE 20 MG: 20 TABLET, FILM COATED ORAL at 21:26

## 2021-08-26 RX ADMIN — IPRATROPIUM BROMIDE AND ALBUTEROL SULFATE 1 AMPULE: 2.5; .5 SOLUTION RESPIRATORY (INHALATION) at 08:32

## 2021-08-26 RX ADMIN — IPRATROPIUM BROMIDE AND ALBUTEROL SULFATE 1 AMPULE: 2.5; .5 SOLUTION RESPIRATORY (INHALATION) at 11:37

## 2021-08-26 RX ADMIN — IPRATROPIUM BROMIDE AND ALBUTEROL SULFATE 1 AMPULE: 2.5; .5 SOLUTION RESPIRATORY (INHALATION) at 14:48

## 2021-08-26 RX ADMIN — HYDROCODONE BITARTRATE AND ACETAMINOPHEN 1 TABLET: 5; 325 TABLET ORAL at 21:27

## 2021-08-26 RX ADMIN — GUAIFENESIN 600 MG: 600 TABLET, EXTENDED RELEASE ORAL at 08:17

## 2021-08-26 RX ADMIN — DOCUSATE SODIUM 100 MG: 100 CAPSULE, LIQUID FILLED ORAL at 08:17

## 2021-08-26 RX ADMIN — MORPHINE SULFATE 4 MG: 4 INJECTION, SOLUTION INTRAMUSCULAR; INTRAVENOUS at 13:38

## 2021-08-26 RX ADMIN — CALCIUM GLUCONATE 1000 MG: 98 INJECTION, SOLUTION INTRAVENOUS at 11:20

## 2021-08-26 RX ADMIN — METOPROLOL SUCCINATE 12.5 MG: 25 TABLET, EXTENDED RELEASE ORAL at 21:25

## 2021-08-26 RX ADMIN — ENOXAPARIN SODIUM 40 MG: 40 INJECTION SUBCUTANEOUS at 08:30

## 2021-08-26 RX ADMIN — FAMOTIDINE 20 MG: 20 TABLET, FILM COATED ORAL at 08:17

## 2021-08-26 RX ADMIN — POLYETHYLENE GLYCOL 3350 17 G: 17 POWDER, FOR SOLUTION ORAL at 08:16

## 2021-08-26 RX ADMIN — SODIUM CHLORIDE: 4.5 INJECTION, SOLUTION INTRAVENOUS at 19:01

## 2021-08-26 RX ADMIN — TAMSULOSIN HYDROCHLORIDE 0.4 MG: 0.4 CAPSULE ORAL at 08:18

## 2021-08-26 RX ADMIN — VANCOMYCIN HYDROCHLORIDE 1250 MG: 1.25 INJECTION, POWDER, LYOPHILIZED, FOR SOLUTION INTRAVENOUS at 23:48

## 2021-08-26 RX ADMIN — GUAIFENESIN 600 MG: 600 TABLET, EXTENDED RELEASE ORAL at 21:26

## 2021-08-26 RX ADMIN — POTASSIUM CHLORIDE 40 MEQ: 1500 TABLET, EXTENDED RELEASE ORAL at 10:37

## 2021-08-26 RX ADMIN — SODIUM CHLORIDE, PRESERVATIVE FREE 10 ML: 5 INJECTION INTRAVENOUS at 21:26

## 2021-08-26 RX ADMIN — HYDROCODONE BITARTRATE AND ACETAMINOPHEN 1 TABLET: 5; 325 TABLET ORAL at 08:24

## 2021-08-26 ASSESSMENT — PAIN SCALES - GENERAL
PAINLEVEL_OUTOF10: 8
PAINLEVEL_OUTOF10: 0
PAINLEVEL_OUTOF10: 0
PAINLEVEL_OUTOF10: 8
PAINLEVEL_OUTOF10: 8
PAINLEVEL_OUTOF10: 0
PAINLEVEL_OUTOF10: 0
PAINLEVEL_OUTOF10: 10
PAINLEVEL_OUTOF10: 4

## 2021-08-26 ASSESSMENT — PAIN DESCRIPTION - LOCATION
LOCATION: CHEST;ARM
LOCATION: WRIST

## 2021-08-26 ASSESSMENT — PAIN DESCRIPTION - PAIN TYPE
TYPE: ACUTE PAIN
TYPE: ACUTE PAIN

## 2021-08-26 ASSESSMENT — ENCOUNTER SYMPTOMS
TROUBLE SWALLOWING: 1
CHEST TIGHTNESS: 0
NAUSEA: 0
VOMITING: 0
ABDOMINAL PAIN: 0
PHOTOPHOBIA: 0
COUGH: 1
SHORTNESS OF BREATH: 0

## 2021-08-26 ASSESSMENT — PAIN DESCRIPTION - FREQUENCY: FREQUENCY: CONTINUOUS

## 2021-08-26 ASSESSMENT — PAIN DESCRIPTION - PROGRESSION: CLINICAL_PROGRESSION: NOT CHANGED

## 2021-08-26 ASSESSMENT — PAIN DESCRIPTION - DESCRIPTORS: DESCRIPTORS: ACHING

## 2021-08-26 ASSESSMENT — PAIN DESCRIPTION - ORIENTATION
ORIENTATION: LEFT;RIGHT
ORIENTATION: LEFT

## 2021-08-26 NOTE — PROGRESS NOTES
[address] [facility]    INPATIENT OCCUPATIONAL THERAPY  PROGRESS NOTE  Date: 2021  Patient Name: Ayden Gann      Room: 3-01  MRN: [id number]    : 1965  (60 y.o.) Gender: male     Discharge Recommendations:  Further Occupational Therapy is recommended upon facility discharge. Referring Practitioner: Mimi Joya MD  Diagnosis: Syncope and collapse, traumatic rhabdomyolysis, closed fracture of multiple ribs of right side  General  Chart Reviewed: Yes, Orders, Progress Notes  Patient assessed for rehabilitation services?: Yes  Response to previous treatment: Patient with no complaints from previous session  Family / Caregiver Present: No  Referring Practitioner: Mimi Joya MD  Diagnosis: Syncope and collapse, traumatic rhabdomyolysis, closed fracture of multiple ribs of right side    Restrictions  Restrictions/Precautions: Fall Risk (L wrist fracture, R rib fractures, chest tube)  Other position/activity restrictions: Per Dr. Maribel De Satniago, patient is ok for wbat to [address] with brace on as well as ROM as tolerated with brace on as well. Required Braces or Orthoses?: Yes (L wrist splint)      Subjective  Subjective: \"I probably can do that\" patient states regarding donning bilateral socks. Patient Currently in Pain: Yes  Pain Level: 8  Pain Location: Wrist  Pain Orientation: Left        Pain Assessment  Pain Assessment: 0-10  Pain Level: 8  Patient's Stated Pain Goal: No pain  Pain Type: Acute pain  Pain Location: Wrist  Pain Orientation: Left  Pain Descriptors: Aching  Pain Frequency: Continuous  Clinical Progression: Not changed    Objective  Cognition  Overall Cognitive Status: Impaired  Memory: Decreased recall of recent events  Following Commands:  Follows multistep commands with repetition  Safety Judgement: Decreased awareness of need for assistance  Awareness of Errors: Assistance required to identify errors made  Insights: Decreased awareness of deficits  Sequencing and Organization: Assistance required to identify errors made  Bed mobility  Supine to Sit: Contact guard assistance  Scooting: Contact guard assistance  Balance  Sitting Balance: Stand by assistance  Standing Balance: Minimal assistance     Functional Mobility  Functional - Mobility Device: No device  Activity:  (3-4 steps to recliner)  Assist Level: Minimal assistance  Functional Mobility Comments: with Minimal verbal cues for safety and increased time for line management   ADL  Feeding: Setup; Increased time to complete  Grooming: Stand by assistance (assist to manage mouthwash)  UE Bathing: Minimal assistance  LE Bathing: Minimal assistance  UE Dressing: Moderate assistance  LE Dressing: Moderate assistance (CGA to don bilateral socks - likely assist for dynamic stand)  Toileting: Minimal assistance (with urinal management)  Additional Comments: OT facilitated Pt engagement in doffing/donning bilateral socks, using mouthwash, and participating in care. OT provided Moderate verbal cues for increased independence with self-care and participation in self-care. Patient brushed hair with R hand as well. Pt reports high motivation to engage in self-care tasks. Transfers  Sit to stand: Minimal assistance  Stand to sit: Minimal assistance                             Assessment  Performance deficits / Impairments: Decreased functional mobility ; Decreased ADL status; Decreased strength;Decreased ROM; Decreased safe awareness;Decreased cognition;Decreased endurance;Decreased balance;Decreased sensation;Decreased high-level IADLs;Decreased fine motor control  Prognosis: Good  Discharge Recommendations: Patient would benefit from continued therapy after discharge  Activity Tolerance: Patient Tolerated treatment well  Safety Devices in place: Yes  Type of devices: All fall risk precautions in place;Call light within reach; Chair alarm in place;Gait belt;Patient at risk for falls; Left in chair;Nurse notified Patient Education: OT POC, safety with functional transfers, safety with self-care tasks     Learner:patient  Method: demonstration and explanation       Outcome: needs reinforcement and asked questions     Plan  Safety Devices  Safety Devices in place: Yes  Type of devices: All fall risk precautions in place, Call light within reach, Chair alarm in place, Gait belt, Patient at risk for falls, Left in chair, Nurse notified  Plan  Times per week: 5-7  Times per day: Daily  Current Treatment Recommendations: Self-Care / ADL, Home Management Training, Strengthening, Balance Training, Functional Mobility Training, Endurance Training, Safety Education & Training, Patient/Caregiver Education & Training, Equipment Evaluation, Education, & procurement      Goals  Short term goals  Time Frame for Short term goals: By discharge  Short term goal 1: Patient will verbalize/demonstrate Good understanding of assistive equipment/durable medical equipment/modified techniques for increased safety and independence with self-care and mobility. Short term goal 2: Patient will verbalize/demonstrate Good understanding of Fall Prevention Strategies for increased safety and independence with self-care and mobility. Short term goal 3: Patient will perform upper body bathing/dressing with contact guard assist and lower body bathing/dressing with Moderate assist while maintaining LUE NWB until further clarification from ortho regarding WB. Short term goal 4: Patient will perform functional mobility and transfers during self-care with Minimal assist and Good safety while maintaining LUE NWB until further clarification from ortho regarding WB. Short term goal 5: Patient will actively participate in 15-25 minutes of therapeutic exercise/functional activities to promote increased independence with self-care and mobility.     OT Individual Minutes  Time In: 3419  Time Out: [address] [facility] [place], [place]  Minutes: 39      Electronically signed by Hortensia Peabody, OT on 8/26/21 at 3:19 PM EDT

## 2021-08-26 NOTE — PLAN OF CARE
Problem: Falls - Risk of:  Goal: Will remain free from falls  Description: Will remain free from falls  8/26/2021 0959 by Aggie Baron  Outcome: Ongoing  8/26/2021 0320 by Rigoberto Jorgensen RN  Outcome: Ongoing  Note: Pt assessed as a fall risk this shift. Remains free from falls and accidental injury at this time. Fall precautions in place, including falling star sign and fall risk band on pt. Floor free from obstacles, and bed is locked and in lowest position. Adequate lighting provided. Pt encouraged to call before getting OOB for any need. Bed alarm activated. Needs monitored during hourly rounding. Goal: Absence of physical injury  Description: Absence of physical injury  Outcome: Ongoing     Problem: Skin Integrity:  Goal: Will show no infection signs and symptoms  Description: Will show no infection signs and symptoms  Outcome: Ongoing  Goal: Absence of new skin breakdown  Description: Absence of new skin breakdown  8/26/2021 0959 by Aggie Baron  Outcome: Ongoing  8/26/2021 0320 by Rigoberto Jorgensen RN  Outcome: Ongoing  Note: Skin assessment complete. Waffle mattress in place. Pt turned and repositioned every two hours per self. Area kept free from moisture. Proper nourishment and fluids encouraged, as appropriate. Skin remains clean, dry, and intact.       Problem: Nutrition  Goal: Optimal nutrition therapy  Outcome: Ongoing     Problem: Musculor/Skeletal Functional Status  Goal: Highest potential functional level  Outcome: Ongoing  Goal: Absence of falls  Outcome: Ongoing     Problem: OXYGENATION/RESPIRATORY FUNCTION  Goal: Patient will maintain patent airway  8/26/2021 0959 by Aggie Baron  Outcome: Ongoing  8/26/2021 0320 by Rigoberto Jorgensen RN  Outcome: Ongoing  Note: Pt on 1L NC with O2 Sat >90%    Goal: Patient will achieve/maintain normal respiratory rate/effort  Description: Respiratory rate and effort will be within normal limits for the patient  Outcome: Ongoing     Problem: Pain:  Goal: Pain level will decrease  Description: Pain level will decrease  8/26/2021 0959 by Georgia Huang  Outcome: Ongoing  8/26/2021 0320 by Miguel Ángel Hawkins RN  Outcome: Ongoing  Note: Pt medicated with pain medication prn. Assessed all pain characteristics including level, type, location, frequency, and onset. Non-pharmacologic interventions offered to pt as well. Pt states pain is tolerable at this time. Goal: Recognizes and communicates pain  Description: Recognizes and communicates pain  Outcome: Ongoing  Goal: Control of acute pain  Description: Control of acute pain  Outcome: Ongoing  Goal: Control of chronic pain  Description: Control of chronic pain  Outcome: Ongoing     Problem: Injury - Risk of, Physical Injury:  Goal: Will remain free from falls  Description: Will remain free from falls  8/26/2021 0959 by Georgia Huang  Outcome: Ongoing  8/26/2021 0320 by Miguel Ángel Hawkins RN  Outcome: Ongoing  Note: Pt assessed as a fall risk this shift. Remains free from falls and accidental injury at this time. Fall precautions in place, including falling star sign and fall risk band on pt. Floor free from obstacles, and bed is locked and in lowest position. Adequate lighting provided. Pt encouraged to call before getting OOB for any need. Bed alarm activated. Needs monitored during hourly rounding.    Goal: Absence of physical injury  Description: Absence of physical injury  Outcome: Ongoing

## 2021-08-26 NOTE — PROGRESS NOTES
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 Erythema present. Comments: Right hand. Neurological:      General: No focal deficit present. Mental Status: He is oriented to person, place, and time and easily aroused. Mental status is at baseline. He is lethargic. Psychiatric:         Attention and Perception: Attention normal.         Mood and Affect: Mood normal.         Speech: Speech is delayed. Behavior: Behavior normal. Behavior is cooperative.            Assessment:        Primary Problem  Syncope and collapse    Active Hospital Problems    Diagnosis Date Noted    Elevated C-reactive protein (CRP) [R79.82]     Leukocytosis [D72.829]     Empyema lung (HCC) [J86.9] 08/21/2021    MRSA bacteremia [R78.81, B95.62] 08/20/2021    Superficial venous thrombosis of arm, left [I82.612] 08/18/2021    Acute respiratory failure ([facility] [J96.00] 08/16/2021    Fever [R50.9] 08/14/2021    Conjunctivitis [H10.9] 08/14/2021    Severe malnutrition ([facility] [E43] 08/12/2021    Alcohol abuse [F10.10] 08/11/2021    Hypokalemia [E87.6] 08/11/2021    Hyponatremia [E87.1] 08/11/2021    Traumatic rhabdomyolysis ([facility] [T79. 6XXA] 08/11/2021    Closed fracture of multiple ribs of right side [S22.41XA] 08/11/2021    Closed fracture of left wrist [S62.102A] 08/11/2021    Syncope and collapse [R55] 06/13/2018    Dyslipidemia [E78.5] 09/17/2014    Smoking addiction [F17.200] 06/11/2013    COPD (chronic obstructive pulmonary disease) ([facility] [J44.9] 05/30/2013          Plan:        ~Difficulty swallowing (stable)   barium swallow study cleared. Dysphagia soft bite size with thin nectar. ~MRSA Bacteremia (stable)  Afebrile. (8/14) ESR 49, ->140s  Blood cultures negative. Elevated leukocyte count 12.6  Patient is on Vancomycin. ID consulted 8/21:  IV vancomycin through September 10, 2021 ,monitor renal function and vancomycin levels closely. Repeat blood cultures 8/16 grew MRSA. CT thoracic and lumbar spine w contrast: No CT evidence of discitis-osteomyelitis. ~Superficial Venous Thrombophlebitis (stable)  - upper extremity left: Superficial vein thrombophlebitis is noted in 2 braches fromthe basilar vein near the antecubital fossa.  -Lovenox 40 mg once daily. DVT scan lower extremity: Chronic superficial venous thrombosis in the great saphenous vein at the level on ankle.    ~Conjunctivitis (stable)  Erythema bilaterally, with some exudate bilaterally,PERRL. Erythromycin ophthalmic ointment for 5 days     ~Acute urinary retention (resolved)  ( 8/12) Bladder scan: 460 mL  Straight cath twice  Baker's catheter in place. Flomax 0.4 mg.     ~Acute kidney injury (resolved)  Nephrology on board 8/24:  Sign off.  Dc fluids     ~Moderate Acute Alcohol withdrawal  On propofol drip.   On fentanyl drip.     ~Acute respiratory failure 2/2 empyema (stable)  CXR (August 16): Large right pleural effusion and right basilar opacities not significantlychanged from the previous study. Pro-Vasquez: 73 (8/12)   Pulmonology on board: Patient intubated at 1230.  No need for thoracocentesis  Trial of spontaneous breathing, RR 40s  Pneumonia work-up was negative for any strep antigens, Legionella, and mycoplasma antigens  Sputum culture in progress, direct exam showed mixed bacterial morphotypes. Blood culture positive for MRSA. Echo 35% EF. Cardio consulted: Plan for ERICKA. Keep K>4 and Mg>2. Pulmonology: Continue to follow up. Monitor sodium. Symbicort 160/4.5  On vancomycin.  -Chest tube output: 3.6L (460 in the last 24 hour). -Mucinex 600mg TID.     ~Syncope and collapse (stable)  -CT head shows no acute intracranial abnormality  -EKG shows sinus tachycardia with unifocal PVCs, no acute ST segment changes as documented by ER physician  -Troponin 20, 15  - urinalysis and urine drug screen unremarkable.      ~Traumatic rhabdomyolysis (resolved)  -CK 1,222-> 22, myoglobin 3,509-->2884.      ~Hypokalemia (stable)  -Initial potassium 2.5->3.7-3.4->2.6-> 3.0-> 3.4  -Patient received potassium supplement in the ED. -Recheck potassium this morning.  -Potassium sliding scale.     ~Acute mild alcoholic hepatitis  - Non reactive Hep A, B and C (2019)  - AST>ALT (95:51) ==> (45:32) ==> 42:27     Hyponatremia   -Initial sodium 127->139>144->147->142>138  -Daily BMP.     Alcohol abuse (resolved)  -LYPJPYL <04  -Thiamine, folic acid, multivitamin daily.  -Seizure and fall precautions.  -Consult social work for rehab, discharge planning.     ~Multiple right rib fractures (stable)  -CT scan shows Grade 2 chest wall injury with right 4th through 6th anterolateral rib fractures. Areas of pleural thickening likely areas of focal hemothorax near the rib fractures on the right. No acute inflammatory process within the abdomen pelvis.   -Fentanyl as needed.  -Consult general surgery: Resume tube feeds following procedures. Surgically stable.  -[place] Q6prn.      ~Closed left wrist fracture (moderate worsening )  -Velcro wrist splint in place  - left wrist is swollen. - left wrist X-ray: Subacute impacted fracture at the distal metaphysis of the left radius, wit visualized fracture line and partial healing of the fracture, grossly stable. -Worsening swelling. VL dup US upper extremity. Inpatient consult to Orthopedic surgery: No intervention planned. Splint only.        `COPD (stable)  -SPO2 96%  (intubated on 8/13 due to resp. Failure.  -Albuterol as needed  -Spiriva daily  -Ellipta daily     Smoking (stable)  -Nicotine patch     GI prophylaxis-Pepcid 20 mg IV twice daily. DVT prophylaxis--Lovenox 40 mg once daily   Diet: TPN . Disposition: PCU. Diet: Awaiting SLP eval and treat. Pending barium swallow w video. Myla Bailey MD  8/26/2021  7:59 AM     Attending Physician Statement  I have discussed the care of Kya Montanez with the resident team. I have examined the patient myself and taken ros and hpi , including pertinent history and exam findings,  with the resident. I have reviewed the key elements of all parts of the encounter with the resident. I agree with the assessment, plan and orders as documented by the resident. Principal Problem:    Syncope and collapse  Active Problems:    COPD (chronic obstructive pulmonary disease) (HCC)    Smoking addiction    Dyslipidemia    Alcohol abuse    Hypokalemia    Hyponatremia    Traumatic rhabdomyolysis (HCC)    Closed fracture of multiple ribs of right side    Closed fracture of left wrist    Severe malnutrition (HCC)    Fever    Conjunctivitis    Acute respiratory failure (HCC)    Superficial venous thrombosis of arm, left    MRSA bacteremia    Empyema lung (HCC)    Elevated C-reactive protein (CRP)    Leukocytosis  Resolved Problems:    * No resolved hospital problems.  *    MRSA empyema- on vanc till 9/10  CT chest showing improvement in effusion  Pt s/p 3 doses tPA, still draining about 460mg last 25 hr  No acute events overnight  Await further CT surg recommendation        Electronically signed by Sage Godfrey MD

## 2021-08-26 NOTE — PROGRESS NOTES
PULMONARY PROGRESS NOTE:    REASON FOR VISIT: resp failure, DTs  Interval History:    Events since last visit: none  460 ml out of chest tube 8-25-21  Had  TPA instilled 8/23-25/by CT surgery     PAST MEDICAL HISTORY:      Scheduled Meds:   guaiFENesin  600 mg Oral BID    calcium gluconate IVPB  1,000 mg IntraVENous Once    vancomycin (VANCOCIN) intermittent dosing (placeholder)   Other RX Placeholder    docusate sodium  100 mg Oral Daily    famotidine  20 mg Oral BID    metoprolol succinate  12.5 mg Oral Nightly    ipratropium-albuterol  1 ampule Inhalation Q4H While awake    tamsulosin  0.4 mg Oral Daily    enoxaparin  40 mg Subcutaneous Daily    vancomycin  1,250 mg IntraVENous Q24H    sodium chloride flush  5-40 mL IntraVENous 2 times per day    nicotine  1 patch Transdermal Daily    lidocaine  1 patch Transdermal Daily     Continuous Infusions:   sodium chloride 75 mL/hr at 08/25/21 1314    dextrose      sodium chloride 25 mL (08/19/21 1717)     PRN Meds:diphenhydrAMINE, HYDROcodone 5 mg - acetaminophen, morphine **OR** morphine, potassium chloride, perflutren lipid microspheres, glucose, dextrose, glucagon (rDNA), dextrose, sodium phosphate IVPB **OR** sodium phosphate IVPB, sodium chloride flush, sodium chloride, polyethylene glycol, acetaminophen **OR** acetaminophen, magnesium sulfate, ondansetron, LORazepam **OR** LORazepam **OR** LORazepam **OR** LORazepam **OR** LORazepam **OR** LORazepam **OR** LORazepam **OR** LORazepam, albuterol sulfate HFA        PHYSICAL EXAMINATION:  BP (!) 150/78   Pulse 92   Temp 98.7 °F (37.1 °C) (Oral)   Resp 20   Ht 5' 9\" (1.753 m)   Wt 146 lb 9.7 oz (66.5 kg)   SpO2 96%   BMI 21.65 kg/m²   afebrile  General : awake,alert,oriented   Neck - supple, no lymphadenopathy, JVD not raised  Heart - SR  Lungs - Air Entry- fair bilaterally; breath sounds : vesicular, 93% on 1 l nc, rt chest tube to 20 cm suction  Abdomen - soft, no tenderness  Upper Extremities  - no cyanosis, mottling; edema : edema  Lower Extremities: no cyanosis, mottling; edema : absent    Current Laboratory, Radiologic, Microbiologic, and Diagnostic studies reviewed  Data ReviewCBC:   Recent Labs     08/24/21  0506 08/25/21  0503 08/26/21  0716   WBC 13.0* 12.7* 12.6*   RBC 2.59* 2.54* 2.51*   HGB 8.4* 8.2* 8.1*   HCT 25.0* 24.6* 24.9*    398 371     BMP:   Recent Labs     08/24/21  0506 08/24/21  1355 08/25/21  0503 08/25/21  0503 08/25/21  1440 08/25/21  2200 08/26/21  0716   GLUCOSE 89  --  96  --   --   --  77      < > 138   < > 137 134* 135   K 3.5*   < > 3.6*   < > 3.9 3.9 3.8   BUN 13  --  11  --   --   --  11   CREATININE 1.08  --  1.10  --   --   --  1.20   CALCIUM 7.6*  --  7.6*  --   --   --  7.6*    < > = values in this interval not displayed. ABGs:   No results for input(s): PHART, PO2ART, TTO2ABJ, KWZ9UPA, BEART, T1IYSPHF, EOU6NQL in the last 72 hours.    PT/INR:  No results found for: PTINR    ASSESSMENT / PLAN:    Alcohol withdrawal - resolved  Thiamine, folic acid  atelectasis  RLL infiltrate / empyema  Acute hypoxic resp failure - Mechanical ventilation, extubated 8-20-21  bacteremia - continue current ABx  ABx per ID; rt chest tube   TPA per CT surgery  CT chest reviewed - decrease in fluid    Plan of care discussed with Dr Kenia Lal  Electronically signed by DONNY Keating CNP on 08/26/21 at 11:45 AM

## 2021-08-26 NOTE — PROGRESS NOTES
[facility] -   Infectious diseases evaluation  admission date 8/10/2021    reason for consultation:   MRSA bacteremia    Impression :   Current:  · MRSA bacteremia suspect pulmonary source of infection  · Left hand and wrist cellulitis  · Left wrist fracture  · Right pleural effusion /empyema status post thoracentesis with MRSA growth on culture. · Pneumothorax  · Superficial vein thrombophlebitis left arm  · Syncopal episode  · Alcohol withdrawal  · Acute respiratory failure requiring intubation  · Rib fracture with possible hemothorax    Other:  · History of alcohol abuse  · COPD  · History of depression  · History of seizure  · History of hypertension. Recommendations   · Continue IV vancomycin through September 10, 2021,monitor renal function and vancomycin levels closely  · Repeat blood cultures from 8/19/2021 no growth  · Echocardiogram , no reported vegetations  · ERICKA showed no vegetations. · CT thoracic and lumbar spine reviewed showed no discitis or osteomyelitis  · Follow CBC and renal function  · Monitor for now ,further work-up if recurrent fever. History of Present Illness:   Initial history:  Suze Bertrand is a 60y.o.-year-old male presented to the hospital on 8/10/2021 after a syncopal episode, was complaining of pain to the right chest wall and abdomen. Reported sharp, constant pain aggravated by movement with no alleviating factors. The patient was previously evaluated at the ER 8/7/2021 after a fall found to have multiple rib fractures and a left wrist closed fracture. Tox screen positive for cannabinoids. CT head negative for acute intracranial abnormality. CT chest/abdomen/pelvis showed rib fractures with associated focal hemothorax. No acute processes in abdomen or pelvis.    Right arm PICC line was placed 8/12/2021  He was placed on alcohol withdrawal protocol, developed respiratory distress was placed on BiPAP initially then was intubated 4/13/21. Baker catheter was placed on 4/13/2021  COVID-19 rapid test was negative  The patient was on Levaquin 8/13/2021 and 8/14/2021 that was discontinued and started on vancomycin and cefepime. He had a fever with a temperature max of 100.6 on 8/13/2021. Blood cultures 8/14/2021 grew MRSA as well as sputum culture. Chest x-ray 8/14/2021 showed bilateral airspace disease right greater than left. CT chest without contrast 8/16/2021 showed increased right pleural effusion with an area of loculated fluid status post thoracentesis with MRSA growth on culture  Right chest tube was placed 8/20/2021  Interval changes  8/26/2021   He is awake, feeling better, pain under control, status post thoracentesis 8/23/2021 . He is complaining of mild cough and right chest wall pain, denied nausea or vomiting. He denied abdominal pain, no urinary symptoms, no other complaints. He had a fever with a temperature max of 100.8 yesterday, afebrile today. WBC 12.6  Lower extremity venous Doppler showed superficial thrombosis on Lovenox  Right arm PICC line in place  Renal function stable  Blood pressure stable     Patient Vitals for the past 8 hrs:   BP Temp Temp src Pulse Resp SpO2 Weight   08/26/21 0832 -- -- -- -- 20 93 % --   08/26/21 0802 (!) 150/78 98.7 °F (37.1 °C) Oral 92 18 93 % --   08/26/21 0545 -- -- -- -- -- -- 146 lb 9.7 oz (66.5 kg)           I have personally reviewed the past medical history, past surgical history, medications, social history, and family history, and I haveupdated the database accordingly. Allergies:   Nickel     Review of Systems:     Review of Systems  As per history present illness, other than above 14 system review was negative  Physical Examination :       Physical Exam  Constitutional:       General: He is not in acute distress. HENT:      Head: Normocephalic and atraumatic.       Right Ear: External ear normal.      Left Ear: External ear normal.   Eyes: intermittent dosing (placeholder)   Other RX Placeholder    docusate sodium  100 mg Oral Daily    famotidine  20 mg Oral BID    metoprolol succinate  12.5 mg Oral Nightly    ipratropium-albuterol  1 ampule Inhalation Q4H While awake    tamsulosin  0.4 mg Oral Daily    enoxaparin  40 mg Subcutaneous Daily    vancomycin  1,250 mg IntraVENous Q24H    sodium chloride flush  5-40 mL IntraVENous 2 times per day    nicotine  1 patch Transdermal Daily    lidocaine  1 patch Transdermal Daily       Social History:     Social History     Socioeconomic History    Marital status:      Spouse name: Not on file    Number of children: Not on file    Years of education: Not on file    Highest education level: Not on file   Occupational History    Not on file   Tobacco Use    Smoking status: Current Every Day Smoker     Packs/day: 1.00     Years: 38.00     Pack years: 38.00     Types: Cigarettes    Smokeless tobacco: Never Used    Tobacco comment: 1 PPD   Vaping Use    Vaping Use: Never used   Substance and Sexual Activity    Alcohol use: Yes     Alcohol/week: 12.0 standard drinks     Types: 12 Cans of beer per week     Comment: 12 24 oz cans daily    Drug use: Yes     Types: Marijuana     Comment: crack occasionally    Sexual activity: Never   Other Topics Concern    Not on file   Social History Narrative    Not on file     Social Determinants of Health     Financial Resource Strain:     Difficulty of Paying Living Expenses:    Food Insecurity:     Worried About Running Out of Food in the Last Year:     Ran Out of Food in the Last Year:    Transportation Needs:     Lack of Transportation (Medical):      Lack of Transportation (Non-Medical):    Physical Activity:     Days of Exercise per Week:     Minutes of Exercise per Session:    Stress:     Feeling of Stress :    Social Connections:     Frequency of Communication with Friends and Family:     Frequency of Social Gatherings with Friends and Family:     Attends Congregational Services:     Active Member of Clubs or Organizations:     Attends Club or Organization Meetings:     Marital Status:    Intimate Partner Violence:     Fear of Current or Ex-Partner:     Emotionally Abused:     Physically Abused:     Sexually Abused:        Family History:     Family History   Problem Relation Age of Onset    Cancer Mother         colon ca      Medical Decision Making:   I have independently reviewed/ordered the following labs:    CBC with Differential:   Recent Labs     08/25/21  0503 08/26/21  0716   WBC 12.7* 12.6*   HGB 8.2* 8.1*   HCT 24.6* 24.9*    371     BMP:  Recent Labs     08/25/21  0503 08/25/21  1440 08/25/21  2200 08/26/21  0716      < > 134* 135   K 3.6*   < > 3.9 3.8      < > 103 102   CO2 25   < > 22 23   BUN 11  --   --  11   CREATININE 1.10  --   --  1.20   MG 1.9  --   --  1.7    < > = values in this interval not displayed. Hepatic Function Panel:   Recent Labs     08/25/21  0503 08/26/21  0716   PROT 5.1* 5.5*   LABALBU 2.1* 2.2*   BILITOT 0.67 0.71   ALKPHOS 76 76   ALT 17 15   AST 24 17     No results for input(s): RPR in the last 72 hours. No results for input(s): HIV in the last 72 hours. No results for input(s): BC in the last 72 hours. Lab Results   Component Value Date    CREATININE 1.20 08/26/2021    GLUCOSE 77 08/26/2021       Detailed results: Thank you for allowing us to participate in the care of this patient. Please call with questions. This note is created with the assistance of a speech recognition program.  While intending to generate adocument that actually reflects the content of the visit, the document can still have some errors including those of syntax and sound a like substitutions which may escape proof reading. It such instances, actual meaningcan be extrapolated by contextual diversion.     Eloy Brown MD  Office: [phone]  Perfect serve / office [phone]

## 2021-08-26 NOTE — PROGRESS NOTES
Called Dr. Mian Santa for update/order concerning patient ROM and WB restrictions to LUE. Awaiting call back.

## 2021-08-26 NOTE — PROGRESS NOTES
[facility]  Daily Progress Note    Surgeon: Dr Randalyn Scheuermann   CC:  Empyema, right  Plan: s/p 3 doses of intrapleural TPA       Subjective:  Mr. Royal Almonte feels well today with no acute complaints. Pain is controlled on current medication regimen. He is currently on oxygen via NC with no shortness of breath or acute signs of distress. Output declining. Chest tubes to water seal with repeat CXR in 3 hours. Encourage OOBTC and ambulating. Denies chest pain or shortness of breath. Plan of care reviewed and questions answered. Physical Exam  Vital Signs: BP (!) 140/74   Pulse 102   Temp 97.5 °F (36.4 °C) (Oral)   Resp 16   Ht 5' 9\" (1.753 m)   Wt 146 lb 9.7 oz (66.5 kg)   SpO2 95%   BMI 21.65 kg/m²  O2 Flow Rate (L/min): 1 L/min   Admit Weight: Weight: 150 lb (68 kg)   WEIGHTWeight: 146 lb 9.7 oz (66.5 kg)     General: alert and oriented to person, place and time, thin/underweight, in no acute distress. Resting in bed  Heart: Normal S1 and S2.  Regular rhythm. No murmurs, gallops, or rubs. Lungs: clear to auscultation bilaterally and diminished breath sounds bibasilar  Abdomen: soft, non tender, non distended, BSx4  Extremities: no edema  Skin: warm and dry. Chest tube dressing clean, dry and intact.      Scheduled Meds:    guaiFENesin  600 mg Oral BID    vancomycin (VANCOCIN) intermittent dosing (placeholder)   Other RX Placeholder    docusate sodium  100 mg Oral Daily    famotidine  20 mg Oral BID    metoprolol succinate  12.5 mg Oral Nightly    ipratropium-albuterol  1 ampule Inhalation Q4H While awake    tamsulosin  0.4 mg Oral Daily    enoxaparin  40 mg Subcutaneous Daily    vancomycin  1,250 mg IntraVENous Q24H    sodium chloride flush  5-40 mL IntraVENous 2 times per day    nicotine  1 patch Transdermal Daily    lidocaine  1 patch Transdermal Daily     Continuous Infusions:    sodium chloride 75 mL/hr at 08/26/21 1901    dextrose      sodium chloride 25 mL (08/19/21 2427)       Data:  CBC:   Recent Labs     08/24/21  0506 08/25/21  0503 08/26/21  0716   WBC 13.0* 12.7* 12.6*   HGB 8.4* 8.2* 8.1*   HCT 25.0* 24.6* 24.9*   MCV 96.4 96.8 99.2    398 371     BMP:   Recent Labs     08/24/21  0506 08/24/21  1355 08/25/21  0503 08/25/21  1440 08/25/21  2200 08/26/21  0716 08/26/21  1614      < > 138   < > 134* 135 131*   K 3.5*   < > 3.6*   < > 3.9 3.8 3.9      < > 105   < > 103 102 100   CO2 26   < > 25   < > 22 23 23   BUN 13  --  11  --   --  11  --    CREATININE 1.08  --  1.10  --   --  1.20  --     < > = values in this interval not displayed. PT/INR: No results for input(s): PROTIME, INR in the last 72 hours. APTT: No results for input(s): APTT in the last 72 hours. CT Chest:    CT OF THE CHEST WITHOUT CONTRAST 8/26/2021 9:26 am       TECHNIQUE:   CT of the chest was performed without the administration of intravenous   contrast. Multiplanar reformatted images are provided for review. Dose   modulation, iterative reconstruction, and/or weight based adjustment of the   mA/kV was utilized to reduce the radiation dose to as low as reasonably   achievable.       COMPARISON:   None.       HISTORY:   ORDERING SYSTEM PROVIDED HISTORY: Reassess empyema morning after 3rd dose of   intrapleural TPA   TECHNOLOGIST PROVIDED HISTORY:   Reassess empyema morning after 3rd dose of intrapleural TPA   Reason for Exam: Reassess empyema morning after 3rd dose of intrapleural TPA   Acuity: Unknown   Type of Exam: Unknown   Relevant Medical/Surgical History: copd       FINDINGS:   Mediastinum: Soft tissues of the thoracic inlet are unremarkable.  The   thoracic aorta is normal in caliber.  The main pulmonary artery is normal in   caliber.  There is no pericardial effusion.  There is no mediastinal or hilar   adenopathy.       Lungs/pleura:  There is a mild-to-moderate left-sided pleural effusion with   adjacent consolidation and this is similar compared to prior exam. Suann Pillar is   a right-sided pleural effusion containing foci of air and this is smaller   compared to prior examination an indwelling drain remains.  There are   loculated areas of fluid adjacent to the fissures that are similar compared   to prior examination.  The tracheobronchial tree is patent.       Upper Abdomen: The upper abdomen is grossly unremarkable.       Soft Tissues/Bones: The extrathoracic soft tissues are unremarkable. Suann Pillar   is no axillary adenopathy. Suann Pillar is no acute osseous abnormality.           Impression   Right-sided pleural fluid and pleural air and this is decreased compared to   prior examination with stable indwelling pigtail catheter.       Stable loculated areas of fluid along the right lung fissures.       Mild-to-moderate left-sided pleural effusion with adjacent consolidation that   is similar compared to prior examination. I/O:  I/O last 3 completed shifts: In: 2428.3 [P.O.:250; I.V.:1928.3; IV Piggyback:250]  Out: 0698 [Urine:2800;  Chest Tube:460]      Assessment:  Empyema, right  Pleural effusion, left  MRSA Bacteremia  Infectious Disease following  Acute respiratory failure  Pulmonology following  COPD  Superficial Venous Thrombophlebitis  Conjunctivitis  Acute urinary retention  Acute kidney Injury  Nephrology following  Alcohol abuse with moderate acute withdrawal   Acute mild alcoholic hepatitis secondary to above  Syncope and collapse  Cardiology following  Traumatic rhabdomyolysis, resolved  Hypertension  Severe malnutrition  Depression  Tobacco dependence      Plan:   Maintain right chest tube to wall suction  Plan intrapleural TPA administration  Once daily x 3 days, final dose yesterday  Chart output twice daily at 06:00 and 18:00  Plan to remove chest tube when output is < 100 mL in 12 hour shift  Change chest tube dressing daily with occlusive gauze  Prefer Zero Form if available  Thank you for including us in the care of this patient    The above

## 2021-08-26 NOTE — PROGRESS NOTES
Per Dr. Kendra Jackson, patient is ok for wbat to [address] with brace on as well as ROM as tolerated with brace on as well.

## 2021-08-26 NOTE — PROGRESS NOTES
[facility]  Speech Language Pathology    Date: 8/26/2021  Patient Name: Thaddeus Obrien  YOB: 1965   AGE: 60 y.o. MRN: [id number]        Patient Not Available for Speech Therapy     Due to:  [] Testing  [] Hemodialysis  [] Cancelled by RN  [] Surgery   [] Intubation/Sedation/Pain Medication  [] Medical instability  [x] Other: 0930 - Attempted to see Pt. For ST, however, off the floor for CT.      1328 - 2nd attempt, Pt. working with OT.     [id number] - 3rd attempt, Pt. refusing stating, \"I'm just too overwhelmed trying to figure out all this stuff with my social security card, food stamps, 's license, and maybe getting on disability\". Pt. with several letters in hand and on recliner. Pt. requesting ST to come back tomorrow. Writer provided education re: speaking with LSW to address concerns listed above. Pt. agreeable to this stating, \"that would be very helpful\". RN, Chico Hernandez, notified and reporting will contact LSW to speak with Pt. Next scheduled treatment: 08/27  Completed by:  Gianfranco Hill M.A., CCC-SLP

## 2021-08-26 NOTE — PLAN OF CARE
Problem: Falls - Risk of:  Goal: Will remain free from falls  Description: Will remain free from falls  8/26/2021 0320 by Tobi Arteaga RN  Outcome: Ongoing  Note: Pt assessed as a fall risk this shift. Remains free from falls and accidental injury at this time. Fall precautions in place, including falling star sign and fall risk band on pt. Floor free from obstacles, and bed is locked and in lowest position. Adequate lighting provided. Pt encouraged to call before getting OOB for any need. Bed alarm activated. Needs monitored during hourly rounding. Problem: Skin Integrity:  Goal: Absence of new skin breakdown  Description: Absence of new skin breakdown  8/26/2021 0320 by Tobi Arteaga RN  Outcome: Ongoing  Note: Skin assessment complete. Waffle mattress in place. Pt turned and repositioned every two hours per self. Area kept free from moisture. Proper nourishment and fluids encouraged, as appropriate. Skin remains clean, dry, and intact. Problem: OXYGENATION/RESPIRATORY FUNCTION  Goal: Patient will maintain patent airway  8/26/2021 0320 by Tobi Arteaga RN  Outcome: Ongoing  Note: Pt on 1L NC with O2 Sat >90%       Problem: Pain:  Goal: Pain level will decrease  Description: Pain level will decrease  8/26/2021 0320 by Tobi Arteaga RN  Outcome: Ongoing  Note: Pt medicated with pain medication prn. Assessed all pain characteristics including level, type, location, frequency, and onset. Non-pharmacologic interventions offered to pt as well. Pt states pain is tolerable at this time.

## 2021-08-26 NOTE — CARE COORDINATION
ОЛЬГА spoke with this patient about [facility] and he was on board with going to that facility. The patient talked about needing to get his life together and would like info on attempting to get disability/Social security.   Mindee with [facility] did start this pre-cert this AM.

## 2021-08-27 ENCOUNTER — APPOINTMENT (OUTPATIENT)
Dept: GENERAL RADIOLOGY | Age: 56
DRG: 351 | End: 2021-08-27
Payer: MEDICAID

## 2021-08-27 LAB
ALBUMIN SERPL-MCNC: 2.2 G/DL (ref 3.5–5.2)
ALBUMIN/GLOBULIN RATIO: ABNORMAL (ref 1–2.5)
ALP BLD-CCNC: 72 U/L (ref 40–129)
ALT SERPL-CCNC: 13 U/L (ref 5–41)
ANION GAP SERPL CALCULATED.3IONS-SCNC: 9 MMOL/L (ref 9–17)
AST SERPL-CCNC: 14 U/L
BILIRUB SERPL-MCNC: 0.55 MG/DL (ref 0.3–1.2)
BUN BLDV-MCNC: 9 MG/DL (ref 6–20)
BUN/CREAT BLD: ABNORMAL (ref 9–20)
CALCIUM SERPL-MCNC: 7.7 MG/DL (ref 8.6–10.4)
CHLORIDE BLD-SCNC: 102 MMOL/L (ref 98–107)
CO2: 25 MMOL/L (ref 20–31)
CREAT SERPL-MCNC: 1.31 MG/DL (ref 0.7–1.2)
GFR AFRICAN AMERICAN: >60 ML/MIN
GFR NON-AFRICAN AMERICAN: 56 ML/MIN
GFR SERPL CREATININE-BSD FRML MDRD: ABNORMAL ML/MIN/{1.73_M2}
GFR SERPL CREATININE-BSD FRML MDRD: ABNORMAL ML/MIN/{1.73_M2}
GLUCOSE BLD-MCNC: 95 MG/DL (ref 70–99)
HCT VFR BLD CALC: 23.9 % (ref 41–53)
HEMOGLOBIN: 8 G/DL (ref 13.5–17.5)
MAGNESIUM: 1.8 MG/DL (ref 1.6–2.6)
MCH RBC QN AUTO: 32.7 PG (ref 26–34)
MCHC RBC AUTO-ENTMCNC: 33.7 G/DL (ref 31–37)
MCV RBC AUTO: 97.2 FL (ref 80–100)
NRBC AUTOMATED: ABNORMAL PER 100 WBC
PDW BLD-RTO: 14.4 % (ref 11.5–14.9)
PLATELET # BLD: 334 K/UL (ref 150–450)
PMV BLD AUTO: 6.9 FL (ref 6–12)
POTASSIUM SERPL-SCNC: 3.9 MMOL/L (ref 3.7–5.3)
RBC # BLD: 2.46 M/UL (ref 4.5–5.9)
SODIUM BLD-SCNC: 136 MMOL/L (ref 135–144)
TOTAL PROTEIN: 5.6 G/DL (ref 6.4–8.3)
VANCOMYCIN TROUGH DATE LAST DOSE: ABNORMAL
VANCOMYCIN TROUGH DOSE AMOUNT: 1250
VANCOMYCIN TROUGH TIME LAST DOSE: 2348
VANCOMYCIN TROUGH: 21.7 UG/ML (ref 10–20)
WBC # BLD: 8.8 K/UL (ref 3.5–11)

## 2021-08-27 PROCEDURE — 99232 SBSQ HOSP IP/OBS MODERATE 35: CPT | Performed by: INTERNAL MEDICINE

## 2021-08-27 PROCEDURE — 97530 THERAPEUTIC ACTIVITIES: CPT

## 2021-08-27 PROCEDURE — 6370000000 HC RX 637 (ALT 250 FOR IP): Performed by: STUDENT IN AN ORGANIZED HEALTH CARE EDUCATION/TRAINING PROGRAM

## 2021-08-27 PROCEDURE — 85027 COMPLETE CBC AUTOMATED: CPT

## 2021-08-27 PROCEDURE — 83735 ASSAY OF MAGNESIUM: CPT

## 2021-08-27 PROCEDURE — 2580000003 HC RX 258: Performed by: NURSE PRACTITIONER

## 2021-08-27 PROCEDURE — 92526 ORAL FUNCTION THERAPY: CPT

## 2021-08-27 PROCEDURE — 6360000002 HC RX W HCPCS: Performed by: STUDENT IN AN ORGANIZED HEALTH CARE EDUCATION/TRAINING PROGRAM

## 2021-08-27 PROCEDURE — 6370000000 HC RX 637 (ALT 250 FOR IP): Performed by: INTERNAL MEDICINE

## 2021-08-27 PROCEDURE — 80202 ASSAY OF VANCOMYCIN: CPT

## 2021-08-27 PROCEDURE — 71045 X-RAY EXAM CHEST 1 VIEW: CPT

## 2021-08-27 PROCEDURE — 2700000000 HC OXYGEN THERAPY PER DAY

## 2021-08-27 PROCEDURE — 94640 AIRWAY INHALATION TREATMENT: CPT

## 2021-08-27 PROCEDURE — 2060000000 HC ICU INTERMEDIATE R&B

## 2021-08-27 PROCEDURE — 6370000000 HC RX 637 (ALT 250 FOR IP): Performed by: PHYSICIAN ASSISTANT

## 2021-08-27 PROCEDURE — 6370000000 HC RX 637 (ALT 250 FOR IP): Performed by: NURSE PRACTITIONER

## 2021-08-27 PROCEDURE — 97116 GAIT TRAINING THERAPY: CPT

## 2021-08-27 PROCEDURE — 2580000003 HC RX 258: Performed by: STUDENT IN AN ORGANIZED HEALTH CARE EDUCATION/TRAINING PROGRAM

## 2021-08-27 PROCEDURE — 36415 COLL VENOUS BLD VENIPUNCTURE: CPT

## 2021-08-27 PROCEDURE — 94761 N-INVAS EAR/PLS OXIMETRY MLT: CPT

## 2021-08-27 PROCEDURE — 80053 COMPREHEN METABOLIC PANEL: CPT

## 2021-08-27 RX ADMIN — ENOXAPARIN SODIUM 40 MG: 40 INJECTION SUBCUTANEOUS at 09:01

## 2021-08-27 RX ADMIN — IPRATROPIUM BROMIDE AND ALBUTEROL SULFATE 1 AMPULE: 2.5; .5 SOLUTION RESPIRATORY (INHALATION) at 11:26

## 2021-08-27 RX ADMIN — FAMOTIDINE 20 MG: 20 TABLET, FILM COATED ORAL at 09:01

## 2021-08-27 RX ADMIN — IPRATROPIUM BROMIDE AND ALBUTEROL SULFATE 1 AMPULE: 2.5; .5 SOLUTION RESPIRATORY (INHALATION) at 15:04

## 2021-08-27 RX ADMIN — HYDROCODONE BITARTRATE AND ACETAMINOPHEN 1 TABLET: 5; 325 TABLET ORAL at 23:02

## 2021-08-27 RX ADMIN — HYDROCODONE BITARTRATE AND ACETAMINOPHEN 1 TABLET: 5; 325 TABLET ORAL at 04:52

## 2021-08-27 RX ADMIN — IPRATROPIUM BROMIDE AND ALBUTEROL SULFATE 1 AMPULE: 2.5; .5 SOLUTION RESPIRATORY (INHALATION) at 07:08

## 2021-08-27 RX ADMIN — SODIUM CHLORIDE, PRESERVATIVE FREE 10 ML: 5 INJECTION INTRAVENOUS at 09:06

## 2021-08-27 RX ADMIN — HYDROCODONE BITARTRATE AND ACETAMINOPHEN 1 TABLET: 5; 325 TABLET ORAL at 18:04

## 2021-08-27 RX ADMIN — TAMSULOSIN HYDROCHLORIDE 0.4 MG: 0.4 CAPSULE ORAL at 09:01

## 2021-08-27 RX ADMIN — HYDROCODONE BITARTRATE AND ACETAMINOPHEN 1 TABLET: 5; 325 TABLET ORAL at 11:05

## 2021-08-27 RX ADMIN — SODIUM CHLORIDE: 4.5 INJECTION, SOLUTION INTRAVENOUS at 10:09

## 2021-08-27 RX ADMIN — DOCUSATE SODIUM 100 MG: 100 CAPSULE, LIQUID FILLED ORAL at 09:01

## 2021-08-27 RX ADMIN — MORPHINE SULFATE 4 MG: 4 INJECTION, SOLUTION INTRAMUSCULAR; INTRAVENOUS at 00:37

## 2021-08-27 RX ADMIN — METOPROLOL SUCCINATE 12.5 MG: 25 TABLET, EXTENDED RELEASE ORAL at 21:37

## 2021-08-27 RX ADMIN — GUAIFENESIN 600 MG: 600 TABLET, EXTENDED RELEASE ORAL at 21:38

## 2021-08-27 RX ADMIN — IPRATROPIUM BROMIDE AND ALBUTEROL SULFATE 1 AMPULE: 2.5; .5 SOLUTION RESPIRATORY (INHALATION) at 18:55

## 2021-08-27 RX ADMIN — GUAIFENESIN 600 MG: 600 TABLET, EXTENDED RELEASE ORAL at 09:01

## 2021-08-27 RX ADMIN — NICOTINE POLACRILEX 2 MG: 2 GUM, CHEWING BUCCAL at 23:01

## 2021-08-27 ASSESSMENT — PAIN DESCRIPTION - PAIN TYPE
TYPE: CHRONIC PAIN
TYPE: CHRONIC PAIN
TYPE: ACUTE PAIN
TYPE: CHRONIC PAIN
TYPE: ACUTE PAIN
TYPE: ACUTE PAIN
TYPE: CHRONIC PAIN
TYPE: ACUTE PAIN
TYPE: CHRONIC PAIN
TYPE: ACUTE PAIN

## 2021-08-27 ASSESSMENT — ENCOUNTER SYMPTOMS
SHORTNESS OF BREATH: 0
PHOTOPHOBIA: 0
VOMITING: 0
ABDOMINAL PAIN: 0
NAUSEA: 0
COUGH: 1
CHEST TIGHTNESS: 0
TROUBLE SWALLOWING: 1

## 2021-08-27 ASSESSMENT — PAIN SCALES - GENERAL
PAINLEVEL_OUTOF10: 10
PAINLEVEL_OUTOF10: 7
PAINLEVEL_OUTOF10: 10
PAINLEVEL_OUTOF10: 4
PAINLEVEL_OUTOF10: 5
PAINLEVEL_OUTOF10: 0
PAINLEVEL_OUTOF10: 8
PAINLEVEL_OUTOF10: 8
PAINLEVEL_OUTOF10: 4
PAINLEVEL_OUTOF10: 8
PAINLEVEL_OUTOF10: 5
PAINLEVEL_OUTOF10: 8
PAINLEVEL_OUTOF10: 0
PAINLEVEL_OUTOF10: 10
PAINLEVEL_OUTOF10: 8

## 2021-08-27 ASSESSMENT — PAIN DESCRIPTION - DESCRIPTORS
DESCRIPTORS: ACHING
DESCRIPTORS: CRUSHING;DULL
DESCRIPTORS: ACHING

## 2021-08-27 ASSESSMENT — PAIN DESCRIPTION - LOCATION
LOCATION: BACK
LOCATION: CHEST;BACK
LOCATION: COCCYX;BACK
LOCATION: ARM
LOCATION: BACK
LOCATION: BACK
LOCATION: GENERALIZED
LOCATION: BACK;CHEST
LOCATION: CHEST;BACK

## 2021-08-27 ASSESSMENT — PAIN DESCRIPTION - ORIENTATION
ORIENTATION: LOWER
ORIENTATION: LEFT
ORIENTATION: RIGHT
ORIENTATION: LOWER
ORIENTATION: RIGHT

## 2021-08-27 ASSESSMENT — PAIN DESCRIPTION - FREQUENCY
FREQUENCY: CONTINUOUS
FREQUENCY: INTERMITTENT
FREQUENCY: CONTINUOUS
FREQUENCY: CONTINUOUS
FREQUENCY: INTERMITTENT
FREQUENCY: CONTINUOUS

## 2021-08-27 NOTE — CARE COORDINATION
No pre-cert as of this time for this patient. There is some question as to whether he will continue to meet criteria for [facility]. Piero with [facility] mares ([phone]).
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
## 2021-08-27 NOTE — PROGRESS NOTES
Speech Language Pathology  Speech Language Pathology  [facility]  Dysphagia Treatment Note    Date: 8/27/2021  Patients Name: Suze Heading  MRN: [id number]  Diagnosis: dysphagia  Patient Active Problem List   Diagnosis Code    COPD (chronic obstructive pulmonary disease) ([facility] [address] J44.9    Smoking addiction F17.200    Depression F32.9    Dyslipidemia E78.5    Lung nodule R91.1    DDD (degenerative disc disease), lumbar M51.36    Groin pain, chronic, right R10.31, G89.29    Ilioinguinal neuralgia of right side G57.91    Syncope and collapse R55    History of alcohol abuse F10.11    Lumbar radiculopathy M54.16    Scoliosis due to degenerative disease of spine in adult patient M41.80    Lumbosacral spondylosis without myelopathy M47.817    Alcohol abuse F10.10    Hypokalemia E87.6    Hyponatremia E87.1    Traumatic rhabdomyolysis ([facility] [address] T79. 6XXA    Closed fracture of multiple ribs of right side S22.41XA    Closed fracture of left wrist S62.102A    Severe malnutrition (HCC) E43    Fever R50.9    Conjunctivitis H10.9    Acute respiratory failure (HCC) J96.00    Superficial venous thrombosis of arm, left I82.612    MRSA bacteremia R78.81, B95.62    Empyema lung (HCC) J86.9    Elevated C-reactive protein (CRP) R79.82    Leukocytosis D72.829       Pain: pt. denies    Dysphagia Treatment  Treatment time: 7221-2361    Subjective: [x] Alert [x] Cooperative     [] Confused     [] Agitated    [] Lethargic    Objective/Assessment:    Pt. Completed Evangelina maneuver x10, effortful swallow x10, simple tongue base strengthening exercises x1, x10. No overt s/s aspiration demonstrated when pt. Taking sips of nectar thick liquid via straw x3. ST encouraged pt. To continue to practice tongue base strengthening exercises Nella on own, pt. Verbalized understanding.        Plan:  [x] Continue ST services    [] Discharge from ST:        Discharge recommendations: [] Inpatient Rehab   [] Skilled Nursing

## 2021-08-27 NOTE — PROGRESS NOTES
Attending Physician Statement  I have discussed the care of Anmol Monreal with the resident team. I have examined the patient myself and taken ros and hpi , including pertinent history and exam findings,  with the resident. I have reviewed the key elements of all parts of the encounter with the resident. I agree with the assessment, plan and orders as documented by the resident. Principal Problem:    Syncope and collapse  Active Problems:    COPD (chronic obstructive pulmonary disease) (HCC)    Smoking addiction    Dyslipidemia    Alcohol abuse    Hypokalemia    Hyponatremia    Traumatic rhabdomyolysis (HCC)    Closed fracture of multiple ribs of right side    Closed fracture of left wrist    Severe malnutrition (HCC)    Fever    Conjunctivitis    Acute respiratory failure (HCC)    Superficial venous thrombosis of arm, left    MRSA bacteremia    Empyema lung (HCC)    Elevated C-reactive protein (CRP)    Leukocytosis  Resolved Problems:    * No resolved hospital problems. *      Chest tube to be removed today, CXR after-if ok, will be ready for discharge  Will NH placement- precert was started yesterday  MRSA empyema- on vanc till 9/10    Full note to follow.       Electronically signed by Cherylene Patten, MD

## 2021-08-27 NOTE — PROGRESS NOTES
Patient seen and examined. Afebrile, VSS. Leukocytosis improved, hemoglobin stable. Patient is doing well. Denies abdominal pain or SOB. C/o right rib pain with coughing. Had two large BM's yesterday. Tolerating diet however still not a great appetite. No N/V. Chest tube had minimal drainage overnight. Per cardiothoracic surgery, chest tube to be removed later today. Abdomen soft, non-tender, non-distended. Surgically stable. Diet as tolerated. Chest tube removal per CT surgery. Continue medical management. Discharge planning. Chest tube was removed. Patient tolerating diet bowels are moving. No acute general surgical issues at this time. I will sign off the case. Please call me as needed.     Ghanshyam Washington PA-C  [address] [facility]
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
## 2021-08-27 NOTE — DISCHARGE INSTR - COC
Continuity of Care Form    Patient Name: Abraham Garcia   :  1965  MRN:  [id number]    Admit date:  8/10/2021  Discharge date:  21    Code Status Order: Full Code   Advance Directives:      Admitting Physician:  Alecia Limon MD  PCP: Zach Mendoza MD    Discharging Nurse: Sallie Troncoso [address] Unit/Room#: 2103/2103-01  Discharging Unit Phone Number: [phone]    Emergency Contact:   Extended Emergency Contact Information  Primary Emergency Contact: [facility]   Address: [address], [address] [facility] [address] Phone: [phone]  Mobile Phone: [phone]  Relation: Other  Hearing or visual needs: None  Other needs: None  Preferred language: English   needed? No  Secondary Emergency Contact: Yolanda Desouza  Home Phone: [phone]  Mobile Phone: [phone]  Relation: Parent  Preferred language: English   needed?  No    Past Surgical History:  Past Surgical History:   Procedure Laterality Date    ANESTHESIA NERVE BLOCK Right 4/10/2017    NERVE BLOCK US RIGHT SIDED ILIOINGUINAL performed by hCar Chambers MD at [address] [place] [address]  3/19/15    HERNIA REPAIR      IR CHEST TUBE INSERTION  2021    IR CHEST TUBE INSERTION 2021 [facility]    NERVE BLOCK Right 10/10/2016    right groin    OTHER SURGICAL HISTORY Right 04/10/2017    US nerve block to R groin    TOE SURGERY      SCREW RIGHT BIG TOE       Immunization History:   Immunization History   Administered Date(s) Administered    Influenza Virus Vaccine 2016, 10/08/2019    Influenza, Quadv, IM, (6 mo and older Fluzone, Flulaval, Fluarix and 3 yrs and older Afluria) 10/08/2019    Pneumococcal Polysaccharide ([id number]) 2016    Tdap (Boostrix, Adacel) 2016    Zoster Live (Zostavax) 10/08/2019       Active Problems:  Patient Active Problem List   Diagnosis Code    COPD (chronic obstructive pulmonary disease) ([facility] [address] J44.9    Smoking addiction F17.200    Depression F32.9    Dyslipidemia E78.5    Lung nodule R91.1    DDD (degenerative disc disease), lumbar M51.36    Groin pain, chronic, right R10.31, G89.29    Ilioinguinal neuralgia of right side G57.91    Syncope and collapse R55    History of alcohol abuse F10.11    Lumbar radiculopathy M54.16    Scoliosis due to degenerative disease of spine in adult patient M41.80    Lumbosacral spondylosis without myelopathy M47.817    Alcohol abuse F10.10    Hypokalemia E87.6    Hyponatremia E87.1    Traumatic rhabdomyolysis ([facility]) T79. 6XXA    Closed fracture of multiple ribs of right side S22.41XA    Closed fracture of left wrist S62.102A    Severe malnutrition ([facility]) E43    Fever R50.9    Conjunctivitis H10.9    Acute respiratory failure ([facility]) J96.00    Superficial venous thrombosis of arm, left I82.612    MRSA bacteremia R78.81, B95.62    Empyema lung ([facility]) J86.9    Elevated C-reactive protein (CRP) R79.82    Leukocytosis D72.829       Isolation/Infection:   Isolation            Contact          Patient Infection Status       Infection Onset Added Last Indicated Last Indicated By Review Planned Expiration Resolved Resolved By    MRSA 08/13/21 08/16/21 08/17/21 Culture, Body Fluid        Blood 8/2021    Resolved    COVID-19 Rule Out 08/16/21 08/16/21 08/16/21 COVID-19, Rapid (Ordered)   08/16/21 Rule-Out Test Resulted            Nurse Assessment:  Last Vital Signs: /62   Pulse 78   Temp 98.8 °F (37.1 °C) (Oral)   Resp 16   Ht 5' 9\" (1.753 m)   Wt 146 lb 9.7 oz (66.5 kg)   SpO2 93%   BMI 21.65 kg/m²     Last documented pain score (0-10 scale): Pain Level: 10  Last Weight:   Wt Readings from Last 1 Encounters:   08/26/21 146 lb 9.7 oz (66.5 kg)     Mental Status:  oriented and alert    IV Access:  - None    Nursing Mobility/ADLs:  Walking   Assisted  Transfer  Assisted  Bathing  Assisted  Dressing  Independent  Toileting  Assisted  Feeding Score:  Readmission Risk              Risk of Unplanned Readmission:  27           Discharging to Facility/ Agency   . [facility] [address] [place] [id number] [phone]      / signature: Electronically signed by Abby Burris RN on 8/27/21 at 8:50 AM EDT  Electronically signed by KALPESH Lazar on 8/31/2021 at 1:42 PM    PHYSICIAN SECTION    Prognosis: Fair    Condition at Discharge: Stable    Rehab Potential (if transferring to Rehab): Fair    Recommended Labs or Other Treatments After Discharge: labs as needed for pharm to dose vanco    Physician Certification: I certify the above information and transfer of [place]  is necessary for the continuing treatment of the diagnosis listed and that he requires [facility] for less 30 days.      Update Admission H&P: No change in H&P    PHYSICIAN SIGNATURE:  Electronically signed by Ladarius Magaña MD on 8/27/21 at 11:25 AM EDT

## 2021-08-27 NOTE — PLAN OF CARE
Sensory Perception - Impaired:  Goal: Demonstrates accurate environmental perceptions  Description: Demonstrates accurate environmental perceptions  8/27/2021 1603 by Lucia Ovalle RN  Outcome: Ongoing   Monitoring. Problem: Sleep Pattern Disturbance:  Goal: Appears well-rested  Description: Appears well-rested  8/27/2021 1603 by Lucia Ovalle RN  Outcome: Ongoing   Pt has adequate sleep noted.

## 2021-08-27 NOTE — CARE COORDINATION
Chest tube output has decreased significantly overnight. Chest tube may be removed today with repeat chest x-ray 4 hours post removal. Patient to follow up in clinic with repeat CT chest. No further acute intervention indicated. Cardiothoracic team will sign off and remain available for questions and concerns.      Billy Lyles, DONNY - CNP

## 2021-08-27 NOTE — PROGRESS NOTES
[facility] -   Infectious diseases evaluation  admission date 8/10/2021    reason for consultation:   MRSA bacteremia    Impression :   Current:  · MRSA bacteremia suspect pulmonary source of infection  · Left hand and wrist cellulitis  · Left wrist fracture  · Right pleural effusion /empyema status post thoracentesis with MRSA growth on culture. · Pneumothorax  · Superficial vein thrombophlebitis left arm  · Syncopal episode  · Alcohol withdrawal  · Acute respiratory failure requiring intubation  · Rib fracture with possible hemothorax    Other:  · History of alcohol abuse  · COPD  · History of depression  · History of seizure  · History of hypertension. Recommendations   · Continue IV vancomycin through September 10, 2021,monitor renal function and vancomycin levels closely  · Repeat blood cultures from 8/19/2021 no growth  · Echocardiogram , no reported vegetations  · ERICKA showed no vegetations. · CT thoracic and lumbar spine showed no discitis or osteomyelitis  · Follow CBC and renal function weekly while on antibiotics  · Repeat CT chest and follow-up with cardiothoracic surgery as outpatient. · Follow-up with Dr. Emily Green in the office in 1 to 2 weeks  · Discussed with patient. History of Present Illness:   Initial history:  Neeru Lowe is a 60y.o.-year-old male presented to the hospital on 8/10/2021 after a syncopal episode, was complaining of pain to the right chest wall and abdomen. Reported sharp, constant pain aggravated by movement with no alleviating factors. The patient was previously evaluated at the ER 8/7/2021 after a fall found to have multiple rib fractures and a left wrist closed fracture. Tox screen positive for cannabinoids. CT head negative for acute intracranial abnormality. CT chest/abdomen/pelvis showed rib fractures with associated focal hemothorax. No acute processes in abdomen or pelvis.    Right arm PICC line was placed 8/12/2021  He was placed on alcohol withdrawal protocol, developed respiratory distress was placed on BiPAP initially then was intubated 4/13/21. Baker catheter was placed on 4/13/2021  COVID-19 rapid test was negative  The patient was on Levaquin 8/13/2021 and 8/14/2021 that was discontinued and started on vancomycin and cefepime. He had a fever with a temperature max of 100.6 on 8/13/2021. Blood cultures 8/14/2021 grew MRSA as well as sputum culture. Chest x-ray 8/14/2021 showed bilateral airspace disease right greater than left. CT chest without contrast 8/16/2021 showed increased right pleural effusion with an area of loculated fluid status post thoracentesis with MRSA growth on culture  Right chest tube was placed 8/20/2021 status post intrapleural TPA, was evaluated by cardiothoracic surgeon  Lower extremity venous Doppler showed superficial thrombosis on Lovenox  Right arm PICC line in place  status post thoracentesis 8/23/2021 . Interval changes  8/28/2021   Afebrile. Feeling good. Concerned about his balance being off when trying to ambulate. CT removed yesterday. No fever, chills, n/v/d.  RUE PICC site clean. Labs:  DOT:8.94-0.90-7.34-4.86  WBC: 12.7-12.6-8.8-8.0  Vanco trough 21.7       Patient Vitals for the past 8 hrs:   BP Temp Temp src Pulse Resp SpO2   08/28/21 1215 123/69 98.3 °F (36.8 °C) Oral 88 18 90 %   08/28/21 1131 -- -- -- -- 16 91 %   08/28/21 1028 -- -- -- -- -- 92 %   08/28/21 0745 126/71 98.7 °F (37.1 °C) Oral 77 18 96 %           I have personally reviewed the past medical history, past surgical history, medications, social history, and family history, and I haveupdated the database accordingly. Allergies:   Nickel     Review of Systems:     Review of Systems   All other systems reviewed and are negative. Physical Examination :       Physical Exam  Vitals and nursing note reviewed. Constitutional:       General: He is not in acute distress.      Appearance: Normal appearance. HENT:      Head: Normocephalic and atraumatic. Right Ear: External ear normal.      Left Ear: External ear normal.      Nose: Nose normal.      Mouth/Throat:      Mouth: Mucous membranes are moist.      Pharynx: Oropharynx is clear. No oropharyngeal exudate. Eyes:      General: No scleral icterus. Right eye: No discharge. Left eye: No discharge. Extraocular Movements: Extraocular movements intact. Conjunctiva/sclera: Conjunctivae normal.      Pupils: Pupils are equal, round, and reactive to light. Cardiovascular:      Rate and Rhythm: Normal rate and regular rhythm. Heart sounds: Normal heart sounds. No murmur heard. Pulmonary:      Effort: Pulmonary effort is normal. No respiratory distress. Breath sounds: Normal breath sounds. No wheezing. Abdominal:      General: Abdomen is flat. Bowel sounds are normal. There is no distension. Palpations: Abdomen is soft. Tenderness: There is no abdominal tenderness. Genitourinary:     Comments: No hair. Musculoskeletal:      Cervical back: Normal range of motion and neck supple. No rigidity. Comments: L wrist brace on. Skin:     General: Skin is warm and dry. Capillary Refill: Capillary refill takes less than 2 seconds. Coloration: Skin is not jaundiced. Comments: Left hand and wrist splint   Neurological:      Mental Status: He is alert and oriented to person, place, and time.    Psychiatric:         Mood and Affect: Mood normal.         Behavior: Behavior normal.         Past Medical History:     Past Medical History:   Diagnosis Date    Alcohol abuse 6/4/2014    Anxiety     Arthritis     COPD (chronic obstructive pulmonary disease) ([facility] [address]     ASTHMA    DDD (degenerative disc disease), lumbar 9/6/2016    Depression     Heart burn     Hemorrhoids     HTN (hypertension) 1/19/2015    Ilioinguinal neuralgia of right side 4/10/2017    Psychiatric problem depression /anxiety    Seizures ([facility] [address]     withdrawal from alcohol 2 years ago    Wears glasses     READING       Past Surgical  History:     Past Surgical History:   Procedure Laterality Date    ANESTHESIA NERVE BLOCK Right 4/10/2017    NERVE BLOCK US RIGHT SIDED ILIOINGUINAL performed by Diana Marin MD at [address] [place] [address]  3/19/15    HERNIA REPAIR      IR CHEST TUBE INSERTION  8/20/2021    IR CHEST TUBE INSERTION 8/20/2021 [facility]    NERVE BLOCK Right 10/10/2016    right groin    OTHER SURGICAL HISTORY Right 04/10/2017    US nerve block to R groin    TOE SURGERY      SCREW RIGHT BIG TOE       Medications:      vancomycin  1,000 mg IntraVENous Q24H    guaiFENesin  600 mg Oral BID    vancomycin (VANCOCIN) intermittent dosing (placeholder)   Other RX Placeholder    docusate sodium  100 mg Oral Daily    famotidine  20 mg Oral BID    metoprolol succinate  12.5 mg Oral Nightly    ipratropium-albuterol  1 ampule Inhalation Q4H While awake    tamsulosin  0.4 mg Oral Daily    enoxaparin  40 mg Subcutaneous Daily    sodium chloride flush  5-40 mL IntraVENous 2 times per day    lidocaine  1 patch Transdermal Daily       Social History:     Social History     Socioeconomic History    Marital status:      Spouse name: Not on file    Number of children: Not on file    Years of education: Not on file    Highest education level: Not on file   Occupational History    Not on file   Tobacco Use    Smoking status: Current Every Day Smoker     Packs/day: 1.00     Years: 38.00     Pack years: 38.00     Types: Cigarettes    Smokeless tobacco: Never Used    Tobacco comment: 1 PPD   Vaping Use    Vaping Use: Never used   Substance and Sexual Activity    Alcohol use:  Yes     Alcohol/week: 12.0 standard drinks     Types: 12 Cans of beer per week     Comment: 12 24 oz cans daily    Drug use: Yes     Types: Marijuana     Comment: crack occasionally    Sexual activity: Never   Other Topics Concern    Not on file   Social History Narrative    Not on file     Social Determinants of Health     Financial Resource Strain:     Difficulty of Paying Living Expenses:    Food Insecurity:     Worried About Running Out of Food in the Last Year:     [address] in the Last Year:    Transportation Needs:     Lack of Transportation (Medical):  Lack of Transportation (Non-Medical):    Physical Activity:     Days of Exercise per Week:     Minutes of Exercise per Session:    Stress:     Feeling of Stress :    Social Connections:     Frequency of Communication with Friends and Family:     Frequency of Social Gatherings with Friends and Family:     Attends Hoahaoism Services:     Active Member of Clubs or Organizations:     Attends Club or Organization Meetings:     Marital Status:    Intimate Partner Violence:     Fear of Current or Ex-Partner:     Emotionally Abused:     Physically Abused:     Sexually Abused:        Family History:     Family History   Problem Relation Age of Onset    Cancer Mother         colon ca      Medical Decision Making:   I have independently reviewed/ordered the following labs:    CBC with Differential:   Recent Labs     08/27/21  0716 08/28/21  0741   WBC 8.8 8.0   HGB 8.0* 9.1*   HCT 23.9* 27.2*    352     BMP:  Recent Labs     08/27/21  0716 08/28/21  0741    134*   K 3.9 3.2*    98   CO2 25 27   BUN 9 7   CREATININE 1.31* 1.33*   MG 1.8 1.9     Hepatic Function Panel:   Recent Labs     08/27/21  0716 08/28/21  0741   PROT 5.6* 6.3*   LABALBU 2.2* 2.4*   BILITOT 0.55 0.50   ALKPHOS 72 80   ALT 13 14   AST 14 15     No results for input(s): RPR in the last 72 hours. No results for input(s): HIV in the last 72 hours. No results for input(s): BC in the last 72 hours. Lab Results   Component Value Date    CREATININE 1.33 08/28/2021    GLUCOSE 89 08/28/2021       Detailed results:         Thank you for allowing us to participate in the care of this patient. Please call with questions. This note is created with the assistance of a speech recognition program.  While intending to generate adocument that actually reflects the content of the visit, the document can still have some errors including those of syntax and sound a like substitutions which may escape proof reading. It such instances, actual meaningcan be extrapolated by contextual diversion.     DONNY De Souza - CNP  Office: [phone]  Perfect serve / office [phone]

## 2021-08-27 NOTE — PROGRESS NOTES
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 lower leg: No edema. Comments: Back pain generalized. Swelling improving in the right hand. Skin:     General: Skin is warm. Capillary Refill: Capillary refill takes less than 2 seconds. Findings: Erythema present. Comments: Right hand. Neurological:      General: No focal deficit present. Mental Status: He is oriented to person, place, and time and easily aroused. Mental status is at baseline. He is lethargic. Psychiatric:         Attention and Perception: Attention normal.         Mood and Affect: Mood normal.         Speech: Speech is delayed. Behavior: Behavior normal. Behavior is cooperative.            Assessment:        Primary Problem  Syncope and collapse    Active Hospital Problems    Diagnosis Date Noted    Elevated C-reactive protein (CRP) [R79.82]     Leukocytosis [D72.829]     Empyema lung ([facility] [J86.9] 08/21/2021    MRSA bacteremia [R78.81, B95.62] 08/20/2021    Superficial venous thrombosis of arm, left [I82.612] 08/18/2021    Acute respiratory failure (HCC) [J96.00] 08/16/2021    Fever [R50.9] 08/14/2021    Conjunctivitis [H10.9] 08/14/2021    Severe malnutrition ([facility] [E43] 08/12/2021    Alcohol abuse [F10.10] 08/11/2021    Hypokalemia [E87.6] 08/11/2021    Hyponatremia [E87.1] 08/11/2021    Traumatic rhabdomyolysis ([facility] [T79. 6XXA] 08/11/2021    Closed fracture of multiple ribs of right side [S22.41XA] 08/11/2021    Closed fracture of left wrist [S62.102A] 08/11/2021    Syncope and collapse [R55] 06/13/2018    Dyslipidemia [E78.5] 09/17/2014    Smoking addiction [F17.200] 06/11/2013    COPD (chronic obstructive pulmonary disease) ([facility] [J44.9] 05/30/2013          Plan:        ~Difficulty swallowing (stable)   barium swallow study cleared. Dysphagia soft bite size with thin nectar. ~MRSA Bacteremia (stable)  Afebrile. (8/14) ESR 49, ->140s  Blood cultures negative. Elevated leukocyte count 12.6  Patient is on Vancomycin. ID consulted 8/21:  IV vancomycin through September 10, 2021 ,monitor renal function and vancomycin levels closely. Repeat blood cultures 8/16 grew MRSA. CT thoracic and lumbar spine w contrast: No CT evidence of discitis-osteomyelitis. ~Superficial Venous Thrombophlebitis (stable)  -VL upper extremity left: Superficial vein thrombophlebitis is noted in 2 braches fromthe basilar vein near the antecubital fossa.  -Lovenox 40 mg once daily. DVT scan lower extremity: Chronic superficial venous thrombosis in the great saphenous vein at the level on ankle.    ~Conjunctivitis (stable)  Erythema bilaterally, with some exudate bilaterally,PERRL. Erythromycin ophthalmic ointment for 5 days     ~Acute urinary retention (resolved)  ( 8/12) Bladder scan: 460 mL  Straight cath twice  Baker's catheter in place.   Flomax 0.4 mg.     ~Acute kidney injury (resolved)  Nephrology on board 8/24:  Sign off. Dc fluids     ~Moderate Acute Alcohol withdrawal  On propofol drip.   On fentanyl drip.     ~Acute respiratory failure 2/2 empyema (stable)  CXR (August 16): Large right pleural effusion and right basilar opacities not significantlychanged from the previous study. Pro-Vasquez: 73 (8/12)   Pulmonology on board: Patient intubated at 1230.  No need for thoracocentesis  Trial of spontaneous breathing, RR 40s  Pneumonia work-up was negative for any strep antigens, Legionella, and mycoplasma antigens  Sputum culture in progress, direct exam showed mixed bacterial morphotypes. Blood culture positive for MRSA. Echo 35% EF. Cardio consulted: Plan for ERICKA. Keep K>4 and Mg>2. Pulmonology: Continue to follow up. Monitor sodium. Symbicort 160/4.5  On vancomycin.  -Chest tube output: 4L (120 in the last 24 hour). -Mucinex 600mg TID. Chest tube plan to remove by IR. Output less than 120 in the last 24 hours.     ~Syncope and collapse (stable)  -CT head shows no acute intracranial abnormality  -EKG shows sinus tachycardia with unifocal PVCs, no acute ST segment changes as documented by ER physician  -Troponin 20, 15  - urinalysis and urine drug screen unremarkable.      ~Traumatic rhabdomyolysis (resolved)  -CK 1,222-> 22, myoglobin 3,509-->2884.      ~Hypokalemia (stable)  -Initial potassium 2.5->3.7-3.4->2.6-> 3.0-> 3.4  -Patient received potassium supplement in the ED.   -Recheck potassium this morning.  -Potassium sliding scale.     ~Acute mild alcoholic hepatitis  - Non reactive Hep A, B and C (2019)  - AST>ALT (95:51) ==> (45:32) ==> 42:27     Hyponatremia   -Initial sodium 127->139>144->147->142>138  -Daily BMP.     Alcohol abuse (resolved)  -DTSWOWN <74  -Thiamine, folic acid, multivitamin daily.  -Seizure and fall precautions.  -Consult social work for rehab, discharge planning.     ~Multiple right rib fractures (stable)  -CT scan shows Grade 2 chest wall injury with right 4th through 6th anterolateral rib fractures. Areas of pleural thickening likely areas of focal hemothorax near the rib fractures on the right. No acute inflammatory process within the abdomen pelvis. -Fentanyl as needed.  -Consult general surgery: Resume tube feeds following procedures. Surgically stable.  -[place] Q6prn.      ~Closed left wrist fracture (moderate worsening )  -Velcro wrist splint in place  - left wrist is swollen. - left wrist X-ray: Subacute impacted fracture at the distal metaphysis of the left radius, wit visualized fracture line and partial healing of the fracture, grossly stable. -Worsening swelling. VL dup US upper extremity. Inpatient consult to Orthopedic surgery: No intervention planned. Splint only.        `COPD (stable)  -SPO2 96%  (intubated on 8/13 due to resp. Failure.  -Albuterol as needed  -Spiriva daily  -Ellipta daily     Smoking (stable)  -Nicotine patch     GI prophylaxis-Pepcid 20 mg IV twice daily. DVT prophylaxis--Lovenox 40 mg once daily   Diet: TPN . Disposition: PCU. Plan to send pt to SNF. Diet: Awaiting SLP eval and treat. Pending barium swallow w video.      Keron Pruett MD  8/27/2021  2:03 PM

## 2021-08-27 NOTE — PROGRESS NOTES
PULMONARY PROGRESS NOTE:    REASON FOR VISIT: resp failure, DTs  Interval History:  Denies SOB, cough, chest pain  NO fever  No NVDC  No swelling feet    Events since last visit: none       PAST MEDICAL HISTORY:      Scheduled Meds:   guaiFENesin  600 mg Oral BID    vancomycin (VANCOCIN) intermittent dosing (placeholder)   Other RX Placeholder    docusate sodium  100 mg Oral Daily    famotidine  20 mg Oral BID    metoprolol succinate  12.5 mg Oral Nightly    ipratropium-albuterol  1 ampule Inhalation Q4H While awake    tamsulosin  0.4 mg Oral Daily    enoxaparin  40 mg Subcutaneous Daily    vancomycin  1,250 mg IntraVENous Q24H    sodium chloride flush  5-40 mL IntraVENous 2 times per day    nicotine  1 patch Transdermal Daily    lidocaine  1 patch Transdermal Daily     Continuous Infusions:   sodium chloride 75 mL/hr at 08/27/21 1009    dextrose      sodium chloride 25 mL (08/19/21 1717)     PRN Meds:diphenhydrAMINE, HYDROcodone 5 mg - acetaminophen, morphine **OR** morphine, potassium chloride, perflutren lipid microspheres, glucose, dextrose, glucagon (rDNA), dextrose, sodium phosphate IVPB **OR** sodium phosphate IVPB, sodium chloride flush, sodium chloride, polyethylene glycol, acetaminophen **OR** acetaminophen, magnesium sulfate, ondansetron, LORazepam **OR** LORazepam **OR** LORazepam **OR** LORazepam **OR** LORazepam **OR** LORazepam **OR** LORazepam **OR** LORazepam, albuterol sulfate HFA        PHYSICAL EXAMINATION:  /77   Pulse 105   Temp 98.1 °F (36.7 °C) (Oral)   Resp 18   Ht 5' 9\" (1.753 m)   Wt 146 lb 9.7 oz (66.5 kg)   SpO2 94%   BMI 21.65 kg/m²   afebrile  General : awake,alert,oriented   Neck - supple, no lymphadenopathy, JVD not raised  Heart - SR  Lungs - Air Entry- fair bilaterally; breath sounds : vesicular, 93% on 1 l nc, rt chest tube to 20 cm suction  Abdomen - soft, no tenderness  Upper Extremities  - no cyanosis, mottling; edema : edema  Lower Extremities: no cyanosis, mottling; edema : absent    Current Laboratory, Radiologic, Microbiologic, and Diagnostic studies reviewed  Data ReviewCBC:   Recent Labs     08/25/21  0503 08/26/21  0716 08/27/21  0716   WBC 12.7* 12.6* 8.8   RBC 2.54* 2.51* 2.46*   HGB 8.2* 8.1* 8.0*   HCT 24.6* 24.9* 23.9*    371 334     BMP:   Recent Labs     08/25/21  0503 08/25/21  1440 08/26/21  0716 08/26/21  0716 08/26/21  1614 08/26/21  2248 08/27/21  0716   GLUCOSE 96  --  77  --   --   --  95      < > 135   < > 131* 134* 136   K 3.6*   < > 3.8   < > 3.9 3.7 3.9   BUN 11  --  11  --   --   --  9   CREATININE 1.10  --  1.20  --   --   --  1.31*   CALCIUM 7.6*  --  7.6*  --   --   --  7.7*    < > = values in this interval not displayed. ABGs:   No results for input(s): PHART, PO2ART, HUX3OYK, UTR6MCD, BEART, P5QWHZNY, WQX6KEF in the last 72 hours.    PT/INR:  No results found for: PTINR    ASSESSMENT / PLAN:    Alcohol withdrawal - resolved  Thiamine, folic acid  atelectasis  RLL infiltrate / empyema  Acute hypoxic resp failure - Mechanical ventilation, extubated 8-20-21  bacteremia - continue current ABx  ABx per ID; rt chest tube   TPA per CT surgery  CT chest reviewed - decrease in fluid  possible Chest tube removal today per IR      Electronically signed by Marilyn Torres MD on 08/27/21 at 12:25 PM

## 2021-08-27 NOTE — PROGRESS NOTES
Physical Therapy  Facility/Department: [facility]  Daily Treatment Note  NAME: Stormy Lucia  : 1965  MRN: [id number]    Date of Service: 2021    Discharge Recommendations:  Patient would benefit from continued therapy after discharge   PT Equipment Recommendations  Equipment Needed: No    Assessment   Body structures, Functions, Activity limitations: Decreased functional mobility ; Decreased strength;Decreased endurance;Decreased balance  Treatment Diagnosis: Dec function  REQUIRES PT FOLLOW UP: Yes  Activity Tolerance  Activity Tolerance: Patient limited by fatigue;Patient limited by endurance     Patient Diagnosis(es): The primary encounter diagnosis was Syncope and collapse. Diagnoses of Hypokalemia, Hyponatremia, Traumatic rhabdomyolysis, initial encounter [facility]), Closed fracture of multiple ribs of right side, initial encounter, and Empyema lung ([address] were also pertinent to this visit. has a past medical history of Alcohol abuse, Anxiety, Arthritis, COPD (chronic obstructive pulmonary disease) ([address] DDD (degenerative disc disease), lumbar, Depression, Heart burn, Hemorrhoids, HTN (hypertension), Ilioinguinal neuralgia of right side, Psychiatric problem, Seizures ([address] and Wears glasses. has a past surgical history that includes hernia repair; Toe Surgery; Hemorrhoid surgery (3/19/15); Nerve Block (Right, 10/10/2016); other surgical history (Right, 04/10/2017); Anesthesia Nerve Block (Right, 4/10/2017); and IR CHEST TUBE INSERTION (2021). Restrictions  Restrictions/Precautions  Restrictions/Precautions: Fall Risk (Simultaneous filing. User may not have seen previous data.)  Required Braces or Orthoses?: Yes (Simultaneous filing. User may not have seen previous data.)  Position Activity Restriction  Other position/activity restrictions: Per Dr. Terry Collins, patient is ok for wbat to [address] with brace on as well as ROM as tolerated with brace on as well.    Subjective General  Chart Reviewed: Yes  Response To Previous Treatment: Patient with no complaints from previous session. Family / Caregiver Present: No  Subjective  Subjective: Patient working with Phillip Schroeder upon writers arrival. Abraham ramirez   General Comment  Comments: RN Lazarus Cheers approved therapy. Co-treat with Phillip Schroeder   Pain Screening  Patient Currently in Pain: Yes  Pain Assessment  Pain Assessment: 0-10  Pain Level: 8  Pain Type: Acute pain  Pain Location: Back  Pain Orientation: Lower  Vital Signs  Patient Currently in Pain: Yes       Orientation  Orientation  Overall Orientation Status: Within Functional Limits  Objective   Bed mobility  Rolling to Right: Stand by assistance  Supine to Sit: Stand by assistance  Sit to Supine: Unable to assess (patient left up in recliner)  Scooting: Stand by assistance  Transfers  Sit to Stand: Contact guard assistance  Stand to sit: Contact guard assistance  Bed to Chair: Contact guard assistance  Comment: Patient CGA for transfers. Second person required for line management   Ambulation  Ambulation?: Yes  Ambulation 1  Surface: level tile  Device: Hemiwalker  Assistance: Contact guard assistance  Distance: 5ft from bed to recliner   Comments: Patient has poor hemiwalker management.  Impulsive despite writer verbal cues for hemiwalker placement   Stairs/Curb  Stairs?: No        Other exercises  Other exercises?: Yes  Other exercises 1: bed mobility   Other exercises 2: seated EOB ~10 minutes SBA   Other exercises 3: sit to stand with hemiwalker; verbal cueing for hand placement      Goals  Short term goals  Time Frame for Short term goals: 5-7 days  Short term goal 1: bed mobility with CGA  Short term goal 2: transfer to chair with Black x1  Short term goal 3: ambulate 10-20ft with device and Black  Short term goal 4: pt able to tolerate 20min of therapeutic activity/exercises    Plan    Plan  Times per week: 6-7x/wk  Times per day: Daily  Current Treatment Recommendations: Strengthening, Balance Training, Functional Mobility Training, Transfer Training, Endurance Training, Safety Education & Training  Safety Devices  Type of devices: Left in chair, Call light within reach, Patient at risk for falls, Nurse notified, Gait belt     Therapy Time     08/27/21 1013   PT Individual Minutes   Time In [address] [place] [address]   Time Out [facility]   Minutes 24     Electronically signed by Evan Martinez PTA on 8/27/21 at 10:22 AM EDT

## 2021-08-27 NOTE — PROGRESS NOTES
3.3 L over the  last 24 hours after IV Lasix. Patient is however 13 L positive balance since admission. Objective/     Vitals:    08/27/21 0710 08/27/21 0715 08/27/21 1126 08/27/21 1201   BP:  129/62  126/77   Pulse:  78  105   Resp: 16 16 18 18   Temp:  98.8 °F (37.1 °C)  98.1 °F (36.7 °C)   TempSrc:  Oral  Oral   SpO2: 95% 93% 95% 94%   Weight:       Height:         24HR INTAKE/OUTPUT:      Intake/Output Summary (Last 24 hours) at 8/27/2021 1355  Last data filed at 8/27/2021 0900  Gross per 24 hour   Intake 1021 ml   Output 2220 ml   Net -1199 ml     Patient Vitals for the past 96 hrs (Last 3 readings):   Weight   08/26/21 0545 146 lb 9.7 oz (66.5 kg)   08/25/21 0323 143 lb 11.8 oz (65.2 kg)     Constitutional: Awake and responds to verbal stimuli; intubated and on ventilator support. Cardiovascular:  S1, S2 without pericardial rub or gallop. Respiratory: Clinically clear. Abdomen: Full, soft with normal bowel sounds. Extremities:  LE edema    Data/  Recent Labs     08/25/21  0503 08/26/21  0716 08/27/21  0716   WBC 12.7* 12.6* 8.8   HGB 8.2* 8.1* 8.0*   HCT 24.6* 24.9* 23.9*   MCV 96.8 99.2 97.2    371 334     Recent Labs     08/25/21  0503 08/25/21  1440 08/26/21  0716 08/26/21  0716 08/26/21  1614 08/26/21  2248 08/27/21  0716      < > 135   < > 131* 134* 136   K 3.6*   < > 3.8   < > 3.9 3.7 3.9      < > 102   < > 100 102 102   CO2 25   < > 23   < > 23 24 25   GLUCOSE 96  --  77  --   --   --  95   MG 1.9  --  1.7  --   --   --  1.8   BUN 11  --  11  --   --   --  9   CREATININE 1.10  --  1.20  --   --   --  1.31*   LABGLOM >60  --  >60  --   --   --  56*   GFRAA >60  --  >60  --   --   --  >60    < > = values in this interval not displayed. Assessment/plan:     1. Acute kidney injury - nonoliguric secondary to decreased effective arterial volume, severe hypoalbuminemia and sepsis. No evidence of acute rhabdomyolysis. Renal ultrasound showed no hydronephrosis or obstruction. Serologic studies are negative with negative ASTRID and normal complements. UOP 3.1 L yesterday  Continue to monitor urine output closely. Avoid nephrotoxic agents. Scr 1.3 mg/dl near baseline     2. Hypokalemia - secondary to diuretic therapy-40 of KCl given today     3. MRSA bacteremia -patient is on IV vancomycin. Monitor Vanco trough levels for  potential nephrotoxicity.     4. Hypernatremia - Serum sodium is improved to 136 mmol/L-discontinue IV fluids    5. Large right-sided parapneumonic pleural effusion, possibly loculated on chest CT-S/P  Left  thoracentesis 8/17/21and right chest tube placement. Plan:    Taper IVF. Schedule for removal of chest tube today.     Bro Valenzuela MD

## 2021-08-27 NOTE — PLAN OF CARE
Problem: Falls - Risk of:  Goal: Will remain free from falls  Description: Will remain free from falls  Outcome: Ongoing     Problem: Skin Integrity:  Goal: Will show no infection signs and symptoms  Description: Will show no infection signs and symptoms  Outcome: Ongoing     Problem: Nutrition  Goal: Optimal nutrition therapy  Outcome: Ongoing     Problem: OXYGENATION/RESPIRATORY FUNCTION  Goal: Patient will maintain patent airway  Outcome: Ongoing     Problem: Pain:  Goal: Pain level will decrease  Description: Pain level will decrease  Outcome: Ongoing     Problem: Musculor/Skeletal Functional Status  Goal: Absence of falls  Outcome: Ongoing

## 2021-08-27 NOTE — PROGRESS NOTES
Pt chest tube removed by JOHN New at bedside at 1400. Recheck chest xray at 1800 (4 hours post removal of chest tube).

## 2021-08-28 ENCOUNTER — APPOINTMENT (OUTPATIENT)
Dept: GENERAL RADIOLOGY | Age: 56
DRG: 351 | End: 2021-08-28
Payer: MEDICAID

## 2021-08-28 LAB
ALBUMIN SERPL-MCNC: 2.4 G/DL (ref 3.5–5.2)
ALBUMIN/GLOBULIN RATIO: ABNORMAL (ref 1–2.5)
ALP BLD-CCNC: 80 U/L (ref 40–129)
ALT SERPL-CCNC: 14 U/L (ref 5–41)
ANION GAP SERPL CALCULATED.3IONS-SCNC: 9 MMOL/L (ref 9–17)
AST SERPL-CCNC: 15 U/L
BILIRUB SERPL-MCNC: 0.5 MG/DL (ref 0.3–1.2)
BUN BLDV-MCNC: 7 MG/DL (ref 6–20)
BUN/CREAT BLD: ABNORMAL (ref 9–20)
CALCIUM SERPL-MCNC: 8.1 MG/DL (ref 8.6–10.4)
CHLORIDE BLD-SCNC: 98 MMOL/L (ref 98–107)
CO2: 27 MMOL/L (ref 20–31)
CREAT SERPL-MCNC: 1.33 MG/DL (ref 0.7–1.2)
GFR AFRICAN AMERICAN: >60 ML/MIN
GFR NON-AFRICAN AMERICAN: 55 ML/MIN
GFR SERPL CREATININE-BSD FRML MDRD: ABNORMAL ML/MIN/{1.73_M2}
GFR SERPL CREATININE-BSD FRML MDRD: ABNORMAL ML/MIN/{1.73_M2}
GLUCOSE BLD-MCNC: 89 MG/DL (ref 70–99)
HCT VFR BLD CALC: 27.2 % (ref 41–53)
HEMOGLOBIN: 9.1 G/DL (ref 13.5–17.5)
MAGNESIUM: 1.9 MG/DL (ref 1.6–2.6)
MCH RBC QN AUTO: 32.3 PG (ref 26–34)
MCHC RBC AUTO-ENTMCNC: 33.2 G/DL (ref 31–37)
MCV RBC AUTO: 97.3 FL (ref 80–100)
NRBC AUTOMATED: ABNORMAL PER 100 WBC
PDW BLD-RTO: 14.3 % (ref 11.5–14.9)
PLATELET # BLD: 352 K/UL (ref 150–450)
PMV BLD AUTO: 7.1 FL (ref 6–12)
POTASSIUM SERPL-SCNC: 3.2 MMOL/L (ref 3.7–5.3)
RBC # BLD: 2.8 M/UL (ref 4.5–5.9)
SODIUM BLD-SCNC: 134 MMOL/L (ref 135–144)
TOTAL PROTEIN: 6.3 G/DL (ref 6.4–8.3)
WBC # BLD: 8 K/UL (ref 3.5–11)

## 2021-08-28 PROCEDURE — 80053 COMPREHEN METABOLIC PANEL: CPT

## 2021-08-28 PROCEDURE — 6370000000 HC RX 637 (ALT 250 FOR IP): Performed by: INTERNAL MEDICINE

## 2021-08-28 PROCEDURE — 97116 GAIT TRAINING THERAPY: CPT

## 2021-08-28 PROCEDURE — 2060000000 HC ICU INTERMEDIATE R&B

## 2021-08-28 PROCEDURE — 71045 X-RAY EXAM CHEST 1 VIEW: CPT

## 2021-08-28 PROCEDURE — 6370000000 HC RX 637 (ALT 250 FOR IP): Performed by: NURSE PRACTITIONER

## 2021-08-28 PROCEDURE — 83735 ASSAY OF MAGNESIUM: CPT

## 2021-08-28 PROCEDURE — 99232 SBSQ HOSP IP/OBS MODERATE 35: CPT | Performed by: NURSE PRACTITIONER

## 2021-08-28 PROCEDURE — 85027 COMPLETE CBC AUTOMATED: CPT

## 2021-08-28 PROCEDURE — 6360000002 HC RX W HCPCS: Performed by: STUDENT IN AN ORGANIZED HEALTH CARE EDUCATION/TRAINING PROGRAM

## 2021-08-28 PROCEDURE — 92526 ORAL FUNCTION THERAPY: CPT

## 2021-08-28 PROCEDURE — 97110 THERAPEUTIC EXERCISES: CPT

## 2021-08-28 PROCEDURE — 6370000000 HC RX 637 (ALT 250 FOR IP): Performed by: STUDENT IN AN ORGANIZED HEALTH CARE EDUCATION/TRAINING PROGRAM

## 2021-08-28 PROCEDURE — 94640 AIRWAY INHALATION TREATMENT: CPT

## 2021-08-28 PROCEDURE — 97530 THERAPEUTIC ACTIVITIES: CPT

## 2021-08-28 PROCEDURE — 36415 COLL VENOUS BLD VENIPUNCTURE: CPT

## 2021-08-28 PROCEDURE — 2580000003 HC RX 258: Performed by: INTERNAL MEDICINE

## 2021-08-28 PROCEDURE — 2580000003 HC RX 258: Performed by: STUDENT IN AN ORGANIZED HEALTH CARE EDUCATION/TRAINING PROGRAM

## 2021-08-28 PROCEDURE — 99232 SBSQ HOSP IP/OBS MODERATE 35: CPT | Performed by: INTERNAL MEDICINE

## 2021-08-28 PROCEDURE — 6370000000 HC RX 637 (ALT 250 FOR IP): Performed by: PHYSICIAN ASSISTANT

## 2021-08-28 PROCEDURE — 94761 N-INVAS EAR/PLS OXIMETRY MLT: CPT

## 2021-08-28 RX ORDER — POTASSIUM CHLORIDE 20 MEQ/1
40 TABLET, EXTENDED RELEASE ORAL ONCE
Status: COMPLETED | OUTPATIENT
Start: 2021-08-28 | End: 2021-08-28

## 2021-08-28 RX ADMIN — GUAIFENESIN 600 MG: 600 TABLET, EXTENDED RELEASE ORAL at 20:49

## 2021-08-28 RX ADMIN — ENOXAPARIN SODIUM 40 MG: 40 INJECTION SUBCUTANEOUS at 10:12

## 2021-08-28 RX ADMIN — IPRATROPIUM BROMIDE AND ALBUTEROL SULFATE 1 AMPULE: 2.5; .5 SOLUTION RESPIRATORY (INHALATION) at 15:48

## 2021-08-28 RX ADMIN — TAMSULOSIN HYDROCHLORIDE 0.4 MG: 0.4 CAPSULE ORAL at 10:11

## 2021-08-28 RX ADMIN — POTASSIUM CHLORIDE 40 MEQ: 1500 TABLET, EXTENDED RELEASE ORAL at 10:12

## 2021-08-28 RX ADMIN — VANCOMYCIN HYDROCHLORIDE 1000 MG: 1 INJECTION, POWDER, LYOPHILIZED, FOR SOLUTION INTRAVENOUS at 02:29

## 2021-08-28 RX ADMIN — IPRATROPIUM BROMIDE AND ALBUTEROL SULFATE 1 AMPULE: 2.5; .5 SOLUTION RESPIRATORY (INHALATION) at 11:31

## 2021-08-28 RX ADMIN — HYDROCODONE BITARTRATE AND ACETAMINOPHEN 1 TABLET: 5; 325 TABLET ORAL at 20:48

## 2021-08-28 RX ADMIN — METOPROLOL SUCCINATE 12.5 MG: 25 TABLET, EXTENDED RELEASE ORAL at 20:49

## 2021-08-28 RX ADMIN — GUAIFENESIN 600 MG: 600 TABLET, EXTENDED RELEASE ORAL at 10:11

## 2021-08-28 RX ADMIN — IPRATROPIUM BROMIDE AND ALBUTEROL SULFATE 1 AMPULE: 2.5; .5 SOLUTION RESPIRATORY (INHALATION) at 20:07

## 2021-08-28 RX ADMIN — HYDROCODONE BITARTRATE AND ACETAMINOPHEN 1 TABLET: 5; 325 TABLET ORAL at 10:09

## 2021-08-28 RX ADMIN — SODIUM CHLORIDE: 4.5 INJECTION, SOLUTION INTRAVENOUS at 13:22

## 2021-08-28 ASSESSMENT — PAIN SCALES - GENERAL
PAINLEVEL_OUTOF10: 8
PAINLEVEL_OUTOF10: 10
PAINLEVEL_OUTOF10: 8
PAINLEVEL_OUTOF10: 4

## 2021-08-28 ASSESSMENT — ENCOUNTER SYMPTOMS
SHORTNESS OF BREATH: 1
EYES NEGATIVE: 1
ALLERGIC/IMMUNOLOGIC NEGATIVE: 1
COUGH: 1
GASTROINTESTINAL NEGATIVE: 1

## 2021-08-28 ASSESSMENT — PAIN DESCRIPTION - ORIENTATION
ORIENTATION: LEFT
ORIENTATION_2: RIGHT
ORIENTATION: LEFT

## 2021-08-28 ASSESSMENT — PAIN SCALES - WONG BAKER: WONGBAKER_NUMERICALRESPONSE: 0

## 2021-08-28 ASSESSMENT — PAIN DESCRIPTION - LOCATION
LOCATION: FINGER (COMMENT WHICH ONE);HAND
LOCATION: HAND;FINGER (COMMENT WHICH ONE)
LOCATION_2: RIB CAGE

## 2021-08-28 ASSESSMENT — PAIN DESCRIPTION - PAIN TYPE
TYPE: ACUTE PAIN
TYPE: ACUTE PAIN

## 2021-08-28 NOTE — PROGRESS NOTES
Physical Therapy  Facility/Department: [facility]  Daily Treatment Note  NAME: Yeyo Grace  : 1965  MRN: [id number]    Date of Service: 2021    Discharge Recommendations:  Patient would benefit from continued therapy after discharge   PT Equipment Recommendations  Equipment Needed: No    Assessment   Body structures, Functions, Activity limitations: Decreased functional mobility ; Decreased strength;Decreased endurance;Decreased balance  Treatment Diagnosis: Dec function  REQUIRES PT FOLLOW UP: Yes  Activity Tolerance  Activity Tolerance: Patient limited by fatigue;Patient limited by endurance     Patient Diagnosis(es): The primary encounter diagnosis was Syncope and collapse. Diagnoses of Hypokalemia, Hyponatremia, Traumatic rhabdomyolysis, initial encounter [facility]), Closed fracture of multiple ribs of right side, initial encounter, and Empyema lung ([address] were also pertinent to this visit. has a past medical history of Alcohol abuse, Anxiety, Arthritis, COPD (chronic obstructive pulmonary disease) ([address] DDD (degenerative disc disease), lumbar, Depression, Heart burn, Hemorrhoids, HTN (hypertension), Ilioinguinal neuralgia of right side, Psychiatric problem, Seizures ([address] and Wears glasses. has a past surgical history that includes hernia repair; Toe Surgery; Hemorrhoid surgery (3/19/15); Nerve Block (Right, 10/10/2016); other surgical history (Right, 04/10/2017); Anesthesia Nerve Block (Right, 4/10/2017); and IR CHEST TUBE INSERTION (2021). Restrictions  Restrictions/Precautions  Restrictions/Precautions: Fall Risk (L wrist fracture, R rib fractures, chest tube)  Required Braces or Orthoses?: Yes (L wrist splint)  Position Activity Restriction  Other position/activity restrictions: Per Dr. Charmayne Sender, patient is ok for wbat to [address] with brace on as well as ROM as tolerated with brace on as well.    Subjective   General  Chart Reviewed: Yes  Response To Previous Treatment: Patient with no complaints from previous session. Family / Caregiver Present: No  Subjective  Subjective: Pt in bed upon entry, stated that he was up earlier. Pt required encouragement before agreed to work with therapy. General Comment  Comments: Co-tx with Zohra Husain. Pain Screening  Patient Currently in Pain: Yes  Pain Assessment  Pain Assessment: 0-10  Pain Level: 8  Sweet-Baker Pain Rating: No hurt  Patient's Stated Pain Goal: No pain  Pain Type: Acute pain  Pain Location: Finger (Comment which one); Hand (All digits)  Pain Orientation: Left  Non-Pharmaceutical Pain Intervention(s): Ambulation/Increased Activity  Pain 2  Pain Location 2: Rib cage  Pain Orientation 2: Right  Vital Signs  Patient Currently in Pain: Yes  Oxygen Therapy  SpO2: 92 %  Pulse Oximeter Device Mode: Intermittent  Pulse Oximeter Device Location: Finger  O2 Device: None (Room air)       Orientation  Orientation  Overall Orientation Status: Within Functional Limits  Cognition      Objective   Bed mobility  Rolling to Left: Stand by assistance  Supine to Sit: Contact guard assistance;Minimal assistance (Assist required d/t brace on Left hand.)  Sit to Supine: Stand by assistance  Scooting: Stand by assistance (To EOB)  Transfers  Sit to Stand: Contact guard assistance  Stand to sit: Contact guard assistance  Bed to Chair: Contact guard assistance  Comment: Patient CGA for transfers. Second person required for line management   Ambulation  Ambulation?: Yes  Ambulation 1  Surface: level tile  Device: Hemiwalker  Assistance: Contact guard assistance  Distance: 15' x 2  Comments: Cues for hemicane placement. Pt is very impulsive and does not follow directions all the time.   Stairs/Curb  Stairs?: No     Balance  Posture:  (kypotic)  Sitting - Static: Fair;+  Sitting - Dynamic: Fair  Standing - Static: Fair  Standing - Dynamic: Fair  Other exercises  Other exercises?: Yes  Other exercises 1: bed mobility   Other exercises 2: Ther ex both L/E supine and seated x 10  Other exercises 3: Sit<>Stand x 3 (Cues for hand placement)         Other Activities: Dangling protocol (8 mins)                    Goals  Short term goals  Time Frame for Short term goals: 5-7 days  Short term goal 1: bed mobility with CGA  Short term goal 2: transfer to chair with Black x1  Short term goal 3: ambulate 10-20ft with device and Black  Short term goal 4: pt able to tolerate 20min of therapeutic activity/exercises    Plan    Plan  Times per week: 6-7x/wk  Times per day: Daily  Current Treatment Recommendations: Strengthening, Balance Training, Functional Mobility Training, Transfer Training, Endurance Training, Safety Education & Training  Safety Devices  Type of devices: Call light within reach, Patient at risk for falls, Nurse notified, Gait belt, Bed alarm in place, Left in bed     Therapy Time   Individual Concurrent Group Co-treatment   Time In [address] [facility]         Time Out 1054         Minutes Erickson Gonzales 177, PTA   Electronically signed by Alejandra Carter PTA on 8/28/2021 at 1:12 PM

## 2021-08-28 NOTE — PROGRESS NOTES
Pharmacy Vancomycin Consult     Vancomycin Day: 14  Current Dosing: vancomycin 1250 mg every 24 hours  Current indication: MRSA bacteremia    Temp max:  99    Recent Labs     08/26/21  0716 08/27/21  0716   BUN 11 9   CREATININE 1.20 1.31*   WBC 12.6* 8.8       Intake/Output Summary (Last 24 hours) at 8/27/2021 2243  Last data filed at 8/27/2021 2140  Gross per 24 hour   Intake 3320.92 ml   Output 3075 ml   Net 245.92 ml     Culture Date      Source                       Results  See micro    Ht Readings from Last 1 Encounters:   08/12/21 5' 9\" (1.753 m)        Wt Readings from Last 1 Encounters:   08/26/21 146 lb 9.7 oz (66.5 kg)       Body mass index is 21.65 kg/m². Estimated Creatinine Clearance: 58 mL/min (A) (based on SCr of 1.31 mg/dL (H)).     Trough: 21.7 at 2145 on 8/27/2021    Assessment/Plan:  Change vancomycin to 1000 mg every 24 hours     Sebastien Luna RPh     -8/27/2021 at 10:44 PM

## 2021-08-28 NOTE — PLAN OF CARE
P  Problem: Falls - Risk of:  Goal: Will remain free from falls  Description: Will remain free from falls  8/28/2021 1511 by Damian Peralta RN  Outcome: Met This Shift    Problem: Pain:  Goal: Pain level will decrease  Description: Pain level will decrease  8/28/2021 1511 by Damian Peralta RN  Outcome: Met This Shift  No falls this shift. Patient is alert and oriented. Call light within reach. No c/o pain this shift.

## 2021-08-28 NOTE — PROGRESS NOTES
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 5th and 6th ribs again noted. 1.  Interval placement of a pigtail terminus drainage catheter inferiorly in the right posterior costophrenic gutter. 2.  Reduction in loculated extrapleural complicated collection, right side, now containing some air bubbles consistent with empyema. Significant fluid still remains. 3.  Compressive atelectasis right lower lobe with mild consolidation. 4.  Scattered pulmonary opacities right hemithorax consistent with multifocal pneumonitis. 5.  Sizable left pleural effusion with compressive atelectasis and some consolidative process in the left lower lobe. 6.  Pulmonary opacity left mid lung field consistent with airspace disease in the lingula. 7.  Stable mediastinal adenopathy. 8.  Right-sided rib fractures anteriorly.      CT CHEST WO CONTRAST    Result Date: 8/16/2021  EXAMINATION: CT OF THE CHEST WITHOUT CONTRAST 8/16/2021 12:53 pm TECHNIQUE: CT of the chest was performed without the administration of intravenous contrast. Multiplanar reformatted images are provided for review. Dose modulation, iterative reconstruction, and/or weight based adjustment of the mA/kV was utilized to reduce the radiation dose to as low as reasonably achievable. COMPARISON: CT of the chest of 08/10/2021. HISTORY: ORDERING SYSTEM PROVIDED HISTORY: pleural effusion TECHNOLOGIST PROVIDED HISTORY: pleural effusion Reason for Exam: pleural effusion Acuity: Unknown Type of Exam: Unknown FINDINGS: Mediastinum: No lymphadenopathy. Right upper extremity PICC with catheter tip at the superior cavoatrial junction. Calcified coronary artery disease. Cardiac chambers are otherwise unremarkable. No pericardial effusion. Lungs/pleura: Significant interval increase in size of a right pleural effusion, which is now large. There is near complete atelectasis of the right lower lobe. Areas of loculated pleural fluid are noted along the fissures as well as in the anterolateral right upper lung. Again noted are areas of pleural thickening adjacent to the anterolateral right rib fractures. There is some subcutaneous gas also noted within the intercostal spaces and adjacent to the rib fractures; these areas are continuous with a air-filled structure within the right middle lobe that is new in comparison to the prior study and measures 4.4 x 2.7 cm on series 4, image 62. On the left, there is a trace pleural effusion, also increased in size, with adjacent atelectasis as well as left lower lobe consolidative opacities. The previously measured right lower lobe pulmonary nodule is now obscured. Endotracheal tube is in place with tip in the mid thoracic trachea. Upper Abdomen: Enteric tube in place with tip below the diaphragm and outside the field of view of this study. Nonspecific stranding surrounds the left renal cortex. Soft Tissues/Bones: No soft tissue abnormality.   Redemonstrated mildly displaced fractures of the anterolateral right 4th, 5th, 6th, and 7th ribs. Possible healed remote fracture of the sternal body. Significant interval increase in size of right pleural effusion, which is now large, with progressive near complete atelectasis of the right lower lobe. There are areas of loculated pleural fluid along the fissures as well as at the right lung apex. New areas of subcutaneous emphysema along the right chest wall near the rib fractures, which are contiguous with an air-filled cavity in the right middle lobe that is new from the prior study. These findings could be posttraumatic in etiology, or related to a recent procedure. Redemonstrated right 4th-7th anterolateral rib fractures with adjacent pleural nodularity favored to represent posttraumatic finding such as hemothorax. New trace left pleural effusion, with adjacent airspace opacities that are concerning for infectious or inflammatory process such as pneumonia or aspiration. CT CERVICAL SPINE WO CONTRAST    Result Date: 8/12/2021  EXAMINATION: CT OF THE CERVICAL SPINE WITHOUT CONTRAST 8/11/2021 10:36 pm TECHNIQUE: CT of the cervical spine was performed without the administration of intravenous contrast. Multiplanar reformatted images are provided for review. Dose modulation, iterative reconstruction, and/or weight based adjustment of the mA/kV was utilized to reduce the radiation dose to as low as reasonably achievable. COMPARISON: None. HISTORY: ORDERING SYSTEM PROVIDED HISTORY: syncope and collapse TECHNOLOGIST PROVIDED HISTORY: syncope and collapse Reason for Exam: Syncope and collapse Acuity: Unknown Type of Exam: Unknown FINDINGS: BONES/ALIGNMENT: There is no acute fracture or traumatic malalignment. C4 on C5 anterolisthesis is seen which measures 4 mm. DEGENERATIVE CHANGES: C5/C6 moderate disc height loss is noted in association with posterior disc osteophyte complex which narrows the midline AP thecal sac diameter to 10 mm consistent with borderline central canal stenosis.   Disc osteophyte complex severely narrows the bilateral neural foramina. C6/C7: Moderate disc height loss is noted in association with posterior disc osteophyte complex which narrows the midline AP thecal sac diameter to 10 mm consistent with borderline central canal stenosis. Disc osteophyte complex encroaches upon and causes moderate left and mild right neural foraminal stenosis. No further significant spondylosis is noted within the cervical spine. SOFT TISSUES: There is no prevertebral soft tissue swelling. Partially visualized posterior right upper lung pleural thickening is noted, as described on CT chest abdomen and pelvis with contrast examination from 08/10/2021.     1. Note: Study significantly limited by patient motion related artifact. 2. No acute abnormality of the cervical spine. 3. C4 on C5 anterolisthesis measuring 4 mm, likely degenerative. 4. C5/C6, C6/C7 borderline central canal stenosis secondary to encroachment by posterior disc osteophyte complex. 5. C5/C6 severe bilateral, C6/C7 moderate left and mild right neural foraminal stenosis secondary to encroachment by disc osteophyte complex. CT THORACIC SPINE W CONTRAST    Result Date: 8/21/2021  EXAMINATION: CT OF THE THORACIC SPINE WITH CONTRAST; CT OF THE LUMBAR SPINE WITH CONTRAST 8/21/2021 2:55 pm: TECHNIQUE: CT of the thoracic spine was performed with the administration of intravenous contrast.  Multiplanar reformatted images are provided for review. Dose modulation, iterative reconstruction, and/or weight based adjustment of the mA/kV was utilized to reduce the radiation dose to as low as reasonably achievable.; CT of the lumbar spine was performed with the administration of intravenous contrast. Multiplanar reformatted images are provided for review. Dose modulation, iterative reconstruction, and/or weight based adjustment of the mA/kV was utilized to reduce the radiation dose to as low as reasonably achievable. COMPARISON: None.  HISTORY: ORDERING SYSTEM PROVIDED HISTORY: r/o OM TECHNOLOGIST PROVIDED HISTORY: r/o OM Reason for Exam: r/o OM Acuity: Unknown Type of Exam: Unknown Relevant Medical/Surgical History: chest tube; ORDERING SYSTEM PROVIDED HISTORY: r/o OM TECHNOLOGIST PROVIDED HISTORY: r/o OM Reason for Exam: r/o om Acuity: Unknown Type of Exam: Unknown FINDINGS: CT thoracic spine: There is a mild levocurvature of the thoracic spine. No acute fracture or traumatic subluxation is identified. Chronic appearing Schmorl's nodes are noted. There is no CT evidence of discitis-osteomyelitis. Atelectatic changes are present within both lungs with a mild left pleural effusion. Multiple loculated pleural effusions are present within the right hemithorax. A chest tube is present. CT lumbar spine: There is a normal lumbar lordosis but a moderately severe to lumbar levoscoliosis. No acute fracture or traumatic subluxation is identified. There is severe degenerative disc disease at L3-4 with vacuum phenomena. A chronic Schmorl's node is seen along the superior endplate of L2. There is multilevel facet hypertrophy with moderate to severe right-sided osseous neural foraminal stenosis at L3-4 and moderate to severe left-sided osseous neural foraminal stenosis at L4-5. There is no CT evidence of discitis-osteomyelitis. Heterogeneous enhancement of the kidneys is noted and there is mild pelvic free fluid. No CT evidence of discitis-osteomyelitis. Partially visualized loculated pleural effusions, on the right-hand side. Heterogeneous enhancement of the kidneys. Rule out pyelonephritis. CT LUMBAR SPINE W CONTRAST    Result Date: 8/21/2021  EXAMINATION: CT OF THE THORACIC SPINE WITH CONTRAST; CT OF THE LUMBAR SPINE WITH CONTRAST 8/21/2021 2:55 pm: TECHNIQUE: CT of the thoracic spine was performed with the administration of intravenous contrast.  Multiplanar reformatted images are provided for review.  Dose modulation, iterative reconstruction, and/or weight based adjustment of the mA/kV was utilized to reduce the radiation dose to as low as reasonably achievable.; CT of the lumbar spine was performed with the administration of intravenous contrast. Multiplanar reformatted images are provided for review. Dose modulation, iterative reconstruction, and/or weight based adjustment of the mA/kV was utilized to reduce the radiation dose to as low as reasonably achievable. COMPARISON: None. HISTORY: ORDERING SYSTEM PROVIDED HISTORY: r/o OM TECHNOLOGIST PROVIDED HISTORY: r/o OM Reason for Exam: r/o OM Acuity: Unknown Type of Exam: Unknown Relevant Medical/Surgical History: chest tube; ORDERING SYSTEM PROVIDED HISTORY: r/o OM TECHNOLOGIST PROVIDED HISTORY: r/o OM Reason for Exam: r/o om Acuity: Unknown Type of Exam: Unknown FINDINGS: CT thoracic spine: There is a mild levocurvature of the thoracic spine. No acute fracture or traumatic subluxation is identified. Chronic appearing Schmorl's nodes are noted. There is no CT evidence of discitis-osteomyelitis. Atelectatic changes are present within both lungs with a mild left pleural effusion. Multiple loculated pleural effusions are present within the right hemithorax. A chest tube is present. CT lumbar spine: There is a normal lumbar lordosis but a moderately severe to lumbar levoscoliosis. No acute fracture or traumatic subluxation is identified. There is severe degenerative disc disease at L3-4 with vacuum phenomena. A chronic Schmorl's node is seen along the superior endplate of L2. There is multilevel facet hypertrophy with moderate to severe right-sided osseous neural foraminal stenosis at L3-4 and moderate to severe left-sided osseous neural foraminal stenosis at L4-5. There is no CT evidence of discitis-osteomyelitis. Heterogeneous enhancement of the kidneys is noted and there is mild pelvic free fluid. No CT evidence of discitis-osteomyelitis.  Partially visualized loculated pleural effusions, on the there were no immediate complications. EBL: Less than 5 mL FINDINGS: Fluoroscopic image demonstrates the tip of the catheter at the cavo-atrial junction. Successful ultrasound and fluoroscopy guided PICC placement     XR CHEST PORTABLE    No significant interval change. Patchy opacities at the right base and pleural-based opacity in the lateral mid right chest.     XR CHEST PORTABLE    Result Date: 8/27/2021  EXAMINATION: ONE XRAY VIEW OF THE CHEST 8/27/2021 7:52 am COMPARISON: 08/26/2021, 08/25/2021 HISTORY: ORDERING SYSTEM PROVIDED HISTORY: Reassess empyema TECHNOLOGIST PROVIDED HISTORY: Reassess empyema Reason for Exam: right side chest tube Acuity: Acute Type of Exam: Initial FINDINGS: Right pigtail chest tube remains in place. Right PICC line appears unchanged in position. The right pleural effusion and opacities in the mid to inferior right lung field are without appreciable change. No pneumothorax identified. Left basilar opacities and small left effusion also appears unchanged. No new airspace disease identified. Right chest tube remains in place. No significant change in opacities in the right lung and effusion. No pneumothorax. XR CHEST PORTABLE    Result Date: 8/26/2021  EXAMINATION: ONE XRAY VIEW OF THE CHEST 8/26/2021 8:05 pm COMPARISON: 8/25/2021 HISTORY: ORDERING SYSTEM PROVIDED HISTORY: reassess empyema post chest tube to water seal TECHNOLOGIST PROVIDED HISTORY: reassess empyema post chest tube to water seal Reason for Exam: reassess empyema post chest tube to water seal Acuity: Acute Type of Exam: Initial Additional signs and symptoms: reassess empyema post chest tube to water seal Relevant Medical/Surgical History: reassess empyema post chest tube to water seal FINDINGS: Right upper extremity PICC and chest tube are in stable position. Improved aeration is identified in the right base compared to the prior study. Persistent loculated hydropneumothorax is seen.   Redemonstration of large nodular opacities overlying the right mid to lower lung zone. Left base pleural effusion and opacity are unchanged. No acute osseous abnormality. 1. Stable lines and tubes. 2. Interval improvement of right base aeration from the prior study. 3. Mild to moderate loculated right hydropneumothorax. 4. Persistent nodular opacities overlying the right mid to lower lung zone. 5. Stable left base opacity and pleural effusion. XR CHEST PORTABLE    Result Date: 8/25/2021  EXAMINATION: ONE XRAY VIEW OF THE CHEST 8/25/2021 9:27 am COMPARISON: 08/24/2021 HISTORY: ORDERING SYSTEM PROVIDED HISTORY: empyema TECHNOLOGIST PROVIDED HISTORY: empyema Reason for Exam: empyema Acuity: Acute Type of Exam: Initial FINDINGS: There is a small bore locking loop chest tube identified at the right lung base. There is a right-sided pleural effusion, predominantly laterally, without significant interval change compared to the previous examination. Multifocal infiltrates are seen throughout the lungs bilaterally. There is a right-sided PICC with the tip overlying the cavoatrial junction. Multiple areas of loculated pleural fluid again noted within the right chest with a rounded appearance. Multifocal loculated pleural effusion in the right chest, with overall appearance similar to the previous examination. Multifocal parenchymal infiltrates are again identified, similar to the prior study. XR CHEST PORTABLE    Result Date: 8/24/2021  EXAMINATION: ONE XRAY VIEW OF THE CHEST 8/24/2021 7:59 am COMPARISON: Chest radiograph performed 08/23/2021. HISTORY: ORDERING SYSTEM PROVIDED HISTORY: empyema TECHNOLOGIST PROVIDED HISTORY: empyema Reason for Exam: sob Acuity: Unknown Type of Exam: Unknown FINDINGS: There is consolidation over the right lung base that is stable compared to prior. Stable right-sided effusion. There is a stable indwelling right basilar drain. The heart is prominent. The upper abdomen is unremarkable. The extrathoracic soft tissues are unremarkable. Similar right lower lung consolidation and effusion with indwelling drain. XR CHEST PORTABLE    Result Date: 8/23/2021  EXAMINATION: ONE XRAY VIEW OF THE CHEST 8/23/2021 2:04 pm COMPARISON: Chest radiograph performed 08/22/2021. HISTORY: ORDERING SYSTEM PROVIDED HISTORY: infiltrate TECHNOLOGIST PROVIDED HISTORY: infiltrate Reason for Exam: F/u infiltrate/chest tube Acuity: Acute Type of Exam: Subsequent/Follow-up Additional signs and symptoms: F/u infiltrate/chest tube FINDINGS: There are bilateral mid and lower lung infiltrates. There are bibasilar effusions. There is no pneumothorax. Mediastinal structures are stable. The upper abdomen is unremarkable. The extrathoracic soft tissues are unremarkable. There is a right-sided PICC line with the tip in the distal SVC. There is a right-sided pigtail drain. Bilateral effusions with adjacent mid and lower lung infiltrates consistent with pneumonia. Support tubes as described above. XR CHEST PORTABLE    Result Date: 8/22/2021  EXAMINATION: ONE XRAY VIEW OF THE CHEST 8/22/2021 6:43 am COMPARISON: 21 August 2021 HISTORY: ORDERING SYSTEM PROVIDED HISTORY: infiltrate TECHNOLOGIST PROVIDED HISTORY: infiltrate Reason for Exam: infiltrate Acuity: Unknown Type of Exam: Unknown Additional signs and symptoms: infiltrate FINDINGS: AP portable view of the chest time stamped at 549 hours demonstrates overlying cardiac monitoring electrodes. Right PICC line terminates in the right atrium. Pigtail terminus small bore chest tube remains at the right base. Prior right-sided extrapleural air is not redemonstrated. However, there is fluid in that space consistent with loculated effusion. Rounded opacity at the right base is unchanged with surrounding haziness. Vascularity appears increased over prior study with interval development of left perihilar and basilar opacities.      Right-sided extrapleural air not demonstrated replaced with fluid. Continued nodular density at the right base. Interval increase in vascularity development of left-sided opacities. XR CHEST PORTABLE    Result Date: 8/21/2021  EXAMINATION: ONE XRAY VIEW OF THE CHEST 8/21/2021 5:21 am COMPARISON: 20 August 2021 HISTORY: ORDERING SYSTEM PROVIDED HISTORY: ETT placement, chest tube placement TECHNOLOGIST PROVIDED HISTORY: ETT placement, chest tube placement Acuity: Unknown Type of Exam: Ongoing Additional signs and symptoms: ETT placement, chest tube placement Relevant Medical/Surgical History: ETT placement, chest tube placement FINDINGS: AP portable view of the chest time stamped at 503 hours demonstrates an endotracheal tube terminating 3.2 cm above the kristen, overlying cardiac monitoring electrodes, right-sided PICC line terminating in the proximal right atrium. No change in cardiomegaly. Rounded opacity at the right lung base is again noted unchanged. There has been some clearing of density surrounding the opacity at the right lung base although significant residual remains. Right-sided pneumothorax again noted 5.1 mm in width reduced from a gonzáles mm. Small amount of right basilar extrapleural air. Pigtail terminus right-sided chest tube remains at the right base. Left lung is clear. 1.  Slight reduction in right-sided pneumothorax. 2.  Slight decrease in opacity in the right lower hemithorax surrounding a masslike opacification. 3.  Tubes and lines as above. XR CHEST PORTABLE    Result Date: 8/20/2021  EXAMINATION: ONE XRAY VIEW OF THE CHEST 8/20/2021 4:10 pm COMPARISON: Portable chest x-ray 5:22 a.m. Today. HISTORY: ORDERING SYSTEM PROVIDED HISTORY: rule out ptx TECHNOLOGIST PROVIDED HISTORY: rule out ptx Reason for Exam: R/o PTX Acuity: Unknown Type of Exam: Unknown Additional signs and symptoms: R/o PTX FINDINGS: There has been interval placement of a chest tube in the right base. Small pneumothorax is stable.   There is a rounded area of opacification measuring up to 4 cm in the right base which is more prominent than on comparison radiographs. No pneumothorax within the left lung. Bibasilar patchy opacities are overall stable from earlier today. Right upper extremity central line tip is at the cavoatrial junction. Enteric tube is been removed. Endotracheal tube is approximately 2 cm above the kristen in appropriate position. 1 anterior rib fracture on the right is seen, likely the 6th rib. Interval chest tube placement in the right lower lobe with stable small pneumothorax. Rounded opacification in the right base seen to better advantage on today's study. May represent consolidation, loculated effusion, or mass. Recommend attention on follow-up. XR CHEST PORTABLE    Result Date: 8/20/2021  EXAMINATION: ONE XRAY VIEW OF THE CHEST 8/20/2021 5:34 am COMPARISON: August 19, 2021 chest exam HISTORY: ORDERING SYSTEM PROVIDED HISTORY: vent TECHNOLOGIST PROVIDED HISTORY: vent Reason for Exam: vent Acuity: Unknown Type of Exam: Ongoing Additional signs and symptoms: vent Relevant Medical/Surgical History: vent FINDINGS: There are satisfactorily positioned endotracheal nasogastric tubes. The endotracheal tube tip is 1.5 cm cranial to the kristen. Stable cardiopericardial silhouette/mild tortuosity of the thoracic aorta Considering technical differences, there is a small right pneumothorax with maximal 12 mm transverse dimension at the upper lung. . Present film is taken in greater expiration than the previous.   Slight interval progression in moderate diffuse bilateral interstitial/alveolar infiltrates with increasing right lower lung consolidation     Stable small right pneumothorax Satisfactorily positioned support lines Slight progression in moderate diffuse bilateral interstitial/alveolar infiltrates suggesting increasing edema and/or infection Increasing now moderate right lower lung consolidation/infiltrate     XR CHEST PORTABLE    Result Date: 8/19/2021  EXAMINATION: ONE XRAY VIEW OF THE CHEST 8/19/2021 5:39 am COMPARISON: 08/18/2021 HISTORY: ORDERING SYSTEM PROVIDED HISTORY: vent TECHNOLOGIST PROVIDED HISTORY: vent Reason for Exam: vent Acuity: Acute Type of Exam: Ongoing FINDINGS: Cardiac size is enlarged. The endotracheal tube ice 3.5 cm above the kristen. Overall stable appearing small right-sided pneumothorax. No mediastinal shift is identified. Perihilar as well as basilar airspace disease seen bilaterally, greater on the right with mild increased focal consolidation at the right lung base compared to the prior study. Otherwise similar appearing chest.     Stable small right-sided pneumothorax. Mild increased focal consolidation in the right lower lung field, otherwise similar appearing parenchymal infiltrates bilaterally. XR CHEST PORTABLE    Result Date: 8/18/2021  EXAMINATION: ONE XRAY VIEW OF THE CHEST 8/18/2021 6:02 am COMPARISON: 08/17/2021 HISTORY: ORDERING SYSTEM PROVIDED HISTORY: vent TECHNOLOGIST PROVIDED HISTORY: vent Reason for Exam: vent Acuity: Acute Type of Exam: Ongoing FINDINGS: Cardiac size and mediastinal structures appear similar. The patient is rotated. There is a right-sided pneumothorax, with the pleural to pleural surface measuring 12 mm at the apicolateral aspect, previously measuring 16 mm. There is a right-sided PICC with the catheter tip overlying the right atrium. Scattered parenchymal infiltrates identified within the lungs bilaterally, right greater than left. Mild increased infiltrates seen at the right lung base. Otherwise similar appearing chest.     Multifocal infiltrates are identified within the lungs bilaterally, with mild interval worsening at the right lung base. Minimally improved right-sided pneumothorax, measuring 12 mm from pleural to pleural surface, previously measuring 16 mm.      XR CHEST PORTABLE    Result Date: 8/17/2021  EXAMINATION: ONE XRAY VIEW OF THE CHEST 8/17/2021 4:20 pm COMPARISON: 17 August 2021 at 6:24 a.m. HISTORY: ORDERING SYSTEM PROVIDED HISTORY: s/p right thora, rule out ptx TECHNOLOGIST PROVIDED HISTORY: s/p right thora, rule out ptx Reason for Exam: s/p right thora, rule out ptx Acuity: Acute Type of Exam: Initial Additional signs and symptoms: s/p right thora, rule out ptx FINDINGS: Single portable chest x-ray submitted. Enteric tube is off the inferior margin of the film. Endotracheal tube tip is above the kristen. Right central venous catheter tip is at the cavoatrial junction. The left lung is well inflated. There is a small to moderate right pneumothorax without mediastinal shift. Right-sided effusion is nearly completely evacuated with right lower lobe opacification. Atherosclerosis of the aorta. Previously identified rib fractures are not well evaluated on the study. Small to moderate pneumothorax without tension. Right lower lobe opacification likely represents consolidation. Nearly completely resolved effusion. RECOMMENDATION: ICU attending physician was not available. These results were discussed via telephone with Dr. Yanique Delgado at [phone] p.m.. Recommended following with portable chest x-rays overnight. The physician voiced understanding. XR CHEST PORTABLE    Result Date: 8/17/2021  EXAMINATION: ONE XRAY VIEW OF THE CHEST 8/17/2021 6:34 am COMPARISON: August 16, 2021 chest examination HISTORY: ORDERING SYSTEM PROVIDED HISTORY: vent TECHNOLOGIST PROVIDED HISTORY: vent Reason for Exam: vent Acuity: Acute Type of Exam: Ongoing FINDINGS: Limited rotated exam Stable satisfactorily positioned endotracheal/nasogastric tubes, right PICC line catheter. Stable cardiopericardial silhouette No definite change in extensive right chest pleuroparenchymal process with near opacification of 3/4 of the right hemithorax related to large effusion with underlying atelectasis versus infiltrate.  Stable mild left basilar opacity     Stable chest     XR CHEST PORTABLE    Result Date: 8/16/2021  EXAMINATION: ONE XRAY VIEW OF THE CHEST 8/16/2021 4:38 am COMPARISON: Chest radiograph 08/15/2021. HISTORY: ORDERING SYSTEM PROVIDED HISTORY: vent TECHNOLOGIST PROVIDED HISTORY: vent Reason for Exam: vent Acuity: Unknown Type of Exam: Ongoing Additional signs and symptoms: vent Relevant Medical/Surgical History: vent FINDINGS: The endotracheal tube terminates approximately 3.5 cm above the kristen. A right upper extremity PICC terminates in the lower superior vena cava. The cardiomediastinal silhouette is unchanged. Large right pleural effusion and basilar airspace opacities without significant change. No pneumothorax or left pleural effusion. No acute osseous abnormality. Large right pleural effusion and right basilar opacities not significantly changed from the previous study. XR CHEST PORTABLE    Result Date: 8/15/2021  EXAMINATION: ONE XRAY VIEW OF THE CHEST 8/15/2021 4:49 am COMPARISON: 08/14/2021 and prior HISTORY: ORDERING SYSTEM PROVIDED HISTORY: vent TECHNOLOGIST PROVIDED HISTORY: vent Reason for Exam: vent Acuity: Unknown Type of Exam: Ongoing Additional signs and symptoms: vent Relevant Medical/Surgical History: vent FINDINGS: Study limited by motion as well as overlying support and monitoring apparatus. The endotracheal tube appears stable from the distal 3rd of the esophagus. NG tube is limited in its evaluation but appears to extend below the diaphragm. The tip is not visualized. Right upper extremity PICC line projects at the superior vena cava right atrial junction. The heart and mediastinal contours appears stable in appearance. Increased interstitial and patchy opacity at the left base appears similar when accounting for differences in technique. Pleural and parenchymal changes on the right with a mid and lower lung zone predominance. Osseous structures are unremarkable in appearance. Lines and tubes appear stable.  Pleural and parenchymal changes on the right without significant change from the prior study given differences in technique. Changes on the left also appear relatively stable. Recommend continued follow-up. XR CHEST PORTABLE    Result Date: 8/14/2021  EXAMINATION: ONE XRAY VIEW OF THE CHEST 8/14/2021 5:54 am COMPARISON: August 13, 2021 HISTORY: ORDERING SYSTEM PROVIDED HISTORY: vent TECHNOLOGIST PROVIDED HISTORY: vent Reason for Exam: vent Acuity: Unknown Type of Exam: Ongoing Additional signs and symptoms: vent Relevant Medical/Surgical History: vent FINDINGS: Stable position of the support lines and tubes. No significant change in the parenchymal opacification of the right mid and lower lung region and left retrocardiac opacity. Bilateral pleural effusions unchanged. Stable cardiomediastinal silhouette. No significant pulmonary edema. No pneumothorax. Stable exam demonstrating bilateral airspace disease, right greater than left. XR CHEST PORTABLE    Result Date: 8/13/2021  EXAMINATION: ONE XRAY VIEW OF THE CHEST 8/13/2021 6:33 am COMPARISON: August 13 0614 hours HISTORY: ORDERING SYSTEM PROVIDED HISTORY: ETT placement, OGT placement TECHNOLOGIST PROVIDED HISTORY: ETT placement, OGT placement Reason for Exam:  new vent, ogt placement Acuity: Unknown Type of Exam: Unknown FINDINGS: The tip of the ET tube is approximately 3.9 cm above the kristen. NG tube is in place, tip not visualized on the radiograph. Proximal side port is just beyond the level of the GE junction, within the gastric cardia. Extensive airspace disease is again noted within the right perihilar region, and right lung base, and left retrocardiac region. Overall appearance is minimally improved. No pneumothorax is present. Fluid is present within the major fissure on the right. Right upper extremity PICC line is in place, tip at the junction of the SVC and right atrium. 1. ET tube in place, tip 3.9 cm above the kristen 2.  NG tube in place, tip not included on the radiograph 3. Bilateral airspace disease, most prominent on the right, and may represent combination of atelectasis and pneumonia. XR CHEST PORTABLE    Result Date: 8/13/2021  EXAMINATION: ONE XRAY VIEW OF THE CHEST 8/13/2021 6:04 am COMPARISON: Chest radiograph performed 08/12/2021. HISTORY: ORDERING SYSTEM PROVIDED HISTORY: pl effusion, rib fracture TECHNOLOGIST PROVIDED HISTORY: pl effusion, rib fracture Reason for Exam: pleural effusion, rib fx Acuity: Acute Type of Exam: Ongoing FINDINGS: There is a right basilar effusion with adjacent consolidation. There is no pneumothorax. The mediastinal structures are stable. The upper abdomen is unremarkable. The extrathoracic soft tissues are unremarkable. There is a right-sided PICC line with the tip in the mid SVC. Right basilar effusion with adjacent consolidation consistent with pneumonia. VL Lower Extremity Arteries Right    Result Date: 8/16/2021    [facility]  Vascular Lower Extremities Arterial Duplex Procedure   Patient Name   [facility]       Date of Study           08/16/2021                 JUN FRAGA   Date of Birth  1965  Gender                  Male   Age            60 year(s)  Race                       Room Number    2004        Height:                 68.9 inch, 175 cm   Corporate ID # [id number]    Weight:                 163 pounds, 74 kg   Patient Acct # [de-identified]   BSA:        1.89 m^2    BMI:      24.16 kg/m^2   MR #           [id number]      Sonographer             Chino Brynat   Accession #    [id number]  Interpreting Physician  Stephanie Smith   Referring                  Referring Physician     Chang Combs  Nurse  Practitioner  Procedure Type of Study:   Extremities Arteries: Lower Extremities Arterial Duplex, Arterial Scan  Lower Right. Patient Status: In Patient. Technical Quality:Adequate visualization. Comments: Indications: Cold foot.   Conclusions   Summary   Simultaneous real time imaging utilizing B-Mode, color doppler and  spectral waveform analysis was performed on the right lower extremity for  arterial examination, study demonstrates:   Right:  No evidence of significant occlusive arterial disease. Signature   ----------------------------------------------------------------  Electronically signed by Chino Bryant(Sonographer) on  08/16/2021 10:26 AM  ----------------------------------------------------------------   ----------------------------------------------------------------  Electronically signed by Montana Carmichael(Interpreting  physician) on 08/16/2021 09:41 PM  ----------------------------------------------------------------  Findings:   Right Impression:  Patent external iliac, common femoral, profunda, superficial femoral,  popliteal and tibial arteries with triphasic flow. Velocities are measured in cm/s ; Diameters are measured in cm LE Duplex Measurements +---------++-----+-----+---------+----+-----++---+-----+-----+----+--------+ ! ! !Right! ! Left     !    !     !!   !     !     !    !        ! +---------++-----+-----+---------+----+-----++---+-----+-----+----+--------+ ! Location ! !PSV  ! Ratio! Wave     !AP  ! Trans! !PSV! Ratio! Wave !AP  ! Trans   ! !         !!     ! !Desc. ! Diam!Diam !!   ! !Desc. ! Diam!Diam    ! +---------++-----+-----+---------+----+-----++---+-----+-----+----+--------+ ! Dist EIA !!111  ! ! Triphasic!0.88!0.9  !!   !     !     !    !        ! +---------++-----+-----+---------+----+-----++---+-----+-----+----+--------+ ! Common   !!123  !1.11 ! Triphasic!1.16!1.3  !!   !     !     !    !        ! !Femoral  !!     !     !         !    !     !!   !     !     !    !        ! +---------++-----+-----+---------+----+-----++---+-----+-----+----+--------+ ! PFA      !!112  !0.91 ! Triphasic!0.64!0.62 !!   !     !     !    !        ! +---------++-----+-----+---------+----+-----++---+-----+-----+----+--------+ ! Prox SFA !!119  ! !Triphasic!0.59!0.77 !!   !     !     !    !        ! +---------++-----+-----+---------+----+-----++---+-----+-----+----+--------+ ! Mid SFA  !!116  !0.97 ! Triphasic!0.57!0.67 !!   !     !     !    !        ! +---------++-----+-----+---------+----+-----++---+-----+-----+----+--------+ ! Dist SFA !!96.9 !0.84 ! Triphasic!0.63!0.78 !!   !     !     !    !        ! +---------++-----+-----+---------+----+-----++---+-----+-----+----+--------+ ! Prox     !!84.9 !0.88 ! Triphasic!0.65!0.81 !!   !     !     !    !        ! !Popliteal!!     !     !         !    !     !!   !     !     !    !        ! +---------++-----+-----+---------+----+-----++---+-----+-----+----+--------+ ! Dist     !!72.9 !0.86 ! Triphasic!0.52!0.68 !!   !     !     !    !        ! !Popliteal!!     !     !         !    !     !!   !     !     !    !        ! +---------++-----+-----+---------+----+-----++---+-----+-----+----+--------+ ! Prox PTA !!132  !1.81 !         !0.29!0.3  !!   !     !     !    !        ! +---------++-----+-----+---------+----+-----++---+-----+-----+----+--------+ ! Dist PTA !!96.9 !0.73 ! Triphasic!0.32!0.34 !!   !     !     !    !        ! +---------++-----+-----+---------+----+-----++---+-----+-----+----+--------+ ! Mid JANICE  !!93   !1.28 ! Triphasic!0.32!0.32 !!   !     !     !    !        ! +---------++-----+-----+---------+----+-----++---+-----+-----+----+--------+ ! Prox     !!121  !0.92 ! Triphasic!0.29!0.28 !!   !     !     !    !        ! !Peroneal !!     !     !         !    !     !!   !     !     !    !        ! +---------++-----+-----+---------+----+-----++---+-----+-----+----+--------+ ! Dist     !!53.4 !0.44 ! Triphasic!0.27!0.23 !!   !     !     !    !        !  !Peroneal !!     !     !         !    !     !!   !     !     !    !        ! +---------++-----+-----+---------+----+-----++---+-----+-----+----+--------+    VL Lower Extremity Bilateral Venous Duplex    Result Date: 8/24/2021    [facility]  Vascular Lower Extremities DVT Study Procedure   Patient Name   [id number]       Date of Study           08/24/2021                 Mimi FRAGA   Date of Birth  1965  Gender                  Male   Age            60 year(s)  Race                       Room Number    2103        Height:                 68.9 inch, 175 cm   Corporate ID # [id number]    Weight:                 163 pounds, 74 kg   Patient Acct # [de-identified]   BSA:        1.89 m^2    BMI:     24.16 kg/m^2   MR #           [id number]      Sonographer             Gisellemarcial Mckeon   Accession #    [id number]  Interpreting Physician  Tristian Machado   Referring                  Referring Physician     Zaira Arguelles  Nurse  Practitioner  Procedure Type of Study:   Veins: Lower Extremities DVT Study, Venous Scan Lower Bilateral.  Patient Status: In Patient. Technical Quality:Adequate visualization. Comments:Prolonged hospitalization  Conclusions   Summary   Simultaneous real time imaging utilizing B-Mode, color doppler and  spectral waveform analysis was performed on the bilateral lower  extremities for venous examination of the deep and superficial systems. Findings are:   Right:  No evidence of deep venous thrombosis. Chronic superficial venous thrombosis identified in the great saphenous  vein at level of ankle. left:  No evidence of deep or superficial venous thrombosis.    Signature   ----------------------------------------------------------------  Electronically signed by Anibal Preciado(Sonographer) on  08/24/2021 12:47 PM  ----------------------------------------------------------------   ----------------------------------------------------------------  Electronically signed by Montana Pedro(Interpreting  physician) on 08/24/2021 07:37 PM  ----------------------------------------------------------------  Findings:   Right Impression:                    Left Impression:  The common femoral, femoral,         The common femoral, femoral,  popliteal and tibial veins popliteal and tibial veins  demonstrate normal compressibility   demonstrate normal compressibility  and augmentation. and augmentation. Non compressibility of the great     Normal compressibility of the great  saphenous vein at the level of the   saphenous vein. ankle. Normal compressibility of the small  Normal compressibility of the small  saphenous vein. saphenous vein. Velocities are measured in cm/s ; Diameters are measured in cm Right Lower Extremities DVT Study Measurements Right 2D Measurements +------------------------------------+----------+---------------+----------+ ! Location                            ! Visualized! Compressibility! Thrombosis! +------------------------------------+----------+---------------+----------+ ! Common Femoral                      !Yes       ! Yes            ! None      ! +------------------------------------+----------+---------------+----------+ ! Prox Femoral                        !Yes       ! Yes            ! None      ! +------------------------------------+----------+---------------+----------+ ! Mid Femoral                         !Yes       ! Yes            ! None      ! +------------------------------------+----------+---------------+----------+ ! Dist Femoral                        !Yes       ! Yes            ! None      ! +------------------------------------+----------+---------------+----------+ ! Popliteal                           !Yes       ! Yes            ! None      ! +------------------------------------+----------+---------------+----------+ ! Sapheno Femoral Junction            ! Yes       ! Yes            ! None      ! +------------------------------------+----------+---------------+----------+ ! PTV                                 ! Yes       ! Partial        !None      ! +------------------------------------+----------+---------------+----------+ ! Peroneal                            !Yes       ! Partial !None      ! +------------------------------------+----------+---------------+----------+ ! Gastroc                             ! Yes       ! Yes            ! None      ! +------------------------------------+----------+---------------+----------+ ! GSV Thigh                           ! Yes       ! Yes            ! None      ! +------------------------------------+----------+---------------+----------+ ! GSV Knee                            ! Yes       ! Yes            ! None      ! +------------------------------------+----------+---------------+----------+ ! GSV Ankle                           ! Yes       ! No             !          ! +------------------------------------+----------+---------------+----------+ ! SSV                                 ! Yes       ! Yes            ! None      ! +------------------------------------+----------+---------------+----------+ Right Doppler Measurements +---------------------------+------+------+--------------------------------+ ! Location                   ! Signal!Reflux! Reflux (msec)                   ! +---------------------------+------+------+--------------------------------+ ! Common Femoral             !Phasic!      !                                ! +---------------------------+------+------+--------------------------------+ ! Prox Femoral               !Phasic!      !                                ! +---------------------------+------+------+--------------------------------+ ! Popliteal                  !Phasic!      !                                ! +---------------------------+------+------+--------------------------------+ Left Lower Extremities DVT Study Measurements Left 2D Measurements +------------------------------------+----------+---------------+----------+ ! Location                            ! Visualized! Compressibility! Thrombosis! +------------------------------------+----------+---------------+----------+ ! Common Femoral                      !Yes       ! Yes            ! None ! +------------------------------------+----------+---------------+----------+ ! Prox Femoral                        !Yes       ! Yes            ! None      ! +------------------------------------+----------+---------------+----------+ ! Mid Femoral                         !Yes       ! Yes            ! None      ! +------------------------------------+----------+---------------+----------+ ! Dist Femoral                        !Yes       ! Yes            ! None      ! +------------------------------------+----------+---------------+----------+ ! Popliteal                           !Yes       ! Yes            ! None      ! +------------------------------------+----------+---------------+----------+ ! Sapheno Femoral Junction            ! Yes       ! Yes            ! None      ! +------------------------------------+----------+---------------+----------+ ! PTV                                 ! Yes       ! Partial        !None      ! +------------------------------------+----------+---------------+----------+ ! Peroneal                            !Yes       ! Partial        !None      ! +------------------------------------+----------+---------------+----------+ ! Gastroc                             ! Yes       ! Yes            ! None      ! +------------------------------------+----------+---------------+----------+ ! GSV Thigh                           ! Yes       ! Yes            ! None      ! +------------------------------------+----------+---------------+----------+ ! GSV Knee                            ! Yes       ! Yes            ! None      ! +------------------------------------+----------+---------------+----------+ ! GSV Ankle                           ! Yes       ! Yes            ! None      ! +------------------------------------+----------+---------------+----------+ ! SSV                                 ! Yes       ! Yes            ! None      ! +------------------------------------+----------+---------------+----------+ Left Doppler Measurements +---------------------------+------+------+--------------------------------+ ! Location                   ! Signal!Reflux! Reflux (msec)                   ! +---------------------------+------+------+--------------------------------+ ! Common Femoral             !Phasic!      !                                ! +---------------------------+------+------+--------------------------------+ ! Prox Femoral               !Phasic!      !                                ! +---------------------------+------+------+--------------------------------+ ! Popliteal                  !Phasic!      !                                ! +---------------------------+------+------+--------------------------------+    IR CHEST TUBE INSERTION    Result Date: 8/20/2021  PROCEDURE: ULTRASOUND GUIDED CHEST TUBE PLACEMENT 8/20/2021 HISTORY: ORDERING SYSTEM PROVIDED HISTORY: increased plural effusion TECHNOLOGIST PROVIDED HISTORY: increased plural effusion Which side should the procedure be performed?->Right Pneumothorax SEDATION: None. 8 mL 1% lidocaine local TECHNIQUE: The patient is not consentable. His immediate family was not available so 2 physician emergent consent was obtained including myself and Internal Medicine attending. [place]man protocol was followed. A time-out was performed prior to commencing the procedure. A suitable skin site was prepped and draped in sterile fashion following ultrasound localization. A 10 [address] Apt multipurpose drain was advanced into the collection utilizing the trocar technique. An ultrasound image of the tip within the collection was saved and sent to PACs. The drain was advanced off the inner stylet, looped, and locked within the collection. Aspiration of sanguinous and purulent material was aspirated to confirm location. The drain was sutured in place and attached to an atrium chest tube device. Sterile dressing was applied.  The patient tolerated the procedure well without immediate complication. FINDINGS: Highly complex pleural effusion consistent with empyema. Successful ultrasound guided placement of a right chest tube. Chest x-ray pending. VL Upper Extremity Venous Duplex Left    Result Date: 8/24/2021    [address] [place] [address]  Vascular Upper Extremities Veins Procedure   Patient Name   [address]       Date of Study           08/24/2021                 Salvador FRAGA   Date of Birth  1965  Gender                  Male   Age            60 year(s)  Race                       Room Number    2103        Height:                 68.9 inch, 175 cm   Corporate ID # [id number]    Weight:                 163 pounds, 74 kg   Patient Acct # [de-identified]   BSA:        1.89 m^2    BMI:     24.16 kg/m^2   MR #           [id number]      Sonographer             Jose Alejandro Minus   Accession #    [id number]  Interpreting Physician  Chucky Holter   Referring                  Referring Physician     Estelita Murphy  Nurse  Practitioner  Procedure Type of Study:   Veins: Upper Extremities Veins, Venous Scan Upper Left. Indications for Study: Thrombus. Patient Status: In Patient. Technical Quality:Adequate visualization. Conclusions   Summary   Simultaneous real time imaging utilizing B-Mode, color doppler and  spectral waveform analysis was performed on the left upper extremity for  venous examination of the deep and superficial systems. Findings are:   Left:  No evidence of deep or superficial venous thrombosis.    Signature   ----------------------------------------------------------------  Electronically signed by Anibal Mendoza(Sonographer) on  08/24/2021 12:58 PM  ----------------------------------------------------------------   ----------------------------------------------------------------  Electronically signed by Montana Carmichael(Interpreting  physician) on 08/24/2021 07:29 PM  ----------------------------------------------------------------  Findings:   Right Impression:        Left Impression: ! +------------------------------------+----------+---------------+----------+ ! Prox Radial                         !No        !               !          ! +------------------------------------+----------+---------------+----------+ ! Prox Ulnar                          ! No        !               !          ! +------------------------------------+----------+---------------+----------+ ! Basilic at UA                       ! Yes       !               !          ! +------------------------------------+----------+---------------+----------+ ! Basilic at AF                       ! Yes       !               !          ! +------------------------------------+----------+---------------+----------+ ! Basilic at [address]                       ! No        !               !          ! +------------------------------------+----------+---------------+----------+ ! Cephalic at UA                      ! Yes       !               !          ! +------------------------------------+----------+---------------+----------+ ! Cephalic at AF                      ! Yes       !               !          ! +------------------------------------+----------+---------------+----------+ ! Cephalic at [address]                      ! No        !               !          ! +------------------------------------+----------+---------------+----------+ Doppler Measurements +-------------------------+-----------------------+------------------------+ ! Location                 ! Signal                 !Reflux                  ! +-------------------------+-----------------------+------------------------+ ! IJV                      ! Phasic                 !                        ! +-------------------------+-----------------------+------------------------+ ! SCV                      ! Phasic                 !                        ! +-------------------------+-----------------------+------------------------+ ! Axillary                 ! Phasic                 ! Impression:          Left Impression:  Right subclavian vein is   Left internal jugular, subclavian, axillary,  compressible with normal   brachial, ulnar, radial, cephalic and basilic  doppler responses. veins are compressible with normal doppler                             responses. There are two tributaries to the basilic vein                             near the anticubital fossa that are slightly                             dilated, partially compressible with isoechoic                             echoes. Velocities are measured in cm/s ; Diameters are measured in cm Right UE Vein Measurements 2D Measurements +---------------+-----------------+----------------------+-----------------+ ! Location       ! Visualized       ! Compressibility       ! Thrombosis       ! +---------------+-----------------+----------------------+-----------------+ ! Prox SCV       ! Yes              ! Yes                   ! None             ! +---------------+-----------------+----------------------+-----------------+ Left UE Vein Measurements 2D Measurements +------------------------------------+----------+---------------+----------+ ! Location                            ! Visualized! Compressibility! Thrombosis! +------------------------------------+----------+---------------+----------+ ! Prox IJV                            ! Yes       ! Yes            ! None      ! +------------------------------------+----------+---------------+----------+ ! Dist IJV                            ! Yes       ! Yes            ! None      ! +------------------------------------+----------+---------------+----------+ ! Prox SCV                            ! Yes       ! Yes            ! None      ! +------------------------------------+----------+---------------+----------+ ! Dist SCV                            ! Yes       ! Yes            ! None      ! +------------------------------------+----------+---------------+----------+ ! Prox Axillary !Yes       !Yes            ! None      ! +------------------------------------+----------+---------------+----------+ ! Dist Axillary                       ! Yes       ! Yes            ! None      ! +------------------------------------+----------+---------------+----------+ ! Prox Brachial                       !Yes       ! Yes            ! None      ! +------------------------------------+----------+---------------+----------+ ! Dist Brachial                       !Yes       ! Yes            ! None      ! +------------------------------------+----------+---------------+----------+ ! Prox Radial                         !Yes       ! Yes            ! None      ! +------------------------------------+----------+---------------+----------+ ! Dist Radial                         !Yes       ! Yes            ! None      ! +------------------------------------+----------+---------------+----------+ ! Prox Ulnar                          ! Yes       ! Yes            ! None      ! +------------------------------------+----------+---------------+----------+ ! Dist Ulnar                          ! Yes       ! Yes            ! None      ! +------------------------------------+----------+---------------+----------+ ! Basilic at UA                       ! Yes       ! Yes            ! None      ! +------------------------------------+----------+---------------+----------+ ! Basilic at AF                       ! Yes       ! Yes            ! None      ! +------------------------------------+----------+---------------+----------+ ! Basilic at [address]                       ! Yes       ! Yes            ! None      ! +------------------------------------+----------+---------------+----------+ ! Cephalic at UA                      ! Yes       ! Yes            ! None      ! +------------------------------------+----------+---------------+----------+ ! Cephalic at AF                      ! Yes       ! Yes            ! None      ! +------------------------------------+----------+---------------+----------+ ! Cephalic at [address]                      ! Yes       ! Yes            ! None      ! +------------------------------------+----------+---------------+----------+    CT CHEST ABDOMEN PELVIS W CONTRAST    Result Date: 8/10/2021  EXAMINATION: CT OF THE CHEST, ABDOMEN, AND PELVIS WITH CONTRAST 8/10/2021 10:41 pm TECHNIQUE: CT of the chest, abdomen and pelvis was performed with the administration of intravenous contrast. Multiplanar reformatted images are provided for review. Dose modulation, iterative reconstruction, and/or weight based adjustment of the mA/kV was utilized to reduce the radiation dose to as low as reasonably achievable. COMPARISON: None HISTORY: ORDERING SYSTEM PROVIDED HISTORY: Syncope, fall, rib and abd pain TECHNOLOGIST PROVIDED HISTORY: Syncope, fall, rib and abd pain Decision Support Exception - unselect if not a suspected or confirmed emergency medical condition->Emergency Medical Condition (MA) Reason for Exam: Syncope, fall, rib and abd pain Acuity: Acute Type of Exam: Initial Mechanism of Injury: Pt states he was walking and then was on the ground, states he hurts everywhere. FINDINGS: Chest: Mediastinum: Cardiomegaly. Coronary artery calcifications. Aortic vascular calcifications. Small pericardial effusion. No suspicious mediastinal or hilar Adenopathy Lungs/pleura: Interstitial thickening suggestive edema. Small right-sided effusion. Areas of pleural thickening identified right near the right-sided rib fractures. Trace left-sided effusion and left basilar atelectasis. Stable right lower lobe nodule 8 mm in size. Focal areas opacity anterior aspect of right lung likely represent areas. Cyst versus scarring Soft Tissues/Bones: There right anterolateral fractures involving the right 4th, 5th 6 fracture ribs with associated areas pleural thickening multilevel changes. Abdomen/Pelvis: Organs: Fatty infiltration liver. Gallbladder, spleen, adrenal glands, kidneys, and pancreas unremarkable. Adrenal glands are thickened bilaterally. Subcentimeter right adrenal nodule likely adrenal adenoma, Is unchanged. GI/Bowel: Mild retained stool. Increased fluid content involving the bowel loops bowel obstruction. Mild colonic diverticulosis. No CT findings acute appendicitis. Few scattered colonic diverticula. Pelvis: Mild bladder wall thickening anteriorly. Prostate unremarkable. Peritoneum/Retroperitoneum: No free fluid. Moderate severe aortic vascular calcifications. Aorta is non. Bones/Soft Tissues: No displaced hip or pelvic fractures levocurvature lumbar spine. Multilevel degenerate changes. Grade 2 chest wall injury with right 4th through 6th anterolateral rib fractures. Areas of pleural thickening likely areas of focal hemothorax near the rib fractures on the right. No acute inflammatory process within the abdomen pelvis. FL MODIFIED BARIUM SWALLOW W VIDEO    Result Date: 8/23/2021  EXAMINATION: MODIFIED BARIUM SWALLOW WAS PERFORMED IN CONJUNCTION WITH SPEECH PATHOLOGY SERVICES TECHNIQUE: Fluoroscopic evaluation of the swallowing mechanism was performed with multiple consistency of barium product. FLUOROSCOPY DOSE AND TYPE OR TIME AND EXPOSURES: Fluoroscopic time of 2 minutes and 18 seconds. DAP of 93.8 dGycm2. COMPARISON: None HISTORY: ORDERING SYSTEM PROVIDED HISTORY: failed swallow test TECHNOLOGIST PROVIDED HISTORY: failed swallow test Reason for Exam: F/u failed video swallow Acuity: Acute Type of Exam: Subsequent/Follow-up Additional signs and symptoms: F/u failed video swallow FINDINGS: Multiple swallows were attempted with multiple consistencies under fluoroscopic evaluation in the presence of speech pathology. Flash penetration was visualized with thin liquid. No aspiration was demonstrated. Flash penetration with thin liquid.  Please see separate speech pathology report for full discussion of findings and recommendations. IR GUIDED THORACENTESIS PLEURAL    Result Date: 8/23/2021  PROCEDURE: Jihan Kennedy LEFT THORACENTESIS 8/23/2021 HISTORY: ORDERING SYSTEM PROVIDED HISTORY: left pleural effusion, thoracentesis vs pigtail placement TECHNOLOGIST PROVIDED HISTORY: left pleural effusion, thoracentesis vs pigtail placement Which side should the procedure be performed? ->Left Small left pleural effusion TECHNIQUE: This procedure was performed by Dr. Amy Anton. Informed consent was obtained after a detailed explanation of the procedure including risks, benefits, and alternatives. Universal protocol was performed. The left chest was prepped and draped in sterile fashion and local anesthesia was achieved with lidocaine. Initial ultrasound images show small left pleural effusion. A 5 [place] needle sheath was advanced under ultrasound guidance into pleural effusion and thoracentesis was performed. The patient tolerated the procedure well. The planned procedure was discussed with CAMERON MCKEON at approximately 2 pm on 8/23/2021. Decision was made to perform thoracentesis only due to the relatively small quantity of fluid present. EBL: None FINDINGS: A total of 100 mL of clear yellow fluid was removed. Fluid was sent for the requested studies. Successful ultrasound guided left thoracentesis. IR GUIDED THORACENTESIS PLEURAL    Result Date: 8/17/2021  PROCEDURE: ULTRASOUNDGUIDED RIGHT THORACENTESIS 8/17/2021 HISTORY: ORDERING SYSTEM PROVIDED HISTORY: Rt Sided Effusion in need of draining TECHNOLOGIST PROVIDED HISTORY: Rt Sided Effusion in need of draining Which side should the procedure be performed?->Right TECHNIQUE: Informed consent was obtained from the patient's mother via telephone after a detailed explanation of the procedure including risks, benefits, and alternatives. Universal protocol was performed. A time-out was performed prior to commencing the procedure.  The right chest was prepped and draped in sterile fashion and local anesthesia was achieved with lidocaine. A 5 Citizen of [place] one-step device was advanced under ultrasound guidance into pleural effusion and thoracentesis was performed. 1.85 L of sanguinous and purulence cloudy material was aspirated. 1 L was sent to the lab. The needle was removed. Hemostasis obtained with direct pressure. A sterile dressing was applied. The patient tolerated the procedure well. FINDINGS: A total of 1.85 L of sanguinous and purulence material was removed. 1 L sent to lab     Successful ultrasound guided right thoracentesis. Physical Examination:        Physical Exam  Vitals reviewed. Constitutional:       General: He is not in acute distress. Appearance: Normal appearance. He is normal weight. He is not ill-appearing, toxic-appearing or diaphoretic. HENT:      Head: Normocephalic and atraumatic. Eyes:      General: No scleral icterus. Right eye: No discharge. Left eye: No discharge. Extraocular Movements: Extraocular movements intact. Conjunctiva/sclera: Conjunctivae normal.   Neck:      Vascular: No carotid bruit. Cardiovascular:      Rate and Rhythm: Normal rate and regular rhythm. Pulses: Normal pulses. Heart sounds: Normal heart sounds. No murmur heard. No friction rub. No gallop. Pulmonary:      Effort: Pulmonary effort is normal. No respiratory distress. Breath sounds: Normal breath sounds. No wheezing or rales. Chest:      Chest wall: No tenderness. Abdominal:      General: Abdomen is flat. Bowel sounds are normal. There is no distension or abdominal bruit. Tenderness: There is no abdominal tenderness. Musculoskeletal:      Cervical back: No rigidity or tenderness. Right lower leg: No edema. Left lower leg: No edema. Skin:     General: Skin is warm. Capillary Refill: Capillary refill takes less than 2 seconds. Neurological:      General: No focal deficit present. Mental Status: He is alert and oriented to person, place, and time. Mental status is at baseline. Psychiatric:         Mood and Affect: Mood normal.         Behavior: Behavior normal.         Thought Content: Thought content normal.         Judgment: Judgment normal.           Assessment:        Primary Problem  Syncope and collapse    Active Hospital Problems    Diagnosis Date Noted    Elevated C-reactive protein (CRP) [R79.82]     Leukocytosis [D72.829]     Empyema lung (HCC) [J86.9] 08/21/2021    MRSA bacteremia [R78.81, B95.62] 08/20/2021    Superficial venous thrombosis of arm, left [I82.612] 08/18/2021    Acute respiratory failure (HCC) [J96.00] 08/16/2021    Fever [R50.9] 08/14/2021    Conjunctivitis [H10.9] 08/14/2021    Severe malnutrition ([address] [E43] 08/12/2021    Alcohol abuse [F10.10] 08/11/2021    Hypokalemia [E87.6] 08/11/2021    Hyponatremia [E87.1] 08/11/2021    Traumatic rhabdomyolysis ([address] [T79. 6XXA] 08/11/2021    Closed fracture of multiple ribs of right side [S22.41XA] 08/11/2021    Closed fracture of left wrist [S62.102A] 08/11/2021    Syncope and collapse [R55] 06/13/2018    Dyslipidemia [E78.5] 09/17/2014    Smoking addiction [F17.200] 06/11/2013    [facility] [J44.9] 05/30/2013       Plan:        ~Difficulty swallowing (stable)   barium swallow study cleared. Dysphagia soft bite size with thin nectar.      ~MRSA Bacteremia (stable)  Afebrile. (8/14) ESR 49, ->140s  Blood cultures negative. Elevated leukocyte count 12.6  Patient is on Vancomycin. ID consulted 8/21:  IV vancomycin through September 10, 2021 ,monitor renal function and vancomycin levels closely. Repeat blood cultures 8/16 grew MRSA.   CT thoracic and lumbar spine w contrast: No CT evidence of discitis-osteomyelitis.     ~Superficial Venous Thrombophlebitis (stable)  -VL upper extremity left: Superficial vein thrombophlebitis is noted in 2 braches fromthe basilar vein near the antecubital fossa.  -Lovenox 40 mg once daily. DVT scan lower extremity: Chronic superficial venous thrombosis in the great saphenous vein at the level on ankle.     ~Conjunctivitis (stable)  Erythema bilaterally, with some exudate bilaterally,PERRL. Erythromycin ophthalmic ointment for 5 days     ~Acute urinary retention (resolved)  ( 8/12) Bladder scan: 460 mL  Straight cath twice  Baker's catheter in place. Flomax 0.4 mg.      ~Acute kidney injury (resolved)  Nephrology on board 8/24:  Sign off. Dc fluids   Potassium was 3.2,  Replace potassium,  Nephrology are okay to discharge the patient to acute rehab     ~Moderate Acute Alcohol withdrawal  Not on ATivan      ~Acute respiratory failure 2/2 empyema (stable)  CXR (August 16): Large right pleural effusion and right basilar opacities not significantlychanged from the previous study. Pro-Vasquez: 73 (8/12)   Pulmonology on board: Patient intubated at 1230.  No need for thoracocentesis  Trial of spontaneous breathing, RR 40s  Pneumonia work-up was negative for any strep antigens, Legionella, and mycoplasma antigens  Sputum culture in progress, direct exam showed mixed bacterial morphotypes. Blood culture positive for MRSA. Echo 35% EF. Cardio consulted: Plan for ERICKA. Keep K>4 and Mg>2. Pulmonology: Continue to follow up. Monitor sodium. Symbicort 160/4.5  On vancomycin.  -Chest tube output: 4L (120 in the last 24 hour). -Mucinex 600mg TID. Chest tube plan to remove by IR. Output less than 120 in the last 24 hours. Chest tube was removed on 8/27  Okay for SNF as per pulmonary       ~Syncope and collapse (stable)  -CT head shows no acute intracranial abnormality  -EKG shows sinus tachycardia with unifocal PVCs, no acute ST segment changes as documented by ER physician  -Troponin 20, 15  - urinalysis and urine drug screen unremarkable.      ~Traumatic rhabdomyolysis (resolved)  -CK 1,222-> 22, myoglobin 3,509-->2884.    ~Hypokalemia (stable)  -Initial potassium 2.5->3.7-3.4->2.6-> 3.0-> 3.4  -Patient received potassium supplement in the ED. -Recheck potassium this morning.  -Potassium sliding scale.     ~Acute mild alcoholic hepatitis  - Non reactive Hep A, B and C (2019)  - AST>ALT (95:51) ==> (45:32) ==> 42:27     Hyponatremia   -Initial sodium 127->139>144->147->142>138> 132  -Daily BMP. -DC fluids as per nephrology     Alcohol abuse (resolved)  -CUKSBEJ <77  -Thiamine, folic acid, multivitamin daily.  -Seizure and fall precautions.  -Consult social work for rehab, discharge planning.     ~Multiple right rib fractures (stable)  -CT scan shows Grade 2 chest wall injury with right 4th through 6th anterolateral rib fractures. Areas of pleural thickening likely areas of focal hemothorax near the rib fractures on the right. No acute inflammatory process within the abdomen pelvis. -Fentanyl as needed.  -Consult general surgery: Resume tube feeds following procedures. Surgically stable.  -[place] Q6prn.      ~Closed left wrist fracture (moderate worsening )  -Velcro wrist splint in place  - left wrist is swollen. - left wrist X-ray: Subacute impacted fracture at the distal metaphysis of the left radius, wit visualized fracture line and partial healing of the fracture, grossly stable. -Worsening swelling. VL dup US upper extremity. Inpatient consult to Orthopedic surgery: No intervention planned. Splint only.        `COPD (stable)  -SPO2 96%  (intubated on 8/13 due to resp. Failure.  -Albuterol as needed  -Spiriva daily  -Ellipta daily     Smoking (stable)  -Nicotine patch     GI prophylaxis-Pepcid 20 mg IV twice daily. DVT prophylaxis--Lovenox 40 mg once daily   Diet:  On Nector Thick Diet . Disposition: PCU. Plan to send pt to SNF. Diet: Awaiting SLP eval and treat.  Pending barium swallow w video.        Shayan Cm MD  8/28/2021  11:24 AM       I have discussed the care of Arthur Mays , including pertinent history and exam findings,    today with the resident. I have seen and examined the patient and the key elements of all parts of the encounter have been performed by me . I agree with the assessment, plan and orders as documented by the resident. Principal Problem:    Syncope and collapse  Active Problems:    COPD (chronic obstructive pulmonary disease) (HCC)    Smoking addiction    Dyslipidemia    Alcohol abuse    Hypokalemia    Hyponatremia    Traumatic rhabdomyolysis (HCC)    Closed fracture of multiple ribs of right side    Closed fracture of left wrist    Severe malnutrition (HCC)    Fever    Conjunctivitis    Acute respiratory failure (HCC)    Superficial venous thrombosis of arm, left    MRSA bacteremia    Empyema lung (HCC)    Elevated C-reactive protein (CRP)    Leukocytosis  Resolved Problems:    * No resolved hospital problems. *        Overall  course ;                                   are improving over time.         Patient, is feeling better although little dizzy  Chest tube removed yesterday  Cardiothoracic surgery signed off  We will request heme-onc consult  DC planning          Electronically signed by Valentin Barboza MD

## 2021-08-28 NOTE — PROGRESS NOTES
PULMONARY PROGRESS NOTE:    REASON FOR VISIT: resp failure, DTs  Interval History:  Denies SOB, cough, chest pain  NO fever  No NVDC  No swelling feet    Events since last visit: chest tube removed       PAST MEDICAL HISTORY:      Scheduled Meds:   vancomycin  1,000 mg IntraVENous Q24H    guaiFENesin  600 mg Oral BID    vancomycin (VANCOCIN) intermittent dosing (placeholder)   Other RX Placeholder    docusate sodium  100 mg Oral Daily    famotidine  20 mg Oral BID    metoprolol succinate  12.5 mg Oral Nightly    ipratropium-albuterol  1 ampule Inhalation Q4H While awake    tamsulosin  0.4 mg Oral Daily    enoxaparin  40 mg Subcutaneous Daily    sodium chloride flush  5-40 mL IntraVENous 2 times per day    lidocaine  1 patch Transdermal Daily     Continuous Infusions:   sodium chloride 35 mL/hr at 08/27/21 1402    dextrose      sodium chloride 25 mL (08/19/21 1717)     PRN Meds:nicotine polacrilex, diphenhydrAMINE, HYDROcodone 5 mg - acetaminophen, morphine **OR** morphine, potassium chloride, perflutren lipid microspheres, glucose, dextrose, glucagon (rDNA), dextrose, sodium phosphate IVPB **OR** sodium phosphate IVPB, sodium chloride flush, sodium chloride, polyethylene glycol, acetaminophen **OR** acetaminophen, magnesium sulfate, ondansetron, LORazepam **OR** LORazepam **OR** LORazepam **OR** LORazepam **OR** LORazepam **OR** LORazepam **OR** LORazepam **OR** LORazepam, albuterol sulfate HFA        PHYSICAL EXAMINATION:  /71   Pulse 77   Temp 98.7 °F (37.1 °C) (Oral)   Resp 18   Ht 5' 9\" (1.753 m)   Wt 146 lb 9.7 oz (66.5 kg)   SpO2 96%   BMI 21.65 kg/m²   afebrile  General : awake,alert,oriented   Neck - supple, no lymphadenopathy, JVD not raised  Heart - SR  Lungs - Air Entry- fair bilaterally; breath sounds : vesicular, 93% on 1 l nc, rt chest tube to 20 cm suction  Abdomen - soft, no tenderness  Upper Extremities  - no cyanosis, mottling; edema : edema  Lower Extremities: no

## 2021-08-28 NOTE — PROGRESS NOTES
Nephrology Progress Note    Reason for consultation: Management of acute kidney injury. Consulting physician: Valentin Barboza MD.    Interval history: Patient was seen and examined today and he appears comfortable at rest.  He was extubated successfully on 8/21/2021 he complains of right-sided pleuritic chest pain. Patient had ERICKA 8/20/2021 with no findings of vegetations. He is s/p left thoracentesis of 1.1L isolating staph aureus s/p chest tube removal   Renal  function has improved and stabilized around 1.3 mg/dl  Good Urine output 2.3  L yesterday. History of Present Illness: This is a 60 y.o. male with history of alcohol abuse, COPD, Hypertension who presented on 8/10/2021 with syncope and collapse. He was witnessed walking on the sidewalk and he collapsed all of a sudden. Bystanders called 911 and patient was evaluated in the ER. He had presented with pain in the right chest wall and abdomen. He initially received chest abdomen CT with IV contrast on 8/10/2021 showing grade 2 chest wall injury with right fourth through 6 anterior lateral rib fractures. Patient was initially placed on alcohol withdrawal protocol. Patient  developed respiratory distress and was placed on BiPAP and subsequently intubated on 8/13/2021 for airway protection. Chest x-ray was suggestive of bilateral pleural effusion right greater than left with significant right-sided pleural effusion. Patient is scheduled for thoracentesis today. Blood cultures are growing MRSA. Jonelle Carlos Patient is receiving IV vancomycin. He was started on Lovenox for DVT of the left upper extremity. Nephrology is consulted for acute kidney injury. Serum creatinine was 0.54 mg/dl on 8/14/2021 and progressively worsened to 1.27 on 8/16/2021 and 1.38 today with BUN of 40 mg/dl. He did receive IV Lasix 40 mg x 1 yesterday as patient is fluid overloaded. Echocardiogram shows a EF of 35% with increased IVC diameter.   He has had good urine output of 3.3 L over the last 24 hours after IV Lasix. Patient is however 13 L positive balance since admission. Objective/     Vitals:    08/27/21 1856 08/27/21 1945 08/28/21 0000 08/28/21 0745   BP:  136/69 (!) 125/55 126/71   Pulse:  94 86 77   Resp: 20 17 17 18   Temp:  99 °F (37.2 °C) 98.6 °F (37 °C) 98.7 °F (37.1 °C)   TempSrc:  Oral Oral Oral   SpO2: 96%   96%   Weight:       Height:         24HR INTAKE/OUTPUT:      Intake/Output Summary (Last 24 hours) at 8/28/2021 1009  Last data filed at 8/28/2021 1000  Gross per 24 hour   Intake 1999.67 ml   Output 3025 ml   Net -1025.33 ml     Patient Vitals for the past 96 hrs (Last 3 readings):   Weight   08/26/21 0545 146 lb 9.7 oz (66.5 kg)   08/25/21 0323 143 lb 11.8 oz (65.2 kg)     Constitutional: Awake and responsive, not in acute distress  Cardiovascular:  S1, S2 without pericardial rub or gallop. Respiratory: Clinically clear. Abdomen: Full, soft with normal bowel sounds. Extremities:  LE edema    Data/  Recent Labs     08/26/21  0716 08/27/21  0716 08/28/21  0741   WBC 12.6* 8.8 8.0   HGB 8.1* 8.0* 9.1*   HCT 24.9* 23.9* 27.2*   MCV 99.2 97.2 97.3    334 352     Recent Labs     08/26/21  0716 08/26/21  1614 08/26/21  2248 08/27/21  0716 08/28/21  0741      < > 134* 136 134*   K 3.8   < > 3.7 3.9 3.2*      < > 102 102 98   CO2 23   < > 24 25 27   GLUCOSE 77  --   --  95 89   MG 1.7  --   --  1.8 1.9   BUN 11  --   --  9 7   CREATININE 1.20  --   --  1.31* 1.33*   LABGLOM >60  --   --  56* 55*   GFRAA >60  --   --  >60 >60    < > = values in this interval not displayed. Assessment/plan:     1. Acute kidney injury - nonoliguric secondary to decreased effective arterial volume, severe hypoalbuminemia and sepsis. No evidence of acute rhabdomyolysis. Renal ultrasound showed no hydronephrosis or obstruction. Serologic studies are negative with negative ASTRID and normal complements. UOP 2.3 L yesterday  Continue to monitor urine output closely.   Avoid nephrotoxic agents. Scr 1.3 mg/dl near baseline     2. Hypokalemia - secondary to diuretic therapy-40 of KCl given today     3. MRSA bacteremia -patient is on IV vancomycin. Monitor Vanco trough levels for  potential nephrotoxicity.     4. Hypernatremia - Serum sodium is improved to 136 mmol/L-discontinue IV fluids    5.  Large right-sided parapneumonic pleural effusion, possibly loculated on chest CT-S/P  Left  thoracentesis 8/17/21  S/P removal of chest tube yesterday 8/27/2021    Plan:    DC IV fluids   Continue current treatment  Discharge planning in progress to rehab no objection on discharge from nephrology standpoint    Bro Valenzuela MD

## 2021-08-28 NOTE — PLAN OF CARE
Problem: Falls - Risk of:  Goal: Will remain free from falls  Description: Will remain free from falls  8/28/2021 0444 by Dang Pack RN  Outcome: Ongoing     Problem: Falls - Risk of:  Goal: Absence of physical injury  Description: Absence of physical injury  8/28/2021 0444 by Dang Pack RN  Outcome: Ongoing     Problem: Skin Integrity:  Goal: Will show no infection signs and symptoms  Description: Will show no infection signs and symptoms  8/28/2021 0444 by Dang Pack RN  Outcome: Ongoing     Problem: Skin Integrity:  Goal: Absence of new skin breakdown  Description: Absence of new skin breakdown  8/28/2021 0444 by Dang Pack RN  Outcome: Ongoing     Problem: Nutrition  Goal: Optimal nutrition therapy  8/28/2021 0444 by Dang Pack RN  Outcome: Ongoing     Problem: Pain:  Goal: Pain level will decrease  Description: Pain level will decrease  8/28/2021 0444 by Dang Pack RN  Outcome: Ongoing     Problem: Pain:  Goal: Recognizes and communicates pain  Description: Recognizes and communicates pain  8/28/2021 0444 by Dang Pack RN  Outcome: Ongoing
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
## 2021-08-28 NOTE — PROGRESS NOTES
Speech Language Pathology  Speech Language Pathology  [facility]  Dysphagia Treatment Note    Date: 8/28/2021  Patients Name: Ca Pimentel  MRN: [id number]  Diagnosis: dysphagia  Patient Active Problem List   Diagnosis Code    COPD (chronic obstructive pulmonary disease) ([address] J44.9    Smoking addiction F17.200    Depression F32.9    Dyslipidemia E78.5    Lung nodule R91.1    DDD (degenerative disc disease), lumbar M51.36    Groin pain, chronic, right R10.31, G89.29    Ilioinguinal neuralgia of right side G57.91    Syncope and collapse R55    History of alcohol abuse F10.11    Lumbar radiculopathy M54.16    Scoliosis due to degenerative disease of spine in adult patient M41.80    Lumbosacral spondylosis without myelopathy M47.817    Alcohol abuse F10.10    Hypokalemia E87.6    Hyponatremia E87.1    Traumatic rhabdomyolysis ([address] T79. 6XXA    Closed fracture of multiple ribs of right side S22.41XA    Closed fracture of left wrist S62.102A    Severe malnutrition (HCC) E43    Fever R50.9    Conjunctivitis H10.9    Acute respiratory failure (HCC) J96.00    Superficial venous thrombosis of arm, left I82.612    MRSA bacteremia R78.81, B95.62    Empyema lung (HCC) J86.9    Elevated C-reactive protein (CRP) R79.82    Leukocytosis D72.829       Pain: pt. denies    Dysphagia Treatment  Treatment time: 1391-0515    Subjective: [x] Alert [x] Cooperative     [] Confused     [] Agitated    [] Lethargic    Objective/Assessment:    Pt. completed Evangelina maneuver x2 (demonstrating difficulty with exercise throughout), effortful swallow x10, simple tongue base strengthening exercises x4, x10. Pt. Stating, \"this is harder than you'd think\" during swallowing exercises. Encouragement provided. No overt s/s aspiration demonstrated when Pt. taking sips of mildly thick liquid via standard cup edge x8.     ST encouraged Pt. to continue to practice swallowing exercises Nella throughout day, Pt. verbalized understanding and agreeable. Plan:  [x] Continue ST services    [] Discharge from ST:        Discharge recommendations: [] Inpatient Rehab   [] [place]   [] Outpatient Therapy  [] Follow up at trauma clinic   [] Other:         Treatment completed by:  Iris Romano M.A., CCC-SLP

## 2021-08-29 ENCOUNTER — APPOINTMENT (OUTPATIENT)
Dept: GENERAL RADIOLOGY | Age: 56
DRG: 351 | End: 2021-08-29
Payer: MEDICAID

## 2021-08-29 LAB
ALBUMIN SERPL-MCNC: 2.2 G/DL (ref 3.5–5.2)
ALBUMIN/GLOBULIN RATIO: ABNORMAL (ref 1–2.5)
ALP BLD-CCNC: 69 U/L (ref 40–129)
ALT SERPL-CCNC: 14 U/L (ref 5–41)
ANION GAP SERPL CALCULATED.3IONS-SCNC: 10 MMOL/L (ref 9–17)
AST SERPL-CCNC: 14 U/L
BILIRUB SERPL-MCNC: 0.36 MG/DL (ref 0.3–1.2)
BUN BLDV-MCNC: 8 MG/DL (ref 6–20)
BUN/CREAT BLD: ABNORMAL (ref 9–20)
CALCIUM SERPL-MCNC: 7.9 MG/DL (ref 8.6–10.4)
CHLORIDE BLD-SCNC: 103 MMOL/L (ref 98–107)
CO2: 26 MMOL/L (ref 20–31)
CREAT SERPL-MCNC: 1.22 MG/DL (ref 0.7–1.2)
CULTURE: ABNORMAL
DIRECT EXAM: ABNORMAL
GFR AFRICAN AMERICAN: >60 ML/MIN
GFR NON-AFRICAN AMERICAN: >60 ML/MIN
GFR SERPL CREATININE-BSD FRML MDRD: ABNORMAL ML/MIN/{1.73_M2}
GFR SERPL CREATININE-BSD FRML MDRD: ABNORMAL ML/MIN/{1.73_M2}
GLUCOSE BLD-MCNC: 93 MG/DL (ref 70–99)
HCT VFR BLD CALC: 23.6 % (ref 41–53)
HEMOGLOBIN: 8.1 G/DL (ref 13.5–17.5)
Lab: ABNORMAL
MAGNESIUM: 1.8 MG/DL (ref 1.6–2.6)
MCH RBC QN AUTO: 33.1 PG (ref 26–34)
MCHC RBC AUTO-ENTMCNC: 34.3 G/DL (ref 31–37)
MCV RBC AUTO: 96.6 FL (ref 80–100)
NRBC AUTOMATED: ABNORMAL PER 100 WBC
PDW BLD-RTO: 14.8 % (ref 11.5–14.9)
PLATELET # BLD: 296 K/UL (ref 150–450)
PMV BLD AUTO: 6.8 FL (ref 6–12)
POTASSIUM SERPL-SCNC: 3.6 MMOL/L (ref 3.7–5.3)
RBC # BLD: 2.44 M/UL (ref 4.5–5.9)
SODIUM BLD-SCNC: 139 MMOL/L (ref 135–144)
SPECIMEN DESCRIPTION: ABNORMAL
TOTAL PROTEIN: 6 G/DL (ref 6.4–8.3)
WBC # BLD: 6.6 K/UL (ref 3.5–11)

## 2021-08-29 PROCEDURE — 6360000002 HC RX W HCPCS: Performed by: STUDENT IN AN ORGANIZED HEALTH CARE EDUCATION/TRAINING PROGRAM

## 2021-08-29 PROCEDURE — 6370000000 HC RX 637 (ALT 250 FOR IP): Performed by: NURSE PRACTITIONER

## 2021-08-29 PROCEDURE — 80053 COMPREHEN METABOLIC PANEL: CPT

## 2021-08-29 PROCEDURE — 85027 COMPLETE CBC AUTOMATED: CPT

## 2021-08-29 PROCEDURE — 99232 SBSQ HOSP IP/OBS MODERATE 35: CPT | Performed by: INTERNAL MEDICINE

## 2021-08-29 PROCEDURE — 94640 AIRWAY INHALATION TREATMENT: CPT

## 2021-08-29 PROCEDURE — 6370000000 HC RX 637 (ALT 250 FOR IP): Performed by: PHYSICIAN ASSISTANT

## 2021-08-29 PROCEDURE — 36415 COLL VENOUS BLD VENIPUNCTURE: CPT

## 2021-08-29 PROCEDURE — 6370000000 HC RX 637 (ALT 250 FOR IP): Performed by: INTERNAL MEDICINE

## 2021-08-29 PROCEDURE — 83735 ASSAY OF MAGNESIUM: CPT

## 2021-08-29 PROCEDURE — 2060000000 HC ICU INTERMEDIATE R&B

## 2021-08-29 PROCEDURE — 2580000003 HC RX 258: Performed by: INTERNAL MEDICINE

## 2021-08-29 PROCEDURE — 6370000000 HC RX 637 (ALT 250 FOR IP): Performed by: STUDENT IN AN ORGANIZED HEALTH CARE EDUCATION/TRAINING PROGRAM

## 2021-08-29 PROCEDURE — 2580000003 HC RX 258: Performed by: NURSE PRACTITIONER

## 2021-08-29 PROCEDURE — 2580000003 HC RX 258: Performed by: STUDENT IN AN ORGANIZED HEALTH CARE EDUCATION/TRAINING PROGRAM

## 2021-08-29 PROCEDURE — 99232 SBSQ HOSP IP/OBS MODERATE 35: CPT | Performed by: NURSE PRACTITIONER

## 2021-08-29 PROCEDURE — 71045 X-RAY EXAM CHEST 1 VIEW: CPT

## 2021-08-29 PROCEDURE — 94664 DEMO&/EVAL PT USE INHALER: CPT

## 2021-08-29 RX ADMIN — VANCOMYCIN HYDROCHLORIDE 1000 MG: 1 INJECTION, POWDER, LYOPHILIZED, FOR SOLUTION INTRAVENOUS at 01:57

## 2021-08-29 RX ADMIN — ENOXAPARIN SODIUM 40 MG: 40 INJECTION SUBCUTANEOUS at 08:45

## 2021-08-29 RX ADMIN — IPRATROPIUM BROMIDE AND ALBUTEROL SULFATE 1 AMPULE: 2.5; .5 SOLUTION RESPIRATORY (INHALATION) at 16:04

## 2021-08-29 RX ADMIN — DOCUSATE SODIUM 100 MG: 100 CAPSULE, LIQUID FILLED ORAL at 08:45

## 2021-08-29 RX ADMIN — IPRATROPIUM BROMIDE AND ALBUTEROL SULFATE 1 AMPULE: 2.5; .5 SOLUTION RESPIRATORY (INHALATION) at 19:56

## 2021-08-29 RX ADMIN — HYDROCODONE BITARTRATE AND ACETAMINOPHEN 1 TABLET: 5; 325 TABLET ORAL at 13:10

## 2021-08-29 RX ADMIN — GUAIFENESIN 600 MG: 600 TABLET, EXTENDED RELEASE ORAL at 08:45

## 2021-08-29 RX ADMIN — SODIUM CHLORIDE, PRESERVATIVE FREE 10 ML: 5 INJECTION INTRAVENOUS at 19:45

## 2021-08-29 RX ADMIN — HYDROCODONE BITARTRATE AND ACETAMINOPHEN 1 TABLET: 5; 325 TABLET ORAL at 19:45

## 2021-08-29 RX ADMIN — FAMOTIDINE 20 MG: 20 TABLET, FILM COATED ORAL at 08:44

## 2021-08-29 RX ADMIN — HYDROCODONE BITARTRATE AND ACETAMINOPHEN 1 TABLET: 5; 325 TABLET ORAL at 06:21

## 2021-08-29 RX ADMIN — SODIUM CHLORIDE, PRESERVATIVE FREE 10 ML: 5 INJECTION INTRAVENOUS at 08:45

## 2021-08-29 RX ADMIN — FAMOTIDINE 20 MG: 20 TABLET, FILM COATED ORAL at 19:45

## 2021-08-29 RX ADMIN — GUAIFENESIN 600 MG: 600 TABLET, EXTENDED RELEASE ORAL at 19:45

## 2021-08-29 RX ADMIN — IPRATROPIUM BROMIDE AND ALBUTEROL SULFATE 1 AMPULE: 2.5; .5 SOLUTION RESPIRATORY (INHALATION) at 11:32

## 2021-08-29 RX ADMIN — TAMSULOSIN HYDROCHLORIDE 0.4 MG: 0.4 CAPSULE ORAL at 08:44

## 2021-08-29 RX ADMIN — SODIUM CHLORIDE: 4.5 INJECTION, SOLUTION INTRAVENOUS at 16:41

## 2021-08-29 RX ADMIN — METOPROLOL SUCCINATE 12.5 MG: 25 TABLET, EXTENDED RELEASE ORAL at 19:45

## 2021-08-29 ASSESSMENT — ENCOUNTER SYMPTOMS
ABDOMINAL DISTENTION: 0
CHOKING: 0
SHORTNESS OF BREATH: 0
EYE PAIN: 0
CHEST TIGHTNESS: 0
TROUBLE SWALLOWING: 0
VOMITING: 0
PHOTOPHOBIA: 0
NAUSEA: 0
CONSTIPATION: 0
BACK PAIN: 0
COUGH: 0
APNEA: 0
DIARRHEA: 0
ABDOMINAL PAIN: 0
WHEEZING: 1

## 2021-08-29 ASSESSMENT — PAIN SCALES - GENERAL
PAINLEVEL_OUTOF10: 9
PAINLEVEL_OUTOF10: 4
PAINLEVEL_OUTOF10: 4
PAINLEVEL_OUTOF10: 10
PAINLEVEL_OUTOF10: 10
PAINLEVEL_OUTOF10: 6

## 2021-08-29 NOTE — CARE COORDINATION
ONGOING DISCHARGE PLANNING NOTE:    Writer reviewed LSW notes, and discharge plan is to discharge to [facility] ([place], [place])  Chest tube discontinued   On iv atb   New hemoc consult       Electronically signed by Mariana Cheadle, RN on 8/29/2021 at 8:17 AM

## 2021-08-29 NOTE — PROGRESS NOTES
Barbara Gonzalez, RCPPatient Assessment complete. Syncope and collapse [R55]  Hypokalemia [E87.6]  Hyponatremia [E87.1]  Traumatic rhabdomyolysis, initial encounter ([facility] [T79. 6XXA]  Closed fracture of multiple ribs of right side, initial encounter [S22.41XA] . Vitals:    08/29/21 1604   BP:    Pulse:    Resp:    Temp:    SpO2: 96%   . Patients home meds are   Prior to Admission medications    Medication Sig Start Date End Date Taking?  Authorizing Provider   albuterol sulfate  (90 Base) MCG/ACT inhaler INHALE 2 PUFFS INTO THE LUNGS EVERY 6 HOURS AS NEEDED FOR WHEEZING 12/10/20   Omar Hercules MD   tiotropium (Ishmael [place]) 18 MCG inhalation capsule Inhale 1 capsule into the lungs daily 11/10/20   Brian Ferreira MD   ARNUITY ELLIPTA 100 MCG/ACT AEPB INHALE 1 PUFF INTO THE LUNGS DAILY 9/7/20   Thompson Turner MD       Assessment:     08/29/21 1609   RT Protocol   Smoking Status 2   Surgical status 0   Xray 2   Respiratory pattern 0   Mental Status 0   Breath sounds 0   Cough 1   Activity level 0   Oxygen Requirement 0   Indications for Bronchodilator Therapy Decreased or absent breath sounds   Bronchodilator Assessment Score 5   Indications for Bronchial Hygiene None   Bronchial Hygiene Assessment Score 5   Indications for Volume Expansion None   Volume Expansion Assessment Score 2             HR - 72  RR - 16  SpO2 - 96% (room air)  Breath Sounds: Clear; Diminished      Bronchodilator assessment at level  3  Hyperinflation assessment at level  0  Secretion Management assessment at level  0    [x]    Bronchodilator Assessment  BRONCHODILATOR ASSESSMENT SCORE  Score 0 1 2 3 4 5   Breath Sounds   []  Patient Baseline []  No Wheeze good aeration []  Faint, scattered wheezing, good aeration [x]  Expiratory Wheezing and or moderately diminished []  Insp/Exp wheeze and/or very diminished []  Insp/Exp and/ or marked distress   Respiratory Rate   []  Patient Baseline []  Less than 20 [x]  Less than 20 []  20-25 []  Greater than 25 []  Greater than 25   Peak flow % of Pred or PB [x]  NA   []  Greater than 90%  []  81-90% []  71-80% []  Less than or equal to 70%  or unable to perform []  Unable due to Respiratory Distress   Dyspnea re []  Patient Baseline []  No SOB []  No SOB [x]  SOB on exertion []  SOB min activity []  At rest/acute   e FEV% Predicted       [x]  NA []  Above 69%  []  Unable []  Above 60-69%  []  Unable []  Above 50-59%  []  Unable []  Above 35-49%  []  Unable []  Less than 35%  []  Unable                 [x]  Hyperinflation Assessment  Score 1 2 3   CXR and Breath Sounds   [x]  Clear []  No atelectasis  Basilar aeration []  Atelectasis or absent basilar breath sounds   Incentive Spirometry Volume  (Per IBW)   [x]  Greater than or equal to 15ml/Kg []  less than 15ml/Kg []  less than 15ml/Kg   Surgery within last 2 weeks [x]  None or general   []  Abdominal or thoracic surgery  []  Abdominal or thoracic   Chronic Pulmonary Historyre []  No [x]  Yes []  Yes     [x]  Secretion Management Assessment  Score 1 2 3   Bilateral Breath Sounds   []  Occasional Rhonchi []  Scattered Rhonchi []  Course Rhonchi and/or poor aeration   Sputum    []  Small amount of thin secretions []  Moderate amount of viscous secretions []  Copius, Viscious Yellow/ Secretions   CXR as reported by physician []  clear  []  Unavailable [x]  Infiltrates and/or consolidation  []  Unavailable []  Mucus Plugging and or lobar consolidation  []  Unavailable   Cough [x]  Strong, productive cough []  Weak productive cough []  No cough or weak non-productive cough   Amanda Loredo RCP  4:09 PM

## 2021-08-29 NOTE — PROGRESS NOTES
PULMONARY PROGRESS NOTE:    REASON FOR VISIT: COPD, rib Fx, empyema  Interval History:  Denies SOB, cough, chest pain  NO fever  No NVDC  No swelling feet    Events since last visit: none       PAST MEDICAL HISTORY:      Scheduled Meds:   vancomycin  1,000 mg IntraVENous Q24H    guaiFENesin  600 mg Oral BID    vancomycin (VANCOCIN) intermittent dosing (placeholder)   Other RX Placeholder    docusate sodium  100 mg Oral Daily    famotidine  20 mg Oral BID    metoprolol succinate  12.5 mg Oral Nightly    ipratropium-albuterol  1 ampule Inhalation Q4H While awake    tamsulosin  0.4 mg Oral Daily    enoxaparin  40 mg Subcutaneous Daily    sodium chloride flush  5-40 mL IntraVENous 2 times per day    lidocaine  1 patch Transdermal Daily     Continuous Infusions:   sodium chloride 35 mL/hr at 08/28/21 1322    dextrose      sodium chloride 25 mL (08/19/21 1717)     PRN Meds:nicotine polacrilex, diphenhydrAMINE, HYDROcodone 5 mg - acetaminophen, morphine **OR** morphine, potassium chloride, perflutren lipid microspheres, glucose, dextrose, glucagon (rDNA), dextrose, sodium phosphate IVPB **OR** sodium phosphate IVPB, sodium chloride flush, sodium chloride, polyethylene glycol, acetaminophen **OR** acetaminophen, magnesium sulfate, ondansetron, LORazepam **OR** LORazepam **OR** LORazepam **OR** LORazepam **OR** LORazepam **OR** LORazepam **OR** LORazepam **OR** LORazepam, albuterol sulfate HFA        PHYSICAL EXAMINATION:  BP (!) 119/58   Pulse 76   Temp 98.3 °F (36.8 °C) (Oral)   Resp 18   Ht 5' 9\" (1.753 m)   Wt 133 lb 13.1 oz (60.7 kg)   SpO2 95%   BMI 19.76 kg/m²   afebrile  General : awake,alert,oriented   Neck - supple, no lymphadenopathy, JVD not raised  Heart - SR  Lungs - Air Entry- fair bilaterally; breath sounds : vesicular, 93% on 1 l nc, rt chest tube to 20 cm suction  Abdomen - soft, no tenderness  Upper Extremities  - no cyanosis, mottling; edema : edema  Lower Extremities: no cyanosis, mottling; edema : absent    Current Laboratory, Radiologic, Microbiologic, and Diagnostic studies reviewed  Data ReviewCBC:   Recent Labs     08/27/21  0716 08/28/21  0741 08/29/21  0506   WBC 8.8 8.0 6.6   RBC 2.46* 2.80* 2.44*   HGB 8.0* 9.1* 8.1*   HCT 23.9* 27.2* 23.6*    352 296     BMP:   Recent Labs     08/27/21  0716 08/28/21  0741 08/29/21  0506   GLUCOSE 95 89 93    134* 139   K 3.9 3.2* 3.6*   BUN 9 7 8   CREATININE 1.31* 1.33* 1.22*   CALCIUM 7.7* 8.1* 7.9*     ABGs:   No results for input(s): PHART, PO2ART, WCE3ZVR, BNR5NAB, BEART, M6DWWBHT, KLU2QSS in the last 72 hours.    PT/INR:  No results found for: PTINR    ASSESSMENT / PLAN:    Alcohol withdrawal - resolved  Thiamine, folic acid  atelectasis  RLL infiltrate / empyema  Acute hypoxic resp failure - Mechanical ventilation, extubated 8-20-21  bacteremia - continue current ABx  ABx per ID; rt chest tube - removed 8/27/21  TPA per CT surgery  CT chest reviewed - decrease in fluid  OK for SNF from Pulmonary    Electronically signed by Abran Medellin MD on 08/29/21 at 3:31 PM

## 2021-08-29 NOTE — PLAN OF CARE
Problem: Falls - Risk of:  Goal: Will remain free from falls  Description: Will remain free from falls  8/29/2021 0409 by Elieser Avalos RN  Outcome: Ongoing     Problem: Skin Integrity:  Goal: Will show no infection signs and symptoms  Description: Will show no infection signs and symptoms  Outcome: Ongoing     Problem: Nutrition  Goal: Optimal nutrition therapy  Outcome: Ongoing     Problem: Pain:  Goal: Pain level will decrease  Description: Pain level will decrease  8/29/2021 0409 by Elieser Avalos RN  Outcome: Ongoing

## 2021-08-29 NOTE — PROGRESS NOTES
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 along the right lung fissures. Mild-to-moderate left-sided pleural effusion with adjacent consolidation that is similar compared to prior examination. CT CHEST WO CONTRAST    Result Date: 8/23/2021  EXAMINATION: CT OF THE CHEST WITHOUT CONTRAST 8/23/2021 10:12 am TECHNIQUE: CT of the chest was performed without the administration of intravenous contrast. Multiplanar reformatted images are provided for review. Dose modulation, iterative reconstruction, and/or weight based adjustment of the mA/kV was utilized to reduce the radiation dose to as low as reasonably achievable. COMPARISON: 16 August 2021 HISTORY: ORDERING SYSTEM PROVIDED HISTORY: right empyema TECHNOLOGIST PROVIDED HISTORY: right empyema Reason for Exam: right empyema Acuity: Unknown Type of Exam: Unknown Additional signs and symptoms: follow up chest tube placement FINDINGS: Mediastinum: Prior intestinal tube has been removed.   Prominent mediastinal lymph nodes are present similar to prior exam.  Heart size is stable, borderline to mildly enlarged with trace epicardial Dose modulation, iterative reconstruction, and/or weight based adjustment of the mA/kV was utilized to reduce the radiation dose to as low as reasonably achievable. COMPARISON: CT of the chest of 08/10/2021. HISTORY: ORDERING SYSTEM PROVIDED HISTORY: pleural effusion TECHNOLOGIST PROVIDED HISTORY: pleural effusion Reason for Exam: pleural effusion Acuity: Unknown Type of Exam: Unknown FINDINGS: Mediastinum: No lymphadenopathy. Right upper extremity PICC with catheter tip at the superior cavoatrial junction. Calcified coronary artery disease. Cardiac chambers are otherwise unremarkable. No pericardial effusion. Lungs/pleura: Significant interval increase in size of a right pleural effusion, which is now large. There is near complete atelectasis of the right lower lobe. Areas of loculated pleural fluid are noted along the fissures as well as in the anterolateral right upper lung. Again noted are areas of pleural thickening adjacent to the anterolateral right rib fractures. There is some subcutaneous gas also noted within the intercostal spaces and adjacent to the rib fractures; these areas are continuous with a air-filled structure within the right middle lobe that is new in comparison to the prior study and measures 4.4 x 2.7 cm on series 4, image 62. On the left, there is a trace pleural effusion, also increased in size, with adjacent atelectasis as well as left lower lobe consolidative opacities. The previously measured right lower lobe pulmonary nodule is now obscured. Endotracheal tube is in place with tip in the mid thoracic trachea. Upper Abdomen: Enteric tube in place with tip below the diaphragm and outside the field of view of this study. Nonspecific stranding surrounds the left renal cortex. Soft Tissues/Bones: No soft tissue abnormality. Redemonstrated mildly displaced fractures of the anterolateral right 4th, 5th, 6th, and 7th ribs. Possible healed remote fracture of the sternal body. Significant interval increase in size of right pleural effusion, which is now large, with progressive near complete atelectasis of the right lower lobe. There are areas of loculated pleural fluid along the fissures as well as at the right lung apex. New areas of subcutaneous emphysema along the right chest wall near the rib fractures, which are contiguous with an air-filled cavity in the right middle lobe that is new from the prior study. These findings could be posttraumatic in etiology, or related to a recent procedure. Redemonstrated right 4th-7th anterolateral rib fractures with adjacent pleural nodularity favored to represent posttraumatic finding such as hemothorax. New trace left pleural effusion, with adjacent airspace opacities that are concerning for infectious or inflammatory process such as pneumonia or aspiration. CT CERVICAL SPINE WO CONTRAST    Result Date: 8/12/2021  EXAMINATION: CT OF THE CERVICAL SPINE WITHOUT CONTRAST 8/11/2021 10:36 pm TECHNIQUE: CT of the cervical spine was performed without the administration of intravenous contrast. Multiplanar reformatted images are provided for review. Dose modulation, iterative reconstruction, and/or weight based adjustment of the mA/kV was utilized to reduce the radiation dose to as low as reasonably achievable. COMPARISON: None. HISTORY: ORDERING SYSTEM PROVIDED HISTORY: syncope and collapse TECHNOLOGIST PROVIDED HISTORY: syncope and collapse Reason for Exam: Syncope and collapse Acuity: Unknown Type of Exam: Unknown FINDINGS: BONES/ALIGNMENT: There is no acute fracture or traumatic malalignment. C4 on C5 anterolisthesis is seen which measures 4 mm. DEGENERATIVE CHANGES: C5/C6 moderate disc height loss is noted in association with posterior disc osteophyte complex which narrows the midline AP thecal sac diameter to 10 mm consistent with borderline central canal stenosis. Disc osteophyte complex severely narrows the bilateral neural foramina. C6/C7: Moderate disc height loss is noted in association with posterior disc osteophyte complex which narrows the midline AP thecal sac diameter to 10 mm consistent with borderline central canal stenosis. Disc osteophyte complex encroaches upon and causes moderate left and mild right neural foraminal stenosis. No further significant spondylosis is noted within the cervical spine. SOFT TISSUES: There is no prevertebral soft tissue swelling. Partially visualized posterior right upper lung pleural thickening is noted, as described on CT chest abdomen and pelvis with contrast examination from 08/10/2021.     1. Note: Study significantly limited by patient motion related artifact. 2. No acute abnormality of the cervical spine. 3. C4 on C5 anterolisthesis measuring 4 mm, likely degenerative. 4. C5/C6, C6/C7 borderline central canal stenosis secondary to encroachment by posterior disc osteophyte complex. 5. C5/C6 severe bilateral, C6/C7 moderate left and mild right neural foraminal stenosis secondary to encroachment by disc osteophyte complex. CT THORACIC SPINE W CONTRAST    Result Date: 8/21/2021  EXAMINATION: CT OF THE THORACIC SPINE WITH CONTRAST; CT OF THE LUMBAR SPINE WITH CONTRAST 8/21/2021 2:55 pm: TECHNIQUE: CT of the thoracic spine was performed with the administration of intravenous contrast.  Multiplanar reformatted images are provided for review. Dose modulation, iterative reconstruction, and/or weight based adjustment of the mA/kV was utilized to reduce the radiation dose to as low as reasonably achievable.; CT of the lumbar spine was performed with the administration of intravenous contrast. Multiplanar reformatted images are provided for review. Dose modulation, iterative reconstruction, and/or weight based adjustment of the mA/kV was utilized to reduce the radiation dose to as low as reasonably achievable. COMPARISON: None.  HISTORY: ORDERING SYSTEM PROVIDED HISTORY: r/o  TECHNOLOGIST PROVIDED HISTORY: r/o OM Reason for Exam: r/o OM Acuity: Unknown Type of Exam: Unknown Relevant Medical/Surgical History: chest tube; ORDERING SYSTEM PROVIDED HISTORY: r/o OM TECHNOLOGIST PROVIDED HISTORY: r/o OM Reason for Exam: r/o om Acuity: Unknown Type of Exam: Unknown FINDINGS: CT thoracic spine: There is a mild levocurvature of the thoracic spine. No acute fracture or traumatic subluxation is identified. Chronic appearing Schmorl's nodes are noted. There is no CT evidence of discitis-osteomyelitis. Atelectatic changes are present within both lungs with a mild left pleural effusion. Multiple loculated pleural effusions are present within the right hemithorax. A chest tube is present. CT lumbar spine: There is a normal lumbar lordosis but a moderately severe to lumbar levoscoliosis. No acute fracture or traumatic subluxation is identified. There is severe degenerative disc disease at L3-4 with vacuum phenomena. A chronic Schmorl's node is seen along the superior endplate of L2. There is multilevel facet hypertrophy with moderate to severe right-sided osseous neural foraminal stenosis at L3-4 and moderate to severe left-sided osseous neural foraminal stenosis at L4-5. There is no CT evidence of discitis-osteomyelitis. Heterogeneous enhancement of the kidneys is noted and there is mild pelvic free fluid. No CT evidence of discitis-osteomyelitis. Partially visualized loculated pleural effusions, on the right-hand side. Heterogeneous enhancement of the kidneys. Rule out pyelonephritis. CT LUMBAR SPINE W CONTRAST    Result Date: 8/21/2021  EXAMINATION: CT OF THE THORACIC SPINE WITH CONTRAST; CT OF THE LUMBAR SPINE WITH CONTRAST 8/21/2021 2:55 pm: TECHNIQUE: CT of the thoracic spine was performed with the administration of intravenous contrast.  Multiplanar reformatted images are provided for review.  Dose modulation, iterative reconstruction, and/or weight based adjustment of the mA/kV was utilized to reduce the radiation dose to as low as reasonably achievable.; CT of the lumbar spine was performed with the administration of intravenous contrast. Multiplanar reformatted images are provided for review. Dose modulation, iterative reconstruction, and/or weight based adjustment of the mA/kV was utilized to reduce the radiation dose to as low as reasonably achievable. COMPARISON: None. HISTORY: ORDERING SYSTEM PROVIDED HISTORY: r/o OM TECHNOLOGIST PROVIDED HISTORY: r/o OM Reason for Exam: r/o OM Acuity: Unknown Type of Exam: Unknown Relevant Medical/Surgical History: chest tube; ORDERING SYSTEM PROVIDED HISTORY: r/o OM TECHNOLOGIST PROVIDED HISTORY: r/o OM Reason for Exam: r/o om Acuity: Unknown Type of Exam: Unknown FINDINGS: CT thoracic spine: There is a mild levocurvature of the thoracic spine. No acute fracture or traumatic subluxation is identified. Chronic appearing Schmorl's nodes are noted. There is no CT evidence of discitis-osteomyelitis. Atelectatic changes are present within both lungs with a mild left pleural effusion. Multiple loculated pleural effusions are present within the right hemithorax. A chest tube is present. CT lumbar spine: There is a normal lumbar lordosis but a moderately severe to lumbar levoscoliosis. No acute fracture or traumatic subluxation is identified. There is severe degenerative disc disease at L3-4 with vacuum phenomena. A chronic Schmorl's node is seen along the superior endplate of L2. There is multilevel facet hypertrophy with moderate to severe right-sided osseous neural foraminal stenosis at L3-4 and moderate to severe left-sided osseous neural foraminal stenosis at L4-5. There is no CT evidence of discitis-osteomyelitis. Heterogeneous enhancement of the kidneys is noted and there is mild pelvic free fluid. No CT evidence of discitis-osteomyelitis. Partially visualized loculated pleural effusions, on the right-hand side. Heterogeneous enhancement of the kidneys.   Rule out pyelonephritis. US RENAL COMPLETE    Result Date: 8/17/2021  EXAMINATION: ULTRASOUND OF THE KIDNEYS 8/17/2021 1:41 pm COMPARISON: None. HISTORY: ORDERING SYSTEM PROVIDED HISTORY: renal failure Acuity: Acute Type of Exam: Initial FINDINGS: The right kidney measures 12.6 cm in length and the left kidney measures 12.5 cm in length. Kidneys demonstrate normal cortical echogenicity. No hydronephrosis or intrarenal stones. No focal lesions. Visualized liver appears echogenic suggesting fatty infiltration. Negative renal ultrasound. Likely hepatic fatty infiltration. IR FLUORO GUIDED CVA DEVICE PLMT/REPLACE/REMOVAL    Result Date: 8/12/2021  PROCEDURE: ULTRASOUND GUIDED VASCULAR ACCESS. FLUOROSCOPY GUIDED PICC PLACEMENT 8/12/2021. HISTORY: ORDERING SYSTEM PROVIDED HISTORY: TPN, vasopressers TECHNOLOGIST PROVIDED HISTORY: PICC TPN, vasopressers Lumen?->Double Lumen Reason for Exam: TPN Acuity: Unknown Type of Exam: Unknown SEDATION: None FLUOROSCOPY DOSE AND TYPE OR TIME AND EXPOSURES: 5 seconds; D AP 9 cGy cm2 TECHNIQUE: Informed consent was obtained after a detailed explanation of the procedure including risks, benefits, and alternatives. Universal protocol was observed. The right arm was prepped and draped in sterile fashion using maximum sterile barrier technique. Local anesthesia was achieved with lidocaine. A micropuncture needle was used to access the right basilic vein using ultrasound guidance. An ultrasound image demonstrating patency of the vein with needle tip located within it. An image was obtained and stored in PACs. A 0.018 guidewire was used to place a peel-a-way sheath and a 5 [employer] [place] dual PICC was advanced with fluoroscopic guidance with the tip at the cavo-atrial junction. The catheter flushed easily and there was a good blood return. The catheter was secured to the skin. The patient tolerated the procedure well and there were no immediate complications.  EBL: Less than 5 mL FINDINGS: Fluoroscopic image demonstrates the tip of the catheter at the cavo-atrial junction. Successful ultrasound and fluoroscopy guided PICC placement     XR CHEST PORTABLE    Result Date: 8/28/2021  EXAMINATION: ONE X-RAY VIEW OF THE CHEST 8/28/2021 8:54 am COMPARISON: 08/27/2021 HISTORY: ORDERING SYSTEM PROVIDED HISTORY:  Reassess empyema TECHNOLOGIST PROVIDED HISTORY: Reassess empyema Reason for Exam:  Reassess empyema Acuity:  Unknown Type of Exam:  Unknown FINDINGS: Right arm PICC remains in place. Heart size is stable. Patchy opacities at the right base and pleural-based opacities in the lateral mid right hemithorax are unchanged. No evidence of pneumothorax. Mild diffuse interstitial thickening is unchanged. No significant interval change. Patchy opacities at the right base and pleural-based opacity in the lateral mid right chest.     XR CHEST PORTABLE    Result Date: 8/27/2021  EXAMINATION: ONE XRAY VIEW OF THE CHEST 8/27/2021 7:52 am COMPARISON: 08/26/2021, 08/25/2021 HISTORY: ORDERING SYSTEM PROVIDED HISTORY: Reassess empyema TECHNOLOGIST PROVIDED HISTORY: Reassess empyema Reason for Exam: right side chest tube Acuity: Acute Type of Exam: Initial FINDINGS: Right pigtail chest tube remains in place. Right PICC line appears unchanged in position. The right pleural effusion and opacities in the mid to inferior right lung field are without appreciable change. No pneumothorax identified. Left basilar opacities and small left effusion also appears unchanged. No new airspace disease identified. Right chest tube remains in place. No significant change in opacities in the right lung and effusion. No pneumothorax.      XR CHEST PORTABLE    Result Date: 8/26/2021  EXAMINATION: ONE XRAY VIEW OF THE CHEST 8/26/2021 8:05 pm COMPARISON: 8/25/2021 HISTORY: ORDERING SYSTEM PROVIDED HISTORY: reassess empyema post chest tube to water seal TECHNOLOGIST PROVIDED HISTORY: reassess empyema post chest tube to 8/24/2021  EXAMINATION: ONE XRAY VIEW OF THE CHEST 8/24/2021 7:59 am COMPARISON: Chest radiograph performed 08/23/2021. HISTORY: ORDERING SYSTEM PROVIDED HISTORY: empyema TECHNOLOGIST PROVIDED HISTORY: empyema Reason for Exam: sob Acuity: Unknown Type of Exam: Unknown FINDINGS: There is consolidation over the right lung base that is stable compared to prior. Stable right-sided effusion. There is a stable indwelling right basilar drain. The heart is prominent. The upper abdomen is unremarkable. The extrathoracic soft tissues are unremarkable. Similar right lower lung consolidation and effusion with indwelling drain. XR CHEST PORTABLE    Result Date: 8/23/2021  EXAMINATION: ONE XRAY VIEW OF THE CHEST 8/23/2021 2:04 pm COMPARISON: Chest radiograph performed 08/22/2021. HISTORY: ORDERING SYSTEM PROVIDED HISTORY: infiltrate TECHNOLOGIST PROVIDED HISTORY: infiltrate Reason for Exam: F/u infiltrate/chest tube Acuity: Acute Type of Exam: Subsequent/Follow-up Additional signs and symptoms: F/u infiltrate/chest tube FINDINGS: There are bilateral mid and lower lung infiltrates. There are bibasilar effusions. There is no pneumothorax. Mediastinal structures are stable. The upper abdomen is unremarkable. The extrathoracic soft tissues are unremarkable. There is a right-sided PICC line with the tip in the distal SVC. There is a right-sided pigtail drain. Bilateral effusions with adjacent mid and lower lung infiltrates consistent with pneumonia. Support tubes as described above.      XR CHEST PORTABLE    Result Date: 8/22/2021  EXAMINATION: ONE XRAY VIEW OF THE CHEST 8/22/2021 6:43 am COMPARISON: 21 August 2021 HISTORY: ORDERING SYSTEM PROVIDED HISTORY: infiltrate TECHNOLOGIST PROVIDED HISTORY: infiltrate Reason for Exam: infiltrate Acuity: Unknown Type of Exam: Unknown Additional signs and symptoms: infiltrate FINDINGS: AP portable view of the chest time stamped at 549 hours demonstrates overlying cardiac monitoring electrodes. Right PICC line terminates in the right atrium. Pigtail terminus small bore chest tube remains at the right base. Prior right-sided extrapleural air is not redemonstrated. However, there is fluid in that space consistent with loculated effusion. Rounded opacity at the right base is unchanged with surrounding haziness. Vascularity appears increased over prior study with interval development of left perihilar and basilar opacities. Right-sided extrapleural air not demonstrated replaced with fluid. Continued nodular density at the right base. Interval increase in vascularity development of left-sided opacities. XR CHEST PORTABLE    Result Date: 8/21/2021  EXAMINATION: ONE XRAY VIEW OF THE CHEST 8/21/2021 5:21 am COMPARISON: 20 August 2021 HISTORY: ORDERING SYSTEM PROVIDED HISTORY: ETT placement, chest tube placement TECHNOLOGIST PROVIDED HISTORY: ETT placement, chest tube placement Acuity: Unknown Type of Exam: Ongoing Additional signs and symptoms: ETT placement, chest tube placement Relevant Medical/Surgical History: ETT placement, chest tube placement FINDINGS: AP portable view of the chest time stamped at 503 hours demonstrates an endotracheal tube terminating 3.2 cm above the kristen, overlying cardiac monitoring electrodes, right-sided PICC line terminating in the proximal right atrium. No change in cardiomegaly. Rounded opacity at the right lung base is again noted unchanged. There has been some clearing of density surrounding the opacity at the right lung base although significant residual remains. Right-sided pneumothorax again noted 5.1 mm in width reduced from a gonzáles mm. Small amount of right basilar extrapleural air. Pigtail terminus right-sided chest tube remains at the right base. Left lung is clear. 1.  Slight reduction in right-sided pneumothorax. 2.  Slight decrease in opacity in the right lower hemithorax surrounding a masslike opacification.  3.  Tubes and lines as above. XR CHEST PORTABLE    Result Date: 8/20/2021  EXAMINATION: ONE XRAY VIEW OF THE CHEST 8/20/2021 4:10 pm COMPARISON: Portable chest x-ray 5:22 a.m. Today. HISTORY: ORDERING SYSTEM PROVIDED HISTORY: rule out ptx TECHNOLOGIST PROVIDED HISTORY: rule out ptx Reason for Exam: R/o PTX Acuity: Unknown Type of Exam: Unknown Additional signs and symptoms: R/o PTX FINDINGS: There has been interval placement of a chest tube in the right base. Small pneumothorax is stable. There is a rounded area of opacification measuring up to 4 cm in the right base which is more prominent than on comparison radiographs. No pneumothorax within the left lung. Bibasilar patchy opacities are overall stable from earlier today. Right upper extremity central line tip is at the cavoatrial junction. Enteric tube is been removed. Endotracheal tube is approximately 2 cm above the kristen in appropriate position. 1 anterior rib fracture on the right is seen, likely the 6th rib. Interval chest tube placement in the right lower lobe with stable small pneumothorax. Rounded opacification in the right base seen to better advantage on today's study. May represent consolidation, loculated effusion, or mass. Recommend attention on follow-up. XR CHEST PORTABLE    Result Date: 8/20/2021  EXAMINATION: ONE XRAY VIEW OF THE CHEST 8/20/2021 5:34 am COMPARISON: August 19, 2021 chest exam HISTORY: ORDERING SYSTEM PROVIDED HISTORY: vent TECHNOLOGIST PROVIDED HISTORY: vent Reason for Exam: vent Acuity: Unknown Type of Exam: Ongoing Additional signs and symptoms: vent Relevant Medical/Surgical History: vent FINDINGS: There are satisfactorily positioned endotracheal nasogastric tubes. The endotracheal tube tip is 1.5 cm cranial to the kristen.  Stable cardiopericardial silhouette/mild tortuosity of the thoracic aorta Considering technical differences, there is a small right pneumothorax with maximal 12 mm transverse dimension at the upper lung. . Present film is taken in greater expiration than the previous. Slight interval progression in moderate diffuse bilateral interstitial/alveolar infiltrates with increasing right lower lung consolidation     Stable small right pneumothorax Satisfactorily positioned support lines Slight progression in moderate diffuse bilateral interstitial/alveolar infiltrates suggesting increasing edema and/or infection Increasing now moderate right lower lung consolidation/infiltrate     XR CHEST PORTABLE    Result Date: 8/19/2021  EXAMINATION: ONE XRAY VIEW OF THE CHEST 8/19/2021 5:39 am COMPARISON: 08/18/2021 HISTORY: ORDERING SYSTEM PROVIDED HISTORY: vent TECHNOLOGIST PROVIDED HISTORY: vent Reason for Exam: vent Acuity: Acute Type of Exam: Ongoing FINDINGS: Cardiac size is enlarged. The endotracheal tube ice 3.5 cm above the kristen. Overall stable appearing small right-sided pneumothorax. No mediastinal shift is identified. Perihilar as well as basilar airspace disease seen bilaterally, greater on the right with mild increased focal consolidation at the right lung base compared to the prior study. Otherwise similar appearing chest.     Stable small right-sided pneumothorax. Mild increased focal consolidation in the right lower lung field, otherwise similar appearing parenchymal infiltrates bilaterally. XR CHEST PORTABLE    Result Date: 8/18/2021  EXAMINATION: ONE XRAY VIEW OF THE CHEST 8/18/2021 6:02 am COMPARISON: 08/17/2021 HISTORY: ORDERING SYSTEM PROVIDED HISTORY: vent TECHNOLOGIST PROVIDED HISTORY: vent Reason for Exam: vent Acuity: Acute Type of Exam: Ongoing FINDINGS: Cardiac size and mediastinal structures appear similar. The patient is rotated. There is a right-sided pneumothorax, with the pleural to pleural surface measuring 12 mm at the apicolateral aspect, previously measuring 16 mm. There is a right-sided PICC with the catheter tip overlying the right atrium.   Scattered parenchymal infiltrates identified within the lungs bilaterally, right greater than left. Mild increased infiltrates seen at the right lung base. Otherwise similar appearing chest.     Multifocal infiltrates are identified within the lungs bilaterally, with mild interval worsening at the right lung base. Minimally improved right-sided pneumothorax, measuring 12 mm from pleural to pleural surface, previously measuring 16 mm. XR CHEST PORTABLE    Result Date: 8/17/2021  EXAMINATION: ONE XRAY VIEW OF THE CHEST 8/17/2021 4:20 pm COMPARISON: 17 August 2021 at 6:24 a.m. HISTORY: ORDERING SYSTEM PROVIDED HISTORY: s/p right thora, rule out ptx TECHNOLOGIST PROVIDED HISTORY: s/p right thora, rule out ptx Reason for Exam: s/p right thora, rule out ptx Acuity: Acute Type of Exam: Initial Additional signs and symptoms: s/p right thora, rule out ptx FINDINGS: Single portable chest x-ray submitted. Enteric tube is off the inferior margin of the film. Endotracheal tube tip is above the kristen. Right central venous catheter tip is at the cavoatrial junction. The left lung is well inflated. There is a small to moderate right pneumothorax without mediastinal shift. Right-sided effusion is nearly completely evacuated with right lower lobe opacification. Atherosclerosis of the aorta. Previously identified rib fractures are not well evaluated on the study. Small to moderate pneumothorax without tension. Right lower lobe opacification likely represents consolidation. Nearly completely resolved effusion. RECOMMENDATION: ICU attending physician was not available. These results were discussed via telephone with Dr. Karon Calderon at [phone] p.m.. Recommended following with portable chest x-rays overnight. The physician voiced understanding.      XR CHEST PORTABLE    Result Date: 8/17/2021  EXAMINATION: ONE XRAY VIEW OF THE CHEST 8/17/2021 6:34 am COMPARISON: August 16, 2021 chest examination HISTORY: ORDERING SYSTEM PROVIDED HISTORY: vent TECHNOLOGIST PROVIDED HISTORY: vent Reason for Exam: vent Acuity: Acute Type of Exam: Ongoing FINDINGS: Limited rotated exam Stable satisfactorily positioned endotracheal/nasogastric tubes, right PICC line catheter. Stable cardiopericardial silhouette No definite change in extensive right chest pleuroparenchymal process with near opacification of 3/4 of the right hemithorax related to large effusion with underlying atelectasis versus infiltrate. Stable mild left basilar opacity     Stable chest     XR CHEST PORTABLE    Result Date: 8/16/2021  EXAMINATION: ONE XRAY VIEW OF THE CHEST 8/16/2021 4:38 am COMPARISON: Chest radiograph 08/15/2021. HISTORY: ORDERING SYSTEM PROVIDED HISTORY: vent TECHNOLOGIST PROVIDED HISTORY: vent Reason for Exam: vent Acuity: Unknown Type of Exam: Ongoing Additional signs and symptoms: vent Relevant Medical/Surgical History: vent FINDINGS: The endotracheal tube terminates approximately 3.5 cm above the kristen. A right upper extremity PICC terminates in the lower superior vena cava. The cardiomediastinal silhouette is unchanged. Large right pleural effusion and basilar airspace opacities without significant change. No pneumothorax or left pleural effusion. No acute osseous abnormality. Large right pleural effusion and right basilar opacities not significantly changed from the previous study. XR CHEST PORTABLE    Result Date: 8/15/2021  EXAMINATION: ONE XRAY VIEW OF THE CHEST 8/15/2021 4:49 am COMPARISON: 08/14/2021 and prior HISTORY: ORDERING SYSTEM PROVIDED HISTORY: vent TECHNOLOGIST PROVIDED HISTORY: vent Reason for Exam: vent Acuity: Unknown Type of Exam: Ongoing Additional signs and symptoms: vent Relevant Medical/Surgical History: vent FINDINGS: Study limited by motion as well as overlying support and monitoring apparatus. The endotracheal tube appears stable from the distal 3rd of the esophagus.   NG tube is limited in its evaluation but appears to extend below the diaphragm. The tip is not visualized. Right upper extremity PICC line projects at the superior vena cava right atrial junction. The heart and mediastinal contours appears stable in appearance. Increased interstitial and patchy opacity at the left base appears similar when accounting for differences in technique. Pleural and parenchymal changes on the right with a mid and lower lung zone predominance. Osseous structures are unremarkable in appearance. Lines and tubes appear stable. Pleural and parenchymal changes on the right without significant change from the prior study given differences in technique. Changes on the left also appear relatively stable. Recommend continued follow-up. XR CHEST PORTABLE    Result Date: 8/14/2021  EXAMINATION: ONE XRAY VIEW OF THE CHEST 8/14/2021 5:54 am COMPARISON: August 13, 2021 HISTORY: ORDERING SYSTEM PROVIDED HISTORY: vent TECHNOLOGIST PROVIDED HISTORY: vent Reason for Exam: vent Acuity: Unknown Type of Exam: Ongoing Additional signs and symptoms: vent Relevant Medical/Surgical History: vent FINDINGS: Stable position of the support lines and tubes. No significant change in the parenchymal opacification of the right mid and lower lung region and left retrocardiac opacity. Bilateral pleural effusions unchanged. Stable cardiomediastinal silhouette. No significant pulmonary edema. No pneumothorax. Stable exam demonstrating bilateral airspace disease, right greater than left. XR CHEST PORTABLE    Result Date: 8/13/2021  EXAMINATION: ONE XRAY VIEW OF THE CHEST 8/13/2021 6:33 am COMPARISON: August 13 0614 hours HISTORY: ORDERING SYSTEM PROVIDED HISTORY: ETT placement, OGT placement TECHNOLOGIST PROVIDED HISTORY: ETT placement, OGT placement Reason for Exam:  new vent, ogt placement Acuity: Unknown Type of Exam: Unknown FINDINGS: The tip of the ET tube is approximately 3.9 cm above the kristen. NG tube is in place, tip not visualized on the radiograph.   Proximal side port is just beyond the level of the GE junction, within the gastric cardia. Extensive airspace disease is again noted within the right perihilar region, and right lung base, and left retrocardiac region. Overall appearance is minimally improved. No pneumothorax is present. Fluid is present within the major fissure on the right. Right upper extremity PICC line is in place, tip at the junction of the SVC and right atrium. 1. ET tube in place, tip 3.9 cm above the kristen 2. NG tube in place, tip not included on the radiograph 3. Bilateral airspace disease, most prominent on the right, and may represent combination of atelectasis and pneumonia. XR CHEST PORTABLE    Result Date: 8/13/2021  EXAMINATION: ONE XRAY VIEW OF THE CHEST 8/13/2021 6:04 am COMPARISON: Chest radiograph performed 08/12/2021. HISTORY: ORDERING SYSTEM PROVIDED HISTORY: pl effusion, rib fracture TECHNOLOGIST PROVIDED HISTORY: pl effusion, rib fracture Reason for Exam: pleural effusion, rib fx Acuity: Acute Type of Exam: Ongoing FINDINGS: There is a right basilar effusion with adjacent consolidation. There is no pneumothorax. The mediastinal structures are stable. The upper abdomen is unremarkable. The extrathoracic soft tissues are unremarkable. There is a right-sided PICC line with the tip in the mid SVC. Right basilar effusion with adjacent consolidation consistent with pneumonia.      VL Lower Extremity Arteries Right    Result Date: 8/16/2021    [facility]  Vascular Lower Extremities Arterial Duplex Procedure   Patient Name   [facility]       Date of Study           08/16/2021                 JUN FRAGA   Date of Birth  1965  Gender                  Male   Age            60 year(s)  Race                       Room Number    2004        Height:                 68.9 inch, 175 cm   Corporate ID # [id number]    Weight:                 163 pounds, 74 kg   Patient Acct # [de-identified]   BSA:        1.89 m^2    BMI: 24.16 kg/m^2   MR #           [id number]      Sonographer             Chino Bryant   Accession #    [id number]  Interpreting Physician  Caroleen Boeck   Referring                  Referring Physician     Anne Marie Lopez  Nurse  Practitioner  Procedure Type of Study:   Extremities Arteries: Lower Extremities Arterial Duplex, Arterial Scan  Lower Right. Patient Status: In Patient. Technical Quality:Adequate visualization. Comments: Indications: Cold foot. Conclusions   Summary   Simultaneous real time imaging utilizing B-Mode, color doppler and  spectral waveform analysis was performed on the right lower extremity for  arterial examination, study demonstrates:   Right:  No evidence of significant occlusive arterial disease. Signature   ----------------------------------------------------------------  Electronically signed by Chino Bryant(Sonographer) on  08/16/2021 10:26 AM  ----------------------------------------------------------------   ----------------------------------------------------------------  Electronically signed by Montana Morgan(Interpreting  physician) on 08/16/2021 09:41 PM  ----------------------------------------------------------------  Findings:   Right Impression:  Patent external iliac, common femoral, profunda, superficial femoral,  popliteal and tibial arteries with triphasic flow. Velocities are measured in cm/s ; Diameters are measured in cm LE Duplex Measurements +---------++-----+-----+---------+----+-----++---+-----+-----+----+--------+ ! ! !Right! ! Left     !    !     !!   !     !     !    !        ! +---------++-----+-----+---------+----+-----++---+-----+-----+----+--------+ ! Location ! !PSV  ! Ratio! Wave     !AP  ! Trans! !PSV! Ratio! Wave !AP  ! Trans   ! !         !!     ! !Desc. ! Diam!Diam !!   ! !Desc. ! Diam!Diam    ! +---------++-----+-----+---------+----+-----++---+-----+-----+----+--------+ ! Dist EIA !!111  ! ! Triphasic!0.88!0.9  !!   !     !     ! !        ! +---------++-----+-----+---------+----+-----++---+-----+-----+----+--------+ ! Common   !!123  !1.11 ! Triphasic!1.16!1.3  !!   !     !     !    !        ! !Femoral  !!     !     !         !    !     !!   !     !     !    !        ! +---------++-----+-----+---------+----+-----++---+-----+-----+----+--------+ ! PFA      !!112  !0.91 ! Triphasic!0.64!0.62 !!   !     !     !    !        ! +---------++-----+-----+---------+----+-----++---+-----+-----+----+--------+ ! Prox SFA !!119  ! ! Triphasic!0.59!0.77 !!   !     !     !    !        ! +---------++-----+-----+---------+----+-----++---+-----+-----+----+--------+ ! Mid SFA  !!116  !0.97 ! Triphasic!0.57!0.67 !!   !     !     !    !        ! +---------++-----+-----+---------+----+-----++---+-----+-----+----+--------+ ! Dist SFA !!96.9 !0.84 ! Triphasic!0.63!0.78 !!   !     !     !    !        ! +---------++-----+-----+---------+----+-----++---+-----+-----+----+--------+ ! Prox     !!84.9 !0.88 ! Triphasic!0.65!0.81 !!   !     !     !    !        ! !Popliteal!!     !     !         !    !     !!   !     !     !    !        ! +---------++-----+-----+---------+----+-----++---+-----+-----+----+--------+ ! Dist     !!72.9 !0.86 ! Triphasic!0.52!0.68 !!   !     !     !    !        ! !Popliteal!!     !     !         !    !     !!   !     !     !    !        ! +---------++-----+-----+---------+----+-----++---+-----+-----+----+--------+ ! Prox PTA !!132  !1.81 !         !0.29!0.3  !!   !     !     !    !        ! +---------++-----+-----+---------+----+-----++---+-----+-----+----+--------+ ! Dist PTA !!96.9 !0.73 ! Triphasic!0.32!0.34 !!   !     !     !    !        ! +---------++-----+-----+---------+----+-----++---+-----+-----+----+--------+ ! Mid JANICE  !!93   !1.28 ! Triphasic!0.32!0.32 !!   !     !     !    !        ! +---------++-----+-----+---------+----+-----++---+-----+-----+----+--------+ ! Prox     !!121  !0.92 ! Triphasic!0.29!0.28 !!   !     !     !    !        !  !Peroneal !! !     !         !    !     !!   !     !     !    !        ! +---------++-----+-----+---------+----+-----++---+-----+-----+----+--------+ ! Dist     !!53.4 !0.44 ! Triphasic!0.27!0.23 !!   !     !     !    !        ! !Peroneal !!     !     !         !    !     !!   !     !     !    !        ! +---------++-----+-----+---------+----+-----++---+-----+-----+----+--------+    VL Lower Extremity Bilateral Venous Duplex    Result Date: 8/24/2021    [facility]  Vascular Lower Extremities DVT Study Procedure   Patient Name   [facility]       Date of Study           08/24/2021                 Milton FRAGA   Date of Birth  1965  Gender                  Male   Age            60 year(s)  Race                       Room Number    2103        Height:                 68.9 inch, 175 cm   Corporate ID # [id number]    Weight:                 163 pounds, 74 kg   Patient Acct # [de-identified]   BSA:        1.89 m^2    BMI:     24.16 kg/m^2   MR #           [id number]      Sonographer             [facility]   Accession #    [id number]  Interpreting Physician  Felton Carter   Referring                  Referring Physician     Ayden Kirby  Nurse  Practitioner  Procedure Type of Study:   Veins: Lower Extremities DVT Study, Venous Scan Lower Bilateral.  Patient Status: In Patient. Technical Quality:Adequate visualization. Comments:Prolonged hospitalization  Conclusions   Summary   Simultaneous real time imaging utilizing B-Mode, color doppler and  spectral waveform analysis was performed on the bilateral lower  extremities for venous examination of the deep and superficial systems. Findings are:   Right:  No evidence of deep venous thrombosis. Chronic superficial venous thrombosis identified in the great saphenous  vein at level of ankle. left:  No evidence of deep or superficial venous thrombosis.    Signature   ----------------------------------------------------------------  Electronically signed by [employer], Anibal(Sonographer) on  08/24/2021 12:47 PM  ----------------------------------------------------------------   ----------------------------------------------------------------  Electronically signed by Montana Vazquez(Interpreting  physician) on 08/24/2021 07:37 PM  ----------------------------------------------------------------  Findings:   Right Impression:                    Left Impression:  The common femoral, femoral,         The common femoral, femoral,  popliteal and tibial veins           popliteal and tibial veins  demonstrate normal compressibility   demonstrate normal compressibility  and augmentation. and augmentation. Non compressibility of the great     Normal compressibility of the great  saphenous vein at the level of the   saphenous vein. ankle. Normal compressibility of the small  Normal compressibility of the small  saphenous vein. saphenous vein. Velocities are measured in cm/s ; Diameters are measured in cm Right Lower Extremities DVT Study Measurements Right 2D Measurements +------------------------------------+----------+---------------+----------+ ! Location                            ! Visualized! Compressibility! Thrombosis! +------------------------------------+----------+---------------+----------+ ! Common Femoral                      !Yes       ! Yes            ! None      ! +------------------------------------+----------+---------------+----------+ ! Prox Femoral                        !Yes       ! Yes            ! None      ! +------------------------------------+----------+---------------+----------+ ! Mid Femoral                         !Yes       ! Yes            ! None      ! +------------------------------------+----------+---------------+----------+ ! Dist Femoral                        !Yes       ! Yes            ! None      ! +------------------------------------+----------+---------------+----------+ ! Popliteal !Phasic!      !                                ! +---------------------------+------+------+--------------------------------+ Left Lower Extremities DVT Study Measurements Left 2D Measurements +------------------------------------+----------+---------------+----------+ ! Location                            ! Visualized! Compressibility! Thrombosis! +------------------------------------+----------+---------------+----------+ ! Common Femoral                      !Yes       ! Yes            ! None      ! +------------------------------------+----------+---------------+----------+ ! Prox Femoral                        !Yes       ! Yes            ! None      ! +------------------------------------+----------+---------------+----------+ ! Mid Femoral                         !Yes       ! Yes            ! None      ! +------------------------------------+----------+---------------+----------+ ! Dist Femoral                        !Yes       ! Yes            ! None      ! +------------------------------------+----------+---------------+----------+ ! Popliteal                           !Yes       ! Yes            ! None      ! +------------------------------------+----------+---------------+----------+ ! Sapheno Femoral Junction            ! Yes       ! Yes            ! None      ! +------------------------------------+----------+---------------+----------+ ! PTV                                 ! Yes       ! Partial        !None      ! +------------------------------------+----------+---------------+----------+ ! Peroneal                            !Yes       ! Partial        !None      ! +------------------------------------+----------+---------------+----------+ ! Gastroc                             ! Yes       ! Yes            ! None      ! +------------------------------------+----------+---------------+----------+ ! GSV Thigh                           ! Yes       ! Yes            ! None      ! +------------------------------------+----------+---------------+----------+ ! GSV Knee                            ! Yes       ! Yes            ! None      ! +------------------------------------+----------+---------------+----------+ ! GSV Ankle                           ! Yes       ! Yes            ! None      ! +------------------------------------+----------+---------------+----------+ ! SSV                                 ! Yes       ! Yes            ! None      ! +------------------------------------+----------+---------------+----------+ Left Doppler Measurements +---------------------------+------+------+--------------------------------+ ! Location                   ! Signal!Reflux! Reflux (msec)                   ! +---------------------------+------+------+--------------------------------+ ! Common Femoral             !Phasic!      !                                ! +---------------------------+------+------+--------------------------------+ ! Prox Femoral               !Phasic!      !                                ! +---------------------------+------+------+--------------------------------+ ! Popliteal                  !Phasic!      !                                ! +---------------------------+------+------+--------------------------------+    IR CHEST TUBE INSERTION    Result Date: 8/20/2021  PROCEDURE: ULTRASOUND GUIDED CHEST TUBE PLACEMENT 8/20/2021 HISTORY: ORDERING SYSTEM PROVIDED HISTORY: increased plural effusion TECHNOLOGIST PROVIDED HISTORY: increased plural effusion Which side should the procedure be performed?->Right Pneumothorax SEDATION: None. 8 mL 1% lidocaine local TECHNIQUE: The patient is not consentable. His immediate family was not available so 2 physician emergent consent was obtained including myself and Internal Medicine attending. Sinclair protocol was followed. A time-out was performed prior to commencing the procedure.   A suitable skin site was prepped and draped in sterile fashion following ultrasound localization. A 10 [employer] [place] multipurpose drain was advanced into the collection utilizing the trocar technique. An ultrasound image of the tip within the collection was saved and sent to PACs. The drain was advanced off the inner stylet, looped, and locked within the collection. Aspiration of sanguinous and purulent material was aspirated to confirm location. The drain was sutured in place and attached to an atrium chest tube device. Sterile dressing was applied. The patient tolerated the procedure well without immediate complication. FINDINGS: Highly complex pleural effusion consistent with empyema. Successful ultrasound guided placement of a right chest tube. Chest x-ray pending. VL Upper Extremity Venous Duplex Left    Result Date: 8/24/2021    [address] [place] [address]  Vascular Upper Extremities Veins Procedure   Patient Name   [address]       Date of Study           08/24/2021                 Jessie FRAGA   Date of Birth  1965  Gender                  Male   Age            60 year(s)  Race                       Room Number    2103        Height:                 68.9 inch, 175 cm   Corporate ID # [id number]    Weight:                 163 pounds, 74 kg   Patient Acct # [de-identified]   BSA:        1.89 m^2    BMI:     24.16 kg/m^2   MR #           [id number]      Sonographer             Wilder Marrow   Accession #    [id number]  Interpreting Physician  Evelyn Dorado   Referring                  Referring Physician     Jodie Patel  Nurse  Practitioner  Procedure Type of Study:   Veins: Upper Extremities Veins, Venous Scan Upper Left. Indications for Study: Thrombus. Patient Status: In Patient. Technical Quality:Adequate visualization. Conclusions   Summary   Simultaneous real time imaging utilizing B-Mode, color doppler and  spectral waveform analysis was performed on the left upper extremity for  venous examination of the deep and superficial systems.  Findings are:   Left:  No +------------------------------------+----------+---------------+----------+ ! Prox IJV                            ! Yes       !               !          ! +------------------------------------+----------+---------------+----------+ ! Prox SCV                            ! Yes       !               !          ! +------------------------------------+----------+---------------+----------+ ! Prox Axillary                       ! Yes       !               !          ! +------------------------------------+----------+---------------+----------+ ! Prox Brachial                       !Yes       !               !          ! +------------------------------------+----------+---------------+----------+ ! Prox Radial                         !No        !               !          ! +------------------------------------+----------+---------------+----------+ ! Prox Ulnar                          ! No        !               !          ! +------------------------------------+----------+---------------+----------+ ! Basilic at UA                       ! Yes       !               !          ! +------------------------------------+----------+---------------+----------+ ! Basilic at AF                       ! Yes       !               !          ! +------------------------------------+----------+---------------+----------+ ! Basilic at [address]                       ! No        !               !          ! +------------------------------------+----------+---------------+----------+ ! Cephalic at UA                      ! Yes       !               !          ! +------------------------------------+----------+---------------+----------+ ! Cephalic at AF                      ! Yes       !               !          ! +------------------------------------+----------+---------------+----------+ ! Reji at [address]                      ! No        !               !          ! +------------------------------------+----------+---------------+----------+ Doppler Measurements +-------------------------+-----------------------+------------------------+ ! Location                 ! Signal                 !Reflux                  ! +-------------------------+-----------------------+------------------------+ ! IJV                      ! Phasic                 !                        ! +-------------------------+-----------------------+------------------------+ ! SCV                      ! Phasic                 !                        ! +-------------------------+-----------------------+------------------------+ ! Axillary                 ! Phasic                 !                        ! +-------------------------+-----------------------+------------------------+ ! Brachial                 !Phasic                 !                        ! +-------------------------+-----------------------+------------------------+    VL Upper Extremity Venous Duplex Left    Result Date: 8/17/2021    [facility]  Vascular Upper Extremities Veins Procedure   Patient Name   [address]       Date of Study           08/17/2021                 JUN FRAGA   Date of Birth  1965  Gender                  Male   Age            60 year(s)  Race                       Room Number    2004        Height:                 68.9 inch, 175 cm   Corporate ID # [id number]    Weight:                 163 pounds, 74 kg   Patient Acct # [de-identified]   BSA:        1.89 m^2    BMI:      24.16 kg/m^2   MR #           [id number]      Sonographer             Rosalia Hollis MAR   Accession #    [id number]  Interpreting Physician  [address] [place] [address]   Referring                  Referring Physician     Lamine Carrillo  Nurse  Practitioner  Procedure Type of Study:   Veins: Upper Extremities Veins, Venous Scan Upper Left. Indications for Study:Arm swelling. Patient Status:Out Patient. Technical Quality:Adequate visualization.   - Critical Result:JONH [place].   Conclusions   Summary   No evidence of superficial or deep venous thrombosis in the left upper  extremity. Superficial vein thrombophlebitis is noted in 2 braches from  the basilar vein near the antecubital fossa. Signature   ----------------------------------------------------------------  Electronically signed by Dilip Cuevas RVT(Sonographer) on  08/17/2021 12:05 PM  ----------------------------------------------------------------   ----------------------------------------------------------------  Electronically signed by Florance Seip Reyes,Arthur(Interpreting  physician) on 08/17/2021 07:25 PM  ----------------------------------------------------------------  Findings:   Right Impression:          Left Impression:  Right subclavian vein is   Left internal jugular, subclavian, axillary,  compressible with normal   brachial, ulnar, radial, cephalic and basilic  doppler responses. veins are compressible with normal doppler                             responses. There are two tributaries to the basilic vein                             near the anticubital fossa that are slightly                             dilated, partially compressible with isoechoic                             echoes. Velocities are measured in cm/s ; Diameters are measured in cm Right UE Vein Measurements 2D Measurements +---------------+-----------------+----------------------+-----------------+ ! Location       ! Visualized       ! Compressibility       ! Thrombosis       ! +---------------+-----------------+----------------------+-----------------+ ! Prox SCV       ! Yes              ! Yes                   ! None             ! +---------------+-----------------+----------------------+-----------------+ Left UE Vein Measurements 2D Measurements +------------------------------------+----------+---------------+----------+ ! Location                            ! Visualized! Compressibility! Thrombosis! +------------------------------------+----------+---------------+----------+ ! Prox IJV !Yes       !Yes            ! None      ! +------------------------------------+----------+---------------+----------+ ! Dist IJV                            ! Yes       ! Yes            ! None      ! +------------------------------------+----------+---------------+----------+ ! Prox SCV                            ! Yes       ! Yes            ! None      ! +------------------------------------+----------+---------------+----------+ ! Dist SCV                            ! Yes       ! Yes            ! None      ! +------------------------------------+----------+---------------+----------+ ! Prox Axillary                       ! Yes       ! Yes            ! None      ! +------------------------------------+----------+---------------+----------+ ! Dist Axillary                       ! Yes       ! Yes            ! None      ! +------------------------------------+----------+---------------+----------+ ! Prox Brachial                       !Yes       ! Yes            ! None      ! +------------------------------------+----------+---------------+----------+ ! Dist Brachial                       !Yes       ! Yes            ! None      ! +------------------------------------+----------+---------------+----------+ ! Prox Radial                         !Yes       ! Yes            ! None      ! +------------------------------------+----------+---------------+----------+ ! Dist Radial                         !Yes       ! Yes            ! None      ! +------------------------------------+----------+---------------+----------+ ! Prox Ulnar                          ! Yes       ! Yes            ! None      ! +------------------------------------+----------+---------------+----------+ ! Dist Ulnar                          ! Yes       ! Yes            ! None      ! +------------------------------------+----------+---------------+----------+ ! Basilic at                        ! Yes       ! Yes            ! None      ! +------------------------------------+----------+---------------+----------+ ! Basilic at AF                       ! Yes       ! Yes            ! None      ! +------------------------------------+----------+---------------+----------+ ! Basilic at [address]                       ! Yes       ! Yes            ! None      ! +------------------------------------+----------+---------------+----------+ ! Cephalic at UA                      ! Yes       ! Yes            ! None      ! +------------------------------------+----------+---------------+----------+ ! Cephalic at AF                      ! Yes       ! Yes            ! None      ! +------------------------------------+----------+---------------+----------+ ! Cephalic at [address]                      ! Yes       ! Yes            ! None      ! +------------------------------------+----------+---------------+----------+    CT CHEST ABDOMEN PELVIS W CONTRAST    Result Date: 8/10/2021  EXAMINATION: CT OF THE CHEST, ABDOMEN, AND PELVIS WITH CONTRAST 8/10/2021 10:41 pm TECHNIQUE: CT of the chest, abdomen and pelvis was performed with the administration of intravenous contrast. Multiplanar reformatted images are provided for review. Dose modulation, iterative reconstruction, and/or weight based adjustment of the mA/kV was utilized to reduce the radiation dose to as low as reasonably achievable. COMPARISON: None HISTORY: ORDERING SYSTEM PROVIDED HISTORY: Syncope, fall, rib and abd pain TECHNOLOGIST PROVIDED HISTORY: Syncope, fall, rib and abd pain Decision Support Exception - unselect if not a suspected or confirmed emergency medical condition->Emergency Medical Condition (MA) Reason for Exam: Syncope, fall, rib and abd pain Acuity: Acute Type of Exam: Initial Mechanism of Injury: Pt states he was walking and then was on the ground, states he hurts everywhere. FINDINGS: Chest: Mediastinum: Cardiomegaly. Coronary artery calcifications. Aortic vascular calcifications. Small pericardial effusion.   No dGycm2. COMPARISON: None HISTORY: ORDERING SYSTEM PROVIDED HISTORY: failed swallow test TECHNOLOGIST PROVIDED HISTORY: failed swallow test Reason for Exam: F/u failed video swallow Acuity: Acute Type of Exam: Subsequent/Follow-up Additional signs and symptoms: F/u failed video swallow FINDINGS: Multiple swallows were attempted with multiple consistencies under fluoroscopic evaluation in the presence of speech pathology. Flash penetration was visualized with thin liquid. No aspiration was demonstrated. Flash penetration with thin liquid. Please see separate speech pathology report for full discussion of findings and recommendations. IR GUIDED THORACENTESIS PLEURAL    Result Date: 8/23/2021  PROCEDURE: Jesus Swanson LEFT THORACENTESIS 8/23/2021 HISTORY: ORDERING SYSTEM PROVIDED HISTORY: left pleural effusion, thoracentesis vs pigtail placement TECHNOLOGIST PROVIDED HISTORY: left pleural effusion, thoracentesis vs pigtail placement Which side should the procedure be performed? ->Left Small left pleural effusion TECHNIQUE: This procedure was performed by Dr. Jossy Persaud. Informed consent was obtained after a detailed explanation of the procedure including risks, benefits, and alternatives. Universal protocol was performed. The left chest was prepped and draped in sterile fashion and local anesthesia was achieved with lidocaine. Initial ultrasound images show small left pleural effusion. A 5 Romanian needle sheath was advanced under ultrasound guidance into pleural effusion and thoracentesis was performed. The patient tolerated the procedure well. The planned procedure was discussed with CAMERON MCKEON at approximately 2 pm on 8/23/2021. Decision was made to perform thoracentesis only due to the relatively small quantity of fluid present. EBL: None FINDINGS: A total of 100 mL of clear yellow fluid was removed. Fluid was sent for the requested studies. Successful ultrasound guided left thoracentesis. IR GUIDED THORACENTESIS PLEURAL    Result Date: 8/17/2021  PROCEDURE: ULTRASOUNDGUIDED RIGHT THORACENTESIS 8/17/2021 HISTORY: ORDERING SYSTEM PROVIDED HISTORY: Rt Sided Effusion in need of draining TECHNOLOGIST PROVIDED HISTORY: Rt Sided Effusion in need of draining Which side should the procedure be performed?->Right TECHNIQUE: Informed consent was obtained from the patient's mother via telephone after a detailed explanation of the procedure including risks, benefits, and alternatives. Universal protocol was performed. A time-out was performed prior to commencing the procedure. The right chest was prepped and draped in sterile fashion and local anesthesia was achieved with lidocaine. A 5 Sinhala one-step device was advanced under ultrasound guidance into pleural effusion and thoracentesis was performed. 1.85 L of sanguinous and purulence cloudy material was aspirated. 1 L was sent to the lab. The needle was removed. Hemostasis obtained with direct pressure. A sterile dressing was applied. The patient tolerated the procedure well. FINDINGS: A total of 1.85 L of sanguinous and purulence material was removed. 1 L sent to lab     Successful ultrasound guided right thoracentesis. Physical Examination:        Physical Exam  Constitutional:       Appearance: Normal appearance. HENT:      Head: Normocephalic and atraumatic. Mouth/Throat:      Mouth: Mucous membranes are moist.      Pharynx: Oropharynx is clear. Eyes:      Extraocular Movements: Extraocular movements intact. Conjunctiva/sclera: Conjunctivae normal.      Pupils: Pupils are equal, round, and reactive to light. Cardiovascular:      Rate and Rhythm: Normal rate and regular rhythm. Pulses: Normal pulses. Heart sounds: Normal heart sounds. Pulmonary:      Effort: Pulmonary effort is normal.      Breath sounds: Wheezing present. Abdominal:      General: Abdomen is flat. Palpations: Abdomen is soft. Musculoskeletal:         General: Swelling and signs of injury present. Normal range of motion. Cervical back: Normal range of motion and neck supple. Comments: Swelling improving over left wrist.  Right ribs    Skin:     General: Skin is warm and dry. Capillary Refill: Capillary refill takes less than 2 seconds. Neurological:      Mental Status: He is alert and oriented to person, place, and time. Cranial Nerves: Cranial nerves are intact. Sensory: Sensation is intact. Coordination: Finger-Nose-Finger Test abnormal. Heel to Mendoza Test normal.      Comments: Bilateral lower leg weakness 4+/5    Mild dysmetria and action tremor in upper limb on finger-to-nose test   Psychiatric:         Mood and Affect: Mood normal.         Behavior: Behavior normal.         Thought Content: Thought content normal.         Judgment: Judgment normal.           Assessment:        Primary Problem  Syncope and collapse    Active Hospital Problems    Diagnosis Date Noted    Elevated C-reactive protein (CRP) [R79.82]     Leukocytosis [D72.829]     Empyema lung (HCC) [J86.9] 08/21/2021    MRSA bacteremia [R78.81, B95.62] 08/20/2021    Superficial venous thrombosis of arm, left [I82.612] 08/18/2021    Acute respiratory failure (HCC) [J96.00] 08/16/2021    Fever [R50.9] 08/14/2021    Conjunctivitis [H10.9] 08/14/2021    Severe malnutrition ([address] [E43] 08/12/2021    Alcohol abuse [F10.10] 08/11/2021    Hypokalemia [E87.6] 08/11/2021    Hyponatremia [E87.1] 08/11/2021    Traumatic rhabdomyolysis ([address] [T79. 6XXA] 08/11/2021    Closed fracture of multiple ribs of right side [S22.41XA] 08/11/2021    Closed fracture of left wrist [S62.102A] 08/11/2021    Syncope and collapse [R55] 06/13/2018    Dyslipidemia [E78.5] 09/17/2014    Smoking addiction [F17.200] 06/11/2013    [facility] [J44.9] 05/30/2013       Plan:        ~Difficulty swallowing (stable)  VL barium swallow study cleared. Dysphagia soft bite size with thin nectar.      ~Superficial Venous Thrombophlebitis (stable)  -VL upper extremity left: Superficial vein thrombophlebitis is noted in 2 braches fromthe basilar vein near the antecubital fossa.  -Lovenox 40 mg once daily. DVT scan lower extremity: Chronic superficial venous thrombosis in the great saphenous vein at the level on ankle.       ~Acute urinary retention (resolved)  Flomax 0.4 mg.      ~Acute kidney injury (resolved)  BUN 8, Cr 1.22  Potassium 3.6  Replace potassium,  Nephrology are okay to discharge the patient to acute rehab     ~Moderate Acute Alcohol withdrawal  -resolved  Recommending rehab and long term abstinence     ~Acute respiratory failure 2/2 empyema (stable)  -No significant interval change from previous studies on CXR  Blood culture positive for MRSA. Symbicort 160/4.5  On vancomycin.  -Mucinex 600mg TID. Chest tube was removed on 8/27  Okay for SNF as per pulmonary        ~Syncope and collapse (stable)  -CT head shows no acute intracranial abnormality  -EKG shows sinus tachycardia with unifocal PVCs, no acute ST segment changes as documented by ER physician          ~Hypokalemia (stable)  -Potassium 3.6 today  -Potassium sliding scale.     ~Acute mild alcoholic hepatitis resolved  - ALT 14, AST 14     Hyponatremia   -sodium today 139  -Daily BMP. -DC fluids as per nephrology     Alcohol abuse (resolved)  -RAAMAHB <68  -Thiamine, folic acid, multivitamin daily.  -Seizure and fall precautions.  -Continued lack of coordination     ~Multiple right rib fractures (stable)  -CT scan shows Grade 2 chest wall injury with right 4th through 6th anterolateral rib fractures. Areas of pleural thickening likely areas of focal hemothorax near the rib fractures on the right.   -Fentanyl as needed.  -Consult general surgery: Resume tube feeds following procedures.  Surgically stable.  -[place] Q6prn.      ~Closed left wrist fracture improving  -Velcro wrist splint in place  - left wrist is still swollen, improved  - left wrist X-ray: Subacute impacted fracture at the distal metaphysis of the left radius, wit visualized fracture line and partial healing of the fracture, grossly stable. -Continue splint        `COPD (stable)  -SPO2 91%  (intubated on 8/13 due to resp. Failure.)  -Albuterol as needed  -Spiriva daily  -Ellipta daily     Smoking (stable)  -Nicotine patch     GI prophylaxis-Pepcid 20 mg IV twice daily. DVT prophylaxis--Lovenox 40 mg once daily   Diet:  On Nector Thick Diet . Disposition: Pending Shai Nassar MD  8/29/2021  8:17 AM       I have discussed the care of Capri Wood , including pertinent history and exam findings,    today with the resident. I have seen and examined the patient and the key elements of all parts of the encounter have been performed by me . I agree with the assessment, plan and orders as documented by the resident. Principal Problem:    Syncope and collapse  Active Problems:    COPD (chronic obstructive pulmonary disease) (HCC)    Smoking addiction    Dyslipidemia    Alcohol abuse    Hypokalemia    Hyponatremia    Traumatic rhabdomyolysis (HCC)    Closed fracture of multiple ribs of right side    Closed fracture of left wrist    Severe malnutrition (HCC)    Fever    Conjunctivitis    Acute respiratory failure (HCC)    Superficial venous thrombosis of arm, left    MRSA bacteremia    Empyema lung (HCC)    Elevated C-reactive protein (CRP)    Leukocytosis  Resolved Problems:    * No resolved hospital problems. *        Overall  course ;                                   are improving over time.         Patient feeling better  Awaiting placement to acute rehab  On IV antibiotic  Patient feeling better  Reviewed ultrasound report of left upper extremity no DVT            Electronically signed by Thad Lam MD

## 2021-08-29 NOTE — PROGRESS NOTES
Physical Therapy        Physical Therapy Cancel Note      DATE: 2021    NAME: Hazel Persaud  MRN: [id number]   : 1965      Patient not seen this date for Physical Therapy due to: Attempted to see patient x 2, pt report being too tired for therapy. Nursing staff notified, Will continue in a.m. .          Electronically signed by Heydi Dietrich PTA on 2021 at 2:45 PM

## 2021-08-29 NOTE — PROGRESS NOTES
[facility] -   Infectious diseases evaluation  admission date 8/10/2021    reason for consultation:   MRSA bacteremia    Impression :   Current:  · MRSA bacteremia suspect pulmonary source of infection  · Left hand and wrist cellulitis  · Left wrist fracture  · Right pleural effusion /empyema status post thoracentesis with MRSA growth on culture. · Pneumothorax  · Superficial vein thrombophlebitis left arm  · Syncopal episode  · Alcohol withdrawal  · Acute respiratory failure requiring intubation  · Rib fracture with possible hemothorax    Other:  · History of alcohol abuse  · COPD  · History of depression  · History of seizure  · History of hypertension. Recommendations   · Continue IV vancomycin through September 10, 2021,monitor renal function and vancomycin levels closely  · Repeat blood cultures from 8/19/2021 no growth  · Echocardiogram , no reported vegetations  · ERICKA showed no vegetations. · CT thoracic and lumbar spine showed no discitis or osteomyelitis  · Follow CBC and renal function weekly while on antibiotics  · Repeat CT chest and follow-up with cardiothoracic surgery as outpatient. · Follow-up with Dr. Mustapha Estrella in the office in 1 to 2 weeks  · Discussed with patient. History of Present Illness:   Initial history:  Suze Bertrand is a 60y.o.-year-old male presented to the hospital on 8/10/2021 after a syncopal episode, was complaining of pain to the right chest wall and abdomen. Reported sharp, constant pain aggravated by movement with no alleviating factors. The patient was previously evaluated at the ER 8/7/2021 after a fall found to have multiple rib fractures and a left wrist closed fracture. Tox screen positive for cannabinoids. CT head negative for acute intracranial abnormality. CT chest/abdomen/pelvis showed rib fractures with associated focal hemothorax. No acute processes in abdomen or pelvis.    Right arm PICC line was placed 8/12/2021  He was placed on alcohol withdrawal protocol, developed respiratory distress was placed on BiPAP initially then was intubated 4/13/21. Baker catheter was placed on 4/13/2021  COVID-19 rapid test was negative  The patient was on Levaquin 8/13/2021 and 8/14/2021 that was discontinued and started on vancomycin and cefepime. He had a fever with a temperature max of 100.6 on 8/13/2021. Blood cultures 8/14/2021 grew MRSA as well as sputum culture. Chest x-ray 8/14/2021 showed bilateral airspace disease right greater than left. CT chest without contrast 8/16/2021 showed increased right pleural effusion with an area of loculated fluid status post thoracentesis with MRSA growth on culture  Right chest tube was placed 8/20/2021 status post intrapleural TPA, was evaluated by cardiothoracic surgeon  Lower extremity venous Doppler showed superficial thrombosis on Lovenox  Right arm PICC line in place  status post thoracentesis 8/23/2021 . Interval changes  8/29/2021   Afebrile  SpO2 95% room air  No complaints, denies n/v/d  CT removed 8/27  RUE PICC site clean. Labs:  Cre:1.10-1.20-1.31-1.33-1.22  WBC: 12.7-12.6-8.8-8.0-6.6  Vanco trough 21.7       Patient Vitals for the past 8 hrs:   BP Temp Temp src Pulse Resp SpO2   08/29/21 1315 (!) 119/58 98.3 °F (36.8 °C) Oral 76 18 95 %   08/29/21 1132 -- -- -- -- -- 94 %   08/29/21 0745 110/66 98.8 °F (37.1 °C) Oral 84 18 91 %           I have personally reviewed the past medical history, past surgical history, medications, social history, and family history, and I haveupdated the database accordingly. Allergies:   Nickel     Review of Systems:     Review of Systems   All other systems reviewed and are negative. Physical Examination :       Physical Exam  Vitals and nursing note reviewed. Constitutional:       General: He is not in acute distress. Appearance: Normal appearance. HENT:      Head: Normocephalic and atraumatic.       Right Ear: External ear normal.      Left Ear: External ear normal.      Nose: Nose normal.      Mouth/Throat:      Mouth: Mucous membranes are moist.      Pharynx: Oropharynx is clear. Eyes:      Conjunctiva/sclera: Conjunctivae normal.   Cardiovascular:      Rate and Rhythm: Normal rate and regular rhythm. Heart sounds: Normal heart sounds. Pulmonary:      Effort: Pulmonary effort is normal. No respiratory distress. Breath sounds: Normal breath sounds. No wheezing. Abdominal:      General: Bowel sounds are normal. There is no distension. Palpations: Abdomen is soft. Tenderness: There is no abdominal tenderness. Genitourinary:     Comments: No hair. Musculoskeletal:      Cervical back: Normal range of motion and neck supple. Comments: L wrist brace on. Skin:     General: Skin is warm and dry. Capillary Refill: Capillary refill takes less than 2 seconds. Coloration: Skin is not jaundiced. Comments: Left hand and wrist splint   Neurological:      Mental Status: He is alert and oriented to person, place, and time.    Psychiatric:         Mood and Affect: Mood normal.         Behavior: Behavior normal.         Past Medical History:     Past Medical History:   Diagnosis Date    Alcohol abuse 6/4/2014    Anxiety     Arthritis     COPD (chronic obstructive pulmonary disease) ([facility] [address]     ASTHMA    DDD (degenerative disc disease), lumbar 9/6/2016    Depression     Heart burn     Hemorrhoids     HTN (hypertension) 1/19/2015    Ilioinguinal neuralgia of right side 4/10/2017    Psychiatric problem     depression /anxiety    Seizures ([address]     withdrawal from alcohol 2 years ago    Wears glasses     READING       Past Surgical  History:     Past Surgical History:   Procedure Laterality Date    ANESTHESIA NERVE BLOCK Right 4/10/2017    NERVE BLOCK US RIGHT SIDED ILIOINGUINAL performed by Rony Sanchez MD at [address] [place] [address]  3/19/15    HERNIA REPAIR      IR CHEST TUBE INSERTION  8/20/2021    IR CHEST TUBE INSERTION 8/20/2021 [facility] PROCED[facility]    NERVE BLOCK Right 10/10/2016    right groin    OTHER SURGICAL HISTORY Right 04/10/2017    US nerve block to R groin    TOE SURGERY      SCREW RIGHT BIG TOE       Medications:      vancomycin  1,000 mg IntraVENous Q24H    guaiFENesin  600 mg Oral BID    vancomycin (VANCOCIN) intermittent dosing (placeholder)   Other RX Placeholder    docusate sodium  100 mg Oral Daily    famotidine  20 mg Oral BID    metoprolol succinate  12.5 mg Oral Nightly    ipratropium-albuterol  1 ampule Inhalation Q4H While awake    tamsulosin  0.4 mg Oral Daily    enoxaparin  40 mg Subcutaneous Daily    sodium chloride flush  5-40 mL IntraVENous 2 times per day    lidocaine  1 patch Transdermal Daily       Social History:     Social History     Socioeconomic History    Marital status:      Spouse name: Not on file    Number of children: Not on file    Years of education: Not on file    Highest education level: Not on file   Occupational History    Not on file   Tobacco Use    Smoking status: Current Every Day Smoker     Packs/day: 1.00     Years: 38.00     Pack years: 38.00     Types: Cigarettes    Smokeless tobacco: Never Used    Tobacco comment: 1 PPD   Vaping Use    Vaping Use: Never used   Substance and Sexual Activity    Alcohol use: Yes     Alcohol/week: 12.0 standard drinks     Types: 12 Cans of beer per week     Comment: 12 24 oz cans daily    Drug use: Yes     Types: Marijuana     Comment: crack occasionally    Sexual activity: Never   Other Topics Concern    Not on file   Social History Narrative    Not on file     Social Determinants of Health     Financial Resource Strain:     Difficulty of Paying Living Expenses:    Food Insecurity:     Worried About Running Out of Food in the Last Year:     Ran Out of Food in the Last Year:    Transportation Needs:     Lack of Transportation (Medical):      Lack of Transportation (Non-Medical):    Physical Activity:     Days of Exercise per Week:     Minutes of Exercise per Session:    Stress:     Feeling of Stress :    Social Connections:     Frequency of Communication with Friends and Family:     Frequency of Social Gatherings with Friends and Family:     Attends Worship Services:     Active Member of Clubs or Organizations:     Attends Club or Organization Meetings:     Marital Status:    Intimate Partner Violence:     Fear of Current or Ex-Partner:     Emotionally Abused:     Physically Abused:     Sexually Abused:        Family History:     Family History   Problem Relation Age of Onset    Cancer Mother         colon ca      Medical Decision Making:   I have independently reviewed/ordered the following labs:    CBC with Differential:   Recent Labs     08/28/21  0741 08/29/21  0506   WBC 8.0 6.6   HGB 9.1* 8.1*   HCT 27.2* 23.6*    296     BMP:  Recent Labs     08/28/21  0741 08/29/21  0506   * 139   K 3.2* 3.6*   CL 98 103   CO2 27 26   BUN 7 8   CREATININE 1.33* 1.22*   MG 1.9 1.8     Hepatic Function Panel:   Recent Labs     08/28/21  0741 08/29/21  0506   PROT 6.3* 6.0*   LABALBU 2.4* 2.2*   BILITOT 0.50 0.36   ALKPHOS 80 69   ALT 14 14   AST 15 14     No results for input(s): RPR in the last 72 hours. No results for input(s): HIV in the last 72 hours. No results for input(s): BC in the last 72 hours. Lab Results   Component Value Date    CREATININE 1.22 08/29/2021    GLUCOSE 93 08/29/2021       Detailed results: Thank you for allowing us to participate in the care of this patient. Please call with questions. This note is created with the assistance of a speech recognition program.  While intending to generate adocument that actually reflects the content of the visit, the document can still have some errors including those of syntax and sound a like substitutions which may escape proof reading.   It such instances, actual meaningcan be extrapolated by contextual diversion.     Farhana Jasso, APRN - CNP  Office: [phone]  Perfect serve / office [phone]

## 2021-08-29 NOTE — PLAN OF CARE
Problem: Falls - Risk of:  Goal: Will remain free from falls  Description: Will remain free from falls  8/29/2021 1638 by Quintin Gramajo RN  Outcome: Ongoing     Problem: Falls - Risk of:  Goal: Absence of physical injury  Description: Absence of physical injury  8/29/2021 1638 by Quintin Gramajo RN  Outcome: Ongoing     Problem: Skin Integrity:  Goal: Will show no infection signs and symptoms  Description: Will show no infection signs and symptoms  8/29/2021 1638 by Quintin Gramajo RN  Outcome: Ongoing     Problem: Skin Integrity:  Goal: Absence of new skin breakdown  Description: Absence of new skin breakdown  8/29/2021 1638 by Quintin Gramajo RN  Outcome: Ongoing     Problem: Nutrition  Goal: Optimal nutrition therapy  8/29/2021 1638 by Quintin Gramajo RN  Outcome: Ongoing     Problem: OXYGENATION/RESPIRATORY FUNCTION  Goal: Patient will maintain patent airway  8/29/2021 1638 by Quintin Gramajo RN  Outcome: Ongoing     Problem: OXYGENATION/RESPIRATORY FUNCTION  Goal: Patient will achieve/maintain normal respiratory rate/effort  Description: Respiratory rate and effort will be within normal limits for the patient  8/29/2021 1638 by Quintin Gramajo RN  Outcome: Ongoing     Problem: MECHANICAL VENTILATION  Goal: Patient will maintain patent airway  8/29/2021 1638 by Quintin Gramajo RN  Outcome: Ongoing     Problem: MECHANICAL VENTILATION  Goal: ET tube will be managed safely  8/29/2021 1638 by Quintin Gramajo RN  Outcome: Ongoing     Problem: Pain:  Goal: Pain level will decrease  Description: Pain level will decrease  8/29/2021 1638 by Quintin Gramajo RN  Outcome: Ongoing     Problem: Pain:  Goal: Recognizes and communicates pain  Description: Recognizes and communicates pain  8/29/2021 1638 by Quintin Gramajo RN  Outcome: Ongoing     Problem: Pain:  Goal: Control of acute pain  Description: Control of acute pain  8/29/2021 1638 by Quintin Gramajo RN  Outcome: Ongoing     Problem: Pain:  Goal: Control of chronic pain  Description: Control of chronic pain  8/29/2021 1638 by Magaly Grier RN  Outcome: Ongoing     Problem: Confusion - Acute:  Goal: Absence of continued neurological deterioration signs and symptoms  Description: Absence of continued neurological deterioration signs and symptoms  8/29/2021 1638 by Magaly Grier RN  Outcome: Ongoing     Problem: Confusion - Acute:  Goal: Mental status will be restored to baseline  Description: Mental status will be restored to baseline  8/29/2021 1638 by Magaly Grier RN  Outcome: Ongoing     Problem: Discharge Planning:  Goal: Ability to perform activities of daily living will improve  Description: Ability to perform activities of daily living will improve  8/29/2021 1638 by Magaly Grier RN  Outcome: Ongoing     Problem: Discharge Planning:  Goal: Participates in care planning  Description: Participates in care planning  8/29/2021 1638 by Magaly Grier RN  Outcome: Ongoing     Problem: Injury - Risk of, Physical Injury:  Goal: Will remain free from falls  Description: Will remain free from falls  8/29/2021 1638 by Magaly Grier RN  Outcome: Ongoing     Problem: Injury - Risk of, Physical Injury:  Goal: Absence of physical injury  Description: Absence of physical injury  8/29/2021 1638 by Magaly Grier RN  Outcome: Ongoing       Patient remains free of falls. Up with standby assist. Calls out appropriately. Tolerates current diet. Able to participate in the discharge plan.

## 2021-08-30 ENCOUNTER — APPOINTMENT (OUTPATIENT)
Dept: GENERAL RADIOLOGY | Age: 56
DRG: 351 | End: 2021-08-30
Payer: MEDICAID

## 2021-08-30 LAB
ALBUMIN SERPL-MCNC: 2.3 G/DL (ref 3.5–5.2)
ALBUMIN/GLOBULIN RATIO: ABNORMAL (ref 1–2.5)
ALP BLD-CCNC: 72 U/L (ref 40–129)
ALT SERPL-CCNC: 13 U/L (ref 5–41)
ANION GAP SERPL CALCULATED.3IONS-SCNC: 10 MMOL/L (ref 9–17)
AST SERPL-CCNC: 14 U/L
BILIRUB SERPL-MCNC: 0.32 MG/DL (ref 0.3–1.2)
BUN BLDV-MCNC: 10 MG/DL (ref 6–20)
BUN/CREAT BLD: ABNORMAL (ref 9–20)
CALCIUM SERPL-MCNC: 8 MG/DL (ref 8.6–10.4)
CHLORIDE BLD-SCNC: 101 MMOL/L (ref 98–107)
CO2: 25 MMOL/L (ref 20–31)
CREAT SERPL-MCNC: 1.26 MG/DL (ref 0.7–1.2)
GFR AFRICAN AMERICAN: >60 ML/MIN
GFR NON-AFRICAN AMERICAN: 59 ML/MIN
GFR SERPL CREATININE-BSD FRML MDRD: ABNORMAL ML/MIN/{1.73_M2}
GFR SERPL CREATININE-BSD FRML MDRD: ABNORMAL ML/MIN/{1.73_M2}
GLUCOSE BLD-MCNC: 101 MG/DL (ref 70–99)
HCT VFR BLD CALC: 23.8 % (ref 41–53)
HEMOGLOBIN: 8 G/DL (ref 13.5–17.5)
MAGNESIUM: 1.7 MG/DL (ref 1.6–2.6)
MCH RBC QN AUTO: 32 PG (ref 26–34)
MCHC RBC AUTO-ENTMCNC: 33.8 G/DL (ref 31–37)
MCV RBC AUTO: 94.7 FL (ref 80–100)
NRBC AUTOMATED: ABNORMAL PER 100 WBC
PDW BLD-RTO: 14.6 % (ref 11.5–14.9)
PLATELET # BLD: 299 K/UL (ref 150–450)
PMV BLD AUTO: 6.5 FL (ref 6–12)
POTASSIUM SERPL-SCNC: 3.6 MMOL/L (ref 3.7–5.3)
RBC # BLD: 2.51 M/UL (ref 4.5–5.9)
SODIUM BLD-SCNC: 136 MMOL/L (ref 135–144)
TOTAL PROTEIN: 6.1 G/DL (ref 6.4–8.3)
WBC # BLD: 7.3 K/UL (ref 3.5–11)

## 2021-08-30 PROCEDURE — 94640 AIRWAY INHALATION TREATMENT: CPT

## 2021-08-30 PROCEDURE — 99254 IP/OBS CNSLTJ NEW/EST MOD 60: CPT | Performed by: PHYSICAL MEDICINE & REHABILITATION

## 2021-08-30 PROCEDURE — 6370000000 HC RX 637 (ALT 250 FOR IP): Performed by: INTERNAL MEDICINE

## 2021-08-30 PROCEDURE — 36415 COLL VENOUS BLD VENIPUNCTURE: CPT

## 2021-08-30 PROCEDURE — 71045 X-RAY EXAM CHEST 1 VIEW: CPT

## 2021-08-30 PROCEDURE — 6360000002 HC RX W HCPCS: Performed by: STUDENT IN AN ORGANIZED HEALTH CARE EDUCATION/TRAINING PROGRAM

## 2021-08-30 PROCEDURE — 94761 N-INVAS EAR/PLS OXIMETRY MLT: CPT

## 2021-08-30 PROCEDURE — 99232 SBSQ HOSP IP/OBS MODERATE 35: CPT | Performed by: INTERNAL MEDICINE

## 2021-08-30 PROCEDURE — 97116 GAIT TRAINING THERAPY: CPT

## 2021-08-30 PROCEDURE — 97110 THERAPEUTIC EXERCISES: CPT

## 2021-08-30 PROCEDURE — 2060000000 HC ICU INTERMEDIATE R&B

## 2021-08-30 PROCEDURE — 6370000000 HC RX 637 (ALT 250 FOR IP): Performed by: STUDENT IN AN ORGANIZED HEALTH CARE EDUCATION/TRAINING PROGRAM

## 2021-08-30 PROCEDURE — 2580000003 HC RX 258: Performed by: NURSE PRACTITIONER

## 2021-08-30 PROCEDURE — 85027 COMPLETE CBC AUTOMATED: CPT

## 2021-08-30 PROCEDURE — 97530 THERAPEUTIC ACTIVITIES: CPT

## 2021-08-30 PROCEDURE — 83735 ASSAY OF MAGNESIUM: CPT

## 2021-08-30 PROCEDURE — 6370000000 HC RX 637 (ALT 250 FOR IP): Performed by: PHYSICIAN ASSISTANT

## 2021-08-30 PROCEDURE — 80053 COMPREHEN METABOLIC PANEL: CPT

## 2021-08-30 PROCEDURE — 2580000003 HC RX 258: Performed by: STUDENT IN AN ORGANIZED HEALTH CARE EDUCATION/TRAINING PROGRAM

## 2021-08-30 RX ADMIN — IPRATROPIUM BROMIDE AND ALBUTEROL SULFATE 1 AMPULE: 2.5; .5 SOLUTION RESPIRATORY (INHALATION) at 08:03

## 2021-08-30 RX ADMIN — FAMOTIDINE 20 MG: 20 TABLET, FILM COATED ORAL at 10:59

## 2021-08-30 RX ADMIN — VANCOMYCIN HYDROCHLORIDE 1000 MG: 1 INJECTION, POWDER, LYOPHILIZED, FOR SOLUTION INTRAVENOUS at 01:04

## 2021-08-30 RX ADMIN — ENOXAPARIN SODIUM 40 MG: 40 INJECTION SUBCUTANEOUS at 10:59

## 2021-08-30 RX ADMIN — IPRATROPIUM BROMIDE AND ALBUTEROL SULFATE 1 AMPULE: 2.5; .5 SOLUTION RESPIRATORY (INHALATION) at 19:56

## 2021-08-30 RX ADMIN — HYDROCODONE BITARTRATE AND ACETAMINOPHEN 1 TABLET: 5; 325 TABLET ORAL at 05:32

## 2021-08-30 RX ADMIN — MORPHINE SULFATE 4 MG: 4 INJECTION, SOLUTION INTRAMUSCULAR; INTRAVENOUS at 10:55

## 2021-08-30 RX ADMIN — GUAIFENESIN 600 MG: 600 TABLET, EXTENDED RELEASE ORAL at 10:59

## 2021-08-30 RX ADMIN — MORPHINE SULFATE 4 MG: 4 INJECTION, SOLUTION INTRAMUSCULAR; INTRAVENOUS at 18:29

## 2021-08-30 RX ADMIN — DOCUSATE SODIUM 100 MG: 100 CAPSULE, LIQUID FILLED ORAL at 10:59

## 2021-08-30 RX ADMIN — IPRATROPIUM BROMIDE AND ALBUTEROL SULFATE 1 AMPULE: 2.5; .5 SOLUTION RESPIRATORY (INHALATION) at 15:36

## 2021-08-30 RX ADMIN — SODIUM CHLORIDE, PRESERVATIVE FREE 10 ML: 5 INJECTION INTRAVENOUS at 10:55

## 2021-08-30 RX ADMIN — IPRATROPIUM BROMIDE AND ALBUTEROL SULFATE 1 AMPULE: 2.5; .5 SOLUTION RESPIRATORY (INHALATION) at 11:27

## 2021-08-30 RX ADMIN — TAMSULOSIN HYDROCHLORIDE 0.4 MG: 0.4 CAPSULE ORAL at 10:59

## 2021-08-30 RX ADMIN — MORPHINE SULFATE 4 MG: 4 INJECTION, SOLUTION INTRAMUSCULAR; INTRAVENOUS at 23:28

## 2021-08-30 ASSESSMENT — ENCOUNTER SYMPTOMS
CONSTIPATION: 0
SHORTNESS OF BREATH: 0
EYE PAIN: 0
APNEA: 0
WHEEZING: 1
TROUBLE SWALLOWING: 0
COUGH: 0
CHOKING: 0
NAUSEA: 0
PHOTOPHOBIA: 0
DIARRHEA: 0
ABDOMINAL PAIN: 0
CHEST TIGHTNESS: 0
BACK PAIN: 0
ABDOMINAL DISTENTION: 0
VOMITING: 0

## 2021-08-30 ASSESSMENT — PAIN SCALES - GENERAL
PAINLEVEL_OUTOF10: 4
PAINLEVEL_OUTOF10: 9
PAINLEVEL_OUTOF10: 8
PAINLEVEL_OUTOF10: 8
PAINLEVEL_OUTOF10: 4
PAINLEVEL_OUTOF10: 10
PAINLEVEL_OUTOF10: 10
PAINLEVEL_OUTOF10: 8

## 2021-08-30 ASSESSMENT — PAIN DESCRIPTION - ORIENTATION
ORIENTATION: LEFT
ORIENTATION_2: LEFT
ORIENTATION: RIGHT

## 2021-08-30 ASSESSMENT — PAIN DESCRIPTION - INTENSITY
RATING_2: 4
RATING_2: 4

## 2021-08-30 ASSESSMENT — PAIN DESCRIPTION - PAIN TYPE
TYPE_2: ACUTE PAIN
TYPE_2: ACUTE PAIN
TYPE: ACUTE PAIN
TYPE: ACUTE PAIN

## 2021-08-30 ASSESSMENT — PAIN DESCRIPTION - LOCATION
LOCATION: RIB CAGE
LOCATION_2: WRIST
LOCATION: HAND

## 2021-08-30 ASSESSMENT — PAIN DESCRIPTION - DESCRIPTORS
DESCRIPTORS: ACHING
DESCRIPTORS_2: DISCOMFORT

## 2021-08-30 ASSESSMENT — PAIN DESCRIPTION - PROGRESSION: CLINICAL_PROGRESSION: NOT CHANGED

## 2021-08-30 NOTE — PROGRESS NOTES
PULMONARY PROGRESS NOTE:    REASON FOR VISIT: COPD, rib Fx, empyema  Interval History:  Denies SOB, cough, chest pain  No NVDC  No swelling feet    Events since last visit: none       PAST MEDICAL HISTORY:      Scheduled Meds:   vancomycin  1,000 mg IntraVENous Q24H    guaiFENesin  600 mg Oral BID    vancomycin (VANCOCIN) intermittent dosing (placeholder)   Other RX Placeholder    docusate sodium  100 mg Oral Daily    famotidine  20 mg Oral BID    metoprolol succinate  12.5 mg Oral Nightly    ipratropium-albuterol  1 ampule Inhalation Q4H While awake    tamsulosin  0.4 mg Oral Daily    enoxaparin  40 mg SubCUTAneous Daily    sodium chloride flush  5-40 mL IntraVENous 2 times per day    lidocaine  1 patch Transdermal Daily     Continuous Infusions:   sodium chloride 35 mL/hr at 08/29/21 1641    dextrose      sodium chloride 25 mL (08/19/21 1717)     PRN Meds:nicotine polacrilex, diphenhydrAMINE, HYDROcodone 5 mg - acetaminophen, morphine **OR** morphine, potassium chloride, perflutren lipid microspheres, glucose, dextrose, glucagon (rDNA), dextrose, sodium phosphate IVPB **OR** sodium phosphate IVPB, sodium chloride flush, sodium chloride, polyethylene glycol, acetaminophen **OR** acetaminophen, magnesium sulfate, ondansetron, LORazepam **OR** LORazepam **OR** LORazepam **OR** LORazepam **OR** LORazepam **OR** LORazepam **OR** LORazepam **OR** LORazepam, albuterol sulfate HFA        PHYSICAL EXAMINATION:  /69   Pulse 88   Temp 98.4 °F (36.9 °C) (Oral)   Resp 18   Ht 5' 9\" (1.753 m)   Wt 133 lb 13.1 oz (60.7 kg)   SpO2 96%   BMI 19.76 kg/m²   tmax 99.4  General : awake,alert,oriented   Neck - supple, no lymphadenopathy, JVD not raised  Heart - SR  Lungs - Air Entry- fair bilaterally; breath sounds : coarse, 96% on RA  Abdomen - soft, no tenderness  Upper Extremities  - no cyanosis, mottling; edema : edema  Lower Extremities: no cyanosis, mottling; edema : absent    Current Laboratory, Radiologic, Microbiologic, and Diagnostic studies reviewed  Data ReviewCBC:   Recent Labs     08/28/21  0741 08/29/21  0506 08/30/21  0555   WBC 8.0 6.6 7.3   RBC 2.80* 2.44* 2.51*   HGB 9.1* 8.1* 8.0*   HCT 27.2* 23.6* 23.8*    296 299     BMP:   Recent Labs     08/28/21  0741 08/29/21  0506 08/30/21  0555   GLUCOSE 89 93 101*   * 139 136   K 3.2* 3.6* 3.6*   BUN 7 8 10   CREATININE 1.33* 1.22* 1.26*   CALCIUM 8.1* 7.9* 8.0*     ABGs:   No results for input(s): PHART, PO2ART, YRE2YYL, XHO1DDX, BEART, O4GXUOBR, XMQ3ONA in the last 72 hours.    PT/INR:  No results found for: PTINR    ASSESSMENT / PLAN:    Alcohol withdrawal - resolved  Thiamine, folic acid  atelectasis  RLL infiltrate / empyema  Acute hypoxic resp failure - Mechanical ventilation, extubated 8-20-21  bacteremia - continue current ABx  ABx per ID; rt chest tube - removed 8/27/21  S/p TPA per CT surgery  CT chest reviewed today - stable  OK for SNF from Pulmonary    Plan of care discussed with Dr Kenia Lal  Electronically signed by DONNY Keating CNP on 08/30/21 at 11:05 AM

## 2021-08-30 NOTE — PROGRESS NOTES
Comprehensive Nutrition Assessment    Type and Reason for Visit:  Reassess    Nutrition Recommendations/Plan:  Recommend liberalization of diet to Regular with Nectar thick liquids if appropriate. Continue Magic Cups twice daily. Nutrition Assessment:  Swallow Study 8/23 indicated recommendation for Regular solids with Nectar Thick Liquids. RN to clarify if solid diet texture can be advanced as pt would like diet liberalized if possible. Pt states he is eating fairly well but is getting tired of the limited menu options. Pt likes the Elaine. Malnutrition Assessment:  Malnutrition Status:  Severe malnutrition    Context:  Acute Illness     Findings of the 6 clinical characteristics of malnutrition:  Energy Intake:  1 - 75% or less of estimated energy requirements for 7 or more days (Prior to admission)  Weight Loss:  1 - 7.5% over 3 months     Body Fat Loss:  Unable to assess     Muscle Mass Loss:  7 - Moderate muscle mass loss Clavicles (pectoralis & deltoids), Thigh (quadraceps)  Fluid Accumulation:  No significant fluid accumulation Extremities   Strength:  Not Performed    Estimated Daily Nutrient Needs:  Energy (kcal):  5129-1703 kcals based on 28-30 kcal/kg using adm wt 61.6 kg; Weight Used for Energy Requirements:  Admission (revised)     Protein (g):  105-117 gm protein based on 1.7-1.9 gm/kg using adm wt; Weight Used for Protein Requirements:  Admission (revised)          Nutrition Related Findings:  Edema: +2 LUE. Labs and meds reviewed. Wounds:   (Abrasions)       Current Nutrition Therapies:    ADULT DIET;  Dysphagia - Soft and Bite Sized; Mildly Thick ([place])  Adult Oral Nutrition Supplement; Frozen Oral Supplement    Anthropometric Measures:  · Height: 5' 9\" (175.3 cm)  · Current Body Weight: 133 lb 13.1 oz (60.7 kg)   · Admission Body Weight: 135 lb 12.9 oz (61.6 kg)    · Usual Body Weight: 150 lb (68 kg) (Estimated)     · Ideal Body Weight: 160 lbs; % Ideal Body Weight 84.9 % · BMI: 19.8  · BMI Categories: Normal Weight (BMI 18.5-24. 9)       Nutrition Diagnosis:   · Severe malnutrition related to inadequate protein-energy intake as evidenced by weight loss 7.5% in 3 months, poor intake prior to admission, moderate muscle loss    Nutrition Interventions:   Food and/or Nutrient Delivery:  Continue Current Diet, Continue Oral Nutrition Supplement  Nutrition Education/Counseling:  No recommendation at this time   Coordination of Nutrition Care:  Continue to monitor while inpatient    Goals:  Meet % of estimated nutrition needs       Nutrition Monitoring and Evaluation:   Behavioral-Environmental Outcomes:  None Identified   Food/Nutrient Intake Outcomes:  Food and Nutrient Intake, Supplement Intake  Physical Signs/Symptoms Outcomes:  Biochemical Data, Fluid Status or Edema, GI Status, Hemodynamic Status, Nutrition Focused Physical Findings, Skin, Weight     Discharge Planning: Too soon to determine     Some areas of assessment may be incomplete due to standard COVID-19 Precautions. Duke Garrett R.D., [place].   Phone: [phone]

## 2021-08-30 NOTE — PROGRESS NOTES
[facility] -   Infectious diseases evaluation  admission date 8/10/2021    reason for consultation:   MRSA bacteremia    Impression :   Current:  · MRSA bacteremia suspect pulmonary source of infection. · Left hand and wrist cellulitis. · Left wrist fracture  · Right pleural effusion /empyema status post thoracentesis with MRSA growth on culture status post chest tube placement and removal.  · Pneumothorax  · Superficial vein thrombophlebitis left arm  · Syncopal episode  · Alcohol withdrawal  · Acute respiratory failure requiring intubation  · Rib fracture with possible hemothorax    Other:  · History of alcohol abuse  · COPD  · History of depression  · History of seizure  · History of hypertension. Recommendations   · Continue IV vancomycin through September 10, 2021,monitor renal function and vancomycin levels closely  · Repeat blood cultures from 8/19/2021 no growth  · Echocardiogram , no reported vegetations  · ERICKA showed no vegetations. · CT thoracic and lumbar spine showed no discitis or osteomyelitis  · Follow CBC and renal function weekly while on antibiotics. · Repeat CT chest and follow-up with cardiothoracic surgery as outpatient. · Follow-up with me in 1 to 2 weeks                  History of Present Illness:   Initial history:  Ca Pimentel is a 60y.o.-year-old male presented to the hospital on 8/10/2021 after a syncopal episode, was complaining of pain to the right chest wall and abdomen. Reported sharp, constant pain aggravated by movement with no alleviating factors. The patient was previously evaluated at the ER 8/7/2021 after a fall found to have multiple rib fractures and a left wrist closed fracture. Tox screen positive for cannabinoids. CT head negative for acute intracranial abnormality. CT chest/abdomen/pelvis showed rib fractures with associated focal hemothorax. No acute processes in abdomen or pelvis.    Right arm PICC line was placed 8/12/2021  He was placed on alcohol withdrawal protocol, developed respiratory distress was placed on BiPAP initially then was intubated 4/13/21. Baker catheter was placed on 4/13/2021  COVID-19 rapid test was negative  The patient was on Levaquin 8/13/2021 and 8/14/2021 that was discontinued and started on vancomycin and cefepime. He had a fever with a temperature max of 100.6 on 8/13/2021. Blood cultures 8/14/2021 grew MRSA as well as sputum culture. Chest x-ray 8/14/2021 showed bilateral airspace disease right greater than left. CT chest without contrast 8/16/2021 showed increased right pleural effusion with an area of loculated fluid status post thoracentesis with MRSA growth on culture  Right chest tube was placed 8/20/2021 status post intrapleural TPA, was evaluated by cardiothoracic surgeon  Lower extremity venous Doppler showed superficial thrombosis on Lovenox  Right arm PICC line in place  status post thoracentesis 8/23/2021 . Interval changes  8/30/2021   He is up to the chair, complaining of intermittent wrist pain, less shortness of breath, occasional cough, denied fever or chills, no vomiting or diarrhea, no other complaints. Patient Vitals for the past 8 hrs:   BP Temp Temp src Pulse Resp SpO2   08/30/21 0803 -- -- -- -- 18 96 %   08/30/21 0730 126/69 98.4 °F (36.9 °C) Oral 88 18 93 %           I have personally reviewed the past medical history, past surgical history, medications, social history, and family history, and I haveupdated the database accordingly. Allergies:   Nickel     Review of Systems:     Review of Systems  As per history present illness, other than above 14 system review was negative  Physical Examination :       Physical Exam  Constitutional:       General: He is not in acute distress. HENT:      Head: Normocephalic and atraumatic. Right Ear: External ear normal.      Left Ear: External ear normal.      Mouth/Throat:      Pharynx: Oropharynx is clear.  No oropharyngeal exudate. Eyes:      General: No scleral icterus. Right eye: No discharge. Left eye: No discharge. Cardiovascular:      Rate and Rhythm: Normal rate and regular rhythm. Heart sounds: No murmur heard. Pulmonary:      Breath sounds: No wheezing. Abdominal:      General: Abdomen is flat. There is no distension. Palpations: Abdomen is soft. Musculoskeletal:      Cervical back: Neck supple. No rigidity. Skin:     General: Skin is warm. Coloration: Skin is not jaundiced. Comments: Left hand and wrist splint   Neurological:      General: No focal deficit present. Mental Status: He is alert and oriented to person, place, and time.      Right PICC line in place    Past Medical History:     Past Medical History:   Diagnosis Date    Alcohol abuse 6/4/2014    Anxiety     Arthritis     COPD (chronic obstructive pulmonary disease) ([address]     ASTHMA    DDD (degenerative disc disease), lumbar 9/6/2016    Depression     Heart burn     Hemorrhoids     HTN (hypertension) 1/19/2015    Ilioinguinal neuralgia of right side 4/10/2017    Psychiatric problem     depression /anxiety    Seizures ([address]     withdrawal from alcohol 2 years ago    Wears glasses     READING       Past Surgical  History:     Past Surgical History:   Procedure Laterality Date    ANESTHESIA NERVE BLOCK Right 4/10/2017    NERVE BLOCK US RIGHT SIDED ILIOINGUINAL performed by Scotty Shah MD at [address] [place] [address]  3/19/15    HERNIA REPAIR      IR CHEST TUBE INSERTION  8/20/2021    IR CHEST TUBE INSERTION 8/20/2021 [facility]    NERVE BLOCK Right 10/10/2016    right groin    OTHER SURGICAL HISTORY Right 04/10/2017    US nerve block to R groin    TOE SURGERY      SCREW RIGHT BIG TOE       Medications:      vancomycin  1,000 mg IntraVENous Q24H    guaiFENesin  600 mg Oral BID    vancomycin (VANCOCIN) intermittent dosing (placeholder)   Other RX Placeholder    docusate sodium  100 mg Oral Daily    famotidine  20 mg Oral BID    metoprolol succinate  12.5 mg Oral Nightly    ipratropium-albuterol  1 ampule Inhalation Q4H While awake    tamsulosin  0.4 mg Oral Daily    enoxaparin  40 mg SubCUTAneous Daily    sodium chloride flush  5-40 mL IntraVENous 2 times per day    lidocaine  1 patch Transdermal Daily       Social History:     Social History     Socioeconomic History    Marital status:      Spouse name: Not on file    Number of children: Not on file    Years of education: Not on file    Highest education level: Not on file   Occupational History    Not on file   Tobacco Use    Smoking status: Current Every Day Smoker     Packs/day: 1.00     Years: 38.00     Pack years: 38.00     Types: Cigarettes    Smokeless tobacco: Never Used    Tobacco comment: 1 PPD   Vaping Use    Vaping Use: Never used   Substance and Sexual Activity    Alcohol use: Yes     Alcohol/week: 12.0 standard drinks     Types: 12 Cans of beer per week     Comment: 12 24 oz cans daily    Drug use: Yes     Types: Marijuana     Comment: crack occasionally    Sexual activity: Never   Other Topics Concern    Not on file   Social History Narrative    Not on file     Social Determinants of Health     Financial Resource Strain:     Difficulty of Paying Living Expenses:    Food Insecurity:     Worried About Running Out of Food in the Last Year:     Ran Out of Food in the Last Year:    Transportation Needs:     Lack of Transportation (Medical):      Lack of Transportation (Non-Medical):    Physical Activity:     Days of Exercise per Week:     Minutes of Exercise per Session:    Stress:     Feeling of Stress :    Social Connections:     Frequency of Communication with Friends and Family:     Frequency of Social Gatherings with Friends and Family:     Attends Buddhist Services:     Active Member of Clubs or Organizations:     Attends Club or Organization Meetings:     Marital Status:    Intimate Partner Violence:     Fear of Current or Ex-Partner:     Emotionally Abused:     Physically Abused:     Sexually Abused:        Family History:     Family History   Problem Relation Age of Onset    Cancer Mother         colon ca      Medical Decision Making:   I have independently reviewed/ordered the following labs:    CBC with Differential:   Recent Labs     08/29/21  0506 08/30/21  0555   WBC 6.6 7.3   HGB 8.1* 8.0*   HCT 23.6* 23.8*    299     BMP:  Recent Labs     08/29/21  0506 08/30/21  0555    136   K 3.6* 3.6*    101   CO2 26 25   BUN 8 10   CREATININE 1.22* 1.26*   MG 1.8 1.7     Hepatic Function Panel:   Recent Labs     08/29/21  0506 08/30/21  0555   PROT 6.0* 6.1*   LABALBU 2.2* 2.3*   BILITOT 0.36 0.32   ALKPHOS 69 72   ALT 14 13   AST 14 14     No results for input(s): RPR in the last 72 hours. No results for input(s): HIV in the last 72 hours. No results for input(s): BC in the last 72 hours. Lab Results   Component Value Date    CREATININE 1.26 08/30/2021    GLUCOSE 101 08/30/2021       Detailed results: Thank you for allowing us to participate in the care of this patient. Please call with questions. This note is created with the assistance of a speech recognition program.  While intending to generate adocument that actually reflects the content of the visit, the document can still have some errors including those of syntax and sound a like substitutions which may escape proof reading. It such instances, actual meaningcan be extrapolated by contextual diversion.     Fredy Kumar MD  Office: [phone]  Perfect serve / office [phone]

## 2021-08-30 NOTE — PROGRESS NOTES
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 CONTRAST    Result Date: 8/23/2021  EXAMINATION: CT OF THE CHEST WITHOUT CONTRAST 8/23/2021 10:12 am TECHNIQUE: CT of the chest was performed without the administration of intravenous contrast. Multiplanar reformatted images are provided for review. Dose modulation, iterative reconstruction, and/or weight based adjustment of the mA/kV was utilized to reduce the radiation dose to as low as reasonably achievable. COMPARISON: 16 August 2021 HISTORY: ORDERING SYSTEM PROVIDED HISTORY: right empyema TECHNOLOGIST PROVIDED HISTORY: right empyema Reason for Exam: right empyema Acuity: Unknown Type of Exam: Unknown Additional signs and symptoms: follow up chest tube placement FINDINGS: Mediastinum: Prior intestinal tube has been removed. Prominent mediastinal lymph nodes are present similar to prior exam.  Heart size is stable, borderline to mildly enlarged with trace epicardial fluid. No epicardial adenopathy. Lungs/pleura: The patient has loculated extrapleural fluid and air collection consistent with empyema on the right. Amount of fluid has reduced somewhat. Pigtail terminus drainage tube is present at the right base posteriorly. Compressive atelectasis is noted as well as scattered ground-glass pulmonary opacities in the right hemithorax consistent with multifocal pneumonitis. Moderate to large uncomplicated left pleural effusion is noted with compressive atelectasis and consolidative type process left lower lobe as well as some ground-glass lingular opacities in the mid lung. Tracheobronchial tree is patent centrally. Upper Abdomen: Atherosclerotic disease in the aorta and branches is noted. Gallbladder appears hyperdense, possibly vicarious excretion of contrast. Upper abdominal included structures otherwise unremarkable. Soft Tissues/Bones: Fractures right anterior 5th and 6th ribs again noted. 1.  Interval placement of a pigtail terminus drainage catheter inferiorly in the right posterior costophrenic gutter. 2.  Reduction in loculated extrapleural complicated collection, right side, now containing some air bubbles consistent with empyema. Significant fluid still remains. 3.  Compressive atelectasis right lower lobe with mild consolidation. 4.  Scattered pulmonary opacities right hemithorax consistent with multifocal pneumonitis. 5.  Sizable left pleural effusion with compressive atelectasis and some consolidative process in the left lower lobe. 6.  Pulmonary opacity left mid lung field consistent with airspace disease in the lingula. 7.  Stable mediastinal adenopathy. 8.  Right-sided rib fractures anteriorly. CT CHEST WO CONTRAST    Result Date: 8/16/2021  EXAMINATION: CT OF THE CHEST WITHOUT CONTRAST 8/16/2021 12:53 pm TECHNIQUE: CT of the chest was performed without the administration of intravenous contrast. Multiplanar reformatted images are provided for review.  Dose modulation, iterative reconstruction, and/or weight based adjustment of the mA/kV was utilized to reduce the radiation dose to as low as reasonably achievable. COMPARISON: CT of the chest of 08/10/2021. HISTORY: ORDERING SYSTEM PROVIDED HISTORY: pleural effusion TECHNOLOGIST PROVIDED HISTORY: pleural effusion Reason for Exam: pleural effusion Acuity: Unknown Type of Exam: Unknown FINDINGS: Mediastinum: No lymphadenopathy. Right upper extremity PICC with catheter tip at the superior cavoatrial junction. Calcified coronary artery disease. Cardiac chambers are otherwise unremarkable. No pericardial effusion. Lungs/pleura: Significant interval increase in size of a right pleural effusion, which is now large. There is near complete atelectasis of the right lower lobe. Areas of loculated pleural fluid are noted along the fissures as well as in the anterolateral right upper lung. Again noted are areas of pleural thickening adjacent to the anterolateral right rib fractures. There is some subcutaneous gas also noted within the intercostal spaces and adjacent to the rib fractures; these areas are continuous with a air-filled structure within the right middle lobe that is new in comparison to the prior study and measures 4.4 x 2.7 cm on series 4, image 62. On the left, there is a trace pleural effusion, also increased in size, with adjacent atelectasis as well as left lower lobe consolidative opacities. The previously measured right lower lobe pulmonary nodule is now obscured. Endotracheal tube is in place with tip in the mid thoracic trachea. Upper Abdomen: Enteric tube in place with tip below the diaphragm and outside the field of view of this study. Nonspecific stranding surrounds the left renal cortex. Soft Tissues/Bones: No soft tissue abnormality. Redemonstrated mildly displaced fractures of the anterolateral right 4th, 5th, 6th, and 7th ribs. Possible healed remote fracture of the sternal body.      Significant interval increase in size of right pleural effusion, which is now large, with progressive near complete atelectasis of the right lower lobe. There are areas of loculated pleural fluid along the fissures as well as at the right lung apex. New areas of subcutaneous emphysema along the right chest wall near the rib fractures, which are contiguous with an air-filled cavity in the right middle lobe that is new from the prior study. These findings could be posttraumatic in etiology, or related to a recent procedure. Redemonstrated right 4th-7th anterolateral rib fractures with adjacent pleural nodularity favored to represent posttraumatic finding such as hemothorax. New trace left pleural effusion, with adjacent airspace opacities that are concerning for infectious or inflammatory process such as pneumonia or aspiration. CT CERVICAL SPINE WO CONTRAST    Result Date: 8/12/2021  EXAMINATION: CT OF THE CERVICAL SPINE WITHOUT CONTRAST 8/11/2021 10:36 pm TECHNIQUE: CT of the cervical spine was performed without the administration of intravenous contrast. Multiplanar reformatted images are provided for review. Dose modulation, iterative reconstruction, and/or weight based adjustment of the mA/kV was utilized to reduce the radiation dose to as low as reasonably achievable. COMPARISON: None. HISTORY: ORDERING SYSTEM PROVIDED HISTORY: syncope and collapse TECHNOLOGIST PROVIDED HISTORY: syncope and collapse Reason for Exam: Syncope and collapse Acuity: Unknown Type of Exam: Unknown FINDINGS: BONES/ALIGNMENT: There is no acute fracture or traumatic malalignment. C4 on C5 anterolisthesis is seen which measures 4 mm. DEGENERATIVE CHANGES: C5/C6 moderate disc height loss is noted in association with posterior disc osteophyte complex which narrows the midline AP thecal sac diameter to 10 mm consistent with borderline central canal stenosis. Disc osteophyte complex severely narrows the bilateral neural foramina.  C6/C7: Moderate disc height loss is noted in association with posterior disc osteophyte complex which narrows the midline AP thecal sac diameter to 10 mm consistent with borderline central canal stenosis. Disc osteophyte complex encroaches upon and causes moderate left and mild right neural foraminal stenosis. No further significant spondylosis is noted within the cervical spine. SOFT TISSUES: There is no prevertebral soft tissue swelling. Partially visualized posterior right upper lung pleural thickening is noted, as described on CT chest abdomen and pelvis with contrast examination from 08/10/2021.     1. Note: Study significantly limited by patient motion related artifact. 2. No acute abnormality of the cervical spine. 3. C4 on C5 anterolisthesis measuring 4 mm, likely degenerative. 4. C5/C6, C6/C7 borderline central canal stenosis secondary to encroachment by posterior disc osteophyte complex. 5. C5/C6 severe bilateral, C6/C7 moderate left and mild right neural foraminal stenosis secondary to encroachment by disc osteophyte complex. CT THORACIC SPINE W CONTRAST    Result Date: 8/21/2021  EXAMINATION: CT OF THE THORACIC SPINE WITH CONTRAST; CT OF THE LUMBAR SPINE WITH CONTRAST 8/21/2021 2:55 pm: TECHNIQUE: CT of the thoracic spine was performed with the administration of intravenous contrast.  Multiplanar reformatted images are provided for review. Dose modulation, iterative reconstruction, and/or weight based adjustment of the mA/kV was utilized to reduce the radiation dose to as low as reasonably achievable.; CT of the lumbar spine was performed with the administration of intravenous contrast. Multiplanar reformatted images are provided for review. Dose modulation, iterative reconstruction, and/or weight based adjustment of the mA/kV was utilized to reduce the radiation dose to as low as reasonably achievable. COMPARISON: None.  HISTORY: ORDERING SYSTEM PROVIDED HISTORY: r/o OM TECHNOLOGIST PROVIDED HISTORY: r/o OM Reason for Exam: r/o OM Acuity: Unknown Type of Exam: Unknown Relevant Medical/Surgical History: chest tube; ORDERING SYSTEM PROVIDED HISTORY: r/o OM TECHNOLOGIST PROVIDED HISTORY: r/o OM Reason for Exam: r/o om Acuity: Unknown Type of Exam: Unknown FINDINGS: CT thoracic spine: There is a mild levocurvature of the thoracic spine. No acute fracture or traumatic subluxation is identified. Chronic appearing Schmorl's nodes are noted. There is no CT evidence of discitis-osteomyelitis. Atelectatic changes are present within both lungs with a mild left pleural effusion. Multiple loculated pleural effusions are present within the right hemithorax. A chest tube is present. CT lumbar spine: There is a normal lumbar lordosis but a moderately severe to lumbar levoscoliosis. No acute fracture or traumatic subluxation is identified. There is severe degenerative disc disease at L3-4 with vacuum phenomena. A chronic Schmorl's node is seen along the superior endplate of L2. There is multilevel facet hypertrophy with moderate to severe right-sided osseous neural foraminal stenosis at L3-4 and moderate to severe left-sided osseous neural foraminal stenosis at L4-5. There is no CT evidence of discitis-osteomyelitis. Heterogeneous enhancement of the kidneys is noted and there is mild pelvic free fluid. No CT evidence of discitis-osteomyelitis. Partially visualized loculated pleural effusions, on the right-hand side. Heterogeneous enhancement of the kidneys. Rule out pyelonephritis. CT LUMBAR SPINE W CONTRAST    Result Date: 8/21/2021  EXAMINATION: CT OF THE THORACIC SPINE WITH CONTRAST; CT OF THE LUMBAR SPINE WITH CONTRAST 8/21/2021 2:55 pm: TECHNIQUE: CT of the thoracic spine was performed with the administration of intravenous contrast.  Multiplanar reformatted images are provided for review.  Dose modulation, iterative reconstruction, and/or weight based adjustment of the mA/kV was utilized to reduce the radiation dose to as low as reasonably achievable.; CT of the lumbar spine was performed with the administration of intravenous contrast. Multiplanar reformatted images are provided for review. Dose modulation, iterative reconstruction, and/or weight based adjustment of the mA/kV was utilized to reduce the radiation dose to as low as reasonably achievable. COMPARISON: None. HISTORY: ORDERING SYSTEM PROVIDED HISTORY: r/o OM TECHNOLOGIST PROVIDED HISTORY: r/o OM Reason for Exam: r/o OM Acuity: Unknown Type of Exam: Unknown Relevant Medical/Surgical History: chest tube; ORDERING SYSTEM PROVIDED HISTORY: r/o OM TECHNOLOGIST PROVIDED HISTORY: r/o OM Reason for Exam: r/o om Acuity: Unknown Type of Exam: Unknown FINDINGS: CT thoracic spine: There is a mild levocurvature of the thoracic spine. No acute fracture or traumatic subluxation is identified. Chronic appearing Schmorl's nodes are noted. There is no CT evidence of discitis-osteomyelitis. Atelectatic changes are present within both lungs with a mild left pleural effusion. Multiple loculated pleural effusions are present within the right hemithorax. A chest tube is present. CT lumbar spine: There is a normal lumbar lordosis but a moderately severe to lumbar levoscoliosis. No acute fracture or traumatic subluxation is identified. There is severe degenerative disc disease at L3-4 with vacuum phenomena. A chronic Schmorl's node is seen along the superior endplate of L2. There is multilevel facet hypertrophy with moderate to severe right-sided osseous neural foraminal stenosis at L3-4 and moderate to severe left-sided osseous neural foraminal stenosis at L4-5. There is no CT evidence of discitis-osteomyelitis. Heterogeneous enhancement of the kidneys is noted and there is mild pelvic free fluid. No CT evidence of discitis-osteomyelitis. Partially visualized loculated pleural effusions, on the right-hand side. Heterogeneous enhancement of the kidneys. Rule out pyelonephritis.      US RENAL COMPLETE    Result Date: 8/17/2021  EXAMINATION: ULTRASOUND OF THE KIDNEYS 8/17/2021 1:41 pm COMPARISON: None. HISTORY: ORDERING SYSTEM PROVIDED HISTORY: renal failure Acuity: Acute Type of Exam: Initial FINDINGS: The right kidney measures 12.6 cm in length and the left kidney measures 12.5 cm in length. Kidneys demonstrate normal cortical echogenicity. No hydronephrosis or intrarenal stones. No focal lesions. Visualized liver appears echogenic suggesting fatty infiltration. Negative renal ultrasound. Likely hepatic fatty infiltration. IR FLUORO GUIDED CVA DEVICE PLMT/REPLACE/REMOVAL    Result Date: 8/12/2021  PROCEDURE: ULTRASOUND GUIDED VASCULAR ACCESS. FLUOROSCOPY GUIDED PICC PLACEMENT 8/12/2021. HISTORY: ORDERING SYSTEM PROVIDED HISTORY: TPN, vasopressers TECHNOLOGIST PROVIDED HISTORY: PICC TPN, vasopressers Lumen?->Double Lumen Reason for Exam: TPN Acuity: Unknown Type of Exam: Unknown SEDATION: None FLUOROSCOPY DOSE AND TYPE OR TIME AND EXPOSURES: 5 seconds; D AP 9 cGy cm2 TECHNIQUE: Informed consent was obtained after a detailed explanation of the procedure including risks, benefits, and alternatives. Universal protocol was observed. The right arm was prepped and draped in sterile fashion using maximum sterile barrier technique. Local anesthesia was achieved with lidocaine. A micropuncture needle was used to access the right basilic vein using ultrasound guidance. An ultrasound image demonstrating patency of the vein with needle tip located within it. An image was obtained and stored in PACs. A 0.018 guidewire was used to place a peel-a-way sheath and a 5 [employer] [place] dual PICC was advanced with fluoroscopic guidance with the tip at the cavo-atrial junction. The catheter flushed easily and there was a good blood return. The catheter was secured to the skin. The patient tolerated the procedure well and there were no immediate complications.  EBL: Less than 5 mL FINDINGS: Fluoroscopic image demonstrates the tip of the catheter at the cavo-atrial junction. Successful ultrasound and fluoroscopy guided PICC placement     XR CHEST PORTABLE    Result Date: 8/29/2021  EXAMINATION: ONE XRAY VIEW OF THE CHEST 8/29/2021 7:45 am COMPARISON: 08/28/2021 HISTORY: Reason for Exam: reassess empyema Acuity: Acute Type of Exam: Ongoing FINDINGS: Stable dense right basilar consolidation with blunting of the costophrenic angle. Stable appearing pleural fluid along the right lateral lung field. Persistent areas of loculated pleural fluid right mid to lower lung field. Persistent smaller left pleural effusion with associated atelectasis. No discernible pneumothorax. Stable right-sided PICC line. Cardiomediastinal is stable. Osseous structures appear intact. Stable appearing chest with persistent findings as detailed above. XR CHEST PORTABLE    Result Date: 8/28/2021  EXAMINATION: ONE X-RAY VIEW OF THE CHEST 8/28/2021 8:54 am COMPARISON: 08/27/2021 HISTORY: ORDERING SYSTEM PROVIDED HISTORY:  Reassess empyema TECHNOLOGIST PROVIDED HISTORY: Reassess empyema Reason for Exam:  Reassess empyema Acuity:  Unknown Type of Exam:  Unknown FINDINGS: Right arm PICC remains in place. Heart size is stable. Patchy opacities at the right base and pleural-based opacities in the lateral mid right hemithorax are unchanged. No evidence of pneumothorax. Mild diffuse interstitial thickening is unchanged. No significant interval change. Patchy opacities at the right base and pleural-based opacity in the lateral mid right chest.     XR CHEST PORTABLE    Result Date: 8/27/2021  EXAMINATION: ONE XRAY VIEW OF THE CHEST 8/27/2021 7:52 am COMPARISON: 08/26/2021, 08/25/2021 HISTORY: ORDERING SYSTEM PROVIDED HISTORY: Reassess empyema TECHNOLOGIST PROVIDED HISTORY: Reassess empyema Reason for Exam: right side chest tube Acuity: Acute Type of Exam: Initial FINDINGS: Right pigtail chest tube remains in place.   Right PICC line appears unchanged in position. The right pleural effusion and opacities in the mid to inferior right lung field are without appreciable change. No pneumothorax identified. Left basilar opacities and small left effusion also appears unchanged. No new airspace disease identified. Right chest tube remains in place. No significant change in opacities in the right lung and effusion. No pneumothorax. XR CHEST PORTABLE    Result Date: 8/26/2021  EXAMINATION: ONE XRAY VIEW OF THE CHEST 8/26/2021 8:05 pm COMPARISON: 8/25/2021 HISTORY: ORDERING SYSTEM PROVIDED HISTORY: reassess empyema post chest tube to water seal TECHNOLOGIST PROVIDED HISTORY: reassess empyema post chest tube to water seal Reason for Exam: reassess empyema post chest tube to water seal Acuity: Acute Type of Exam: Initial Additional signs and symptoms: reassess empyema post chest tube to water seal Relevant Medical/Surgical History: reassess empyema post chest tube to water seal FINDINGS: Right upper extremity PICC and chest tube are in stable position. Improved aeration is identified in the right base compared to the prior study. Persistent loculated hydropneumothorax is seen. Redemonstration of large nodular opacities overlying the right mid to lower lung zone. Left base pleural effusion and opacity are unchanged. No acute osseous abnormality. 1. Stable lines and tubes. 2. Interval improvement of right base aeration from the prior study. 3. Mild to moderate loculated right hydropneumothorax. 4. Persistent nodular opacities overlying the right mid to lower lung zone. 5. Stable left base opacity and pleural effusion.      XR CHEST PORTABLE    Result Date: 8/25/2021  EXAMINATION: ONE XRAY VIEW OF THE CHEST 8/25/2021 9:27 am COMPARISON: 08/24/2021 HISTORY: ORDERING SYSTEM PROVIDED HISTORY: empyema TECHNOLOGIST PROVIDED HISTORY: empyema Reason for Exam: empyema Acuity: Acute Type of Exam: Initial FINDINGS: There is a small bore locking loop chest tube There is a right-sided PICC line with the tip in the distal SVC. There is a right-sided pigtail drain. Bilateral effusions with adjacent mid and lower lung infiltrates consistent with pneumonia. Support tubes as described above. XR CHEST PORTABLE    Result Date: 8/22/2021  EXAMINATION: ONE XRAY VIEW OF THE CHEST 8/22/2021 6:43 am COMPARISON: 21 August 2021 HISTORY: ORDERING SYSTEM PROVIDED HISTORY: infiltrate TECHNOLOGIST PROVIDED HISTORY: infiltrate Reason for Exam: infiltrate Acuity: Unknown Type of Exam: Unknown Additional signs and symptoms: infiltrate FINDINGS: AP portable view of the chest time stamped at 549 hours demonstrates overlying cardiac monitoring electrodes. Right PICC line terminates in the right atrium. Pigtail terminus small bore chest tube remains at the right base. Prior right-sided extrapleural air is not redemonstrated. However, there is fluid in that space consistent with loculated effusion. Rounded opacity at the right base is unchanged with surrounding haziness. Vascularity appears increased over prior study with interval development of left perihilar and basilar opacities. Right-sided extrapleural air not demonstrated replaced with fluid. Continued nodular density at the right base. Interval increase in vascularity development of left-sided opacities.      XR CHEST PORTABLE    Result Date: 8/21/2021  EXAMINATION: ONE XRAY VIEW OF THE CHEST 8/21/2021 5:21 am COMPARISON: 20 August 2021 HISTORY: ORDERING SYSTEM PROVIDED HISTORY: ETT placement, chest tube placement TECHNOLOGIST PROVIDED HISTORY: ETT placement, chest tube placement Acuity: Unknown Type of Exam: Ongoing Additional signs and symptoms: ETT placement, chest tube placement Relevant Medical/Surgical History: ETT placement, chest tube placement FINDINGS: AP portable view of the chest time stamped at 503 hours demonstrates an endotracheal tube terminating 3.2 cm above the kristen, overlying cardiac monitoring electrodes, right-sided PICC line terminating in the proximal right atrium. No change in cardiomegaly. Rounded opacity at the right lung base is again noted unchanged. There has been some clearing of density surrounding the opacity at the right lung base although significant residual remains. Right-sided pneumothorax again noted 5.1 mm in width reduced from a gonzáles mm. Small amount of right basilar extrapleural air. Pigtail terminus right-sided chest tube remains at the right base. Left lung is clear. 1.  Slight reduction in right-sided pneumothorax. 2.  Slight decrease in opacity in the right lower hemithorax surrounding a masslike opacification. 3.  Tubes and lines as above. XR CHEST PORTABLE    Result Date: 8/20/2021  EXAMINATION: ONE XRAY VIEW OF THE CHEST 8/20/2021 4:10 pm COMPARISON: Portable chest x-ray 5:22 a.m. Today. HISTORY: ORDERING SYSTEM PROVIDED HISTORY: rule out ptx TECHNOLOGIST PROVIDED HISTORY: rule out ptx Reason for Exam: R/o PTX Acuity: Unknown Type of Exam: Unknown Additional signs and symptoms: R/o PTX FINDINGS: There has been interval placement of a chest tube in the right base. Small pneumothorax is stable. There is a rounded area of opacification measuring up to 4 cm in the right base which is more prominent than on comparison radiographs. No pneumothorax within the left lung. Bibasilar patchy opacities are overall stable from earlier today. Right upper extremity central line tip is at the cavoatrial junction. Enteric tube is been removed. Endotracheal tube is approximately 2 cm above the kristen in appropriate position. 1 anterior rib fracture on the right is seen, likely the 6th rib. Interval chest tube placement in the right lower lobe with stable small pneumothorax. Rounded opacification in the right base seen to better advantage on today's study. May represent consolidation, loculated effusion, or mass. Recommend attention on follow-up.      XR CHEST PORTABLE    Result Date: 8/20/2021  EXAMINATION: ONE XRAY VIEW OF THE CHEST 8/20/2021 5:34 am COMPARISON: August 19, 2021 chest exam HISTORY: ORDERING SYSTEM PROVIDED HISTORY: vent TECHNOLOGIST PROVIDED HISTORY: vent Reason for Exam: vent Acuity: Unknown Type of Exam: Ongoing Additional signs and symptoms: vent Relevant Medical/Surgical History: vent FINDINGS: There are satisfactorily positioned endotracheal nasogastric tubes. The endotracheal tube tip is 1.5 cm cranial to the kristen. Stable cardiopericardial silhouette/mild tortuosity of the thoracic aorta Considering technical differences, there is a small right pneumothorax with maximal 12 mm transverse dimension at the upper lung. . Present film is taken in greater expiration than the previous. Slight interval progression in moderate diffuse bilateral interstitial/alveolar infiltrates with increasing right lower lung consolidation     Stable small right pneumothorax Satisfactorily positioned support lines Slight progression in moderate diffuse bilateral interstitial/alveolar infiltrates suggesting increasing edema and/or infection Increasing now moderate right lower lung consolidation/infiltrate     XR CHEST PORTABLE    Result Date: 8/19/2021  EXAMINATION: ONE XRAY VIEW OF THE CHEST 8/19/2021 5:39 am COMPARISON: 08/18/2021 HISTORY: ORDERING SYSTEM PROVIDED HISTORY: vent TECHNOLOGIST PROVIDED HISTORY: vent Reason for Exam: vent Acuity: Acute Type of Exam: Ongoing FINDINGS: Cardiac size is enlarged. The endotracheal tube ice 3.5 cm above the kristen. Overall stable appearing small right-sided pneumothorax. No mediastinal shift is identified. Perihilar as well as basilar airspace disease seen bilaterally, greater on the right with mild increased focal consolidation at the right lung base compared to the prior study. Otherwise similar appearing chest.     Stable small right-sided pneumothorax.  Mild increased focal consolidation in the right lower lung field, of the aorta. Previously identified rib fractures are not well evaluated on the study. Small to moderate pneumothorax without tension. Right lower lobe opacification likely represents consolidation. Nearly completely resolved effusion. RECOMMENDATION: ICU attending physician was not available. These results were discussed via telephone with Dr. Joanne Rios at [phone] p.m.. Recommended following with portable chest x-rays overnight. The physician voiced understanding. XR CHEST PORTABLE    Result Date: 8/17/2021  EXAMINATION: ONE XRAY VIEW OF THE CHEST 8/17/2021 6:34 am COMPARISON: August 16, 2021 chest examination HISTORY: ORDERING SYSTEM PROVIDED HISTORY: vent TECHNOLOGIST PROVIDED HISTORY: vent Reason for Exam: vent Acuity: Acute Type of Exam: Ongoing FINDINGS: Limited rotated exam Stable satisfactorily positioned endotracheal/nasogastric tubes, right PICC line catheter. Stable cardiopericardial silhouette No definite change in extensive right chest pleuroparenchymal process with near opacification of 3/4 of the right hemithorax related to large effusion with underlying atelectasis versus infiltrate. Stable mild left basilar opacity     Stable chest     XR CHEST PORTABLE    Result Date: 8/16/2021  EXAMINATION: ONE XRAY VIEW OF THE CHEST 8/16/2021 4:38 am COMPARISON: Chest radiograph 08/15/2021. HISTORY: ORDERING SYSTEM PROVIDED HISTORY: vent TECHNOLOGIST PROVIDED HISTORY: vent Reason for Exam: vent Acuity: Unknown Type of Exam: Ongoing Additional signs and symptoms: vent Relevant Medical/Surgical History: vent FINDINGS: The endotracheal tube terminates approximately 3.5 cm above the kristen. A right upper extremity PICC terminates in the lower superior vena cava. The cardiomediastinal silhouette is unchanged. Large right pleural effusion and basilar airspace opacities without significant change. No pneumothorax or left pleural effusion. No acute osseous abnormality.      Large right pleural effusion and right basilar opacities not significantly changed from the previous study. XR CHEST PORTABLE    Result Date: 8/15/2021  EXAMINATION: ONE XRAY VIEW OF THE CHEST 8/15/2021 4:49 am COMPARISON: 08/14/2021 and prior HISTORY: ORDERING SYSTEM PROVIDED HISTORY: vent TECHNOLOGIST PROVIDED HISTORY: vent Reason for Exam: vent Acuity: Unknown Type of Exam: Ongoing Additional signs and symptoms: vent Relevant Medical/Surgical History: vent FINDINGS: Study limited by motion as well as overlying support and monitoring apparatus. The endotracheal tube appears stable from the distal 3rd of the esophagus. NG tube is limited in its evaluation but appears to extend below the diaphragm. The tip is not visualized. Right upper extremity PICC line projects at the superior vena cava right atrial junction. The heart and mediastinal contours appears stable in appearance. Increased interstitial and patchy opacity at the left base appears similar when accounting for differences in technique. Pleural and parenchymal changes on the right with a mid and lower lung zone predominance. Osseous structures are unremarkable in appearance. Lines and tubes appear stable. Pleural and parenchymal changes on the right without significant change from the prior study given differences in technique. Changes on the left also appear relatively stable. Recommend continued follow-up. XR CHEST PORTABLE    Result Date: 8/14/2021  EXAMINATION: ONE XRAY VIEW OF THE CHEST 8/14/2021 5:54 am COMPARISON: August 13, 2021 HISTORY: ORDERING SYSTEM PROVIDED HISTORY: vent TECHNOLOGIST PROVIDED HISTORY: vent Reason for Exam: vent Acuity: Unknown Type of Exam: Ongoing Additional signs and symptoms: vent Relevant Medical/Surgical History: vent FINDINGS: Stable position of the support lines and tubes. No significant change in the parenchymal opacification of the right mid and lower lung region and left retrocardiac opacity. Bilateral pleural effusions unchanged.  Stable with the tip in the mid SVC. Right basilar effusion with adjacent consolidation consistent with pneumonia. VL Lower Extremity Arteries Right    Result Date: 8/16/2021    [address] [place] [address]  Vascular Lower Extremities Arterial Duplex Procedure   Patient Name   79Fr[facility]sca Hic[facility]       Date of Study           08/16/2021                 JUN FRAGA   Date of Birth  1965  Gender                  Male   Age            60 year(s)  Race                       Room Number    2004        Height:                 68.9 inch, 175 cm   Corporate ID # [id number]    Weight:                 163 pounds, 74 kg   Patient Acct # [de-identified]   BSA:        1.89 m^2    BMI:      24.16 kg/m^2   MR #           [id number]      Sonographer             Chino Bryant   Accession #    [id number]  Interpreting Physician  Alaina Soler   Referring                  Referring Physician     Chas Blevins  Nurse  Practitioner  Procedure Type of Study:   Extremities Arteries: Lower Extremities Arterial Duplex, Arterial Scan  Lower Right. Patient Status: In Patient. Technical Quality:Adequate visualization. Comments: Indications: Cold foot. Conclusions   Summary   Simultaneous real time imaging utilizing B-Mode, color doppler and  spectral waveform analysis was performed on the right lower extremity for  arterial examination, study demonstrates:   Right:  No evidence of significant occlusive arterial disease.    Signature   ----------------------------------------------------------------  Electronically signed by Chino Bryant(Sonographer) on  08/16/2021 10:26 AM  ----------------------------------------------------------------   ----------------------------------------------------------------  Electronically signed by Montana Johns(Interpreting  physician) on 08/16/2021 09:41 PM  ----------------------------------------------------------------  Findings:   Right Impression:  Patent external iliac, common femoral, profunda, superficial femoral,  popliteal and tibial arteries with triphasic flow. Velocities are measured in cm/s ; Diameters are measured in cm LE Duplex Measurements +---------++-----+-----+---------+----+-----++---+-----+-----+----+--------+ ! ! !Right! ! Left     !    !     !!   !     !     !    !        ! +---------++-----+-----+---------+----+-----++---+-----+-----+----+--------+ ! Location ! !PSV  ! Ratio! Wave     !AP  ! Trans! !PSV! Ratio! Wave !AP  ! Trans   ! !         !!     ! !Desc. ! Diam!Diam !!   ! !Desc. ! Diam!Diam    ! +---------++-----+-----+---------+----+-----++---+-----+-----+----+--------+ ! Dist EIA !!111  ! ! Triphasic!0.88!0.9  !!   !     !     !    !        ! +---------++-----+-----+---------+----+-----++---+-----+-----+----+--------+ ! Common   !!123  !1.11 ! Triphasic!1.16!1.3  !!   !     !     !    !        ! !Femoral  !!     !     !         !    !     !!   !     !     !    !        ! +---------++-----+-----+---------+----+-----++---+-----+-----+----+--------+ ! PFA      !!112  !0.91 ! Triphasic!0.64!0.62 !!   !     !     !    !        ! +---------++-----+-----+---------+----+-----++---+-----+-----+----+--------+ ! Prox SFA !!119  ! ! Triphasic!0.59!0.77 !!   !     !     !    !        ! +---------++-----+-----+---------+----+-----++---+-----+-----+----+--------+ ! Mid SFA  !!116  !0.97 ! Triphasic!0.57!0.67 !!   !     !     !    !        ! +---------++-----+-----+---------+----+-----++---+-----+-----+----+--------+ ! Dist SFA !!96.9 !0.84 ! Triphasic!0.63!0.78 !!   !     !     !    !        ! +---------++-----+-----+---------+----+-----++---+-----+-----+----+--------+ ! Prox     !!84.9 !0.88 ! Triphasic!0.65!0.81 !!   !     !     !    !        ! !Popliteal!!     !     !         !    !     !!   !     !     !    !        ! +---------++-----+-----+---------+----+-----++---+-----+-----+----+--------+ ! Dist     !!72.9 !0.86 ! Triphasic!0.52!0.68 !!   !     !     !    !        ! !Popliteal!!     !     ! !    !     !!   !     !     !    !        ! +---------++-----+-----+---------+----+-----++---+-----+-----+----+--------+ ! Prox PTA !!132  !1.81 !         !0.29!0.3  !!   !     !     !    !        ! +---------++-----+-----+---------+----+-----++---+-----+-----+----+--------+ ! Dist PTA !!96.9 !0.73 ! Triphasic!0.32!0.34 !!   !     !     !    !        ! +---------++-----+-----+---------+----+-----++---+-----+-----+----+--------+ ! Mid JANICE  !!93   !1.28 ! Triphasic!0.32!0.32 !!   !     !     !    !        ! +---------++-----+-----+---------+----+-----++---+-----+-----+----+--------+ ! Prox     !!121  !0.92 ! Triphasic!0.29!0.28 !!   !     !     !    !        ! !Peroneal !!     !     !         !    !     !!   !     !     !    !        ! +---------++-----+-----+---------+----+-----++---+-----+-----+----+--------+ ! Dist     !!53.4 !0.44 ! Triphasic!0.27!0.23 !!   !     !     !    !        !  !Peroneal !!     !     !         !    !     !!   !     !     !    !        ! +---------++-----+-----+---------+----+-----++---+-----+-----+----+--------+    VL Lower Extremity Bilateral Venous Duplex    Result Date: 8/24/2021    [facility]  Vascular Lower Extremities DVT Study Procedure   Patient Name   [facility]       Date of Study           08/24/2021                 Jocelyn FRAGA   Date of Birth  1965  Gender                  Male   Age            60 year(s)  Race                       Room Number    2103        Height:                 68.9 inch, 175 cm   Corporate ID # [id number]    Weight:                 163 pounds, 74 kg   Patient Acct # [de-identified]   BSA:        1.89 m^2    BMI:     24.16 kg/m^2   MR #           [id number]      Sonographer             Fab Chen   Accession #    [id number]  Interpreting Physician  Alex Camacho   Referring                  Referring Physician     Jesus Addison  Nurse  Practitioner  Procedure Type of Study:   Veins: Lower Extremities DVT Study, Venous Scan Lower Bilateral.  Patient Status: In Patient. Technical Quality:Adequate visualization. Comments:Prolonged hospitalization  Conclusions   Summary   Simultaneous real time imaging utilizing B-Mode, color doppler and  spectral waveform analysis was performed on the bilateral lower  extremities for venous examination of the deep and superficial systems. Findings are:   Right:  No evidence of deep venous thrombosis. Chronic superficial venous thrombosis identified in the great saphenous  vein at level of ankle. left:  No evidence of deep or superficial venous thrombosis. Signature   ----------------------------------------------------------------  Electronically signed by Anibal Naylor(Sonographer) on  08/24/2021 12:47 PM  ----------------------------------------------------------------   ----------------------------------------------------------------  Electronically signed by Montana Robertson(Interpreting  physician) on 08/24/2021 07:37 PM  ----------------------------------------------------------------  Findings:   Right Impression:                    Left Impression:  The common femoral, femoral,         The common femoral, femoral,  popliteal and tibial veins           popliteal and tibial veins  demonstrate normal compressibility   demonstrate normal compressibility  and augmentation. and augmentation. Non compressibility of the great     Normal compressibility of the great  saphenous vein at the level of the   saphenous vein. ankle. Normal compressibility of the small  Normal compressibility of the small  saphenous vein. saphenous vein. Velocities are measured in cm/s ; Diameters are measured in cm Right Lower Extremities DVT Study Measurements Right 2D Measurements +------------------------------------+----------+---------------+----------+ ! Location                            ! Visualized! Compressibility! Thrombosis! +------------------------------------+----------+---------------+----------+ ! Common Femoral                      !Yes       ! Yes            ! None      ! +------------------------------------+----------+---------------+----------+ ! Prox Femoral                        !Yes       ! Yes            ! None      ! +------------------------------------+----------+---------------+----------+ ! Mid Femoral                         !Yes       ! Yes            ! None      ! +------------------------------------+----------+---------------+----------+ ! Dist Femoral                        !Yes       ! Yes            ! None      ! +------------------------------------+----------+---------------+----------+ ! Popliteal                           !Yes       ! Yes            ! None      ! +------------------------------------+----------+---------------+----------+ ! Sapheno Femoral Junction            ! Yes       ! Yes            ! None      ! +------------------------------------+----------+---------------+----------+ ! PTV                                 ! Yes       ! Partial        !None      ! +------------------------------------+----------+---------------+----------+ ! Peroneal                            !Yes       ! Partial        !None      ! +------------------------------------+----------+---------------+----------+ ! Gastroc                             ! Yes       ! Yes            ! None      ! +------------------------------------+----------+---------------+----------+ ! GSV Thigh                           ! Yes       ! Yes            ! None      ! +------------------------------------+----------+---------------+----------+ ! GSV Knee                            ! Yes       ! Yes            ! None      ! +------------------------------------+----------+---------------+----------+ ! GSV Ankle                           ! Yes       ! No             !          ! +------------------------------------+----------+---------------+----------+ ! SSV !Yes       !Yes            ! None      ! +------------------------------------+----------+---------------+----------+ Right Doppler Measurements +---------------------------+------+------+--------------------------------+ ! Location                   ! Signal!Reflux! Reflux (msec)                   ! +---------------------------+------+------+--------------------------------+ ! Common Femoral             !Phasic!      !                                ! +---------------------------+------+------+--------------------------------+ ! Prox Femoral               !Phasic!      !                                ! +---------------------------+------+------+--------------------------------+ ! Popliteal                  !Phasic!      !                                ! +---------------------------+------+------+--------------------------------+ Left Lower Extremities DVT Study Measurements Left 2D Measurements +------------------------------------+----------+---------------+----------+ ! Location                            ! Visualized! Compressibility! Thrombosis! +------------------------------------+----------+---------------+----------+ ! Common Femoral                      !Yes       ! Yes            ! None      ! +------------------------------------+----------+---------------+----------+ ! Prox Femoral                        !Yes       ! Yes            ! None      ! +------------------------------------+----------+---------------+----------+ ! Mid Femoral                         !Yes       ! Yes            ! None      ! +------------------------------------+----------+---------------+----------+ ! Dist Femoral                        !Yes       ! Yes            ! None      ! +------------------------------------+----------+---------------+----------+ ! Popliteal                           !Yes       ! Yes            ! None      ! +------------------------------------+----------+---------------+----------+ ! Sapheno Femoral Junction !Yes       !Yes            ! None      ! +------------------------------------+----------+---------------+----------+ ! PTV                                 ! Yes       ! Partial        !None      ! +------------------------------------+----------+---------------+----------+ ! Peroneal                            !Yes       ! Partial        !None      ! +------------------------------------+----------+---------------+----------+ ! Gastroc                             ! Yes       ! Yes            ! None      ! +------------------------------------+----------+---------------+----------+ ! GSV Thigh                           ! Yes       ! Yes            ! None      ! +------------------------------------+----------+---------------+----------+ ! GSV Knee                            ! Yes       ! Yes            ! None      ! +------------------------------------+----------+---------------+----------+ ! GSV Ankle                           ! Yes       ! Yes            ! None      ! +------------------------------------+----------+---------------+----------+ ! SSV                                 ! Yes       ! Yes            ! None      ! +------------------------------------+----------+---------------+----------+ Left Doppler Measurements +---------------------------+------+------+--------------------------------+ ! Location                   ! Signal!Reflux! Reflux (msec)                   ! +---------------------------+------+------+--------------------------------+ ! Common Femoral             !Phasic!      !                                ! +---------------------------+------+------+--------------------------------+ ! Prox Femoral               !Phasic!      !                                ! +---------------------------+------+------+--------------------------------+ ! Popliteal                  !Phasic!      !                                ! +---------------------------+------+------+--------------------------------+    IR CHEST TUBE INSERTION    Result Date: 8/20/2021  PROCEDURE: ULTRASOUND GUIDED CHEST TUBE PLACEMENT 8/20/2021 HISTORY: ORDERING SYSTEM PROVIDED HISTORY: increased plural effusion TECHNOLOGIST PROVIDED HISTORY: increased plural effusion Which side should the procedure be performed?->Right Pneumothorax SEDATION: None. 8 mL 1% lidocaine local TECHNIQUE: The patient is not consentable. His immediate family was not available so 2 physician emergent consent was obtained including myself and Internal Medicine attending. [place] protocol was followed. A time-out was performed prior to commencing the procedure. A suitable skin site was prepped and draped in sterile fashion following ultrasound localization. A 10 [employer] [place] multipurpose drain was advanced into the collection utilizing the trocar technique. An ultrasound image of the tip within the collection was saved and sent to PACs. The drain was advanced off the inner stylet, looped, and locked within the collection. Aspiration of sanguinous and purulent material was aspirated to confirm location. The drain was sutured in place and attached to an atrium chest tube device. Sterile dressing was applied. The patient tolerated the procedure well without immediate complication. FINDINGS: Highly complex pleural effusion consistent with empyema. Successful ultrasound guided placement of a right chest tube. Chest x-ray pending.      VL Upper Extremity Venous Duplex Left    Result Date: 8/24/2021    [address] [place] [address]  Vascular Upper Extremities Veins Procedure   Patient Name   [facility]       Date of Study           08/24/2021                 JUN FRAGA   Date of Birth  1965  Gender                  Male   Age            60 year(s)  Race                       Room Number    2103        Height:                 68.9 inch, 175 cm   Corporate ID # [id number]    Weight:                 163 pounds, 74 kg   Patient Acct # [de-identified]   BSA:        1.89 m^2    BMI:     24.16 kg/m^2   MR #           [id number]      Sonographer             Lukasz Baum   Accession #    [id number]  Interpreting Physician  Daniel Jacobsen   Referring                  Referring Physician     Lidia Santacruz  Nurse  Practitioner  Procedure Type of Study:   Veins: Upper Extremities Veins, Venous Scan Upper Left. Indications for Study: Thrombus. Patient Status: In Patient. Technical Quality:Adequate visualization. Conclusions   Summary   Simultaneous real time imaging utilizing B-Mode, color doppler and  spectral waveform analysis was performed on the left upper extremity for  venous examination of the deep and superficial systems. Findings are:   Left:  No evidence of deep or superficial venous thrombosis. Signature   ----------------------------------------------------------------  Electronically signed by Bascom Cushing, Nick(Sonographer) on  08/24/2021 12:58 PM  ----------------------------------------------------------------   ----------------------------------------------------------------  Electronically signed by Montana Bedolla(Interpreting  physician) on 08/24/2021 07:29 PM  ----------------------------------------------------------------  Findings:   Right Impression:        Left Impression:  The Subclavian vein was  Internal jugular, subclavian, axillary, brachial,  evaluated. ulnar, radial, cephalic and basilic veins are  It was compressible and  compressible with normal doppler responses. with normal doppler  responses. Velocities are measured in cm/s ; Diameters are measured in cm Right UE Vein Measurements 2D Measurements +---------------+-----------------+----------------------+-----------------+ ! Location       ! Visualized       ! Compressibility       ! Thrombosis       ! +---------------+-----------------+----------------------+-----------------+ ! Prox SCV       ! Yes              ! Yes                   ! None             ! +---------------+-----------------+----------------------+-----------------+ Doppler Measurements +-------------------------+-----------------------+------------------------+ ! Location                 ! Signal                 !Reflux                  ! +-------------------------+-----------------------+------------------------+ ! SCV                      ! Phasic                 !                        ! +-------------------------+-----------------------+------------------------+ Left UE Vein Measurements 2D Measurements +------------------------------------+----------+---------------+----------+ ! Location                            ! Visualized! Compressibility! Thrombosis! +------------------------------------+----------+---------------+----------+ ! Prox IJV                            ! Yes       !               !          ! +------------------------------------+----------+---------------+----------+ ! Prox SCV                            ! Yes       !               !          ! +------------------------------------+----------+---------------+----------+ ! Prox Axillary                       ! Yes       !               !          ! +------------------------------------+----------+---------------+----------+ ! Prox Brachial                       !Yes       !               !          ! +------------------------------------+----------+---------------+----------+ ! Prox Radial                         !No        !               !          ! +------------------------------------+----------+---------------+----------+ ! Prox Ulnar                          ! No        !               !          ! +------------------------------------+----------+---------------+----------+ ! Basilic at UA                       ! Yes       !               !          ! +------------------------------------+----------+---------------+----------+ ! Basilic at AF                       ! Yes       !               !          ! 68.9 inch, 175 cm   Corporate ID # [id number]    Weight:                 163 pounds, 74 kg   Patient Acct # [de-identified]   BSA:        1.89 m^2    BMI:      24.16 kg/m^2   MR #           [id number]      Sonographer             Madison Lyon RVT   Accession #    [id number]  Interpreting Physician  Giovani Waite   Referring                  Referring Physician     Aicha Castellanos  Nurse  Practitioner  Procedure Type of Study:   Veins: Upper Extremities Veins, Venous Scan Upper Left. Indications for Study:Arm swelling. Patient Status:Out Patient. Technical Quality:Adequate visualization.   - Critical Result:JOHN Duke. Conclusions   Summary   No evidence of superficial or deep venous thrombosis in the left upper  extremity. Superficial vein thrombophlebitis is noted in 2 braches from  the basilar vein near the antecubital fossa. Signature   ----------------------------------------------------------------  Electronically signed by Madison Lyon RVT(Sonographer) on  08/17/2021 12:05 PM  ----------------------------------------------------------------   ----------------------------------------------------------------  Electronically signed by Berdie Feather Reyes,Arthur(Interpreting  physician) on 08/17/2021 07:25 PM  ----------------------------------------------------------------  Findings:   Right Impression:          Left Impression:  Right subclavian vein is   Left internal jugular, subclavian, axillary,  compressible with normal   brachial, ulnar, radial, cephalic and basilic  doppler responses. veins are compressible with normal doppler                             responses. There are two tributaries to the basilic vein                             near the anticubital fossa that are slightly                             dilated, partially compressible with isoechoic                             echoes.   Velocities are measured in cm/s ; Diameters are measured in cm Right UE Vein Measurements 2D Measurements +---------------+-----------------+----------------------+-----------------+ ! Location       ! Visualized       ! Compressibility       ! Thrombosis       ! +---------------+-----------------+----------------------+-----------------+ ! Prox SCV       ! Yes              ! Yes                   ! None             ! +---------------+-----------------+----------------------+-----------------+ Left UE Vein Measurements 2D Measurements +------------------------------------+----------+---------------+----------+ ! Location                            ! Visualized! Compressibility! Thrombosis! +------------------------------------+----------+---------------+----------+ ! Prox IJV                            ! Yes       ! Yes            ! None      ! +------------------------------------+----------+---------------+----------+ ! Dist IJV                            ! Yes       ! Yes            ! None      ! +------------------------------------+----------+---------------+----------+ ! Prox SCV                            ! Yes       ! Yes            ! None      ! +------------------------------------+----------+---------------+----------+ ! Dist SCV                            ! Yes       ! Yes            ! None      ! +------------------------------------+----------+---------------+----------+ ! Prox Axillary                       ! Yes       ! Yes            ! None      ! +------------------------------------+----------+---------------+----------+ ! Dist Axillary                       ! Yes       ! Yes            ! None      ! +------------------------------------+----------+---------------+----------+ ! Prox Brachial                       !Yes       ! Yes            ! None      ! +------------------------------------+----------+---------------+----------+ ! Dist Brachial                       !Yes       ! Yes            ! None      ! +------------------------------------+----------+---------------+----------+ ! Rajesh Radial !Yes       !Yes            ! None      ! +------------------------------------+----------+---------------+----------+ ! Dist Radial                         !Yes       ! Yes            ! None      ! +------------------------------------+----------+---------------+----------+ ! Prox Ulnar                          ! Yes       ! Yes            ! None      ! +------------------------------------+----------+---------------+----------+ ! Dist Ulnar                          ! Yes       ! Yes            ! None      ! +------------------------------------+----------+---------------+----------+ ! Basilic at UA                       ! Yes       ! Yes            ! None      ! +------------------------------------+----------+---------------+----------+ ! Basilic at AF                       ! Yes       ! Yes            ! None      ! +------------------------------------+----------+---------------+----------+ ! Basilic at [address]                       ! Yes       ! Yes            ! None      ! +------------------------------------+----------+---------------+----------+ ! Cephalic at UA                      ! Yes       ! Yes            ! None      ! +------------------------------------+----------+---------------+----------+ ! Cephalic at AF                      ! Yes       ! Yes            ! None      ! +------------------------------------+----------+---------------+----------+ ! Cephalic at [address]                      ! Yes       ! Yes            ! None      ! +------------------------------------+----------+---------------+----------+    CT CHEST ABDOMEN PELVIS W CONTRAST    Result Date: 8/10/2021  EXAMINATION: CT OF THE CHEST, ABDOMEN, AND PELVIS WITH CONTRAST 8/10/2021 10:41 pm TECHNIQUE: CT of the chest, abdomen and pelvis was performed with the administration of intravenous contrast. Multiplanar reformatted images are provided for review.  Dose modulation, iterative reconstruction, and/or weight based adjustment of the mA/kV was utilized to reduce the radiation dose to as low as reasonably achievable. COMPARISON: None HISTORY: ORDERING SYSTEM PROVIDED HISTORY: Syncope, fall, rib and abd pain TECHNOLOGIST PROVIDED HISTORY: Syncope, fall, rib and abd pain Decision Support Exception - unselect if not a suspected or confirmed emergency medical condition->Emergency Medical Condition (MA) Reason for Exam: Syncope, fall, rib and abd pain Acuity: Acute Type of Exam: Initial Mechanism of Injury: Pt states he was walking and then was on the ground, states he hurts everywhere. FINDINGS: Chest: Mediastinum: Cardiomegaly. Coronary artery calcifications. Aortic vascular calcifications. Small pericardial effusion. No suspicious mediastinal or hilar Adenopathy Lungs/pleura: Interstitial thickening suggestive edema. Small right-sided effusion. Areas of pleural thickening identified right near the right-sided rib fractures. Trace left-sided effusion and left basilar atelectasis. Stable right lower lobe nodule 8 mm in size. Focal areas opacity anterior aspect of right lung likely represent areas. Cyst versus scarring Soft Tissues/Bones: There right anterolateral fractures involving the right 4th, 5th 6 fracture ribs with associated areas pleural thickening multilevel changes. Abdomen/Pelvis: Organs: Fatty infiltration liver. Gallbladder, spleen, adrenal glands, kidneys, and pancreas unremarkable. Adrenal glands are thickened bilaterally. Subcentimeter right adrenal nodule likely adrenal adenoma, Is unchanged. GI/Bowel: Mild retained stool. Increased fluid content involving the bowel loops bowel obstruction. Mild colonic diverticulosis. No CT findings acute appendicitis. Few scattered colonic diverticula. Pelvis: Mild bladder wall thickening anteriorly. Prostate unremarkable. Peritoneum/Retroperitoneum: No free fluid. Moderate severe aortic vascular calcifications. Aorta is non. Bones/Soft Tissues: No displaced hip or pelvic fractures levocurvature lumbar spine. draped in sterile fashion and local anesthesia was achieved with lidocaine. Initial ultrasound images show small left pleural effusion. A 5 Urdu needle sheath was advanced under ultrasound guidance into pleural effusion and thoracentesis was performed. The patient tolerated the procedure well. The planned procedure was discussed with CAMERON MCKEON at approximately 2 pm on 8/23/2021. Decision was made to perform thoracentesis only due to the relatively small quantity of fluid present. EBL: None FINDINGS: A total of 100 mL of clear yellow fluid was removed. Fluid was sent for the requested studies. Successful ultrasound guided left thoracentesis. IR GUIDED THORACENTESIS PLEURAL    Result Date: 8/17/2021  PROCEDURE: ULTRASOUNDGUIDED RIGHT THORACENTESIS 8/17/2021 HISTORY: ORDERING SYSTEM PROVIDED HISTORY: Rt Sided Effusion in need of draining TECHNOLOGIST PROVIDED HISTORY: Rt Sided Effusion in need of draining Which side should the procedure be performed?->Right TECHNIQUE: Informed consent was obtained from the patient's mother via telephone after a detailed explanation of the procedure including risks, benefits, and alternatives. Universal protocol was performed. A time-out was performed prior to commencing the procedure. The right chest was prepped and draped in sterile fashion and local anesthesia was achieved with lidocaine. A 5 Urdu one-step device was advanced under ultrasound guidance into pleural effusion and thoracentesis was performed. 1.85 L of sanguinous and purulence cloudy material was aspirated. 1 L was sent to the lab. The needle was removed. Hemostasis obtained with direct pressure. A sterile dressing was applied. The patient tolerated the procedure well. FINDINGS: A total of 1.85 L of sanguinous and purulence material was removed. 1 L sent to lab     Successful ultrasound guided right thoracentesis.          Physical Examination:        Physical Exam  Constitutional: Appearance: Normal appearance. HENT:      Head: Normocephalic and atraumatic. Mouth/Throat:      Mouth: Mucous membranes are moist.      Pharynx: Oropharynx is clear. Eyes:      Extraocular Movements: Extraocular movements intact. Conjunctiva/sclera: Conjunctivae normal.      Pupils: Pupils are equal, round, and reactive to light. Cardiovascular:      Rate and Rhythm: Normal rate and regular rhythm. Pulses: Normal pulses. Heart sounds: Normal heart sounds. Pulmonary:      Effort: Pulmonary effort is normal.      Breath sounds: Wheezing present. Comments: Decreased breath sounds  Normal wheezing in the right lower lobe  Abdominal:      General: Abdomen is flat. Palpations: Abdomen is soft. Musculoskeletal:         General: Swelling and signs of injury present. Normal range of motion. Cervical back: Normal range of motion and neck supple. Comments: Swelling improving over left wrist.  Right ribs    Skin:     General: Skin is warm and dry. Capillary Refill: Capillary refill takes less than 2 seconds. Neurological:      Mental Status: He is alert and oriented to person, place, and time. Cranial Nerves: Cranial nerves are intact. Sensory: Sensation is intact. Coordination: Finger-Nose-Finger Test abnormal. Heel to Mendoza Test normal.      Comments: Bilateral lower leg weakness 4+/5    Mild dysmetria and action tremor in upper limb on finger-to-nose test   Psychiatric:         Mood and Affect: Mood normal.         Behavior: Behavior normal.         Thought Content:  Thought content normal.         Judgment: Judgment normal.           Assessment:        Primary Problem  Syncope and collapse    Active Hospital Problems    Diagnosis Date Noted    Elevated C-reactive protein (CRP) [R79.82]     Leukocytosis [D72.829]     Empyema lung (HCC) [J86.9] 08/21/2021    MRSA bacteremia [R78.81, B95.62] 08/20/2021    Superficial venous thrombosis of arm, left [I82.612] 08/18/2021    Acute respiratory failure (HCC) [J96.00] 08/16/2021    Fever [R50.9] 08/14/2021    Conjunctivitis [H10.9] 08/14/2021    [facility] [E43] 08/12/2021    Alcohol abuse [F10.10] 08/11/2021    Hypokalemia [E87.6] 08/11/2021    Hyponatremia [E87.1] 08/11/2021    Traumatic rhabdomyolysis ([facility] [T79. 6XXA] 08/11/2021    Closed fracture of multiple ribs of right side [S22.41XA] 08/11/2021    Closed fracture of left wrist [S62.102A] 08/11/2021    Syncope and collapse [R55] 06/13/2018    Dyslipidemia [E78.5] 09/17/2014    Smoking addiction [F17.200] 06/11/2013    COPD (chronic obstructive pulmonary disease) ([facility] [J44.9] 05/30/2013       Plan:             ~Acute respiratory failure 2/2 empyema (stable)  -No significant interval change from previous studies on CXR  Blood culture positive for MRSA. Symbicort 160/4.5  -Mucinex 600mg TID. Chest tube was removed on 8/27  Continue IV vancomycin  Okay for SNF as per pulmonary        ~Hypokalemia (stable)  -Potassium 3.6 today  -Potassium sliding scale.     Alcohol abuse  -Seizure and fall precautions.  -Continued lack of coordination  -Continue PT/OT     ~Multiple right rib fractures (stable)  -CT scan shows Grade 2 chest wall injury with right 4th through 6th anterolateral rib fractures.  -Morrison Q6prn.      ~Closed left wrist fracture improving  -Velcro wrist splint in place  - left wrist is still swollen, improved  - left wrist X-ray: Subacute impacted fracture at the distal metaphysis of the left radius, wit visualized fracture line and partial healing of the fracture, grossly stable. -Continue splint        `COPD (stable)  -SPO2 91%  (intubated on 8/13 due to resp. Failure.)  -Albuterol as needed  -Spiriva daily  -Ellipta daily     Smoking (stable)  -Nicotine patch     GI prophylaxis-Pepcid 20 mg IV twice daily. DVT prophylaxis--Lovenox 40 mg once daily   Diet:  On Nector Thick Diet . Disposition: Pending PMR. SNF versus Chuckie Olivas MD  8/30/2021  9:13 AM       I have discussed the care of Steve Ko , including pertinent history and exam findings,    today with the resident. I have seen and examined the patient and the key elements of all parts of the encounter have been performed by me . I agree with the assessment, plan and orders as documented by the resident. Principal Problem:    Syncope and collapse  Active Problems:    COPD (chronic obstructive pulmonary disease) (HCC)    Smoking addiction    Dyslipidemia    Alcohol abuse    Hypokalemia    Hyponatremia    Traumatic rhabdomyolysis (HCC)    Closed fracture of multiple ribs of right side    Closed fracture of left wrist    Severe malnutrition (HCC)    Fever    Conjunctivitis    Acute respiratory failure (HCC)    Superficial venous thrombosis of arm, left    MRSA bacteremia    Empyema lung (HCC)    Elevated C-reactive protein (CRP)    Leukocytosis  Resolved Problems:    * No resolved hospital problems. *        Overall  course ;                                   are improving over time.         Patient improved  Awaiting placement to ARU          Electronically signed by Jorden Wallace MD

## 2021-08-30 NOTE — PROGRESS NOTES
well as ROM as tolerated with brace on as well. Subjective   General  Chart Reviewed: Yes  Response To Previous Treatment: Patient with no complaints from previous session. Family / Caregiver Present: No  Subjective  Subjective: Pt in bedside chair upon entry. Pt stated that he wants and needs to get up and moving. Pt reported 4/10 pain in his stomach and 8/10 pain in his Left hand. After AMB, pt reported LBP too. Pain Screening  Patient Currently in Pain: Yes  Pain Assessment  Pain Assessment: 0-10  Pain Level: 8  Pain Type: Acute pain  Pain Location: Hand  Pain Orientation: Left  Non-Pharmaceutical Pain Intervention(s): Ambulation/Increased Activity;Repositioned;Rest;Distraction  Response to Pain Intervention: Patient Satisfied  Multiple Pain Sites: Yes  Pain 2  Pain Rating 2: 4  Pain Type 2: Acute Pain  Pain Location 2:  (Stomach)  Non-Pharmaceutical Pain Intervention(s) 2: Repositioned; Ambulation/Increased Activity; Rest;Distraction  Pain 3  Pain Rating 3: 6 (pt did not report a number rating, PTA used faces scale)  Pain Type 3: Chronic pain  Pain Location 3: Back  Non-Pharmaceutical Pain Intervention(s) 3: Ambulation/Increased Activity;Repositioned;Rest;Distraction  Vital Signs  Patient Currently in Pain: Yes       Orientation  Orientation  Overall Orientation Status: Impaired  Orientation Level: Oriented to place;Oriented to situation;Disoriented to time (Pt thought it was 10/1/21)    Objective      Transfers  Sit to Stand: Contact guard assistance  Stand to sit: Contact guard assistance  Bed to Chair: Contact guard assistance  Comment: Patient CGA for transfers  Ambulation  Ambulation?: Yes  Ambulation 1  Surface: level tile  Device: Hemiwalker  Assistance: Contact guard assistance  Gait Deviations: Slow Renu;Decreased step length;Decreased step height  Distance: 160ft  Comments: Cues for hemiwalker placement. Pt reported more stability and less back pain with better HW placement. Balance  Posture:  (kypotic)  Sitting - Static: Fair;+  Sitting - Dynamic: Fair;+  Standing - Static: Fair;+  Standing - Dynamic: Fair  Other exercises  Other exercises?: Yes  Other exercises 1: Seated BUE Ex's: x10  Other exercises 2: Seated BLE Ex's: x10  Other exercises 3: Standing BLE Ex's: x10           Goals  Short term goals  Time Frame for Short term goals: 5-7 days  Short term goal 1: bed mobility with CGA  Short term goal 2: transfer to chair with Black x1  Short term goal 3: ambulate 10-20ft with device and Black  Short term goal 4: pt able to tolerate 20min of therapeutic activity/exercises    Plan    Plan  Times per week: 6-7x/wk  Times per day: Daily  Current Treatment Recommendations: Strengthening, Balance Training, Functional Mobility Training, Transfer Training, Endurance Training, Safety Education & Training  Safety Devices  Type of devices: Call light within reach, Patient at risk for falls, Gait belt, Left in chair, All fall risk precautions in place  Restraints  Initially in place: No     Therapy Time     08/30/21 1326   PT Individual Minutes   Time In 1326   Time Out Bradley. Lorie 131, PTA

## 2021-08-30 NOTE — CARE COORDINATION
ОЛЬГА spoke with Brandin Madden in admissions with [facility]. He reviewed this patient's chart and did report that he no longer meets criteria. Brandin Madden pulled all requests for pre-cert. ОЛЬГА also noted that the patient does not meet criteria to go to the ARU. ОЛЬГА spoke with the patient and he reported that he was willing to go to a skilled facility. SW spoke with him about his choices and her reported that he would like to stay on this side of town and that he smoked prior to this hospital stay and he may start to smoke again so he wants to go somewhere that he can choose to smoke if he wants. SW and patient narrowed the choices down to [facility]. ОЛЬГА faxed the referral and followed up with Jadyn Pollard in admissions. ОЛЬГА also provided the patient some information on applying for disability and financial assistance (food stamps). ОЛЬГА did inform the patient that the LSW at the facility will also be able to assist him with all of these issues.

## 2021-08-30 NOTE — PROGRESS NOTES
Vancomycin Dosing by Pharmacy - Daily Note   Vancomycin Therapy Day:  17  Indication: MRSA bacteremia secondary to empyema    Allergies:  Nickel   Actual Weight:    Wt Readings from Last 1 Encounters:   08/29/21 133 lb 13.1 oz (60.7 kg)       Labs/Ancillary Data  Estimated Creatinine Clearance: 55 mL/min (A) (based on SCr of 1.26 mg/dL (H)). Recent Labs     08/28/21  0741 08/29/21  0506 08/30/21  0555   CREATININE 1.33* 1.22* 1.26*   BUN 7 8 10   WBC 8.0 6.6 7.3     Procalcitonin   Date Value Ref Range Status   08/12/2021 73.20 (H) <0.09 ng/mL Final     Comment:           Suspected Sepsis:  <0.50 ng/mL     Low likelihood of sepsis. 0.50-2.00 ng/mL     Increased likelihood of sepsis. Antibiotics encouraged. >2.00 ng/mL     High risk of sepsis/shock. Antibiotics strongly encouraged. Suspected Lower Resp Tract Infections:  <0.24 ng/mL     Low likelihood of bacterial infection. >0.24 ng/mL     Increased likelihood of bacterial infection. Antibiotics encouraged. With successful antibiotic therapy, PCT levels should decrease rapidly. (Half-life of 24 to   36 hours.)        Procalcitonin values from samples collected within the first 6 hours of systemic infection   may still be low. Retesting may be indicated. Values from day 1 and day 4 can be entered into the Change in Procalcitonin Calculator   (www."[employer]"s-pct-calculator. allGreenup) to determine the patient's Mortality Risk Prognosis        In healthy neonates, plasma Procalcitonin (PCT) concentrations increase gradually after   birth, reaching peak values at about 24 hours of age then decrease to normal values below   0.5 ng/mL by 48-72 hours of age.          Intake/Output Summary (Last 24 hours) at 8/30/2021 1035  Last data filed at 8/30/2021 0918  Gross per 24 hour   Intake 1827 ml   Output 3075 ml   Net -1248 ml     Temp: 98.4    Culture Date / Source  /  Results  8/13 sputum- MRSA   8/14 blood (2 of 2)- MRSA  8/16 blood (2 of 2)- MRSA  Recent vancomycin administrations                     vancomycin 1000 mg IVPB in 250 mL D5W addavial (mg) 1,000 mg New Bag 08/30/21 0104     1,000 mg New Bag 08/29/21 0157     1,000 mg New Bag 08/28/21 0229                    Vancomycin Concentrations:   TROUGH:    Recent Labs     08/27/21  2145   [address] 21.7*     RANDOM:  No results for input(s): VANCORANDOM in the last 72 hours. MRSA Nasal Swab: N/A. Non-respiratory infection. [facility] Billing PLAN     Continue current dose of 1000 mg q24h IV  Ensured BUN/sCr ordered at baseline and every 48 hours x at least 3 levels, then at least weekly. Repeat vancomycin concentration ordered for 8/31 @ 0100  Pharmacy will continue to monitor patient and adjust therapy as indicated      Vancomycin Target Concentration Parameters  Treatment  Population Target AUC/NIMISHA Target Trough   Invasive MRSA Infection (bacteremia, pneumonia, meningitis, endocarditis, osteomyelitis)  Sepsis (undifferentiated) 400-600 N/A   Infection due to non-MRSA pathogen  Empiric treatment of non-invasive MRSA infection  (SSTI, UTI) <500 10-15 mg/L   CrCl < 29 mL/min  Rapidly fluctuating serum creatinine   SHANNAN N/A < 15 mg/L     Renal replacement therapy is dosed by levels, per hospital protocol. Abbreviations  * Pauc: probability that AUC is >400 (efficacy); Pconc: probability that Ctrough is above 20 ?g/mL (toxicity); Tox: Probability of nephrotoxicity, based on Leticia et al. Clin Infect Dis 2009. Thank you for the consult. Pharmacy will continue to follow.   Oren Cole PharmD, [facility] 8/30/2021 11:17 AM

## 2021-08-30 NOTE — PLAN OF CARE
Problem: Falls - Risk of:  Goal: Will remain free from falls  Description: Will remain free from falls  8/30/2021 0357 by Zulma Casillas RN  Outcome: Ongoing     Problem: Skin Integrity:  Goal: Will show no infection signs and symptoms  Description: Will show no infection signs and symptoms  8/30/2021 0357 by Zulma Casillas RN  Outcome: Ongoing     Problem: Pain:  Goal: Pain level will decrease  Description: Pain level will decrease  8/30/2021 0357 by Zulma Casillas RN  Outcome: Ongoing   Norco prn for left wrist

## 2021-08-30 NOTE — CONSULTS
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 elements of the (History, physical exam and plan of care). Additional findings are added to the note above    Patient seen and examined with resident, note reviewed and adjusted. Agree with above. Candida Del Cid M.D. Beata Sal. Jamil Sherman MD on 8/30/2021 at 11:59 AM          This note is created with the assistance of a speech recognition program.  While intending to generate a document that actually reflects the content of the visit, the document can still have some errors including those of syntax and sound a like substitutions which may escape proof reading.   In such instances, actual meaning can be extrapolated by contextual diversion
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
## 2021-08-31 ENCOUNTER — APPOINTMENT (OUTPATIENT)
Dept: GENERAL RADIOLOGY | Age: 56
DRG: 351 | End: 2021-08-31
Payer: MEDICAID

## 2021-08-31 VITALS
OXYGEN SATURATION: 95 % | SYSTOLIC BLOOD PRESSURE: 121 MMHG | RESPIRATION RATE: 18 BRPM | HEIGHT: 69 IN | BODY MASS INDEX: 19.82 KG/M2 | HEART RATE: 78 BPM | TEMPERATURE: 99 F | WEIGHT: 133.82 LBS | DIASTOLIC BLOOD PRESSURE: 74 MMHG

## 2021-08-31 LAB
ALBUMIN SERPL-MCNC: 2.6 G/DL (ref 3.5–5.2)
ALBUMIN/GLOBULIN RATIO: ABNORMAL (ref 1–2.5)
ALP BLD-CCNC: 71 U/L (ref 40–129)
ALT SERPL-CCNC: 13 U/L (ref 5–41)
ANION GAP SERPL CALCULATED.3IONS-SCNC: 11 MMOL/L (ref 9–17)
AST SERPL-CCNC: 12 U/L
BILIRUB SERPL-MCNC: 0.32 MG/DL (ref 0.3–1.2)
BUN BLDV-MCNC: 11 MG/DL (ref 6–20)
BUN/CREAT BLD: ABNORMAL (ref 9–20)
CALCIUM SERPL-MCNC: 8.3 MG/DL (ref 8.6–10.4)
CHLORIDE BLD-SCNC: 102 MMOL/L (ref 98–107)
CO2: 26 MMOL/L (ref 20–31)
CREAT SERPL-MCNC: 1.24 MG/DL (ref 0.7–1.2)
GFR AFRICAN AMERICAN: >60 ML/MIN
GFR NON-AFRICAN AMERICAN: 60 ML/MIN
GFR SERPL CREATININE-BSD FRML MDRD: ABNORMAL ML/MIN/{1.73_M2}
GFR SERPL CREATININE-BSD FRML MDRD: ABNORMAL ML/MIN/{1.73_M2}
GLUCOSE BLD-MCNC: 88 MG/DL (ref 70–99)
HCT VFR BLD CALC: 23.7 % (ref 41–53)
HEMOGLOBIN: 8 G/DL (ref 13.5–17.5)
MAGNESIUM: 1.8 MG/DL (ref 1.6–2.6)
MCH RBC QN AUTO: 31.7 PG (ref 26–34)
MCHC RBC AUTO-ENTMCNC: 33.6 G/DL (ref 31–37)
MCV RBC AUTO: 94.3 FL (ref 80–100)
NRBC AUTOMATED: ABNORMAL PER 100 WBC
PDW BLD-RTO: 14.7 % (ref 11.5–14.9)
PLATELET # BLD: 282 K/UL (ref 150–450)
PMV BLD AUTO: 6.7 FL (ref 6–12)
POTASSIUM SERPL-SCNC: 3.8 MMOL/L (ref 3.7–5.3)
RBC # BLD: 2.52 M/UL (ref 4.5–5.9)
SODIUM BLD-SCNC: 139 MMOL/L (ref 135–144)
TOTAL PROTEIN: 6.3 G/DL (ref 6.4–8.3)
VANCOMYCIN TROUGH DATE LAST DOSE: NORMAL
VANCOMYCIN TROUGH DOSE AMOUNT: 1000
VANCOMYCIN TROUGH TIME LAST DOSE: 100
VANCOMYCIN TROUGH: 13.5 UG/ML (ref 10–20)
WBC # BLD: 6.9 K/UL (ref 3.5–11)

## 2021-08-31 PROCEDURE — 6360000002 HC RX W HCPCS: Performed by: STUDENT IN AN ORGANIZED HEALTH CARE EDUCATION/TRAINING PROGRAM

## 2021-08-31 PROCEDURE — 99239 HOSP IP/OBS DSCHRG MGMT >30: CPT | Performed by: INTERNAL MEDICINE

## 2021-08-31 PROCEDURE — 36415 COLL VENOUS BLD VENIPUNCTURE: CPT

## 2021-08-31 PROCEDURE — 80202 ASSAY OF VANCOMYCIN: CPT

## 2021-08-31 PROCEDURE — 85027 COMPLETE CBC AUTOMATED: CPT

## 2021-08-31 PROCEDURE — 6370000000 HC RX 637 (ALT 250 FOR IP): Performed by: INTERNAL MEDICINE

## 2021-08-31 PROCEDURE — 94761 N-INVAS EAR/PLS OXIMETRY MLT: CPT

## 2021-08-31 PROCEDURE — 99232 SBSQ HOSP IP/OBS MODERATE 35: CPT | Performed by: INTERNAL MEDICINE

## 2021-08-31 PROCEDURE — 83735 ASSAY OF MAGNESIUM: CPT

## 2021-08-31 PROCEDURE — 2580000003 HC RX 258: Performed by: STUDENT IN AN ORGANIZED HEALTH CARE EDUCATION/TRAINING PROGRAM

## 2021-08-31 PROCEDURE — 6370000000 HC RX 637 (ALT 250 FOR IP): Performed by: NURSE PRACTITIONER

## 2021-08-31 PROCEDURE — 80053 COMPREHEN METABOLIC PANEL: CPT

## 2021-08-31 PROCEDURE — 92526 ORAL FUNCTION THERAPY: CPT

## 2021-08-31 PROCEDURE — 94640 AIRWAY INHALATION TREATMENT: CPT

## 2021-08-31 PROCEDURE — 6370000000 HC RX 637 (ALT 250 FOR IP): Performed by: STUDENT IN AN ORGANIZED HEALTH CARE EDUCATION/TRAINING PROGRAM

## 2021-08-31 PROCEDURE — 6370000000 HC RX 637 (ALT 250 FOR IP): Performed by: PHYSICIAN ASSISTANT

## 2021-08-31 PROCEDURE — 71045 X-RAY EXAM CHEST 1 VIEW: CPT

## 2021-08-31 RX ORDER — GUAIFENESIN 600 MG/1
600 TABLET, EXTENDED RELEASE ORAL 2 TIMES DAILY PRN
Qty: 15 TABLET | Refills: 0 | Status: ON HOLD | OUTPATIENT
Start: 2021-08-31 | End: 2021-12-14

## 2021-08-31 RX ORDER — HYDROCODONE BITARTRATE AND ACETAMINOPHEN 5; 325 MG/1; MG/1
1 TABLET ORAL EVERY 6 HOURS PRN
Qty: 12 TABLET | Refills: 0 | Status: SHIPPED | OUTPATIENT
Start: 2021-08-31 | End: 2021-09-03

## 2021-08-31 RX ORDER — TAMSULOSIN HYDROCHLORIDE 0.4 MG/1
0.4 CAPSULE ORAL DAILY
Qty: 30 CAPSULE | Refills: 3 | Status: SHIPPED | OUTPATIENT
Start: 2021-09-01

## 2021-08-31 RX ORDER — METOPROLOL SUCCINATE 25 MG/1
12.5 TABLET, EXTENDED RELEASE ORAL NIGHTLY
Qty: 30 TABLET | Refills: 3 | Status: SHIPPED | OUTPATIENT
Start: 2021-08-31 | End: 2021-12-08

## 2021-08-31 RX ORDER — LIDOCAINE 4 G/G
1 PATCH TOPICAL DAILY
Qty: 6 PATCH | Refills: 0 | Status: SHIPPED | OUTPATIENT
Start: 2021-09-01 | End: 2021-12-08

## 2021-08-31 RX ADMIN — FAMOTIDINE 20 MG: 20 TABLET, FILM COATED ORAL at 07:28

## 2021-08-31 RX ADMIN — IPRATROPIUM BROMIDE AND ALBUTEROL SULFATE 1 AMPULE: 2.5; .5 SOLUTION RESPIRATORY (INHALATION) at 07:25

## 2021-08-31 RX ADMIN — ENOXAPARIN SODIUM 40 MG: 40 INJECTION SUBCUTANEOUS at 07:30

## 2021-08-31 RX ADMIN — ACETAMINOPHEN 650 MG: 325 TABLET, FILM COATED ORAL at 11:20

## 2021-08-31 RX ADMIN — VANCOMYCIN HYDROCHLORIDE 1000 MG: 1 INJECTION, POWDER, LYOPHILIZED, FOR SOLUTION INTRAVENOUS at 01:58

## 2021-08-31 RX ADMIN — GUAIFENESIN 600 MG: 600 TABLET, EXTENDED RELEASE ORAL at 07:28

## 2021-08-31 RX ADMIN — IPRATROPIUM BROMIDE AND ALBUTEROL SULFATE 1 AMPULE: 2.5; .5 SOLUTION RESPIRATORY (INHALATION) at 10:39

## 2021-08-31 RX ADMIN — HYDROCODONE BITARTRATE AND ACETAMINOPHEN 1 TABLET: 5; 325 TABLET ORAL at 07:28

## 2021-08-31 ASSESSMENT — ENCOUNTER SYMPTOMS
EYE PAIN: 0
BACK PAIN: 0
ABDOMINAL PAIN: 0
CONSTIPATION: 0
VOMITING: 0
CHOKING: 0
ABDOMINAL DISTENTION: 0
COUGH: 0
WHEEZING: 1
NAUSEA: 0
TROUBLE SWALLOWING: 0
DIARRHEA: 0
CHEST TIGHTNESS: 0
SHORTNESS OF BREATH: 0
APNEA: 0
PHOTOPHOBIA: 0

## 2021-08-31 ASSESSMENT — PAIN SCALES - GENERAL
PAINLEVEL_OUTOF10: 8
PAINLEVEL_OUTOF10: 7
PAINLEVEL_OUTOF10: 6
PAINLEVEL_OUTOF10: 8

## 2021-08-31 NOTE — PROGRESS NOTES
RN attempted to call report to [facility]. No answer for 3 minutes. RN called  to ask for an alternate number to give report. SW to call the facility.

## 2021-08-31 NOTE — PROGRESS NOTES
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 in loculated extrapleural complicated collection, right side, now containing some air bubbles consistent with empyema. Significant fluid still remains. 3.  Compressive atelectasis right lower lobe with mild consolidation. 4.  Scattered pulmonary opacities right hemithorax consistent with multifocal pneumonitis. 5.  Sizable left pleural effusion with compressive atelectasis and some consolidative process in the left lower lobe. 6.  Pulmonary opacity left mid lung field consistent with airspace disease in the lingula. 7.  Stable mediastinal adenopathy. 8.  Right-sided rib fractures anteriorly. CT CHEST WO CONTRAST    Result Date: 8/16/2021  EXAMINATION: CT OF THE CHEST WITHOUT CONTRAST 8/16/2021 12:53 pm TECHNIQUE: CT of the chest was performed without the administration of intravenous contrast. Multiplanar reformatted images are provided for review. Dose modulation, iterative reconstruction, and/or weight based adjustment of the mA/kV was utilized to reduce the radiation dose to as low as reasonably achievable.  COMPARISON: CT of the chest of 08/10/2021. HISTORY: ORDERING SYSTEM PROVIDED HISTORY: pleural effusion TECHNOLOGIST PROVIDED HISTORY: pleural effusion Reason for Exam: pleural effusion Acuity: Unknown Type of Exam: Unknown FINDINGS: Mediastinum: No lymphadenopathy. Right upper extremity PICC with catheter tip at the superior cavoatrial junction. Calcified coronary artery disease. Cardiac chambers are otherwise unremarkable. No pericardial effusion. Lungs/pleura: Significant interval increase in size of a right pleural effusion, which is now large. There is near complete atelectasis of the right lower lobe. Areas of loculated pleural fluid are noted along the fissures as well as in the anterolateral right upper lung. Again noted are areas of pleural thickening adjacent to the anterolateral right rib fractures. There is some subcutaneous gas also noted within the intercostal spaces and adjacent to the rib fractures; these areas are continuous with a air-filled structure within the right middle lobe that is new in comparison to the prior study and measures 4.4 x 2.7 cm on series 4, image 62. On the left, there is a trace pleural effusion, also increased in size, with adjacent atelectasis as well as left lower lobe consolidative opacities. The previously measured right lower lobe pulmonary nodule is now obscured. Endotracheal tube is in place with tip in the mid thoracic trachea. Upper Abdomen: Enteric tube in place with tip below the diaphragm and outside the field of view of this study. Nonspecific stranding surrounds the left renal cortex. Soft Tissues/Bones: No soft tissue abnormality. Redemonstrated mildly displaced fractures of the anterolateral right 4th, 5th, 6th, and 7th ribs. Possible healed remote fracture of the sternal body. Significant interval increase in size of right pleural effusion, which is now large, with progressive near complete atelectasis of the right lower lobe.  There are areas of loculated pleural fluid along the fissures as well as at the right lung apex. New areas of subcutaneous emphysema along the right chest wall near the rib fractures, which are contiguous with an air-filled cavity in the right middle lobe that is new from the prior study. These findings could be posttraumatic in etiology, or related to a recent procedure. Redemonstrated right 4th-7th anterolateral rib fractures with adjacent pleural nodularity favored to represent posttraumatic finding such as hemothorax. New trace left pleural effusion, with adjacent airspace opacities that are concerning for infectious or inflammatory process such as pneumonia or aspiration. CT CERVICAL SPINE WO CONTRAST    Result Date: 8/12/2021  EXAMINATION: CT OF THE CERVICAL SPINE WITHOUT CONTRAST 8/11/2021 10:36 pm TECHNIQUE: CT of the cervical spine was performed without the administration of intravenous contrast. Multiplanar reformatted images are provided for review. Dose modulation, iterative reconstruction, and/or weight based adjustment of the mA/kV was utilized to reduce the radiation dose to as low as reasonably achievable. COMPARISON: None. HISTORY: ORDERING SYSTEM PROVIDED HISTORY: syncope and collapse TECHNOLOGIST PROVIDED HISTORY: syncope and collapse Reason for Exam: Syncope and collapse Acuity: Unknown Type of Exam: Unknown FINDINGS: BONES/ALIGNMENT: There is no acute fracture or traumatic malalignment. C4 on C5 anterolisthesis is seen which measures 4 mm. DEGENERATIVE CHANGES: C5/C6 moderate disc height loss is noted in association with posterior disc osteophyte complex which narrows the midline AP thecal sac diameter to 10 mm consistent with borderline central canal stenosis. Disc osteophyte complex severely narrows the bilateral neural foramina.  C6/C7: Moderate disc height loss is noted in association with posterior disc osteophyte complex which narrows the midline AP thecal sac diameter to 10 mm consistent with borderline central canal OM Reason for Exam: r/o om Acuity: Unknown Type of Exam: Unknown FINDINGS: CT thoracic spine: There is a mild levocurvature of the thoracic spine. No acute fracture or traumatic subluxation is identified. Chronic appearing Schmorl's nodes are noted. There is no CT evidence of discitis-osteomyelitis. Atelectatic changes are present within both lungs with a mild left pleural effusion. Multiple loculated pleural effusions are present within the right hemithorax. A chest tube is present. CT lumbar spine: There is a normal lumbar lordosis but a moderately severe to lumbar levoscoliosis. No acute fracture or traumatic subluxation is identified. There is severe degenerative disc disease at L3-4 with vacuum phenomena. A chronic Schmorl's node is seen along the superior endplate of L2. There is multilevel facet hypertrophy with moderate to severe right-sided osseous neural foraminal stenosis at L3-4 and moderate to severe left-sided osseous neural foraminal stenosis at L4-5. There is no CT evidence of discitis-osteomyelitis. Heterogeneous enhancement of the kidneys is noted and there is mild pelvic free fluid. No CT evidence of discitis-osteomyelitis. Partially visualized loculated pleural effusions, on the right-hand side. Heterogeneous enhancement of the kidneys. Rule out pyelonephritis. CT LUMBAR SPINE W CONTRAST    Result Date: 8/21/2021  EXAMINATION: CT OF THE THORACIC SPINE WITH CONTRAST; CT OF THE LUMBAR SPINE WITH CONTRAST 8/21/2021 2:55 pm: TECHNIQUE: CT of the thoracic spine was performed with the administration of intravenous contrast.  Multiplanar reformatted images are provided for review. Dose modulation, iterative reconstruction, and/or weight based adjustment of the mA/kV was utilized to reduce the radiation dose to as low as reasonably achievable.; CT of the lumbar spine was performed with the administration of intravenous contrast. Multiplanar reformatted images are provided for review.  Dose Acuity: Acute Type of Exam: Initial FINDINGS: The right kidney measures 12.6 cm in length and the left kidney measures 12.5 cm in length. Kidneys demonstrate normal cortical echogenicity. No hydronephrosis or intrarenal stones. No focal lesions. Visualized liver appears echogenic suggesting fatty infiltration. Negative renal ultrasound. Likely hepatic fatty infiltration. IR FLUORO GUIDED CVA DEVICE PLMT/REPLACE/REMOVAL    Result Date: 8/12/2021  PROCEDURE: ULTRASOUND GUIDED VASCULAR ACCESS. FLUOROSCOPY GUIDED PICC PLACEMENT 8/12/2021. HISTORY: ORDERING SYSTEM PROVIDED HISTORY: TPN, vasopressers TECHNOLOGIST PROVIDED HISTORY: PICC TPN, vasopressers Lumen?->Double Lumen Reason for Exam: TPN Acuity: Unknown Type of Exam: Unknown SEDATION: None FLUOROSCOPY DOSE AND TYPE OR TIME AND EXPOSURES: 5 seconds; D AP 9 cGy cm2 TECHNIQUE: Informed consent was obtained after a detailed explanation of the procedure including risks, benefits, and alternatives. Universal protocol was observed. The right arm was prepped and draped in sterile fashion using maximum sterile barrier technique. Local anesthesia was achieved with lidocaine. A micropuncture needle was used to access the right basilic vein using ultrasound guidance. An ultrasound image demonstrating patency of the vein with needle tip located within it. An image was obtained and stored in PACs. A 0.018 guidewire was used to place a peel-a-way sheath and a 5 [employer] [place] dual PICC was advanced with fluoroscopic guidance with the tip at the cavo-atrial junction. The catheter flushed easily and there was a good blood return. The catheter was secured to the skin. The patient tolerated the procedure well and there were no immediate complications. EBL: Less than 5 mL FINDINGS: Fluoroscopic image demonstrates the tip of the catheter at the cavo-atrial junction.      Successful ultrasound and fluoroscopy guided PICC placement     XR CHEST PORTABLE    Result Date: 8/29/2021  EXAMINATION: ONE XRAY VIEW OF THE CHEST 8/29/2021 7:45 am COMPARISON: 08/28/2021 HISTORY: Reason for Exam: reassess empyema Acuity: Acute Type of Exam: Ongoing FINDINGS: Stable dense right basilar consolidation with blunting of the costophrenic angle. Stable appearing pleural fluid along the right lateral lung field. Persistent areas of loculated pleural fluid right mid to lower lung field. Persistent smaller left pleural effusion with associated atelectasis. No discernible pneumothorax. Stable right-sided PICC line. Cardiomediastinal is stable. Osseous structures appear intact. Stable appearing chest with persistent findings as detailed above. XR CHEST PORTABLE    Result Date: 8/28/2021  EXAMINATION: ONE X-RAY VIEW OF THE CHEST 8/28/2021 8:54 am COMPARISON: 08/27/2021 HISTORY: ORDERING SYSTEM PROVIDED HISTORY:  Reassess empyema TECHNOLOGIST PROVIDED HISTORY: Reassess empyema Reason for Exam:  Reassess empyema Acuity:  Unknown Type of Exam:  Unknown FINDINGS: Right arm PICC remains in place. Heart size is stable. Patchy opacities at the right base and pleural-based opacities in the lateral mid right hemithorax are unchanged. No evidence of pneumothorax. Mild diffuse interstitial thickening is unchanged. No significant interval change. Patchy opacities at the right base and pleural-based opacity in the lateral mid right chest.     XR CHEST PORTABLE    Result Date: 8/27/2021  EXAMINATION: ONE XRAY VIEW OF THE CHEST 8/27/2021 7:52 am COMPARISON: 08/26/2021, 08/25/2021 HISTORY: ORDERING SYSTEM PROVIDED HISTORY: Reassess empyema TECHNOLOGIST PROVIDED HISTORY: Reassess empyema Reason for Exam: right side chest tube Acuity: Acute Type of Exam: Initial FINDINGS: Right pigtail chest tube remains in place. Right PICC line appears unchanged in position. The right pleural effusion and opacities in the mid to inferior right lung field are without appreciable change. No pneumothorax identified. Left basilar opacities and small left effusion also appears unchanged. No new airspace disease identified. Right chest tube remains in place. No significant change in opacities in the right lung and effusion. No pneumothorax. XR CHEST PORTABLE    Result Date: 8/26/2021  EXAMINATION: ONE XRAY VIEW OF THE CHEST 8/26/2021 8:05 pm COMPARISON: 8/25/2021 HISTORY: ORDERING SYSTEM PROVIDED HISTORY: reassess empyema post chest tube to water seal TECHNOLOGIST PROVIDED HISTORY: reassess empyema post chest tube to water seal Reason for Exam: reassess empyema post chest tube to water seal Acuity: Acute Type of Exam: Initial Additional signs and symptoms: reassess empyema post chest tube to water seal Relevant Medical/Surgical History: reassess empyema post chest tube to water seal FINDINGS: Right upper extremity PICC and chest tube are in stable position. Improved aeration is identified in the right base compared to the prior study. Persistent loculated hydropneumothorax is seen. Redemonstration of large nodular opacities overlying the right mid to lower lung zone. Left base pleural effusion and opacity are unchanged. No acute osseous abnormality. 1. Stable lines and tubes. 2. Interval improvement of right base aeration from the prior study. 3. Mild to moderate loculated right hydropneumothorax. 4. Persistent nodular opacities overlying the right mid to lower lung zone. 5. Stable left base opacity and pleural effusion. XR CHEST PORTABLE    Result Date: 8/25/2021  EXAMINATION: ONE XRAY VIEW OF THE CHEST 8/25/2021 9:27 am COMPARISON: 08/24/2021 HISTORY: ORDERING SYSTEM PROVIDED HISTORY: empyema TECHNOLOGIST PROVIDED HISTORY: empyema Reason for Exam: empyema Acuity: Acute Type of Exam: Initial FINDINGS: There is a small bore locking loop chest tube identified at the right lung base.   There is a right-sided pleural effusion, predominantly laterally, without significant interval change compared to the previous examination. Multifocal infiltrates are seen throughout the lungs bilaterally. There is a right-sided PICC with the tip overlying the cavoatrial junction. Multiple areas of loculated pleural fluid again noted within the right chest with a rounded appearance. Multifocal loculated pleural effusion in the right chest, with overall appearance similar to the previous examination. Multifocal parenchymal infiltrates are again identified, similar to the prior study. XR CHEST PORTABLE    Result Date: 8/24/2021  EXAMINATION: ONE XRAY VIEW OF THE CHEST 8/24/2021 7:59 am COMPARISON: Chest radiograph performed 08/23/2021. HISTORY: ORDERING SYSTEM PROVIDED HISTORY: empyema TECHNOLOGIST PROVIDED HISTORY: empyema Reason for Exam: sob Acuity: Unknown Type of Exam: Unknown FINDINGS: There is consolidation over the right lung base that is stable compared to prior. Stable right-sided effusion. There is a stable indwelling right basilar drain. The heart is prominent. The upper abdomen is unremarkable. The extrathoracic soft tissues are unremarkable. Similar right lower lung consolidation and effusion with indwelling drain. XR CHEST PORTABLE    Result Date: 8/23/2021  EXAMINATION: ONE XRAY VIEW OF THE CHEST 8/23/2021 2:04 pm COMPARISON: Chest radiograph performed 08/22/2021. HISTORY: ORDERING SYSTEM PROVIDED HISTORY: infiltrate TECHNOLOGIST PROVIDED HISTORY: infiltrate Reason for Exam: F/u infiltrate/chest tube Acuity: Acute Type of Exam: Subsequent/Follow-up Additional signs and symptoms: F/u infiltrate/chest tube FINDINGS: There are bilateral mid and lower lung infiltrates. There are bibasilar effusions. There is no pneumothorax. Mediastinal structures are stable. The upper abdomen is unremarkable. The extrathoracic soft tissues are unremarkable. There is a right-sided PICC line with the tip in the distal SVC. There is a right-sided pigtail drain.      Bilateral effusions with adjacent mid and lower lung infiltrates consistent with pneumonia. Support tubes as described above. XR CHEST PORTABLE    Result Date: 8/22/2021  EXAMINATION: ONE XRAY VIEW OF THE CHEST 8/22/2021 6:43 am COMPARISON: 21 August 2021 HISTORY: ORDERING SYSTEM PROVIDED HISTORY: infiltrate TECHNOLOGIST PROVIDED HISTORY: infiltrate Reason for Exam: infiltrate Acuity: Unknown Type of Exam: Unknown Additional signs and symptoms: infiltrate FINDINGS: AP portable view of the chest time stamped at 549 hours demonstrates overlying cardiac monitoring electrodes. Right PICC line terminates in the right atrium. Pigtail terminus small bore chest tube remains at the right base. Prior right-sided extrapleural air is not redemonstrated. However, there is fluid in that space consistent with loculated effusion. Rounded opacity at the right base is unchanged with surrounding haziness. Vascularity appears increased over prior study with interval development of left perihilar and basilar opacities. Right-sided extrapleural air not demonstrated replaced with fluid. Continued nodular density at the right base. Interval increase in vascularity development of left-sided opacities. XR CHEST PORTABLE    Result Date: 8/21/2021  EXAMINATION: ONE XRAY VIEW OF THE CHEST 8/21/2021 5:21 am COMPARISON: 20 August 2021 HISTORY: ORDERING SYSTEM PROVIDED HISTORY: ETT placement, chest tube placement TECHNOLOGIST PROVIDED HISTORY: ETT placement, chest tube placement Acuity: Unknown Type of Exam: Ongoing Additional signs and symptoms: ETT placement, chest tube placement Relevant Medical/Surgical History: ETT placement, chest tube placement FINDINGS: AP portable view of the chest time stamped at 503 hours demonstrates an endotracheal tube terminating 3.2 cm above the kristen, overlying cardiac monitoring electrodes, right-sided PICC line terminating in the proximal right atrium. No change in cardiomegaly. Rounded opacity at the right lung base is again noted unchanged. There has been some clearing of density surrounding the opacity at the right lung base although significant residual remains. Right-sided pneumothorax again noted 5.1 mm in width reduced from a gonzáles mm. Small amount of right basilar extrapleural air. Pigtail terminus right-sided chest tube remains at the right base. Left lung is clear. 1.  Slight reduction in right-sided pneumothorax. 2.  Slight decrease in opacity in the right lower hemithorax surrounding a masslike opacification. 3.  Tubes and lines as above. XR CHEST PORTABLE    Result Date: 8/20/2021  EXAMINATION: ONE XRAY VIEW OF THE CHEST 8/20/2021 4:10 pm COMPARISON: Portable chest x-ray 5:22 a.m. Today. HISTORY: ORDERING SYSTEM PROVIDED HISTORY: rule out ptx TECHNOLOGIST PROVIDED HISTORY: rule out ptx Reason for Exam: R/o PTX Acuity: Unknown Type of Exam: Unknown Additional signs and symptoms: R/o PTX FINDINGS: There has been interval placement of a chest tube in the right base. Small pneumothorax is stable. There is a rounded area of opacification measuring up to 4 cm in the right base which is more prominent than on comparison radiographs. No pneumothorax within the left lung. Bibasilar patchy opacities are overall stable from earlier today. Right upper extremity central line tip is at the cavoatrial junction. Enteric tube is been removed. Endotracheal tube is approximately 2 cm above the kristen in appropriate position. 1 anterior rib fracture on the right is seen, likely the 6th rib. Interval chest tube placement in the right lower lobe with stable small pneumothorax. Rounded opacification in the right base seen to better advantage on today's study. May represent consolidation, loculated effusion, or mass. Recommend attention on follow-up.      XR CHEST PORTABLE    Result Date: 8/20/2021  EXAMINATION: ONE XRAY VIEW OF THE CHEST 8/20/2021 5:34 am COMPARISON: August 19, 2021 chest exam HISTORY: ORDERING SYSTEM PROVIDED HISTORY: vent TECHNOLOGIST PROVIDED HISTORY: vent Reason for Exam: vent Acuity: Unknown Type of Exam: Ongoing Additional signs and symptoms: vent Relevant Medical/Surgical History: vent FINDINGS: There are satisfactorily positioned endotracheal nasogastric tubes. The endotracheal tube tip is 1.5 cm cranial to the kristen. Stable cardiopericardial silhouette/mild tortuosity of the thoracic aorta Considering technical differences, there is a small right pneumothorax with maximal 12 mm transverse dimension at the upper lung. . Present film is taken in greater expiration than the previous. Slight interval progression in moderate diffuse bilateral interstitial/alveolar infiltrates with increasing right lower lung consolidation     Stable small right pneumothorax Satisfactorily positioned support lines Slight progression in moderate diffuse bilateral interstitial/alveolar infiltrates suggesting increasing edema and/or infection Increasing now moderate right lower lung consolidation/infiltrate     XR CHEST PORTABLE    Result Date: 8/19/2021  EXAMINATION: ONE XRAY VIEW OF THE CHEST 8/19/2021 5:39 am COMPARISON: 08/18/2021 HISTORY: ORDERING SYSTEM PROVIDED HISTORY: vent TECHNOLOGIST PROVIDED HISTORY: vent Reason for Exam: vent Acuity: Acute Type of Exam: Ongoing FINDINGS: Cardiac size is enlarged. The endotracheal tube ice 3.5 cm above the kristen. Overall stable appearing small right-sided pneumothorax. No mediastinal shift is identified. Perihilar as well as basilar airspace disease seen bilaterally, greater on the right with mild increased focal consolidation at the right lung base compared to the prior study. Otherwise similar appearing chest.     Stable small right-sided pneumothorax. Mild increased focal consolidation in the right lower lung field, otherwise similar appearing parenchymal infiltrates bilaterally.      XR CHEST PORTABLE    Result Date: 8/18/2021  EXAMINATION: ONE XRAY VIEW OF THE CHEST 8/18/2021 6:02 am COMPARISON: 08/17/2021 HISTORY: ORDERING SYSTEM PROVIDED HISTORY: vent TECHNOLOGIST PROVIDED HISTORY: vent Reason for Exam: vent Acuity: Acute Type of Exam: Ongoing FINDINGS: Cardiac size and mediastinal structures appear similar. The patient is rotated. There is a right-sided pneumothorax, with the pleural to pleural surface measuring 12 mm at the apicolateral aspect, previously measuring 16 mm. There is a right-sided PICC with the catheter tip overlying the right atrium. Scattered parenchymal infiltrates identified within the lungs bilaterally, right greater than left. Mild increased infiltrates seen at the right lung base. Otherwise similar appearing chest.     Multifocal infiltrates are identified within the lungs bilaterally, with mild interval worsening at the right lung base. Minimally improved right-sided pneumothorax, measuring 12 mm from pleural to pleural surface, previously measuring 16 mm. XR CHEST PORTABLE    Result Date: 8/17/2021  EXAMINATION: ONE XRAY VIEW OF THE CHEST 8/17/2021 4:20 pm COMPARISON: 17 August 2021 at 6:24 a.m. HISTORY: ORDERING SYSTEM PROVIDED HISTORY: s/p right thora, rule out ptx TECHNOLOGIST PROVIDED HISTORY: s/p right thora, rule out ptx Reason for Exam: s/p right thora, rule out ptx Acuity: Acute Type of Exam: Initial Additional signs and symptoms: s/p right thora, rule out ptx FINDINGS: Single portable chest x-ray submitted. Enteric tube is off the inferior margin of the film. Endotracheal tube tip is above the kristen. Right central venous catheter tip is at the cavoatrial junction. The left lung is well inflated. There is a small to moderate right pneumothorax without mediastinal shift. Right-sided effusion is nearly completely evacuated with right lower lobe opacification. Atherosclerosis of the aorta. Previously identified rib fractures are not well evaluated on the study. Small to moderate pneumothorax without tension.  Right lower lobe opacification likely represents consolidation. Nearly completely resolved effusion. RECOMMENDATION: ICU attending physician was not available. These results were discussed via telephone with Dr. Yanique Delgado at [phone] p.m.. Recommended following with portable chest x-rays overnight. The physician voiced understanding. XR CHEST PORTABLE    Result Date: 8/17/2021  EXAMINATION: ONE XRAY VIEW OF THE CHEST 8/17/2021 6:34 am COMPARISON: August 16, 2021 chest examination HISTORY: ORDERING SYSTEM PROVIDED HISTORY: vent TECHNOLOGIST PROVIDED HISTORY: vent Reason for Exam: vent Acuity: Acute Type of Exam: Ongoing FINDINGS: Limited rotated exam Stable satisfactorily positioned endotracheal/nasogastric tubes, right PICC line catheter. Stable cardiopericardial silhouette No definite change in extensive right chest pleuroparenchymal process with near opacification of 3/4 of the right hemithorax related to large effusion with underlying atelectasis versus infiltrate. Stable mild left basilar opacity     Stable chest     XR CHEST PORTABLE    Result Date: 8/16/2021  EXAMINATION: ONE XRAY VIEW OF THE CHEST 8/16/2021 4:38 am COMPARISON: Chest radiograph 08/15/2021. HISTORY: ORDERING SYSTEM PROVIDED HISTORY: vent TECHNOLOGIST PROVIDED HISTORY: vent Reason for Exam: vent Acuity: Unknown Type of Exam: Ongoing Additional signs and symptoms: vent Relevant Medical/Surgical History: vent FINDINGS: The endotracheal tube terminates approximately 3.5 cm above the kristen. A right upper extremity PICC terminates in the lower superior vena cava. The cardiomediastinal silhouette is unchanged. Large right pleural effusion and basilar airspace opacities without significant change. No pneumothorax or left pleural effusion. No acute osseous abnormality. Large right pleural effusion and right basilar opacities not significantly changed from the previous study.      XR CHEST PORTABLE    Result Date: 8/15/2021  EXAMINATION: ONE XRAY VIEW OF THE CHEST 8/15/2021 4:49 am COMPARISON: 08/14/2021 and prior HISTORY: ORDERING SYSTEM PROVIDED HISTORY: vent TECHNOLOGIST PROVIDED HISTORY: vent Reason for Exam: vent Acuity: Unknown Type of Exam: Ongoing Additional signs and symptoms: vent Relevant Medical/Surgical History: vent FINDINGS: Study limited by motion as well as overlying support and monitoring apparatus. The endotracheal tube appears stable from the distal 3rd of the esophagus. NG tube is limited in its evaluation but appears to extend below the diaphragm. The tip is not visualized. Right upper extremity PICC line projects at the superior vena cava right atrial junction. The heart and mediastinal contours appears stable in appearance. Increased interstitial and patchy opacity at the left base appears similar when accounting for differences in technique. Pleural and parenchymal changes on the right with a mid and lower lung zone predominance. Osseous structures are unremarkable in appearance. Lines and tubes appear stable. Pleural and parenchymal changes on the right without significant change from the prior study given differences in technique. Changes on the left also appear relatively stable. Recommend continued follow-up. XR CHEST PORTABLE    Result Date: 8/14/2021  EXAMINATION: ONE XRAY VIEW OF THE CHEST 8/14/2021 5:54 am COMPARISON: August 13, 2021 HISTORY: ORDERING SYSTEM PROVIDED HISTORY: vent TECHNOLOGIST PROVIDED HISTORY: vent Reason for Exam: vent Acuity: Unknown Type of Exam: Ongoing Additional signs and symptoms: vent Relevant Medical/Surgical History: vent FINDINGS: Stable position of the support lines and tubes. No significant change in the parenchymal opacification of the right mid and lower lung region and left retrocardiac opacity. Bilateral pleural effusions unchanged. Stable cardiomediastinal silhouette. No significant pulmonary edema. No pneumothorax. Stable exam demonstrating bilateral airspace disease, right greater than left. XR CHEST PORTABLE    Result Date: 8/13/2021  EXAMINATION: ONE XRAY VIEW OF THE CHEST 8/13/2021 6:33 am COMPARISON: August 13 0614 hours HISTORY: ORDERING SYSTEM PROVIDED HISTORY: ETT placement, OGT placement TECHNOLOGIST PROVIDED HISTORY: ETT placement, OGT placement Reason for Exam:  new vent, ogt placement Acuity: Unknown Type of Exam: Unknown FINDINGS: The tip of the ET tube is approximately 3.9 cm above the kristen. NG tube is in place, tip not visualized on the radiograph. Proximal side port is just beyond the level of the GE junction, within the gastric cardia. Extensive airspace disease is again noted within the right perihilar region, and right lung base, and left retrocardiac region. Overall appearance is minimally improved. No pneumothorax is present. Fluid is present within the major fissure on the right. Right upper extremity PICC line is in place, tip at the junction of the SVC and right atrium. 1. ET tube in place, tip 3.9 cm above the kristen 2. NG tube in place, tip not included on the radiograph 3. Bilateral airspace disease, most prominent on the right, and may represent combination of atelectasis and pneumonia. XR CHEST PORTABLE    Result Date: 8/13/2021  EXAMINATION: ONE XRAY VIEW OF THE CHEST 8/13/2021 6:04 am COMPARISON: Chest radiograph performed 08/12/2021. HISTORY: ORDERING SYSTEM PROVIDED HISTORY: pl effusion, rib fracture TECHNOLOGIST PROVIDED HISTORY: pl effusion, rib fracture Reason for Exam: pleural effusion, rib fx Acuity: Acute Type of Exam: Ongoing FINDINGS: There is a right basilar effusion with adjacent consolidation. There is no pneumothorax. The mediastinal structures are stable. The upper abdomen is unremarkable. The extrathoracic soft tissues are unremarkable. There is a right-sided PICC line with the tip in the mid SVC. Right basilar effusion with adjacent consolidation consistent with pneumonia.      VL Lower Extremity Arteries Right    Result Date: 8/16/2021    [facility]. HOSP. AND [place] TREATMENT  Vascular Lower Extremities Arterial Duplex Procedure   Patient Name   [id number]       Date of Study           08/16/2021                 JUN FRAGA   Date of Birth  1965  Gender                  Male   Age            60 year(s)  Race                       Room Number    2004        Height:                 68.9 inch, 175 cm   Corporate ID # [id number]    Weight:                 163 pounds, 74 kg   Patient Acct # [de-identified]   BSA:        1.89 m^2    BMI:      24.16 kg/m^2   MR #           [id number]      Sonographer             Chino Bryant   Accession #    [id number]  Interpreting Physician  Arelis Joyner   Referring                  Referring Physician     Jazmin Mills  Nurse  Practitioner  Procedure Type of Study:   Extremities Arteries: Lower Extremities Arterial Duplex, Arterial Scan  Lower Right. Patient Status: In Patient. Technical Quality:Adequate visualization. Comments: Indications: Cold foot. Conclusions   Summary   Simultaneous real time imaging utilizing B-Mode, color doppler and  spectral waveform analysis was performed on the right lower extremity for  arterial examination, study demonstrates:   Right:  No evidence of significant occlusive arterial disease. Signature   ----------------------------------------------------------------  Electronically signed by Chino Bryant(Sonographer) on  08/16/2021 10:26 AM  ----------------------------------------------------------------   ----------------------------------------------------------------  Electronically signed by Montana Vazquez(Interpreting  physician) on 08/16/2021 09:41 PM  ----------------------------------------------------------------  Findings:   Right Impression:  Patent external iliac, common femoral, profunda, superficial femoral,  popliteal and tibial arteries with triphasic flow.   Velocities are measured in cm/s ; Diameters are measured in cm LE Duplex Measurements +---------++-----+-----+---------+----+-----++---+-----+-----+----+--------+ ! ! !Right! ! Left     !    !     !!   !     !     !    !        ! +---------++-----+-----+---------+----+-----++---+-----+-----+----+--------+ ! Location ! !PSV  ! Ratio! Wave     !AP  ! Trans! !PSV! Ratio! Wave !AP  ! Trans   ! !         !!     ! !Desc. ! Diam!Diam !!   ! !Desc. ! Diam!Diam    ! +---------++-----+-----+---------+----+-----++---+-----+-----+----+--------+ ! Dist EIA !!111  ! ! Triphasic!0.88!0.9  !!   !     !     !    !        ! +---------++-----+-----+---------+----+-----++---+-----+-----+----+--------+ ! Common   !!123  !1.11 ! Triphasic!1.16!1.3  !!   !     !     !    !        ! !Femoral  !!     !     !         !    !     !!   !     !     !    !        ! +---------++-----+-----+---------+----+-----++---+-----+-----+----+--------+ ! PFA      !!112  !0.91 ! Triphasic!0.64!0.62 !!   !     !     !    !        ! +---------++-----+-----+---------+----+-----++---+-----+-----+----+--------+ ! Prox SFA !!119  ! ! Triphasic!0.59!0.77 !!   !     !     !    !        ! +---------++-----+-----+---------+----+-----++---+-----+-----+----+--------+ ! Mid SFA  !!116  !0.97 ! Triphasic!0.57!0.67 !!   !     !     !    !        ! +---------++-----+-----+---------+----+-----++---+-----+-----+----+--------+ ! Dist SFA !!96.9 !0.84 ! Triphasic!0.63!0.78 !!   !     !     !    !        ! +---------++-----+-----+---------+----+-----++---+-----+-----+----+--------+ ! Prox     !!84.9 !0.88 ! Triphasic!0.65!0.81 !!   !     !     !    !        ! !Popliteal!!     !     !         !    !     !!   !     !     !    !        ! +---------++-----+-----+---------+----+-----++---+-----+-----+----+--------+ ! Dist     !!72.9 !0.86 ! Triphasic!0.52!0.68 !!   !     !     !    !        ! !Popliteal!!     !     !         !    !     !!   !     !     !    !        ! +---------++-----+-----+---------+----+-----++---+-----+-----+----+--------+ ! Prox PTA !!132  !1.81 ! ! 0. 29!0.3  !!   !     !     !    !        ! +---------++-----+-----+---------+----+-----++---+-----+-----+----+--------+ ! Dist PTA !!96.9 !0.73 ! Triphasic!0.32!0.34 !!   !     !     !    !        ! +---------++-----+-----+---------+----+-----++---+-----+-----+----+--------+ ! Mid JANICE  !!93   !1.28 ! Triphasic!0.32!0.32 !!   !     !     !    !        ! +---------++-----+-----+---------+----+-----++---+-----+-----+----+--------+ ! Prox     !!121  !0.92 ! Triphasic!0.29!0.28 !!   !     !     !    !        ! !Peroneal !!     !     !         !    !     !!   !     !     !    !        ! +---------++-----+-----+---------+----+-----++---+-----+-----+----+--------+ ! Dist     !!53.4 !0.44 ! Triphasic!0.27!0.23 !!   !     !     !    !        ! !Peroneal !!     !     !         !    !     !!   !     !     !    !        ! +---------++-----+-----+---------+----+-----++---+-----+-----+----+--------+    VL Lower Extremity Bilateral Venous Duplex    Result Date: 8/24/2021    [facility]  Vascular Lower Extremities DVT Study Procedure   Patient Name   [facility]       Date of Study           08/24/2021                 Malu FRAGA   Date of Birth  1965  Gender                  Male   Age            60 year(s)  Race                       Room Number    2103        Height:                 68.9 inch, 175 cm   Corporate ID # [id number]    Weight:                 163 pounds, 74 kg   Patient Acct # [de-identified]   BSA:        1.89 m^2    BMI:     24.16 kg/m^2   MR #           [id number]      Sonographer             Gloria Yue   Accession #    [id number]  Interpreting Physician  Oneyda Segura   Referring                  Referring Physician     Lamine Carrillo  Nurse  Practitioner  Procedure Type of Study:   Veins: Lower Extremities DVT Study, Venous Scan Lower Bilateral.  Patient Status: In Patient. Technical Quality:Adequate visualization.  Comments:Prolonged hospitalization  Conclusions   Summary   Simultaneous real time imaging utilizing B-Mode, color doppler and  spectral waveform analysis was performed on the bilateral lower  extremities for venous examination of the deep and superficial systems. Findings are:   Right:  No evidence of deep venous thrombosis. Chronic superficial venous thrombosis identified in the great saphenous  vein at level of ankle. left:  No evidence of deep or superficial venous thrombosis. Signature   ----------------------------------------------------------------  Electronically signed by Anibal Sanchez(Sonographer) on  08/24/2021 12:47 PM  ----------------------------------------------------------------   ----------------------------------------------------------------  Electronically signed by Montana Menon(Interpreting  physician) on 08/24/2021 07:37 PM  ----------------------------------------------------------------  Findings:   Right Impression:                    Left Impression:  The common femoral, femoral,         The common femoral, femoral,  popliteal and tibial veins           popliteal and tibial veins  demonstrate normal compressibility   demonstrate normal compressibility  and augmentation. and augmentation. Non compressibility of the great     Normal compressibility of the great  saphenous vein at the level of the   saphenous vein. ankle. Normal compressibility of the small  Normal compressibility of the small  saphenous vein. saphenous vein. Velocities are measured in cm/s ; Diameters are measured in cm Right Lower Extremities DVT Study Measurements Right 2D Measurements +------------------------------------+----------+---------------+----------+ ! Location                            ! Visualized! Compressibility! Thrombosis! +------------------------------------+----------+---------------+----------+ ! Common Femoral                      !Yes       ! Yes            ! None      ! +------------------------------------+----------+---------------+----------+ ! Prox Femoral                        !Yes       ! Yes            ! None      ! +------------------------------------+----------+---------------+----------+ ! Mid Femoral                         !Yes       ! Yes            ! None      ! +------------------------------------+----------+---------------+----------+ ! Dist Femoral                        !Yes       ! Yes            ! None      ! +------------------------------------+----------+---------------+----------+ ! Popliteal                           !Yes       ! Yes            ! None      ! +------------------------------------+----------+---------------+----------+ ! Sapheno Femoral Junction            ! Yes       ! Yes            ! None      ! +------------------------------------+----------+---------------+----------+ ! PTV                                 ! Yes       ! Partial        !None      ! +------------------------------------+----------+---------------+----------+ ! Peroneal                            !Yes       ! Partial        !None      ! +------------------------------------+----------+---------------+----------+ ! Gastroc                             ! Yes       ! Yes            ! None      ! +------------------------------------+----------+---------------+----------+ ! GSV Thigh                           ! Yes       ! Yes            ! None      ! +------------------------------------+----------+---------------+----------+ ! GSV Knee                            ! Yes       ! Yes            ! None      ! +------------------------------------+----------+---------------+----------+ ! GSV Ankle                           ! Yes       ! No             !          ! +------------------------------------+----------+---------------+----------+ ! SSV                                 ! Yes       ! Yes            ! None      ! +------------------------------------+----------+---------------+----------+ Right Doppler Measurements +---------------------------+------+------+--------------------------------+ ! Location                   ! Signal!Reflux! Reflux (msec)                   ! +---------------------------+------+------+--------------------------------+ ! Common Femoral             !Phasic!      !                                ! +---------------------------+------+------+--------------------------------+ ! Prox Femoral               !Phasic!      !                                ! +---------------------------+------+------+--------------------------------+ ! Popliteal                  !Phasic!      !                                ! +---------------------------+------+------+--------------------------------+ Left Lower Extremities DVT Study Measurements Left 2D Measurements +------------------------------------+----------+---------------+----------+ ! Location                            ! Visualized! Compressibility! Thrombosis! +------------------------------------+----------+---------------+----------+ ! Common Femoral                      !Yes       ! Yes            ! None      ! +------------------------------------+----------+---------------+----------+ ! Prox Femoral                        !Yes       ! Yes            ! None      ! +------------------------------------+----------+---------------+----------+ ! Mid Femoral                         !Yes       ! Yes            ! None      ! +------------------------------------+----------+---------------+----------+ ! Dist Femoral                        !Yes       ! Yes            ! None      ! +------------------------------------+----------+---------------+----------+ ! Popliteal                           !Yes       ! Yes            ! None      ! +------------------------------------+----------+---------------+----------+ ! Sapheno Femoral Junction            ! Yes       ! Yes            ! None      ! +------------------------------------+----------+---------------+----------+ ! PTV !Yes       !Partial        !None      ! +------------------------------------+----------+---------------+----------+ ! Peroneal                            !Yes       ! Partial        !None      ! +------------------------------------+----------+---------------+----------+ ! Gastroc                             ! Yes       ! Yes            ! None      ! +------------------------------------+----------+---------------+----------+ ! GSV Thigh                           ! Yes       ! Yes            ! None      ! +------------------------------------+----------+---------------+----------+ ! GSV Knee                            ! Yes       ! Yes            ! None      ! +------------------------------------+----------+---------------+----------+ ! GSV Ankle                           ! Yes       ! Yes            ! None      ! +------------------------------------+----------+---------------+----------+ ! SSV                                 ! Yes       ! Yes            ! None      ! +------------------------------------+----------+---------------+----------+ Left Doppler Measurements +---------------------------+------+------+--------------------------------+ ! Location                   ! Signal!Reflux! Reflux (msec)                   ! +---------------------------+------+------+--------------------------------+ ! Common Femoral             !Phasic!      !                                ! +---------------------------+------+------+--------------------------------+ ! Prox Femoral               !Phasic!      !                                ! +---------------------------+------+------+--------------------------------+ ! Popliteal                  !Phasic!      !                                ! +---------------------------+------+------+--------------------------------+    IR CHEST TUBE INSERTION    Result Date: 8/20/2021  PROCEDURE: ULTRASOUND GUIDED CHEST TUBE PLACEMENT 8/20/2021 HISTORY: ORDERING SYSTEM PROVIDED HISTORY: increased plural effusion TECHNOLOGIST PROVIDED HISTORY: increased plural effusion Which side should the procedure be performed?->Right Pneumothorax SEDATION: None. 8 mL 1% lidocaine local TECHNIQUE: The patient is not consentable. His immediate family was not available so 2 physician emergent consent was obtained including myself and Internal Medicine attending. [place] protocol was followed. A time-out was performed prior to commencing the procedure. A suitable skin site was prepped and draped in sterile fashion following ultrasound localization. A 10 [employer] [place] multipurpose drain was advanced into the collection utilizing the trocar technique. An ultrasound image of the tip within the collection was saved and sent to PACs. The drain was advanced off the inner stylet, looped, and locked within the collection. Aspiration of sanguinous and purulent material was aspirated to confirm location. The drain was sutured in place and attached to an [facility]rium chest tube device. Sterile dressing was applied. The patient tolerated the procedure well without immediate complication. FINDINGS: Highly complex pleural effusion consistent with empyema. Successful ultrasound guided placement of a right chest tube. Chest x-ray pending.      VL Upper Extremity Venous Duplex Left    Result Date: 8/24/2021    [address] [place] [address]  Vascular Upper Extremities Veins Procedure   Patient Name   [id number]       Date of Study           08/24/2021                 Sarah FRAGA   Date of Birth  1965  Gender                  Male   Age            60 year(s)  Race                       Room Number    2103        Height:                 68.9 inch, 175 cm   Corporate ID # [id number]    Weight:                 163 pounds, 74 kg   Patient Acct # [de-identified]   BSA:        1.89 m^2    BMI:     24.16 kg/m^2   MR #           [id number]      Sonographer             Olinda Santiago   Accession #    [id number]  Interpreting Physician  Jennifer Marques   Referring Referring Physician     Jaswant Blair  Nurse  Practitioner  Procedure Type of Study:   Veins: Upper Extremities Veins, Venous Scan Upper Left. Indications for Study: Thrombus. Patient Status: In Patient. Technical Quality:Adequate visualization. Conclusions   Summary   Simultaneous real time imaging utilizing B-Mode, color doppler and  spectral waveform analysis was performed on the left upper extremity for  venous examination of the deep and superficial systems. Findings are:   Left:  No evidence of deep or superficial venous thrombosis. Signature   ----------------------------------------------------------------  Electronically signed by Anibal Hidalgo(Sonographer) on  08/24/2021 12:58 PM  ----------------------------------------------------------------   ----------------------------------------------------------------  Electronically signed by Montana De La Fuente(Interpreting  physician) on 08/24/2021 07:29 PM  ----------------------------------------------------------------  Findings:   Right Impression:        Left Impression:  The Subclavian vein was  Internal jugular, subclavian, axillary, brachial,  evaluated. ulnar, radial, cephalic and basilic veins are  It was compressible and  compressible with normal doppler responses. with normal doppler  responses. Velocities are measured in cm/s ; Diameters are measured in cm Right UE Vein Measurements 2D Measurements +---------------+-----------------+----------------------+-----------------+ ! Location       ! Visualized       ! Compressibility       ! Thrombosis       ! +---------------+-----------------+----------------------+-----------------+ ! Prox SCV       ! Yes              ! Yes                   ! None             ! +---------------+-----------------+----------------------+-----------------+ Doppler Measurements +-------------------------+-----------------------+------------------------+ ! Location                 ! Signal !Reflux                  ! +-------------------------+-----------------------+------------------------+ ! SCV                      ! Phasic                 !                        ! +-------------------------+-----------------------+------------------------+ Left UE Vein Measurements 2D Measurements +------------------------------------+----------+---------------+----------+ ! Location                            ! Visualized! Compressibility! Thrombosis! +------------------------------------+----------+---------------+----------+ ! Prox IJV                            ! Yes       !               !          ! +------------------------------------+----------+---------------+----------+ ! Prox SCV                            ! Yes       !               !          ! +------------------------------------+----------+---------------+----------+ ! Prox Axillary                       ! Yes       !               !          ! +------------------------------------+----------+---------------+----------+ ! Prox Brachial                       !Yes       !               !          ! +------------------------------------+----------+---------------+----------+ ! Prox Radial                         !No        !               !          ! +------------------------------------+----------+---------------+----------+ ! Prox Ulnar                          ! No        !               !          ! +------------------------------------+----------+---------------+----------+ ! Basilic at UA                       ! Yes       !               !          ! +------------------------------------+----------+---------------+----------+ ! Basilic at AF                       ! Yes       !               !          ! +------------------------------------+----------+---------------+----------+ ! Basbrien at [address]                       ! No        !               !          ! +------------------------------------+----------+---------------+----------+ ! Reji at UA !Yes       !               !          ! +------------------------------------+----------+---------------+----------+ ! Cephalic at AF                      ! Yes       !               !          ! +------------------------------------+----------+---------------+----------+ ! Cephalic at [address]                      ! No        !               !          ! +------------------------------------+----------+---------------+----------+ Doppler Measurements +-------------------------+-----------------------+------------------------+ ! Location                 ! Signal                 !Reflux                  ! +-------------------------+-----------------------+------------------------+ ! IJV                      ! Phasic                 !                        ! +-------------------------+-----------------------+------------------------+ ! SCV                      ! Phasic                 !                        ! +-------------------------+-----------------------+------------------------+ ! Axillary                 ! Phasic                 !                        ! +-------------------------+-----------------------+------------------------+ ! Brachial                 !Phasic                 !                        ! +-------------------------+-----------------------+------------------------+    VL Upper Extremity Venous Duplex Left    Result Date: 8/17/2021    [facility]  Vascular Upper Extremities Veins Procedure   Patient Name   [id number]       Date of Study           08/17/2021                 JUN FRAGA   Date of Birth  1965  Gender                  Male   Age            60 year(s)  Race                       Room Number    2004        Height:                 68.9 inch, 175 cm   Corporate ID # [id number]    Weight:                 163 pounds, 74 kg   Patient Acct # [de-identified]   BSA:        1.89 m^2    BMI:      24.16 kg/m^2   MR #           [id number]      Sonographer             Denny Edward, RVT   Accession #    [id number]  Interpreting Physician  Justino Shahid   Referring                  Referring Physician     Rashid Cruz  Nurse  Practitioner  Procedure Type of Study:   Veins: Upper Extremities Veins, Venous Scan Upper Left. Indications for Study:Arm swelling. Patient Status:Out Patient. Technical Quality:Adequate visualization.   - Critical Result:JOHN Leon. Conclusions   Summary   No evidence of superficial or deep venous thrombosis in the left upper  extremity. Superficial vein thrombophlebitis is noted in 2 braches from  the basilar vein near the antecubital fossa. Signature   ----------------------------------------------------------------  Electronically signed by Davene Runner, RVT(Sonographer) on  08/17/2021 12:05 PM  ----------------------------------------------------------------   ----------------------------------------------------------------  Electronically signed by Teddy Sprinkles Reyes,Arthur(Interpreting  physician) on 08/17/2021 07:25 PM  ----------------------------------------------------------------  Findings:   Right Impression:          Left Impression:  Right subclavian vein is   Left internal jugular, subclavian, axillary,  compressible with normal   brachial, ulnar, radial, cephalic and basilic  doppler responses. veins are compressible with normal doppler                             responses. There are two tributaries to the basilic vein                             near the anticubital fossa that are slightly                             dilated, partially compressible with isoechoic                             echoes. Velocities are measured in cm/s ; Diameters are measured in cm Right UE Vein Measurements 2D Measurements +---------------+-----------------+----------------------+-----------------+ ! Location       ! Visualized       ! Compressibility       ! Thrombosis       !  +---------------+-----------------+----------------------+-----------------+ !Prox SCV       ! Yes              ! Yes                   ! None             ! +---------------+-----------------+----------------------+-----------------+ Left UE Vein Measurements 2D Measurements +------------------------------------+----------+---------------+----------+ ! Location                            ! Visualized! Compressibility! Thrombosis! +------------------------------------+----------+---------------+----------+ ! Prox IJV                            ! Yes       ! Yes            ! None      ! +------------------------------------+----------+---------------+----------+ ! Dist IJV                            ! Yes       ! Yes            ! None      ! +------------------------------------+----------+---------------+----------+ ! Prox SCV                            ! Yes       ! Yes            ! None      ! +------------------------------------+----------+---------------+----------+ ! Dist SCV                            ! Yes       ! Yes            ! None      ! +------------------------------------+----------+---------------+----------+ ! Prox Axillary                       ! Yes       ! Yes            ! None      ! +------------------------------------+----------+---------------+----------+ ! Dist Axillary                       ! Yes       ! Yes            ! None      ! +------------------------------------+----------+---------------+----------+ ! Prox Brachial                       !Yes       ! Yes            ! None      ! +------------------------------------+----------+---------------+----------+ ! Dist Brachial                       !Yes       ! Yes            ! None      ! +------------------------------------+----------+---------------+----------+ ! Prox Radial                         !Yes       ! Yes            ! None      ! +------------------------------------+----------+---------------+----------+ ! Dist Radial                         !Yes       ! Yes            ! None      ! +------------------------------------+----------+---------------+----------+ ! Prox Ulnar                          ! Yes       ! Yes            ! None      ! +------------------------------------+----------+---------------+----------+ ! Dist Ulnar                          ! Yes       ! Yes            ! None      ! +------------------------------------+----------+---------------+----------+ ! Basilic at UA                       ! Yes       ! Yes            ! None      ! +------------------------------------+----------+---------------+----------+ ! Basilic at AF                       ! Yes       ! Yes            ! None      ! +------------------------------------+----------+---------------+----------+ ! Basilic at [address]                       ! Yes       ! Yes            ! None      ! +------------------------------------+----------+---------------+----------+ ! Cephalic at UA                      ! Yes       ! Yes            ! None      ! +------------------------------------+----------+---------------+----------+ ! Cephalic at AF                      ! Yes       ! Yes            ! None      ! +------------------------------------+----------+---------------+----------+ ! Cephalic at [address]                      ! Yes       ! Yes            ! None      ! +------------------------------------+----------+---------------+----------+    CT CHEST ABDOMEN PELVIS W CONTRAST    Result Date: 8/10/2021  EXAMINATION: CT OF THE CHEST, ABDOMEN, AND PELVIS WITH CONTRAST 8/10/2021 10:41 pm TECHNIQUE: CT of the chest, abdomen and pelvis was performed with the administration of intravenous contrast. Multiplanar reformatted images are provided for review. Dose modulation, iterative reconstruction, and/or weight based adjustment of the mA/kV was utilized to reduce the radiation dose to as low as reasonably achievable.  COMPARISON: None HISTORY: ORDERING SYSTEM PROVIDED HISTORY: Syncope, fall, rib and abd pain TECHNOLOGIST PROVIDED HISTORY: Syncope, fall, rib and abd pain Decision Support Exception - unselect if not a suspected or confirmed emergency medical condition->Emergency Medical Condition (MA) Reason for Exam: Syncope, fall, rib and abd pain Acuity: Acute Type of Exam: Initial Mechanism of Injury: Pt states he was walking and then was on the ground, states he hurts everywhere. FINDINGS: Chest: Mediastinum: Cardiomegaly. Coronary artery calcifications. Aortic vascular calcifications. Small pericardial effusion. No suspicious mediastinal or hilar Adenopathy Lungs/pleura: Interstitial thickening suggestive edema. Small right-sided effusion. Areas of pleural thickening identified right near the right-sided rib fractures. Trace left-sided effusion and left basilar atelectasis. Stable right lower lobe nodule 8 mm in size. Focal areas opacity anterior aspect of right lung likely represent areas. Cyst versus scarring Soft Tissues/Bones: There right anterolateral fractures involving the right 4th, 5th 6 fracture ribs with associated areas pleural thickening multilevel changes. Abdomen/Pelvis: Organs: Fatty infiltration liver. Gallbladder, spleen, adrenal glands, kidneys, and pancreas unremarkable. Adrenal glands are thickened bilaterally. Subcentimeter right adrenal nodule likely adrenal adenoma, Is unchanged. GI/Bowel: Mild retained stool. Increased fluid content involving the bowel loops bowel obstruction. Mild colonic diverticulosis. No CT findings acute appendicitis. Few scattered colonic diverticula. Pelvis: Mild bladder wall thickening anteriorly. Prostate unremarkable. Peritoneum/Retroperitoneum: No free fluid. Moderate severe aortic vascular calcifications. Aorta is non. Bones/Soft Tissues: No displaced hip or pelvic fractures levocurvature lumbar spine. Multilevel degenerate changes. Grade 2 chest wall injury with right 4th through 6th anterolateral rib fractures.  Areas of pleural thickening likely areas of focal hemothorax near the rib fractures on the right. No acute inflammatory process within the abdomen pelvis. FL MODIFIED BARIUM SWALLOW W VIDEO    Result Date: 8/23/2021  EXAMINATION: MODIFIED BARIUM SWALLOW WAS PERFORMED IN CONJUNCTION WITH SPEECH PATHOLOGY SERVICES TECHNIQUE: Fluoroscopic evaluation of the swallowing mechanism was performed with multiple consistency of barium product. FLUOROSCOPY DOSE AND TYPE OR TIME AND EXPOSURES: Fluoroscopic time of 2 minutes and 18 seconds. DAP of 93.8 dGycm2. COMPARISON: None HISTORY: ORDERING SYSTEM PROVIDED HISTORY: failed swallow test TECHNOLOGIST PROVIDED HISTORY: failed swallow test Reason for Exam: F/u failed video swallow Acuity: Acute Type of Exam: Subsequent/Follow-up Additional signs and symptoms: F/u failed video swallow FINDINGS: Multiple swallows were attempted with multiple consistencies under fluoroscopic evaluation in the presence of speech pathology. Flash penetration was visualized with thin liquid. No aspiration was demonstrated. Flash penetration with thin liquid. Please see separate speech pathology report for full discussion of findings and recommendations. IR GUIDED THORACENTESIS PLEURAL    Result Date: 8/23/2021  PROCEDURE: Madelinecarolyn Pacheco LEFT THORACENTESIS 8/23/2021 HISTORY: ORDERING SYSTEM PROVIDED HISTORY: left pleural effusion, thoracentesis vs pigtail placement TECHNOLOGIST PROVIDED HISTORY: left pleural effusion, thoracentesis vs pigtail placement Which side should the procedure be performed? ->Left Small left pleural effusion TECHNIQUE: This procedure was performed by Dr. Alfredo Garcia. Informed consent was obtained after a detailed explanation of the procedure including risks, benefits, and alternatives. Universal protocol was performed. The left chest was prepped and draped in sterile fashion and local anesthesia was achieved with lidocaine. Initial ultrasound images show small left pleural effusion.   A 5 Egyptian needle sheath was advanced under ultrasound guidance into pleural effusion and thoracentesis was performed. The patient tolerated the procedure well. The planned procedure was discussed with CAMERON MCKEON at approximately 2 pm on 8/23/2021. Decision was made to perform thoracentesis only due to the relatively small quantity of fluid present. EBL: None FINDINGS: A total of 100 mL of clear yellow fluid was removed. Fluid was sent for the requested studies. Successful ultrasound guided left thoracentesis. IR GUIDED THORACENTESIS PLEURAL    Result Date: 8/17/2021  PROCEDURE: ULTRASOUNDGUIDED RIGHT THORACENTESIS 8/17/2021 HISTORY: ORDERING SYSTEM PROVIDED HISTORY: Rt Sided Effusion in need of draining TECHNOLOGIST PROVIDED HISTORY: Rt Sided Effusion in need of draining Which side should the procedure be performed?->Right TECHNIQUE: Informed consent was obtained from the patient's mother via telephone after a detailed explanation of the procedure including risks, benefits, and alternatives. Universal protocol was performed. A time-out was performed prior to commencing the procedure. The right chest was prepped and draped in sterile fashion and local anesthesia was achieved with lidocaine. A 5 Telugu one-step device was advanced under ultrasound guidance into pleural effusion and thoracentesis was performed. 1.85 L of sanguinous and purulence cloudy material was aspirated. 1 L was sent to the lab. The needle was removed. Hemostasis obtained with direct pressure. A sterile dressing was applied. The patient tolerated the procedure well. FINDINGS: A total of 1.85 L of sanguinous and purulence material was removed. 1 L sent to lab     Successful ultrasound guided right thoracentesis. Physical Examination:        Physical Exam  Constitutional:       Appearance: Normal appearance. HENT:      Head: Normocephalic and atraumatic. Mouth/Throat:      Mouth: Mucous membranes are moist.      Pharynx: Oropharynx is clear.    Eyes:      Extraocular Movements: Extraocular movements intact. Conjunctiva/sclera: Conjunctivae normal.      Pupils: Pupils are equal, round, and reactive to light. Cardiovascular:      Rate and Rhythm: Normal rate and regular rhythm. Pulses: Normal pulses. Heart sounds: Normal heart sounds. Pulmonary:      Effort: Pulmonary effort is normal.      Breath sounds: Wheezing present. Comments: Decreased breath sounds  Normal wheezing in the right lower lobe  Abdominal:      General: Abdomen is flat. Palpations: Abdomen is soft. Musculoskeletal:         General: Swelling and signs of injury present. Normal range of motion. Cervical back: Normal range of motion and neck supple. Comments: Swelling improving over left wrist.  Right ribs    Skin:     General: Skin is warm and dry. Capillary Refill: Capillary refill takes less than 2 seconds. Neurological:      Mental Status: He is alert and oriented to person, place, and time. Cranial Nerves: Cranial nerves are intact. Sensory: Sensation is intact. Coordination: Finger-Nose-Finger Test abnormal. Heel to Mendoza Test normal.      Comments: Bilateral lower leg weakness 4+/5    Mild dysmetria and action tremor in upper limb on finger-to-nose test   Psychiatric:         Mood and Affect: Mood normal.         Behavior: Behavior normal.         Thought Content:  Thought content normal.         Judgment: Judgment normal.           Assessment:        Primary Problem  Syncope and collapse    Active Hospital Problems    Diagnosis Date Noted    Elevated C-reactive protein (CRP) [R79.82]     Leukocytosis [D72.829]     Empyema lung (HCC) [J86.9] 08/21/2021    MRSA bacteremia [R78.81, B95.62] 08/20/2021    Superficial venous thrombosis of arm, left [I82.612] 08/18/2021    Acute respiratory failure (HCC) [J96.00] 08/16/2021    Fever [R50.9] 08/14/2021    Conjunctivitis [H10.9] 08/14/2021    Severe malnutrition ([address] [E43] 08/12/2021  Alcohol abuse [F10.10] 08/11/2021    Hypokalemia [E87.6] 08/11/2021    Hyponatremia [E87.1] 08/11/2021    Traumatic rhabdomyolysis ([facility] [T79. 6XXA] 08/11/2021    Closed fracture of multiple ribs of right side [S22.41XA] 08/11/2021    Closed fracture of left wrist [S62.102A] 08/11/2021    Syncope and collapse [R55] 06/13/2018    Dyslipidemia [E78.5] 09/17/2014    Smoking addiction [F17.200] 06/11/2013    COPD (chronic obstructive pulmonary disease) ([facility] [J44.9] 05/30/2013       Plan:             ~Acute respiratory failure 2/2 empyema (stable)  Blood culture positive for MRSA. Symbicort 160/4.5  -Mucinex 600mg BID PRN  Continue IV vancomycin until September 10 as per ID  Okay for SNF as per pulmonary        Alcohol abuse  -Seizure and fall precautions.     ~Multiple right rib fractures (stable)  -CT scan shows Grade 2 chest wall injury with right 4th through 6th anterolateral rib fractures.  -[place] Q6prn.      ~Closed left wrist fracture improving  -Velcro wrist splint in place  - left wrist is still swollen, improved  -pain improved. -Continue splint        `COPD (stable)  -Albuterol as needed  -Spiriva daily  -Ellipta daily     Smoking (stable)  -Nicotine patch  Patient counseled on smoking cessation     GI prophylaxis-Pepcid 20 mg IV twice daily. DVT prophylaxis--Lovenox 40 mg once daily   Diet:  On Nector Thick Diet . Disposition: Discharge on IV vanco to SNF      I have discussed the care of [place] , including pertinent history and exam findings,    today with the resident. I have seen and examined the patient and the key elements of all parts of the encounter have been performed by me . I agree with the assessment, plan and orders as documented by the resident.      Principal Problem:    Syncope and collapse  Active Problems:    COPD (chronic obstructive pulmonary disease) (HCC)    Smoking addiction    Dyslipidemia    Alcohol abuse    Hypokalemia    Hyponatremia    Traumatic rhabdomyolysis ([facility] [address]    Closed fracture of multiple ribs of right side    Closed fracture of left wrist    Severe malnutrition (HCC)    Fever    Conjunctivitis    Acute respiratory failure (HCC)    Superficial venous thrombosis of arm, left    MRSA bacteremia    Empyema lung (HCC)    Elevated C-reactive protein (CRP)    Leukocytosis  Resolved Problems:    * No resolved hospital problems. *        Overall  course ;                                   are improving over time.         DC planning           Electronically signed by Ann Salamanca MD

## 2021-08-31 NOTE — PROGRESS NOTES
S2 normal; no additional sounds heard  Lungs - Air Entry- fair bilaterally; breath sounds : vesicular;   rales/crackles - absent  Abdomen - soft, no tenderness  Upper Extremities  - no cyanosis, mottling; edema : absent  Lower Extremities: no cyanosis, mottling; edema : absent    Current Laboratory, Radiologic, Microbiologic, and Diagnostic studies reviewed  Data ReviewCBC:   Recent Labs     08/29/21  0506 08/30/21  0555 08/31/21  0517   WBC 6.6 7.3 6.9   RBC 2.44* 2.51* 2.52*   HGB 8.1* 8.0* 8.0*   HCT 23.6* 23.8* 23.7*    299 282     BMP:   Recent Labs     08/29/21  0506 08/30/21  0555 08/31/21  0517   GLUCOSE 93 101* 88    136 139   K 3.6* 3.6* 3.8   BUN 8 10 11   CREATININE 1.22* 1.26* 1.24*   CALCIUM 7.9* 8.0* 8.3*     ABGs: No results for input(s): PHART, PO2ART, LRT9IXQ, WOE1IDZ, BEART, F1LLRIOG, NDT7AGP in the last 72 hours.    PT/INR:  No results found for: PTINR    ASSESSMENT / PLAN:  Alcohol withdrawal - resolved  Thiamine, folic acid  atelectasis  RLL infiltrate / empyema  Acute hypoxic resp failure - Mechanical ventilation, extubated 8-20-21  bacteremia - continue current ABx  ABx per ID; rt chest tube - removed 8/27/21  S/p TPA per CT surgery  Repeat CT Chest 8/26  - stable  OK for SNF from Pulmonary  Discussed with Dr. Nitish Pires       Electronically signed by DONNY Simons - CNP on 08/31/21

## 2021-08-31 NOTE — DISCHARGE SUMMARY
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 PROVIDED HISTORY: syncope and collapse Reason for Exam: Syncope and collapse Acuity: Unknown Type of Exam: Unknown FINDINGS: BONES/ALIGNMENT: There is no acute fracture or traumatic malalignment. C4 on C5 anterolisthesis is seen which measures 4 mm. DEGENERATIVE CHANGES: C5/C6 moderate disc height loss is noted in association with posterior disc osteophyte complex which narrows the midline AP thecal sac diameter to 10 mm consistent with borderline central canal stenosis. Disc osteophyte complex severely narrows the bilateral neural foramina. C6/C7: Moderate disc height loss is noted in association with posterior disc osteophyte complex which narrows the midline AP thecal sac diameter to 10 mm consistent with borderline central canal stenosis. Disc osteophyte complex encroaches upon and causes moderate left and mild right neural foraminal stenosis. No further significant spondylosis is noted within the cervical spine.  SOFT TISSUES: There is no prevertebral soft tissue swelling. Partially visualized posterior right upper lung pleural thickening is noted, as described on CT chest abdomen and pelvis with contrast examination from 08/10/2021.     1. Note: Study significantly limited by patient motion related artifact. 2. No acute abnormality of the cervical spine. 3. C4 on C5 anterolisthesis measuring 4 mm, likely degenerative. 4. C5/C6, C6/C7 borderline central canal stenosis secondary to encroachment by posterior disc osteophyte complex. 5. C5/C6 severe bilateral, C6/C7 moderate left and mild right neural foraminal stenosis secondary to encroachment by disc osteophyte complex. CT THORACIC SPINE W CONTRAST    Result Date: 8/21/2021  EXAMINATION: CT OF THE THORACIC SPINE WITH CONTRAST; CT OF THE LUMBAR SPINE WITH CONTRAST 8/21/2021 2:55 pm: TECHNIQUE: CT of the thoracic spine was performed with the administration of intravenous contrast.  Multiplanar reformatted images are provided for review. Dose modulation, iterative reconstruction, and/or weight based adjustment of the mA/kV was utilized to reduce the radiation dose to as low as reasonably achievable.; CT of the lumbar spine was performed with the administration of intravenous contrast. Multiplanar reformatted images are provided for review. Dose modulation, iterative reconstruction, and/or weight based adjustment of the mA/kV was utilized to reduce the radiation dose to as low as reasonably achievable. COMPARISON: None. HISTORY: ORDERING SYSTEM PROVIDED HISTORY: r/o OM TECHNOLOGIST PROVIDED HISTORY: r/o OM Reason for Exam: r/o OM Acuity: Unknown Type of Exam: Unknown Relevant Medical/Surgical History: chest tube; ORDERING SYSTEM PROVIDED HISTORY: r/o OM TECHNOLOGIST PROVIDED HISTORY: r/o OM Reason for Exam: r/o om Acuity: Unknown Type of Exam: Unknown FINDINGS: CT thoracic spine: There is a mild levocurvature of the thoracic spine. No acute fracture or traumatic subluxation is identified.   Chronic appearing Schmorl's nodes are noted. There is no CT evidence of discitis-osteomyelitis. Atelectatic changes are present within both lungs with a mild left pleural effusion. Multiple loculated pleural effusions are present within the right hemithorax. A chest tube is present. CT lumbar spine: There is a normal lumbar lordosis but a moderately severe to lumbar levoscoliosis. No acute fracture or traumatic subluxation is identified. There is severe degenerative disc disease at L3-4 with vacuum phenomena. A chronic Schmorl's node is seen along the superior endplate of L2. There is multilevel facet hypertrophy with moderate to severe right-sided osseous neural foraminal stenosis at L3-4 and moderate to severe left-sided osseous neural foraminal stenosis at L4-5. There is no CT evidence of discitis-osteomyelitis. Heterogeneous enhancement of the kidneys is noted and there is mild pelvic free fluid. No CT evidence of discitis-osteomyelitis. Partially visualized loculated pleural effusions, on the right-hand side. Heterogeneous enhancement of the kidneys. Rule out pyelonephritis. CT LUMBAR SPINE W CONTRAST    Result Date: 8/21/2021  EXAMINATION: CT OF THE THORACIC SPINE WITH CONTRAST; CT OF THE LUMBAR SPINE WITH CONTRAST 8/21/2021 2:55 pm: TECHNIQUE: CT of the thoracic spine was performed with the administration of intravenous contrast.  Multiplanar reformatted images are provided for review. Dose modulation, iterative reconstruction, and/or weight based adjustment of the mA/kV was utilized to reduce the radiation dose to as low as reasonably achievable.; CT of the lumbar spine was performed with the administration of intravenous contrast. Multiplanar reformatted images are provided for review. Dose modulation, iterative reconstruction, and/or weight based adjustment of the mA/kV was utilized to reduce the radiation dose to as low as reasonably achievable. COMPARISON: None.  HISTORY: ORDERING SYSTEM PROVIDED HISTORY: r/o OM TECHNOLOGIST PROVIDED HISTORY: r/o OM Reason for Exam: r/o OM Acuity: Unknown Type of Exam: Unknown Relevant Medical/Surgical History: chest tube; ORDERING SYSTEM PROVIDED HISTORY: r/o OM TECHNOLOGIST PROVIDED HISTORY: r/o OM Reason for Exam: r/o om Acuity: Unknown Type of Exam: Unknown FINDINGS: CT thoracic spine: There is a mild levocurvature of the thoracic spine. No acute fracture or traumatic subluxation is identified. Chronic appearing Schmorl's nodes are noted. There is no CT evidence of discitis-osteomyelitis. Atelectatic changes are present within both lungs with a mild left pleural effusion. Multiple loculated pleural effusions are present within the right hemithorax. A chest tube is present. CT lumbar spine: There is a normal lumbar lordosis but a moderately severe to lumbar levoscoliosis. No acute fracture or traumatic subluxation is identified. There is severe degenerative disc disease at L3-4 with vacuum phenomena. A chronic Schmorl's node is seen along the superior endplate of L2. There is multilevel facet hypertrophy with moderate to severe right-sided osseous neural foraminal stenosis at L3-4 and moderate to severe left-sided osseous neural foraminal stenosis at L4-5. There is no CT evidence of discitis-osteomyelitis. Heterogeneous enhancement of the kidneys is noted and there is mild pelvic free fluid. No CT evidence of discitis-osteomyelitis. Partially visualized loculated pleural effusions, on the right-hand side. Heterogeneous enhancement of the kidneys. Rule out pyelonephritis. US RENAL COMPLETE    Result Date: 8/17/2021  EXAMINATION: ULTRASOUND OF THE KIDNEYS 8/17/2021 1:41 pm COMPARISON: None. HISTORY: ORDERING SYSTEM PROVIDED HISTORY: renal failure Acuity: Acute Type of Exam: Initial FINDINGS: The right kidney measures 12.6 cm in length and the left kidney measures 12.5 cm in length. Kidneys demonstrate normal cortical echogenicity. No hydronephrosis or intrarenal stones. No focal lesions. Visualized liver appears echogenic suggesting fatty infiltration. Negative renal ultrasound. Likely hepatic fatty infiltration. IR FLUORO GUIDED CVA DEVICE PLMT/REPLACE/REMOVAL    Result Date: 8/12/2021  PROCEDURE: ULTRASOUND GUIDED VASCULAR ACCESS. FLUOROSCOPY GUIDED PICC PLACEMENT 8/12/2021. HISTORY: ORDERING SYSTEM PROVIDED HISTORY: TPN, vasopressers TECHNOLOGIST PROVIDED HISTORY: PICC TPN, vasopressers Lumen?->Double Lumen Reason for Exam: TPN Acuity: Unknown Type of Exam: Unknown SEDATION: None FLUOROSCOPY DOSE AND TYPE OR TIME AND EXPOSURES: 5 seconds; D AP 9 cGy cm2 TECHNIQUE: Informed consent was obtained after a detailed explanation of the procedure including risks, benefits, and alternatives. Universal protocol was observed. The right arm was prepped and draped in sterile fashion using maximum sterile barrier technique. Local anesthesia was achieved with lidocaine. A micropuncture needle was used to access the right basilic vein using ultrasound guidance. An ultrasound image demonstrating patency of the vein with needle tip located within it. An image was obtained and stored in PACs. A 0.018 guidewire was used to place a peel-a-way sheath and a 5 [employer] [place] dual PICC was advanced with fluoroscopic guidance with the tip at the cavo-atrial junction. The catheter flushed easily and there was a good blood return. The catheter was secured to the skin. The patient tolerated the procedure well and there were no immediate complications. EBL: Less than 5 mL FINDINGS: Fluoroscopic image demonstrates the tip of the catheter at the cavo-atrial junction.      Successful ultrasound and fluoroscopy guided PICC placement     XR CHEST PORTABLE    Result Date: 8/31/2021  EXAMINATION: ONE XRAY VIEW OF THE CHEST 8/31/2021 7:50 am COMPARISON: 08/30/2021 HISTORY: ORDERING SYSTEM PROVIDED HISTORY: Reassess empyema TECHNOLOGIST PROVIDED HISTORY: Reassess empyema Reason for Exam: empyema Acuity: Unknown Type of Exam: Unknown FINDINGS: The patient is rotated. A right PICC terminates over the mid right atrium, stable position. The cardiac silhouette is normal.  There is inhomogeneous, patchy opacity in the right base. There is thickening along the right superior and mid, lateral pleural interface. Left perihilar linear/patchy opacities are present. Left pleural effusion is not evident on the series. Stable inhomogeneous opacity over the right chest, combination of loculated pleural effusion and dependent airspace disease or atelectasis. Stable left perihilar airspace disease. XR CHEST PORTABLE    Result Date: 8/30/2021  EXAMINATION: ONE XRAY VIEW OF THE CHEST 8/30/2021 7:14 am COMPARISON: AP chest from 08/29/2021 HISTORY: ORDERING SYSTEM PROVIDED HISTORY: Reassess empyema TECHNOLOGIST PROVIDED HISTORY: Reassess empyema Reason for Exam: Post right chest tube removal Acuity: Acute Type of Exam: Subsequent/Follow-up History of COPD FINDINGS: Overlying ECG monitor leads and gown snaps. Unchanged right-sided PICC. Similar cardiomediastinal shadow and right-sided parenchymal and pleural abnormalities with rounded opacities mid lower lung, presumably loculated pleural/fissural fluid. Bibasilar atelectasis. New pulmonary or significant left pleural abnormality. Bones stable. Stable chest findings, as above. XR CHEST PORTABLE    Result Date: 8/29/2021  EXAMINATION: ONE XRAY VIEW OF THE CHEST 8/29/2021 7:45 am COMPARISON: 08/28/2021 HISTORY: Reason for Exam: reassess empyema Acuity: Acute Type of Exam: Ongoing FINDINGS: Stable dense right basilar consolidation with blunting of the costophrenic angle. Stable appearing pleural fluid along the right lateral lung field. Persistent areas of loculated pleural fluid right mid to lower lung field. Persistent smaller left pleural effusion with associated atelectasis. No discernible pneumothorax. Stable right-sided PICC line. Cardiomediastinal is stable. Osseous structures appear intact. Stable appearing chest with persistent findings as detailed above. XR CHEST PORTABLE    Result Date: 8/28/2021  EXAMINATION: ONE X-RAY VIEW OF THE CHEST 8/28/2021 8:54 am COMPARISON: 08/27/2021 HISTORY: ORDERING SYSTEM PROVIDED HISTORY:  Reassess empyema TECHNOLOGIST PROVIDED HISTORY: Reassess empyema Reason for Exam:  Reassess empyema Acuity:  Unknown Type of Exam:  Unknown FINDINGS: Right arm PICC remains in place. Heart size is stable. Patchy opacities at the right base and pleural-based opacities in the lateral mid right hemithorax are unchanged. No evidence of pneumothorax. Mild diffuse interstitial thickening is unchanged. No significant interval change. Patchy opacities at the right base and pleural-based opacity in the lateral mid right chest.     XR CHEST PORTABLE    Result Date: 8/27/2021  EXAMINATION: ONE XRAY VIEW OF THE CHEST 8/27/2021 7:52 am COMPARISON: 08/26/2021, 08/25/2021 HISTORY: ORDERING SYSTEM PROVIDED HISTORY: Reassess empyema TECHNOLOGIST PROVIDED HISTORY: Reassess empyema Reason for Exam: right side chest tube Acuity: Acute Type of Exam: Initial FINDINGS: Right pigtail chest tube remains in place. Right PICC line appears unchanged in position. The right pleural effusion and opacities in the mid to inferior right lung field are without appreciable change. No pneumothorax identified. Left basilar opacities and small left effusion also appears unchanged. No new airspace disease identified. Right chest tube remains in place. No significant change in opacities in the right lung and effusion. No pneumothorax.      XR CHEST PORTABLE    Result Date: 8/26/2021  EXAMINATION: ONE XRAY VIEW OF THE CHEST 8/26/2021 8:05 pm COMPARISON: 8/25/2021 HISTORY: ORDERING SYSTEM PROVIDED HISTORY: reassess empyema post chest tube to water seal TECHNOLOGIST PROVIDED HISTORY: reassess empyema post chest tube to water seal Reason for Exam: reassess empyema post chest tube to water seal Acuity: Acute Type of Exam: Initial Additional signs and symptoms: reassess empyema post chest tube to water seal Relevant Medical/Surgical History: reassess empyema post chest tube to water seal FINDINGS: Right upper extremity PICC and chest tube are in stable position. Improved aeration is identified in the right base compared to the prior study. Persistent loculated hydropneumothorax is seen. Redemonstration of large nodular opacities overlying the right mid to lower lung zone. Left base pleural effusion and opacity are unchanged. No acute osseous abnormality. 1. Stable lines and tubes. 2. Interval improvement of right base aeration from the prior study. 3. Mild to moderate loculated right hydropneumothorax. 4. Persistent nodular opacities overlying the right mid to lower lung zone. 5. Stable left base opacity and pleural effusion. XR CHEST PORTABLE    Result Date: 8/25/2021  EXAMINATION: ONE XRAY VIEW OF THE CHEST 8/25/2021 9:27 am COMPARISON: 08/24/2021 HISTORY: ORDERING SYSTEM PROVIDED HISTORY: empyema TECHNOLOGIST PROVIDED HISTORY: empyema Reason for Exam: empyema Acuity: Acute Type of Exam: Initial FINDINGS: There is a small bore locking loop chest tube identified at the right lung base. There is a right-sided pleural effusion, predominantly laterally, without significant interval change compared to the previous examination. Multifocal infiltrates are seen throughout the lungs bilaterally. There is a right-sided PICC with the tip overlying the cavoatrial junction. Multiple areas of loculated pleural fluid again noted within the right chest with a rounded appearance. Multifocal loculated pleural effusion in the right chest, with overall appearance similar to the previous examination. Multifocal parenchymal infiltrates are again identified, similar to the prior study.      XR CHEST PORTABLE    Result Date: 8/24/2021  EXAMINATION: ONE XRAY VIEW OF THE CHEST 8/24/2021 7:59 am COMPARISON: Chest radiograph performed 08/23/2021. HISTORY: ORDERING SYSTEM PROVIDED HISTORY: empyema TECHNOLOGIST PROVIDED HISTORY: empyema Reason for Exam: sob Acuity: Unknown Type of Exam: Unknown FINDINGS: There is consolidation over the right lung base that is stable compared to prior. Stable right-sided effusion. There is a stable indwelling right basilar drain. The heart is prominent. The upper abdomen is unremarkable. The extrathoracic soft tissues are unremarkable. Similar right lower lung consolidation and effusion with indwelling drain. XR CHEST PORTABLE    Result Date: 8/23/2021  EXAMINATION: ONE XRAY VIEW OF THE CHEST 8/23/2021 2:04 pm COMPARISON: Chest radiograph performed 08/22/2021. HISTORY: ORDERING SYSTEM PROVIDED HISTORY: infiltrate TECHNOLOGIST PROVIDED HISTORY: infiltrate Reason for Exam: F/u infiltrate/chest tube Acuity: Acute Type of Exam: Subsequent/Follow-up Additional signs and symptoms: F/u infiltrate/chest tube FINDINGS: There are bilateral mid and lower lung infiltrates. There are bibasilar effusions. There is no pneumothorax. Mediastinal structures are stable. The upper abdomen is unremarkable. The extrathoracic soft tissues are unremarkable. There is a right-sided PICC line with the tip in the distal SVC. There is a right-sided pigtail drain. Bilateral effusions with adjacent mid and lower lung infiltrates consistent with pneumonia. Support tubes as described above. XR CHEST PORTABLE    Result Date: 8/22/2021  EXAMINATION: ONE XRAY VIEW OF THE CHEST 8/22/2021 6:43 am COMPARISON: 21 August 2021 HISTORY: ORDERING SYSTEM PROVIDED HISTORY: infiltrate TECHNOLOGIST PROVIDED HISTORY: infiltrate Reason for Exam: infiltrate Acuity: Unknown Type of Exam: Unknown Additional signs and symptoms: infiltrate FINDINGS: AP portable view of the chest time stamped at 549 hours demonstrates overlying cardiac monitoring electrodes.   Right PICC line terminates in the right atrium. Pigtail terminus small bore chest tube remains at the right base. Prior right-sided extrapleural air is not redemonstrated. However, there is fluid in that space consistent with loculated effusion. Rounded opacity at the right base is unchanged with surrounding haziness. Vascularity appears increased over prior study with interval development of left perihilar and basilar opacities. Right-sided extrapleural air not demonstrated replaced with fluid. Continued nodular density at the right base. Interval increase in vascularity development of left-sided opacities. XR CHEST PORTABLE    Result Date: 8/21/2021  EXAMINATION: ONE XRAY VIEW OF THE CHEST 8/21/2021 5:21 am COMPARISON: 20 August 2021 HISTORY: ORDERING SYSTEM PROVIDED HISTORY: ETT placement, chest tube placement TECHNOLOGIST PROVIDED HISTORY: ETT placement, chest tube placement Acuity: Unknown Type of Exam: Ongoing Additional signs and symptoms: ETT placement, chest tube placement Relevant Medical/Surgical History: ETT placement, chest tube placement FINDINGS: AP portable view of the chest time stamped at 503 hours demonstrates an endotracheal tube terminating 3.2 cm above the kristen, overlying cardiac monitoring electrodes, right-sided PICC line terminating in the proximal right atrium. No change in cardiomegaly. Rounded opacity at the right lung base is again noted unchanged. There has been some clearing of density surrounding the opacity at the right lung base although significant residual remains. Right-sided pneumothorax again noted 5.1 mm in width reduced from a gonzáles mm. Small amount of right basilar extrapleural air. Pigtail terminus right-sided chest tube remains at the right base. Left lung is clear. 1.  Slight reduction in right-sided pneumothorax. 2.  Slight decrease in opacity in the right lower hemithorax surrounding a masslike opacification. 3.  Tubes and lines as above.      XR CHEST PORTABLE    Result expiration than the previous. Slight interval progression in moderate diffuse bilateral interstitial/alveolar infiltrates with increasing right lower lung consolidation     Stable small right pneumothorax Satisfactorily positioned support lines Slight progression in moderate diffuse bilateral interstitial/alveolar infiltrates suggesting increasing edema and/or infection Increasing now moderate right lower lung consolidation/infiltrate     XR CHEST PORTABLE    Result Date: 8/19/2021  EXAMINATION: ONE XRAY VIEW OF THE CHEST 8/19/2021 5:39 am COMPARISON: 08/18/2021 HISTORY: ORDERING SYSTEM PROVIDED HISTORY: vent TECHNOLOGIST PROVIDED HISTORY: vent Reason for Exam: vent Acuity: Acute Type of Exam: Ongoing FINDINGS: Cardiac size is enlarged. The endotracheal tube ice 3.5 cm above the kristen. Overall stable appearing small right-sided pneumothorax. No mediastinal shift is identified. Perihilar as well as basilar airspace disease seen bilaterally, greater on the right with mild increased focal consolidation at the right lung base compared to the prior study. Otherwise similar appearing chest.     Stable small right-sided pneumothorax. Mild increased focal consolidation in the right lower lung field, otherwise similar appearing parenchymal infiltrates bilaterally. XR CHEST PORTABLE    Result Date: 8/18/2021  EXAMINATION: ONE XRAY VIEW OF THE CHEST 8/18/2021 6:02 am COMPARISON: 08/17/2021 HISTORY: ORDERING SYSTEM PROVIDED HISTORY: vent TECHNOLOGIST PROVIDED HISTORY: vent Reason for Exam: vent Acuity: Acute Type of Exam: Ongoing FINDINGS: Cardiac size and mediastinal structures appear similar. The patient is rotated. There is a right-sided pneumothorax, with the pleural to pleural surface measuring 12 mm at the apicolateral aspect, previously measuring 16 mm. There is a right-sided PICC with the catheter tip overlying the right atrium.   Scattered parenchymal infiltrates identified within the lungs bilaterally, right greater than left. Mild increased infiltrates seen at the right lung base. Otherwise similar appearing chest.     Multifocal infiltrates are identified within the lungs bilaterally, with mild interval worsening at the right lung base. Minimally improved right-sided pneumothorax, measuring 12 mm from pleural to pleural surface, previously measuring 16 mm. XR CHEST PORTABLE    Result Date: 8/17/2021  EXAMINATION: ONE XRAY VIEW OF THE CHEST 8/17/2021 4:20 pm COMPARISON: 17 August 2021 at 6:24 a.m. HISTORY: ORDERING SYSTEM PROVIDED HISTORY: s/p right thora, rule out ptx TECHNOLOGIST PROVIDED HISTORY: s/p right thora, rule out ptx Reason for Exam: s/p right thora, rule out ptx Acuity: Acute Type of Exam: Initial Additional signs and symptoms: s/p right thora, rule out ptx FINDINGS: Single portable chest x-ray submitted. Enteric tube is off the inferior margin of the film. Endotracheal tube tip is above the kristen. Right central venous catheter tip is at the cavoatrial junction. The left lung is well inflated. There is a small to moderate right pneumothorax without mediastinal shift. Right-sided effusion is nearly completely evacuated with right lower lobe opacification. Atherosclerosis of the aorta. Previously identified rib fractures are not well evaluated on the study. Small to moderate pneumothorax without tension. Right lower lobe opacification likely represents consolidation. Nearly completely resolved effusion. RECOMMENDATION: ICU attending physician was not available. These results were discussed via telephone with Dr. Vinicio Segal at [phone] p.m.. Recommended following with portable chest x-rays overnight. The physician voiced understanding.      XR CHEST PORTABLE    Result Date: 8/17/2021  EXAMINATION: ONE XRAY VIEW OF THE CHEST 8/17/2021 6:34 am COMPARISON: August 16, 2021 chest examination HISTORY: ORDERING SYSTEM PROVIDED HISTORY: vent TECHNOLOGIST PROVIDED HISTORY: vent Reason for Exam: vent Acuity: Acute Type of Exam: Ongoing FINDINGS: Limited rotated exam Stable satisfactorily positioned endotracheal/nasogastric tubes, right PICC line catheter. Stable cardiopericardial silhouette No definite change in extensive right chest pleuroparenchymal process with near opacification of 3/4 of the right hemithorax related to large effusion with underlying atelectasis versus infiltrate. Stable mild left basilar opacity     Stable chest     XR CHEST PORTABLE    Result Date: 8/16/2021  EXAMINATION: ONE XRAY VIEW OF THE CHEST 8/16/2021 4:38 am COMPARISON: Chest radiograph 08/15/2021. HISTORY: ORDERING SYSTEM PROVIDED HISTORY: vent TECHNOLOGIST PROVIDED HISTORY: vent Reason for Exam: vent Acuity: Unknown Type of Exam: Ongoing Additional signs and symptoms: vent Relevant Medical/Surgical History: vent FINDINGS: The endotracheal tube terminates approximately 3.5 cm above the kristen. A right upper extremity PICC terminates in the lower superior vena cava. The cardiomediastinal silhouette is unchanged. Large right pleural effusion and basilar airspace opacities without significant change. No pneumothorax or left pleural effusion. No acute osseous abnormality. Large right pleural effusion and right basilar opacities not significantly changed from the previous study. XR CHEST PORTABLE    Result Date: 8/15/2021  EXAMINATION: ONE XRAY VIEW OF THE CHEST 8/15/2021 4:49 am COMPARISON: 08/14/2021 and prior HISTORY: ORDERING SYSTEM PROVIDED HISTORY: vent TECHNOLOGIST PROVIDED HISTORY: vent Reason for Exam: vent Acuity: Unknown Type of Exam: Ongoing Additional signs and symptoms: vent Relevant Medical/Surgical History: vent FINDINGS: Study limited by motion as well as overlying support and monitoring apparatus. The endotracheal tube appears stable from the distal 3rd of the esophagus. NG tube is limited in its evaluation but appears to extend below the diaphragm. The tip is not visualized.  Right upper extremity PICC line projects at the superior vena cava right atrial junction. The heart and mediastinal contours appears stable in appearance. Increased interstitial and patchy opacity at the left base appears similar when accounting for differences in technique. Pleural and parenchymal changes on the right with a mid and lower lung zone predominance. Osseous structures are unremarkable in appearance. Lines and tubes appear stable. Pleural and parenchymal changes on the right without significant change from the prior study given differences in technique. Changes on the left also appear relatively stable. Recommend continued follow-up. XR CHEST PORTABLE    Result Date: 8/14/2021  EXAMINATION: ONE XRAY VIEW OF THE CHEST 8/14/2021 5:54 am COMPARISON: August 13, 2021 HISTORY: ORDERING SYSTEM PROVIDED HISTORY: vent TECHNOLOGIST PROVIDED HISTORY: vent Reason for Exam: vent Acuity: Unknown Type of Exam: Ongoing Additional signs and symptoms: vent Relevant Medical/Surgical History: vent FINDINGS: Stable position of the support lines and tubes. No significant change in the parenchymal opacification of the right mid and lower lung region and left retrocardiac opacity. Bilateral pleural effusions unchanged. Stable cardiomediastinal silhouette. No significant pulmonary edema. No pneumothorax. Stable exam demonstrating bilateral airspace disease, right greater than left. XR CHEST PORTABLE    Result Date: 8/13/2021  EXAMINATION: ONE XRAY VIEW OF THE CHEST 8/13/2021 6:33 am COMPARISON: August 13 0614 hours HISTORY: ORDERING SYSTEM PROVIDED HISTORY: ETT placement, OGT placement TECHNOLOGIST PROVIDED HISTORY: ETT placement, OGT placement Reason for Exam:  new vent, ogt placement Acuity: Unknown Type of Exam: Unknown FINDINGS: The tip of the ET tube is approximately 3.9 cm above the kristen. NG tube is in place, tip not visualized on the radiograph.   Proximal side port is just beyond the level of the GE junction, within the gastric cardia. Extensive airspace disease is again noted within the right perihilar region, and right lung base, and left retrocardiac region. Overall appearance is minimally improved. No pneumothorax is present. Fluid is present within the major fissure on the right. Right upper extremity PICC line is in place, tip at the junction of the SVC and right atrium. 1. ET tube in place, tip 3.9 cm above the kristen 2. NG tube in place, tip not included on the radiograph 3. Bilateral airspace disease, most prominent on the right, and may represent combination of atelectasis and pneumonia. XR CHEST PORTABLE    Result Date: 8/13/2021  EXAMINATION: ONE XRAY VIEW OF THE CHEST 8/13/2021 6:04 am COMPARISON: Chest radiograph performed 08/12/2021. HISTORY: ORDERING SYSTEM PROVIDED HISTORY: pl effusion, rib fracture TECHNOLOGIST PROVIDED HISTORY: pl effusion, rib fracture Reason for Exam: pleural effusion, rib fx Acuity: Acute Type of Exam: Ongoing FINDINGS: There is a right basilar effusion with adjacent consolidation. There is no pneumothorax. The mediastinal structures are stable. The upper abdomen is unremarkable. The extrathoracic soft tissues are unremarkable. There is a right-sided PICC line with the tip in the mid SVC. Right basilar effusion with adjacent consolidation consistent with pneumonia.      VL Lower Extremity Arteries Right    Result Date: 8/16/2021    [facility]  Vascular Lower Extremities Arterial Duplex Procedure   Patient Name   [facility]       Date of Study           08/16/2021                 JUN FRAGA   Date of Birth  1965  Gender                  Male   Age            60 year(s)  Race                       Room Number    2004        Height:                 68.9 inch, 175 cm   Corporate ID # [id number]    Weight:                 163 pounds, 74 kg   Patient Acct # [de-identified]   BSA:        1.89 m^2    BMI:      24.16 kg/m^2   MR #           [id number]      Sonographer Chino Bryant   Accession #    [id number]  Interpreting Physician  Tristian Machado   Referring                  Referring Physician     Lucas Rios  Nurse  Practitioner  Procedure Type of Study:   Extremities Arteries: Lower Extremities Arterial Duplex, Arterial Scan  Lower Right. Patient Status: In Patient. Technical Quality:Adequate visualization. Comments: Indications: Cold foot. Conclusions   Summary   Simultaneous real time imaging utilizing B-Mode, color doppler and  spectral waveform analysis was performed on the right lower extremity for  arterial examination, study demonstrates:   Right:  No evidence of significant occlusive arterial disease. Signature   ----------------------------------------------------------------  Electronically signed by Chino Bryant(Sonographer) on  08/16/2021 10:26 AM  ----------------------------------------------------------------   ----------------------------------------------------------------  Electronically signed by Montana Pedro(Interpreting  physician) on 08/16/2021 09:41 PM  ----------------------------------------------------------------  Findings:   Right Impression:  Patent external iliac, common femoral, profunda, superficial femoral,  popliteal and tibial arteries with triphasic flow. Velocities are measured in cm/s ; Diameters are measured in cm LE Duplex Measurements +---------++-----+-----+---------+----+-----++---+-----+-----+----+--------+ ! ! !Right! ! Left     !    !     !!   !     !     !    !        ! +---------++-----+-----+---------+----+-----++---+-----+-----+----+--------+ ! Location ! !PSV  ! Ratio! Wave     !AP  ! Trans! !PSV! Ratio! Wave !AP  ! Trans   ! !         !!     ! !Desc. ! Diam!Diam !!   ! !Desc. ! Diam!Diam    ! +---------++-----+-----+---------+----+-----++---+-----+-----+----+--------+ ! Dist EIA !!111  ! ! Triphasic!0.88!0.9  !!   !     !     !    !        ! +---------++-----+-----+---------+----+-----++---+-----+-----+----+--------+ ! Common   !!123  !1.11 ! Triphasic!1.16!1.3  !!   !     !     !    !        ! !Femoral  !!     !     !         !    !     !!   !     !     !    !        ! +---------++-----+-----+---------+----+-----++---+-----+-----+----+--------+ ! PFA      !!112  !0.91 ! Triphasic!0.64!0.62 !!   !     !     !    !        ! +---------++-----+-----+---------+----+-----++---+-----+-----+----+--------+ ! Prox SFA !!119  ! ! Triphasic!0.59!0.77 !!   !     !     !    !        ! +---------++-----+-----+---------+----+-----++---+-----+-----+----+--------+ ! Mid SFA  !!116  !0.97 ! Triphasic!0.57!0.67 !!   !     !     !    !        ! +---------++-----+-----+---------+----+-----++---+-----+-----+----+--------+ ! Dist SFA !!96.9 !0.84 ! Triphasic!0.63!0.78 !!   !     !     !    !        ! +---------++-----+-----+---------+----+-----++---+-----+-----+----+--------+ ! Prox     !!84.9 !0.88 ! Triphasic!0.65!0.81 !!   !     !     !    !        ! !Popliteal!!     !     !         !    !     !!   !     !     !    !        ! +---------++-----+-----+---------+----+-----++---+-----+-----+----+--------+ ! Dist     !!72.9 !0.86 ! Triphasic!0.52!0.68 !!   !     !     !    !        ! !Popliteal!!     !     !         !    !     !!   !     !     !    !        ! +---------++-----+-----+---------+----+-----++---+-----+-----+----+--------+ ! Prox PTA !!132  !1.81 !         !0.29!0.3  !!   !     !     !    !        ! +---------++-----+-----+---------+----+-----++---+-----+-----+----+--------+ ! Dist PTA !!96.9 !0.73 ! Triphasic!0.32!0.34 !!   !     !     !    !        ! +---------++-----+-----+---------+----+-----++---+-----+-----+----+--------+ ! Mid JANICE  !!93   !1.28 ! Triphasic!0.32!0.32 !!   !     !     !    !        ! +---------++-----+-----+---------+----+-----++---+-----+-----+----+--------+ ! Prox     !!121  !0.92 ! Triphasic!0.29!0.28 !!   !     !     !    !        !  !Peroneal !!     !     ! 08/24/2021 12:47 PM  ----------------------------------------------------------------   ----------------------------------------------------------------  Electronically signed by Montana Lewis(Interpreting  physician) on 08/24/2021 07:37 PM  ----------------------------------------------------------------  Findings:   Right Impression:                    Left Impression:  The common femoral, femoral,         The common femoral, femoral,  popliteal and tibial veins           popliteal and tibial veins  demonstrate normal compressibility   demonstrate normal compressibility  and augmentation. and augmentation. Non compressibility of the great     Normal compressibility of the great  saphenous vein at the level of the   saphenous vein. ankle. Normal compressibility of the small  Normal compressibility of the small  saphenous vein. saphenous vein. Velocities are measured in cm/s ; Diameters are measured in cm Right Lower Extremities DVT Study Measurements Right 2D Measurements +------------------------------------+----------+---------------+----------+ ! Location                            ! Visualized! Compressibility! Thrombosis! +------------------------------------+----------+---------------+----------+ ! Common Femoral                      !Yes       ! Yes            ! None      ! +------------------------------------+----------+---------------+----------+ ! Prox Femoral                        !Yes       ! Yes            ! None      ! +------------------------------------+----------+---------------+----------+ ! Mid Femoral                         !Yes       ! Yes            ! None      ! +------------------------------------+----------+---------------+----------+ ! Dist Femoral                        !Yes       ! Yes            ! None      ! +------------------------------------+----------+---------------+----------+ ! Popliteal                           !Yes !Yes            !None      ! +------------------------------------+----------+---------------+----------+ ! Sapheno Femoral Junction            ! Yes       ! Yes            ! None      ! +------------------------------------+----------+---------------+----------+ ! PTV                                 ! Yes       ! Partial        !None      ! +------------------------------------+----------+---------------+----------+ ! Peroneal                            !Yes       ! Partial        !None      ! +------------------------------------+----------+---------------+----------+ ! Gastroc                             ! Yes       ! Yes            ! None      ! +------------------------------------+----------+---------------+----------+ ! GSV Thigh                           ! Yes       ! Yes            ! None      ! +------------------------------------+----------+---------------+----------+ ! GSV Knee                            ! Yes       ! Yes            ! None      ! +------------------------------------+----------+---------------+----------+ ! GSV Ankle                           ! Yes       ! No             !          ! +------------------------------------+----------+---------------+----------+ ! SSV                                 ! Yes       ! Yes            ! None      ! +------------------------------------+----------+---------------+----------+ Right Doppler Measurements +---------------------------+------+------+--------------------------------+ ! Location                   ! Signal!Reflux! Reflux (msec)                   ! +---------------------------+------+------+--------------------------------+ ! Common Femoral             !Phasic!      !                                ! +---------------------------+------+------+--------------------------------+ ! Prox Femoral               !Phasic!      !                                ! +---------------------------+------+------+--------------------------------+ ! Popliteal                  !Phasic!      ! ! +---------------------------+------+------+--------------------------------+ Left Lower Extremities DVT Study Measurements Left 2D Measurements +------------------------------------+----------+---------------+----------+ ! Location                            ! Visualized! Compressibility! Thrombosis! +------------------------------------+----------+---------------+----------+ ! Common Femoral                      !Yes       ! Yes            ! None      ! +------------------------------------+----------+---------------+----------+ ! Prox Femoral                        !Yes       ! Yes            ! None      ! +------------------------------------+----------+---------------+----------+ ! Mid Femoral                         !Yes       ! Yes            ! None      ! +------------------------------------+----------+---------------+----------+ ! Dist Femoral                        !Yes       ! Yes            ! None      ! +------------------------------------+----------+---------------+----------+ ! Popliteal                           !Yes       ! Yes            ! None      ! +------------------------------------+----------+---------------+----------+ ! Sapheno Femoral Junction            ! Yes       ! Yes            ! None      ! +------------------------------------+----------+---------------+----------+ ! PTV                                 ! Yes       ! Partial        !None      ! +------------------------------------+----------+---------------+----------+ ! Peroneal                            !Yes       ! Partial        !None      ! +------------------------------------+----------+---------------+----------+ ! Gastroc                             ! Yes       ! Yes            ! None      ! +------------------------------------+----------+---------------+----------+ ! GSV Thigh                           ! Yes       ! Yes            ! None      ! +------------------------------------+----------+---------------+----------+ !GSV Knee                            ! Yes       ! Yes            ! None      ! +------------------------------------+----------+---------------+----------+ ! GSV Ankle                           ! Yes       ! Yes            ! None      ! +------------------------------------+----------+---------------+----------+ ! SSV                                 ! Yes       ! Yes            ! None      ! +------------------------------------+----------+---------------+----------+ Left Doppler Measurements +---------------------------+------+------+--------------------------------+ ! Location                   ! Signal!Reflux! Reflux (msec)                   ! +---------------------------+------+------+--------------------------------+ ! Common Femoral             !Phasic!      !                                ! +---------------------------+------+------+--------------------------------+ ! Prox Femoral               !Phasic!      !                                ! +---------------------------+------+------+--------------------------------+ ! Popliteal                  !Phasic!      !                                ! +---------------------------+------+------+--------------------------------+    IR CHEST TUBE INSERTION    Result Date: 8/20/2021  PROCEDURE: ULTRASOUND GUIDED CHEST TUBE PLACEMENT 8/20/2021 HISTORY: ORDERING SYSTEM PROVIDED HISTORY: increased plural effusion TECHNOLOGIST PROVIDED HISTORY: increased plural effusion Which side should the procedure be performed?->Right Pneumothorax SEDATION: None. 8 mL 1% lidocaine local TECHNIQUE: The patient is not consentable. His immediate family was not available so 2 physician emergent consent was obtained including myself and Internal Medicine attending. [place] protocol was followed. A time-out was performed prior to commencing the procedure. A suitable skin site was prepped and draped in sterile fashion following ultrasound localization.   A 10 [employer] multipurpose drain was advanced into the collection utilizing the trocar technique. An ultrasound image of the tip within the collection was saved and sent to PACs. The drain was advanced off the inner stylet, looped, and locked within the collection. Aspiration of sanguinous and purulent material was aspirated to confirm location. The drain was sutured in place and attached to an atrium chest tube device. Sterile dressing was applied. The patient tolerated the procedure well without immediate complication. FINDINGS: Highly complex pleural effusion consistent with empyema. Successful ultrasound guided placement of a right chest tube. Chest x-ray pending. VL Upper Extremity Venous Duplex Left    Result Date: 8/24/2021    [facility]  Vascular Upper Extremities Veins Procedure   [id number]       Date of Study           08/24/2021                 Kwasi FRAGA   Date of Birth  1965  Gender                  Male   Age            60 year(s)  Race                       Room Number    2103        Height:                 68.9 inch, 175 cm   Corporate ID # [id number]    Weight:                 163 pounds, 74 kg   Patient Acct # [de-identified]   BSA:        1.89 m^2    BMI:     24.16 kg/m^2   MR #           [id number]      Sonographer             Auther Situ   Accession #    [id number]  Interpreting Physician  Jackelyn Pedraza   Referring                  Referring Physician     Fredy Catalan  Nurse  Practitioner  Procedure Type of Study:   Veins: Upper Extremities Veins, Venous Scan Upper Left. Indications for Study: Thrombus. Patient Status: In Patient. Technical Quality:Adequate visualization. Conclusions   Summary   Simultaneous real time imaging utilizing B-Mode, color doppler and  spectral waveform analysis was performed on the left upper extremity for  venous examination of the deep and superficial systems. Findings are:   Left:  No evidence of deep or superficial venous thrombosis.    Signature ----------------------------------------------------------------  Electronically signed by Anibal Darden(Sonographer) on  08/24/2021 12:58 PM  ----------------------------------------------------------------   ----------------------------------------------------------------  Electronically signed by Montana Lewis(Interpreting  physician) on 08/24/2021 07:29 PM  ----------------------------------------------------------------  Findings:   Right Impression:        Left Impression:  The Subclavian vein was  Internal jugular, subclavian, axillary, brachial,  evaluated. ulnar, radial, cephalic and basilic veins are  It was compressible and  compressible with normal doppler responses. with normal doppler  responses. Velocities are measured in cm/s ; Diameters are measured in cm Right UE Vein Measurements 2D Measurements +---------------+-----------------+----------------------+-----------------+ ! Location       ! Visualized       ! Compressibility       ! Thrombosis       ! +---------------+-----------------+----------------------+-----------------+ ! Prox SCV       ! Yes              ! Yes                   ! None             ! +---------------+-----------------+----------------------+-----------------+ Doppler Measurements +-------------------------+-----------------------+------------------------+ ! Location                 ! Signal                 !Reflux                  ! +-------------------------+-----------------------+------------------------+ ! SCV                      ! Phasic                 !                        ! +-------------------------+-----------------------+------------------------+ Left UE Vein Measurements 2D Measurements +------------------------------------+----------+---------------+----------+ ! Location                            ! Visualized! Compressibility! Thrombosis! +------------------------------------+----------+---------------+----------+ ! Prox IJV                            ! Yes !               !          ! +------------------------------------+----------+---------------+----------+ ! Prox SCV                            ! Yes       !               !          ! +------------------------------------+----------+---------------+----------+ ! Prox Axillary                       ! Yes       !               !          ! +------------------------------------+----------+---------------+----------+ ! Prox Brachial                       !Yes       !               !          ! +------------------------------------+----------+---------------+----------+ ! Prox Radial                         !No        !               !          ! +------------------------------------+----------+---------------+----------+ ! Prox Ulnar                          ! No        !               !          ! +------------------------------------+----------+---------------+----------+ ! Basilic at UA                       ! Yes       !               !          ! +------------------------------------+----------+---------------+----------+ ! Basilic at AF                       ! Yes       !               !          ! +------------------------------------+----------+---------------+----------+ ! Basilic at [address]                       ! No        !               !          ! +------------------------------------+----------+---------------+----------+ ! Cephalic at UA                      ! Yes       !               !          ! +------------------------------------+----------+---------------+----------+ ! Cephalic at AF                      ! Yes       !               !          ! +------------------------------------+----------+---------------+----------+ ! Cephalic at [address]                      ! No        !               !          ! +------------------------------------+----------+---------------+----------+ Doppler Measurements +-------------------------+-----------------------+------------------------+ ! Location                 ! Signal !Reflux                  ! +-------------------------+-----------------------+------------------------+ ! IJV                      ! Phasic                 !                        ! +-------------------------+-----------------------+------------------------+ ! SCV                      ! Phasic                 !                        ! +-------------------------+-----------------------+------------------------+ ! Axillary                 ! Phasic                 !                        ! +-------------------------+-----------------------+------------------------+ ! Brachial                 !Phasic                 !                        ! +-------------------------+-----------------------+------------------------+    VL Upper Extremity Venous Duplex Left    Result Date: 8/17/2021    [address] [place] [address]  Vascular Upper Extremities Veins Procedure   Patient Name   [id number]       Date of Study           08/17/2021                 JUN FRAGA   Date of Birth  1965  Gender                  Male   Age            60 year(s)  Race                       Room Number    2004        Height:                 68.9 inch, 175 cm   Corporate ID # [id number]    Weight:                 163 pounds, 74 kg   Patient Acct # [de-identified]   BSA:        1.89 m^2    BMI:      24.16 kg/m^2   MR #           [id number]      Sonographer             Jennifer Baeza T   Accession #    [id number]  Interpreting Physician  Shy Winter   Referring                  Referring Physician     Verl Klinefelter  Nurse  Practitioner  Procedure Type of Study:   Veins: Upper Extremities Veins, Venous Scan Upper Left. Indications for Study:Arm swelling. Patient Status:Out Patient. Technical Quality:Adequate visualization.   - Critical Result:JOHN García. Conclusions   Summary   No evidence of superficial or deep venous thrombosis in the left upper  extremity.  Superficial vein thrombophlebitis is noted in 2 braches from  the basilar vein near the antecubital fossa. Signature   ----------------------------------------------------------------  Electronically signed by Josue Cortes RVT(Sonographer) on  08/17/2021 12:05 PM  ----------------------------------------------------------------   ----------------------------------------------------------------  Electronically signed by Carlen Craze Reyes,Arthur(Interpreting  physician) on 08/17/2021 07:25 PM  ----------------------------------------------------------------  Findings:   Right Impression:          Left Impression:  Right subclavian vein is   Left internal jugular, subclavian, axillary,  compressible with normal   brachial, ulnar, radial, cephalic and basilic  doppler responses. veins are compressible with normal doppler                             responses. There are two tributaries to the basilic vein                             near the anticubital fossa that are slightly                             dilated, partially compressible with isoechoic                             echoes. Velocities are measured in cm/s ; Diameters are measured in cm Right UE Vein Measurements 2D Measurements +---------------+-----------------+----------------------+-----------------+ ! Location       ! Visualized       ! Compressibility       ! Thrombosis       ! +---------------+-----------------+----------------------+-----------------+ ! Prox SCV       ! Yes              ! Yes                   ! None             ! +---------------+-----------------+----------------------+-----------------+ Left UE Vein Measurements 2D Measurements +------------------------------------+----------+---------------+----------+ ! Location                            ! Visualized! Compressibility! Thrombosis! +------------------------------------+----------+---------------+----------+ ! Prox IJV                            ! Yes       ! Yes            ! None      ! +------------------------------------+----------+---------------+----------+ ! Dist IJV                            ! Yes       ! Yes            ! None      ! +------------------------------------+----------+---------------+----------+ ! Prox SCV                            ! Yes       ! Yes            ! None      ! +------------------------------------+----------+---------------+----------+ ! Dist SCV                            ! Yes       ! Yes            ! None      ! +------------------------------------+----------+---------------+----------+ ! Prox Axillary                       ! Yes       ! Yes            ! None      ! +------------------------------------+----------+---------------+----------+ ! Dist Axillary                       ! Yes       ! Yes            ! None      ! +------------------------------------+----------+---------------+----------+ ! Prox Brachial                       !Yes       ! Yes            ! None      ! +------------------------------------+----------+---------------+----------+ ! Dist Brachial                       !Yes       ! Yes            ! None      ! +------------------------------------+----------+---------------+----------+ ! Prox Radial                         !Yes       ! Yes            ! None      ! +------------------------------------+----------+---------------+----------+ ! Dist Radial                         !Yes       ! Yes            ! None      ! +------------------------------------+----------+---------------+----------+ ! Prox Ulnar                          ! Yes       ! Yes            ! None      ! +------------------------------------+----------+---------------+----------+ ! Dist Ulnar                          ! Yes       ! Yes            ! None      ! +------------------------------------+----------+---------------+----------+ ! Basilic at                        ! Yes       ! Yes            ! None      ! +------------------------------------+----------+---------------+----------+ ! [place] at AF !Yes       !Yes            ! None      ! +------------------------------------+----------+---------------+----------+ ! Basilic at [address]                       ! Yes       ! Yes            ! None      ! +------------------------------------+----------+---------------+----------+ ! Cephalic at UA                      ! Yes       ! Yes            ! None      ! +------------------------------------+----------+---------------+----------+ ! Cephalic at AF                      ! Yes       ! Yes            ! None      ! +------------------------------------+----------+---------------+----------+ ! Cephalic at [address]                      ! Yes       ! Yes            ! None      ! +------------------------------------+----------+---------------+----------+    CT CHEST ABDOMEN PELVIS W CONTRAST    Result Date: 8/10/2021  EXAMINATION: CT OF THE CHEST, ABDOMEN, AND PELVIS WITH CONTRAST 8/10/2021 10:41 pm TECHNIQUE: CT of the chest, abdomen and pelvis was performed with the administration of intravenous contrast. Multiplanar reformatted images are provided for review. Dose modulation, iterative reconstruction, and/or weight based adjustment of the mA/kV was utilized to reduce the radiation dose to as low as reasonably achievable. COMPARISON: None HISTORY: ORDERING SYSTEM PROVIDED HISTORY: Syncope, fall, rib and abd pain TECHNOLOGIST PROVIDED HISTORY: Syncope, fall, rib and abd pain Decision Support Exception - unselect if not a suspected or confirmed emergency medical condition->Emergency Medical Condition (MA) Reason for Exam: Syncope, fall, rib and abd pain Acuity: Acute Type of Exam: Initial Mechanism of Injury: Pt states he was walking and then was on the ground, states he hurts everywhere. FINDINGS: Chest: Mediastinum: Cardiomegaly. Coronary artery calcifications. Aortic vascular calcifications. Small pericardial effusion. No suspicious mediastinal or hilar Adenopathy Lungs/pleura: Interstitial thickening suggestive edema.   Small right-sided effusion. Areas of pleural thickening identified right near the right-sided rib fractures. Trace left-sided effusion and left basilar atelectasis. Stable right lower lobe nodule 8 mm in size. Focal areas opacity anterior aspect of right lung likely represent areas. Cyst versus scarring Soft Tissues/Bones: There right anterolateral fractures involving the right 4th, 5th 6 fracture ribs with associated areas pleural thickening multilevel changes. Abdomen/Pelvis: Organs: Fatty infiltration liver. Gallbladder, spleen, adrenal glands, kidneys, and pancreas unremarkable. Adrenal glands are thickened bilaterally. Subcentimeter right adrenal nodule likely adrenal adenoma, Is unchanged. GI/Bowel: Mild retained stool. Increased fluid content involving the bowel loops bowel obstruction. Mild colonic diverticulosis. No CT findings acute appendicitis. Few scattered colonic diverticula. Pelvis: Mild bladder wall thickening anteriorly. Prostate unremarkable. Peritoneum/Retroperitoneum: No free fluid. Moderate severe aortic vascular calcifications. Aorta is non. Bones/Soft Tissues: No displaced hip or pelvic fractures levocurvature lumbar spine. Multilevel degenerate changes. Grade 2 chest wall injury with right 4th through 6th anterolateral rib fractures. Areas of pleural thickening likely areas of focal hemothorax near the rib fractures on the right. No acute inflammatory process within the abdomen pelvis. FL MODIFIED BARIUM SWALLOW W VIDEO    Result Date: 8/23/2021  EXAMINATION: MODIFIED BARIUM SWALLOW WAS PERFORMED IN CONJUNCTION WITH SPEECH PATHOLOGY SERVICES TECHNIQUE: Fluoroscopic evaluation of the swallowing mechanism was performed with multiple consistency of barium product. FLUOROSCOPY DOSE AND TYPE OR TIME AND EXPOSURES: Fluoroscopic time of 2 minutes and 18 seconds. DAP of 93.8 dGycm2.  COMPARISON: None HISTORY: ORDERING SYSTEM PROVIDED HISTORY: failed swallow test TECHNOLOGIST PROVIDED HISTORY: failed swallow test Reason for Exam: F/u failed video swallow Acuity: Acute Type of Exam: Subsequent/Follow-up Additional signs and symptoms: F/u failed video swallow FINDINGS: Multiple swallows were attempted with multiple consistencies under fluoroscopic evaluation in the presence of speech pathology. Flash penetration was visualized with thin liquid. No aspiration was demonstrated. Flash penetration with thin liquid. Please see separate speech pathology report for full discussion of findings and recommendations. IR GUIDED THORACENTESIS PLEURAL    Result Date: 8/23/2021  PROCEDURE: Tracy  LEFT THORACENTESIS 8/23/2021 HISTORY: ORDERING SYSTEM PROVIDED HISTORY: left pleural effusion, thoracentesis vs pigtail placement TECHNOLOGIST PROVIDED HISTORY: left pleural effusion, thoracentesis vs pigtail placement Which side should the procedure be performed? ->Left Small left pleural effusion TECHNIQUE: This procedure was performed by Dr. Mirta Brooks. Informed consent was obtained after a detailed explanation of the procedure including risks, benefits, and alternatives. Universal protocol was performed. The left chest was prepped and draped in sterile fashion and local anesthesia was achieved with lidocaine. Initial ultrasound images show small left pleural effusion. A 5 [place] needle sheath was advanced under ultrasound guidance into pleural effusion and thoracentesis was performed. The patient tolerated the procedure well. The planned procedure was discussed with CAMERON MCKEON at approximately 2 pm on 8/23/2021. Decision was made to perform thoracentesis only due to the relatively small quantity of fluid present. EBL: None FINDINGS: A total of 100 mL of clear yellow fluid was removed. Fluid was sent for the requested studies. Successful ultrasound guided left thoracentesis.      IR GUIDED THORACENTESIS PLEURAL    Result Date: 8/17/2021  PROCEDURE: ULTRASOUNDGUIDED RIGHT THORACENTESIS 8/17/2021 HISTORY: ORDERING SYSTEM PROVIDED HISTORY: Rt Sided Effusion in need of draining TECHNOLOGIST PROVIDED HISTORY: Rt Sided Effusion in need of draining Which side should the procedure be performed?->Right TECHNIQUE: Informed consent was obtained from the patient's mother via telephone after a detailed explanation of the procedure including risks, benefits, and alternatives. Universal protocol was performed. A time-out was performed prior to commencing the procedure. The right chest was prepped and draped in sterile fashion and local anesthesia was achieved with lidocaine. A 5 Upper sorbian one-step device was advanced under ultrasound guidance into pleural effusion and thoracentesis was performed. 1.85 L of sanguinous and purulence cloudy material was aspirated. 1 L was sent to the lab. The needle was removed. Hemostasis obtained with direct pressure. A sterile dressing was applied. The patient tolerated the procedure well. FINDINGS: A total of 1.85 L of sanguinous and purulence material was removed. 1 L sent to lab     Successful ultrasound guided right thoracentesis. Consultations:    Consults:     Final Specialist Recommendations/Findings:   IP CONSULT TO GENERAL SURGERY  IP CONSULT TO SOCIAL WORK  IP CONSULT TO SOCIAL WORK  IP CONSULT TO PULMONOLOGY  IP CONSULT TO DIETITIAN  PHARMACY TO DOSE VANCOMYCIN  IP CONSULT TO INFECTIOUS DISEASES  IP CONSULT TO CARDIOLOGY  IP CONSULT TO NEPHROLOGY  IP CONSULT TO ORTHOPEDIC SURGERY  IP CONSULT TO CARDIOTHORACIC SURGERY  IP CONSULT TO PHYSICAL MEDICINE REHAB      The patient was seen and examined on day of discharge and this discharge summary is in conjunction with any daily progress note from day of discharge. Discharge plan:       Disposition:  To a non-[facility] facility    Physician Follow Up:     Perlita High MD  [address] [place] [address]  [phone]    Go on 9/23/2021  Please report to our clinic to follow up on right empyema        Requiring Further Evaluation/Follow Up POST HOSPITALIZATION/Incidental Findings: Fracture of left wrist.    Diet: regular diet    Activity: As tolerated    Instructions to Patient: Follow-up with rehab, avoid alcohol and continue abstinence. Discharge Medications:      Medication List      START taking these medications    guaiFENesin 600 MG extended release tablet  Commonly known as: MUCINEX  Take 1 tablet by mouth 2 times daily as needed for Congestion     HYDROcodone-acetaminophen 5-325 MG per tablet  Commonly known as: NORCO  Take 1 tablet by mouth every 6 hours as needed for Pain for up to 3 days. lidocaine 4 % external patch  Place 1 patch onto the skin daily  Start taking on: September 1, 2021     metoprolol succinate 25 MG extended release tablet  Commonly known as: TOPROL XL  Take 0.5 tablets by mouth nightly     tamsulosin 0.4 MG capsule  Commonly known as: FLOMAX  Take 1 capsule by mouth daily  Start taking on: September 1, 2021     vancomycin  infusion  Commonly known as: VANCOCIN  Infuse 1,000 mg intravenously every 24 hours for 10 days Compound per protocol.         CONTINUE taking these medications    albuterol sulfate  (90 Base) MCG/ACT inhaler  INHALE 2 PUFFS INTO THE LUNGS EVERY 6 HOURS AS NEEDED FOR WHEEZING     Arnuity Ellipta 100 MCG/ACT Aepb  Generic drug: fluticasone  INHALE 1 PUFF INTO THE LUNGS DAILY     Spiriva HandiHaler 18 MCG inhalation capsule  Generic drug: tiotropium  Inhale 1 capsule into the lungs daily           Where to Get Your Medications      These medications were sent to [address] [facility].    Phone: [phone]   · guaiFENesin 600 MG extended release tablet  · lidocaine 4 % external patch  · metoprolol succinate 25 MG extended release tablet  · tamsulosin 0.4 MG capsule     You can get these medications from any pharmacy    Bring a paper prescription for each of these medications  · HYDROcodone-acetaminophen 5-325 MG per tablet  · vancomycin  infusion         Time Spent on discharge is  40 mins in patient examination, evaluation, counseling as well as medication reconciliation, prescriptions for required medications, discharge plan and follow up. Electronically signed by   Alexus Lopez MD  8/31/2021  4:07 PM      Thank you Dr. Pat Davenport MD for the opportunity to be involved in this patient's care. Attending Physician Statement  I have discussed the care of Hazel Persaud and I have examined the patient myselft and taken ros and hpi , including pertinent history and exam findings,  with the resident. I have reviewed the key elements of all parts of the encounter with the resident. I agree with the assessment, plan and orders as documented by the resident. I spent over 35 mins in direct patient care as above and reviewing medications and counseling for discharge .         Electronically signed by Rosmery Oakley MD

## 2021-08-31 NOTE — CARE COORDINATION
ОЛЬГА received info that [facility] is able to take this patient on this date. ОЛЬГА informed the staff and the facility of this DC. ОЛЬГА set up transportation and the patient was schedule to be picked up at 1:30 pm via wheelchair by Jorge Carlson. ОЛЬГА completed and submitted the 7000 form via [employer]. The number for report is [phone]. ОЛЬГА informed [facility] this patient's RN of the number. [facility] reported that no one answered. ОЛЬГА called Gael Mejia in admissions and asked her to have the Nurse there call [facility] for Report. ОЛЬГА did provide Yolanda's number.

## 2021-08-31 NOTE — PROGRESS NOTES
placed 8/12/2021  He was placed on alcohol withdrawal protocol, developed respiratory distress was placed on BiPAP initially then was intubated 4/13/21. Baker catheter was placed on 4/13/2021  COVID-19 rapid test was negative  The patient was on Levaquin 8/13/2021 and 8/14/2021 that was discontinued and started on vancomycin and cefepime. He had a fever with a temperature max of 100.6 on 8/13/2021. Blood cultures 8/14/2021 grew MRSA as well as sputum culture. Chest x-ray 8/14/2021 showed bilateral airspace disease right greater than left. CT chest without contrast 8/16/2021 showed increased right pleural effusion with an area of loculated fluid status post thoracentesis with MRSA growth on culture  Right chest tube was placed 8/20/2021 status post intrapleural TPA, was evaluated by cardiothoracic surgeon  Lower extremity venous Doppler showed superficial thrombosis on Lovenox  Right arm PICC line in place  status post thoracentesis 8/23/2021 . Interval changes  8/31/2021   He is complaining of left wrist pain, denied fever or chills, denied nausea or vomiting, no other complaints. Patient Vitals for the past 8 hrs:   BP Temp Temp src Pulse Resp SpO2   08/31/21 1039 -- -- -- -- 18 95 %   08/31/21 0725 -- -- -- -- 18 93 %   08/31/21 0719 121/74 99 °F (37.2 °C) Oral 78 18 92 %           I have personally reviewed the past medical history, past surgical history, medications, social history, and family history, and I haveupdated the database accordingly. Allergies:   Nickel     Review of Systems:     Review of Systems  As per history present illness, other than above 14 system review was negative  Physical Examination :       Physical Exam  Constitutional:       General: He is not in acute distress. HENT:      Head: Normocephalic and atraumatic. Right Ear: External ear normal.      Left Ear: External ear normal.      Mouth/Throat:      Pharynx: Oropharynx is clear. No oropharyngeal exudate.    Eyes: General: No scleral icterus. Right eye: No discharge. Left eye: No discharge. Cardiovascular:      Rate and Rhythm: Normal rate and regular rhythm. Heart sounds: No murmur heard. Pulmonary:      Breath sounds: No wheezing. Abdominal:      General: Abdomen is flat. There is no distension. Palpations: Abdomen is soft. Musculoskeletal:      Cervical back: Neck supple. No rigidity. Skin:     General: Skin is warm. Coloration: Skin is not jaundiced. Comments: Left hand and wrist splint   Neurological:      General: No focal deficit present. Mental Status: He is alert and oriented to person, place, and time.      Right PICC line in place    Past Medical History:     Past Medical History:   Diagnosis Date    Alcohol abuse 6/4/2014    Anxiety     Arthritis     COPD (chronic obstructive pulmonary disease) ([address]     ASTHMA    DDD (degenerative disc disease), lumbar 9/6/2016    Depression     Heart burn     Hemorrhoids     HTN (hypertension) 1/19/2015    Ilioinguinal neuralgia of right side 4/10/2017    Psychiatric problem     depression /anxiety    Seizures ([address]     withdrawal from alcohol 2 years ago    Wears glasses     READING       Past Surgical  History:     Past Surgical History:   Procedure Laterality Date    ANESTHESIA NERVE BLOCK Right 4/10/2017    NERVE BLOCK US RIGHT SIDED ILIOINGUINAL performed by Margorie Crigler, MD at [address] [place] [address]  3/19/15    HERNIA REPAIR      IR CHEST TUBE INSERTION  8/20/2021    IR CHEST TUBE INSERTION 8/20/2021 [facility] SPECIAL PROCEDURES    NERVE BLOCK Right 10/10/2016    right groin    OTHER SURGICAL HISTORY Right 04/10/2017    US nerve block to R groin    TOE SURGERY      SCREW RIGHT BIG TOE       Medications:      vancomycin  1,000 mg IntraVENous Q24H    guaiFENesin  600 mg Oral BID    vancomycin (VANCOCIN) intermittent dosing (placeholder)   Other RX Placeholder    docusate sodium  100 mg Oral Daily    famotidine  20 mg Oral BID    metoprolol succinate  12.5 mg Oral Nightly    ipratropium-albuterol  1 ampule Inhalation Q4H While awake    tamsulosin  0.4 mg Oral Daily    enoxaparin  40 mg SubCUTAneous Daily    sodium chloride flush  5-40 mL IntraVENous 2 times per day    lidocaine  1 patch Transdermal Daily       Social History:     Social History     Socioeconomic History    Marital status:      Spouse name: Not on file    Number of children: Not on file    Years of education: Not on file    Highest education level: Not on file   Occupational History    Not on file   Tobacco Use    Smoking status: Current Every Day Smoker     Packs/day: 1.00     Years: 38.00     Pack years: 38.00     Types: Cigarettes    Smokeless tobacco: Never Used    Tobacco comment: 1 PPD   Vaping Use    Vaping Use: Never used   Substance and Sexual Activity    Alcohol use: Yes     Alcohol/week: 12.0 standard drinks     Types: 12 Cans of beer per week     Comment: 12 24 oz cans daily    Drug use: Yes     Types: Marijuana     Comment: crack occasionally    Sexual activity: Never   Other Topics Concern    Not on file   Social History Narrative    Not on file     Social Determinants of Health     Financial Resource Strain:     Difficulty of Paying Living Expenses:    Food Insecurity:     Worried About Running Out of Food in the Last Year:     Ran Out of Food in the Last Year:    Transportation Needs:     Lack of Transportation (Medical):      Lack of Transportation (Non-Medical):    Physical Activity:     Days of Exercise per Week:     Minutes of Exercise per Session:    Stress:     Feeling of Stress :    Social Connections:     Frequency of Communication with Friends and Family:     Frequency of Social Gatherings with Friends and Family:     Attends Gnosticist Services:     Active Member of Clubs or Organizations:     Attends Club or Organization Meetings:     Marital Status:    Intimate Partner Violence:     Fear of Current or Ex-Partner:     Emotionally Abused:     Physically Abused:     Sexually Abused:        Family History:     Family History   Problem Relation Age of Onset    Cancer Mother         colon ca      Medical Decision Making:   I have independently reviewed/ordered the following labs:    CBC with Differential:   Recent Labs     08/30/21  0555 08/31/21  0517   WBC 7.3 6.9   HGB 8.0* 8.0*   HCT 23.8* 23.7*    282     BMP:  Recent Labs     08/30/21  0555 08/31/21  0517    139   K 3.6* 3.8    102   CO2 25 26   BUN 10 11   CREATININE 1.26* 1.24*   MG 1.7 1.8     Hepatic Function Panel:   Recent Labs     08/30/21  0555 08/31/21  0517   PROT 6.1* 6.3*   LABALBU 2.3* 2.6*   BILITOT 0.32 0.32   ALKPHOS 72 71   ALT 13 13   AST 14 12     No results for input(s): RPR in the last 72 hours. No results for input(s): HIV in the last 72 hours. No results for input(s): BC in the last 72 hours. Lab Results   Component Value Date    CREATININE 1.24 08/31/2021    GLUCOSE 88 08/31/2021       Detailed results: Thank you for allowing us to participate in the care of this patient. Please call with questions. This note is created with the assistance of a speech recognition program.  While intending to generate adocument that actually reflects the content of the visit, the document can still have some errors including those of syntax and sound a like substitutions which may escape proof reading. It such instances, actual meaningcan be extrapolated by contextual diversion.     Yoko Ortega MD  Office: [phone]  Perfect serve / office [phone]

## 2021-08-31 NOTE — PROGRESS NOTES
Speech Language Pathology  Speech Language Pathology  [facility]  Dysphagia Treatment Note    Date: 8/31/2021  Patients Name: Ayden Gann  MRN: [id number]  Diagnosis: dysphagia  Patient Active Problem List   Diagnosis Code    COPD (chronic obstructive pulmonary disease) ([facility] [address] J44.9    Smoking addiction F17.200    Depression F32.9    Dyslipidemia E78.5    Lung nodule R91.1    DDD (degenerative disc disease), lumbar M51.36    Groin pain, chronic, right R10.31, G89.29    Ilioinguinal neuralgia of right side G57.91    Syncope and collapse R55    History of alcohol abuse F10.11    Lumbar radiculopathy M54.16    Scoliosis due to degenerative disease of spine in adult patient M41.80    Lumbosacral spondylosis without myelopathy M47.817    Alcohol abuse F10.10    Hypokalemia E87.6    Hyponatremia E87.1    Traumatic rhabdomyolysis ([facility] [address] T79. 6XXA    Closed fracture of multiple ribs of right side S22.41XA    Closed fracture of left wrist S62.102A    Severe malnutrition (HCC) E43    Fever R50.9    Conjunctivitis H10.9    Acute respiratory failure (HCC) J96.00    Superficial venous thrombosis of arm, left I82.612    MRSA bacteremia R78.81, B95.62    Empyema lung (HCC) J86.9    Elevated C-reactive protein (CRP) R79.82    Leukocytosis D72.829       Pain: pt. denies    Dysphagia Treatment  Treatment time: 4104-9601    Subjective: [x] Alert [x] Cooperative     [] Confused     [] Agitated    [] Lethargic    Objective/Assessment:    Pt. completed Evangelina maneuver x10 and simple tongue base strengthening exercises x3, x10. ST encouraged Pt. to continue to practice swallowing exercises Nella throughout day, Pt. verbalized understanding and agreeable. Pt. Reports he is leaving for SNF today and \"I hope they don't make me drink thickened orange juice. \"  Pt. Reeduc. Re: rationale and importance of thickening liquids and plan for repeat swallow study in 3-4 weeks as an outpatient.   Pt. Verbalized understanding. Plan:  [x] Continue ST services    [] Discharge from ST:        Discharge recommendations: [] Inpatient Rehab   [] [place]   [] Outpatient Therapy  [] Follow up at trauma clinic   [] Other:         Treatment completed by: Nika Mai A.CCC/SLP

## 2021-08-31 NOTE — PLAN OF CARE
Problem: Falls - Risk of:  Goal: Will remain free from falls  Description: Will remain free from falls  8/31/2021 1348 by Char North RN  Outcome: Completed  8/31/2021 0031 by Nancy Jones RN  Outcome: Ongoing  Goal: Absence of physical injury  Description: Absence of physical injury  8/31/2021 1348 by Dennys Peralta RN  Outcome: Completed  8/31/2021 0031 by Nancy Jones RN  Outcome: Ongoing     Problem: Skin Integrity:  Goal: Will show no infection signs and symptoms  Description: Will show no infection signs and symptoms  8/31/2021 1348 by Dennys Peralta RN  Outcome: Completed  8/31/2021 0031 by Nancy Jones RN  Outcome: Ongoing  Goal: Absence of new skin breakdown  Description: Absence of new skin breakdown  8/31/2021 1348 by Dennys Peralta RN  Outcome: Completed  8/31/2021 0031 by Nancy Jones RN  Outcome: Ongoing     Problem: Nutrition  Goal: Optimal nutrition therapy  8/31/2021 1348 by Dennys Peralta RN  Outcome: Completed  8/31/2021 0031 by Nancy Jones RN  Outcome: Ongoing     Problem: OXYGENATION/RESPIRATORY FUNCTION  Goal: Patient will maintain patent airway  8/31/2021 1348 by Dennys Peralta RN  Outcome: Completed  8/31/2021 0031 by Nancy Jones RN  Outcome: Ongoing  Goal: Patient will achieve/maintain normal respiratory rate/effort  Description: Respiratory rate and effort will be within normal limits for the patient  8/31/2021 1348 by Dennys Peralta RN  Outcome: Completed  8/31/2021 0031 by Nancy Jones RN  Outcome: Ongoing     Problem: MECHANICAL VENTILATION  Goal: Patient will maintain patent airway  8/31/2021 1348 by Dennys Peralta RN  Outcome: Completed  8/31/2021 0031 by Nancy Jones RN  Outcome: Ongoing  Goal: ET tube will be managed safely  8/31/2021 1348 by Dennys Peralta RN  Outcome: Completed  8/31/2021 0031 by Nancy Jones RN  Outcome: Ongoing     Problem: Pain:  Goal: Pain level will decrease  Description: Pain level will decrease  8/31/2021 1348 by Dennys Peralta, RN  Outcome: Completed  8/31/2021 0031 by Lindie Gaucher, RN  Outcome: Ongoing  Goal: Recognizes and communicates pain  Description: Recognizes and communicates pain  8/31/2021 1348 by Marianne Peralta RN  Outcome: Completed  8/31/2021 0031 by Lindie Gaucher, RN  Outcome: Ongoing  Goal: Control of acute pain  Description: Control of acute pain  8/31/2021 1348 by Marianne Peralta RN  Outcome: Completed  8/31/2021 0031 by Lindie Gaucher, RN  Outcome: Ongoing  Goal: Control of chronic pain  Description: Control of chronic pain  8/31/2021 1348 by Marianne Peralta RN  Outcome: Completed  8/31/2021 0031 by Lindie Gaucher, RN  Outcome: Ongoing     Problem: Confusion - Acute:  Goal: Absence of continued neurological deterioration signs and symptoms  Description: Absence of continued neurological deterioration signs and symptoms  8/31/2021 1348 by Marianne Peralta RN  Outcome: Completed  8/31/2021 0031 by Lindie Gaucher, RN  Outcome: Ongoing  Goal: Mental status will be restored to baseline  Description: Mental status will be restored to baseline  8/31/2021 1348 by Marianne Peralta RN  Outcome: Completed  8/31/2021 0031 by Lindie Gaucher, RN  Outcome: Ongoing     Problem: Discharge Planning:  Goal: Ability to perform activities of daily living will improve  Description: Ability to perform activities of daily living will improve  8/31/2021 1348 by Marianne Peralta RN  Outcome: Completed  8/31/2021 0031 by Lindie Gaucher, RN  Outcome: Ongoing  Goal: Participates in care planning  Description: Participates in care planning  8/31/2021 1348 by Marianne Peralta RN  Outcome: Completed  8/31/2021 0031 by Lindie Gaucher, RN  Outcome: Ongoing     Problem: Injury - Risk of, Physical Injury:  Goal: Will remain free from falls  Description: Will remain free from falls  8/31/2021 1348 by Marianne Peralta RN  Outcome: Completed  8/31/2021 0031 by Lindie Gaucher, RN  Outcome: Ongoing  Goal: Absence of physical injury  Description: Absence of physical injury  8/31/2021 1348 by Marianne Peralta RN  Outcome: Completed  8/31/2021 0031 by Kristen Avery RN  Outcome: Ongoing     Problem: Mood - Altered:  Goal: Mood stable  Description: Mood stable  8/31/2021 1348 by Arlette Peralta RN  Outcome: Completed  8/31/2021 0031 by Kristen Avery RN  Outcome: Ongoing  Goal: Absence of abusive behavior  Description: Absence of abusive behavior  8/31/2021 1348 by Arlette Peralta RN  Outcome: Completed  8/31/2021 0031 by Kristen Avery RN  Outcome: Ongoing  Goal: Verbalizations of feeling emotionally comfortable while being cared for will increase  Description: Verbalizations of feeling emotionally comfortable while being cared for will increase  8/31/2021 1348 by Arlette Peralta RN  Outcome: Completed  8/31/2021 0031 by Kristen Avery RN  Outcome: Ongoing     Problem: Psychomotor Activity - Altered:  Goal: Absence of psychomotor disturbance signs and symptoms  Description: Absence of psychomotor disturbance signs and symptoms  8/31/2021 1348 by Arlette Peralta RN  Outcome: Completed  8/31/2021 0031 by Kristen Avery RN  Outcome: Ongoing     Problem: Sensory Perception - Impaired:  Goal: Demonstrations of improved sensory functioning will increase  Description: Demonstrations of improved sensory functioning will increase  8/31/2021 1348 by Arlette Peralta RN  Outcome: Completed  8/31/2021 0031 by Kristen Avery RN  Outcome: Ongoing  Goal: Decrease in sensory misperception frequency  Description: Decrease in sensory misperception frequency  8/31/2021 1348 by Arlette Peralta RN  Outcome: Completed  8/31/2021 0031 by Kristen Avery RN  Outcome: Ongoing  Goal: Able to refrain from responding to false sensory perceptions  Description: Able to refrain from responding to false sensory perceptions  8/31/2021 1348 by Arlette Peralta RN  Outcome: Completed  8/31/2021 0031 by Kristen Avery RN  Outcome: Ongoing  Goal: Demonstrates accurate environmental perceptions  Description: Demonstrates accurate environmental perceptions  8/31/2021 1348 by Arlette Peralta RN  Outcome: Completed  8/31/2021 0031 by Kristen Avery RN  Outcome: Ongoing  Goal: Able to distinguish between reality-based and nonreality-based thinking  Description: Able to distinguish between reality-based and nonreality-based thinking  8/31/2021 1348 by Nora Basurto RN  Outcome: Completed  8/31/2021 0031 by Kristen Avery RN  Outcome: Ongoing  Goal: Able to interrupt nonreality-based thinking  Description: Able to interrupt nonreality-based thinking  8/31/2021 1348 by Arlette Peralta RN  Outcome: Completed  8/31/2021 0031 by Kristen Avery RN  Outcome: Ongoing     Problem: Sleep Pattern Disturbance:  Goal: Appears well-rested  Description: Appears well-rested  8/31/2021 1348 by Arlette Peralta RN  Outcome: Completed  8/31/2021 0031 by Kristen Avery RN  Outcome: Ongoing     Problem: Pain:  Goal: Pain level will decrease  Description: Pain level will decrease  8/31/2021 1348 by Nora Basurto RN  Outcome: Completed  8/31/2021 0031 by Kristen Avery RN  Outcome: Ongoing  Goal: Control of acute pain  Description: Control of acute pain  8/31/2021 1348 by Arlette Peralta RN  Outcome: Completed  8/31/2021 0031 by Kristen Avery RN  Outcome: Ongoing  Goal: Control of chronic pain  Description: Control of chronic pain  8/31/2021 1348 by Arlette Peralta RN  Outcome: Completed  8/31/2021 0031 by Kristen Avery RN  Outcome: Ongoing     Problem: Musculor/Skeletal Functional Status  Goal: Highest potential functional level  8/31/2021 1348 by Nora Basurto RN  Outcome: Completed  8/31/2021 0031 by Kristen Avery RN  Outcome: Ongoing  Goal: Absence of falls  8/31/2021 1348 by Nora Basurto RN  Outcome: Completed  8/31/2021 0031 by Kristen Avery RN  Outcome: Ongoing

## 2021-08-31 NOTE — PROGRESS NOTES
Vancomycin Dosing by Pharmacy - Daily Note   Vancomycin Therapy Day:  18  Indication: MRSA bacteremia secondary to empyema    Allergies:  Nickel   Actual Weight:    Wt Readings from Last 1 Encounters:   08/29/21 133 lb 13.1 oz (60.7 kg)       Labs/Ancillary Data  Estimated Creatinine Clearance: 56 mL/min (A) (based on SCr of 1.24 mg/dL (H)). Recent Labs     08/29/21  0506 08/30/21  0555 08/31/21  0517   CREATININE 1.22* 1.26* 1.24*   BUN 8 10 11   WBC 6.6 7.3 6.9     Procalcitonin   Date Value Ref Range Status   08/12/2021 73.20 (H) <0.09 ng/mL Final     Comment:           Suspected Sepsis:  <0.50 ng/mL     Low likelihood of sepsis. 0.50-2.00 ng/mL     Increased likelihood of sepsis. Antibiotics encouraged. >2.00 ng/mL     High risk of sepsis/shock. Antibiotics strongly encouraged. Suspected Lower Resp Tract Infections:  <0.24 ng/mL     Low likelihood of bacterial infection. >0.24 ng/mL     Increased likelihood of bacterial infection. Antibiotics encouraged. With successful antibiotic therapy, PCT levels should decrease rapidly. (Half-life of 24 to   36 hours.)        Procalcitonin values from samples collected within the first 6 hours of systemic infection   may still be low. Retesting may be indicated. Values from day 1 and day 4 can be entered into the Change in Procalcitonin Calculator   (www.[employer]-pct-calculator. [employer]) to determine the patient's Mortality Risk Prognosis        In healthy neonates, plasma Procalcitonin (PCT) concentrations increase gradually after   birth, reaching peak values at about 24 hours of age then decrease to normal values below   0.5 ng/mL by 48-72 hours of age.          Intake/Output Summary (Last 24 hours) at 8/31/2021 0848  Last data filed at 8/31/2021 0654  Gross per 24 hour   Intake 1500 ml   Output 3625 ml   Net -2125 ml     Temp: 99 F    Culture Date / Sander Aguilar  /  Results  8/16 - blood x2 - MRSA  8/23 - pleural fluid  8/19 - blood x2      Recent vancomycin administrations                     vancomycin 1000 mg IVPB in 250 mL D5W addavial (mg) 1,000 mg New Bag 08/31/21 0158     1,000 mg New Bag 08/30/21 0104     1,000 mg New Bag 08/29/21 0157                    Vancomycin Concentrations:   TROUGH:    Recent Labs     08/31/21  0106   VANCOTROUGH 13.5     RANDOM:  No results for input(s): VANCORANDOM in the last 72 hours. MRSA Nasal Swab: was negative on 8/14/21, ordered for respiratory indication, pharmacy to contact provider about discontinuing vancomycin - MRSA Bacteremia, Vanco appropriate, ID following. PLAN     Continue current dose of 1000 mg q24h IV  Ensured BUN/sCr ordered at baseline and every 48 hours x at least 3 levels, then at least weekly. Repeat vancomycin concentration ordered for TBD. Pharmacy will continue to monitor patient and adjust therapy as indicated      Vancomycin Target Concentration Parameters  Treatment  Population Target AUC/NIMISHA Target Trough   Invasive MRSA Infection (bacteremia, pneumonia, meningitis, endocarditis, osteomyelitis)  Sepsis (undifferentiated) 400-600 N/A   Infection due to non-MRSA pathogen  Empiric treatment of non-invasive MRSA infection  (SSTI, UTI) <500 10-15 mg/L   CrCl < 29 mL/min  Rapidly fluctuating serum creatinine   SHANNAN N/A < 15 mg/L     Renal replacement therapy is dosed by levels, per hospital protocol. Abbreviations  * Pauc: probability that AUC is >400 (efficacy); Pconc: probability that Ctrough is above 20 ?g/mL (toxicity); Tox: Probability of nephrotoxicity, based on Leticia et al. Clin Infect Dis 2009. Thank you for the consult. Pharmacy will continue to follow.     Aicha Romero RPH,PharmD,  8/31/2021, 9:52 AM

## 2021-08-31 NOTE — PROGRESS NOTES
Patient belongings packed up and staff helped him to dress and get in the wheelchair. Patient left with [facility].

## 2021-08-31 NOTE — PLAN OF CARE
Problem: Falls - Risk of:  Goal: Will remain free from falls  Description: Will remain free from falls  Outcome: Ongoing     Problem: Falls - Risk of:  Goal: Absence of physical injury  Description: Absence of physical injury  Outcome: Ongoing     Problem: Skin Integrity:  Goal: Will show no infection signs and symptoms  Description: Will show no infection signs and symptoms  Outcome: Ongoing     Problem: Skin Integrity:  Goal: Absence of new skin breakdown  Description: Absence of new skin breakdown  Outcome: Ongoing     Problem: Pain:  Goal: Pain level will decrease  Description: Pain level will decrease  Outcome: Ongoing     Problem: Pain:  Goal: Recognizes and communicates pain  Description: Recognizes and communicates pain  Outcome: Ongoing     Problem: Pain:  Goal: Control of acute pain  Description: Control of acute pain  Outcome: Ongoing     Problem: Pain:  Goal: Control of chronic pain  Description: Control of chronic pain  Outcome: Ongoing

## 2021-08-31 NOTE — PROGRESS NOTES
Vancomycin Dosing by Pharmacy - Daily Note   Vancomycin Therapy Day:  18  Indication: MRSA bacteremia secondary to empyema    Allergies:  Nickel   Actual Weight:    Wt Readings from Last 1 Encounters:   08/29/21 133 lb 13.1 oz (60.7 kg)       Labs/Ancillary Data  Estimated Creatinine Clearance: 55 mL/min (A) (based on SCr of 1.26 mg/dL (H)). Recent Labs     08/28/21  0741 08/29/21  0506 08/30/21  0555   CREATININE 1.33* 1.22* 1.26*   BUN 7 8 10   WBC 8.0 6.6 7.3     Procalcitonin   Date Value Ref Range Status   08/12/2021 73.20 (H) <0.09 ng/mL Final     Comment:           Suspected Sepsis:  <0.50 ng/mL     Low likelihood of sepsis. 0.50-2.00 ng/mL     Increased likelihood of sepsis. Antibiotics encouraged. >2.00 ng/mL     High risk of sepsis/shock. Antibiotics strongly encouraged. Suspected Lower Resp Tract Infections:  <0.24 ng/mL     Low likelihood of bacterial infection. >0.24 ng/mL     Increased likelihood of bacterial infection. Antibiotics encouraged. With successful antibiotic therapy, PCT levels should decrease rapidly. (Half-life of 24 to   36 hours.)        Procalcitonin values from samples collected within the first 6 hours of systemic infection   may still be low. Retesting may be indicated. Values from day 1 and day 4 can be entered into the Change in Procalcitonin Calculator   (www.[employer]-pct-calculator. [employer]) to determine the patient's Mortality Risk Prognosis        In healthy neonates, plasma Procalcitonin (PCT) concentrations increase gradually after   birth, reaching peak values at about 24 hours of age then decrease to normal values below   0.5 ng/mL by 48-72 hours of age.          Intake/Output Summary (Last 24 hours) at 8/31/2021 0247  Last data filed at 8/31/2021 0200  Gross per 24 hour   Intake 1980 ml   Output 3625 ml   Net -1645 ml     Temp: 98.8    Culture Date / Source  /  Results  See micro  Recent vancomycin administrations                     vancomycin 1000 mg IVPB in 250 mL D5W addavial (mg) 1,000 mg New Bag 08/31/21 0158     1,000 mg New Bag 08/30/21 0104     1,000 mg New Bag 08/29/21 0157                    Vancomycin Concentrations:   TROUGH:    Recent Labs     08/31/21  0106   VANCOTROUGH 13.5     RANDOM:  No results for input(s): VANCORANDOM in the last 72 hours. MRSA Nasal Swab: N/A. Non-respiratory infection. [place] [employer] PLAN     Continue current dose of 1000 mg q24h IV  Ensured BUN/sCr ordered at baseline and every 48 hours x at least 3 levels, then at least weekly. Repeat vancomycin concentration ordered for 09/3/21 @ 0600   Pharmacy will continue to monitor patient and adjust therapy as indicated      Vancomycin Target Concentration Parameters  Treatment  Population Target AUC/NIMISHA Target Trough   Invasive MRSA Infection (bacteremia, pneumonia, meningitis, endocarditis, osteomyelitis)  Sepsis (undifferentiated) 400-600 N/A   Infection due to non-MRSA pathogen  Empiric treatment of non-invasive MRSA infection  (SSTI, UTI) <500 10-15 mg/L   CrCl < 29 mL/min  Rapidly fluctuating serum creatinine   SHANNAN N/A < 15 mg/L     Renal replacement therapy is dosed by levels, per hospital protocol. Abbreviations  * Pauc: probability that AUC is >400 (efficacy); Pconc: probability that Ctrough is above 20 ?g/mL (toxicity); Tox: Probability of nephrotoxicity, based on Lodise et al. Clin Infect Dis 2009. Thank you for the consult. Pharmacy will continue to follow. Nila Wheeler. [address] in-patient pharmacy

## 2021-09-15 LAB
EKG ATRIAL RATE: 77 BPM
EKG P AXIS: 58 DEGREES
EKG P-R INTERVAL: 144 MS
EKG Q-T INTERVAL: 438 MS
EKG QRS DURATION: 98 MS
EKG QTC CALCULATION (BAZETT): 495 MS
EKG R AXIS: 9 DEGREES
EKG T AXIS: -13 DEGREES
EKG VENTRICULAR RATE: 77 BPM

## 2021-09-20 LAB
CULTURE: NORMAL
Lab: NORMAL
SPECIMEN DESCRIPTION: NORMAL

## 2021-09-22 ENCOUNTER — OFFICE VISIT (OUTPATIENT)
Dept: INFECTIOUS DISEASES | Age: 56
End: 2021-09-22
Payer: MEDICAID

## 2021-09-22 VITALS
HEART RATE: 82 BPM | OXYGEN SATURATION: 98 % | BODY MASS INDEX: 19.7 KG/M2 | TEMPERATURE: 97.1 F | HEIGHT: 69 IN | RESPIRATION RATE: 18 BRPM | DIASTOLIC BLOOD PRESSURE: 84 MMHG | SYSTOLIC BLOOD PRESSURE: 139 MMHG | WEIGHT: 133 LBS

## 2021-09-22 DIAGNOSIS — J86.9 EMPYEMA (HCC): Primary | ICD-10-CM

## 2021-09-22 PROCEDURE — 1111F DSCHRG MED/CURRENT MED MERGE: CPT | Performed by: INTERNAL MEDICINE

## 2021-09-22 PROCEDURE — 3017F COLORECTAL CA SCREEN DOC REV: CPT | Performed by: INTERNAL MEDICINE

## 2021-09-22 PROCEDURE — 99214 OFFICE O/P EST MOD 30 MIN: CPT | Performed by: INTERNAL MEDICINE

## 2021-09-22 PROCEDURE — G8420 CALC BMI NORM PARAMETERS: HCPCS | Performed by: INTERNAL MEDICINE

## 2021-09-22 PROCEDURE — G8427 DOCREV CUR MEDS BY ELIG CLIN: HCPCS | Performed by: INTERNAL MEDICINE

## 2021-09-22 PROCEDURE — 4004F PT TOBACCO SCREEN RCVD TLK: CPT | Performed by: INTERNAL MEDICINE

## 2021-09-22 NOTE — PROGRESS NOTES
Infectious disease Consult Note      Patient: Amrita Claros  : 1965  Acct#:  [de-identified]     Date:  2021    Subjective:       History of Present Illness  Patient is a 60 y.o.  male    Chief Complaint   Patient presents with    Follow-Up from [address] [place] [address]   The patient had prolonged hospitalization at [facility] 8/10/2021 through 2021 presented initially with syncopal episode and right chest wall/abdominal pain was found to have multiple rib fractures and a closed left wrist fracture. Tox screen positive for cannabinoids. CT head negative for acute intracranial abnormality. CT chest/abdomen/pelvis showed rib fractures with associated focal hemothorax. No acute processes in abdomen or pelvis. He was placed on alcohol withdrawal protocol, developed respiratory distress was placed on BiPAP initially then was intubated. Blood cultures grew MRSA, thoracentesis was done grew MRSA. The patient had chest tube placed tatus post intrapleural TPA, was evaluated by cardiothoracic surgeon  Chest tube was subsequently removed  He was treated with IV antibiotics during his hospitalization was discharged on IV vancomycin that was completed 2021    Interval history 2021  The patient remains at the nursing facility, complaining of right chest wall pain worse with deep breath, denied fever or chills, still have generalized weakness but has been able to do physical therapy . He denied significant cough, denied nausea or vomiting, tolerating oral with good appetite, no other complaints.     Past Medical History:   Diagnosis Date    Alcohol abuse 2014    Anxiety     Arthritis     COPD (chronic obstructive pulmonary disease) ([facility] [address]     ASTHMA    DDD (degenerative disc disease), lumbar 2016    Depression     Heart burn     Hemorrhoids     HTN (hypertension) 2015    Ilioinguinal neuralgia of right side 4/10/2017    Psychiatric problem depression /anxiety    Seizures ([facility] [address]     withdrawal from alcohol 2 years ago    Wears glasses     READING      Past Surgical History:   Procedure Laterality Date    ANESTHESIA NERVE BLOCK Right 4/10/2017    NERVE BLOCK US RIGHT SIDED ILIOINGUINAL performed by Jer Goldman MD at [address]  3/19/15    HERNIA REPAIR      IR CHEST TUBE INSERTION  8/20/2021    IR CHEST TUBE INSERTION 8/20/2021 [facility]    NERVE BLOCK Right 10/10/2016    right groin    OTHER SURGICAL HISTORY Right 04/10/2017    US nerve block to R groin    TOE SURGERY      SCREW RIGHT BIG TOE          Admission Meds  Current Outpatient Medications on File Prior to Visit   Medication Sig Dispense Refill    metoprolol succinate (TOPROL XL) 25 MG extended release tablet Take 0.5 tablets by mouth nightly 30 tablet 3    guaiFENesin (MUCINEX) 600 MG extended release tablet Take 1 tablet by mouth 2 times daily as needed for Congestion 15 tablet 0    lidocaine 4 % external patch Place 1 patch onto the skin daily 6 patch 0    tamsulosin (FLOMAX) 0.4 MG capsule Take 1 capsule by mouth daily 30 capsule 3    albuterol sulfate  (90 Base) MCG/ACT inhaler INHALE 2 PUFFS INTO THE LUNGS EVERY 6 HOURS AS NEEDED FOR WHEEZING 18 g 3    tiotropium (SPIRIVA HANDIHALER) 18 MCG inhalation capsule Inhale 1 capsule into the lungs daily 90 capsule 3    ARNUITY ELLIPTA 100 MCG/ACT AEPB INHALE 1 PUFF INTO THE LUNGS DAILY 30 each 2     No current facility-administered medications on file prior to visit. Allergies  Allergies   Allergen Reactions    Nickel      Contact dermatitis        Social   Social History     Tobacco Use    Smoking status: Current Every Day Smoker     Packs/day: 1.00     Years: 38.00     Pack years: 38.00     Types: Cigarettes    Smokeless tobacco: Never Used    Tobacco comment: 1 PPD   Substance Use Topics    Alcohol use:  Yes     Alcohol/week: 12.0 standard drinks     Types: 12 Cans of beer per week     Comment: 12 24 oz cans daily           Family History   Problem Relation Age of Onset    Cancer Mother         colon ca          Review of Systems  No fever / chills / sweats. No weight loss. No visual change, eye pain, eye discharge. Other than above 12 systems reviewed were negative . Physical Exam  /84   Pulse 82   Temp 97.1 °F (36.2 °C)   Resp 18   Ht 5' 9\" (1.753 m)   Wt 133 lb (60.3 kg)   SpO2 98%   BMI 19.64 kg/m²           General Appearance: alert and oriented to person, place and time, well-developed and well-nourished, in no acute distress  Skin: warm and dry, no rash or erythema  Head: normocephalic and atraumatic  Eyes: pupils equal, round, and reactive to light  ENT: hearing grossly normal bilaterally. Neck: neck supple and non tender . Pulmonary/Chest: Decreased breath sounds on the right side, no crackles, no wheezing  Cardiovascular: normal rate, regular rhythm, normal S1 and S2, no murmurs.   Abdomen: soft, non-tender, non-distended, normal bowel sounds, no masses or organomegaly  Extremities: no cyanosis, clubbing or edema  Musculoskeletal: normal range of motion, no joint swelling, deformity or tenderness      Data Review:    WBC   Date Value Ref Range Status   08/31/2021 6.9 3.5 - 11.0 k/uL Final   08/30/2021 7.3 3.5 - 11.0 k/uL Final   08/29/2021 6.6 3.5 - 11.0 k/uL Final     Hemoglobin   Date Value Ref Range Status   08/31/2021 8.0 (L) 13.5 - 17.5 g/dL Final   08/30/2021 8.0 (L) 13.5 - 17.5 g/dL Final   08/29/2021 8.1 (L) 13.5 - 17.5 g/dL Final     Hematocrit   Date Value Ref Range Status   08/31/2021 23.7 (L) 41 - 53 % Final   08/30/2021 23.8 (L) 41 - 53 % Final   08/29/2021 23.6 (L) 41 - 53 % Final     MCV   Date Value Ref Range Status   08/31/2021 94.3 80 - 100 fL Final   08/30/2021 94.7 80 - 100 fL Final   08/29/2021 96.6 80 - 100 fL Final     Platelets   Date Value Ref Range Status   08/31/2021 282 150 - 450 k/uL Final   08/30/2021 299 Final   08/20/2012 33 5.6 - 51.3 U/L Final     Comment:     [address] [address] [phone]     Protime   Date Value Ref Range Status   08/16/2021 12.4 11.8 - 14.6 sec Final   12/17/2014 10.1 9.4 - 12.6 sec Final   08/20/2012 9.8 9.1 - 12.7 sec Final     INR   Date Value Ref Range Status   08/16/2021 0.9  Final     Comment:           Non-therapeutic Range:     INR = 0.9-1.2  Therapeutic Range: Moderate Anticoagulant Intensity:     INR = 2.0-3.0   High Anticoagulant Intensity:     INR = 2.5-3.5           12/17/2014 1.0  Final     Comment:           Therapeutic Range: Moderate Anticoagulant Intensity:     INR = 2.0-3.0   High Anticoagulant Intensity:     INR = 2.5-3.5        [address] [phone]     08/20/2012 0.9  Final     Comment:           Therapeutic Range: Moderate Anticoagulant Intensity:     INR = 2.0-3.0   High Anticoagulant Intensity:     INR = 2.5-3.5        [address] [phone]     No results found for: PTT  No results found for: OCCULTBLD  No results found for: GLUMET     Imaging Studies:                           All appropriate imaging studies and reports reviewed: Yes  XR CHEST (SINGLE VIEW FRONTAL)    Result Date: 8/27/2021  EXAMINATION: ONE XRAY VIEW OF THE CHEST 8/27/2021 3:25 pm COMPARISON: Earlier exam of same date HISTORY: ORDERING SYSTEM PROVIDED HISTORY: post chest tube removal right sided, 4 hours post removal TECHNOLOGIST PROVIDED HISTORY: post chest tube removal right sided, 4 hours post removal Reason for Exam: S/p chest tube removal right side. Acuity: Unknown Type of Exam: Unknown Additional signs and symptoms: S/p chest tube removal right side. FINDINGS: Right-sided chest tube has been removed. PICC is stable in positioning. Stable patchy right lung infiltrates. Moderate right pleural effusion and small left pleural effusion. No pneumothorax.      Removal of right chest tube without pneumothorax. No other significant changes are seen     XR CHEST (SINGLE VIEW FRONTAL)    Result Date: 8/23/2021  EXAMINATION: ONE XRAY VIEW OF THE CHEST 8/23/2021 3:42 pm COMPARISON: 08/23/2021 HISTORY: ORDERING SYSTEM PROVIDED HISTORY: on inspiration TECHNOLOGIST PROVIDED HISTORY: on inspiration Reason for Exam: Post thora today Acuity: Acute Type of Exam: Ongoing Additional signs and symptoms: Post thora today FINDINGS: Right arm PICC line and right pleural pigtail catheter remain in place. No change in pleural/parenchymal density in the right chest due to combination of loculated fluid and lung consolidation. Decrease in pleural/parenchymal density left lung base without a significant pneumothorax status post left thoracentesis. Stable cardiomegaly. Left costophrenic angle was partially obscured by the patient's arm. No significant pneumothorax status post left thoracentesis. CT CHEST WO CONTRAST    Result Date: 8/26/2021  EXAMINATION: CT OF THE CHEST WITHOUT CONTRAST 8/26/2021 9:26 am TECHNIQUE: CT of the chest was performed without the administration of intravenous contrast. Multiplanar reformatted images are provided for review. Dose modulation, iterative reconstruction, and/or weight based adjustment of the mA/kV was utilized to reduce the radiation dose to as low as reasonably achievable. COMPARISON: None. HISTORY: ORDERING SYSTEM PROVIDED HISTORY: Reassess empyema morning after 3rd dose of intrapleural TPA TECHNOLOGIST PROVIDED HISTORY: Reassess empyema morning after 3rd dose of intrapleural TPA Reason for Exam: Reassess empyema morning after 3rd dose of intrapleural TPA Acuity: Unknown Type of Exam: Unknown Relevant Medical/Surgical History: copd FINDINGS: Mediastinum: Soft tissues of the thoracic inlet are unremarkable. The thoracic aorta is normal in caliber. The main pulmonary artery is normal in caliber. There is no pericardial effusion.   There is no mediastinal or hilar adenopathy. Lungs/pleura: There is a mild-to-moderate left-sided pleural effusion with adjacent consolidation and this is similar compared to prior exam.  There is a right-sided pleural effusion containing foci of air and this is smaller compared to prior examination an indwelling drain remains. There are loculated areas of fluid adjacent to the fissures that are similar compared to prior examination. The tracheobronchial tree is patent. Upper Abdomen: The upper abdomen is grossly unremarkable. Soft Tissues/Bones: The extrathoracic soft tissues are unremarkable. There is no axillary adenopathy. There is no acute osseous abnormality. Right-sided pleural fluid and pleural air and this is decreased compared to prior examination with stable indwelling pigtail catheter. Stable loculated areas of fluid along the right lung fissures. Mild-to-moderate left-sided pleural effusion with adjacent consolidation that is similar compared to prior examination. XR CHEST PORTABLE    Result Date: 8/31/2021  EXAMINATION: ONE XRAY VIEW OF THE CHEST 8/31/2021 7:50 am COMPARISON: 08/30/2021 HISTORY: ORDERING SYSTEM PROVIDED HISTORY: Reassess empyema TECHNOLOGIST PROVIDED HISTORY: Reassess empyema Reason for Exam: empyema Acuity: Unknown Type of Exam: Unknown FINDINGS: The patient is rotated. A right PICC terminates over the mid right atrium, stable position. The cardiac silhouette is normal.  There is inhomogeneous, patchy opacity in the right base. There is thickening along the right superior and mid, lateral pleural interface. Left perihilar linear/patchy opacities are present. Left pleural effusion is not evident on the series. Stable inhomogeneous opacity over the right chest, combination of loculated pleural effusion and dependent airspace disease or atelectasis. Stable left perihilar airspace disease.      XR CHEST PORTABLE    Result Date: 8/30/2021  EXAMINATION: ONE XRAY VIEW OF THE CHEST 8/30/2021 7:14 am COMPARISON: AP chest from 08/29/2021 HISTORY: ORDERING SYSTEM PROVIDED HISTORY: Reassess empyema TECHNOLOGIST PROVIDED HISTORY: Reassess empyema Reason for Exam: Post right chest tube removal Acuity: Acute Type of Exam: Subsequent/Follow-up History of COPD FINDINGS: Overlying ECG monitor leads and gown snaps. Unchanged right-sided PICC. Similar cardiomediastinal shadow and right-sided parenchymal and pleural abnormalities with rounded opacities mid lower lung, presumably loculated pleural/fissural fluid. Bibasilar atelectasis. New pulmonary or significant left pleural abnormality. Bones stable. Stable chest findings, as above. XR CHEST PORTABLE    Result Date: 8/29/2021  EXAMINATION: ONE XRAY VIEW OF THE CHEST 8/29/2021 7:45 am COMPARISON: 08/28/2021 HISTORY: Reason for Exam: reassess empyema Acuity: Acute Type of Exam: Ongoing FINDINGS: Stable dense right basilar consolidation with blunting of the costophrenic angle. Stable appearing pleural fluid along the right lateral lung field. Persistent areas of loculated pleural fluid right mid to lower lung field. Persistent smaller left pleural effusion with associated atelectasis. No discernible pneumothorax. Stable right-sided PICC line. Cardiomediastinal is stable. Osseous structures appear intact. Stable appearing chest with persistent findings as detailed above. XR CHEST PORTABLE    Result Date: 8/28/2021  EXAMINATION: ONE X-RAY VIEW OF THE CHEST 8/28/2021 8:54 am COMPARISON: 08/27/2021 HISTORY: ORDERING SYSTEM PROVIDED HISTORY:  Reassess empyema TECHNOLOGIST PROVIDED HISTORY: Reassess empyema Reason for Exam:  Reassess empyema Acuity:  Unknown Type of Exam:  Unknown FINDINGS: Right arm PICC remains in place. Heart size is stable. Patchy opacities at the right base and pleural-based opacities in the lateral mid right hemithorax are unchanged. No evidence of pneumothorax. Mild diffuse interstitial thickening is unchanged. No significant interval change. Patchy opacities at the right base and pleural-based opacity in the lateral mid right chest.     XR CHEST PORTABLE    Result Date: 8/27/2021  EXAMINATION: ONE XRAY VIEW OF THE CHEST 8/27/2021 7:52 am COMPARISON: 08/26/2021, 08/25/2021 HISTORY: ORDERING SYSTEM PROVIDED HISTORY: Reassess empyema TECHNOLOGIST PROVIDED HISTORY: Reassess empyema Reason for Exam: right side chest tube Acuity: Acute Type of Exam: Initial FINDINGS: Right pigtail chest tube remains in place. Right PICC line appears unchanged in position. The right pleural effusion and opacities in the mid to inferior right lung field are without appreciable change. No pneumothorax identified. Left basilar opacities and small left effusion also appears unchanged. No new airspace disease identified. Right chest tube remains in place. No significant change in opacities in the right lung and effusion. No pneumothorax. XR CHEST PORTABLE    Result Date: 8/26/2021  EXAMINATION: ONE XRAY VIEW OF THE CHEST 8/26/2021 8:05 pm COMPARISON: 8/25/2021 HISTORY: ORDERING SYSTEM PROVIDED HISTORY: reassess empyema post chest tube to water seal TECHNOLOGIST PROVIDED HISTORY: reassess empyema post chest tube to water seal Reason for Exam: reassess empyema post chest tube to water seal Acuity: Acute Type of Exam: Initial Additional signs and symptoms: reassess empyema post chest tube to water seal Relevant Medical/Surgical History: reassess empyema post chest tube to water seal FINDINGS: Right upper extremity PICC and chest tube are in stable position. Improved aeration is identified in the right base compared to the prior study. Persistent loculated hydropneumothorax is seen. Redemonstration of large nodular opacities overlying the right mid to lower lung zone. Left base pleural effusion and opacity are unchanged. No acute osseous abnormality. 1. Stable lines and tubes.  2. Interval improvement of right base aeration from the prior study. 3. Mild to moderate loculated right hydropneumothorax. 4. Persistent nodular opacities overlying the right mid to lower lung zone. 5. Stable left base opacity and pleural effusion. XR CHEST PORTABLE    Result Date: 8/25/2021  EXAMINATION: ONE XRAY VIEW OF THE CHEST 8/25/2021 9:27 am COMPARISON: 08/24/2021 HISTORY: ORDERING SYSTEM PROVIDED HISTORY: empyema TECHNOLOGIST PROVIDED HISTORY: empyema Reason for Exam: empyema Acuity: Acute Type of Exam: Initial FINDINGS: There is a small bore locking loop chest tube identified at the right lung base. There is a right-sided pleural effusion, predominantly laterally, without significant interval change compared to the previous examination. Multifocal infiltrates are seen throughout the lungs bilaterally. There is a right-sided PICC with the tip overlying the cavoatrial junction. Multiple areas of loculated pleural fluid again noted within the right chest with a rounded appearance. Multifocal loculated pleural effusion in the right chest, with overall appearance similar to the previous examination. Multifocal parenchymal infiltrates are again identified, similar to the prior study. XR CHEST PORTABLE    Result Date: 8/24/2021  EXAMINATION: ONE XRAY VIEW OF THE CHEST 8/24/2021 7:59 am COMPARISON: Chest radiograph performed 08/23/2021. HISTORY: ORDERING SYSTEM PROVIDED HISTORY: empyema TECHNOLOGIST PROVIDED HISTORY: empyema Reason for Exam: sob Acuity: Unknown Type of Exam: Unknown FINDINGS: There is consolidation over the right lung base that is stable compared to prior. Stable right-sided effusion. There is a stable indwelling right basilar drain. The heart is prominent. The upper abdomen is unremarkable. The extrathoracic soft tissues are unremarkable. Similar right lower lung consolidation and effusion with indwelling drain.      XR CHEST PORTABLE    Result Date: 8/23/2021  EXAMINATION: ONE XRAY VIEW OF THE CHEST 8/23/2021 2:04 pm COMPARISON: Chest radiograph performed 08/22/2021. HISTORY: ORDERING SYSTEM PROVIDED HISTORY: infiltrate TECHNOLOGIST PROVIDED HISTORY: infiltrate Reason for Exam: F/u infiltrate/chest tube Acuity: Acute Type of Exam: Subsequent/Follow-up Additional signs and symptoms: F/u infiltrate/chest tube FINDINGS: There are bilateral mid and lower lung infiltrates. There are bibasilar effusions. There is no pneumothorax. Mediastinal structures are stable. The upper abdomen is unremarkable. The extrathoracic soft tissues are unremarkable. There is a right-sided PICC line with the tip in the distal SVC. There is a right-sided pigtail drain. Bilateral effusions with adjacent mid and lower lung infiltrates consistent with pneumonia. Support tubes as described above. VL Lower Extremity Bilateral Venous Duplex    Result Date: 8/24/2021    [facility]  Vascular Lower Extremities DVT Study Procedure   Patient Name   [facility]       Date of Study           08/24/2021                 Delta Comes F   Date of Birth  1965  Gender                  Male   Age            60 year(s)  Race                       Room Number    2103        Height:                 68.9 inch, 175 cm   Corporate ID # [id number]    Weight:                 163 pounds, 74 kg   Patient Acct # [de-identified]   BSA:        1.89 m^2    BMI:     24.16 kg/m^2   MR #           [id number]      Sonographer             Lukasz Baum   Accession #    [id number]  Interpreting Physician  Daniel Jacobsen   Referring                  Referring Physician     Lidia Santacruz  Nurse  Practitioner  Procedure Type of Study:   Veins: Lower Extremities DVT Study, Venous Scan Lower Bilateral.  Patient Status: In Patient. Technical Quality:Adequate visualization.  Comments:Prolonged hospitalization  Conclusions   Summary   Simultaneous real time imaging utilizing B-Mode, color doppler and  spectral waveform analysis was performed on the bilateral lower  extremities for venous examination of the deep and superficial systems. Findings are:   Right:  No evidence of deep venous thrombosis. Chronic superficial venous thrombosis identified in the great saphenous  vein at level of ankle. left:  No evidence of deep or superficial venous thrombosis. Signature   ----------------------------------------------------------------  Electronically signed by Anibal Mendoza(Sonographer) on  08/24/2021 12:47 PM  ----------------------------------------------------------------   ----------------------------------------------------------------  Electronically signed by Montana Carmichael(Interpreting  physician) on 08/24/2021 07:37 PM  ----------------------------------------------------------------  Findings:   Right Impression:                    Left Impression:  The common femoral, femoral,         The common femoral, femoral,  popliteal and tibial veins           popliteal and tibial veins  demonstrate normal compressibility   demonstrate normal compressibility  and augmentation. and augmentation. Non compressibility of the great     Normal compressibility of the great  saphenous vein at the level of the   saphenous vein. ankle. Normal compressibility of the small  Normal compressibility of the small  saphenous vein. saphenous vein. Velocities are measured in cm/s ; Diameters are measured in cm Right Lower Extremities DVT Study Measurements Right 2D Measurements +------------------------------------+----------+---------------+----------+ ! Location                            ! Visualized! Compressibility! Thrombosis! +------------------------------------+----------+---------------+----------+ ! Common Femoral                      !Yes       ! Yes            ! None      ! +------------------------------------+----------+---------------+----------+ ! Prox Femoral                        !Yes       ! Yes            ! None      ! +------------------------------------+----------+---------------+----------+ ! Mid Femoral                         !Yes       ! Yes            ! None      ! +------------------------------------+----------+---------------+----------+ ! Dist Femoral                        !Yes       ! Yes            ! None      ! +------------------------------------+----------+---------------+----------+ ! Popliteal                           !Yes       ! Yes            ! None      ! +------------------------------------+----------+---------------+----------+ ! Sapheno Femoral Junction            ! Yes       ! Yes            ! None      ! +------------------------------------+----------+---------------+----------+ ! PTV                                 ! Yes       ! Partial        !None      ! +------------------------------------+----------+---------------+----------+ ! Peroneal                            !Yes       ! Partial        !None      ! +------------------------------------+----------+---------------+----------+ ! Gastroc                             ! Yes       ! Yes            ! None      ! +------------------------------------+----------+---------------+----------+ ! GSV Thigh                           ! Yes       ! Yes            ! None      ! +------------------------------------+----------+---------------+----------+ ! GSV Knee                            ! Yes       ! Yes            ! None      ! +------------------------------------+----------+---------------+----------+ ! GSV Ankle                           ! Yes       ! No             !          ! +------------------------------------+----------+---------------+----------+ ! SSV                                 ! Yes       ! Yes            ! None      ! +------------------------------------+----------+---------------+----------+ Right Doppler Measurements +---------------------------+------+------+--------------------------------+ ! Location                   ! Signal!Reflux! Reflux (msec) ! +---------------------------+------+------+--------------------------------+ ! Common Femoral             !Phasic!      !                                ! +---------------------------+------+------+--------------------------------+ ! Prox Femoral               !Phasic!      !                                ! +---------------------------+------+------+--------------------------------+ ! Popliteal                  !Phasic!      !                                ! +---------------------------+------+------+--------------------------------+ Left Lower Extremities DVT Study Measurements Left 2D Measurements +------------------------------------+----------+---------------+----------+ ! Location                            ! Visualized! Compressibility! Thrombosis! +------------------------------------+----------+---------------+----------+ ! Common Femoral                      !Yes       ! Yes            ! None      ! +------------------------------------+----------+---------------+----------+ ! Prox Femoral                        !Yes       ! Yes            ! None      ! +------------------------------------+----------+---------------+----------+ ! Mid Femoral                         !Yes       ! Yes            ! None      ! +------------------------------------+----------+---------------+----------+ ! Dist Femoral                        !Yes       ! Yes            ! None      ! +------------------------------------+----------+---------------+----------+ ! Popliteal                           !Yes       ! Yes            ! None      ! +------------------------------------+----------+---------------+----------+ ! Sapheno Femoral Junction            ! Yes       ! Yes            ! None      ! +------------------------------------+----------+---------------+----------+ ! PTV                                 ! Yes       ! Partial        !None      ! +------------------------------------+----------+---------------+----------+ ! Ashley !Yes       !Partial        !None      ! +------------------------------------+----------+---------------+----------+ ! Gastroc                             ! Yes       ! Yes            ! None      ! +------------------------------------+----------+---------------+----------+ ! GSV Thigh                           ! Yes       ! Yes            ! None      ! +------------------------------------+----------+---------------+----------+ ! GSV Knee                            ! Yes       ! Yes            ! None      ! +------------------------------------+----------+---------------+----------+ ! GSV Ankle                           ! Yes       ! Yes            ! None      ! +------------------------------------+----------+---------------+----------+ ! SSV                                 ! Yes       ! Yes            ! None      ! +------------------------------------+----------+---------------+----------+ Left Doppler Measurements +---------------------------+------+------+--------------------------------+ ! Location                   ! Signal!Reflux! Reflux (msec)                   ! +---------------------------+------+------+--------------------------------+ ! Common Femoral             !Phasic!      !                                ! +---------------------------+------+------+--------------------------------+ ! Prox Femoral               !Phasic!      !                                ! +---------------------------+------+------+--------------------------------+ ! Popliteal                  !Phasic!      !                                ! +---------------------------+------+------+--------------------------------+    VL Upper Extremity Venous Duplex Left    Result Date: 8/24/2021    [address] [place] [address]  Vascular Upper Extremities Veins Procedure   Patient Name   [id number] Renetta Hickey       Date of Study           08/24/2021                 Delta Comes F   Date of Birth  1965  Gender                  Male   Age            60 year(s)  Race    Room Number    2103        Height:                 68.9 inch, 175 cm   Corporate ID # [id number]    Weight:                 163 pounds, 74 kg   Patient Acct # [de-identified]   BSA:        1.89 m^2    BMI:     24.16 kg/m^2   MR #           [id number]      Sonographer             Amy Terry   Accession #    [id number]  Interpreting Physician  Ruperto Reinoso   Referring                  Referring Physician     Jaswant Blair  Nurse  Practitioner  Procedure Type of Study:   Veins: Upper Extremities Veins, Venous Scan Upper Left. Indications for Study: Thrombus. Patient Status: In Patient. Technical Quality:Adequate visualization. Conclusions   Summary   Simultaneous real time imaging utilizing B-Mode, color doppler and  spectral waveform analysis was performed on the left upper extremity for  venous examination of the deep and superficial systems. Findings are:   Left:  No evidence of deep or superficial venous thrombosis. Signature   ----------------------------------------------------------------  Electronically signed by Anibal Hidalgo(Sonographer) on  08/24/2021 12:58 PM  ----------------------------------------------------------------   ----------------------------------------------------------------  Electronically signed by Montana De La Fuente(Interpreting  physician) on 08/24/2021 07:29 PM  ----------------------------------------------------------------  Findings:   Right Impression:        Left Impression:  The Subclavian vein was  Internal jugular, subclavian, axillary, brachial,  evaluated. ulnar, radial, cephalic and basilic veins are  It was compressible and  compressible with normal doppler responses. with normal doppler  responses. Velocities are measured in cm/s ; Diameters are measured in cm Right UE Vein Measurements 2D Measurements +---------------+-----------------+----------------------+-----------------+ ! Location       ! Visualized       ! Compressibility       ! Thrombosis       ! +---------------+-----------------+----------------------+-----------------+ ! Prox SCV       ! Yes              ! Yes                   ! None             ! +---------------+-----------------+----------------------+-----------------+ Doppler Measurements +-------------------------+-----------------------+------------------------+ ! Location                 ! Signal                 !Reflux                  ! +-------------------------+-----------------------+------------------------+ ! SCV                      ! Phasic                 !                        ! +-------------------------+-----------------------+------------------------+ Left UE Vein Measurements 2D Measurements +------------------------------------+----------+---------------+----------+ ! Location                            ! Visualized! Compressibility! Thrombosis! +------------------------------------+----------+---------------+----------+ ! Prox IJV                            ! Yes       !               !          ! +------------------------------------+----------+---------------+----------+ ! Prox SCV                            ! Yes       !               !          ! +------------------------------------+----------+---------------+----------+ ! Prox Axillary                       ! Yes       !               !          ! +------------------------------------+----------+---------------+----------+ ! Prox Brachial                       !Yes       !               !          ! +------------------------------------+----------+---------------+----------+ ! Prox Radial                         !No        !               !          ! +------------------------------------+----------+---------------+----------+ ! Prox Ulnar                          ! No        !               !          ! +------------------------------------+----------+---------------+----------+ ! Emil at UA                       ! Yes       !               !          ! +------------------------------------+----------+---------------+----------+ ! Basilic at AF                       ! Yes       !               !          ! +------------------------------------+----------+---------------+----------+ ! Basilic at [address]                       ! No        !               !          ! +------------------------------------+----------+---------------+----------+ ! Cephalic at UA                      ! Yes       !               !          ! +------------------------------------+----------+---------------+----------+ ! Cephalic at AF                      ! Yes       !               !          ! +------------------------------------+----------+---------------+----------+ ! Cephalic at [address]                      ! No        !               !          ! +------------------------------------+----------+---------------+----------+ Doppler Measurements +-------------------------+-----------------------+------------------------+ ! Location                 ! Signal                 !Reflux                  ! +-------------------------+-----------------------+------------------------+ ! IJV                      ! Phasic                 !                        ! +-------------------------+-----------------------+------------------------+ ! SCV                      ! Phasic                 !                        ! +-------------------------+-----------------------+------------------------+ ! Axillary                 ! Phasic                 !                        ! +-------------------------+-----------------------+------------------------+ ! Brachial                 !Phasic                 !                        ! +-------------------------+-----------------------+------------------------+    FL MODIFIED BARIUM SWALLOW W VIDEO    Result Date: 8/23/2021  EXAMINATION: MODIFIED BARIUM SWALLOW WAS PERFORMED IN CONJUNCTION WITH SPEECH PATHOLOGY SERVICES TECHNIQUE: Fluoroscopic evaluation of the swallowing mechanism was FINDINGS: A total of 100 mL of clear yellow fluid was removed. Fluid was sent for the requested studies. Successful ultrasound guided left thoracentesis. Assessment:   Recent MRSA empyema status post chest tube placement and removal  Recent MRSA bacteremia  Left wrist fracture  Left arm superficial thrombophlebitis  Syncope  Alcohol abuse  Recent acute respiratory failure required intubation  Multiple rib fractures  · COPD  · History of depression  · History of seizure  · History of hypertension. Recommendations: Follow-up CT chest on 9/23/2021  Monitor off antibiotics for now  Further recommendation based on CT chest results. Follow-up next week    Thank you for allowing me to participate in the care of your patient. Please feel free to contact me with any questions or concerns.      Ilana Roman MD

## 2021-09-23 ENCOUNTER — HOSPITAL ENCOUNTER (OUTPATIENT)
Dept: CT IMAGING | Age: 56
Discharge: HOME | End: 2021-09-25
Payer: MEDICAID

## 2021-09-23 ENCOUNTER — OFFICE VISIT (OUTPATIENT)
Dept: CARDIOTHORACIC SURGERY | Age: 56
End: 2021-09-23
Payer: MEDICAID

## 2021-09-23 VITALS
HEIGHT: 69 IN | WEIGHT: 150 LBS | DIASTOLIC BLOOD PRESSURE: 78 MMHG | SYSTOLIC BLOOD PRESSURE: 144 MMHG | BODY MASS INDEX: 22.22 KG/M2 | RESPIRATION RATE: 18 BRPM | OXYGEN SATURATION: 100 % | TEMPERATURE: 97.6 F | HEART RATE: 84 BPM

## 2021-09-23 DIAGNOSIS — J90 PLEURAL EFFUSION: Primary | ICD-10-CM

## 2021-09-23 DIAGNOSIS — J86.9 EMPYEMA LUNG (HCC): ICD-10-CM

## 2021-09-23 PROCEDURE — 3017F COLORECTAL CA SCREEN DOC REV: CPT | Performed by: THORACIC SURGERY (CARDIOTHORACIC VASCULAR SURGERY)

## 2021-09-23 PROCEDURE — 99214 OFFICE O/P EST MOD 30 MIN: CPT | Performed by: THORACIC SURGERY (CARDIOTHORACIC VASCULAR SURGERY)

## 2021-09-23 PROCEDURE — 1111F DSCHRG MED/CURRENT MED MERGE: CPT | Performed by: THORACIC SURGERY (CARDIOTHORACIC VASCULAR SURGERY)

## 2021-09-23 PROCEDURE — 71250 CT THORAX DX C-: CPT

## 2021-09-23 PROCEDURE — 4004F PT TOBACCO SCREEN RCVD TLK: CPT | Performed by: THORACIC SURGERY (CARDIOTHORACIC VASCULAR SURGERY)

## 2021-09-23 PROCEDURE — G8427 DOCREV CUR MEDS BY ELIG CLIN: HCPCS | Performed by: THORACIC SURGERY (CARDIOTHORACIC VASCULAR SURGERY)

## 2021-09-23 PROCEDURE — G8420 CALC BMI NORM PARAMETERS: HCPCS | Performed by: THORACIC SURGERY (CARDIOTHORACIC VASCULAR SURGERY)

## 2021-09-23 NOTE — PATIENT INSTRUCTIONS
Ct scan looks great  Continue current treatment  Cough and deep breathing exercises  Cleared from cardiothoracic surgery

## 2021-09-23 NOTE — PROGRESS NOTES
[facility]  Office Visit      Subjective:  Mr. Lea Cobb is a 60 y.o. male   Patient came today to reevaluate his CT chest.  In patient's recent hospital admission he had a noted right-sided effusion. Patient was treated for rhabdo while in house. Patient was intubated. Today patient came back for reevaluation of right thorax. And has no signs of chest.  Patient states he feels 100 times better. Patient is alert and oriented. Denies fever chills nausea vomiting. He had a CT chest this morning. Physical Exam  Vitals:  Vitals:    09/23/21 1101   BP: (!) 144/78   Pulse:    Resp:    Temp:    SpO2:        General: Alert and Oriented x3. Ambulatory. No apparent distress. Chest:  No abnormality. Equal and symmetric expansion with respiration. Lungs:  Clear to auscultation. Cardiac:  Regular rate and rhythm without murmurs, rubs or gallops. Abdomen:  Soft, non-tender, normoactive bowel sounds. Extremities:  No edema. Intact pulses in all four extremities. Psychiatric: Mood and affect are appropriate. I reviewed the CT chest which does not show any signs of right-sided pleural effusion. No signs of congestive heart failure. No pneumothorax.     Current Medications:    Current Outpatient Medications:     metoprolol succinate (TOPROL XL) 25 MG extended release tablet, Take 0.5 tablets by mouth nightly, Disp: 30 tablet, Rfl: 3    guaiFENesin (MUCINEX) 600 MG extended release tablet, Take 1 tablet by mouth 2 times daily as needed for Congestion, Disp: 15 tablet, Rfl: 0    lidocaine 4 % external patch, Place 1 patch onto the skin daily, Disp: 6 patch, Rfl: 0    tamsulosin (FLOMAX) 0.4 MG capsule, Take 1 capsule by mouth daily, Disp: 30 capsule, Rfl: 3    albuterol sulfate  (90 Base) MCG/ACT inhaler, INHALE 2 PUFFS INTO THE LUNGS EVERY 6 HOURS AS NEEDED FOR WHEEZING, Disp: 18 g, Rfl: 3    tiotropium (SPIRIVA HANDIHALER) 18 MCG inhalation capsule, Inhale 1 capsule into the lungs daily, Disp: 90 capsule, Rfl: 3    ARNUITY ELLIPTA 100 MCG/ACT AEPB, INHALE 1 PUFF INTO THE LUNGS DAILY, Disp: 30 each, Rfl: 2    Past Surgical History:   Procedure Laterality Date    ANESTHESIA NERVE BLOCK Right 4/10/2017    NERVE BLOCK US RIGHT SIDED ILIOINGUINAL performed by Mushtaq Anglin MD at [address] [place] [address]  3/19/15    HERNIA REPAIR      IR CHEST TUBE INSERTION  8/20/2021    IR CHEST TUBE INSERTION 8/20/2021 [facility]    NERVE BLOCK Right 10/10/2016    right groin    OTHER SURGICAL HISTORY Right 04/10/2017    US nerve block to R groin    TOE SURGERY      SCREW RIGHT BIG TOE       Social Hx: reports that he has been smoking cigarettes. He has a 38.00 pack-year smoking history. He has never used smokeless tobacco.    Assessment & Plan:   Ct scan today looks normal no issue.    IS and deep breathing  Quit smoking        FLORENCIA ESPINO, APRN - NP,APRN CNP

## 2021-10-11 LAB
CULTURE: NORMAL
DIRECT EXAM: NORMAL
Lab: NORMAL
SPECIMEN DESCRIPTION: NORMAL

## 2021-10-21 ENCOUNTER — HOSPITAL ENCOUNTER (OUTPATIENT)
Age: 56
Discharge: HOME | End: 2021-10-23
Payer: MEDICAID

## 2021-10-21 ENCOUNTER — HOSPITAL ENCOUNTER (OUTPATIENT)
Dept: GENERAL RADIOLOGY | Age: 56
Discharge: HOME | End: 2021-10-23
Payer: MEDICAID

## 2021-10-21 DIAGNOSIS — M25.532 LEFT WRIST PAIN: ICD-10-CM

## 2021-10-21 PROCEDURE — 73110 X-RAY EXAM OF WRIST: CPT

## 2021-11-24 ENCOUNTER — HOSPITAL ENCOUNTER (OUTPATIENT)
Dept: GENERAL RADIOLOGY | Age: 56
Discharge: HOME | End: 2021-11-26
Payer: MEDICAID

## 2021-11-24 ENCOUNTER — HOSPITAL ENCOUNTER (OUTPATIENT)
Age: 56
Discharge: HOME | End: 2021-11-26
Payer: MEDICAID

## 2021-11-24 DIAGNOSIS — L08.9 TOE INFECTION: ICD-10-CM

## 2021-11-24 DIAGNOSIS — S62.102S LEFT WRIST FRACTURE, SEQUELA: ICD-10-CM

## 2021-11-24 PROCEDURE — 73630 X-RAY EXAM OF FOOT: CPT

## 2021-11-24 PROCEDURE — 73110 X-RAY EXAM OF WRIST: CPT

## 2021-12-02 ENCOUNTER — OFFICE VISIT (OUTPATIENT)
Dept: ORTHOPEDIC SURGERY | Age: 56
End: 2021-12-02
Payer: MEDICAID

## 2021-12-02 VITALS — HEIGHT: 69 IN | WEIGHT: 144 LBS | BODY MASS INDEX: 21.33 KG/M2

## 2021-12-02 DIAGNOSIS — M86.9 TOE OSTEOMYELITIS, RIGHT (HCC): Primary | ICD-10-CM

## 2021-12-02 DIAGNOSIS — Z01.818 PRE-OP TESTING: ICD-10-CM

## 2021-12-02 DIAGNOSIS — T84.7XXA INFECTED HARDWARE IN RIGHT LOWER EXTREMITY, INITIAL ENCOUNTER (HCC): ICD-10-CM

## 2021-12-02 DIAGNOSIS — S52.552A OTHER CLOSED EXTRA-ARTICULAR FRACTURE OF DISTAL END OF LEFT RADIUS, INITIAL ENCOUNTER: ICD-10-CM

## 2021-12-02 PROCEDURE — G8484 FLU IMMUNIZE NO ADMIN: HCPCS | Performed by: STUDENT IN AN ORGANIZED HEALTH CARE EDUCATION/TRAINING PROGRAM

## 2021-12-02 PROCEDURE — 4004F PT TOBACCO SCREEN RCVD TLK: CPT | Performed by: STUDENT IN AN ORGANIZED HEALTH CARE EDUCATION/TRAINING PROGRAM

## 2021-12-02 PROCEDURE — 3017F COLORECTAL CA SCREEN DOC REV: CPT | Performed by: STUDENT IN AN ORGANIZED HEALTH CARE EDUCATION/TRAINING PROGRAM

## 2021-12-02 PROCEDURE — G8427 DOCREV CUR MEDS BY ELIG CLIN: HCPCS | Performed by: STUDENT IN AN ORGANIZED HEALTH CARE EDUCATION/TRAINING PROGRAM

## 2021-12-02 PROCEDURE — G8420 CALC BMI NORM PARAMETERS: HCPCS | Performed by: STUDENT IN AN ORGANIZED HEALTH CARE EDUCATION/TRAINING PROGRAM

## 2021-12-02 PROCEDURE — 99214 OFFICE O/P EST MOD 30 MIN: CPT | Performed by: STUDENT IN AN ORGANIZED HEALTH CARE EDUCATION/TRAINING PROGRAM

## 2021-12-02 RX ORDER — CIPROFLOXACIN 500 MG/1
500 TABLET, FILM COATED ORAL 2 TIMES DAILY
Qty: 20 TABLET | Refills: 0 | Status: SHIPPED | OUTPATIENT
Start: 2021-12-02 | End: 2021-12-12

## 2021-12-02 RX ORDER — SULFAMETHOXAZOLE AND TRIMETHOPRIM 400; 80 MG/1; MG/1
1 TABLET ORAL 2 TIMES DAILY
Qty: 20 TABLET | Refills: 0 | Status: SHIPPED | OUTPATIENT
Start: 2021-12-02 | End: 2021-12-12
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
## 2021-12-04 ENCOUNTER — HOSPITAL ENCOUNTER (OUTPATIENT)
Dept: GENERAL RADIOLOGY | Age: 56
Discharge: HOME | End: 2021-12-06
Payer: MEDICAID

## 2021-12-04 ENCOUNTER — HOSPITAL ENCOUNTER (OUTPATIENT)
Age: 56
Discharge: HOME | End: 2021-12-06
Payer: MEDICAID

## 2021-12-04 DIAGNOSIS — Z01.818 PRE-OPERATIVE CLEARANCE: ICD-10-CM

## 2021-12-04 DIAGNOSIS — Z01.818 PRE-OP TESTING: ICD-10-CM

## 2021-12-04 PROCEDURE — 71046 X-RAY EXAM CHEST 2 VIEWS: CPT

## 2021-12-07 RX ORDER — SODIUM CHLORIDE, SODIUM LACTATE, POTASSIUM CHLORIDE, CALCIUM CHLORIDE 600; 310; 30; 20 MG/100ML; MG/100ML; MG/100ML; MG/100ML
1000 INJECTION, SOLUTION INTRAVENOUS CONTINUOUS
Status: CANCELLED | OUTPATIENT
Start: 2021-12-07

## 2021-12-08 ENCOUNTER — HOSPITAL ENCOUNTER (OUTPATIENT)
Dept: PREADMISSION TESTING | Age: 56
Discharge: HOME | End: 2021-12-12
Payer: MEDICAID

## 2021-12-08 VITALS
HEART RATE: 82 BPM | DIASTOLIC BLOOD PRESSURE: 92 MMHG | TEMPERATURE: 98.1 F | RESPIRATION RATE: 18 BRPM | BODY MASS INDEX: 20.59 KG/M2 | OXYGEN SATURATION: 97 % | HEIGHT: 69 IN | WEIGHT: 139 LBS | SYSTOLIC BLOOD PRESSURE: 140 MMHG

## 2021-12-08 DIAGNOSIS — Z01.818 PRE-OP TESTING: ICD-10-CM

## 2021-12-08 LAB
ALBUMIN SERPL-MCNC: 4.3 G/DL (ref 3.5–5.2)
ALBUMIN/GLOBULIN RATIO: 1.6 (ref 1–2.5)
ALP BLD-CCNC: 72 U/L (ref 40–129)
ALT SERPL-CCNC: 14 U/L (ref 5–41)
ANION GAP SERPL CALCULATED.3IONS-SCNC: 12 MMOL/L (ref 9–17)
AST SERPL-CCNC: 15 U/L
BILIRUB SERPL-MCNC: <0.1 MG/DL (ref 0.3–1.2)
BILIRUBIN URINE: NEGATIVE
BUN BLDV-MCNC: 14 MG/DL (ref 6–20)
BUN/CREAT BLD: ABNORMAL (ref 9–20)
CALCIUM SERPL-MCNC: 9.4 MG/DL (ref 8.6–10.4)
CHLORIDE BLD-SCNC: 104 MMOL/L (ref 98–107)
CO2: 21 MMOL/L (ref 20–31)
COLOR: YELLOW
COMMENT UA: NORMAL
CREAT SERPL-MCNC: 1.33 MG/DL (ref 0.7–1.2)
GFR AFRICAN AMERICAN: >60 ML/MIN
GFR NON-AFRICAN AMERICAN: 55 ML/MIN
GFR SERPL CREATININE-BSD FRML MDRD: ABNORMAL ML/MIN/{1.73_M2}
GFR SERPL CREATININE-BSD FRML MDRD: ABNORMAL ML/MIN/{1.73_M2}
GLUCOSE BLD-MCNC: 87 MG/DL (ref 70–99)
GLUCOSE URINE: NEGATIVE
HCT VFR BLD CALC: 42.7 % (ref 40.7–50.3)
HEMOGLOBIN: 13.6 G/DL (ref 13–17)
KETONES, URINE: NEGATIVE
LEUKOCYTE ESTERASE, URINE: NEGATIVE
MCH RBC QN AUTO: 29.4 PG (ref 25.2–33.5)
MCHC RBC AUTO-ENTMCNC: 31.9 G/DL (ref 28.4–34.8)
MCV RBC AUTO: 92.4 FL (ref 82.6–102.9)
NITRITE, URINE: NEGATIVE
NRBC AUTOMATED: 0 PER 100 WBC
PDW BLD-RTO: 13.4 % (ref 11.8–14.4)
PH UA: 6 (ref 5–8)
PLATELET # BLD: 259 K/UL (ref 138–453)
PMV BLD AUTO: 9.3 FL (ref 8.1–13.5)
POTASSIUM SERPL-SCNC: 4.1 MMOL/L (ref 3.7–5.3)
PROTEIN UA: NEGATIVE
RBC # BLD: 4.62 M/UL (ref 4.21–5.77)
SODIUM BLD-SCNC: 137 MMOL/L (ref 135–144)
SPECIFIC GRAVITY UA: 1.02 (ref 1–1.03)
TOTAL PROTEIN: 7 G/DL (ref 6.4–8.3)
TURBIDITY: CLEAR
URINE HGB: NEGATIVE
UROBILINOGEN, URINE: NORMAL
WBC # BLD: 6.5 K/UL (ref 3.5–11.3)

## 2021-12-08 PROCEDURE — 85027 COMPLETE CBC AUTOMATED: CPT

## 2021-12-08 PROCEDURE — 36415 COLL VENOUS BLD VENIPUNCTURE: CPT

## 2021-12-08 PROCEDURE — 81003 URINALYSIS AUTO W/O SCOPE: CPT

## 2021-12-08 PROCEDURE — 93005 ELECTROCARDIOGRAM TRACING: CPT

## 2021-12-08 PROCEDURE — 80053 COMPREHEN METABOLIC PANEL: CPT

## 2021-12-08 ASSESSMENT — PAIN DESCRIPTION - LOCATION: LOCATION: BACK;TOE (COMMENT WHICH ONE)

## 2021-12-08 ASSESSMENT — PAIN DESCRIPTION - PAIN TYPE: TYPE: CHRONIC PAIN

## 2021-12-08 ASSESSMENT — PAIN SCALES - GENERAL: PAINLEVEL_OUTOF10: 2

## 2021-12-08 NOTE — H&P (VIEW-ONLY)
History and Physical    Pt Name: Sofia Galdamez  MRN: [id number]  YOB: 1965  Date of evaluation: 12/8/2021  Primary Care Physician: Gianni Root MD    SUBJECTIVE:   History of Chief Complaint:    Sofia Galdamez is a 60 y.o. male who presents for PAT appointment. Patient complains of right great toe with pus-filled blister x 2 months. Patient reports history of surgery to his right great toe about 15 years ago with screw placement. He states he has noticed blisters to his right great toe and will pop them, which causes green/white drainage. Patient reports he was recently able to visualize the screw to his right great toe. Patient denies pain with walking but reports pain to the site of the blister to his right great toe. Patient has been scheduled for HARDWARE REMOVAL GREAT TOE, IRRIGATION AND DEBRIDEMENT, POSSIBLE AMPUTATION OF GREAT TOE , (SYNTHES BROKEN SCREW REMOVAL SET, C-ARM, 3080, SUPINE) - Right  Allergies  is allergic to nickel. Medications  Prior to Admission medications    Medication Sig Start Date End Date Taking?  Authorizing Provider   sulfamethoxazole-trimethoprim (BACTRIM) 400-80 MG per tablet Take 1 tablet by mouth 2 times daily for 10 days 12/2/21 12/12/21 Yes Robert Quinn,    ciprofloxacin (CIPRO) 500 MG tablet Take 1 tablet by mouth 2 times daily for 10 days 12/2/21 12/12/21 Yes Robert Quinn,    tamsulosin (FLOMAX) 0.4 MG capsule Take 1 capsule by mouth daily 9/1/21  Yes Oksana Silverman MD   albuterol sulfate  (90 Base) MCG/ACT inhaler INHALE 2 PUFFS INTO THE LUNGS EVERY 6 HOURS AS NEEDED FOR WHEEZING 12/10/20  Yes Bruce Cardoso MD   tiotropium (SPIRIVA HANDIHALER) 18 MCG inhalation capsule Inhale 1 capsule into the lungs daily 11/10/20  Yes Brian Ferreira MD   ARNUITY ELLIPTA 100 MCG/ACT AEPB INHALE 1 PUFF INTO THE LUNGS DAILY 9/7/20  Yes Lisa Turner MD   guaiFENesin (MUCINEX) 600 MG extended release tablet Take 1 tablet by mouth 2 times daily as needed for Congestion 21   Yelena Hall MD     Past Medical History    has a past medical history of Alcohol abuse, Anxiety, Arthritis, COPD (chronic obstructive pulmonary disease) ([facility] [address] DDD (degenerative disc disease), lumbar, Depression, Heart burn, Hemorrhoids, HTN (hypertension), Ilioinguinal neuralgia of right side, Infected hardware in right leg ([facility] [address] Lives in group home, Psychiatric problem, Rhabdomyolysis, Seizures ([facility] [address] Wears glasses, and Wellness examination. Past Surgical History   has a past surgical history that includes hernia repair; Toe Surgery; Hemorrhoid surgery (3/19/15); Nerve Block (Right, 10/10/2016); other surgical history (Right, 04/10/2017); Anesthesia Nerve Block (Right, 4/10/2017); and IR CHEST TUBE INSERTION (2021). Social History   reports that he has been smoking cigarettes. He has a 38.00 pack-year smoking history. He has never used smokeless tobacco.    reports previous alcohol use. reports previous drug use. Drugs: Marijuana (Weed) and Cocaine. Marital Status   Children 1  Occupation does not work  Family History  Family Status   Relation Name Status    Mother  Alive    Father       family history includes Cancer in his mother.     Review of Systems:  CONSTITUTIONAL:   negative for fevers, chills, fatigue and malaise    EYES:   negative for double vision, blurred vision and photophobia    HEENT:   negative for tinnitus, epistaxis and sore throat     RESPIRATORY:   negative for cough, shortness of breath, wheezing     CARDIOVASCULAR:   negative for chest pain, palpitations, syncope, edema     GASTROINTESTINAL:   negative for nausea, vomiting     GENITOURINARY:   negative for incontinence     MUSCULOSKELETAL:   negative for neck or back pain reports pain to right great toe with green/white drainage   NEUROLOGICAL:   Negative for weakness and tingling  negative for headaches and dizziness     PSYCHIATRIC:   negative for anxiety       OBJECTIVE: VITALS:  height is 5' 9\" (1.753 m) and weight is 139 lb (63 kg). His infrared temperature is 98.1 °F (36.7 °C). His blood pressure is 140/92 (abnormal) and his pulse is 82. His respiration is 18 and oxygen saturation is 97%. CONSTITUTIONAL:alert & oriented x 3, no acute distress. Calm and pleasant. SKIN:  Warm and dry, no rashes to exposed areas of skin. HEAD:  Normocephalic, atraumatic. EYES: PERRL. EOMs intact. EARS:  Intact and equal bilaterally. No edema or thickening, without lumps, lesions, or discharge. Hearing grossly WNL. NOSE:  Nares patent. No rhinorrhea   MOUTH/THROAT:  Mucous membranes pink and moist, uvula midline, teeth appear to be intact. NECK:supple, no lymphadenopathy  LUNGS: Respirations even and non-labored. Clear to auscultation bilaterally, no wheezes, rales, or rhonchi. CARDIOVASCULAR: Regular rate and rhythm, no murmurs/rubs/gallops   ABDOMEN: soft, non-tender, non-distended, bowel sounds active x 4   EXTREMITIES: No edema to bilateral lower extremities. No varicosities to bilateral lower extremities. Left wrist brace due to previous fracture. NEUROLOGIC: CN II-XII are grossly intact. Gait is smooth, rhythmic and effortless   Testing:   EK2021  Labs pending: drawn 2021   Chest XRay:  On file from 2021  IMPRESSIONS:   Right great toe infected hardware/osteomyelitis.   PLANS:   HARDWARE REMOVAL GREAT TOE, IRRIGATION AND DEBRIDEMENT, POSSIBLE AMPUTATION OF GREAT TOE , (SYNTHES BROKEN SCREW REMOVAL SET, C-ARM, 3080, SUPINE) - Right    DONNY Romano - CNP  Electronically signed 2021 at 3:28 PM

## 2021-12-08 NOTE — H&P
History and Physical    Pt Name: Taran Glez  MRN: [id number]  YOB: 1965  Date of evaluation: 12/8/2021  Primary Care Physician: Brandin Chavarria MD    SUBJECTIVE:   History of Chief Complaint:    Taran Glez is a 60 y.o. male who presents for PAT appointment. Patient complains of right great toe with pus-filled blister x 2 months. Patient reports history of surgery to his right great toe about 15 years ago with screw placement. He states he has noticed blisters to his right great toe and will pop them, which causes green/white drainage. Patient reports he was recently able to visualize the screw to his right great toe. Patient denies pain with walking but reports pain to the site of the blister to his right great toe. Patient has been scheduled for HARDWARE REMOVAL GREAT TOE, IRRIGATION AND DEBRIDEMENT, POSSIBLE AMPUTATION OF GREAT TOE , (SYNTHES BROKEN SCREW REMOVAL SET, C-ARM, 3080, SUPINE) - Right  Allergies  is allergic to nickel. Medications  Prior to Admission medications    Medication Sig Start Date End Date Taking?  Authorizing Provider   sulfamethoxazole-trimethoprim (BACTRIM) 400-80 MG per tablet Take 1 tablet by mouth 2 times daily for 10 days 12/2/21 12/12/21 Yes Deborah Moser DO   ciprofloxacin (CIPRO) 500 MG tablet Take 1 tablet by mouth 2 times daily for 10 days 12/2/21 12/12/21 Yes Deborah Moser DO   tamsulosin (FLOMAX) 0.4 MG capsule Take 1 capsule by mouth daily 9/1/21  Yes Malia Benitez MD   albuterol sulfate  (90 Base) MCG/ACT inhaler INHALE 2 PUFFS INTO THE LUNGS EVERY 6 HOURS AS NEEDED FOR WHEEZING 12/10/20  Yes Alton Munoz MD   tiotropium (SPIRIVA HANDIHALER) 18 MCG inhalation capsule Inhale 1 capsule into the lungs daily 11/10/20  Yes Brian Ferreira MD   ARNUITY ELLIPTA 100 MCG/ACT AEPB INHALE 1 PUFF INTO THE LUNGS DAILY 9/7/20  Yes Bernie Turner MD   guaiFENesin (MUCINEX) 600 MG extended release tablet Take 1 tablet by mouth 2 times daily as needed for Congestion 21   Bal Shin MD     Past Medical History    has a past medical history of Alcohol abuse, Anxiety, Arthritis, COPD (chronic obstructive pulmonary disease) ([address] DDD (degenerative disc disease), lumbar, Depression, Heart burn, Hemorrhoids, HTN (hypertension), Ilioinguinal neuralgia of right side, Infected hardware in right leg ([address] Lives in group home, Psychiatric problem, Rhabdomyolysis, Seizures ([address] Wears glasses, and Wellness examination. Past Surgical History   has a past surgical history that includes hernia repair; Toe Surgery; Hemorrhoid surgery (3/19/15); Nerve Block (Right, 10/10/2016); other surgical history (Right, 04/10/2017); Anesthesia Nerve Block (Right, 4/10/2017); and IR CHEST TUBE INSERTION (2021). Social History   reports that he has been smoking cigarettes. He has a 38.00 pack-year smoking history. He has never used smokeless tobacco.    reports previous alcohol use. reports previous drug use. Drugs: Marijuana (Weed) and Cocaine. Marital Status   Children 1  Occupation does not work  Family History  Family Status   Relation Name Status    Mother  Alive    Father       family history includes Cancer in his mother.     Review of Systems:  CONSTITUTIONAL:   negative for fevers, chills, fatigue and malaise    EYES:   negative for double vision, blurred vision and photophobia    HEENT:   negative for tinnitus, epistaxis and sore throat     RESPIRATORY:   negative for cough, shortness of breath, wheezing     CARDIOVASCULAR:   negative for chest pain, palpitations, syncope, edema     GASTROINTESTINAL:   negative for nausea, vomiting     GENITOURINARY:   negative for incontinence     MUSCULOSKELETAL:   negative for neck or back pain reports pain to right great toe with green/white drainage   NEUROLOGICAL:   Negative for weakness and tingling  negative for headaches and dizziness     PSYCHIATRIC:   negative for anxiety       OBJECTIVE: VITALS:  height is 5' 9\" (1.753 m) and weight is 139 lb (63 kg). His infrared temperature is 98.1 °F (36.7 °C). His blood pressure is 140/92 (abnormal) and his pulse is 82. His respiration is 18 and oxygen saturation is 97%. CONSTITUTIONAL:alert & oriented x 3, no acute distress. Calm and pleasant. SKIN:  Warm and dry, no rashes to exposed areas of skin. HEAD:  Normocephalic, atraumatic. EYES: PERRL. EOMs intact. EARS:  Intact and equal bilaterally. No edema or thickening, without lumps, lesions, or discharge. Hearing grossly WNL. NOSE:  Nares patent. No rhinorrhea   MOUTH/THROAT:  Mucous membranes pink and moist, uvula midline, teeth appear to be intact. NECK:supple, no lymphadenopathy  LUNGS: Respirations even and non-labored. Clear to auscultation bilaterally, no wheezes, rales, or rhonchi. CARDIOVASCULAR: Regular rate and rhythm, no murmurs/rubs/gallops   ABDOMEN: soft, non-tender, non-distended, bowel sounds active x 4   EXTREMITIES: No edema to bilateral lower extremities. No varicosities to bilateral lower extremities. Left wrist brace due to previous fracture. NEUROLOGIC: CN II-XII are grossly intact. Gait is smooth, rhythmic and effortless   Testing:   EK2021  Labs pending: drawn 2021   Chest XRay:  On file from 2021  IMPRESSIONS:   Right great toe infected hardware/osteomyelitis.   PLANS:   HARDWARE REMOVAL GREAT TOE, IRRIGATION AND DEBRIDEMENT, POSSIBLE AMPUTATION OF GREAT TOE , (SYNTHES BROKEN SCREW REMOVAL SET, C-ARM, 3080, SUPINE) - Right    DONNY Cervantes - CNP  Electronically signed 2021 at 3:28 PM

## 2021-12-08 NOTE — PROGRESS NOTES
Anesthesia Focused Assessment    Hx of anesthesia complications:  no  Family hx of anesthesia complications:  no      Prior + Covid-19 test? no      STOP-BANG Sleep Apnea Questionnaire    SNORE loudly (heard through closed doors)? Yes  TIRED, fatigued, sleepy during daytime? No  OBSERVED stopping breathing during sleep? No  High blood PRESSURE or being treated? Yes    BMI over 35? No  AGE over 48? Yes  NECK circumference over 16\"? No  GENDER (male)? Yes             Total 4  High risk 5-8  Intermediate risk 3-4  Low risk 0-2    ----------------------------------------------------------------------------------------------------------------------  JEFERSON                              No  If yes, machine? DM1                                            No  DM2                   No    Coronary Artery Disease      No  HTN         Yes  Defib/AICD/Pacemaker               No             Renal Failure                   No  If yes, on dialysis           Active smoker? Yes, 3/4 ppd  Drinks alcohol? Not currently  Illicit drugs? No  Dentition?        benign      Past Medical History:   Diagnosis Date    Alcohol abuse 06/04/2014    Anxiety     Arthritis     COPD (chronic obstructive pulmonary disease) ([facility])     Dr. Prasad Luna PCP [address] DDD (degenerative disc disease), lumbar 09/06/2016    Depression     Heart burn     Hemorrhoids     HTN (hypertension) 01/19/2015    Ilioinguinal neuralgia of right side 04/10/2017    Infected hardware in right leg ([address]     Right great toe NOT leg    Lives in group home     Sober living    [facility] Psychiatric problem     depression /anxiety    Rhabdomyolysis 08/2021    Seizures ([address] 2021    withdrawal from alcohol     Wears glasses     READING    Wellness examination 11/03/2021    Dr. Mimi Garza [facility]          Patient was evaluated in PAT & anesthesia guidelines were applied.    NPO guidelines, medication instructions and scheduled arrival time were reviewed with patient. Anesthesia contacted:   Yes    I spoke with Dr. Tana Chester. Patient history and pertinent findings reviewed. Patient with history of recent admission for rhabdomyelosis 8/2021. He was evaluated by cardiology at this time and on echocardiogram was found to have EF with 35%. Follow up echo during this admission 8/2021 with EF = 55%, improved. Cardiology notes from inpatient visit on file. Patient does not have heart disease, does not follow with cardiology outpatient. Patient denies chest pain but does report shortness of breath that is chronic (h/o COPD) with exertion at times, controlled with inhaler use. PAT EKG today is WNL.      Medical or cardiac clearance ordered: DONNY Wu CNP   12/8/21  8:51 AM

## 2021-12-09 ENCOUNTER — OFFICE VISIT (OUTPATIENT)
Dept: DERMATOLOGY | Age: 56
End: 2021-12-09
Payer: MEDICAID

## 2021-12-09 ENCOUNTER — HOSPITAL ENCOUNTER (OUTPATIENT)
Age: 56
Setting detail: SPECIMEN
Discharge: HOME | End: 2021-12-09

## 2021-12-09 VITALS
HEIGHT: 69 IN | BODY MASS INDEX: 21.21 KG/M2 | DIASTOLIC BLOOD PRESSURE: 89 MMHG | SYSTOLIC BLOOD PRESSURE: 137 MMHG | TEMPERATURE: 98.4 F | WEIGHT: 143.2 LBS | HEART RATE: 78 BPM | OXYGEN SATURATION: 98 %

## 2021-12-09 DIAGNOSIS — L23.0 ALLERGIC CONTACT DERMATITIS DUE TO METALS: Primary | ICD-10-CM

## 2021-12-09 DIAGNOSIS — L30.8 OTHER ECZEMA: ICD-10-CM

## 2021-12-09 LAB
EKG ATRIAL RATE: 70 BPM
EKG P AXIS: 12 DEGREES
EKG P-R INTERVAL: 150 MS
EKG Q-T INTERVAL: 418 MS
EKG QRS DURATION: 88 MS
EKG QTC CALCULATION (BAZETT): 451 MS
EKG R AXIS: -7 DEGREES
EKG T AXIS: 3 DEGREES
EKG VENTRICULAR RATE: 70 BPM

## 2021-12-09 PROCEDURE — 93010 ELECTROCARDIOGRAM REPORT: CPT | Performed by: INTERNAL MEDICINE

## 2021-12-09 PROCEDURE — 11104 PUNCH BX SKIN SINGLE LESION: CPT | Performed by: DERMATOLOGY

## 2021-12-09 PROCEDURE — 3017F COLORECTAL CA SCREEN DOC REV: CPT | Performed by: DERMATOLOGY

## 2021-12-09 PROCEDURE — G8484 FLU IMMUNIZE NO ADMIN: HCPCS | Performed by: DERMATOLOGY

## 2021-12-09 PROCEDURE — 99214 OFFICE O/P EST MOD 30 MIN: CPT | Performed by: DERMATOLOGY

## 2021-12-09 PROCEDURE — G8427 DOCREV CUR MEDS BY ELIG CLIN: HCPCS | Performed by: DERMATOLOGY

## 2021-12-09 PROCEDURE — G8420 CALC BMI NORM PARAMETERS: HCPCS | Performed by: DERMATOLOGY

## 2021-12-09 PROCEDURE — 4004F PT TOBACCO SCREEN RCVD TLK: CPT | Performed by: DERMATOLOGY

## 2021-12-09 RX ORDER — CLOBETASOL PROPIONATE 0.5 MG/G
OINTMENT TOPICAL
Qty: 60 G | Refills: 2 | Status: SHIPPED | OUTPATIENT
Start: 2021-12-09 | End: 2022-01-12 | Stop reason: SDUPTHER

## 2021-12-09 RX ORDER — CLOTRIMAZOLE 1 %
CREAM (GRAM) TOPICAL
COMMUNITY
Start: 2021-10-14

## 2021-12-09 RX ORDER — TRIAMCINOLONE ACETONIDE 1 MG/G
CREAM TOPICAL
Qty: 454 G | Refills: 1 | Status: SHIPPED | OUTPATIENT
Start: 2021-12-09 | End: 2021-12-20 | Stop reason: SDUPTHER

## 2021-12-09 RX ORDER — THIAMINE MONONITRATE (VIT B1) 100 MG
TABLET ORAL
COMMUNITY
Start: 2021-10-12

## 2021-12-09 RX ORDER — LIDOCAINE HYDROCHLORIDE AND EPINEPHRINE 10; 10 MG/ML; UG/ML
1 INJECTION, SOLUTION INFILTRATION; PERINEURAL ONCE
Status: SHIPPED | OUTPATIENT
Start: 2021-12-09

## 2021-12-09 RX ORDER — CLINDAMYCIN PHOSPHATE 150 MG/ML
INJECTION, SOLUTION INTRAVENOUS
COMMUNITY
Start: 2021-09-04

## 2021-12-09 NOTE — Clinical Note
Please let patient know that I'm not sure Ill get in touch with her surgeon before his surgery Tuesday. I recommend that he tell Dr. Remedios Reyes that I suspect a metal allergy, and if new hardware is required to make sure it is nickel free.

## 2021-12-09 NOTE — Clinical Note
Jarred Werner,  I suspect that Mr. Mike Hernandez has a nickel allergy that is causing his rash. He is definitely reacting to his belt buckle. Looks like he is having a screw removed from his toe on Tuesday, and may get an amputation if toe cannot be salvaged. If there is need for more hardware, I would recommend that it is nickel free.  Thanks, Swati Holley MD

## 2021-12-09 NOTE — PROGRESS NOTES
of right side 04/10/2017    Groin pain, chronic, right 10/10/2016    DDD (degenerative disc disease), lumbar 09/06/2016    Lung nodule 06/12/2015    Dyslipidemia 09/17/2014    Depression 07/22/2013    Smoking addiction 06/11/2013    COPD (chronic obstructive pulmonary disease) ([facility] 05/30/2013       CURRENT MEDICATIONS:   Current Outpatient Medications   Medication Sig Dispense Refill    vitamin B-1 (THIAMINE) 100 MG tablet       clindamycin (CLEOCIN) 900 MG/6ML injection       clotrimazole (LOTRIMIN) 1 % cream 1 (ONE) APPLICATION TO AFFECTED AREA TWICE DAILY      sulfamethoxazole-trimethoprim (BACTRIM) 400-80 MG per tablet Take 1 tablet by mouth 2 times daily for 10 days 20 tablet 0    ciprofloxacin (CIPRO) 500 MG tablet Take 1 tablet by mouth 2 times daily for 10 days 20 tablet 0    guaiFENesin (MUCINEX) 600 MG extended release tablet Take 1 tablet by mouth 2 times daily as needed for Congestion 15 tablet 0    tamsulosin (FLOMAX) 0.4 MG capsule Take 1 capsule by mouth daily 30 capsule 3    albuterol sulfate  (90 Base) MCG/ACT inhaler INHALE 2 PUFFS INTO THE LUNGS EVERY 6 HOURS AS NEEDED FOR WHEEZING 18 g 3    tiotropium (SPIRIVA HANDIHALER) 18 MCG inhalation capsule Inhale 1 capsule into the lungs daily 90 capsule 3    ARNUITY ELLIPTA 100 MCG/ACT AEPB INHALE 1 PUFF INTO THE LUNGS DAILY 30 each 2     No current facility-administered medications for this visit.        ALLERGIES:   Allergies   Allergen Reactions    Nickel Rash     Contact dermatitis       SOCIAL HISTORY:  Social History     Tobacco Use    Smoking status: Current Every Day Smoker     Packs/day: 1.00     Years: 38.00     Pack years: 38.00     Types: Cigarettes    Smokeless tobacco: Never Used    Tobacco comment: 1 PPD   Substance Use Topics    Alcohol use: Not Currently     Alcohol/week: 0.0 standard drinks     Comment: has not drank for at least 4 months       Pertinent ROS:  Review of Systems  Skin: Denies any new changing, growing or bleeding lesions or rashes except as described in the HPI   Constitutional: Denies fevers, chills, and malaise. PHYSICAL EXAM:   /89   Pulse 78   Temp 98.4 °F (36.9 °C)   Ht 5' 9\" (1.753 m)   Wt 143 lb 3.2 oz (65 kg)   SpO2 98%   BMI 21.15 kg/m²     The patient is generally well appearing, well nourished, alert and conversational. Affect is normal.    Cutaneous Exam:  Physical Exam  Total body skin exam including external genitalia: head/face, neck, both arms, chest, back, abdomen, both legs, genitalia, buttocks, digits and/or nails, was examined. Complete visualization of scalp may be limited by hair density, length, and/or style    Facial covering was removed during examination. Diagnoses/exam findings/medical history pertinent to this visit are listed below:    Assessment:   Diagnosis Orders   1. 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 Psoriasis     3. Other eczema          Plan:  Allergic contact dermatitis, possible metal allergy  - recommended nickel-free belt or painting belt/metal with nail polish    Probable nummular eczema, 30% BSA, widespread. Ddx includes psoriasis  May be triggered by ACD to nickel (belt buckle and/or metal implant of foot) or neomycin used on foot. Patient states he may have a toe amputation of right toe when screw is removed. I recommend he discuss with his surgeon using only nickel free hardware if replacement is made  Punch Biopsy: The procedure and its risks were explained including but not limited to pain, bleeding, infection, permanent scar, permanent pigment alteration and need for an additional procedure. Consent to proceed with the procedure was obtained from the patient or the parent. After cleaning with alcohol the rash was anesthetized with 1% lidocaine with epinephrine and a 4 mm punch was performed. Hemostasis was achieved with suture closure of the defect with 4-0 Ethilon and Vaseline and a bandage were applied.      Start triamcinolone for widespread rash, clobetasol for stubborn areas      RTC pending path    Future Appointments   Date Time Provider aMson Vizcaino   12/10/2021  2:30 PM STV CT ROOM 1 [facility] CT SCAN [facility]   12/30/2021 10:20 AM SCHEDULE, [facility]   1/4/2022  2:15 PM Daquan Ames MD INFT DISEASE [facility]         There are no Patient Instructions on file for this visit. This note was created with the assistance of a speech-recognition program.  Although the intention is to generate a document that actually reflects the content of the visit, no guarantees can be provided that every mistake has been identified and corrected by editing. I, Dr. Mony Peterson, personally performed the services described in this documentation, as scribed by Jessica Lucia in my presence, and it is both accurate and complete.     Electronically signed by Ramesh Prado MD on 12/9/21 at 12:59 PM EST

## 2021-12-10 ENCOUNTER — HOSPITAL ENCOUNTER (OUTPATIENT)
Dept: CT IMAGING | Age: 56
Discharge: HOME | End: 2021-12-12
Payer: MEDICAID

## 2021-12-10 DIAGNOSIS — R91.8 INFILTRATE OF LOWER LOBE OF RIGHT LUNG PRESENT ON IMAGING STUDY: ICD-10-CM

## 2021-12-10 PROCEDURE — 71260 CT THORAX DX C+: CPT

## 2021-12-10 PROCEDURE — 6360000004 HC RX CONTRAST MEDICATION: Performed by: NURSE PRACTITIONER

## 2021-12-10 RX ADMIN — IOPAMIDOL 75 ML: 755 INJECTION, SOLUTION INTRAVENOUS at 13:50

## 2021-12-13 LAB — DERMATOLOGY PATHOLOGY REPORT: NORMAL

## 2021-12-13 NOTE — RESULT ENCOUNTER NOTE
Biopsy showed eczema. I think that nickel sensitivity is contributing to this. Use the ointments I prescribed, AVOID all metal with nickel, and stop applying triple antibiotic ointment to toe (this can also cause an allergy rash).     Schedule 2 mo f/u

## 2021-12-14 ENCOUNTER — APPOINTMENT (OUTPATIENT)
Dept: GENERAL RADIOLOGY | Age: 56
End: 2021-12-14
Attending: ORTHOPAEDIC SURGERY
Payer: MEDICAID

## 2021-12-14 ENCOUNTER — HOSPITAL ENCOUNTER (OUTPATIENT)
Age: 56
Setting detail: OUTPATIENT SURGERY
Discharge: HOME | End: 2021-12-14
Attending: ORTHOPAEDIC SURGERY | Admitting: ORTHOPAEDIC SURGERY
Payer: MEDICAID

## 2021-12-14 ENCOUNTER — ANESTHESIA EVENT (OUTPATIENT)
Dept: OPERATING ROOM | Age: 56
End: 2021-12-14
Payer: MEDICAID

## 2021-12-14 ENCOUNTER — ANESTHESIA (OUTPATIENT)
Dept: OPERATING ROOM | Age: 56
End: 2021-12-14
Payer: MEDICAID

## 2021-12-14 VITALS
BODY MASS INDEX: 20.9 KG/M2 | OXYGEN SATURATION: 97 % | HEIGHT: 69 IN | HEART RATE: 81 BPM | SYSTOLIC BLOOD PRESSURE: 142 MMHG | TEMPERATURE: 98.6 F | WEIGHT: 141.09 LBS | RESPIRATION RATE: 14 BRPM | DIASTOLIC BLOOD PRESSURE: 86 MMHG

## 2021-12-14 VITALS — SYSTOLIC BLOOD PRESSURE: 90 MMHG | OXYGEN SATURATION: 99 % | DIASTOLIC BLOOD PRESSURE: 60 MMHG | TEMPERATURE: 95.3 F

## 2021-12-14 PROCEDURE — 73630 X-RAY EXAM OF FOOT: CPT

## 2021-12-14 PROCEDURE — 2580000003 HC RX 258: Performed by: ANESTHESIOLOGY

## 2021-12-14 PROCEDURE — 7100000001 HC PACU RECOVERY - ADDTL 15 MIN: Performed by: ORTHOPAEDIC SURGERY

## 2021-12-14 PROCEDURE — 2580000003 HC RX 258: Performed by: ORTHOPAEDIC SURGERY

## 2021-12-14 PROCEDURE — 6360000002 HC RX W HCPCS: Performed by: NURSE ANESTHETIST, CERTIFIED REGISTERED

## 2021-12-14 PROCEDURE — 7100000000 HC PACU RECOVERY - FIRST 15 MIN: Performed by: ORTHOPAEDIC SURGERY

## 2021-12-14 PROCEDURE — 20680 REMOVAL OF IMPLANT DEEP: CPT | Performed by: ORTHOPAEDIC SURGERY

## 2021-12-14 PROCEDURE — 2709999900 HC NON-CHARGEABLE SUPPLY: Performed by: ORTHOPAEDIC SURGERY

## 2021-12-14 PROCEDURE — 3209999900 FLUORO FOR SURGICAL PROCEDURES

## 2021-12-14 PROCEDURE — 3700000000 HC ANESTHESIA ATTENDED CARE: Performed by: ORTHOPAEDIC SURGERY

## 2021-12-14 PROCEDURE — 3600000014 HC SURGERY LEVEL 4 ADDTL 15MIN: Performed by: ORTHOPAEDIC SURGERY

## 2021-12-14 PROCEDURE — 7100000010 HC PHASE II RECOVERY - FIRST 15 MIN: Performed by: ORTHOPAEDIC SURGERY

## 2021-12-14 PROCEDURE — 3600000004 HC SURGERY LEVEL 4 BASE: Performed by: ORTHOPAEDIC SURGERY

## 2021-12-14 PROCEDURE — 3700000001 HC ADD 15 MINUTES (ANESTHESIA): Performed by: ORTHOPAEDIC SURGERY

## 2021-12-14 PROCEDURE — 2500000003 HC RX 250 WO HCPCS: Performed by: NURSE ANESTHETIST, CERTIFIED REGISTERED

## 2021-12-14 RX ORDER — ONDANSETRON 2 MG/ML
INJECTION INTRAMUSCULAR; INTRAVENOUS PRN
Status: DISCONTINUED | OUTPATIENT
Start: 2021-12-14 | End: 2021-12-14 | Stop reason: SDUPTHER

## 2021-12-14 RX ORDER — CIPROFLOXACIN 500 MG/1
500 TABLET, FILM COATED ORAL 2 TIMES DAILY
Qty: 20 TABLET | Refills: 0 | Status: SHIPPED | OUTPATIENT
Start: 2021-12-14 | End: 2021-12-24

## 2021-12-14 RX ORDER — GLYCOPYRROLATE 1 MG/5 ML
SYRINGE (ML) INTRAVENOUS PRN
Status: DISCONTINUED | OUTPATIENT
Start: 2021-12-14 | End: 2021-12-14 | Stop reason: SDUPTHER

## 2021-12-14 RX ORDER — SODIUM CHLORIDE, SODIUM LACTATE, POTASSIUM CHLORIDE, CALCIUM CHLORIDE 600; 310; 30; 20 MG/100ML; MG/100ML; MG/100ML; MG/100ML
1000 INJECTION, SOLUTION INTRAVENOUS CONTINUOUS
Status: DISCONTINUED | OUTPATIENT
Start: 2021-12-14 | End: 2021-12-15 | Stop reason: HOSPADM

## 2021-12-14 RX ORDER — DIPHENHYDRAMINE HYDROCHLORIDE 50 MG/ML
INJECTION INTRAMUSCULAR; INTRAVENOUS PRN
Status: DISCONTINUED | OUTPATIENT
Start: 2021-12-14 | End: 2021-12-14 | Stop reason: SDUPTHER

## 2021-12-14 RX ORDER — MAGNESIUM HYDROXIDE 1200 MG/15ML
LIQUID ORAL CONTINUOUS PRN
Status: COMPLETED | OUTPATIENT
Start: 2021-12-14 | End: 2021-12-14

## 2021-12-14 RX ORDER — PROPOFOL 10 MG/ML
INJECTION, EMULSION INTRAVENOUS PRN
Status: DISCONTINUED | OUTPATIENT
Start: 2021-12-14 | End: 2021-12-14 | Stop reason: SDUPTHER

## 2021-12-14 RX ORDER — ACETAMINOPHEN 500 MG
500 TABLET ORAL EVERY 4 HOURS PRN
Qty: 180 TABLET | Refills: 0 | Status: CANCELLED | OUTPATIENT
Start: 2021-12-14 | End: 2022-01-13

## 2021-12-14 RX ORDER — SULFAMETHOXAZOLE AND TRIMETHOPRIM 400; 80 MG/1; MG/1
1 TABLET ORAL 2 TIMES DAILY
Qty: 20 TABLET | Refills: 0 | Status: SHIPPED | OUTPATIENT
Start: 2021-12-14 | End: 2021-12-24

## 2021-12-14 RX ORDER — IBUPROFEN 800 MG/1
800 TABLET ORAL EVERY 8 HOURS PRN
Qty: 90 TABLET | Refills: 0 | Status: SHIPPED | OUTPATIENT
Start: 2021-12-14 | End: 2022-01-13

## 2021-12-14 RX ORDER — LIDOCAINE HYDROCHLORIDE 10 MG/ML
INJECTION, SOLUTION EPIDURAL; INFILTRATION; INTRACAUDAL; PERINEURAL PRN
Status: DISCONTINUED | OUTPATIENT
Start: 2021-12-14 | End: 2021-12-14 | Stop reason: SDUPTHER

## 2021-12-14 RX ORDER — FENTANYL CITRATE 50 UG/ML
INJECTION, SOLUTION INTRAMUSCULAR; INTRAVENOUS PRN
Status: DISCONTINUED | OUTPATIENT
Start: 2021-12-14 | End: 2021-12-14 | Stop reason: SDUPTHER

## 2021-12-14 RX ORDER — CEFAZOLIN SODIUM 2 G/50ML
SOLUTION INTRAVENOUS PRN
Status: DISCONTINUED | OUTPATIENT
Start: 2021-12-14 | End: 2021-12-14 | Stop reason: SDUPTHER

## 2021-12-14 RX ORDER — KETAMINE HYDROCHLORIDE 10 MG/ML
INJECTION, SOLUTION INTRAMUSCULAR; INTRAVENOUS PRN
Status: DISCONTINUED | OUTPATIENT
Start: 2021-12-14 | End: 2021-12-14 | Stop reason: SDUPTHER

## 2021-12-14 RX ADMIN — KETAMINE HYDROCHLORIDE 10 MG: 10 INJECTION INTRAMUSCULAR; INTRAVENOUS at 17:35

## 2021-12-14 RX ADMIN — CEFAZOLIN SODIUM 2000 MG: 2 SOLUTION INTRAVENOUS at 17:26

## 2021-12-14 RX ADMIN — DIPHENHYDRAMINE HYDROCHLORIDE 25 MG: 50 INJECTION INTRAMUSCULAR; INTRAVENOUS at 17:35

## 2021-12-14 RX ADMIN — FENTANYL CITRATE 25 MCG: 50 INJECTION, SOLUTION INTRAMUSCULAR; INTRAVENOUS at 17:41

## 2021-12-14 RX ADMIN — ONDANSETRON 4 MG: 2 INJECTION INTRAMUSCULAR; INTRAVENOUS at 17:35

## 2021-12-14 RX ADMIN — SODIUM CHLORIDE, POTASSIUM CHLORIDE, SODIUM LACTATE AND CALCIUM CHLORIDE: 600; 310; 30; 20 INJECTION, SOLUTION INTRAVENOUS at 18:00

## 2021-12-14 RX ADMIN — FENTANYL CITRATE 25 MCG: 50 INJECTION, SOLUTION INTRAMUSCULAR; INTRAVENOUS at 17:33

## 2021-12-14 RX ADMIN — PROPOFOL 50 MG: 10 INJECTION, EMULSION INTRAVENOUS at 17:16

## 2021-12-14 RX ADMIN — Medication 0.2 MG: at 17:10

## 2021-12-14 RX ADMIN — FENTANYL CITRATE 25 MCG: 50 INJECTION, SOLUTION INTRAMUSCULAR; INTRAVENOUS at 17:24

## 2021-12-14 RX ADMIN — LIDOCAINE HYDROCHLORIDE 50 MG: 10 INJECTION, SOLUTION EPIDURAL; INFILTRATION; INTRACAUDAL; PERINEURAL at 17:14

## 2021-12-14 RX ADMIN — PROPOFOL 200 MG: 10 INJECTION, EMULSION INTRAVENOUS at 17:14

## 2021-12-14 RX ADMIN — PHENYLEPHRINE HYDROCHLORIDE 100 MCG: 10 INJECTION INTRAVENOUS at 17:31

## 2021-12-14 RX ADMIN — SODIUM CHLORIDE, POTASSIUM CHLORIDE, SODIUM LACTATE AND CALCIUM CHLORIDE 1000 ML: 600; 310; 30; 20 INJECTION, SOLUTION INTRAVENOUS at 14:29

## 2021-12-14 ASSESSMENT — PULMONARY FUNCTION TESTS
PIF_VALUE: 4
PIF_VALUE: 12
PIF_VALUE: 11
PIF_VALUE: 4
PIF_VALUE: 11
PIF_VALUE: 12
PIF_VALUE: 16
PIF_VALUE: 26
PIF_VALUE: 4
PIF_VALUE: 12
PIF_VALUE: 15
PIF_VALUE: 12
PIF_VALUE: 1
PIF_VALUE: 15
PIF_VALUE: 17
PIF_VALUE: 17
PIF_VALUE: 1
PIF_VALUE: 11
PIF_VALUE: 12
PIF_VALUE: 0
PIF_VALUE: 8
PIF_VALUE: 11
PIF_VALUE: 1
PIF_VALUE: 2
PIF_VALUE: 14
PIF_VALUE: 9
PIF_VALUE: 1
PIF_VALUE: 15
PIF_VALUE: 13
PIF_VALUE: 1
PIF_VALUE: 27
PIF_VALUE: 23
PIF_VALUE: 25
PIF_VALUE: 2
PIF_VALUE: 18
PIF_VALUE: 4
PIF_VALUE: 2
PIF_VALUE: 4
PIF_VALUE: 3
PIF_VALUE: 1
PIF_VALUE: 2

## 2021-12-14 ASSESSMENT — PAIN - FUNCTIONAL ASSESSMENT: PAIN_FUNCTIONAL_ASSESSMENT: 0-10

## 2021-12-14 ASSESSMENT — PAIN SCALES - GENERAL
PAINLEVEL_OUTOF10: 0

## 2021-12-14 ASSESSMENT — COPD QUESTIONNAIRES: CAT_SEVERITY: MODERATE

## 2021-12-14 ASSESSMENT — LIFESTYLE VARIABLES: SMOKING_STATUS: 1

## 2021-12-14 NOTE — ANESTHESIA PRE PROCEDURE
Department of Anesthesiology  Preprocedure Note       Name:  Eli Dsouza   Age:  60 y.o.  :  1965                                          MRN:  [id number]         Date:  2021      Surgeon: Arnaud Williamson):  Basilio Sheriff DO    Procedure: Procedure(s):  HARDWARE REMOVAL GREAT TOE, IRRIGATION AND DEBRIDEMENT, POSSIBLE AMPUTATION OF GREAT TOE , (SYNTHES BROKEN SCREW REMOVAL SET, C-ARM, 3080, SUPINE)    Medications prior to admission:   Prior to Admission medications    Medication Sig Start Date End Date Taking?  Authorizing Provider   NONFORMULARY Taking an antibiotic last  been on for 10 days   Yes Historical Provider, MD   clotrimazole (LOTRIMIN) 1 % cream 1 (ONE) APPLICATION TO AFFECTED AREA TWICE DAILY 10/14/21  Yes Historical Provider, MD   clobetasol (TEMOVATE) 0.05 % ointment Apply to rash twice daily (not face, armpit or groin) 21  Yes Jona Choi MD   tamsulosin (FLOMAX) 0.4 MG capsule Take 1 capsule by mouth daily 21  Yes Florentino Ramey MD   albuterol sulfate  (90 Base) MCG/ACT inhaler INHALE 2 PUFFS INTO THE LUNGS EVERY 6 HOURS AS NEEDED FOR WHEEZING 12/10/20  Yes Ren Kemp MD   NONFORMULARY Patient on tylenol or asa not sure of dose    Historical Provider, MD   vitamin B-1 (THIAMINE) 100 MG tablet  10/12/21   Historical Provider, MD   clindamycin (CLEOCIN) 900 MG/6ML injection  21   Historical Provider, MD   triamcinolone (KENALOG) 0.1 % cream Apply to rash twice daily (not face, armpit or groin) 21   Jona Choi MD   tiotropium (SPIRIVA HANDIHALER) 18 MCG inhalation capsule Inhale 1 capsule into the lungs daily 11/10/20   Brian Ferreira MD   ARNUITY ELLIPTA 100 MCG/ACT AEPB INHALE 1 PUFF INTO THE LUNGS DAILY 20   Elroy Amos MD       Current medications:    Current Facility-Administered Medications   Medication Dose Route Frequency Provider Last Rate Last Admin    lactated ringers infusion 1,000 mL  1,000 mL IntraVENous Continuous Tony Nassar MD 50 mL/hr at 12/14/21 1429 1,000 mL at 12/14/21 1429       Allergies: Allergies   Allergen Reactions    Nickel Rash     Contact dermatitis       Problem List:    Patient Active Problem List   Diagnosis Code    COPD (chronic obstructive pulmonary disease) ([facility] J44.9    Smoking addiction F17.200    Depression F32. A    Dyslipidemia E78.5    Lung nodule R91.1    DDD (degenerative disc disease), lumbar M51.36    Groin pain, chronic, right R10.31, G89.29    Ilioinguinal neuralgia of right side G57.91    Syncope and collapse R55    History of alcohol abuse F10.11    Lumbar radiculopathy M54.16    Scoliosis due to degenerative disease of spine in adult patient M41.80    Lumbosacral spondylosis without myelopathy M47.817    Alcohol abuse F10.10    Hypokalemia E87.6    Hyponatremia E87.1    Traumatic rhabdomyolysis ([facility]) T79. 6XXA    Closed fracture of multiple ribs of right side S22.41XA    Closed fracture of left wrist S62.102A    Severe malnutrition ([facility]) E43    Fever R50.9    Conjunctivitis H10.9    Acute respiratory failure ([facility]) J96.00    Superficial venous thrombosis of arm, left I82.612    MRSA bacteremia R78.81, B95.62    Empyema lung ([facility]) J86.9    Elevated C-reactive protein (CRP) R79.82    Leukocytosis D72.829       Past Medical History:        Diagnosis Date    Acute respiratory failure ([facility] 08/16/2021    Alcohol abuse 06/04/2014    Anxiety     Arthritis     Cardiomyopathy ([facility] 08/20/2021    EF 35%    Closed fracture of left wrist     Closed fracture of multiple ribs 08/11/2021    Conjunctivitis 08/14/2021    COPD (chronic obstructive pulmonary disease) ([facility])     Dr. Lien Porter [place] [address] [facility]    DDD (degenerative disc disease), lumbar 09/06/2016    Depression     Dyslipidemia 09/17/2014    Elevated C-reactive protein 08/21/2021    Fever 08/14/2021    Heart burn     Hemorrhoids     History of recent hospitalization     8/10/2021-9/2/2021.  ICU and intubation    HTN (hypertension) 01/19/2015    Hypokalemia 08/11/2021    Hyponatremia 08/11/2021    Ilioinguinal neuralgia of right side 04/10/2017    Infected hardware in right leg (HCC)     Right great toe NOT leg    Leukocytosis 08/21/2021    Lives in group home     Sober living     MRSA bacteremia 08/20/2021    Psychiatric problem     depression /anxiety    Rhabdomyolysis 08/11/2021    Seizures ([address] 2021    withdrawal from alcohol     Severe malnutrition ([address] 08/12/2021    Superficial venous thrombosis of left arm 08/18/2021    Syncope and collapse 06/13/2018    Wears glasses     READING    Wellness examination 11/03/2021    Dr. Dejesus Cuff [facility]        Past Surgical History:        Procedure Laterality Date    ANESTHESIA NERVE BLOCK Right 4/10/2017    NERVE BLOCK US RIGHT SIDED ILIOINGUINAL performed by Ayaan Renteria MD at [address] [place] [address]  3/19/15    HERNIA REPAIR      IR CHEST TUBE INSERTION  8/20/2021    IR CHEST TUBE INSERTION 8/20/2021 [facility]    NERVE BLOCK Right 10/10/2016    right groin    OTHER SURGICAL HISTORY Right 04/10/2017    US nerve block to R groin    TOE SURGERY      SCREW RIGHT BIG TOE       Social History:    Social History     Tobacco Use    Smoking status: Current Every Day Smoker     Packs/day: 1.00     Years: 38.00     Pack years: 38.00     Types: Cigarettes    Smokeless tobacco: Never Used    Tobacco comment: 1 PPD   Substance Use Topics    Alcohol use: Not Currently     Alcohol/week: 0.0 standard drinks     Comment: has not drank for at least 4 months                                Ready to quit: Not Answered  Counseling given: Not Answered  Comment: 1 PPD      Vital Signs (Current):   Vitals:    12/14/21 1409   BP: 137/85   Pulse: 75   Resp: 16   Temp: 97.9 °F (36.6 °C)   TempSrc: Temporal   SpO2: 100%   Weight: 141 lb 1.5 oz (64 kg)   Height: 5' 9\" (1.753 m) BP Readings from Last 3 Encounters:   12/14/21 137/85   12/08/21 (!) 140/92   12/09/21 137/89       NPO Status: Time of last liquid consumption: 2358                        Time of last solid consumption: 2359                        Date of last liquid consumption: 12/13/21                        Date of last solid food consumption: 12/13/21    BMI:   Wt Readings from Last 3 Encounters:   12/14/21 141 lb 1.5 oz (64 kg)   12/08/21 139 lb (63 kg)   12/09/21 143 lb 3.2 oz (65 kg)     Body mass index is 20.84 kg/m². CBC:   Lab Results   Component Value Date    WBC 6.5 12/08/2021    RBC 4.62 12/08/2021    HGB 13.6 12/08/2021    HCT 42.7 12/08/2021    MCV 92.4 12/08/2021    RDW 13.4 12/08/2021     12/08/2021       CMP:   Lab Results   Component Value Date     12/08/2021    K 4.1 12/08/2021     12/08/2021    CO2 21 12/08/2021    BUN 14 12/08/2021    CREATININE 1.33 12/08/2021    GFRAA >60 12/08/2021    LABGLOM 55 12/08/2021    GLUCOSE 87 12/08/2021    PROT 7.0 12/08/2021    CALCIUM 9.4 12/08/2021    BILITOT <0.10 12/08/2021    ALKPHOS 72 12/08/2021    AST 15 12/08/2021    ALT 14 12/08/2021       POC Tests: No results for input(s): POCGLU, POCNA, POCK, POCCL, POCBUN, POCHEMO, POCHCT in the last 72 hours.     Coags:   Lab Results   Component Value Date    PROTIME 12.4 08/16/2021    INR 0.9 08/16/2021    APTT 36.5 08/16/2021       HCG (If Applicable): No results found for: PREGTESTUR, PREGSERUM, HCG, HCGQUANT     ABGs:   Lab Results   Component Value Date    PHART 7.500 08/21/2021    PO2ART 75.7 08/21/2021    GGI9GPT 39.2 08/21/2021    UAZ9XTQ 30.6 08/21/2021    T7PNNSIP 94.5 08/21/2021        Type & Screen (If Applicable):  No results found for: LABABO, LABRH    Drug/Infectious Status (If Applicable):  Lab Results   Component Value Date    HEPCAB NONREACTIVE 05/10/2019    HEPCAB NONREACTIVE 05/10/2019       COVID-19 Screening (If Applicable):   Lab Results Component Value Date    COVID19 Not Detected 08/16/2021           Anesthesia Evaluation  Patient summary reviewed and Nursing notes reviewed no history of anesthetic complications:   Airway: Mallampati: II  TM distance: >3 FB   Neck ROM: full  Mouth opening: > = 3 FB Dental:          Pulmonary:normal exam    (+) COPD: moderate,  current smoker                           Cardiovascular:  Exercise tolerance: good (>4 METS),   (+) hypertension:, hyperlipidemia    (-) past MI, CAD, CABG/stent, dysrhythmias and  angina      Rhythm: regular  Rate: normal           Beta Blocker:  Not on Beta Blocker         Neuro/Psych:   (+) neuromuscular disease:, psychiatric history:            GI/Hepatic/Renal: Neg GI/Hepatic/Renal ROS            Endo/Other: Negative Endo/Other ROS                    Abdominal:             Vascular: Other Findings:             Anesthesia Plan      general     ASA 3       Induction: intravenous. MIPS: Postoperative opioids intended and Prophylactic antiemetics administered. Anesthetic plan and risks discussed with patient.       Plan discussed with CRNA and surgical team.                  Radha Mckinley MD   12/14/2021

## 2021-12-14 NOTE — PROGRESS NOTES
Discharge Note:      All discharge instructions given at this time as well as all patient belongings returned to patient. Pt denies any further questions regarding discharge at this time. Pt given also given discharge packet with unit letter, discharge instructions/restrictions and medication handouts regarding all discharge medications and side effects. Pt denies any further issues at this time. Pt wheeled out to front discharge doors at this time. Pt left premises without any issues in private vehicle at this time.        Transported to car via wheelchair with friend and all belongings sent with pt

## 2021-12-14 NOTE — OP NOTE
Operative Note      Patient: Tobin Trejo  YOB: 1965  MRN: [id number]     Date of Procedure: 12/14/2021     Pre-Op Diagnosis: Right great toe retained hardware     Post-Op Diagnosis: Right great toe retained loosened hardware       Procedure(s):  1. Right great toe hardware removal with irrigation and blunt excisional debridement of bone with a curette     Surgeon(s):  Saravanan Troy DO     Assistant:  Resident: Reji Rodrigues DO     Anesthesia: General     Estimated Blood Loss (mL): 0cc     Fluids: 1L crystalloids     TT: 5min     Complications: None     Specimens:   * No specimens in log *     Implants:  Implant Name Type Inv. Item Serial No.  Lot No. LRB No. Used Action   SCREW           Right 1 Explanted          Drains: * No LDAs found *     Findings: See op note for details. Indications for Procedure:  Astrid Santana is a 55-year-old male with a history of a crush injury to his right great toe roughly 15 years ago that required surgery from podiatry. Patient states he had a screw placed across his right great toe IP joint. He states over the past month he noticed a draining sinus from the great toe with purulence. He was seen in our office on 12/2/2021. He was prescribed Bactrim and ciprofloxacin which he states has improved his drainage and overall symptoms to his great toe. He admits to prominence of the screw head which causes him some significant discomfort. He was also told by his other physicians that he sees that he has a nickel allergy which could also be related to his retained orthopedic hardware causing irritation. All treatment options including conservative versus surgical were discussed with the patient in detail. All questions answered appropriately.  Surgical risks including but not limited to: bleeding, blood clots, wound complications, infection, damage to surrounding tissues/nerves/vessels, malunion, nonunion, need for further surgery, loss of motion, stiffness, residual pain, risks of anesthesia, loss of limb and loss of life were all discussed with the patient. Knowing these risks, patient wishes to proceed with surgery in the form of a right great toe I&D with hardware removal.     Detailed Description of Procedure: The patient was met in the preoperative holding area were all final questions were answered regarding his plan of care. His surgical consent form was reviewed to ensure accuracy. His right foot was then marked in anticipation for surgery. He was then wheeled to the operating room and laid on the OR table in a supine condition. General anesthesia was induced without difficulty. He received 2 g of IV Ancef for perioperative prophylaxis. A tourniquet was placed proximally his right thigh but was not inflated for the procedure. His right lower extremity then prepped and draped in a sterile fashion. A timeout was then performed were all members in the room were in agreement the patient, procedure, and the correct operative extremity. At this point we placed a small toe tourniquet proximally on the great toe. We then used a hemostat to pinky out the screw head which was freely palpable at the distal extent of the great toe. Fluoroscopy was used to confirm this. We then made a small nick in the skin with a 15 blade over the screw head which was just under the level of the skin. We were able to remove the screw with a Warren/elevator as it was significantly loose. No purulence or drainage came out of the site where the screw was removed. We then decorticated the region where the screw was removed with a curette. We irrigated this region with normal saline solution. The tourniquet was removed and the toe briskly pinked up and had brisk capillary refill less than 2 seconds. We then dressed the toe with Adaptic, 4 x 4, 2 inch Ida wrap, and an Ace wrap.   Final x-ray imaging was then taken which demonstrated adequate hard removal of his right great toe. At this point the patient was transferred to his hospital bed. He was awoken from anesthesia without difficulty. He was transferred to the postanesthesia care unit in stable condition. All counts were final and corrected in the procedure. Dr. Sandy Ariza was present and active during the entirety of the procedure.     Electronically signed by Ange Martin DO on 12/14/2021 at 5:44 PM

## 2021-12-14 NOTE — BRIEF OP NOTE
Brief Postoperative Note      Patient: Dinah García  YOB: 1965  MRN: [id number]    Date of Procedure: 12/14/2021    Pre-Op Diagnosis: Right great toe retained hardware    Post-Op Diagnosis: Right great toe retained loosened hardware       Procedure(s):  1. Right great toe hardware removal with irrigation and blunt excisional debridement of bone with a curette    Surgeon(s):  Pavel Arcos DO    Assistant:  Resident: Catia Cao DO    Anesthesia: General    Estimated Blood Loss (mL): 0cc    Fluids: 1L crystalloids    TT: 5min    Complications: None    Specimens:   * No specimens in log *    Implants:  Implant Name Type Inv. Item Serial No.  Lot No. LRB No. Used Action   SCREW      Right 1 Explanted         Drains: * No LDAs found *    Findings: See op note for details.     Electronically signed by Catia Cao DO on 12/14/2021 at 5:40 PM

## 2021-12-15 NOTE — ANESTHESIA POSTPROCEDURE EVALUATION
Department of Anesthesiology  Postprocedure Note    Patient: Mirian Gonzalez  MRN: [id number]  [place]: 1965  Date of evaluation: 12/14/2021  Time:  7:52 PM     Procedure Summary     Date: 12/14/21 Room / Location: [address]    Anesthesia Start: 1706 Anesthesia Stop: 7231    Procedure: HARDWARE REMOVAL RIGHT GREAT TOE, C-ARM (Right ) Diagnosis: (RIGHT GREAT TOE INFECTED HARDWARE, OSTEOMYELITIS)    Surgeons: Madie Gotti DO Responsible Provider: Maricruz Motta MD    Anesthesia Type: general ASA Status: 3          Anesthesia Type: general    Cuco Phase I: Cuco Score: 10    Cuco Phase II: Cuco Score: 10    Last vitals: Reviewed and per EMR flowsheets.        Anesthesia Post Evaluation    Patient location during evaluation: PACU  Patient participation: complete - patient participated  Level of consciousness: awake and alert  Pain score: 4  Airway patency: patent  Nausea & Vomiting: no nausea and no vomiting  Complications: no  Cardiovascular status: hemodynamically stable  Respiratory status: room air  Hydration status: euvolemic

## 2021-12-20 ENCOUNTER — TELEPHONE (OUTPATIENT)
Dept: DERMATOLOGY | Age: 56
End: 2021-12-20

## 2021-12-20 DIAGNOSIS — L23.0 ALLERGIC CONTACT DERMATITIS DUE TO METALS: Primary | ICD-10-CM

## 2021-12-20 DIAGNOSIS — L30.8 OTHER ECZEMA: ICD-10-CM

## 2021-12-20 RX ORDER — TRIAMCINOLONE ACETONIDE 1 MG/G
CREAM TOPICAL
Qty: 454 G | Refills: 1 | Status: SHIPPED | OUTPATIENT
Start: 2021-12-20 | End: 2022-01-12 | Stop reason: SDUPTHER

## 2021-12-20 RX ORDER — PREDNISONE 20 MG/1
TABLET ORAL
Qty: 18 TABLET | Refills: 0 | Status: SHIPPED | OUTPATIENT
Start: 2021-12-20 | End: 2022-01-12 | Stop reason: SDUPTHER

## 2021-12-20 NOTE — TELEPHONE ENCOUNTER
The pharmacy should have filled a 454 g jar. I sent the prednisone and I also sent a new script for triamcinolone asking the pharmacy to please fill the quantity I prescribed and no less.

## 2021-12-20 NOTE — TELEPHONE ENCOUNTER
Rash is clearing, but he is still having itching. It is driving him crazy. Feels like skin is dry & it is flaking. He is wondering if there is a medicated lotion that he can use (he currently has no money to purchase any OTC products) He is also out of prescribed medication. OK to leave message if he does not answer. FYI- he had a screw removed from toe & that is improving.

## 2021-12-20 NOTE — TELEPHONE ENCOUNTER
He is already out of a 1lb jar of triamcinolone and the 60 g tube of clobetasol?!    I am sending Sarna as a cooling lotion. Next step would be oral prednisone.   - discuss r/b of oral steroids - immunosuppression, opportununistic infections, HTN, hyperglycemia, agitation, osteoporosis   - let me know if he wants me to send prednisone taper.

## 2021-12-30 ENCOUNTER — OFFICE VISIT (OUTPATIENT)
Dept: ORTHOPEDIC SURGERY | Age: 56
End: 2021-12-30

## 2021-12-30 VITALS — BODY MASS INDEX: 20.88 KG/M2 | HEIGHT: 69 IN | WEIGHT: 141 LBS

## 2021-12-30 DIAGNOSIS — T84.7XXA INFECTED HARDWARE IN RIGHT LOWER EXTREMITY, INITIAL ENCOUNTER (HCC): Primary | ICD-10-CM

## 2021-12-30 PROCEDURE — 99024 POSTOP FOLLOW-UP VISIT: CPT | Performed by: STUDENT IN AN ORGANIZED HEALTH CARE EDUCATION/TRAINING PROGRAM

## 2021-12-30 NOTE — PROGRESS NOTES
this plan. Electronically signed by Madlyn Najjar, DO on 12/30/2021 at 12:40 PM    This note is created with the assistance of a speech recognition program.  While intending to generate a document that actually reflects the content of the visit, the document can still have some errors including those of syntax and sound a like substitutions which may escape proof reading.   In such instances, actual meaning can be extrapolated by contextual diversion

## 2022-01-04 ENCOUNTER — OFFICE VISIT (OUTPATIENT)
Dept: INFECTIOUS DISEASES | Age: 57
End: 2022-01-04
Payer: MEDICAID

## 2022-01-04 VITALS
BODY MASS INDEX: 20.73 KG/M2 | HEART RATE: 83 BPM | SYSTOLIC BLOOD PRESSURE: 119 MMHG | HEIGHT: 69 IN | DIASTOLIC BLOOD PRESSURE: 73 MMHG | TEMPERATURE: 97.9 F | WEIGHT: 140 LBS

## 2022-01-04 DIAGNOSIS — T84.7XXA INFECTED HARDWARE IN RIGHT LOWER EXTREMITY, INITIAL ENCOUNTER (HCC): Primary | ICD-10-CM

## 2022-01-04 DIAGNOSIS — B95.62 MRSA BACTEREMIA: ICD-10-CM

## 2022-01-04 DIAGNOSIS — R78.81 MRSA BACTEREMIA: ICD-10-CM

## 2022-01-04 DIAGNOSIS — J86.9 EMPYEMA LUNG (HCC): ICD-10-CM

## 2022-01-04 PROCEDURE — 3017F COLORECTAL CA SCREEN DOC REV: CPT | Performed by: INTERNAL MEDICINE

## 2022-01-04 PROCEDURE — 4004F PT TOBACCO SCREEN RCVD TLK: CPT | Performed by: INTERNAL MEDICINE

## 2022-01-04 PROCEDURE — 99203 OFFICE O/P NEW LOW 30 MIN: CPT | Performed by: INTERNAL MEDICINE

## 2022-01-04 PROCEDURE — G8484 FLU IMMUNIZE NO ADMIN: HCPCS | Performed by: INTERNAL MEDICINE

## 2022-01-04 PROCEDURE — G8427 DOCREV CUR MEDS BY ELIG CLIN: HCPCS | Performed by: INTERNAL MEDICINE

## 2022-01-04 PROCEDURE — G8420 CALC BMI NORM PARAMETERS: HCPCS | Performed by: INTERNAL MEDICINE

## 2022-01-06 ENCOUNTER — OFFICE VISIT (OUTPATIENT)
Dept: ORTHOPEDIC SURGERY | Age: 57
End: 2022-01-06
Payer: MEDICAID

## 2022-01-06 ENCOUNTER — HOSPITAL ENCOUNTER (OUTPATIENT)
Age: 57
Discharge: HOME | End: 2022-01-06
Payer: MEDICAID

## 2022-01-06 VITALS — HEIGHT: 69 IN | BODY MASS INDEX: 20.73 KG/M2 | WEIGHT: 140 LBS

## 2022-01-06 DIAGNOSIS — T84.7XXA INFECTED HARDWARE IN RIGHT LOWER EXTREMITY, INITIAL ENCOUNTER (HCC): ICD-10-CM

## 2022-01-06 DIAGNOSIS — S52.552P: ICD-10-CM

## 2022-01-06 DIAGNOSIS — T84.7XXA INFECTED HARDWARE IN RIGHT LOWER EXTREMITY, INITIAL ENCOUNTER (HCC): Primary | ICD-10-CM

## 2022-01-06 PROCEDURE — 36415 COLL VENOUS BLD VENIPUNCTURE: CPT

## 2022-01-06 PROCEDURE — 99213 OFFICE O/P EST LOW 20 MIN: CPT | Performed by: ORTHOPAEDIC SURGERY

## 2022-01-06 PROCEDURE — G8484 FLU IMMUNIZE NO ADMIN: HCPCS | Performed by: ORTHOPAEDIC SURGERY

## 2022-01-06 PROCEDURE — 3017F COLORECTAL CA SCREEN DOC REV: CPT | Performed by: ORTHOPAEDIC SURGERY

## 2022-01-06 PROCEDURE — 4004F PT TOBACCO SCREEN RCVD TLK: CPT | Performed by: ORTHOPAEDIC SURGERY

## 2022-01-06 PROCEDURE — G8420 CALC BMI NORM PARAMETERS: HCPCS | Performed by: ORTHOPAEDIC SURGERY

## 2022-01-06 PROCEDURE — G8427 DOCREV CUR MEDS BY ELIG CLIN: HCPCS | Performed by: ORTHOPAEDIC SURGERY

## 2022-01-06 NOTE — PROGRESS NOTES
Musculoskeletal: Positive for wrist pain and deformity. Skin: Negative for itching and rash. Neurological: Negative for numbness, tingling, weakness. Psychiatric/Behavioral: Patient display good fund of knowledge, understanding of assessment and plan. OBJECTIVE:  Physical Exam:  General: NAD, sitting comfortably in the room. CV: No dependent edema, regular rate. Pulm: Respirations unlabored and regular. Neuro: Alert. Oriented. Ortho exam:  LUE: Skin is intact with no gross signs of infection. While in the neutral position it is obvious that he has radial deviation of his wrist region. Active and passive range of motion: Flexion 15 degrees, extension 30 degrees, 0 degrees ulnar deviation, 30 degrees radial deviation, supination 85 degrees, pronation 90 degrees. Tenderness to palpation over the distal radius region with palpable deformity. No gross motor or sensory deficits on exam.  Radial pulse 2+. PROCEDURES: None. RADIOLOGY:  No new radiographs were obtained in the office today. Prior radiographs demonstrate a history of a distal radius fracture with significant shortening and formation of a malunion. There is roughly neutral volar tilt on the lateral radiograph. There is severe loss of radial inclination and height. ASSESSMENT:   1. Left distal radius malunion  2. Left great toe infection, s/p I&D and hardware removal  3. Left great toe osteomyelitis    PLAN:  Margot Thurman is here for evaluation of his left distal radius. We discussed surgical and nonsurgical options. At this point patient is painful, although his function is relatively well-preserved. He would like to get back to work, and the pain is inhibiting him from doing so. We discussed that he needs to quit smoking, and therefore he is referred back to his primary care physician for further assistance if needed.   He was also sent for metal allergy testing with the thought that he will need a future procedure for correction of his malunion. We discussed the surgery in depth, which would likely consist of a dorsal spanning plate, debridement of the malunion site, and tricortical iliac crest bone graft for structural support. Patient was amenable to the treatment plan, and wishes to quit smoking as well as obtain metal allergy testing. We will see him in roughly 1 month for further evaluation and see where he is at, he will also obtain new radiographs of the left wrist at that point.    -Return in about 4 weeks (around 2/3/2022) for X-rays out of cast/splint/brace/sling. No orders of the defined types were placed in this encounter. Orders Placed This Encounter   Procedures    Miscellaneous Sendout 1     Orthopedic Panel 1: aluminum, cobalt, chromium, iron, molybdenum, sanford, vanadium, zirconium     Standing Status:   Future     Standing Expiration Date:   1/6/2023     Order Specific Question:   Specify Req. Test (1 Test/Order)     Answer:   Orthopedic Panel 1: aluminum, cobalt, chromium, iron, molybdenum, sanford, vanadium, zirconium    Christine Glover DPM, Podiatry, [facility]     Referral Priority:   Routine     Referral Type:   Eval and Treat     Referral Reason:   Specialty Services Required     Referred to Provider:   Silvestre Aguilar DPM     Requested Specialty:   Podiatry     Number of Visits Requested:   1         Electronically signed by Coletta Lanes, DO, on 1/6/2022 at 10:39 AM    This dictation was generated by voice recognition computer software. Although all attempts are made to edit the dictation for accuracy, there may be errors in the transcription that are not intended. The actual meaning should be extrapolated by the context of the sentence.

## 2022-01-12 ENCOUNTER — TELEPHONE (OUTPATIENT)
Dept: DERMATOLOGY | Age: 57
End: 2022-01-12

## 2022-01-12 DIAGNOSIS — L23.0 ALLERGIC CONTACT DERMATITIS DUE TO METALS: ICD-10-CM

## 2022-01-12 DIAGNOSIS — L30.8 OTHER ECZEMA: ICD-10-CM

## 2022-01-12 RX ORDER — CLOBETASOL PROPIONATE 0.5 MG/G
OINTMENT TOPICAL
Qty: 60 G | Refills: 2 | Status: SHIPPED | OUTPATIENT
Start: 2022-01-12 | End: 2022-02-22 | Stop reason: SDUPTHER

## 2022-01-12 RX ORDER — PREDNISONE 20 MG/1
TABLET ORAL
Qty: 18 TABLET | Refills: 0 | Status: SHIPPED | OUTPATIENT
Start: 2022-01-12 | End: 2022-02-22 | Stop reason: ALTCHOICE

## 2022-01-12 RX ORDER — TRIAMCINOLONE ACETONIDE 1 MG/G
CREAM TOPICAL
Qty: 454 G | Refills: 1 | Status: SHIPPED | OUTPATIENT
Start: 2022-01-12 | End: 2022-02-22 | Stop reason: SDUPTHER

## 2022-01-12 NOTE — TELEPHONE ENCOUNTER
PT states his surgeon does not think he is allergic to metal so they are doing testing to identify what he is allergic with. PT is avoiding nickel. PT will notify us when he gets his results back. PT is using topicals and they do help but they do not clear the area completely.

## 2022-01-12 NOTE — TELEPHONE ENCOUNTER
PT states the spots/ rash has came back all over the body. PT states he needs refills on the pills you prescribed him for 10 days because it was working well but as soon as he stopped it came back.

## 2022-01-12 NOTE — TELEPHONE ENCOUNTER
It was prednisone which is not recommended for longterm use. I can send one more rx but after that we need to find another solution. Is he avoiding nickel? Using topicals? I am refilling topicals again as well.

## 2022-01-13 ENCOUNTER — TELEPHONE (OUTPATIENT)
Dept: ORTHOPEDIC SURGERY | Age: 57
End: 2022-01-13

## 2022-01-13 DIAGNOSIS — T84.7XXA INFECTED HARDWARE IN RIGHT LOWER EXTREMITY, INITIAL ENCOUNTER (HCC): Primary | ICD-10-CM

## 2022-01-13 NOTE — TELEPHONE ENCOUNTER
Received call from lab that patient needed metal allergy panel re drawn. New orders placed and patient advised. Patient states understanding.

## 2022-01-18 ENCOUNTER — HOSPITAL ENCOUNTER (OUTPATIENT)
Age: 57
Discharge: HOME | End: 2022-01-18
Payer: MEDICAID

## 2022-01-18 DIAGNOSIS — T84.7XXA INFECTED HARDWARE IN RIGHT LOWER EXTREMITY, INITIAL ENCOUNTER (HCC): ICD-10-CM

## 2022-01-18 PROCEDURE — 36415 COLL VENOUS BLD VENIPUNCTURE: CPT

## 2022-01-21 ENCOUNTER — TELEPHONE (OUTPATIENT)
Dept: ORTHOPEDIC SURGERY | Age: 57
End: 2022-01-21

## 2022-01-21 NOTE — TELEPHONE ENCOUNTER
Patient last seen on 1/6/22 for Left distalradius malunion, Left great toe infection, s/p I&D and hardware removal, Left great toe osteomyelitis. Patient was sent to the lab to have metal allergy testing completed and after 3 attempts his WBC count is too low for the test to be ran. Would you like to see him back sooner or refer to PCP for WBC count?

## 2022-01-21 NOTE — TELEPHONE ENCOUNTER
Spoke to Dr. Sarah Ruiz in the office today. He would like the patient to follow up with his PCP for WBC management.      Once WBC count is higher can re-run the metal allergy test.

## 2022-02-14 DIAGNOSIS — S52.552P: Primary | ICD-10-CM

## 2022-02-14 NOTE — PROGRESS NOTES
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            ! None      ! +------------------------------------+----------+---------------+----------+ ! Dist Femoral                        !Yes       ! Yes            ! None      ! +------------------------------------+----------+---------------+----------+ ! Popliteal                           !Yes       ! Yes            ! None      ! +------------------------------------+----------+---------------+----------+ ! Sapheno Femoral Junction            ! Yes       ! Yes            ! None      ! +------------------------------------+----------+---------------+----------+ ! PTV                                 ! Yes       ! Partial        !None      ! +------------------------------------+----------+---------------+----------+ ! Peroneal                            !Yes       ! Partial        !None      ! +------------------------------------+----------+---------------+----------+ ! Gastroc !Yes       !Yes            ! None      ! +------------------------------------+----------+---------------+----------+ ! GSV Thigh                           ! Yes       ! Yes            ! None      ! +------------------------------------+----------+---------------+----------+ ! GSV Knee                            ! Yes       ! Yes            ! None      ! +------------------------------------+----------+---------------+----------+ ! GSV Ankle                           ! Yes       ! No             !          ! +------------------------------------+----------+---------------+----------+ ! SSV                                 ! Yes       ! Yes            ! None      ! +------------------------------------+----------+---------------+----------+ Right Doppler Measurements +---------------------------+------+------+--------------------------------+ ! Location                   ! Signal!Reflux! Reflux (msec)                   ! +---------------------------+------+------+--------------------------------+ ! Common Femoral             !Phasic!      !                                ! +---------------------------+------+------+--------------------------------+ ! Prox Femoral               !Phasic!      !                                ! +---------------------------+------+------+--------------------------------+ ! Popliteal                  !Phasic!      !                                ! +---------------------------+------+------+--------------------------------+ Left Lower Extremities DVT Study Measurements Left 2D Measurements +------------------------------------+----------+---------------+----------+ ! Location                            ! Visualized! Compressibility! Thrombosis! +------------------------------------+----------+---------------+----------+ ! Common Femoral                      !Yes       ! Yes            ! None      ! +------------------------------------+----------+---------------+----------+ ! Prox Femoral !Yes       !Yes            ! None      ! +------------------------------------+----------+---------------+----------+ ! Mid Femoral                         !Yes       ! Yes            ! None      ! +------------------------------------+----------+---------------+----------+ ! Dist Femoral                        !Yes       ! Yes            ! None      ! +------------------------------------+----------+---------------+----------+ ! Popliteal                           !Yes       ! Yes            ! None      ! +------------------------------------+----------+---------------+----------+ ! Sapheno Femoral Junction            ! Yes       ! Yes            ! None      ! +------------------------------------+----------+---------------+----------+ ! PTV                                 ! Yes       ! Partial        !None      ! +------------------------------------+----------+---------------+----------+ ! Peroneal                            !Yes       ! Partial        !None      ! +------------------------------------+----------+---------------+----------+ ! Gastroc                             ! Yes       ! Yes            ! None      ! +------------------------------------+----------+---------------+----------+ ! GSV Thigh                           ! Yes       ! Yes            ! None      ! +------------------------------------+----------+---------------+----------+ ! GSV Knee                            ! Yes       ! Yes            ! None      ! +------------------------------------+----------+---------------+----------+ ! GSV Ankle                           ! Yes       ! Yes            ! None      ! +------------------------------------+----------+---------------+----------+ ! SSV                                 ! Yes       ! Yes            ! None      ! +------------------------------------+----------+---------------+----------+ Left Doppler Measurements +---------------------------+------+------+--------------------------------+ ! Location Electronically signed by Anibal Thornton(Sonographer) on  08/24/2021 12:58 PM  ----------------------------------------------------------------   ----------------------------------------------------------------  Electronically signed by Montana Escoto(Interpreting  physician) on 08/24/2021 07:29 PM  ----------------------------------------------------------------  Findings:   Right Impression:        Left Impression:  The Subclavian vein was  Internal jugular, subclavian, axillary, brachial,  evaluated. ulnar, radial, cephalic and basilic veins are  It was compressible and  compressible with normal doppler responses. with normal doppler  responses. Velocities are measured in cm/s ; Diameters are measured in cm Right UE Vein Measurements 2D Measurements +---------------+-----------------+----------------------+-----------------+ ! Location       ! Visualized       ! Compressibility       ! Thrombosis       ! +---------------+-----------------+----------------------+-----------------+ ! Prox SCV       ! Yes              ! Yes                   ! None             ! +---------------+-----------------+----------------------+-----------------+ Doppler Measurements +-------------------------+-----------------------+------------------------+ ! Location                 ! Signal                 !Reflux                  ! +-------------------------+-----------------------+------------------------+ ! SCV                      ! Phasic                 !                        ! +-------------------------+-----------------------+------------------------+ Left UE Vein Measurements 2D Measurements +------------------------------------+----------+---------------+----------+ ! Location                            ! Visualized! Compressibility! Thrombosis! +------------------------------------+----------+---------------+----------+ ! Prox IJV                            ! Yes       !               !          ! +------------------------------------+----------+---------------+----------+ ! Prox SCV                            ! Yes       !               !          ! +------------------------------------+----------+---------------+----------+ ! Prox Axillary                       ! Yes       !               !          ! +------------------------------------+----------+---------------+----------+ ! Prox Brachial                       !Yes       !               !          ! +------------------------------------+----------+---------------+----------+ ! Prox Radial                         !No        !               !          ! +------------------------------------+----------+---------------+----------+ ! Prox Ulnar                          ! No        !               !          ! +------------------------------------+----------+---------------+----------+ ! Basilic at UA                       ! Yes       !               !          ! +------------------------------------+----------+---------------+----------+ ! Basilic at AF                       ! Yes       !               !          ! +------------------------------------+----------+---------------+----------+ ! Basilic at [address]                       ! No        !               !          ! +------------------------------------+----------+---------------+----------+ ! Cephalic at UA                      ! Yes       !               !          ! +------------------------------------+----------+---------------+----------+ ! Cephalic at AF                      ! Yes       !               !          ! +------------------------------------+----------+---------------+----------+ ! Cephalic at [address]                      ! No        !               !          ! +------------------------------------+----------+---------------+----------+ Doppler Measurements +-------------------------+-----------------------+------------------------+ ! Location                 ! Signal                 !Reflux                  ! +-------------------------+-----------------------+------------------------+ ! IJV                      ! Phasic                 !                        ! +-------------------------+-----------------------+------------------------+ ! SCV                      ! Phasic                 !                        ! +-------------------------+-----------------------+------------------------+ ! Axillary                 ! Phasic                 !                        ! +-------------------------+-----------------------+------------------------+ ! Brachial                 !Phasic                 !                        ! +-------------------------+-----------------------+------------------------+    FL MODIFIED BARIUM SWALLOW W VIDEO    Result Date: 8/23/2021  EXAMINATION: MODIFIED BARIUM SWALLOW WAS PERFORMED IN CONJUNCTION WITH SPEECH PATHOLOGY SERVICES TECHNIQUE: Fluoroscopic evaluation of the swallowing mechanism was performed with multiple consistency of barium product. FLUOROSCOPY DOSE AND TYPE OR TIME AND EXPOSURES: Fluoroscopic time of 2 minutes and 18 seconds. DAP of 93.8 dGycm2. COMPARISON: None HISTORY: ORDERING SYSTEM PROVIDED HISTORY: failed swallow test TECHNOLOGIST PROVIDED HISTORY: failed swallow test Reason for Exam: F/u failed video swallow Acuity: Acute Type of Exam: Subsequent/Follow-up Additional signs and symptoms: F/u failed video swallow FINDINGS: Multiple swallows were attempted with multiple consistencies under fluoroscopic evaluation in the presence of speech pathology. Flash penetration was visualized with thin liquid. No aspiration was demonstrated. Flash penetration with thin liquid. Please see separate speech pathology report for full discussion of findings and recommendations.      IR GUIDED THORACENTESIS PLEURAL    Result Date: 8/23/2021  PROCEDURE: Tayler Villegas LEFT THORACENTESIS 8/23/2021 HISTORY: ORDERING SYSTEM PROVIDED HISTORY: left pleural effusion, thoracentesis vs pigtail placement TECHNOLOGIST PROVIDED HISTORY: left pleural effusion, thoracentesis vs pigtail placement Which side should the procedure be performed? ->Left Small left pleural effusion TECHNIQUE: This procedure was performed by Dr. Roddy Hoffman. Informed consent was obtained after a detailed explanation of the procedure including risks, benefits, and alternatives. Universal protocol was performed. The left chest was prepped and draped in sterile fashion and local anesthesia was achieved with lidocaine. Initial ultrasound images show small left pleural effusion. A 5 Slovak needle sheath was advanced under ultrasound guidance into pleural effusion and thoracentesis was performed. The patient tolerated the procedure well. The planned procedure was discussed with CAMERON MCKEON at approximately 2 pm on 8/23/2021. Decision was made to perform thoracentesis only due to the relatively small quantity of fluid present. EBL: None FINDINGS: A total of 100 mL of clear yellow fluid was removed. Fluid was sent for the requested studies. Successful ultrasound guided left thoracentesis. Thank you for allowing me to participate in the care of your patient. Please feel free to contact me with any questions or concerns.      Griselda Rausch MD

## 2022-02-17 ENCOUNTER — HOSPITAL ENCOUNTER (OUTPATIENT)
Age: 57
Setting detail: SPECIMEN
Discharge: HOME | End: 2022-02-17
Payer: MEDICAID

## 2022-02-17 ENCOUNTER — OFFICE VISIT (OUTPATIENT)
Dept: ORTHOPEDIC SURGERY | Age: 57
End: 2022-02-17
Payer: MEDICAID

## 2022-02-17 VITALS — BODY MASS INDEX: 20.73 KG/M2 | HEIGHT: 69 IN | WEIGHT: 140 LBS

## 2022-02-17 DIAGNOSIS — S52.552P: Primary | ICD-10-CM

## 2022-02-17 DIAGNOSIS — S52.552P: ICD-10-CM

## 2022-02-17 PROCEDURE — 99214 OFFICE O/P EST MOD 30 MIN: CPT

## 2022-02-17 PROCEDURE — 86353 LYMPHOCYTE TRANSFORMATION: CPT

## 2022-02-17 PROCEDURE — G8427 DOCREV CUR MEDS BY ELIG CLIN: HCPCS

## 2022-02-17 PROCEDURE — 4004F PT TOBACCO SCREEN RCVD TLK: CPT

## 2022-02-17 PROCEDURE — 3017F COLORECTAL CA SCREEN DOC REV: CPT

## 2022-02-17 PROCEDURE — G8420 CALC BMI NORM PARAMETERS: HCPCS

## 2022-02-17 PROCEDURE — G8484 FLU IMMUNIZE NO ADMIN: HCPCS
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
## 2022-02-22 ENCOUNTER — OFFICE VISIT (OUTPATIENT)
Dept: DERMATOLOGY | Age: 57
End: 2022-02-22
Payer: MEDICAID

## 2022-02-22 VITALS
BODY MASS INDEX: 22.81 KG/M2 | SYSTOLIC BLOOD PRESSURE: 124 MMHG | DIASTOLIC BLOOD PRESSURE: 83 MMHG | WEIGHT: 154 LBS | TEMPERATURE: 98.1 F | HEIGHT: 69 IN | HEART RATE: 80 BPM | OXYGEN SATURATION: 93 %

## 2022-02-22 DIAGNOSIS — L23.0 ALLERGIC CONTACT DERMATITIS DUE TO METALS: Primary | ICD-10-CM

## 2022-02-22 DIAGNOSIS — L30.8 OTHER ECZEMA: ICD-10-CM

## 2022-02-22 PROCEDURE — G8420 CALC BMI NORM PARAMETERS: HCPCS | Performed by: DERMATOLOGY

## 2022-02-22 PROCEDURE — 3017F COLORECTAL CA SCREEN DOC REV: CPT | Performed by: DERMATOLOGY

## 2022-02-22 PROCEDURE — 99213 OFFICE O/P EST LOW 20 MIN: CPT | Performed by: DERMATOLOGY

## 2022-02-22 PROCEDURE — 4004F PT TOBACCO SCREEN RCVD TLK: CPT | Performed by: DERMATOLOGY

## 2022-02-22 PROCEDURE — G8484 FLU IMMUNIZE NO ADMIN: HCPCS | Performed by: DERMATOLOGY

## 2022-02-22 PROCEDURE — G8427 DOCREV CUR MEDS BY ELIG CLIN: HCPCS | Performed by: DERMATOLOGY

## 2022-02-22 RX ORDER — TRIAMCINOLONE ACETONIDE 1 MG/G
CREAM TOPICAL
Qty: 454 G | Refills: 1 | Status: SHIPPED | OUTPATIENT
Start: 2022-02-22 | End: 2022-04-18

## 2022-02-22 RX ORDER — ACETAMINOPHEN 325 MG/1
TABLET ORAL
COMMUNITY
Start: 2022-02-05

## 2022-02-22 RX ORDER — CLOBETASOL PROPIONATE 0.5 MG/G
OINTMENT TOPICAL
Qty: 60 G | Refills: 2 | Status: SHIPPED | OUTPATIENT
Start: 2022-02-22

## 2022-02-22 NOTE — PROGRESS NOTES
Dermatology Patient Note  [address]  [place] [address]  Dept: [phone]  Dept Fax: [phone]      VISITDATE: 2/22/2022   REFERRING PROVIDER: No ref. provider found      Rosalino Cabral is a 59 y.o. male  who presents today in the office for:    Follow-up (Patient here for follow up for allergic dermaitis. Still itching everywhere, and now radiating to his feet. States that it is getting better than his last appointment and the steriod has helped too. )      HISTORY OF PRESENT ILLNESS:  As above. Patient had surgical screw removed from his right great toe. He reports that the orthopedist ordered a blood test to check for metal allergies. He states that the rash on his abdomen resolved after buying a new belt. He had slight itching but states that it improved after using topicals and having screw removed.     MEDICAL PROBLEMS:  Patient Active Problem List    Diagnosis Date Noted    Infected hardware in right leg ([address]     Elevated C-reactive protein (CRP)     Leukocytosis     Empyema lung ([address] 08/21/2021    MRSA bacteremia 08/20/2021    Superficial venous thrombosis of arm, left 08/18/2021    Acute respiratory failure ([address] 08/16/2021    Fever 08/14/2021    Conjunctivitis 08/14/2021    Severe malnutrition ([address] 08/12/2021    Alcohol abuse 08/11/2021    Hypokalemia 08/11/2021    Hyponatremia 08/11/2021    Traumatic rhabdomyolysis ([facility] 08/11/2021    Closed fracture of multiple ribs of right side 08/11/2021    Closed fracture of left wrist 08/11/2021    Lumbosacral spondylosis without myelopathy     Scoliosis due to degenerative disease of spine in adult patient 08/11/2020    Lumbar radiculopathy     History of alcohol abuse 03/03/2020    Syncope and collapse 06/13/2018    Ilioinguinal neuralgia of right side 04/10/2017    Groin pain, chronic, right 10/10/2016    DDD (degenerative disc disease), lumbar 09/06/2016    Lung nodule 06/12/2015    Dyslipidemia 09/17/2014    Depression 07/22/2013    Smoking addiction 06/11/2013    COPD (chronic obstructive pulmonary disease) ([facility] 05/30/2013       CURRENT MEDICATIONS:   Current Outpatient Medications   Medication Sig Dispense Refill    acetaminophen (TYLENOL) 325 MG tablet TAKE 2 (TWO) TABLET EVERY SIX HOURS AS NEEDED      clobetasol (TEMOVATE) 0.05 % ointment Apply to rash twice daily (not face, armpit or groin) 60 g 2    camphor-menthol (SARNA) 0.5-0.5 % lotion Apply topically as needed for itching, up to 4x daily 222 mL 5    triamcinolone (KENALOG) 0.1 % cream Apply to rash twice daily (not face, armpit or groin).  Needs 1 lb jar/30 days for full body rash 454 g 1    NONFORMULARY Taking an antibiotic last Sunday been on for 10 days       NONFORMULARY Patient on tylenol or asa not sure of dose       vitamin B-1 (THIAMINE) 100 MG tablet       clindamycin (CLEOCIN) 900 MG/6ML injection       clotrimazole (LOTRIMIN) 1 % cream 1 (ONE) APPLICATION TO AFFECTED AREA TWICE DAILY      tamsulosin (FLOMAX) 0.4 MG capsule Take 1 capsule by mouth daily 30 capsule 3    albuterol sulfate  (90 Base) MCG/ACT inhaler INHALE 2 PUFFS INTO THE LUNGS EVERY 6 HOURS AS NEEDED FOR WHEEZING 18 g 3    tiotropium (SPIRIVA HANDIHALER) 18 MCG inhalation capsule Inhale 1 capsule into the lungs daily 90 capsule 3    ARNUITY ELLIPTA 100 MCG/ACT AEPB INHALE 1 PUFF INTO THE LUNGS DAILY 30 each 2    ibuprofen (ADVIL;MOTRIN) 800 MG tablet Take 1 tablet by mouth every 8 hours as needed for Pain 90 tablet 0     Current Facility-Administered Medications   Medication Dose Route Frequency Provider Last Rate Last Admin    lidocaine-EPINEPHrine 1 %-[id number] injection 1 mL  1 mL IntraDERmal Once Loli Jeff MD           ALLERGIES:   Allergies   Allergen Reactions    Nickel Rash     Contact dermatitis       SOCIAL HISTORY:  Social History     Tobacco Use    Smoking status: Current Every Day Smoker     Packs/day: 1.00     Years: 38.00     Pack years: 38.00     Types: Cigarettes    Smokeless tobacco: Never Used    Tobacco comment: 1 PPD   Substance Use Topics    Alcohol use: Not Currently     Alcohol/week: 0.0 standard drinks     Comment: has not drank for at least 4 months       Pertinent ROS:  Review of Systems  Skin: Denies any new changing, growing or bleeding lesions or rashes except as described in the HPI   Constitutional: Denies fevers, chills, and malaise. PHYSICAL EXAM:   /83   Pulse 80   Temp 98.1 °F (36.7 °C)   Ht 5' 9\" (1.753 m)   Wt 154 lb (69.9 kg)   SpO2 93%   BMI 22.74 kg/m²     The patient is generally well appearing, well nourished, alert and conversational. Affect is normal.    Cutaneous Exam:  Physical Exam  Waist-up + limited LEs: Head/face,neck, both arms, chest, back, abdomen, digits and/or nails, and legs visible with pants/shorts and shoes/socks on was examined. Facial covering was removed during examination. Diagnoses/exam findings/medical history pertinent to this visit are listed below:    Assessment:   Diagnosis Orders   1. 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 This note was created with the assistance of a speech-recognition program.  Although the intention is to generate a document that actually reflects the content of the visit, no guarantees can be provided that every mistake has been identified and corrected by editing. I, Dr. Kezia Pak, personally performed the services described in this documentation, as scribed by [facility] in my presence, and it is both accurate and complete.      Electronically signed by Eulogio Marshall MD on 2/22/22 at 2:33 PM EST

## 2022-02-24 ENCOUNTER — HOSPITAL ENCOUNTER (OUTPATIENT)
Dept: OCCUPATIONAL THERAPY | Age: 57
Setting detail: THERAPIES SERIES
Discharge: HOME | End: 2022-02-24
Payer: MEDICAID

## 2022-02-24 PROCEDURE — 97165 OT EVAL LOW COMPLEX 30 MIN: CPT

## 2022-02-24 NOTE — CONSULTS
Pt lives in a  and House With 4+ and No Handrail TSERING  The washing machine is on and the lower level (basement)    Vocation    Job Status []Normal duty []Light duty [] Off due to condition []Retired  [x]Not employed - looking for work  []Disability  []Other:  []  Return to work: Work activities/duties Watch TV, hang out with guys at house     Avocational Activities     [] [address]     [] [facility]     [] Care giver [] OTHER    [x] NA       ADL/IADL Previous level of function Current level of function Who assists or modifications made or needed   Bathing  [x] Independent    [] Assist  [x] Independent    [] Assist     Dress/grooming [x] Independent    [] Assist  [x] Independent    [] Assist     Transfer/mobility [x] Independent    [] Assist  [x] Independent    [] Assist     Feeding [x] Independent    [] Assist  [x] Independent    [] Assist     Toileting [x] Independent    [] Assist  [x] Independent    [] Assist     Driving [x] Independent    [] Assist  [x] Independent    [] Assist     Housekeeping [x] Independent    [] Assist  [x] Independent    [] Assist     Grocery shop/meal prep [x] Independent    [] Assist  [] Independent    [x] Assist  Can complete most things, needs assistance with chopping, carrying heavy skillets/pan     Device: Independent   Orthosis: Currently has wrist cock-up splint     Objective: Tests/Measurements: Upper Extremity Functional Index  Current Functional Level:  31/80 functionally impaired as measured with the Upper Extremity Functional Index Survey. 0-80 scale, with 80 = no Deficits  (The UEFI model does not provide any specific cut off points that could classify the upper limb disability degree, however, a minimal detectable change of 9 points is provided. This means that for improvement or deterioration to be considered, between two subsequent evaluations, the scores must differ by at least 9 points.)    Sensibility: Normal Edema: Min      Skin Color: Normal Skin/Scar condition Intact     STRENGTH (Measured in pounds)     pounds RIGHT LEFT    65 19   Lateral pinch 20 12   2 point pinch 14 8   3 jaw pinch 15 10     The affected extremity is 71% weaker than the unaffected extremity. (affected score/unaffected score, take the total and subtract from 100)    COORDINATION  - Fine Motor (speed/dexterity)     Right in seconds Percentile Left in seconds Percentile   9 Hole Peg Test 22.88  22.81        AROM - L wrist    Ext/flex: 45/40 degrees    Radial/ulnar deviation: 20/35 degrees   Forearm supination/pronation: WFL      EDEMA - L wrist  L wrist: 17.8cm  R wrist: 17.4cm    Problems: Pain, ROM, Strength and Function     Assessment:  Patient would benefit from skilled occupational therapy services in order to: Improve  I with ADLS, ROM, Strength and Complaint of pain in order to improve functional use of UE in ADL performance    Short Term Goals: (  6    Treatments)  1. Decrease Pain: to 3/10 with meal prep activities   2. Increase AROM  a. L wrist ext/flex to 55/55 degrees for improved use of L hand/wrist when completing functional activities  3. Increase strength (pounds);  a. L  to 25 pounds for less difficulty completing IADL tasks around home  b. All L pinches by 2-3 pounds for less difficulty completing IADL tasks around home   4. Increase function:UE Functional Index Score to 45/80 or more points to promote increased functional abilities  5. Patient to be independent with home exercise program as demonstrated by performance with correct form without cues. Long Term Goals: (  12  Treatments)  1. Increase L  to 35 pounds or more to promote independence with ADL/IADL tasks  2. Increase all L pinch strength measurements by 4 pounds or more to promote independence with ADL/IADL tasks   3.  Improve functional skills AEB a score of 60/80 with UEFI     Patient Goals: getting a brace made for work     Treatment Potential: Fair Suggested Professional Referral: No  Domestic Concerns: Yes - currently working on being sober and living in a sober living home      Barriers to Goal Achievement: Yes - extensive amount of time since injury occurred and has not completed any therapy thus far     Comments/Assessment: Presents s/p distal radius fx that occurred almost 7 months ago. Pt has not had any therapy for L wrist up to this point. pt voices main reason for coming to appt this date was to receive a brace for L wrist so he can go to work. Pt is currently living in a group home setting for sober living and does not have a job outside the home. Pt states as part of his sober living program, he has begun looking for jobs. HE feels he needs a different type of brace so that he can work, however no specific job is lined up. L ulna visibly displaced at distal end. According to physician notes, surgery was recommended at the time of injury, but pt wanted to proceed conservatively. Continues to voice wanting to treat wrist conservatively and not undergo surgery for L wrist. Pt demo significant weakness of L wrist and hand, however doesn't have plans to continue with OT treatment, he just wants a brace made for work purposes. Pt was thoroughly educated on the benefits of completing additional OT treatment to increase L hand strength, ROM, and function. Pt states he has done therapy in the past for other issues and it has never helped. He is politely insistent that he only wants a new brace. Pt currently wearing a black, wrist cock-up splint, that he states was given to him by his doctor. Demonstrates L digit ROM WFL, no stiffness with digits noted. L wrist AROM below normal limits, however pt states he isn't concerned with the motion with wrist. Demo's L hand coordination skills WFL, AEB time with 9 hole peg test. Recommended treatment plan and frequency listed below, should pt want to continue with OT treatment.  Spoke with Dr. Darnell Kenny office about a specific splint they wanted made, per the referral, they stated there is not specific splint that needs made and the current black, wrist cock-up splint pt has is fine to continue with, and pt can follow-up with doctor at next appt. Clinician called pt and left VM regarding this information and asked pt to call clinic if he wants to continue with OT treatment. Home Program Initiated: NA - insurance only approves for evaluation to be completed and requires authorization after evaluation. Comprehension of Education: Needs Review       Plans, Goals, Risks, Benefits, Discussed with and Patient    Treatment Plan:  Therapeutic Ex 13996, Therapeutic Act 98222, Manual 29255, HEP and Ultrasound [id number]       Treatment Plan:  Frequency: 2 x/weeks for 12 visits     Today's Treatment: no treatment completed at today's appt due       Specific Instructions for Next Treatment: L hand strength, ROM activities     Evaluation Complexity:  History (Personal factors, comorbidities) [x]  0 []  1-2 []  3+   Exam (limitations, restrictions) [x]  1-2 []  3 []  4+   Decision Making [x]  Low []  Moderate []  High   ? [x]  Low Complexity []  Moderate   Complexity []  High Complexity      Treatment Charges: Mins Units   Evaluation  []  High  []  Moderate  [x]  Low        40        1   []  Ther Exercise     []  Manual Therapy     []  Ther Activities     []  Orthotic fit/train     []  Orthotic recheck     []       Total Treatment time 40 min      Time In: 5981   Time out: 7041     Electronically signed by DENNY Gorman on 2/24/2022 at 1:05 PM    Physician Signature: _________________________ Date: _______________  By signing above or cosigning this note, I have reviewed this plan of care and certify a need for medically necessary rehabilitation services.      *PLEASE SIGN ABOVE AND FAX BACK ALL PAGES*

## 2022-02-25 ENCOUNTER — TELEPHONE (OUTPATIENT)
Dept: ORTHOPEDIC SURGERY | Age: 57
End: 2022-02-25

## 2022-02-25 NOTE — TELEPHONE ENCOUNTER
Mason Jeffrey,  I spoke with [facility]. He can keep his current brace. Can discuss any specialty brace if still needed in a follow up appointment. Thank You for reaching out.

## 2022-03-04 NOTE — PROGRESS NOTES
Speech Language Pathology  Speech Language Pathology  [facility]    Dysphagia Treatment Note    Date: 8/24/2021  Patients Name: Jeannine Edwards  MRN: [id number]  Diagnosis: dysphagia  Patient Active Problem List   Diagnosis Code    COPD (chronic obstructive pulmonary disease) ([facility] [address] J44.9    Smoking addiction F17.200    Depression F32.9    Dyslipidemia E78.5    Lung nodule R91.1    DDD (degenerative disc disease), lumbar M51.36    Groin pain, chronic, right R10.31, G89.29    Ilioinguinal neuralgia of right side G57.91    Syncope and collapse R55    History of alcohol abuse F10.11    Lumbar radiculopathy M54.16    Scoliosis due to degenerative disease of spine in adult patient M41.80    Lumbosacral spondylosis without myelopathy M47.817    Alcohol abuse F10.10    Hypokalemia E87.6    Hyponatremia E87.1    Traumatic rhabdomyolysis ([facility] [address] T79. 6XXA    Closed fracture of multiple ribs of right side S22.41XA    Closed fracture of left wrist S62.102A    Severe malnutrition (HCC) E43    Fever R50.9    Conjunctivitis H10.9    Acute respiratory failure (HCC) J96.00    Superficial venous thrombosis of arm, left I82.612    MRSA bacteremia R78.81, B95.62    Empyema lung (HCC) J86.9    Elevated C-reactive protein (CRP) R79.82    Leukocytosis D72.829       Pain: 10/10, ribs    Dysphagia Treatment  Treatment time: 2806-9875    Subjective: [x] Alert [x] Cooperative     [] Confused     [] Agitated    [] Lethargic    Objective/Assessment:    Pt. Seen for laryngeal strengthing treatment program per MBSS recommendations on 8/23/21. Pt. Completed all swallowing exercises X 10 X 1 sets with mod cues. Swallowing exercise handout left on bedside table, pt encouraged to complete exercises throughout the day, independently, pt verbalized understanding. Therapeutic feeding trials of mildly thickened liquids provided via cup (x4). Pt demonstrated no overt s/s of aspiration with all cup sips.  Pt appears to be tolerating diet and compliant with recommendations stating, \"this coffee is actually pretty good\" (referring to thickened coffee). Continue current diet recommendation. Plan:  [x] Continue ST services    [] Discharge from ST:        Discharge recommendations: [] Inpatient Rehab   [] [place]   [] Outpatient Therapy  [] Follow up at trauma clinic   [] Other:         Treatment completed by:  ROSALIE Lockhart, M.A., [facility]

## 2022-03-07 RX ORDER — IBUPROFEN 800 MG/1
TABLET ORAL
Qty: 90 TABLET | Refills: 0 | OUTPATIENT
Start: 2022-03-07

## 2022-03-11 LAB
SEND OUT REPORT: NORMAL
TEST NAME: NORMAL

## 2022-03-22 RX ORDER — IBUPROFEN 800 MG/1
TABLET ORAL
Qty: 90 TABLET | Refills: 0 | OUTPATIENT
Start: 2022-03-22

## 2022-04-05 NOTE — DISCHARGE SUMMARY
[x] [facility]       Occupational Therapy             1st floor       [address], [place]         Phone: [phone]       Fax: [phone] [] [facility]  [address], [place]  Phone: [phone]  Fax: [phone]         Occupational Therapy Discharge Note    Date: 2022      Patient: Kayla Kauffman  : 1965  MRN: [id number]    Referring Provider:  Emma Donnelly DO  Insurance: [id number]  [phone] after eval  Medical Diagnosis: closed extra-articular fracture of distal end of left radius with malunion S52.552P     Rehab Codes: pain in wrist M25.53,, fine motor skills loss R29.818,, muscle weakness generalized M62.81,, pain in left forearm M79.632, or pain in left hand M79.642,  Onset Date: 22                   Next Dr. Jose Jimenez: TBD  Date of initial visit: 24                Date of final visit: 22      Subjective:  Refer to initial evaluation. Objective:  Refer to initial evaluation. Assessment:  Refer to initial evaluation. Treatment to Date:  [] Therapeutic Exercise    [] Modalities:  [] Therapeutic Activity    [] Ultrasound  [] Electrical Stimulation  [] Ortho refit/check             [] Massage   [] Fluidotherapy  [] Neuromuscular Re-education [] Cold/hotpack [] Iontophoresis: 4 mg/mL  [] Instruction in Home Exercise Program                     Dexamethasone Sodium  [] Manual Therapy             Phosphate 40-80 mAmin          [] Paraffin        [x] Other: NA, no treatment completed     Discharge Status:     [] Pt recovered from conditions. Treatment goals were met. [] Pt received maximum benefit. No further therapy indicated at this time. [] Pt to continue exercise/home instructions independently. [] Therapy interrupted due to:    [] Pt has 2 or more no shows/cancels, is discontinued per our policy. [] Pt has completed prescribed number of treatment sessions.     [x] Other: pt did not want to continue with OT treatment          Electronically signed by: Edna Watts OTR/L    If you have any questions or concerns, please don't hesitate to call.   Thank you for your referral.

## 2022-04-18 DIAGNOSIS — L23.0 ALLERGIC CONTACT DERMATITIS DUE TO METALS: ICD-10-CM

## 2022-04-18 DIAGNOSIS — L30.8 OTHER ECZEMA: ICD-10-CM

## 2022-04-18 RX ORDER — TRIAMCINOLONE ACETONIDE 1 MG/G
CREAM TOPICAL
Qty: 454 G | Refills: 1 | Status: SHIPPED | OUTPATIENT
Start: 2022-04-18

## 2022-10-31 ENCOUNTER — TELEPHONE (OUTPATIENT)
Dept: NEUROSURGERY | Age: 57
End: 2022-10-31

## 2022-10-31 NOTE — TELEPHONE ENCOUNTER
Hasn't seen Rangel Gillespie in 2 years, canceled appts last year with Rangel Gillespie and [address]. PT is having increased pain. Is working full time. Wants to see if there is any option to get in sooner possibly with [facility]?

## 2022-11-16 ENCOUNTER — OFFICE VISIT (OUTPATIENT)
Dept: NEUROSURGERY | Age: 57
End: 2022-11-16
Payer: MEDICAID

## 2022-11-16 VITALS
HEART RATE: 76 BPM | DIASTOLIC BLOOD PRESSURE: 83 MMHG | WEIGHT: 154 LBS | BODY MASS INDEX: 22.81 KG/M2 | SYSTOLIC BLOOD PRESSURE: 133 MMHG | OXYGEN SATURATION: 97 % | HEIGHT: 69 IN | TEMPERATURE: 97.3 F

## 2022-11-16 DIAGNOSIS — M47.816 LUMBAR SPONDYLOSIS: Primary | ICD-10-CM

## 2022-11-16 DIAGNOSIS — M41.50 SCOLIOSIS DUE TO DEGENERATIVE DISEASE OF SPINE IN ADULT PATIENT: ICD-10-CM

## 2022-11-16 PROCEDURE — G8484 FLU IMMUNIZE NO ADMIN: HCPCS | Performed by: NURSE PRACTITIONER

## 2022-11-16 PROCEDURE — 99214 OFFICE O/P EST MOD 30 MIN: CPT | Performed by: NURSE PRACTITIONER

## 2022-11-16 PROCEDURE — G8427 DOCREV CUR MEDS BY ELIG CLIN: HCPCS | Performed by: NURSE PRACTITIONER

## 2022-11-16 PROCEDURE — 4004F PT TOBACCO SCREEN RCVD TLK: CPT | Performed by: NURSE PRACTITIONER

## 2022-11-16 PROCEDURE — 3017F COLORECTAL CA SCREEN DOC REV: CPT | Performed by: NURSE PRACTITIONER

## 2022-11-16 PROCEDURE — G8420 CALC BMI NORM PARAMETERS: HCPCS | Performed by: NURSE PRACTITIONER

## 2022-11-16 RX ORDER — ACETAMINOPHEN 500 MG
500 TABLET ORAL 4 TIMES DAILY PRN
Qty: 120 TABLET | Refills: 0 | Status: SHIPPED | OUTPATIENT
Start: 2022-11-16

## 2022-11-16 NOTE — PROGRESS NOTES
[address]  [facility] [address] [facility] [address]  Dept: [phone]    Patient:  Oziel Yousif  YOB: 1965  Date: 11/16/22    The patient is a 59 y.o. male who presents today for consult of the following problems:     Chief Complaint   Patient presents with    Other         HPI:     Oziel Yousif is a 59 y.o. male who presents for follow-up of back pain. Patient has had ongoing issues with low back pain for many years. Was evaluated here in this office approximately 2.5 years ago with concerns regarding 8/10 pain with intermittent cramping to lower extremities. Has been having 5-10/10 pain on average to lower back. Work worsens the pain, has a physically demanding job working as a . Occasional numbness to right anterior thigh in approximate L3 distribution, not particularly painful. Denies saddle anesthesia, loss of bowel/bladder function. Has attended remote physical therapy without significant relief. Has also completed remote medial branch blocks. Patient has stopped drinking in the interim, feels that he is noticing more pain now as it is not being masked by alcohol abuse. History:     Past Medical History:   Diagnosis Date    Acute respiratory failure ([facility] [address] 08/16/2021    Alcohol abuse 06/04/2014    Anxiety     Arthritis     Cardiomyopathy ([facility] [address] 08/20/2021    EF 35%    Closed fracture of left wrist     Closed fracture of multiple ribs 08/11/2021    Conjunctivitis 08/14/2021    COPD (chronic obstructive pulmonary disease) [facility])     Dr. Que Chirinos [address]    DDD (degenerative disc disease), lumbar 09/06/2016    Depression     Dyslipidemia 09/17/2014    Elevated C-reactive protein 08/21/2021    Fever 08/14/2021    Heart burn     Hemorrhoids     History of recent hospitalization     8/10/2021-9/2/2021.  ICU and intubation    HTN (hypertension) 01/19/2015    Hypokalemia 08/11/2021    Hyponatremia 08/11/2021    Ilioinguinal neuralgia of right side 04/10/2017    Infected hardware in right leg ([facility] [address]     Right great toe NOT leg    Leukocytosis 08/21/2021    Lives in group home     Sober living     MRSA bacteremia 08/20/2021    Psychiatric problem     depression /anxiety    Rhabdomyolysis 08/11/2021    Seizures ([facility] [address] 2021    withdrawal from alcohol     Severe malnutrition ([facility] [address] 08/12/2021    Superficial venous thrombosis of left arm 08/18/2021    Syncope and collapse 06/13/2018    Wears glasses     READING    Wellness examination 11/03/2021    Dr. Crystal Painting [address]      Past Surgical History:   Procedure Laterality Date    ANESTHESIA NERVE BLOCK Right 04/10/2017    NERVE BLOCK US RIGHT SIDED ILIOINGUINAL performed by Bettye Pond MD at [place] [address] Right 12/14/2021    HARDWARE REMOVAL RIGHT GREAT TOE, C-ARM performed by Lamine Valdes DO at [address] Right 12/14/2021    HARDWARE REMOVAL RIGHT GREAT TOE    HEMORRHOID SURGERY  03/19/2015    HERNIA REPAIR      IR CHEST TUBE INSERTION  08/20/2021    IR CHEST TUBE INSERTION 8/20/2021 [facility] SPECIAL PROCEDURES    NERVE BLOCK Right 10/10/2016    right groin    OTHER SURGICAL HISTORY Right 04/10/2017    US nerve block to R groin    TOE SURGERY      SCREW RIGHT BIG TOE     Family History   Problem Relation Age of Onset    Cancer Mother         colon ca     Current Outpatient Medications on File Prior to Visit   Medication Sig Dispense Refill    triamcinolone (KENALOG) 0.1 % cream APPLY TO RASH TWICE DAILY (NOT FACE, ARMPIT OR GROIN).  NEEDS 1 LB JAR/30 DAYS FOR FULL BODY RASH 454 g 1    clobetasol (TEMOVATE) 0.05 % ointment Apply to rash twice daily (not face, armpit or groin) 60 g 2    camphor-menthol (SARNA) 0.5-0.5 % lotion Apply topically as needed for itching, up to 4x daily 222 mL 5    NONFORMULARY Taking an antibiotic last Sunday been on for 10 days       NONFORMULARY Patient on tylenol or asa not sure of dose       vitamin B-1 (THIAMINE) 100 MG tablet       clindamycin (CLEOCIN) 900 MG/6ML injection       clotrimazole (LOTRIMIN) 1 % cream 1 (ONE) APPLICATION TO AFFECTED AREA TWICE DAILY      tamsulosin (FLOMAX) 0.4 MG capsule Take 1 capsule by mouth daily 30 capsule 3    albuterol sulfate  (90 Base) MCG/ACT inhaler INHALE 2 PUFFS INTO THE LUNGS EVERY 6 HOURS AS NEEDED FOR WHEEZING 18 g 3    tiotropium (SPIRIVA HANDIHALER) 18 MCG inhalation capsule Inhale 1 capsule into the lungs daily 90 capsule 3    ARNUITY ELLIPTA 100 MCG/ACT AEPB INHALE 1 PUFF INTO THE LUNGS DAILY 30 each 2    ibuprofen (ADVIL;MOTRIN) 800 MG tablet Take 1 tablet by mouth every 8 hours as needed for Pain 90 tablet 0     Current Facility-Administered Medications on File Prior to Visit   Medication Dose Route Frequency Provider Last Rate Last Admin    lidocaine-EPINEPHrine 1 %-1:032420 injection 1 mL  1 mL IntraDERmal Once Nely Newton MD         Social History     Tobacco Use    Smoking status: Every Day     Packs/day: 1.00     Years: 38.00     Pack years: 38.00     Types: Cigarettes    Smokeless tobacco: Never    Tobacco comments:     1 PPD   Vaping Use    Vaping Use: Never used   Substance Use Topics    Alcohol use: Not Currently     Alcohol/week: 0.0 standard drinks     Comment: has not drank for at least 4 months    Drug use: Not Currently     Types: Marijuana Katheren Ling), Cocaine     Comment:  quit at least 4-6 months ago       Allergies   Allergen Reactions    Nickel Rash     Contact dermatitis       Review of Systems  Constitutional: Negative for activity change and appetite change. HENT: Negative for ear pain and facial swelling. Eyes: Negative for discharge and itching. Respiratory: Negative for choking and chest tightness. Cardiovascular: Negative for chest pain and leg swelling. Gastrointestinal: Negative for nausea and abdominal pain.    Endocrine: Negative for cold intolerance and heat intolerance. Genitourinary: Negative for frequency and flank pain. Musculoskeletal: Negative for myalgias and joint swelling. Skin: Negative for rash and wound. Allergic/Immunologic: Negative for environmental allergies and food allergies. Hematological: Negative for adenopathy. Does not bruise/bleed easily. Psychiatric/Behavioral: Negative for self-injury. The patient is not nervous/anxious. Physical Exam:      /83 (Site: Right Upper Arm, Position: Sitting, Cuff Size: Large Adult)   Pulse 76   Temp 97.3 °F (36.3 °C) (Temporal)   Ht 5' 9\" (1.753 m)   Wt 154 lb (69.9 kg)   SpO2 97%   BMI 22.74 kg/m²   Estimated body mass index is 22.74 kg/m² as calculated from the following:    Height as of this encounter: 5' 9\" (1.753 m). Weight as of this encounter: 154 lb (69.9 kg). General:  Dallin Link is a 59y.o. year old male who appears his stated age. HEENT: Normocephalic atraumatic. Neck supple. Chest: regular rate; pulses equal  Abdomen: Soft nontender nondistended.   Ext: DP and PT pulses 2+, good cap refill  Neuro    Mentation  Appropriate affect  Registration intact  Orientation intact  Judgement intact to situation    Cranial Nerves:   Pupils equal and reactive to light  Extraocular motion intact  Face and shrug symmetric  Tongue midline  No dysarthria  v1-3 sensation symmetric, masseter tone symmetric  Hearing symmetric    Sensation: Intact on exam    Motor  L deltoid 5/5; R deltoid 5/5  L biceps 5/5; R biceps 5/5  L triceps 5/5; R triceps 5/5  L wrist extension 5/5; R wrist extension 5/5  L intrinsics 5/5; R intrinsics 5/5     L iliopsoas 5/5 , R iliopsoas 5/5  L quadriceps 5/5; R quadriceps 5/5  L Dorsiflexion 5/5; R dorsiflexion 5/5  L Plantarflexion 5/5; R plantarflexion 5/5  L EHL 5/5; R EHL 5/5    Reflexes  L Brachioradialis 2+/4; R brachioradialis 2+/4  L Biceps 2+/4; R Biceps 2+/4  L Triceps 2+/4; R Triceps 2+/4  L Patellar 2+/4: R Patellar 2+/4  L Achilles 2+/4; R Achilles 2+/4    hoffmans L: neg  hoffmans R: neg  Clonus L: neg  Clonus R: neg  Babinski L: neg  Babinski R: neg    Tenderness to lower back    Studies Review:     MRI lumbar 6/8/2020:    FINDINGS:   BONES/ALIGNMENT:       Moderate lumbar levoscoliosis. Edema involving the L2 through L5 vertebral   bodies are most likely degenerative. Mild-to-moderate compression deformity   of L2 vertebral body appears new but chronic. Grade 1 anterolisthesis of L3   relative to L4. Minimal grade 1 retrolisthesis of L4 relative to L5. Degenerative endplate edema is most significant at L3-L4. Schmorl's node   involving the superior endplate of L2 vertebral body. No convincing evidence   of acute discitis or osteomyelitis on this noncontrast examination. SPINAL CORD:       The conus terminates normally. SOFT TISSUES:       No paraspinal mass identified. L1-L2: Posterior disc osteophyte complex extends into neural foramina   bilaterally. Mild bilateral facet degenerative changes. Mild bilateral   neural foraminal stenosis. No significant spinal canal stenosis. L2-L3: Posterior disc osteophyte complex extends into neural foramina   bilaterally. Mild bilateral facet degenerative changes. Moderate left   neural foraminal stenosis. Moderate right neural foraminal stenosis. Mild   bilateral lateral recess stenosis. Minimal spinal canal stenosis. L3-L4: Posterior disc osteophyte complex extends into the neural foramina   bilaterally. Moderate to severe bilateral facet degenerative changes. Severe right neural foraminal stenosis. Moderate left neural foraminal   stenosis. Severe bilateral lateral recess stenosis. Severe spinal canal   stenosis with mass effect on the nerve roots within the spinal canal.       L4-L5: Posterior disc osteophyte complex extends into neural foramina   bilaterally. Superimposed small central and left central disc herniation. Moderate to severe bilateral facet degenerative changes. Severe left neural   foraminal stenosis. Moderate right neural foraminal stenosis. Severe left   lateral recess stenosis. Mild right lateral recess stenosis. Mild spinal   canal stenosis. L5-S1: Posterior disc osteophyte complex extends into neural foramina   bilaterally. Severe left facet degenerative changes. Mild right facet   degenerative changes. Mild right neural foraminal stenosis. Moderate to   severe left neural foraminal stenosis. No significant spinal canal stenosis. X-ray lumbar 7/28/2020:  Lumbar spine: As noted above, there is levoscoliosis of the lumbar spine. The curvature is slightly accentuated when bending right, measuring 34   degrees and improved when bending left, measuring 14 degrees. No evidence of   spondylolysis. There is superior endplate depression at L2, which is   unchanged. Vertebral body heights are otherwise preserved. There is   moderate to severe multilevel disc space narrowing, endplate spurring, and   facet arthropathy. Extensive vascular calcification. X-ray spine 7/20/2020:  Scoliosis series: There is moderate levoconvex curvature of the lumbar spine,   measuring 29 degrees, not significantly changed from previous scoliosis   series. No significant abnormal curvature of the thoracic spine. No   significant thoracic or pelvic tilt. 12 ribs bilaterally. 5 lumbar type   vertebral bodies. Sagittal alignment is within normal limits. Assessment and Plan:      1. Lumbar spondylosis    2. Scoliosis due to degenerative disease of spine in adult patient          Plan: Previous imaging reviewed, patient with multilevel degenerative changes, resulting scoliotic changes. Varying degrees of multilevel central and foraminal stenosis. Current complaints predominantly consist of axial discomfort, no clear radicular or claudication symptoms.   Recommend initiation of multimodal physical therapy to work on core strengthening, stretching, strengthening, range of motion. Patient requests refill of ibuprofen, however last available lab work suggestive of some degree of kidney disease, recommend utilizing Tylenol instead as needed to control symptoms. Patient to return in 12-14 weeks for reevaluation, may benefit from updated lumbar MRI pending response to therapy. Followup: Return in about 3 months (around 2/16/2023), or if symptoms worsen or fail to improve. Prescriptions Ordered:  Orders Placed This Encounter   Medications    acetaminophen (TYLENOL) 500 MG tablet     Sig: Take 1 tablet by mouth 4 times daily as needed for Pain     Dispense:  120 tablet     Refill:  0      Orders Placed:  Orders Placed This Encounter   Procedures    [address]     Referral Priority:   Routine     Referral Type:   Eval and Treat     Referral Reason:   Specialty Services Required     Requested Specialty:   Physical Therapist     Number of Visits Requested:   1        Electronically signed by DONNY Miranda CNP on 11/16/2022 at 10:42 AM    Please note that this chart was generated using voice recognition Dragon dictation software. Although every effort was made to ensure the accuracy of this automated transcription, some errors in transcription may have occurred.

## 2022-11-28 ENCOUNTER — HOSPITAL ENCOUNTER (OUTPATIENT)
Dept: PHYSICAL THERAPY | Age: 57
Setting detail: THERAPIES SERIES
Discharge: HOME | End: 2022-11-28
Payer: MEDICAID

## 2022-11-28 PROCEDURE — 97162 PT EVAL MOD COMPLEX 30 MIN: CPT

## 2022-12-01 NOTE — CONSULTS
[] [address]  [place]: [phone]   F: [phone]    Physical Therapy Spine Evaluation    Date:  2022  Patient: Rojelio Nielsen  : 1965  MRN: [id number]  Physician: Leni Engel CNP     Insurance: [facility] sarah Stoner  Medical Diagnosis: lumbar spondylosis M47.816  Rehab Codes: low back pain M54.5  Onset Date: referral 22  Next 's appt. : 23  Visit Count:    Cancel/No Show: 0/0    Subjective: Patient presents to therapy with complaints of (L) sided low back pain. 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 This prompted MD appointment and referral to therapy on above listed date. Patient has had XRAYs which are below and which show degenerative issues as well as issues with scoliotic curve. Patient has done previous therapy years ago for this issue without significant change in symptoms. Patient currently not doing specific exercise program for home, but has used heating pad with some temporary improvement of symptoms. 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 Also notes increased complaints of symptoms with turning to the (L) and with bending/lifting ADLs.   CC: complaints of (L) sided back pain in the (L) paraspinals  HPI: insidious onset of symptoms which prompted MD referral on above listed date    PMHx: [] Unremarkable [] Diabetes [x] HTN  [] Pacemaker   [x] MI/Heart Problems [] Cancer [x] Arthritis [] Other:              [x] Refer to full medical chart  In EPIC     Comorbidities:   [] Obesity [] Dialysis  [] Other:   [x] Asthma/COPD [] Dementia [] Other:   [] Stroke [] Sleep apnea [] Other:   [] Vascular disease [] Rheumatic disease [] Other:     Tests: [x] X-Ray:6/9/20 [x] MRI: 4/23/20 [] Other:  Scoliosis series: There is moderate levoconvex curvature of the lumbar spine,   measuring 29 degrees, not significantly changed from previous scoliosis   series. No significant abnormal curvature of the thoracic spine. No   significant thoracic or pelvic tilt. 12 ribs bilaterally. 5 lumbar type   vertebral bodies. Sagittal alignment is within normal limits. Lumbar spine: As noted above, there is levoscoliosis of the lumbar spine. The curvature is slightly accentuated when bending right, measuring 34   degrees and improved when bending left, measuring 14 degrees. No evidence of   spondylolysis. There is superior endplate depression at L2, which is   unchanged. Vertebral body heights are otherwise preserved. There is   moderate to severe multilevel disc space narrowing, endplate spurring, and   facet arthropathy. Extensive vascular calcification. MRI 4/23/20  L1-L2: Posterior disc osteophyte complex extends into neural foramina   bilaterally. Mild bilateral facet degenerative changes. Mild bilateral   neural foraminal stenosis. No significant spinal canal stenosis. L2-L3: Posterior disc osteophyte complex extends into neural foramina   bilaterally. Mild bilateral facet degenerative changes. Moderate left   neural foraminal stenosis. Moderate right neural foraminal stenosis. Mild   bilateral lateral recess stenosis. Minimal spinal canal stenosis. L3-L4: Posterior disc osteophyte complex extends into the neural foramina   bilaterally. Moderate to severe bilateral facet degenerative changes. Severe right neural foraminal stenosis. Moderate left neural foraminal   stenosis. Severe bilateral lateral recess stenosis.   Severe spinal canal   stenosis with mass effect on the nerve roots within the spinal canal.       L4-L5: Posterior disc osteophyte complex extends into neural foramina bilaterally. Superimposed small central and left central disc herniation. Moderate to severe bilateral facet degenerative changes. Severe left neural   foraminal stenosis. Moderate right neural foraminal stenosis. Severe left   lateral recess stenosis. Mild right lateral recess stenosis. Mild spinal   canal stenosis. L5-S1: Posterior disc osteophyte complex extends into neural foramina   bilaterally. Severe left facet degenerative changes. Mild right facet   degenerative changes. Mild right neural foraminal stenosis. Moderate to   severe left neural foraminal stenosis. No significant spinal canal stenosis     Medications: [x] Refer to full medical record [] None [] Other:  Allergies:      [x] Refer to full medical record [] None [] Other:    Function:  Hand Dominance  [] Right  [] Left  Patient lives with: In what type of home []  One story   [] Two story   [] Split level   Number of stairs to enter    With handrail on the []  Right to enter   [] Left to enter   Bathroom has a []  Tub only  [] Tub/shower combo   [] Walk in shower    []  Grab bars   Washing machine is on []  Main level   [] Second level   [] Basement   Employer [employer]   Job Status [x]  Normal duty   [] Light duty   [] Off due to condition    []  Retired   [] Not employed   [] Disability  [] Other:  []  Return to work: 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   ADL/IADL Previous level of function Current level of function Who currently assists the patient with task   Bathing  [] Independent  [] Assist [] Independent  [] Assist    Dress/grooming [] Independent  [] Assist [] Independent  [] Assist    Transfer/mobility [] Independent  [] Assist [] Independent  [] Assist    Feeding [] Independent  [] Assist [] Independent  [] Assist    Toileting [] Independent  [] Assist [] Independent  [] Assist    Driving [] Independent  [] Assist [] Independent  [] Assist    Housekeeping [] Independent  [] Assist [x] Independent  [] Assist Limited with lifting/carrying; difficulty   Grocery shop/meal prep [] Independent  [] Assist [x] Independent  [] Assist Limited with prolonged ambulation, with rotation to the (L); difficulty     Gait Prior level of function Current level of function    [] Independent  [] Assist [] Independent  [] Assist   Device: [] Independent [] Independent    [] Straight Cane [] Quad cane [] Straight Cane [] Quad cane    [] Standard walker [] Rolling walker   [] 4 wheeled walker [] Standard walker [] Rolling walker   [] 4 wheeled walker    [] Wheelchair [] Wheelchair     Pain:  [x] Yes  [] No Location: (L) lumbar spine L1-L5; soft tissue in (L) lumbar region Pain Rating: (0-10 scale) 6/10  Pain altered Tx:  [] Yes  [] No  Action:    Symptoms:  [] Improving [] Worsening [] Same  Better:  [] AM    [] PM    [] Sit    [] Rise/Sit    []Stand    [] Walk    [] Lying    [] Other:  Worse: [] AM    [] PM    [] Sit    [] Rise/Sit    []Stand    [] Walk    [] Lying    [] Bend                             [] Valsalva    [] Other:  Sleep: [] OK    [] Disturbed    Objective:  ROM:  Flexion to 1/3rd distal from patella to ankles with complaints of (L) sided back discomfort  Extension 10 degrees with (L) lateral trunk sway    Patient stands with significant scoliotic curve (L) lower lumbar, (R) compensatory upper lumbar curve. TESTS (+/-) LEFT RIGHT Not Tested   SLR [] sit [] supine (-) (-) []   Hamstring (SLR) (-) (-) []   SKTC   []   DKTC   []   Slump/Dural   []   SI JT   []   ROMARIO   []   Joint Mobility   []   Cerv. Comp   []   Cerv. Distraction   []   Cerv. Alar/Transverse   []   Vertebral Artery   []   Adsons   []   Kia Florian   []   Deandre Tests ? Pain ?  Pain No Change Not Tested   RFIS [] [] [] []   AMANDA [] [] [] []   RFIL [] [] [] []   REIL [] [] [] []   Rep Prot [] [] [] []   Rep Retract [] [] [] []       OBSERVATION No Deficit Deficit Not Tested Comments   Posture       Forward Head [] [] []    Rounded Shoulders [] [] []    Kyphosis [] [] []    Lordosis [] [] []    Lateral Shift [] [] []    Scoliosis [] [] []    Iliac Crest [] [] []    PSIS [] [] []    ASIS [] [] []    Genu Valgus [] [] []    Genu Varus [] [] []    Genu Recurvatum [] [] []    Pronation [] [] []    Supination [] [] []    Leg Length Discrp [] [] []    Slumped Sitting [] [] []    Palpation [] [] [] MOD TTP (L) lumbar paraspinals   Sensation [] [] []    Edema [] [] []    Neurological [] [] []        FUNCTION Normal Difficult Unable   Sitting [] [x] []   Standing [] [x] []   Ambulation [x] [] []   Groom/Dress [x] [] []   Lift/Carry [] [x] []   Stairs [x] [] []   Bending [] [x] []   OH reach [x] [] []   Sit to Stand [x] [] []     Comments:    Assessment: Patient with limited tolerance of prolonged sitting more than 25-30 minutes and shifts in sitting, limited with prolonged standing more than 20-25 minutes, limited with repetitive overhead activity ar work, repetitive bending, (L) turning/rotation. Patient would continue to benefit from skilled physical therapy services in order to: improve sitting, standing, bending tolerance. Problem list, as detailed above:   [x] ? Back Pain: (L) paraspinals, trunk    [x] ? ROM: grossly, especially flexion    [x] ? Function: sitting more than 25 minutes, standing more than 20 minutes, work activity in the pit, bending/turning (L)        STG: (to be met in 8 treatments)  ? Pain: Improved pain levels to 1-2/10 with activity to allow improvement with general function  ? ROM: flexion to 2/3rds from patella to ankles to allow improved ability to tie shoes without pain, extension to 15 degrees with less pain to allow improved tolerance of extension at work  ?  Function: sitting tolerance to 30 minutes, standing to 25 minutes, bending/turning improved by 50% self report  Independent with Home Exercise Programs  LTG: (to be met in 12 treatments)  ? Pain: Improved pain levels to 0-1/10 with activity to allow improvement with general function  ? ROM: flexion to 2/3rds from patella to ankles to allow improved ability to tie shoes without pain, extension to 15 degrees with less pain to allow improved tolerance of extension at work  ? Function: sitting tolerance to 45 minutes, standing to 30 minutes, bending/turning improved by 80% self report  Independent with Home Exercise Programs      Patient goals: aligned up to see doctor for shoe adjustment    Functional Assessment Used: optimal instrument  Current Status Score: 9/3 50% limited for bending, lifting, carrying  Goal Status Sore:  5/3 or better    Evaluation Complexity:  History (Personal factors, comorbidities) [] 0 [x] 1-2 [] 3+   Exam (limitations, restrictions) [] 1-2 [x] 3 [] 4+   Clinical presentation (progression) [] Stable [x] Evolving  [] Unstable   Decision Making [] Low [x] Moderate [] High    [] Low Complexity [x] Moderate Complexity [] High Complexity       Rehab Potential:  [] Good  [x] Fair  [] Poor   Suggested Professional Referral:  [x] No  [] Yes:  Barriers to Goal Achievement[de-identified]  [x] No  [] Yes:  Domestic Concerns:  [x] No  [] Yes:    Pt. Education:  [x] Plans/Goals, Risks/Benefits discussed  [x] Home exercise program  Method of Education: [x] Verbal  [] Demo  [x] Written  Comprehension of Education:  [x] Verbalizes understanding. [] Demonstrates understanding. [] Needs Review. [] Demonstrates/verbalizes understanding of HEP/Ed previously given.     Treatment Plan:  [x] Therapeutic Exercise   26315  [] Iontophoresis: 4 mg/mL Dexamethasone Sodium Phosphate  mAmin  77323   [] Therapeutic Activity  33925 [] Vasopneumatic cold with compression  39187    [] Gait Training   67393 [] Ultrasound   62270   [x] Neuromuscular Re-education  22469 [x] Electrical Stimulation Unattended  82603   [x] Manual Therapy  02658 [] Electrical Stimulation Attended  [id number]   [x] Instruction in HEP  [] Lumbar/Cervical Traction  [id number]   [] Aquatic Therapy   [id number] [] Cold/hotpack    [] Massage   [id number]      [] Dry Needling, 1 or 2 muscles  82053   [] Biofeedback, first 15 minutes   87402  [] Biofeedback, additional 15 minutes   16804 [] Dry Needling, 3 or more muscles  48034       []  Medication allergies reviewed for use of    Dexamethasone Sodium Phosphate 4mg/ml     with iontophoresis treatments. Pt is not allergic. Frequency:  1-2 x/week for 12 visits    Todays Treatment:  Modalities:   Precautions:  Exercises:below exercises given 11/28/22  Exercise Reps/ Time Weight/ Level Comments   Postural education       (L) shift correction (((R) SG against wall)) 2 x 10     Lateral trunk stretch (L)  3 x 10\"                 Other:    Specific Instructions for next treatment:    Treatment Charges: Mins Units   [x] Evaluation       []  Low       [x]  Moderate       []  High 45 1   []  Modalities     []  Ther Exercise     []  Manual Therapy     []  Ther Activities     []  Aquatics     []  Neuromuscular     []  Gait Training     []  Dry Needling           1-2 muscles     []  Dry Needling           3 or more muscles     [] Vasocompression     []  Other       TOTAL TREATMENT TIME: 45    Time in: 0600      Time out: 5454    Electronically signed by: Roxana Hinds PT        Physician Signature:________________________________Date:__________________  By signing above or cosigning this note, I have reviewed this plan of care and certify a need for medically necessary rehabilitation services.      *PLEASE SIGN ABOVE AND FAX BACK ALL PAGES*

## 2022-12-05 ENCOUNTER — HOSPITAL ENCOUNTER (OUTPATIENT)
Dept: PHYSICAL THERAPY | Age: 57
Setting detail: THERAPIES SERIES
Discharge: HOME | End: 2022-12-05
Payer: MEDICAID

## 2022-12-05 PROCEDURE — 97110 THERAPEUTIC EXERCISES: CPT

## 2022-12-05 PROCEDURE — 97140 MANUAL THERAPY 1/> REGIONS: CPT

## 2022-12-07 NOTE — FLOWSHEET NOTE
[] [address]  [place]: [phone]   F: [phone]    Physical Therapy Daily Treatment Note      Date:  2022  Patient Name:  Lyn Garzon    :  1965  MRN: [id number]  Physician: Reyna Raymond CNP                                   Insurance: [facility] eval then Shanice Dunaway  Medical Diagnosis: lumbar spondylosis M47.816                Rehab Codes: low back pain M54.5  Onset Date: referral 22                       Next 's appt. : 23  Visit Count:                                 Cancel/No Show: 0/0    Subjective:  Minimal change to symptoms in the lumbar spine. Note complaints of radicular symptoms at this time, but continued significant back pain that has not changed significant since initial eval and then remains increased with rotation or moving \"just the right way\"  Pain:  [x] Yes  [] No   Location: (L) lumbar spine L1-L5; soft tissue in (L) lumbar region    Pain Rating: (0-10 scale) 6/10  Pain altered Tx:  [] Yes  [] No  Action:  Comments:    Objective:  Modalities:   Precautions:  Exercises:below exercises given 22  Exercise Reps/ Time Weight/ Level Comments   (R) SG against wall 10x      Trunk SB with wand   10 x 5\" (B)      Theraband retraction, extension 2 x 10 red      theraband Ws  15x red       paloff press  10x (B)  red             Other: rotation with OP (L); PA mobilization 10'    Specific Instructions for next treatment:      Assessment: [] Progressing toward goals. [x] No change. Added manual- significant soreness at L4-L5 with PA mobilization,  does better with rotation as far as tolerance of stretching. [] Other:    [x] Patient would continue to benefit from skilled physical therapy services in order to: improve sitting, standing, bending tolerance. STG: (to be met in 8 treatments)  ? Pain: Improved pain levels to 1-2/10 with activity to allow improvement with general function  ?  ROM: flexion to 2/3rds from patella to ankles to allow improved ability to tie shoes without pain, extension to 15 degrees with less pain to allow improved tolerance of extension at work  ? Function: sitting tolerance to 30 minutes, standing to 25 minutes, bending/turning improved by 50% self report  Independent with Home Exercise Programs  LTG: (to be met in 12 treatments)  ? Pain: Improved pain levels to 0-1/10 with activity to allow improvement with general function  ? ROM: flexion to 2/3rds from patella to ankles to allow improved ability to tie shoes without pain, extension to 15 degrees with less pain to allow improved tolerance of extension at work  ? Function: sitting tolerance to 45 minutes, standing to 30 minutes, bending/turning improved by 80% self report  Independent with Home Exercise Programs    Pt. Education:  [x] Yes  [] No  [x] Reviewed Prior HEP/Ed  Method of Education: [x] Verbal  [] Demo  [] Written  Comprehension of Education:  [x] Verbalizes understanding. [] Demonstrates understanding. [] Needs review. [] Demonstrates/verbalizes HEP/Ed previously given. Plan: [] Continue per plan of care.    [] Other:      Treatment Charges: Mins Units   []  Modalities     []  Ther Exercise 20 1   []  Manual Therapy 10 1   []  Ther Activities     []  Aquatics     []  Neuromuscular     [] Vasocompression     [] Gait Training     [] Dry needling        [] 1 or 2 muscles        [] 3 or more muscles     []  Other     Total Treatment time 30 2     Time In:0500            Time Out: 0530    Electronically signed by:  Geo Brink PT

## 2022-12-12 ENCOUNTER — HOSPITAL ENCOUNTER (OUTPATIENT)
Dept: PHYSICAL THERAPY | Age: 57
Setting detail: THERAPIES SERIES
Discharge: HOME | End: 2022-12-12
Payer: MEDICAID

## 2022-12-12 PROCEDURE — 97140 MANUAL THERAPY 1/> REGIONS: CPT

## 2022-12-12 PROCEDURE — G0283 ELEC STIM OTHER THAN WOUND: HCPCS

## 2022-12-12 PROCEDURE — 97110 THERAPEUTIC EXERCISES: CPT

## 2022-12-14 NOTE — FLOWSHEET NOTE
[] [address]  [place]: [phone]   F: [phone]    Physical Therapy Daily Treatment Note      Date:  2022  Patient Name:  Lorie Montana    :  1965  MRN: [id number]  Physician: Dorcas Mathews CNP                                   Insurance: [facility] eval then [address] [place] [address]  Medical Diagnosis: lumbar spondylosis M47.816                Rehab Codes: low back pain M54.5  Onset Date: referral 22                       Next 's appt. 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 Pain:  [x] Yes  [] No   Location: (L) lumbar spine L1-L5; soft tissue in (L) lumbar region    Pain Rating: (0-10 scale) 6/10  Pain altered Tx:  [] Yes  [] No  Action:  Comments:    Objective:  Modalities: ES/HP TENS (L) paraspinal 10' prone  Precautions:  Exercises:  Exercise Reps/ Time Weight/ Level Comments   (R) SG against wall 10x      Trunk SB with wand   10 x 5\" (B)      Theraband retraction, extension 2 x 10 red      theraband Ws  15x red       paloff press  10x (B)  red             Other: rotation with OP (L); soft tissue MFR (L) paraspinals 10'    Specific Instructions for next treatment:      Assessment: [] Progressing toward goals. [x] No change. Very sore last treatment and after last treatment with the PA mobilization. Changed program to include ES and HP at the end of treatment to the (L) side and then also soft tissue massage to the (L) paraspinals. Stated that the felt better leaving than entering the clinic. [] Other:    [x] Patient would continue to benefit from skilled physical therapy services in order to: improve sitting, standing, bending tolerance. STG: (to be met in 8 treatments)  ?  Pain: Improved pain levels to 1-2/10 with activity to allow improvement with general function  ? ROM: flexion to 2/3rds from patella to ankles to allow improved ability to tie shoes without pain, extension to 15 degrees with less pain to allow improved tolerance of extension at work  ? Function: sitting tolerance to 30 minutes, standing to 25 minutes, bending/turning improved by 50% self report  Independent with Home Exercise Programs  LTG: (to be met in 12 treatments)  ? Pain: Improved pain levels to 0-1/10 with activity to allow improvement with general function  ? ROM: flexion to 2/3rds from patella to ankles to allow improved ability to tie shoes without pain, extension to 15 degrees with less pain to allow improved tolerance of extension at work  ? Function: sitting tolerance to 45 minutes, standing to 30 minutes, bending/turning improved by 80% self report  Independent with Home Exercise Programs    Pt. Education:  [x] Yes  [] No  [x] Reviewed Prior HEP/Ed  Method of Education: [x] Verbal  [] Demo  [] Written  Comprehension of Education:  [x] Verbalizes understanding. [] Demonstrates understanding. [] Needs review. [] Demonstrates/verbalizes HEP/Ed previously given. Plan: [] Continue per plan of care.    [] Other:      Treatment Charges: Mins Units   []  Modalities 10 1   []  Ther Exercise 20 1   []  Manual Therapy 10 1   []  Ther Activities     []  Aquatics     []  Neuromuscular     [] Vasocompression     [] Gait Training     [] Dry needling        [] 1 or 2 muscles        [] 3 or more muscles     []  Other     Total Treatment time 40 2     Time In:9530            Time Out: 0386    Electronically signed by:  aNte Leach, PT

## 2022-12-17 ENCOUNTER — HOSPITAL ENCOUNTER (OUTPATIENT)
Age: 57
Discharge: HOME | End: 2022-12-17
Payer: MEDICAID

## 2022-12-17 LAB
CHOLESTEROL, FASTING: 225 MG/DL
CHOLESTEROL/HDL RATIO: 4.2
CREATININE URINE: 27.9 MG/DL (ref 39–259)
HAV IGM SER IA-ACNC: NONREACTIVE
HDLC SERPL-MCNC: 53 MG/DL
HEPATITIS B CORE IGM ANTIBODY: NONREACTIVE
HEPATITIS B SURFACE ANTIGEN: NONREACTIVE
HEPATITIS C ANTIBODY: NONREACTIVE
LDL CHOLESTEROL: 147 MG/DL (ref 0–130)
MICROALBUMIN/CREAT 24H UR: <12 MG/L
MICROALBUMIN/CREAT UR-RTO: ABNORMAL MCG/MG CREAT
PROSTATE SPECIFIC ANTIGEN: 0.85 NG/ML
TRIGLYCERIDE, FASTING: 123 MG/DL
TSH SERPL DL<=0.05 MIU/L-ACNC: 1.01 UIU/ML (ref 0.3–5)

## 2022-12-17 PROCEDURE — 80061 LIPID PANEL: CPT

## 2022-12-17 PROCEDURE — 87661 TRICHOMONAS VAGINALIS AMPLIF: CPT

## 2022-12-17 PROCEDURE — 83036 HEMOGLOBIN GLYCOSYLATED A1C: CPT

## 2022-12-17 PROCEDURE — 82043 UR ALBUMIN QUANTITATIVE: CPT

## 2022-12-17 PROCEDURE — G0103 PSA SCREENING: HCPCS

## 2022-12-17 PROCEDURE — 80074 ACUTE HEPATITIS PANEL: CPT

## 2022-12-17 PROCEDURE — 87389 HIV-1 AG W/HIV-1&-2 AB AG IA: CPT

## 2022-12-17 PROCEDURE — 86780 TREPONEMA PALLIDUM: CPT

## 2022-12-17 PROCEDURE — 87591 N.GONORRHOEAE DNA AMP PROB: CPT

## 2022-12-17 PROCEDURE — 84443 ASSAY THYROID STIM HORMONE: CPT

## 2022-12-17 PROCEDURE — 82570 ASSAY OF URINE CREATININE: CPT

## 2022-12-17 PROCEDURE — 36415 COLL VENOUS BLD VENIPUNCTURE: CPT

## 2022-12-17 PROCEDURE — 87491 CHLMYD TRACH DNA AMP PROBE: CPT

## 2022-12-18 LAB
C. TRACHOMATIS DNA ,URINE: NEGATIVE
ESTIMATED AVERAGE GLUCOSE: 117 MG/DL
HBA1C MFR BLD: 5.7 % (ref 4–6)
HIV AG/AB: NONREACTIVE
N. GONORRHOEAE DNA, URINE: NEGATIVE
SOURCE: ABNORMAL
SPECIMEN DESCRIPTION: NORMAL
T. PALLIDUM, IGG: NONREACTIVE
TRICHOMONAS VAGINALI, MOLECULAR: POSITIVE

## 2022-12-19 ENCOUNTER — HOSPITAL ENCOUNTER (OUTPATIENT)
Dept: PHYSICAL THERAPY | Age: 57
Setting detail: THERAPIES SERIES
Discharge: HOME | End: 2022-12-19
Payer: MEDICAID

## 2023-01-01 ENCOUNTER — HOSPITAL ENCOUNTER (EMERGENCY)
Age: 58
Discharge: HOME | End: 2023-01-01
Attending: EMERGENCY MEDICINE
Payer: MEDICAID

## 2023-01-01 VITALS
SYSTOLIC BLOOD PRESSURE: 137 MMHG | TEMPERATURE: 96.9 F | RESPIRATION RATE: 18 BRPM | OXYGEN SATURATION: 98 % | HEART RATE: 80 BPM | DIASTOLIC BLOOD PRESSURE: 84 MMHG

## 2023-01-01 DIAGNOSIS — H57.13 PAIN OF BOTH EYES: Primary | ICD-10-CM

## 2023-01-01 PROCEDURE — 6370000000 HC RX 637 (ALT 250 FOR IP): Performed by: STUDENT IN AN ORGANIZED HEALTH CARE EDUCATION/TRAINING PROGRAM

## 2023-01-01 PROCEDURE — 2500000003 HC RX 250 WO HCPCS: Performed by: STUDENT IN AN ORGANIZED HEALTH CARE EDUCATION/TRAINING PROGRAM

## 2023-01-01 PROCEDURE — 99283 EMERGENCY DEPT VISIT LOW MDM: CPT

## 2023-01-01 RX ORDER — CETIRIZINE HYDROCHLORIDE 10 MG/1
10 TABLET ORAL DAILY
Qty: 30 TABLET | Refills: 0 | Status: SHIPPED | OUTPATIENT
Start: 2023-01-01 | End: 2023-01-31

## 2023-01-01 RX ORDER — AZELASTINE HYDROCHLORIDE 0.5 MG/ML
1 SOLUTION/ DROPS OPHTHALMIC 2 TIMES DAILY
Qty: 1 EACH | Refills: 0 | Status: SHIPPED | OUTPATIENT
Start: 2023-01-01 | End: 2023-01-31

## 2023-01-01 RX ORDER — PROPARACAINE HYDROCHLORIDE 5 MG/ML
1 SOLUTION/ DROPS OPHTHALMIC ONCE
Status: COMPLETED | OUTPATIENT
Start: 2023-01-01 | End: 2023-01-01

## 2023-01-01 RX ADMIN — FLUORESCEIN SODIUM 1 MG: 1 STRIP OPHTHALMIC at 10:49

## 2023-01-01 RX ADMIN — PROPARACAINE HYDROCHLORIDE 1 DROP: 5 SOLUTION/ DROPS OPHTHALMIC at 10:50

## 2023-01-01 ASSESSMENT — ENCOUNTER SYMPTOMS
EYE DISCHARGE: 0
EYE ITCHING: 1
VOMITING: 0
ABDOMINAL PAIN: 0
NAUSEA: 0
EYE REDNESS: 1
SHORTNESS OF BREATH: 0
EYE PAIN: 1

## 2023-01-01 NOTE — DISCHARGE INSTRUCTIONS
Begin Zyrtec daily. Use Optivar eyedrops as needed for itching/pain. Call tomorrow to schedule follow-up appoint with ophthalmology soon as possible. Return to the ED if you develop worsening pain, changes in vision, fever, or other new/concerning symptoms.

## 2023-01-01 NOTE — ED NOTES
Pt to ed with c/o itching, burning, watering eyes. Pt states his left eye has been bothering him for over a month and yesterday into today the right eye started itching and burning.       Aga Porter RN  01/01/23 [id number]

## 2023-01-01 NOTE — ED NOTES
Dr. Mary Grace Alan and Dr. Jessica Serrato at bedside to evaluate pt     Nessa Suarez RN  01/01/23 2045

## 2023-01-01 NOTE — ED PROVIDER NOTES
[address] ED  Emergency Department Encounter  Emergency Medicine Resident     Pt Name: Betito Osorio  GZS:[id number]  Birthdate 1965  Date of evaluation: 1/1/23  PCP:  DONNY Gaines    CHIEF COMPLAINT       Chief Complaint   Patient presents with    Eye Problem     Itching, watering, burning eyes. Left eye ongoing for over a month, right eye started this am.      HISTORY OF PRESENT ILLNESS  (Location/Symptom, Timing/Onset, Context/Setting, Quality, Duration, ModifyingFactors, Severity.)      Betito Osorio is a 59 y.o. male who presents for evaluation of bilateral eye itching and pain. Patient states that for the past month his left has been itchy with associated burning pain. Over the past few days he is having similar symptoms in the right so wanted to be evaluated. Associated photophobia. Patient notes he has poor vision at baseline but does not feel that this is changed. Does not wear contacts or glasses. Patient does note itching of penis. Treated for trichomonas 1-2 days. No genital sores or penile discharge. No fever, chills, unintentional weight loss, chest pain, shortness of breath, abdominal pain, diarrhea, constipation, testicular pain.     PAST MEDICAL / SURGICAL / SOCIAL /FAMILY HISTORY      has a past medical history of Acute respiratory failure ([address] Alcohol abuse, Anxiety, Arthritis, Cardiomyopathy ([address] Closed fracture of left wrist, Closed fracture of multiple ribs, Conjunctivitis, COPD (chronic obstructive pulmonary disease) ([address] DDD (degenerative disc disease), lumbar, Depression, Dyslipidemia, Elevated C-reactive protein, Fever, Heart burn, Hemorrhoids, History of recent hospitalization, HTN (hypertension), Hypokalemia, Hyponatremia, Ilioinguinal neuralgia of right side, Infected hardware in right leg ([address] Leukocytosis, Lives in group home, MRSA bacteremia, Psychiatric problem, Rhabdomyolysis, Seizures ([address] Severe malnutrition ([address] Superficial venous thrombosis of left arm, Syncope and collapse, Wears glasses, and Wellness examination. No other pertinent PMH on review with patient/guardian. has a past surgical history that includes hernia repair; Toe Surgery; Hemorrhoid surgery (03/19/2015); Nerve Block (Right, 10/10/2016); other surgical history (Right, 04/10/2017); Anesthesia Nerve Block (Right, 04/10/2017); IR CHEST TUBE INSERTION (08/20/2021); Hardware Removal (Right, 12/14/2021); and Finger Closed Reduction (Right, 12/14/2021). No other pertinent PSH on review with patient/guardian. Social History     Socioeconomic History    Marital status:      Spouse name: Not on file    Number of children: Not on file    Years of education: Not on file    Highest education level: Not on file   Occupational History    Not on file   Tobacco Use    Smoking status: Every Day     Packs/day: 1.00     Years: 38.00     Pack years: 38.00     Types: Cigarettes    Smokeless tobacco: Never    Tobacco comments:     1 PPD   Vaping Use    Vaping Use: Never used   Substance and Sexual Activity    Alcohol use: Not Currently     Alcohol/week: 0.0 standard drinks     Comment: has not drank for at least 4 months    Drug use: Not Currently     Types: Marijuana [place]), Cocaine     Comment:  quit at least 4-6 months ago    Sexual activity: Never   Other Topics Concern    Not on file   Social History Narrative    Not on file     Social Determinants of Health     Financial Resource Strain: Not on file   Food Insecurity: Not on file   Transportation Needs: Not on file   Physical Activity: Not on file   Stress: Not on file   Social Connections: Not on file   Intimate Partner Violence: Not on file   Housing Stability: Not on file     I counseled the patient against using tobacco products. Family History   Problem Relation Age of Onset    Cancer Mother         colon ca     No other pertinent FamHx on review with patient/guardian.     Allergies:  Nickel    Home Medications:  Prior to Admission medications    Medication Sig Start Date End Date Taking? Authorizing Provider   cetirizine (ZYRTEC) 10 MG tablet Take 1 tablet by mouth daily 1/1/23 1/31/23 Yes Rodolfo Lerma DO   azelastine (OPTIVAR) 0.05 % ophthalmic solution Place 1 drop into both eyes 2 times daily 1/1/23 1/31/23 Yes Rodolfo Lerma DO   acetaminophen (TYLENOL) 500 MG tablet Take 1 tablet by mouth 4 times daily as needed for Pain 11/16/22   Maty Barger, APRN - CNP   triamcinolone (KENALOG) 0.1 % cream APPLY TO RASH TWICE DAILY (NOT FACE, ARMPIT OR GROIN).  NEEDS 1 LB JAR/30 DAYS FOR FULL BODY RASH 4/18/22   Cruz Honeycutt MD   clobetasol (TEMOVATE) 0.05 % ointment Apply to rash twice daily (not face, armpit or groin) 2/22/22   Ez Maddox MD   camphor-menthol (SARNA) 0.5-0.5 % lotion Apply topically as needed for itching, up to 4x daily 2/22/22   Ez Maddox MD   NONFORMULARY Taking an antibiotic last Sunday been on for 10 days     Historical Provider, MD   NONFORMULARY Patient on tylenol or asa not sure of dose     Historical Provider, MD   ibuprofen (ADVIL;MOTRIN) 800 MG tablet Take 1 tablet by mouth every 8 hours as needed for Pain 12/14/21 1/13/22  Freddie Arciniega,    vitamin B-1 (THIAMINE) 100 MG tablet  10/12/21   Historical Provider, MD   clindamycin (CLEOCIN) 900 MG/6ML injection  9/4/21   Historical Provider, MD   clotrimazole (LOTRIMIN) 1 % cream 1 (ONE) APPLICATION TO AFFECTED AREA TWICE DAILY 10/14/21   Historical Provider, MD   tamsulosin (FLOMAX) 0.4 MG capsule Take 1 capsule by mouth daily 9/1/21   Tenisha Harry MD   albuterol sulfate  (90 Base) MCG/ACT inhaler INHALE 2 PUFFS INTO THE LUNGS EVERY 6 HOURS AS NEEDED FOR WHEEZING 12/10/20   Luciano Pardo MD   tiotropium (SPIRIVA HANDIHALER) 18 MCG inhalation capsule Inhale 1 capsule into the lungs daily 11/10/20   Brian Ferreira MD   ARNUITY ELLIPTA 100 MCG/ACT AEPB INHALE 1 PUFF INTO THE LUNGS DAILY 9/7/20 Angela Turner MD     REVIEW OF SYSTEMS    (2-9 systems for level 4, 10 ormore for level 5)      Review of Systems   Constitutional:  Negative for fever. Eyes:  Positive for pain, redness and itching. Negative for discharge and visual disturbance. Respiratory:  Negative for shortness of breath. Cardiovascular:  Negative for chest pain. Gastrointestinal:  Negative for abdominal pain, nausea and vomiting. Genitourinary:  Negative for dysuria, hematuria, penile discharge, penile pain, testicular pain and urgency. Positive for penile itching. Skin:  Negative for rash. Allergic/Immunologic: Negative for immunocompromised state. Neurological:  Negative for headaches. Hematological:  Does not bruise/bleed easily. PHYSICAL EXAM   (up to 7 for level 4, 8 or more for level 5)      INITIAL VITALS:   /84   Pulse 80   Temp 96.9 °F (36.1 °C) (Oral)   Resp 18   SpO2 98%     Physical Exam  Constitutional:       General: He is not in acute distress. Appearance: Normal appearance. He is not ill-appearing, toxic-appearing or diaphoretic. HENT:      Head: Normocephalic and atraumatic. Right Ear: External ear normal.      Left Ear: External ear normal.   Eyes:      General:         Right eye: No discharge. Left eye: No discharge. Extraocular Movements: Extraocular movements intact. Conjunctiva/sclera: Conjunctivae normal.      Pupils: Pupils are equal, round, and reactive to light. Comments: There is mild erythema surrounding eyes but no warmth, tenderness. No cell and flare on slit-lamp exam.  No fluorescein uptake. No pain with light testing. Cardiovascular:      Rate and Rhythm: Normal rate and regular rhythm. Pulses: Normal pulses. Heart sounds: No murmur heard. Pulmonary:      Effort: Pulmonary effort is normal. No respiratory distress. Breath sounds: Normal breath sounds. No wheezing, rhonchi or rales.    Abdominal: Palpations: Abdomen is soft. Tenderness: There is no abdominal tenderness. Genitourinary:     Comments: No erythema, warmth, rash of penis or testes. Exam chaperoned by Dr. Eladio Layton.   Skin:     Capillary Refill: Capillary refill takes less than 2 seconds. Neurological:      General: No focal deficit present. Mental Status: He is alert. DIFFERENTIAL  DIAGNOSIS     PLAN (LABS / IMAGING / EKG):  No orders of the defined types were placed in this encounter. MEDICATIONS ORDERED:  Orders Placed This Encounter   Medications    fluorescein ophthalmic strip 1 mg    proparacaine (ALCAINE) 0.5 % ophthalmic solution 1 drop    cetirizine (ZYRTEC) 10 MG tablet     Sig: Take 1 tablet by mouth daily     Dispense:  30 tablet     Refill:  0    azelastine (OPTIVAR) 0.05 % ophthalmic solution     Sig: Place 1 drop into both eyes 2 times daily     Dispense:  1 each     Refill:  0       DIAGNOSTIC RESULTS / EMERGENCY DEPARTMENT COURSE / MDM     LABS:  No results found for this visit on 01/01/23. IMPRESSION/MDM/ED COURSE:  59 y.o. male presented with lateral eye itching and burning pain, left x1 month, right for the past few days. Patient febrile vitals WNL. On exam patient resting calmly no acute distress, nontoxic-appearing. Heart RRR, lungs clear. Abdomen soft nontender. There is mild erythema surrounding eyes but no warmth, tenderness. PERRL, EOMI. Concern for uveitis versus allergic conjunctivitis. Mild erythema around eyes that appears to be from rubbing eyes, no warmth concerning for preseptal or orbital cellulitis. Patient does complain of penile itching but no rash, erythema, warmth,  genital sores on exam. No urinary symptoms, joint pain. Recent treatment for trichomonas, discussed need to have significant other treated as well. Planning to follow up with PCP regarding this.        ED Course as of 01/01/23 1410   Sun Jan 01, 2023   1404 No cell and flare on slit-lamp exam.  No pain with light testing. No fluorescein uptake. We will treat for allergic conjunctivitis with Zyrtec and Optivar. Recommend close follow-up with ophthalmology. I discussed signs and symptoms that would require reevaluation in the ED. The patient expressed understanding and agreement with plan. All questions answered. [AF]      ED Course User Index  [AF] Abby Osorio DO     RADIOLOGY:  No orders to display       EKG  None    All EKG's are interpreted by the Emergency Department Physician who either signs or Co-signs this chart in the absence of a cardiologist.    PROCEDURES:  None    CONSULTS:  None    FINAL IMPRESSION      1.  Pain of both eyes          DISPOSITION / PLAN     DISPOSITION Decision To Discharge 01/01/2023 11:37:11 AM      PATIENT REFERREDTO:  DONNY Dahl  [facility]  [phone]          [facility]  [address] [place] [phone]  Schedule an appointment as soon as possible for a visit       DISCHARGE MEDICATIONS:  Discharge Medication List as of 1/1/2023 11:47 AM        START taking these medications    Details   cetirizine (ZYRTEC) 10 MG tablet Take 1 tablet by mouth daily, Disp-30 tablet, R-0Print      azelastine (OPTIVAR) 0.05 % ophthalmic solution Place 1 drop into both eyes 2 times daily, Disp-1 each, R-0Print             Luz Swenson DO  PGY 3  Resident Physician Emergency Medicine  01/01/23 2:10 PM    (Please note that portions of this note were completed with a voice recognition program.Efforts were made to edit the dictations but occasionally words are mis-transcribed.)       Abby Osorio DO  Resident  01/01/23 0192

## 2023-01-01 NOTE — ED PROVIDER NOTES
[address] [facility]     Emergency Department     Faculty Attestation        I performed a history and physical examination of the patient and discussed management with the resident. I reviewed the residents note and agree with the documented findings and plan of care. Any areas of disagreement are noted on the chart. I was personally present for the key portions of any procedures. I have documented in the chart those procedures where I was not present during the key portions. I have reviewed the emergency nurses triage note. I agree with the chief complaint, past medical history, past surgical history, allergies, medications, social and family history as documented unless otherwise noted below. For Physician Assistant/ Nurse Practitioner cases/documentation I have personally evaluated this patient and have completed at least one if not all key elements of the E/M (history, physical exam, and MDM). Additional findings are as noted. Vital Signs: BP: 137/84  Heart Rate: 80  Resp: 18  Temp: 96.9 °F (36.1 °C) SpO2: 98 %  PCP:  DONNY Meneses    Pertinent Comments:     Patient is a 47-year-old male who for the last 4 weeks to a month has been having left eye itching now moving to the right eye. No obvious new exposures but does have some mild clear drainage and occasional crusting. Denies any pain associated. No new exposures as stated above and no new safety glasses or other eye exposures. Denies any recent URI symptoms either. On exam patient does have some conjunctival injection bilaterally with clear drainage. Pupils are briskly reactive to light at 3 mm and equal bilaterally. Denies any vision changes. Assessment/plan: Patient with likely allergic conjunctivitis however awaiting fluorescein staining and further examination at the bedside.    Reevaluate after    Critical Care  None      (Please note that portions of this note were completed with a voice recognition program. Efforts were made to edit the dictations but occasionally words are mis-transcribed.  Whenever words are used in this note in any gender, they shall be construed as though they were used in the gender appropriate to the circumstances; and whenever words are used in this note in the singular or plural form, they shall be construed as though they were used in the form appropriate to the circumstances.)    Pramod Brady MD [facility]  Attending Emergency Medicine Physician            Milton Ríos MD  01/01/23 3091

## 2023-01-23 ENCOUNTER — HOSPITAL ENCOUNTER (OUTPATIENT)
Dept: PHYSICAL THERAPY | Age: 58
Setting detail: THERAPIES SERIES
Discharge: HOME | End: 2023-01-23

## 2023-01-23 NOTE — FLOWSHEET NOTE
[x] [facility] Therapy  [address]  [place]: [phone]   F: [phone]    Physical Therapy CXl/NS      Date:  2022  Patient Name:  Kain Lugo    :  1965  MRN: [id number]  Physician: Larry Bell CNP                                   Insurance: [address]  Medical Diagnosis: lumbar spondylosis M47.816                Rehab Codes: low back pain M54.5  Onset Date: referral 22                       Next 's appt. : 23  Visit Count: 3/12                                Cancel/No Show:     Did not show for appointment today- unable to reach patient.     Electronically signed by:  Anju Martin PT

## 2023-01-30 ENCOUNTER — HOSPITAL ENCOUNTER (OUTPATIENT)
Dept: PHYSICAL THERAPY | Age: 58
Setting detail: THERAPIES SERIES
Discharge: HOME | End: 2023-01-30
Payer: MEDICAID

## 2023-01-30 PROCEDURE — G0283 ELEC STIM OTHER THAN WOUND: HCPCS

## 2023-01-30 PROCEDURE — 97110 THERAPEUTIC EXERCISES: CPT

## 2023-02-02 NOTE — FLOWSHEET NOTE
[] [address]  [place]: [phone]   F: [phone]    Physical Therapy Daily Treatment Note/PROGRESS NOTE/Discharge      Date:  2022  Patient Name:  Sara Bucio    :  1965  MRN: [id number]  Physician: Fredrick Mckeon CNP                                   Insurance: [facility]  Medical Diagnosis: lumbar spondylosis M47.816                Rehab Codes: low back pain M54.5  Onset Date: referral 22                       Next 's appt. : 23  Visit Count:                                 Cancel/No Show:     Subjective:  Continues to note significant low back pain at this time, no significant change of improvement in symptoms with therapy at this time. In fact, feels like exercise program typically irritates his symptoms. Does get partial relief with use of TENS unit and hotpack at the end of therapy session. We discussed possible use of home TENs unit at this time secondary to temporary improvement of muscle symptoms/pain with this. 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 Pain:  [x] Yes  [] No   Location: (L) lumbar spine L1-L5; soft tissue in (L) lumbar region    Pain Rating: (0-10 scale) 6/10  Pain altered Tx:  [] Yes  [] No  Action:  Comments:    Objective:  Modalities: ES/HP TENS (L) paraspinal 10' prone  Precautions:  Exercises:  Exercise Reps/ Time Weight/ Level Comments   (R) SG against wall 10x      Trunk SB with wand   10 x 5\" (B)                                   Specific Instructions for next treatment:      Assessment: [] Progressing toward goals. [x] No change. Continued soreness/pain. Improved symptoms more recently with change of job tasks.   Temporary improvement of muscle symptoms with TENS and discussed doing this at home with home unit. Also showed patient basic sidebending and side glide exercises to help with lower back scoliotic curve. Fair prognosis for full function secondary to minimal progress with therapy and due to long term nature of scoliotic issues. [] Other:    [x] Patient would continue to benefit from skilled physical therapy services in order to: improve sitting, standing, bending tolerance. STG: (to be met in 8 treatments)  ? Pain: Improved pain levels to 1-2/10 with activity to allow improvement with general function NOT MET  ? ROM: flexion to 2/3rds from patella to ankles to allow improved ability to tie shoes without pain, extension to 15 degrees with less pain to allow improved tolerance of extension at work NOT MET  ? Function: sitting tolerance to 30 minutes, standing to 25 minutes, bending/turning improved by 50% self report NOT MET  Independent with Home Exercise Programs  LTG: (to be met in 12 treatments)  ? Pain: Improved pain levels to 0-1/10 with activity to allow improvement with general function  ? ROM: flexion to 2/3rds from patella to ankles to allow improved ability to tie shoes without pain, extension to 15 degrees with less pain to allow improved tolerance of extension at work  ? Function: sitting tolerance to 45 minutes, standing to 30 minutes, bending/turning improved by 80% self report  Independent with Home Exercise Programs    Pt. Education:  [x] Yes  [] No  [x] Reviewed Prior HEP/Ed  Method of Education: [x] Verbal  [] Demo  [x] Written  Comprehension of Education:  [x] Verbalizes understanding. [] Demonstrates understanding. [] Needs review. [] Demonstrates/verbalizes HEP/Ed previously given. Plan: [] Continue per plan of care.    [] Other:      Treatment Charges: Mins Units   []  Modalities 10 1   []  Ther Exercise 20 1   []  Manual Therapy     []  Ther Activities     []  Aquatics     []  Neuromuscular     [] Vasocompression     [] Gait Training     [] Dry needling        [] 1 or 2 muscles        [] 3 or more muscles     []  Other     Total Treatment time 30 2     Time In:0500          Time Out: 4654    Electronically signed by:  Moose Rangel PT

## 2023-03-31 ENCOUNTER — OFFICE VISIT (OUTPATIENT)
Dept: NEUROSURGERY | Age: 58
End: 2023-03-31
Payer: MEDICAID

## 2023-03-31 VITALS
OXYGEN SATURATION: 97 % | BODY MASS INDEX: 24.37 KG/M2 | HEART RATE: 72 BPM | SYSTOLIC BLOOD PRESSURE: 126 MMHG | WEIGHT: 165 LBS | DIASTOLIC BLOOD PRESSURE: 80 MMHG | TEMPERATURE: 97.6 F | RESPIRATION RATE: 22 BRPM

## 2023-03-31 DIAGNOSIS — M48.061 SPINAL STENOSIS OF LUMBAR REGION WITHOUT NEUROGENIC CLAUDICATION: Primary | ICD-10-CM

## 2023-03-31 DIAGNOSIS — M47.816 LUMBAR FACET ARTHROPATHY: ICD-10-CM

## 2023-03-31 PROCEDURE — G8427 DOCREV CUR MEDS BY ELIG CLIN: HCPCS | Performed by: NURSE PRACTITIONER

## 2023-03-31 PROCEDURE — 3017F COLORECTAL CA SCREEN DOC REV: CPT | Performed by: NURSE PRACTITIONER

## 2023-03-31 PROCEDURE — G8484 FLU IMMUNIZE NO ADMIN: HCPCS | Performed by: NURSE PRACTITIONER

## 2023-03-31 PROCEDURE — 99213 OFFICE O/P EST LOW 20 MIN: CPT | Performed by: NURSE PRACTITIONER

## 2023-03-31 PROCEDURE — G8420 CALC BMI NORM PARAMETERS: HCPCS | Performed by: NURSE PRACTITIONER

## 2023-03-31 PROCEDURE — 4004F PT TOBACCO SCREEN RCVD TLK: CPT | Performed by: NURSE PRACTITIONER

## 2023-03-31 NOTE — PROGRESS NOTES
drank for at least 4 months    Drug use: Not Currently     Types: Marijuana [place]), Cocaine     Comment:  quit at least 4-6 months ago       Allergies   Allergen Reactions    Nickel Rash     Contact dermatitis       Review of Systems  Constitutional: Negative for activity change and appetite change. HENT: Negative for ear pain and facial swelling. Eyes: Negative for discharge and itching. Respiratory: Negative for choking and chest tightness. Cardiovascular: Negative for chest pain and leg swelling. Gastrointestinal: Negative for nausea and abdominal pain. Endocrine: Negative for cold intolerance and heat intolerance. Genitourinary: Negative for frequency and flank pain. Musculoskeletal: Negative for myalgias and joint swelling. Skin: Negative for rash and wound. Allergic/Immunologic: Negative for environmental allergies and food allergies. Hematological: Negative for adenopathy. Does not bruise/bleed easily. Psychiatric/Behavioral: Negative for self-injury. The patient is not nervous/anxious. Physical Exam:      /80   Pulse 72   Temp 97.6 °F (36.4 °C)   Resp 22   Wt 165 lb (74.8 kg)   SpO2 97%   BMI 24.37 kg/m²   Estimated body mass index is 24.37 kg/m² as calculated from the following:    Height as of 11/16/22: 5' 9\" (1.753 m). Weight as of this encounter: 165 lb (74.8 kg). General:  Avtar Chiang is a 59y.o. year old male who appears his stated age. HEENT: Normocephalic atraumatic. Neck supple. Chest: regular rate; pulses equal  Abdomen: Soft nontender nondistended.   Ext: DP and PT pulses 2+, good cap refill  Neuro    Mentation  Appropriate affect  Registration intact  Orientation intact  Judgement intact to situation    Cranial Nerves:   Pupils equal and reactive to light  Extraocular motion intact  Face and shrug symmetric  Tongue midline  No dysarthria  v1-3 sensation symmetric, masseter tone symmetric  Hearing symmetric    Sensation: Intact on

## 2023-04-27 ENCOUNTER — HOSPITAL ENCOUNTER (OUTPATIENT)
Dept: MRI IMAGING | Age: 58
Discharge: HOME | End: 2023-04-29
Payer: MEDICAID

## 2023-04-27 DIAGNOSIS — M47.816 LUMBAR FACET ARTHROPATHY: ICD-10-CM

## 2023-04-27 DIAGNOSIS — M48.061 SPINAL STENOSIS OF LUMBAR REGION WITHOUT NEUROGENIC CLAUDICATION: ICD-10-CM

## 2023-04-27 PROCEDURE — 72148 MRI LUMBAR SPINE W/O DYE: CPT

## 2023-05-04 ENCOUNTER — HOSPITAL ENCOUNTER (OUTPATIENT)
Dept: PAIN MANAGEMENT | Age: 58
Discharge: HOME | End: 2023-05-04
Payer: MEDICAID

## 2023-05-04 ENCOUNTER — TELEPHONE (OUTPATIENT)
Dept: NEUROSURGERY | Age: 58
End: 2023-05-04

## 2023-05-04 VITALS — HEIGHT: 69 IN | TEMPERATURE: 97.3 F | WEIGHT: 165 LBS | BODY MASS INDEX: 24.44 KG/M2

## 2023-05-04 DIAGNOSIS — M47.817 LUMBOSACRAL SPONDYLOSIS WITHOUT MYELOPATHY: Primary | ICD-10-CM

## 2023-05-04 DIAGNOSIS — M54.51 VERTEBROGENIC LOW BACK PAIN: Chronic | ICD-10-CM

## 2023-05-04 DIAGNOSIS — R20.0 RIGHT LEG NUMBNESS: Chronic | ICD-10-CM

## 2023-05-04 DIAGNOSIS — M41.50 SCOLIOSIS DUE TO DEGENERATIVE DISEASE OF SPINE IN ADULT PATIENT: ICD-10-CM

## 2023-05-04 DIAGNOSIS — M51.36 DDD (DEGENERATIVE DISC DISEASE), LUMBAR: ICD-10-CM

## 2023-05-04 PROCEDURE — 99215 OFFICE O/P EST HI 40 MIN: CPT

## 2023-05-04 RX ORDER — HYDROCODONE BITARTRATE AND ACETAMINOPHEN 5; 325 MG/1; MG/1
1 TABLET ORAL EVERY 8 HOURS PRN
Qty: 45 TABLET | Refills: 0 | Status: CANCELLED | OUTPATIENT
Start: 2023-05-04 | End: 2023-05-19

## 2023-05-04 ASSESSMENT — ENCOUNTER SYMPTOMS
EYES NEGATIVE: 1
RESPIRATORY NEGATIVE: 1
BACK PAIN: 1

## 2023-05-04 ASSESSMENT — PAIN SCALES - GENERAL: PAINLEVEL_OUTOF10: 5

## 2023-05-04 NOTE — TELEPHONE ENCOUNTER
----- Message from DONNY Lara CNP sent at 5/3/2023  8:37 AM EDT -----  Multilevel degenerative changes, follow up PM as referred, follow up here as planned.

## 2023-05-04 NOTE — PROGRESS NOTES
08/21/2021    Fever 08/14/2021    Heart burn     Hemorrhoids     History of recent hospitalization     8/10/2021-9/2/2021.  ICU and intubation    HTN (hypertension) 01/19/2015    Hypokalemia 08/11/2021    Hyponatremia 08/11/2021    Ilioinguinal neuralgia of right side 04/10/2017    Infected hardware in right leg ([facility]     Right great toe NOT leg    Leukocytosis 08/21/2021    Lives in group home     Sober living     MRSA bacteremia 08/20/2021    Psychiatric problem     depression /anxiety    Rhabdomyolysis 08/11/2021    Seizures ([address] 2021    withdrawal from alcohol     Severe malnutrition ([facility] [address] 08/12/2021    Superficial venous thrombosis of left arm 08/18/2021    Syncope and collapse 06/13/2018    Wears glasses     READING    Wellness examination 11/03/2021    Dr. Nava [address]         Past Surgical History:   Procedure Laterality Date    ANESTHESIA NERVE BLOCK Right 04/10/2017    NERVE BLOCK US RIGHT SIDED ILIOINGUINAL performed by Lucina Luz MD at [facility] Right 12/14/2021    HARDWARE REMOVAL RIGHT GREAT TOE, C-ARM performed by Neisha Razo DO at [address] Right 12/14/2021    HARDWARE REMOVAL RIGHT GREAT TOE    HEMORRHOID SURGERY  03/19/2015    HERNIA REPAIR      IR CHEST TUBE INSERTION  08/20/2021    IR CHEST TUBE INSERTION 8/20/2021 [facility] SPECIAL PROCEDURES    NERVE BLOCK Right 10/10/2016    right groin    OTHER SURGICAL HISTORY Right 04/10/2017    US nerve block to R groin    TOE SURGERY      SCREW RIGHT BIG TOE       Social History     Socioeconomic History    Marital status:      Spouse name: None    Number of children: None    Years of education: None    Highest education level: None   Tobacco Use    Smoking status: Every Day     Packs/day: 1.00     Years: 38.00     Pack years: 38.00     Types: Cigarettes    Smokeless tobacco: Never    Tobacco comments:     1 PPD   Vaping Use    Vaping Use: Never used   Substance and Sexual

## 2023-05-24 ENCOUNTER — HOSPITAL ENCOUNTER (OUTPATIENT)
Dept: PAIN MANAGEMENT | Facility: CLINIC | Age: 58
Discharge: HOME | End: 2023-05-24
Payer: MEDICAID

## 2023-05-24 VITALS
RESPIRATION RATE: 13 BRPM | TEMPERATURE: 97.3 F | HEART RATE: 67 BPM | HEIGHT: 69 IN | WEIGHT: 165 LBS | DIASTOLIC BLOOD PRESSURE: 91 MMHG | SYSTOLIC BLOOD PRESSURE: 141 MMHG | BODY MASS INDEX: 24.44 KG/M2 | OXYGEN SATURATION: 94 %

## 2023-05-24 DIAGNOSIS — M41.50 SCOLIOSIS DUE TO DEGENERATIVE DISEASE OF SPINE IN ADULT PATIENT: ICD-10-CM

## 2023-05-24 DIAGNOSIS — M47.817 LUMBOSACRAL SPONDYLOSIS WITHOUT MYELOPATHY: Primary | ICD-10-CM

## 2023-05-24 DIAGNOSIS — R52 PAIN MANAGEMENT: ICD-10-CM

## 2023-05-24 PROCEDURE — 64494 INJ PARAVERT F JNT L/S 2 LEV: CPT

## 2023-05-24 PROCEDURE — 6360000002 HC RX W HCPCS: Performed by: ANESTHESIOLOGY

## 2023-05-24 PROCEDURE — 6360000004 HC RX CONTRAST MEDICATION: Performed by: ANESTHESIOLOGY

## 2023-05-24 PROCEDURE — 64493 INJ PARAVERT F JNT L/S 1 LEV: CPT

## 2023-05-24 PROCEDURE — 64495 INJ PARAVERT F JNT L/S 3 LEV: CPT

## 2023-05-24 PROCEDURE — 2500000003 HC RX 250 WO HCPCS: Performed by: ANESTHESIOLOGY

## 2023-05-24 RX ORDER — LIDOCAINE HYDROCHLORIDE 10 MG/ML
INJECTION, SOLUTION EPIDURAL; INFILTRATION; INTRACAUDAL; PERINEURAL
Status: COMPLETED | OUTPATIENT
Start: 2023-05-24 | End: 2023-05-24

## 2023-05-24 RX ORDER — MIDAZOLAM HYDROCHLORIDE 2 MG/2ML
INJECTION, SOLUTION INTRAMUSCULAR; INTRAVENOUS
Status: COMPLETED | OUTPATIENT
Start: 2023-05-24 | End: 2023-05-24

## 2023-05-24 RX ORDER — FENTANYL CITRATE 50 UG/ML
INJECTION, SOLUTION INTRAMUSCULAR; INTRAVENOUS
Status: COMPLETED | OUTPATIENT
Start: 2023-05-24 | End: 2023-05-24

## 2023-05-24 RX ORDER — BUPIVACAINE HYDROCHLORIDE 5 MG/ML
INJECTION, SOLUTION EPIDURAL; INTRACAUDAL
Status: COMPLETED | OUTPATIENT
Start: 2023-05-24 | End: 2023-05-24

## 2023-05-24 RX ADMIN — LIDOCAINE HYDROCHLORIDE 5 ML: 10 INJECTION, SOLUTION EPIDURAL; INFILTRATION; INTRACAUDAL at 12:56

## 2023-05-24 RX ADMIN — MIDAZOLAM HYDROCHLORIDE 2 MG: 1 INJECTION, SOLUTION INTRAMUSCULAR; INTRAVENOUS at 12:56

## 2023-05-24 RX ADMIN — IOHEXOL 5 ML: 180 INJECTION INTRAVENOUS at 12:58

## 2023-05-24 RX ADMIN — FENTANYL CITRATE 50 MCG: 50 INJECTION, SOLUTION INTRAMUSCULAR; INTRAVENOUS at 12:56

## 2023-05-24 RX ADMIN — BUPIVACAINE HYDROCHLORIDE 10 ML: 5 INJECTION, SOLUTION EPIDURAL; INTRACAUDAL; PERINEURAL at 13:00

## 2023-05-24 ASSESSMENT — PAIN - FUNCTIONAL ASSESSMENT
PAIN_FUNCTIONAL_ASSESSMENT: PREVENTS OR INTERFERES WITH ALL ACTIVE AND SOME PASSIVE ACTIVITIES
PAIN_FUNCTIONAL_ASSESSMENT: 0-10
PAIN_FUNCTIONAL_ASSESSMENT: 0-10

## 2023-05-24 ASSESSMENT — PAIN DESCRIPTION - DESCRIPTORS: DESCRIPTORS: ACHING;CRAMPING

## 2023-05-24 ASSESSMENT — PAIN DESCRIPTION - ORIENTATION: ORIENTATION: LOWER

## 2023-05-24 ASSESSMENT — PAIN DESCRIPTION - LOCATION: LOCATION: BACK

## 2023-05-24 NOTE — OP NOTE
Patient Name: Jose De Jesus   YOB: 1965  Room/Bed: Room/bed info not found  Medical Record Number: [id number]  Date: 5/24/2023       Sedation/ Anesthesia Plan:   intravenous sedation   as needed. Medications Planned:   midazolam (Versed) / Fentanyl  Intravenously  as needed. Preoperative Diagnosis: Lumbar spondylosis w/o myelopathy or radiculopathy  Postoperative Diagnosis: Lumbar spondylosis w/o myelopathy or radiculopathy  Blood Loss: none    Procedure Performed:  Bilateral Lumbar Medial Branch nerve Blocks at the transverse processes of L3, L4, L5 under fluoroscopy guidance    Procedure: The Patient was seen in the preop area, chart was reviewed, informed consent was obtained. Patient was taken to procedure room and was placed in prone position. Vital signs were monitored through out the  Procedure. A time out was completed. The skin over the back was prepped and draped in sterile manner. The target point was marked at the junction of Transverse process and superior articular process at the target levels. Skin and deep tissues were anesthetized with 1 % lidocaine. A 25-gauge needlele was advanced to the target spots under fluoroscopy guidance in AP / Lateral and Oblique views. Then after negative aspiration contrast dye was injected with live fluoroscopy in AP views that showed  spread of the contrast with no epidural space and no vascular runoff or intrathecal spread. Finally 0.5 ml of treatment solution 0.5% BUPIVACAINE  was injected at each level. The needle was removed and a Band-Aid was placed over the needle  insertion site. The patient's vital signs remained stable and the patient tolerated the procedure well.       Electronically signed by Stevie Sneed MD on 5/24/2023 at 1:05 PM    SEDATION NOTE:    ASA CLASSIFICATION  3  MP   CLASSIFICATION  3    Moderate intravenous conscious sedation was supervised by Dr. Crys Babcock  The patient was independently monitored by a

## 2023-05-24 NOTE — DISCHARGE INSTRUCTIONS
Discharge Instructions following Sedation or Anesthesia:  You have  received  a sedative/anesthetic therefore, you should not consume any alcoholic beverages for minimum of 12 hours. Do not drive or operate machinery for 24 hours. Do not sign legal documents for 24 hours. Dizziness, drowsiness, and unsteadiness may occur. Rest when need to. Increase diet as tolerated. Keep up on fluids if diet allows. General Instructions:  Do not take a tub bath for 72 hours after procedure (this includes hot tubs and swimming pools). You may shower, but avoid hot water to injection site. Avoid strenuous activity TODAY especially if you experience dizziness. Remove band-aid the next day. Wash off any residual iodine   Do not use heat, heating pad, or any other heating device over the injection site for 3 days after the procedure. If you experience pain after your procedure, you may continue with your current pain medication as prescribed. (DO NOT INCREASE YOUR PAIN MEDICATION WITHOUT TALKING TO DOCTOR)  Soreness and pain at injection site is common, may use ice to reduce soreness. Please complete pain diary as instructed.      Call Candace Phan at [phone] if you experience:   Fever, chills or temperature over 100    Vomiting, Headache, persistent stiff neck, nausea, blurred vision   Difficulty in urinating or unable to urinate with 8 hours   Increase in weakness, numbness or loss of function   Increased redness, swelling or drainage at the injection site

## 2023-05-24 NOTE — INTERVAL H&P NOTE
Update History & Physical    The patient's History and Physical of May 4, 2023 was reviewed with the patient and I examined the patient. There was no change. The surgical site was confirmed by the patient and me. Plan: The risks, benefits, expected outcome, and alternative to the recommended procedure have been discussed with the patient. Patient understands and wants to proceed with the procedure.      ASA 3  MP 3  Electronically signed by Joel Beckwith MD on 5/24/2023 at 12:33 PM

## 2023-05-25 ENCOUNTER — TELEPHONE (OUTPATIENT)
Dept: PAIN MANAGEMENT | Age: 58
End: 2023-05-25

## 2023-05-25 DIAGNOSIS — M47.817 LUMBOSACRAL SPONDYLOSIS WITHOUT MYELOPATHY: Primary | ICD-10-CM

## 2023-06-08 ENCOUNTER — HOSPITAL ENCOUNTER (OUTPATIENT)
Dept: PAIN MANAGEMENT | Age: 58
Discharge: HOME | End: 2023-06-08
Payer: MEDICAID

## 2023-06-08 VITALS
DIASTOLIC BLOOD PRESSURE: 82 MMHG | OXYGEN SATURATION: 95 % | HEIGHT: 69 IN | BODY MASS INDEX: 24.44 KG/M2 | WEIGHT: 165 LBS | HEART RATE: 75 BPM | SYSTOLIC BLOOD PRESSURE: 135 MMHG

## 2023-06-08 DIAGNOSIS — M54.16 LUMBAR RADICULOPATHY: Primary | ICD-10-CM

## 2023-06-08 DIAGNOSIS — M47.817 LUMBOSACRAL SPONDYLOSIS WITHOUT MYELOPATHY: ICD-10-CM

## 2023-06-08 DIAGNOSIS — M51.36 DDD (DEGENERATIVE DISC DISEASE), LUMBAR: ICD-10-CM

## 2023-06-08 DIAGNOSIS — Z79.891 ENCOUNTER FOR LONG-TERM OPIATE ANALGESIC USE: ICD-10-CM

## 2023-06-08 PROCEDURE — G0481 DRUG TEST DEF 8-14 CLASSES: HCPCS

## 2023-06-08 PROCEDURE — 99213 OFFICE O/P EST LOW 20 MIN: CPT | Performed by: NURSE PRACTITIONER

## 2023-06-08 PROCEDURE — 80307 DRUG TEST PRSMV CHEM ANLYZR: CPT

## 2023-06-08 PROCEDURE — 99213 OFFICE O/P EST LOW 20 MIN: CPT

## 2023-06-08 ASSESSMENT — PAIN SCALES - GENERAL: PAINLEVEL_OUTOF10: 5

## 2023-06-08 ASSESSMENT — ENCOUNTER SYMPTOMS
BACK PAIN: 1
BOWEL INCONTINENCE: 0
SHORTNESS OF BREATH: 0
COUGH: 0
CONSTIPATION: 0

## 2023-06-08 ASSESSMENT — PAIN DESCRIPTION - FREQUENCY: FREQUENCY: CONTINUOUS

## 2023-06-08 ASSESSMENT — PAIN DESCRIPTION - PAIN TYPE: TYPE: CHRONIC PAIN

## 2023-06-08 ASSESSMENT — PAIN DESCRIPTION - LOCATION: LOCATION: BACK

## 2023-06-08 ASSESSMENT — PAIN DESCRIPTION - DESCRIPTORS: DESCRIPTORS: TIGHTNESS

## 2023-06-08 NOTE — PROGRESS NOTES
degenerative change resulting in canal stenosis  most severe at L3-4. Lateral recess narrowing and foraminal narrowing as described above. Assessment:  Problem List Items Addressed This Visit       DDD (degenerative disc disease), lumbar    Lumbar radiculopathy - Primary    Lumbosacral spondylosis without myelopathy          Treatment Plan:  Patient reports good relief following lumbar MBB last month - he is scheduled for confirmatory block next week. Per Dr. Barb Woodall, pt is being considered for medication contract - UDS collected today. If appropriate, will need to sign medication contract at next visit  Follow up appointment made for 4 weeks    I have reviewed the chief complaint and history of present illness (including ROS and PFSH) and vital documentation by my staff and I agree with their documentation and have added where applicable.

## 2023-06-15 ENCOUNTER — TELEPHONE (OUTPATIENT)
Dept: PAIN MANAGEMENT | Age: 58
End: 2023-06-15

## 2023-06-30 ENCOUNTER — OFFICE VISIT (OUTPATIENT)
Dept: NEUROSURGERY | Age: 58
End: 2023-06-30
Payer: MEDICAID

## 2023-06-30 VITALS
DIASTOLIC BLOOD PRESSURE: 82 MMHG | OXYGEN SATURATION: 95 % | HEIGHT: 69 IN | HEART RATE: 77 BPM | SYSTOLIC BLOOD PRESSURE: 133 MMHG | WEIGHT: 165 LBS | BODY MASS INDEX: 24.44 KG/M2

## 2023-06-30 DIAGNOSIS — M47.817 LUMBOSACRAL SPONDYLOSIS WITHOUT MYELOPATHY: Primary | ICD-10-CM

## 2023-06-30 DIAGNOSIS — M47.816 LUMBAR FACET ARTHROPATHY: Primary | ICD-10-CM

## 2023-06-30 DIAGNOSIS — M21.70 LEG LENGTH DISCREPANCY: ICD-10-CM

## 2023-06-30 PROCEDURE — 3017F COLORECTAL CA SCREEN DOC REV: CPT | Performed by: NURSE PRACTITIONER

## 2023-06-30 PROCEDURE — G8427 DOCREV CUR MEDS BY ELIG CLIN: HCPCS | Performed by: NURSE PRACTITIONER

## 2023-06-30 PROCEDURE — 4004F PT TOBACCO SCREEN RCVD TLK: CPT | Performed by: NURSE PRACTITIONER

## 2023-06-30 PROCEDURE — 99213 OFFICE O/P EST LOW 20 MIN: CPT | Performed by: NURSE PRACTITIONER

## 2023-06-30 PROCEDURE — G8420 CALC BMI NORM PARAMETERS: HCPCS | Performed by: NURSE PRACTITIONER

## 2023-07-19 ENCOUNTER — HOSPITAL ENCOUNTER (OUTPATIENT)
Dept: PAIN MANAGEMENT | Facility: CLINIC | Age: 58
Discharge: HOME | End: 2023-07-19
Payer: MEDICAID

## 2023-07-19 VITALS
OXYGEN SATURATION: 98 % | DIASTOLIC BLOOD PRESSURE: 84 MMHG | WEIGHT: 159 LBS | BODY MASS INDEX: 23.55 KG/M2 | TEMPERATURE: 98 F | HEIGHT: 69 IN | RESPIRATION RATE: 14 BRPM | SYSTOLIC BLOOD PRESSURE: 139 MMHG | HEART RATE: 70 BPM

## 2023-07-19 DIAGNOSIS — M47.817 LUMBOSACRAL SPONDYLOSIS WITHOUT MYELOPATHY: Primary | ICD-10-CM

## 2023-07-19 DIAGNOSIS — R52 PAIN MANAGEMENT: ICD-10-CM

## 2023-07-19 PROCEDURE — 64635 DESTROY LUMB/SAC FACET JNT: CPT

## 2023-07-19 PROCEDURE — 2500000003 HC RX 250 WO HCPCS: Performed by: ANESTHESIOLOGY

## 2023-07-19 PROCEDURE — 6360000002 HC RX W HCPCS: Performed by: ANESTHESIOLOGY

## 2023-07-19 PROCEDURE — 64636 DESTROY L/S FACET JNT ADDL: CPT

## 2023-07-19 RX ORDER — FENTANYL CITRATE 50 UG/ML
INJECTION, SOLUTION INTRAMUSCULAR; INTRAVENOUS
Status: COMPLETED | OUTPATIENT
Start: 2023-07-19 | End: 2023-07-19

## 2023-07-19 RX ORDER — LIDOCAINE HYDROCHLORIDE 10 MG/ML
INJECTION, SOLUTION EPIDURAL; INFILTRATION; INTRACAUDAL; PERINEURAL
Status: COMPLETED | OUTPATIENT
Start: 2023-07-19 | End: 2023-07-19

## 2023-07-19 RX ORDER — LIDOCAINE HYDROCHLORIDE 40 MG/ML
INJECTION, SOLUTION RETROBULBAR; TOPICAL
Status: COMPLETED | OUTPATIENT
Start: 2023-07-19 | End: 2023-07-19

## 2023-07-19 RX ORDER — MIDAZOLAM HYDROCHLORIDE 2 MG/2ML
INJECTION, SOLUTION INTRAMUSCULAR; INTRAVENOUS
Status: COMPLETED | OUTPATIENT
Start: 2023-07-19 | End: 2023-07-19

## 2023-07-19 RX ADMIN — LIDOCAINE HYDROCHLORIDE 5 ML: 10 INJECTION, SOLUTION EPIDURAL; INFILTRATION; INTRACAUDAL at 11:32

## 2023-07-19 RX ADMIN — MIDAZOLAM HYDROCHLORIDE 2 MG: 1 INJECTION, SOLUTION INTRAMUSCULAR; INTRAVENOUS at 11:24

## 2023-07-19 RX ADMIN — FENTANYL CITRATE 50 MCG: 50 INJECTION, SOLUTION INTRAMUSCULAR; INTRAVENOUS at 11:24

## 2023-07-19 RX ADMIN — LIDOCAINE HYDROCHLORIDE 5 ML: 40 SOLUTION RETROBULBAR; TOPICAL at 11:32

## 2023-07-19 RX ADMIN — LIDOCAINE HYDROCHLORIDE 5 ML: 10 INJECTION, SOLUTION EPIDURAL; INFILTRATION; INTRACAUDAL at 11:24

## 2023-07-19 RX ADMIN — FENTANYL CITRATE 50 MCG: 50 INJECTION, SOLUTION INTRAMUSCULAR; INTRAVENOUS at 11:29

## 2023-07-19 ASSESSMENT — PAIN DESCRIPTION - DESCRIPTORS: DESCRIPTORS: ACHING

## 2023-07-19 ASSESSMENT — PAIN - FUNCTIONAL ASSESSMENT
PAIN_FUNCTIONAL_ASSESSMENT: PREVENTS OR INTERFERES SOME ACTIVE ACTIVITIES AND ADLS
PAIN_FUNCTIONAL_ASSESSMENT: 0-10

## 2023-07-19 ASSESSMENT — PAIN SCALES - GENERAL
PAINLEVEL_OUTOF10: 0

## 2023-07-19 NOTE — DISCHARGE INSTRUCTIONS
Discharge Instructions following Sedation or Anesthesia:  You have  received  a sedative/anesthetic therefore, you should not consume any alcoholic beverages for minimum of 12 hours. Do not drive or operate machinery for 24 hours. Do not sign legal documents for 24 hours. Dizziness, drowsiness, and unsteadiness may occur. Rest when need to. Increase diet as tolerated. Keep up on fluids if diet allows. General Instructions:  Do not take a tub bath for 72 hours after procedure (this includes hot tubs and swimming pools). You may shower, but avoid hot water to injection site. Avoid strenuous activity TODAY especially if you experience dizziness. Remove band-aid the next day. Wash off any residual iodine   Do not use heat, heating pad, or any other heating device over the injection site for 3 days after the procedure. If you experience pain after your procedure, you may continue with your current pain medication as prescribed. (DO NOT INCREASE YOUR PAIN MEDICATION WITHOUT TALKING TO DOCTOR)  Soreness and pain at injection site is common, may use ice to reduce soreness.     Call Pola at [phone] if you experience:   Fever, chills or temperature over 100    Vomiting, Headache, persistent stiff neck, nausea, blurred vision   Difficulty in urinating or unable to urinate with 8 hours   Increase in weakness, numbness or loss of function   Increased redness, swelling or drainage at the injection site

## 2023-07-19 NOTE — H&P
Pain Pre-Op H&P Note    Chong Akhtar MD    HPI: Max Pedersen  presents with     Back pain  Chronic going on for many years located in the lower lumbar area in midline  Pain aggravates with twisting turning bending lifting prolonged sitting  No significant dermatomal radiation of pain in leg  Numbness and tingling in right lower extremity  No changes in bladder or bowel control  No previous lumbar spine injection history  No previous lumbar spine surgical history  Pain is constant severe sharp interfering with quality of life and activity level    Past Medical History:   Diagnosis Date    Acute respiratory failure ([address]) 08/16/2021    Alcohol abuse 06/04/2014    Anxiety     Arthritis     Cardiomyopathy ([address]) 08/20/2021    EF 35%    Closed fracture of left wrist     Closed fracture of multiple ribs 08/11/2021    Conjunctivitis 08/14/2021    COPD (chronic obstructive pulmonary disease) ([address])     Dr. Rachel Sher PCP [facility]    DDD (degenerative disc disease), lumbar 09/06/2016    Depression     Dyslipidemia 09/17/2014    Elevated C-reactive protein 08/21/2021    Fever 08/14/2021    Heart burn     Hemorrhoids     History of recent hospitalization     8/10/2021-9/2/2021.  ICU and intubation    HTN (hypertension) 01/19/2015    Hypokalemia 08/11/2021    Hyponatremia 08/11/2021    Ilioinguinal neuralgia of right side 04/10/2017    Infected hardware in right leg ([address])     Right great toe NOT leg    Leukocytosis 08/21/2021    Lives in group home     Sober living     MRSA bacteremia 08/20/2021    Psychiatric problem     depression /anxiety    Rhabdomyolysis 08/11/2021    Seizures ([address]) 2021    withdrawal from alcohol     Severe malnutrition ([address]) 08/12/2021    Superficial venous thrombosis of left arm 08/18/2021    Syncope and collapse 06/13/2018    Wears glasses     READING    Wellness examination 11/03/2021    Dr. Candie Abreu        Past Surgical History:   Procedure Laterality Date

## 2023-07-19 NOTE — OP NOTE
Preoperative Diagnosis: Lumbar spondylosis w/o myelopathy, chronic low back pain  Postoperative Diagnosis: Lumbar spondylosis w/o myelopathy, chronic low back pain  SEDATION: SEE SEDATION NOTE  BLOOD LOSS: NONE    Procedure Performed:  :Radiofrequency ablation of median branches at the Transverse processes of L3 /L4 AND L5 for L3/4 AND L4/5 facet joints on Bilateral under fluoroscopic guidance with IV sedation    Procedure:    After starting an IV, the patient was taken to procedure room. The patient was placed in prone position and skin over the back was prepped and draped in sterile manner. Standard monitors were connected and vitals were monitored during the case and they remained stable during the procedure. A meaningful communication was kept up with the patient throughout the procedure. Then using fluoroscopy the junction of the transverse process of the target vertebra with the superior process of the facet joint was observed and the view was optimized. The skin and deep tissues were infiltrated with 2 ml of  1 % lidocaine. The RF canula with the 10 mm active tip was introduced through the skin wheal under fluoroscopy guidance such that the tip of the needle lies in the groove of the transverse process with the superior processes of the facet joint. Then a lateral and AP view of the lumbar spine was obtained to make sure the tip of needle is not in the neural foramen. Then electric impedence was checked to make sure it is acceptable. Then a sensory stimulus was applied at 50 Hz up to 0.5 volt and concordant pain symptoms were reproduced. Then a motor stimulus was applied at 2 Hz up to 2 volts or 3 x times the sensory stimulus and no motor stimulation was seen in lower extremities. Some multifidus stimulus was seen. Then after negative aspiration 1 ml of 4% lidocaine was injected through the needle at each level. The radiofrequency lesion was done at 85 degrees centigrade for 110 seconds.

## 2023-09-13 ENCOUNTER — TELEPHONE (OUTPATIENT)
Dept: PAIN MANAGEMENT | Age: 58
End: 2023-09-13

## 2023-09-13 NOTE — TELEPHONE ENCOUNTER
I LM on patient's VM advising Intracept procedure and pre-testing has been scheduled. I asked patient to call the office for dates and times.

## 2024-01-01 NOTE — PROGRESS NOTES
Physical Therapy        Physical Therapy Cancel Note      DATE: 2021    NAME: Stormy Lucia  MRN: [id number]   : 1965      Patient not seen this date for Physical Therapy due to:    Testing: Pt is out of room at this time getting a CT. Will check back later this date, time permitting.        Electronically signed by Dejan Hickey PTA on 2021 at 12:57 PM Edy Garcia  (RN)  2024 22:45:52

## 2024-05-31 ENCOUNTER — APPOINTMENT (OUTPATIENT)
Dept: CT IMAGING | Age: 59
End: 2024-05-31
Attending: EMERGENCY MEDICINE
Payer: MEDICAID

## 2024-05-31 ENCOUNTER — HOSPITAL ENCOUNTER (EMERGENCY)
Age: 59
Discharge: HOME | End: 2024-05-31
Attending: EMERGENCY MEDICINE
Payer: MEDICAID

## 2024-05-31 ENCOUNTER — APPOINTMENT (OUTPATIENT)
Dept: GENERAL RADIOLOGY | Age: 59
End: 2024-05-31
Attending: EMERGENCY MEDICINE
Payer: MEDICAID

## 2024-05-31 VITALS
OXYGEN SATURATION: 100 % | TEMPERATURE: 97.9 F | DIASTOLIC BLOOD PRESSURE: 95 MMHG | WEIGHT: 120 LBS | SYSTOLIC BLOOD PRESSURE: 136 MMHG | HEART RATE: 74 BPM | HEIGHT: 69 IN | RESPIRATION RATE: 23 BRPM | BODY MASS INDEX: 17.77 KG/M2

## 2024-05-31 DIAGNOSIS — R63.4 UNINTENTIONAL WEIGHT LOSS: ICD-10-CM

## 2024-05-31 DIAGNOSIS — R91.8 PULMONARY NODULES: ICD-10-CM

## 2024-05-31 DIAGNOSIS — E86.0 DEHYDRATION: Primary | ICD-10-CM

## 2024-05-31 DIAGNOSIS — R53.1 GENERALIZED WEAKNESS: ICD-10-CM

## 2024-05-31 LAB
ALBUMIN SERPL-MCNC: 3.9 G/DL (ref 3.5–5.2)
ALP SERPL-CCNC: 116 U/L (ref 40–129)
ALT SERPL-CCNC: 55 U/L (ref 5–41)
ANION GAP SERPL CALCULATED.3IONS-SCNC: 10 MMOL/L (ref 9–17)
AST SERPL-CCNC: 52 U/L
BASOPHILS # BLD: 0 K/UL (ref 0–0.2)
BASOPHILS NFR BLD: 1 % (ref 0–2)
BILIRUB SERPL-MCNC: 0.7 MG/DL (ref 0.3–1.2)
BUN SERPL-MCNC: 5 MG/DL (ref 8–23)
CALCIUM SERPL-MCNC: 9 MG/DL (ref 8.6–10.4)
CHLORIDE SERPL-SCNC: 103 MMOL/L (ref 98–107)
CO2 SERPL-SCNC: 25 MMOL/L (ref 20–31)
CREAT SERPL-MCNC: 0.6 MG/DL (ref 0.7–1.2)
EKG ATRIAL RATE: 81 BPM
EKG P AXIS: 36 DEGREES
EKG P-R INTERVAL: 134 MS
EKG Q-T INTERVAL: 420 MS
EKG QRS DURATION: 88 MS
EKG QTC CALCULATION (BAZETT): 487 MS
EKG R AXIS: 15 DEGREES
EKG T AXIS: 24 DEGREES
EKG VENTRICULAR RATE: 81 BPM
EOSINOPHIL # BLD: 0.1 K/UL (ref 0–0.4)
EOSINOPHILS RELATIVE PERCENT: 2 % (ref 0–4)
ERYTHROCYTE [DISTWIDTH] IN BLOOD BY AUTOMATED COUNT: 13.1 % (ref 11.5–14.9)
ETHANOL PERCENT: <0.01 %
ETHANOLAMINE SERPL-MCNC: <10 MG/DL (ref 0–0.08)
GFR, ESTIMATED: >90 ML/MIN/1.73M2
GLUCOSE SERPL-MCNC: 100 MG/DL (ref 70–99)
HCT VFR BLD AUTO: 45.8 % (ref 41–53)
HGB BLD-MCNC: 15.5 G/DL (ref 13.5–17.5)
INR PPP: 0.9
LYMPHOCYTES NFR BLD: 1.5 K/UL (ref 1–4.8)
LYMPHOCYTES RELATIVE PERCENT: 22 % (ref 24–44)
MAGNESIUM SERPL-MCNC: 2 MG/DL (ref 1.6–2.6)
MCH RBC QN AUTO: 33.6 PG (ref 26–34)
MCHC RBC AUTO-ENTMCNC: 34 G/DL (ref 31–37)
MCV RBC AUTO: 99 FL (ref 80–100)
MONOCYTES NFR BLD: 0.8 K/UL (ref 0.1–1.3)
MONOCYTES NFR BLD: 12 % (ref 1–7)
NEUTROPHILS NFR BLD: 63 % (ref 36–66)
NEUTS SEG NFR BLD: 4.5 K/UL (ref 1.3–9.1)
PHOSPHATE SERPL-MCNC: 3.5 MG/DL (ref 2.5–4.5)
PLATELET # BLD AUTO: 212 K/UL (ref 150–450)
PMV BLD AUTO: 7.6 FL (ref 6–12)
POTASSIUM SERPL-SCNC: 3.9 MMOL/L (ref 3.7–5.3)
PROT SERPL-MCNC: 6.7 G/DL (ref 6.4–8.3)
PROTHROMBIN TIME: 12.4 SEC (ref 11.8–14.6)
RBC # BLD AUTO: 4.62 M/UL (ref 4.5–5.9)
SODIUM SERPL-SCNC: 138 MMOL/L (ref 135–144)
TROPONIN I SERPL HS-MCNC: 11 NG/L (ref 0–22)
TROPONIN I SERPL HS-MCNC: 13 NG/L (ref 0–22)
TSH SERPL DL<=0.05 MIU/L-ACNC: 0.8 UIU/ML (ref 0.3–5)
WBC OTHER # BLD: 7 K/UL (ref 3.5–11)

## 2024-05-31 PROCEDURE — 36415 COLL VENOUS BLD VENIPUNCTURE: CPT

## 2024-05-31 PROCEDURE — 96360 HYDRATION IV INFUSION INIT: CPT

## 2024-05-31 PROCEDURE — 99285 EMERGENCY DEPT VISIT HI MDM: CPT

## 2024-05-31 PROCEDURE — 85025 COMPLETE CBC W/AUTO DIFF WBC: CPT

## 2024-05-31 PROCEDURE — 93005 ELECTROCARDIOGRAM TRACING: CPT | Performed by: EMERGENCY MEDICINE

## 2024-05-31 PROCEDURE — 80053 COMPREHEN METABOLIC PANEL: CPT

## 2024-05-31 PROCEDURE — 2580000003 HC RX 258: Performed by: EMERGENCY MEDICINE

## 2024-05-31 PROCEDURE — 85610 PROTHROMBIN TIME: CPT

## 2024-05-31 PROCEDURE — 84484 ASSAY OF TROPONIN QUANT: CPT

## 2024-05-31 PROCEDURE — 96361 HYDRATE IV INFUSION ADD-ON: CPT

## 2024-05-31 PROCEDURE — 84100 ASSAY OF PHOSPHORUS: CPT

## 2024-05-31 PROCEDURE — 71045 X-RAY EXAM CHEST 1 VIEW: CPT

## 2024-05-31 PROCEDURE — 71250 CT THORAX DX C-: CPT

## 2024-05-31 PROCEDURE — G0480 DRUG TEST DEF 1-7 CLASSES: HCPCS

## 2024-05-31 PROCEDURE — 83735 ASSAY OF MAGNESIUM: CPT

## 2024-05-31 PROCEDURE — 84443 ASSAY THYROID STIM HORMONE: CPT

## 2024-05-31 RX ORDER — SODIUM CHLORIDE 9 MG/ML
INJECTION, SOLUTION INTRAVENOUS CONTINUOUS
Status: DISCONTINUED | OUTPATIENT
Start: 2024-05-31 | End: 2024-05-31 | Stop reason: HOSPADM

## 2024-05-31 RX ADMIN — SODIUM CHLORIDE: 9 INJECTION, SOLUTION INTRAVENOUS at 07:44

## 2024-05-31 ASSESSMENT — ENCOUNTER SYMPTOMS
NAUSEA: 0
ABDOMINAL PAIN: 0
DIARRHEA: 0
COUGH: 1
CONSTIPATION: 0
ABDOMINAL DISTENTION: 0
VOMITING: 0
SHORTNESS OF BREATH: 1

## 2024-05-31 ASSESSMENT — PAIN - FUNCTIONAL ASSESSMENT: PAIN_FUNCTIONAL_ASSESSMENT: 0-10

## 2024-05-31 ASSESSMENT — LIFESTYLE VARIABLES
HOW OFTEN DO YOU HAVE A DRINK CONTAINING ALCOHOL: 4 OR MORE TIMES A WEEK
HOW MANY STANDARD DRINKS CONTAINING ALCOHOL DO YOU HAVE ON A TYPICAL DAY: 10 OR MORE

## 2024-05-31 NOTE — ED PROVIDER NOTES
dysuria.   Skin:  Negative for rash.   Neurological:  Positive for weakness (Generalized) and light-headedness. Negative for headaches.       PAST MEDICAL HISTORY     Past Medical History:   Diagnosis Date    Acute respiratory failure (HCC) 08/16/2021    Alcohol abuse 06/04/2014    Anxiety     Arthritis     Cardiomyopathy ([facility]) 08/20/2021    EF 35%    Closed fracture of left wrist     Closed fracture of multiple ribs 08/11/2021    Conjunctivitis 08/14/2021    COPD (chronic obstructive pulmonary disease) ([facility])     Dr. Velasco [address]    DDD (degenerative disc disease), lumbar 09/06/2016    Depression     Dyslipidemia 09/17/2014    Elevated C-reactive protein 08/21/2021    Fever 08/14/2021    Heart burn     Hemorrhoids     History of recent hospitalization     8/10/2021-9/2/2021. ICU and intubation    HTN (hypertension) 01/19/2015    Hypokalemia 08/11/2021    Hyponatremia 08/11/2021    Ilioinguinal neuralgia of right side 04/10/2017    Infected hardware in right leg ([facility])     Right great toe NOT leg    Leukocytosis 08/21/2021    Lives in group home     Sober living     MRSA bacteremia 08/20/2021    Psychiatric problem     depression /anxiety    Rhabdomyolysis 08/11/2021    Seizures ([facility]) 2021    withdrawal from alcohol     Severe malnutrition ([facility]) 08/12/2021    Superficial venous thrombosis of left arm 08/18/2021    Syncope and collapse 06/13/2018    Wears glasses     READING    Wellness examination 11/03/2021    Dr. Velasco [address]        SURGICAL HISTORY       Past Surgical History:   Procedure Laterality Date    ANESTHESIA NERVE BLOCK Right 04/10/2017    NERVE BLOCK US RIGHT SIDED ILIOINGUINAL performed by Marv Marques MD at [facility] OR    FINGER CLOSED REDUCTION Right 12/14/2021    HARDWARE REMOVAL RIGHT GREAT TOE, C-ARM performed by Hernan Holley DO at [facility] OR    HARDWARE REMOVAL Right 12/14/2021    HARDWARE REMOVAL RIGHT GREAT TOE    HEMORRHOID SURGERY  03/19/2015

## 2024-08-13 ENCOUNTER — OFFICE VISIT (OUTPATIENT)
Dept: FAMILY MEDICINE CLINIC | Age: 59
End: 2024-08-13
Payer: MEDICAID

## 2024-08-13 VITALS
HEIGHT: 69 IN | HEART RATE: 67 BPM | BODY MASS INDEX: 20.94 KG/M2 | DIASTOLIC BLOOD PRESSURE: 73 MMHG | WEIGHT: 141.4 LBS | SYSTOLIC BLOOD PRESSURE: 116 MMHG

## 2024-08-13 DIAGNOSIS — Z00.00 HEALTHCARE MAINTENANCE: Primary | ICD-10-CM

## 2024-08-13 DIAGNOSIS — R21 RASH: ICD-10-CM

## 2024-08-13 DIAGNOSIS — J44.9 CHRONIC OBSTRUCTIVE PULMONARY DISEASE, UNSPECIFIED COPD TYPE (HCC): Primary | ICD-10-CM

## 2024-08-13 DIAGNOSIS — R56.9 SEIZURES (HCC): ICD-10-CM

## 2024-08-13 DIAGNOSIS — F32.A DEPRESSION, UNSPECIFIED DEPRESSION TYPE: ICD-10-CM

## 2024-08-13 DIAGNOSIS — F45.41 STRESS HEADACHE: ICD-10-CM

## 2024-08-13 DIAGNOSIS — L30.8 OTHER ECZEMA: ICD-10-CM

## 2024-08-13 DIAGNOSIS — Z00.00 HEALTH CARE MAINTENANCE: ICD-10-CM

## 2024-08-13 DIAGNOSIS — F41.9 ANXIETY: ICD-10-CM

## 2024-08-13 DIAGNOSIS — L23.0 ALLERGIC CONTACT DERMATITIS DUE TO METALS: ICD-10-CM

## 2024-08-13 DIAGNOSIS — M54.51 VERTEBROGENIC LOW BACK PAIN: Chronic | ICD-10-CM

## 2024-08-13 PROBLEM — L30.9 ECZEMA: Status: ACTIVE | Noted: 2024-08-13

## 2024-08-13 LAB — HBA1C MFR BLD: 5.8 %

## 2024-08-13 PROCEDURE — 83036 HEMOGLOBIN GLYCOSYLATED A1C: CPT

## 2024-08-13 PROCEDURE — 90677 PCV20 VACCINE IM: CPT | Performed by: FAMILY MEDICINE

## 2024-08-13 PROCEDURE — 99203 OFFICE O/P NEW LOW 30 MIN: CPT

## 2024-08-13 RX ORDER — ESCITALOPRAM OXALATE 10 MG/1
10 TABLET ORAL DAILY
Qty: 30 TABLET | Refills: 0 | Status: SHIPPED | OUTPATIENT
Start: 2024-08-13 | End: 2024-09-12

## 2024-08-13 RX ORDER — ALBUTEROL SULFATE 90 UG/1
2 AEROSOL, METERED RESPIRATORY (INHALATION) EVERY 6 HOURS PRN
Qty: 18 G | Refills: 3 | Status: SHIPPED | OUTPATIENT
Start: 2024-08-13 | End: 2024-08-13

## 2024-08-13 RX ORDER — CARBAMAZEPINE 200 MG/1
200 TABLET, EXTENDED RELEASE ORAL 2 TIMES DAILY
Qty: 60 TABLET | Refills: 3 | Status: SHIPPED | OUTPATIENT
Start: 2024-08-13

## 2024-08-13 RX ORDER — TIOTROPIUM BROMIDE 18 UG/1
18 CAPSULE ORAL; RESPIRATORY (INHALATION) DAILY
Qty: 90 CAPSULE | Refills: 3 | Status: SHIPPED | OUTPATIENT
Start: 2024-08-13 | End: 2024-08-13

## 2024-08-13 RX ORDER — FLUTICASONE FUROATE 100 UG/1
1 POWDER RESPIRATORY (INHALATION) DAILY
Qty: 30 EACH | Refills: 2 | Status: SHIPPED | OUTPATIENT
Start: 2024-08-13

## 2024-08-13 RX ORDER — TRIAMCINOLONE ACETONIDE 1 MG/G
CREAM TOPICAL
Qty: 454 G | Refills: 1 | Status: SHIPPED | OUTPATIENT
Start: 2024-08-13 | End: 2024-08-13

## 2024-08-13 RX ORDER — ESCITALOPRAM OXALATE 10 MG/1
10 TABLET ORAL DAILY
Qty: 30 TABLET | Refills: 0 | Status: SHIPPED | OUTPATIENT
Start: 2024-08-13 | End: 2024-08-13

## 2024-08-13 RX ORDER — HYDROXYZINE HYDROCHLORIDE 25 MG/1
25 TABLET, FILM COATED ORAL EVERY 8 HOURS PRN
Qty: 30 TABLET | Refills: 0 | Status: SHIPPED | OUTPATIENT
Start: 2024-08-13 | End: 2024-09-12

## 2024-08-13 RX ORDER — THIAMINE MONONITRATE (VIT B1) 100 MG
100 TABLET ORAL DAILY
Qty: 30 TABLET | Refills: 0 | Status: SHIPPED | OUTPATIENT
Start: 2024-08-13 | End: 2024-08-13

## 2024-08-13 RX ORDER — CARBAMAZEPINE 200 MG/1
200 TABLET, EXTENDED RELEASE ORAL 2 TIMES DAILY
Qty: 60 TABLET | Refills: 3 | Status: SHIPPED | OUTPATIENT
Start: 2024-08-13 | End: 2024-08-13

## 2024-08-13 RX ORDER — FLUTICASONE FUROATE 100 UG/1
1 POWDER RESPIRATORY (INHALATION) DAILY
Qty: 30 EACH | Refills: 2 | Status: SHIPPED | OUTPATIENT
Start: 2024-08-13 | End: 2024-08-13

## 2024-08-13 RX ORDER — TRIAMCINOLONE ACETONIDE 1 MG/G
CREAM TOPICAL
Qty: 454 G | Refills: 1 | Status: SHIPPED | OUTPATIENT
Start: 2024-08-13

## 2024-08-13 RX ORDER — ALBUTEROL SULFATE 90 UG/1
2 AEROSOL, METERED RESPIRATORY (INHALATION) EVERY 6 HOURS PRN
Qty: 18 G | Refills: 3 | Status: SHIPPED | OUTPATIENT
Start: 2024-08-13

## 2024-08-13 RX ORDER — THIAMINE MONONITRATE (VIT B1) 100 MG
100 TABLET ORAL DAILY
Qty: 30 TABLET | Refills: 0 | Status: SHIPPED | OUTPATIENT
Start: 2024-08-13 | End: 2024-09-12

## 2024-08-13 RX ORDER — TIOTROPIUM BROMIDE 18 UG/1
18 CAPSULE ORAL; RESPIRATORY (INHALATION) DAILY
Qty: 90 CAPSULE | Refills: 3 | Status: SHIPPED | OUTPATIENT
Start: 2024-08-13

## 2024-08-13 RX ORDER — HYDROXYZINE HYDROCHLORIDE 25 MG/1
25 TABLET, FILM COATED ORAL EVERY 8 HOURS PRN
Qty: 30 TABLET | Refills: 0 | Status: SHIPPED | OUTPATIENT
Start: 2024-08-13 | End: 2024-08-13

## 2024-08-13 SDOH — ECONOMIC STABILITY: INCOME INSECURITY: HOW HARD IS IT FOR YOU TO PAY FOR THE VERY BASICS LIKE FOOD, HOUSING, MEDICAL CARE, AND HEATING?: VERY HARD

## 2024-08-13 SDOH — ECONOMIC STABILITY: FOOD INSECURITY: WITHIN THE PAST 12 MONTHS, THE FOOD YOU BOUGHT JUST DIDN'T LAST AND YOU DIDN'T HAVE MONEY TO GET MORE.: SOMETIMES TRUE

## 2024-08-13 SDOH — ECONOMIC STABILITY: FOOD INSECURITY: WITHIN THE PAST 12 MONTHS, YOU WORRIED THAT YOUR FOOD WOULD RUN OUT BEFORE YOU GOT MONEY TO BUY MORE.: OFTEN TRUE

## 2024-08-13 ASSESSMENT — PATIENT HEALTH QUESTIONNAIRE - PHQ9
SUM OF ALL RESPONSES TO PHQ QUESTIONS 1-9: 17
SUM OF ALL RESPONSES TO PHQ9 QUESTIONS 1 & 2: 1
6. FEELING BAD ABOUT YOURSELF - OR THAT YOU ARE A FAILURE OR HAVE LET YOURSELF OR YOUR FAMILY DOWN: SEVERAL DAYS
1. LITTLE INTEREST OR PLEASURE IN DOING THINGS: NOT AT ALL
4. FEELING TIRED OR HAVING LITTLE ENERGY: NEARLY EVERY DAY
7. TROUBLE CONCENTRATING ON THINGS, SUCH AS READING THE NEWSPAPER OR WATCHING TELEVISION: NEARLY EVERY DAY
2. FEELING DOWN, DEPRESSED OR HOPELESS: SEVERAL DAYS
3. TROUBLE FALLING OR STAYING ASLEEP: NEARLY EVERY DAY
SUM OF ALL RESPONSES TO PHQ QUESTIONS 1-9: 17
5. POOR APPETITE OR OVEREATING: NEARLY EVERY DAY
SUM OF ALL RESPONSES TO PHQ QUESTIONS 1-9: 17
8. MOVING OR SPEAKING SO SLOWLY THAT OTHER PEOPLE COULD HAVE NOTICED. OR THE OPPOSITE, BEING SO FIGETY OR RESTLESS THAT YOU HAVE BEEN MOVING AROUND A LOT MORE THAN USUAL: NEARLY EVERY DAY
10. IF YOU CHECKED OFF ANY PROBLEMS, HOW DIFFICULT HAVE THESE PROBLEMS MADE IT FOR YOU TO DO YOUR WORK, TAKE CARE OF THINGS AT HOME, OR GET ALONG WITH OTHER PEOPLE: SOMEWHAT DIFFICULT
9. THOUGHTS THAT YOU WOULD BE BETTER OFF DEAD, OR OF HURTING YOURSELF: NOT AT ALL
SUM OF ALL RESPONSES TO PHQ QUESTIONS 1-9: 17

## 2024-08-13 ASSESSMENT — ENCOUNTER SYMPTOMS
CONSTIPATION: 0
DIARRHEA: 0
BACK PAIN: 1
COUGH: 1
NAUSEA: 0
SHORTNESS OF BREATH: 1

## 2024-08-13 NOTE — PROGRESS NOTES
[facility] - Outpatient Note      Subjective:    Efrain Bowman is a 59 y.o. male with  has a past medical history of Acute respiratory failure (HCC), Alcohol abuse, Anxiety, Arthritis, Cardiomyopathy (HCC), Closed fracture of left wrist, Closed fracture of multiple ribs, Conjunctivitis, COPD (chronic obstructive pulmonary disease) (HCC), DDD (degenerative disc disease), lumbar, Depression, Dyslipidemia, Elevated C-reactive protein, Fever, Heart burn, Hemorrhoids, History of recent hospitalization, HTN (hypertension), Hypokalemia, Hyponatremia, Ilioinguinal neuralgia of right side, Infected hardware in right leg (HCC), Leukocytosis, Lives in group home, MRSA bacteremia, Psychiatric problem, Rhabdomyolysis, Seizures (HCC), Severe malnutrition (HCC), Superficial venous thrombosis of left arm, Syncope and collapse, Wears glasses, and Wellness examination.    Presented to the office today for:  Chief Complaint   Patient presents with    Establish Care     New patient        HPI  Patient is here to establish care, medication refills, required labs and vaccinations. He has been a chronic alcoholic, now clean for 55 days after spending 3 to 4 weeks in acute rehab.    Vitals are stable, A1c 5.8.      Headaches  He also complains of fatigue, weakness and, headaches in the back of the head associated with neck stiffness.  Headaches do not worsen or improve with bending and are not associated with nausea, chills, vision disturbances.  Discussed, likely due to stress.    Rash  Has a rash in lower abdomen, back of knee, likely intertrigo.  Patient taking triamcinolone 0.1%, wants refills.    Back Pain  Has chronic lower back pain, not associated with cramps, tingling, numbness, genitourinary issues.  Taking ibuprofen 800, wants refills.    COPD  Is a chronic cigarette smoker, diagnosed COPD, complains of dry cough, shortness of breath and snores at night.  He uses inhalers

## 2024-08-13 NOTE — PATIENT INSTRUCTIONS
Thank you for letting us take care of you today. We hope all your questions were addressed. If a question was overlooked or something else comes to mind after you return home, please contact a member of your Care Team listed below.      Your Care Team at [facility] is Team #  Guicho Hector M.D. (Faculty)  Karol Grey M.D. (Resident)  Sumit Valerio M.D. (Resident)   Chichi Zaldivar M.D. (Resident)  Jose De Jesus Doherty M.D. (Resident)  Galina Sesay M.D. (Resident)  Humaira Selby., [facility]  Milagros Wisdom, [facility]  Salina Naqvi, [facility]  Ander Montoya, KERRIE June, [facility]  Estela Hylton, [facility]  Radha Jorgensen, KERRIE Davila (LJ) MARNI Piña (Clinical Practice Manager)  Malena Nayak [facility] (Clinical Pharmacist)     Office phone number: [phone]    If you need to get in right away due to illness, please be advised we have \"Same Day\" appointments available Monday-Friday. Please call us at [phone] option #3 to schedule your \"Same Day\" appointment.

## 2024-08-13 NOTE — PROGRESS NOTES
Visit Information    Have you changed or started any medications since your last visit including any over-the-counter medicines, vitamins, or herbal medicines? no   Are you having any side effects from any of your medications? -  no  Have you stopped taking any of your medications? Is so, why? -  no    Have you seen any other physician or provider since your last visit? Yes - Records Obtained [place]  Have you had any other diagnostic tests since your last visit? Yes - Records Obtained [place]  Have you been seen in the emergency room and/or had an admission to a hospital since we last saw you? No  Have you had your routine dental cleaning in the past 6 months? no    Have you activated your [employer] account? If not, what are your barriers? Yes     Patient Care Team:  Emilie Love DO as Consulting Physician (General Surgery)  Va Hernandez RN as Nurse Navigator  Edwin Capone MD as Consulting Physician (Infectious Diseases)    Medical History Review  Past Medical, Family, and Social History reviewed and does not contribute to the patient presenting condition    Health Maintenance   Topic Date Due    Depression Monitoring  Never done    Pneumococcal 0-64 years Vaccine (2 of 2 - PCV) 08/16/2017    Shingles vaccine (2 of 3) 12/03/2019    Respiratory Syncytial Virus (RSV) Pregnant or age 60 yrs+ (1 - 1-dose 60+ series) Never done    COVID-19 Vaccine (3 - 2023-24 season) 09/01/2023    A1C test (Diabetic or Prediabetic)  12/17/2023    Flu vaccine (1) 08/01/2024    Colorectal Cancer Screen  12/30/2024    Lung Cancer Screening &/or Counseling  05/31/2025    DTaP/Tdap/Td vaccine (2 - Td or Tdap) 08/16/2026    Lipids  12/17/2027    Hepatitis C screen  Completed    HIV screen  Completed    Hepatitis A vaccine  Aged Out    Hepatitis B vaccine  Aged Out    Hib vaccine  Aged Out    Polio vaccine  Aged Out    Meningococcal (ACWY) vaccine  Aged Out    Diabetes screen  Discontinued    Prostate Specific Antigen (PSA)

## 2024-08-13 NOTE — PROGRESS NOTES
Attending Physician Statement  I have discussed the care of Efrain Bowman, including pertinent history and exam findings,  with the resident. I have seen and examined the patient and the key elements of all parts of the encounter have been performed by me.  I agree with the assessment, plan and orders as documented by the resident.  (GC Modifier)    Luis Martel MD

## 2024-08-20 ENCOUNTER — TELEPHONE (OUTPATIENT)
Dept: FAMILY MEDICINE CLINIC | Age: 59
End: 2024-08-20

## 2024-08-20 NOTE — TELEPHONE ENCOUNTER
We missed the opportunity to provide you with the resources you requested at your last visit. I have put an updated after visit summary in the mail to you. Again, we apologize for missing this at your last visit. Please reach out to us at [phone] if you have any questions.

## 2024-09-09 DIAGNOSIS — R56.9 SEIZURES (HCC): ICD-10-CM

## 2024-09-09 DIAGNOSIS — F41.9 ANXIETY: ICD-10-CM

## 2024-09-09 RX ORDER — HYDROXYZINE HYDROCHLORIDE 25 MG/1
25 TABLET, FILM COATED ORAL EVERY 8 HOURS PRN
Qty: 30 TABLET | Refills: 0 | Status: SHIPPED | OUTPATIENT
Start: 2024-09-09 | End: 2024-10-09

## 2024-09-09 RX ORDER — ESCITALOPRAM OXALATE 10 MG/1
10 TABLET ORAL DAILY
Qty: 30 TABLET | Refills: 0 | Status: SHIPPED | OUTPATIENT
Start: 2024-09-09

## 2024-09-09 RX ORDER — GAUZE BANDAGE 2" X 2"
100 BANDAGE TOPICAL DAILY
Qty: 30 TABLET | Refills: 0 | Status: SHIPPED | OUTPATIENT
Start: 2024-09-09

## 2024-09-24 RX ORDER — IBUPROFEN 800 MG/1
800 TABLET, FILM COATED ORAL EVERY 8 HOURS PRN
Qty: 90 TABLET | Refills: 0 | Status: SHIPPED | OUTPATIENT
Start: 2024-09-24 | End: 2024-10-24

## 2024-10-23 ENCOUNTER — TELEPHONE (OUTPATIENT)
Dept: FAMILY MEDICINE CLINIC | Age: 59
End: 2024-10-23

## 2024-10-23 NOTE — TELEPHONE ENCOUNTER
Patient called in this afternoon wanted to know if you can send something to the pharmacy for acid reflex. Please Advise

## 2024-10-24 RX ORDER — PANTOPRAZOLE SODIUM 40 MG/1
40 TABLET, DELAYED RELEASE ORAL
Qty: 42 TABLET | Refills: 0 | Status: SHIPPED | OUTPATIENT
Start: 2024-10-24 | End: 2024-12-05

## 2024-10-24 NOTE — TELEPHONE ENCOUNTER
Received the patient's call requesting medication for acid reflux.  Starting PPI trial, Protonix 40 for 6 weeks.  Will switch to Pepcid afterwards      Jose De Jesus ROMERO PGY 1

## 2024-11-25 RX ORDER — IBUPROFEN 800 MG/1
800 TABLET, FILM COATED ORAL EVERY 8 HOURS PRN
Qty: 90 TABLET | Refills: 0 | Status: SHIPPED | OUTPATIENT
Start: 2024-11-25 | End: 2024-12-25

## 2024-11-25 RX ORDER — PANTOPRAZOLE SODIUM 40 MG/1
40 TABLET, DELAYED RELEASE ORAL
Qty: 42 TABLET | Refills: 0 | Status: SHIPPED | OUTPATIENT
Start: 2024-11-25 | End: 2025-01-06

## 2024-11-25 NOTE — TELEPHONE ENCOUNTER
Patient called in requesting medication for congestion, patient states he has been congested for 4 days, as well as refills that are pending he is asking for as well. Patient has an upcoming appointment scheduled. Please advise

## 2024-11-25 NOTE — TELEPHONE ENCOUNTER
E-scribe request for med refill. Please review and e-scribe if applicable.     Last Visit Date:  08/13/2024  Next Visit Date:  12/10/2024    Hemoglobin A1C (%)   Date Value   08/13/2024 5.8   12/17/2022 5.7   08/13/2021 5.3             ( goal A1C is < 7)   No components found for: \"LABMICR\"  No components found for: \"LDLCHOLESTEROL\", \"LDLCALC\"    (goal LDL is <100)   AST (U/L)   Date Value   05/31/2024 52 (H)     ALT (U/L)   Date Value   05/31/2024 55 (H)     BUN (mg/dL)   Date Value   05/31/2024 5 (L)     BP Readings from Last 3 Encounters:   08/13/24 116/73   05/31/24 (!) 136/95   07/19/23 139/84          (goal 120/80)        Patient Active Problem List:     COPD (chronic obstructive pulmonary disease) ([facility])     Smoking addiction     Depression     Dyslipidemia     Lung nodule     DDD (degenerative disc disease), lumbar     Groin pain, chronic, right     Ilioinguinal neuralgia of right side     Syncope and collapse     History of alcohol abuse     Lumbar radiculopathy     Scoliosis due to degenerative disease of spine in adult patient     Lumbosacral spondylosis without myelopathy     Alcohol abuse     Hypokalemia     Hyponatremia     Traumatic rhabdomyolysis ([facility])     Closed fracture of multiple ribs of right side     Closed fracture of left wrist     Severe malnutrition ([facility])     Fever     Conjunctivitis     Acute respiratory failure     Superficial venous thrombosis of arm, left     MRSA bacteremia     Empyema lung ([facility])     Elevated C-reactive protein (CRP)     Leukocytosis     Infected hardware in right leg ([facility])     Right leg numbness     Vertebrogenic low back pain     Stress headache     Rash     Allergic contact dermatitis due to metals     Eczema     Anxiety     Seizures ([facility])      ----BRINDA

## 2024-12-10 ENCOUNTER — OFFICE VISIT (OUTPATIENT)
Dept: FAMILY MEDICINE CLINIC | Age: 59
End: 2024-12-10
Payer: MEDICAID

## 2024-12-10 VITALS
BODY MASS INDEX: 22.75 KG/M2 | DIASTOLIC BLOOD PRESSURE: 67 MMHG | HEART RATE: 72 BPM | HEIGHT: 69 IN | TEMPERATURE: 97.2 F | SYSTOLIC BLOOD PRESSURE: 113 MMHG | WEIGHT: 153.6 LBS | OXYGEN SATURATION: 97 %

## 2024-12-10 DIAGNOSIS — J45.901 MILD ASTHMA WITH EXACERBATION, UNSPECIFIED WHETHER PERSISTENT: Primary | ICD-10-CM

## 2024-12-10 DIAGNOSIS — J06.9 VIRAL URI: ICD-10-CM

## 2024-12-10 DIAGNOSIS — R91.1 PULMONARY NODULE: ICD-10-CM

## 2024-12-10 PROCEDURE — 90656 IIV3 VACC NO PRSV 0.5 ML IM: CPT

## 2024-12-10 PROCEDURE — 99213 OFFICE O/P EST LOW 20 MIN: CPT

## 2024-12-10 RX ORDER — BENZONATATE 100 MG/1
100 CAPSULE ORAL 3 TIMES DAILY PRN
Qty: 30 CAPSULE | Refills: 1 | Status: SHIPPED | OUTPATIENT
Start: 2024-12-10 | End: 2024-12-30

## 2024-12-10 RX ORDER — FLUTICASONE FUROATE 100 UG/1
1 POWDER RESPIRATORY (INHALATION) DAILY
Qty: 30 EACH | Refills: 2 | Status: SHIPPED | OUTPATIENT
Start: 2024-12-10

## 2024-12-10 RX ORDER — PREDNISONE 20 MG/1
20 TABLET ORAL 2 TIMES DAILY
Qty: 10 TABLET | Refills: 0 | Status: SHIPPED | OUTPATIENT
Start: 2024-12-10 | End: 2024-12-15

## 2024-12-10 RX ORDER — ALBUTEROL SULFATE 90 UG/1
2 INHALANT RESPIRATORY (INHALATION) EVERY 6 HOURS PRN
Qty: 18 G | Refills: 3 | Status: SHIPPED | OUTPATIENT
Start: 2024-12-10

## 2024-12-10 ASSESSMENT — ENCOUNTER SYMPTOMS
COUGH: 1
STRIDOR: 0
WHEEZING: 0
CHEST TIGHTNESS: 0
SHORTNESS OF BREATH: 1

## 2024-12-10 NOTE — PROGRESS NOTES
Visit Information    Have you changed or started any medications since your last visit including any over-the-counter medicines, vitamins, or herbal medicines? no   Have you stopped taking any of your medications? Is so, why? -  no  Are you having any side effects from any of your medications? - no    Have you seen any other physician or provider since your last visit?  no   Have you had any other diagnostic tests since your last visit?  no   Have you been seen in the emergency room and/or had an admission in a hospital since we last saw you?  no   Have you had your routine dental cleaning in the past 6 months?  no     Do you have an active MyChart account? If no, what is the barrier?  Yes    Patient Care Team:  Jose De Jesus Doherty MD as PCP - General (Family Medicine)  Emilie Love DO as Consulting Physician (General Surgery)  Va Hernandez RN as Nurse Navigator  Edwin Capone MD as Consulting Physician (Infectious Diseases)    Medical History Review  Past Medical, Family, and Social History reviewed and does not contribute to the patient presenting condition    Health Maintenance   Topic Date Due    Shingles vaccine (2 of 3) 12/03/2019    Respiratory Syncytial Virus (RSV) Pregnant or age 60 yrs+ (1 - Risk 60-74 years 1-dose series) Never done    Flu vaccine (1) 08/01/2024    COVID-19 Vaccine (3 - 2023-24 season) 09/01/2024    Colorectal Cancer Screen  12/30/2024    Lung Cancer Screening &/or Counseling  05/31/2025    A1C test (Diabetic or Prediabetic)  08/13/2025    Depression Monitoring  08/13/2025    DTaP/Tdap/Td vaccine (2 - Td or Tdap) 08/16/2026    Lipids  12/17/2027    Pneumococcal 0-64 years Vaccine  Completed    Hepatitis C screen  Completed    HIV screen  Completed    Hepatitis A vaccine  Aged Out    Hepatitis B vaccine  Aged Out    Hib vaccine  Aged Out    Polio vaccine  Aged Out    Meningococcal (ACWY) vaccine  Aged Out    Diabetes screen  Discontinued    Prostate Specific Antigen (PSA)

## 2024-12-10 NOTE — PROGRESS NOTES
[facility] Residency Program - Outpatient Note      Subjective:    Efrain Bowman is a 59 y.o. male with  has a past medical history of Acute respiratory failure, Alcohol abuse, Anxiety, Arthritis, Cardiomyopathy (HCC), Closed fracture of left wrist, Closed fracture of multiple ribs, Conjunctivitis, COPD (chronic obstructive pulmonary disease) (HCC), DDD (degenerative disc disease), lumbar, Depression, Dyslipidemia, Elevated C-reactive protein, Fever, Heart burn, Hemorrhoids, History of recent hospitalization, HTN (hypertension), Hypokalemia, Hyponatremia, Ilioinguinal neuralgia of right side, Infected hardware in right leg (HCC), Leukocytosis, Lives in group home, MRSA bacteremia, Psychiatric problem, Rhabdomyolysis, Seizures (HCC), Severe malnutrition (HCC), Superficial venous thrombosis of left arm, Syncope and collapse, Wears glasses, and Wellness examination.    Presented to the office today for:  Chief Complaint   Patient presents with    COPD     Patient would like to discuss getting nebulizer to help with breathing    Asthma     Follow up     Cough  Chronic , since 2 years. Productive, clear whitish sputum. Associated nasal congestion. Denies fever, chills or sweats.      Mild COPD Excerebration  Mildly increased SOB. Increased used of rescue inhalers. Patient was not using Spiriva or ellipta. No SOB at rest, worsening on climbing stairs. Agreeable to PFT study.    Patient agreeable to flu shot.    Review of Systems   Constitutional:  Positive for fatigue. Negative for chills and fever.   Respiratory:  Positive for cough and shortness of breath. Negative for chest tightness, wheezing and stridor.    Neurological:  Positive for headaches.         The patient has a   Family History   Problem Relation Age of Onset    Cancer Mother         colon ca       Objective:    /67 (Site: Left Upper Arm, Position: Sitting, Cuff Size: Medium Adult)   Pulse 72   Temp 97.2 °F

## 2024-12-10 NOTE — PATIENT INSTRUCTIONS
Thank you for letting us take care of you today. We hope all your questions were addressed. If a question was overlooked or something else comes to mind after you return home, please contact a member of your Care Team listed below.      Your Care Team at [facility] is Team #  Guicho Hector M.D. (Faculty)  Karol Grey M.D. (Resident)  Sumit Valerio M.D. (Resident)   Chichi Zaldivar M.D. (Resident)  Jose De Jesus Doherty M.D. (Resident)  Galina Sesay M.D. (Resident)  Humaira Selby., [facility]  Milagros Wisdom, [facility]  Salina Naqvi, [facility]  Ander Mnotoya, KERRIE June, [facility]  Estela Hylton, [facility]  Radha Jorgensen, KERRIE Davila (LJ) MARNI Piña (Clinical Practice Manager)  Malena Nayak [facility] (Clinical Pharmacist)     Office phone number: [phone]    If you need to get in right away due to illness, please be advised we have \"Same Day\" appointments available Monday-Friday. Please call us at [phone] option #3 to schedule your \"Same Day\" appointment.

## 2025-01-10 ENCOUNTER — OFFICE VISIT (OUTPATIENT)
Dept: PULMONOLOGY | Age: 60
End: 2025-01-10

## 2025-01-10 VITALS
SYSTOLIC BLOOD PRESSURE: 129 MMHG | OXYGEN SATURATION: 98 % | BODY MASS INDEX: 23.08 KG/M2 | RESPIRATION RATE: 19 BRPM | WEIGHT: 155.8 LBS | HEART RATE: 71 BPM | HEIGHT: 69 IN | DIASTOLIC BLOOD PRESSURE: 89 MMHG

## 2025-01-10 DIAGNOSIS — Z87.891 PERSONAL HISTORY OF TOBACCO USE: ICD-10-CM

## 2025-01-10 DIAGNOSIS — R91.1 RIGHT LOWER LOBE PULMONARY NODULE: ICD-10-CM

## 2025-01-10 DIAGNOSIS — J41.1 CHRONIC BRONCHITIS WITH PRODUCTIVE MUCOPURULENT COUGH (HCC): Primary | ICD-10-CM

## 2025-01-10 NOTE — PROGRESS NOTES
PULMONARY MEDICINE OUTPATIENT NOTE     PATIENT:Efrain Bowman  YOB: 1965  MRN:[id number]     REFERRED BY:Jose De Jesus Doherty MD      HISTORY     Efrain Bowman is a 60 y.o. years old male, here for new patient evaluation.     INITIAL VISIT:  LAST VISIT: Visit date not found  DATE OF VISIT: 1/10/2025    CHIEF COMPLIANT:Pulmonary Nodule (New Patient.  Image in chart from May.  Films requested from [employer]. ), Cough, and Shortness of Breath    EVALUATION/MANAGEMENT DETAILS FROM PREVIOUS VISITS/ENCOUNTERS:    TOBACCO HISTORY:  He  reports that he has been smoking cigarettes. He started smoking about 46 years ago. He has a 46 pack-year smoking history. He has never used smokeless tobacco.    AVOCATION/OCCUPATIONAL EXPOSURE:     Yes No   ASBESTOS [] [x]   SILICA DUST [] [x]   COAL [] [x]   FOUNDRY [] [x]   QUARRY [] [x]   COTTON MILL [] [x]   PETS [] [x]   PARAKEETS  []  [x]   TUBERCULOSIS [] [x]   HOT TUB [] [x]   DRUGS [] [x]   OTHER [] [x]     PULMONARY CO-MORBIDITIES/RISK FACTORS:     Yes No   NASO-BRONCHIAL/ENVIRONMENTAL ALLERGIES [] [x]   EOSINOPHILIA [] [x]   BRONCHIAL ASTHMA [] [x]   CHRONIC BRONCHITIS/EMPHYSEMA/COPD [x] []   CHRONIC SINUSITIS/POSTNASAL DRIP [] [x]   ACID REFLUX/GERD [] [x]   ACE/ARB USAGE [] [x]   ASPIRIN USAGE [] [x]   CORONARY ARTERY DISEASE  []  [x]   CONGESTIVE CARDIAC FAILURE [] [x]   ATRIAL FIBRILLATION [] [x]   AMIODARONE USAGE [] [x]   PULMONARY HYPERTENSION [] [x]   VENOUS THROMBOEMBOLISM [] [x]   END STAGE RENAL DISEASE [] [x]   CHRONIC LIVER DISEASE/CIRRHOSIS [] [x]   CONNECTIVE TISSUE DISORDER [] [x]   INTERSTITIAL LUNG DISEASE [] [x]   CHEST WALL RESTRICTION/DIAPHRAGMATIC DISORDER [] [x]   LUNG CANCER HISTORY [] [x]   RADIATION THERAPY HISTORY [] [x]   MORBID OBESITY [] [x]   AMBULATORY OXYGEN USE [] [x]   NOCTURNAL CPAP USE [] [x]   NOCTURNAL BIPAP/NIV USE [] [x]     PAST MEDICAL HISTORY:      Diagnosis Date    Acute respiratory failure 08/16/2021    Alcohol abuse

## 2025-01-20 RX ORDER — CALCIUM CARBONATE/VITAMIN D3 600 MG-10
1 TABLET ORAL DAILY
Qty: 30 TABLET | Refills: 0 | Status: SHIPPED | OUTPATIENT
Start: 2025-01-20

## 2025-01-20 NOTE — TELEPHONE ENCOUNTER
POST TRANSFUSION                Patient Active Problem List:     COPD (chronic obstructive pulmonary disease) (HCC)     Smoking addiction     Depression     Dyslipidemia     Pulmonary nodule     DDD (degenerative disc disease), lumbar     Groin pain, chronic, right     Ilioinguinal neuralgia of right side     Syncope and collapse     History of alcohol abuse     Lumbar radiculopathy     Scoliosis due to degenerative disease of spine in adult patient     Lumbosacral spondylosis without myelopathy     Alcohol abuse     Hypokalemia     Hyponatremia     Traumatic rhabdomyolysis ([facility])     Closed fracture of multiple ribs of right side     Closed fracture of left wrist     Severe malnutrition ([facility])     Fever     Conjunctivitis     Acute respiratory failure     Superficial venous thrombosis of arm, left     MRSA bacteremia     Empyema lung ([facility])     Elevated C-reactive protein (CRP)     Leukocytosis     Infected hardware in right leg ([facility])     Right leg numbness     Vertebrogenic low back pain     Stress headache     Rash     Allergic contact dermatitis due to metals     Eczema     Anxiety     Seizures ([facility])     Mild asthma with exacerbation     Viral URI

## 2025-01-23 ENCOUNTER — OFFICE VISIT (OUTPATIENT)
Dept: FAMILY MEDICINE CLINIC | Age: 60
End: 2025-01-23
Payer: MEDICAID

## 2025-01-23 VITALS
WEIGHT: 156.2 LBS | HEIGHT: 69 IN | HEART RATE: 90 BPM | OXYGEN SATURATION: 96 % | BODY MASS INDEX: 23.13 KG/M2 | SYSTOLIC BLOOD PRESSURE: 110 MMHG | TEMPERATURE: 97.3 F | DIASTOLIC BLOOD PRESSURE: 82 MMHG

## 2025-01-23 DIAGNOSIS — J44.1 COPD WITH ACUTE EXACERBATION (HCC): Primary | ICD-10-CM

## 2025-01-23 DIAGNOSIS — J02.9 SORETHROAT: ICD-10-CM

## 2025-01-23 LAB — S PYO AG THROAT QL: NORMAL

## 2025-01-23 PROCEDURE — 87880 STREP A ASSAY W/OPTIC: CPT

## 2025-01-23 PROCEDURE — 99213 OFFICE O/P EST LOW 20 MIN: CPT

## 2025-01-23 RX ORDER — AZITHROMYCIN 500 MG/1
500 TABLET, FILM COATED ORAL DAILY
Qty: 3 TABLET | Refills: 0 | Status: SHIPPED | OUTPATIENT
Start: 2025-01-23 | End: 2025-01-26

## 2025-01-23 RX ORDER — PREDNISONE 20 MG/1
20 TABLET ORAL 2 TIMES DAILY
Qty: 10 TABLET | Refills: 0 | Status: SHIPPED | OUTPATIENT
Start: 2025-01-23 | End: 2025-01-28

## 2025-01-23 SDOH — ECONOMIC STABILITY: FOOD INSECURITY: WITHIN THE PAST 12 MONTHS, YOU WORRIED THAT YOUR FOOD WOULD RUN OUT BEFORE YOU GOT MONEY TO BUY MORE.: NEVER TRUE

## 2025-01-23 SDOH — ECONOMIC STABILITY: FOOD INSECURITY: WITHIN THE PAST 12 MONTHS, THE FOOD YOU BOUGHT JUST DIDN'T LAST AND YOU DIDN'T HAVE MONEY TO GET MORE.: NEVER TRUE

## 2025-01-23 ASSESSMENT — PATIENT HEALTH QUESTIONNAIRE - PHQ9
2. FEELING DOWN, DEPRESSED OR HOPELESS: NOT AT ALL
5. POOR APPETITE OR OVEREATING: NOT AT ALL
DEPRESSION UNABLE TO ASSESS: PT REFUSES
SUM OF ALL RESPONSES TO PHQ9 QUESTIONS 1 & 2: 0
6. FEELING BAD ABOUT YOURSELF - OR THAT YOU ARE A FAILURE OR HAVE LET YOURSELF OR YOUR FAMILY DOWN: NOT AT ALL
SUM OF ALL RESPONSES TO PHQ QUESTIONS 1-9: 0
1. LITTLE INTEREST OR PLEASURE IN DOING THINGS: NOT AT ALL
10. IF YOU CHECKED OFF ANY PROBLEMS, HOW DIFFICULT HAVE THESE PROBLEMS MADE IT FOR YOU TO DO YOUR WORK, TAKE CARE OF THINGS AT HOME, OR GET ALONG WITH OTHER PEOPLE: NOT DIFFICULT AT ALL
SUM OF ALL RESPONSES TO PHQ QUESTIONS 1-9: 0
SUM OF ALL RESPONSES TO PHQ QUESTIONS 1-9: 0
7. TROUBLE CONCENTRATING ON THINGS, SUCH AS READING THE NEWSPAPER OR WATCHING TELEVISION: NOT AT ALL
3. TROUBLE FALLING OR STAYING ASLEEP: NOT AT ALL
8. MOVING OR SPEAKING SO SLOWLY THAT OTHER PEOPLE COULD HAVE NOTICED. OR THE OPPOSITE, BEING SO FIGETY OR RESTLESS THAT YOU HAVE BEEN MOVING AROUND A LOT MORE THAN USUAL: NOT AT ALL
4. FEELING TIRED OR HAVING LITTLE ENERGY: NOT AT ALL
SUM OF ALL RESPONSES TO PHQ QUESTIONS 1-9: 0
9. THOUGHTS THAT YOU WOULD BE BETTER OFF DEAD, OR OF HURTING YOURSELF: NOT AT ALL

## 2025-01-23 ASSESSMENT — ENCOUNTER SYMPTOMS
RHINORRHEA: 1
EYES NEGATIVE: 1
COUGH: 1
GASTROINTESTINAL NEGATIVE: 1
TROUBLE SWALLOWING: 0
SHORTNESS OF BREATH: 1
SORE THROAT: 1
WHEEZING: 1

## 2025-01-23 NOTE — PROGRESS NOTES
[facility] Residency Program - Outpatient Note      Subjective:    Efrain Bowman is a 60 y.o. male with  has a past medical history of Acute respiratory failure, Alcohol abuse, Anxiety, Arthritis, Cardiomyopathy (HCC), Closed fracture of left wrist, Closed fracture of multiple ribs, Conjunctivitis, COPD (chronic obstructive pulmonary disease) (HCC), DDD (degenerative disc disease), lumbar, Depression, Dyslipidemia, Elevated C-reactive protein, Fever, Heart burn, Hemorrhoids, History of recent hospitalization, HTN (hypertension), Hypokalemia, Hyponatremia, Ilioinguinal neuralgia of right side, Infected hardware in right leg (HCC), Leukocytosis, Lives in group home, MRSA bacteremia, Psychiatric problem, Rhabdomyolysis, Seizures (HCC), Severe malnutrition (HCC), Superficial venous thrombosis of left arm, Syncope and collapse, Wears glasses, and Wellness examination.    Presented to the office today for:  Chief Complaint   Patient presents with    Generalized Body Aches     body arches, sob, head congestion, coughing up yellow mucus, started Tuesday night          HPI     Efrain Bowman is a 60 y.o. male who presents for acute visit for evaluation of symptoms of a URI. Symptoms include subjective chills, aches, nasal congestion, productive cough with   increased sputum production but reports he didn't look at the color of sputum, occasional wheezing, sore throat and pain with swallowing and shortness of breath. Onset of symptoms was 2 days ago, gradually worsening since that time. Treatment to date: none. Reports he lives in a recovery home and got exposed to COVID there  No fever, nausea, vomiting, diarrhea, constipation, chest pain, dizziness or LOC. Patient has mild end expiratory wheezing on lung exam, no crackles.    Review of Systems   Constitutional:  Positive for chills. Negative for fever.   HENT:  Positive for congestion, rhinorrhea, sneezing and sore throat. Negative for

## 2025-01-23 NOTE — PROGRESS NOTES
Visit Information    Have you changed or started any medications since your last visit including any over-the-counter medicines, vitamins, or herbal medicines? no   Have you stopped taking any of your medications? Is so, why? -  no  Are you having any side effects from any of your medications? - no    Have you seen any other physician or provider since your last visit? Yes- Pulm  Have you had any other diagnostic tests since your last visit?  no   Have you been seen in the emergency room and/or had an admission in a hospital since we last saw you?  no   Have you had your routine dental cleaning in the past 6 months?  no     Do you have an active MyChart account? If no, what is the barrier?  Yes    Patient Care Team:  Jose De Jesus Doherty MD as PCP - General (Family Medicine)  Emilie Love DO as Consulting Physician (General Surgery)  Va Hernandez RN as Nurse Navigator  Edwin Capone MD as Consulting Physician (Infectious Diseases)  Hermann Vitale MD as Consulting Physician (Pulmonary Disease)    Medical History Review  Past Medical, Family, and Social History reviewed and does not contribute to the patient presenting condition    Health Maintenance   Topic Date Due    Shingles vaccine (2 of 3) 12/03/2019    Respiratory Syncytial Virus (RSV) Pregnant or age 60 yrs+ (1 - Risk 60-74 years 1-dose series) Never done    COVID-19 Vaccine (3 - 2023-24 season) 09/01/2024    Colorectal Cancer Screen  12/30/2024    Lung Cancer Screening &/or Counseling  05/31/2025    A1C test (Diabetic or Prediabetic)  08/13/2025    Depression Monitoring  08/13/2025    DTaP/Tdap/Td vaccine (2 - Td or Tdap) 08/16/2026    Lipids  12/17/2027    Flu vaccine  Completed    Pneumococcal 0-64 years Vaccine  Completed    Hepatitis C screen  Completed    HIV screen  Completed    Hepatitis A vaccine  Aged Out    Hepatitis B vaccine  Aged Out    Hib vaccine  Aged Out    Polio vaccine  Aged Out    Meningococcal (ACWY) vaccine  Aged Out    Diabetes

## 2025-01-23 NOTE — PATIENT INSTRUCTIONS
Thank you for letting us take care of you today. We hope all your questions were addressed. If a question was overlooked or something else comes to mind after you return home, please contact a member of your Care Team listed below.      Your Care Team at [facility] is Team #  Guicho Hector M.D. (Faculty)  Karol Grey M.D. (Resident)  Sumit Valerio M.D. (Resident)   Chichi Zaldivar M.D. (Resident)  Jose De Jesus Doherty M.D. (Resident)  Galina Sesay M.D. (Resident)  Humaira Selby., [facility]  Milagros Wisdom, [facility]  Salina Naqvi, [facility]  Ja June, [facility]  Estela Hylton, [facility]  Radha Jorgensen, [facility]  Radha Davila (LJ) MARNI Piña (Clinical Practice Manager)  Malena Nayak [facility] (Clinical Pharmacist)     Office phone number: [phone]    If you need to get in right away due to illness, please be advised we have \"Same Day\" appointments available Monday-Friday. Please call us at [phone] option #3 to schedule your \"Same Day\" appointment.

## 2025-01-23 NOTE — PROGRESS NOTES
Attending Physician Statement  I have discussed the care of Efrain Bowman, 60 y.o. male,including pertinent history and exam findings,  with the resident Samuel Landry MD.  History:  Chief Complaint   Patient presents with    Generalized Body Aches     body arches, sob, head congestion, coughing up yellow mucus, started Tuesday night          I have reviewed the key elements of the encounter with the resident. Examination was done by resident as documented in residents note.    BP Readings from Last 3 Encounters:   01/23/25 110/82   01/10/25 129/89   12/10/24 113/67     /82 (Site: Right Upper Arm, Position: Sitting, Cuff Size: Medium Adult)   Pulse 90   Temp 97.3 °F (36.3 °C)   Ht 1.753 m (5' 9.02\")   Wt 70.9 kg (156 lb 3.2 oz)   SpO2 96% Comment: room air  BMI 23.06 kg/m²   Lab Results   Component Value Date    WBC 7.0 05/31/2024    HGB 15.5 05/31/2024    HCT 45.8 05/31/2024     05/31/2024    CHOL 210 (H) 01/28/2020    TRIG 185 (H) 08/19/2021    HDL 53 12/17/2022    ALT 55 (H) 05/31/2024    AST 52 (H) 05/31/2024     05/31/2024    K 3.9 05/31/2024     05/31/2024    CREATININE 0.6 (L) 05/31/2024    BUN 5 (L) 05/31/2024    CO2 25 05/31/2024    TSH 0.80 05/31/2024    PSA 0.85 12/17/2022    INR 0.9 05/31/2024    GLUF 87 05/10/2019    GLUF 85 05/10/2019    LABA1C 5.8 08/13/2024     Lab Results   Component Value Date    CALCIUM 9.0 05/31/2024    PHOS 3.5 05/31/2024     No results found for: \"LDLDIRECT\"  I agree with the assessment, plan and diagnosis of    Diagnosis Orders   1. Sorethroat  Rapid Influenza A/B Antigens    COVID-19    POCT rapid strep A        I agree with orders as documented by the resident.    Recommendations: Agree with resident assessment and plan.  We will continue to advise patient on smoking cessation as well.  Patient to follow-up closely regarding his COPD and likely need for step up of his treatments..  Will have patient follow-up with PCP within the next few

## 2025-01-27 DIAGNOSIS — J44.1 COPD WITH ACUTE EXACERBATION (HCC): ICD-10-CM

## 2025-01-27 RX ORDER — PREDNISONE 20 MG/1
20 TABLET ORAL 2 TIMES DAILY
Qty: 10 TABLET | Refills: 0 | OUTPATIENT
Start: 2025-01-27 | End: 2025-02-01

## 2025-01-27 NOTE — PROGRESS NOTES
Patient seen and examined. Afebrile, VSS. Mild leukocytosis about the same, hemoglobin stable. Patient states he is feeling well. Denies SOB however still has productive cough. Pain in right ribs with coughing. Denies abdominal pain. Passing flatus, no BM for couple days. Tolerating diet, no N/V. Abdomen soft, non-tender, non-distended. Chest tube to suction with 100mL output overnight. No air leak. Surgically stable. Chest tube to suction. Diet as tolerated. Stool softeners. Cardiothoracic surgery following. Continue medical management. Patient was seen and examined. Awake alert patient stated he had a great day today. Tolerating diet bowels are moving. No acute general surgical issues.     Maty Martines PA-C  [address] [facility] Office Note  Chief Complaint:   Chief Complaint   Patient presents with    ER F/U     Seen yesterday. Not feeling well, vomiting.        HPI:   This is a 35 year old male coming in for ER follow-up for acute prostatitis and viral URI.  Patient states he was not feeling well last week with upper respiratory symptoms but this then progressed recently to having low back pain, fever and chills.  He went to the ER where imaging showed that he likely was experiencing signs and symptoms consistent with prostatitis.  He was started on doxycycline.  He has been on the antibiotic now for about 24 hours but his having worsening symptoms of progressive fever and nausea/vomiting.  He is also having consistent low back pain.  He still has a cough as well.  He is seeing urology tomorrow.      History reviewed. No pertinent past medical history.  History reviewed. No pertinent surgical history.  Social History:  Social History     Socioeconomic History    Marital status:    Tobacco Use    Smoking status: Every Day     Current packs/day: 0.00     Types: Cigarettes     Last attempt to quit: 10/7/2017     Years since quittin.3    Smokeless tobacco: Never    Tobacco comments:     1/2 pack daily   Vaping Use    Vaping status: Never Used   Substance and Sexual Activity    Alcohol use: Yes     Alcohol/week: 0.0 standard drinks of alcohol     Comment: social    Drug use: No   Other Topics Concern    Caffeine Concern No    Stress Concern No    Weight Concern No    Special Diet No    Exercise No    Seat Belt Yes     Family History:  Family History   Problem Relation Age of Onset    Cancer Father         prostate ca    Diabetes Maternal Grandmother     Other (Other) Sister         thyroid     Allergies:  Allergies[1]  Current Meds:  Current Outpatient Medications   Medication Sig Dispense Refill    ciprofloxacin 500 MG Oral Tab Take 1 tablet (500 mg total) by mouth 2 (two) times daily for 21  days. 42 tablet 0    ondansetron 8 MG Oral Tablet Dispersible Take 1 tablet (8 mg total) by mouth every 8 (eight) hours as needed for Nausea. 30 tablet 0    doxycycline 100 MG Oral Cap Take 1 capsule (100 mg total) by mouth 2 (two) times daily for 14 days. 28 capsule 0    amoxicillin clavulanate 875-125 MG Oral Tab Take 1 tablet by mouth 2 (two) times daily for 7 days. 14 tablet 0    azelastine 0.1 % Nasal Solution 1 spray by Nasal route 2 (two) times daily. (Patient not taking: Reported on 1/27/2025) 30 mL 0    benzonatate 100 MG Oral Cap Take 1 capsule (100 mg total) by mouth 3 (three) times daily as needed. (Patient not taking: Reported on 1/27/2025) 30 capsule 0    montelukast 10 MG Oral Tab Take 1 tablet (10 mg total) by mouth nightly. (Patient not taking: Reported on 1/27/2025)        Ready to quit: Not Answered  Counseling given: Not Answered  Tobacco comments: 1/2 pack daily       REVIEW OF SYSTEMS:   ROS:  CONSTITUTIONAL:  Denies any unusual weight gain/loss, weakness or fatigue. + fever and chills  HEENT:  Eyes:  Denies visual loss, blurred vision, double vision or yellow sclerae. Ears, Nose, Throat:  Denies hearing loss, sneezing, congestion, runny nose or sore throat.  SKIN:  No rash or itching.  CARDIOVASCULAR:  Denies chest pain, chest pressure or chest discomfort. No palpitations or edema.  Denies any dyspnea on exertion or at rest  RESPIRATORY:  Denies shortness of breath, + cough  GASTROINTESTINAL:  Denies any abdominal pain, constipation, diarrhea, or blood in stool. + nausea and vomiting  NEUROLOGICAL:  Denies headache, dizziness, syncope, numbness or tingling in the extremities.  MUSCULOSKELETAL:  + lower back pain    EXAM:   There were no vitals taken for this visit. Estimated body mass index is 26.92 kg/m² as calculated from the following:    Height as of 1/26/25: 5' 11\" (1.803 m).    Weight as of 1/26/25: 193 lb (87.5 kg).   Vital signs reviewed.Appears stated age, well groomed.  Physical  Exam:  GEN:  Patient is alert and oriented x3, acutely ill  HEAD:  Normocephalic, atraumatic  HEENT:  Eyes: EOMI, PERRLA, no scleral icterus, conjunctivae clear bilaterally.  Ears: TM's clear and visible bilaterally, no excess cerumen or erythema.  Throat:  No tonsillar erythema or exudate.  Mouth:  No oral lesions or ulcerations, no dental abnormalities noted.  LUNGS: clear to auscultation bilaterally, no rales/rhonchi/wheezing  HEART:  Regular rate and rhythm, no murmurs, rubs or gallops  ABDOMEN:  Soft, nondistended, nontender, bowel sounds normal in all 4 quadrants, no hepatosplenomegaly  EXTREMITIES:  Strength intact with 5/5 bilaterally upper and lower extremities, no edema noted  NEURO:  CN 2 - 12 grossly intact     ASSESSMENT AND PLAN:   1. Acute prostatitis  -  worsening  -  change doxycycline to cipro 500mg BID x 3 weeks  -  start zofran PRN nausea  -  f/u with urology as scheduled tomorrow  - ciprofloxacin 500 MG Oral Tab; Take 1 tablet (500 mg total) by mouth 2 (two) times daily for 21 days.  Dispense: 42 tablet; Refill: 0  - ondansetron 8 MG Oral Tablet Dispersible; Take 1 tablet (8 mg total) by mouth every 8 (eight) hours as needed for Nausea.  Dispense: 30 tablet; Refill: 0  - Urology Referral - In Network    2. Viral URI  -  persistent  -  supportive measures discussed      Meds & Refills for this Visit:  Requested Prescriptions     Signed Prescriptions Disp Refills    ciprofloxacin 500 MG Oral Tab 42 tablet 0     Sig: Take 1 tablet (500 mg total) by mouth 2 (two) times daily for 21 days.    ondansetron 8 MG Oral Tablet Dispersible 30 tablet 0     Sig: Take 1 tablet (8 mg total) by mouth every 8 (eight) hours as needed for Nausea.       Health Maintenance:  Health Maintenance Due   Topic Date Due    Pneumococcal Vaccine: Birth to 50yrs (1 of 2 - PCV) Never done    COVID-19 Vaccine (3 - 2024-25 season) 09/01/2024    Annual Depression Screening  01/01/2025    Tobacco Cessation Counseling  Never done     Annual Physical  03/21/2025       Patient/Caregiver Education: Patient/Caregiver Education: There are no barriers to learning. Medical education done.   Outcome: Patient verbalizes understanding. Patient is notified to call with any questions, complications, allergies, or worsening or changing symptoms.  Patient is to call with any side effects or complications from the treatments as a result of today.     Problem List:  Patient Active Problem List   Diagnosis    Lipoma of back    Lipoma of forearm    Submuscular lipoma of chest    Tobacco abuse    Rhytides       KEYANA WHITNEY, DO    Please note that portions of this note may have been completed with a voice recognition program. Efforts were made to edit the dictations but occasionally words are mis-transcribed.         [1] No Known Allergies

## 2025-01-27 NOTE — TELEPHONE ENCOUNTER
Patient called into office today and stated that he is still having some SOB mucus and stated that he has one dose left to go for his prednisone and he be done after today, he would like to now if he could get around of the prednisone sent in to his pharmacy,    Last visit: 01/23/2025  Last Med refill:   Does patient have enough medication for 72 hours: No:     Next Visit Date:  Future Appointments   Date Time Provider Department Center   3/4/2025  8:30 AM Jose De Jesus Doherty MD [facility] DEP   6/2/2025  8:00 AM STV CT ROOM 1 [facility] CT SCAN [facility] Radiolog   7/18/2025  8:30 AM SCHEDULE, [facility] RESPIRATORY SPEC Resp Spec [id number]       Health Maintenance   Topic Date Due    Shingles vaccine (2 of 3) 12/03/2019    Respiratory Syncytial Virus (RSV) Pregnant or age 60 yrs+ (1 - Risk 60-74 years 1-dose series) Never done    COVID-19 Vaccine (3 - 2023-24 season) 09/01/2024    Colorectal Cancer Screen  12/30/2024    Lung Cancer Screening &/or Counseling  05/31/2025    A1C test (Diabetic or Prediabetic)  08/13/2025    Depression Monitoring  01/23/2026    DTaP/Tdap/Td vaccine (2 - Td or Tdap) 08/16/2026    Lipids  12/17/2027    Flu vaccine  Completed    Pneumococcal 0-64 years Vaccine  Completed    Hepatitis C screen  Completed    HIV screen  Completed    Hepatitis A vaccine  Aged Out    Hepatitis B vaccine  Aged Out    Hib vaccine  Aged Out    Polio vaccine  Aged Out    Meningococcal (ACWY) vaccine  Aged Out    Diabetes screen  Discontinued    Prostate Specific Antigen (PSA) Screening or Monitoring  Discontinued       Hemoglobin A1C (%)   Date Value   08/13/2024 5.8   12/17/2022 5.7   08/13/2021 5.3             ( goal A1C is < 7)   No components found for: \"LABMICR\"  No components found for: \"LDLCHOLESTEROL\", \"LDLCALC\"    (goal LDL is <100)   AST (U/L)   Date Value   05/31/2024 52 (H)     ALT (U/L)   Date Value   05/31/2024 55 (H)     BUN (mg/dL)   Date Value   05/31/2024 5 (L)     BP Readings from Last 3 Encounters:   01/23/25

## 2025-02-13 RX ORDER — IBUPROFEN 800 MG/1
800 TABLET, FILM COATED ORAL EVERY 8 HOURS PRN
Qty: 90 TABLET | Refills: 0 | Status: SHIPPED | OUTPATIENT
Start: 2025-02-13 | End: 2025-03-15

## 2025-02-13 RX ORDER — CALCIUM CARBONATE/VITAMIN D3 600 MG-10
1 TABLET ORAL DAILY
Qty: 30 TABLET | Refills: 0 | Status: SHIPPED | OUTPATIENT
Start: 2025-02-13

## 2025-02-13 NOTE — TELEPHONE ENCOUNTER
Last visit: 01/23/2025  Last Med refill: 11/25/2024  Does patient have enough medication for 72 hours: No:     Next Visit Date:  Future Appointments   Date Time Provider Department Center   3/4/2025  8:30 AM Jose De Jesus Doherty MD [facility]   6/2/2025  8:00 AM STV CT ROOM 1 [facility] CT SCAN [facility]   7/18/2025  9:00 AM Hermann Vitale MD Resp Spec [id number]       Health Maintenance   Topic Date Due    Shingles vaccine (2 of 3) 12/03/2019    Respiratory Syncytial Virus (RSV) Pregnant or age 60 yrs+ (1 - Risk 60-74 years 1-dose series) Never done    COVID-19 Vaccine (3 - 2024-25 season) 09/01/2024    Colorectal Cancer Screen  12/30/2024    Lung Cancer Screening &/or Counseling  05/31/2025    A1C test (Diabetic or Prediabetic)  08/13/2025    Depression Monitoring  01/23/2026    DTaP/Tdap/Td vaccine (2 - Td or Tdap) 08/16/2026    Lipids  12/17/2027    Flu vaccine  Completed    Pneumococcal 50+ years Vaccine  Completed    Hepatitis C screen  Completed    HIV screen  Completed    Hepatitis A vaccine  Aged Out    Hepatitis B vaccine  Aged Out    Hib vaccine  Aged Out    Polio vaccine  Aged Out    Meningococcal (ACWY) vaccine  Aged Out    Pneumococcal 0-49 years Vaccine  Discontinued    Diabetes screen  Discontinued    Prostate Specific Antigen (PSA) Screening or Monitoring  Discontinued       Hemoglobin A1C (%)   Date Value   08/13/2024 5.8   12/17/2022 5.7   08/13/2021 5.3             ( goal A1C is < 7)   No components found for: \"LABMICR\"  No components found for: \"LDLCHOLESTEROL\", \"LDLCALC\"    (goal LDL is <100)   AST (U/L)   Date Value   05/31/2024 52 (H)     ALT (U/L)   Date Value   05/31/2024 55 (H)     BUN (mg/dL)   Date Value   05/31/2024 5 (L)     BP Readings from Last 3 Encounters:   01/23/25 110/82   01/10/25 129/89   12/10/24 113/67          (goal 120/80)    All Future Testing planned in [facility]  Lab Frequency Next Occurrence   CT Lung Screen (Initial/Annual/Baseline) Once 05/31/2025   Rapid Influenza

## 2025-03-05 NOTE — PROGRESS NOTES
[facility]   OCCUPATIONAL THERAPY MISSED TREATMENT NOTE   INPATIENT   Date: 21  Patient Name: Wendi Rock       Room:   MRN: [id number]   Account #: [de-identified]    : 1965  (60 y.o.)  Gender: male                 REASON FOR MISSED TREATMENT:  Patient unable to participate   -   Pt remains on vent - will continue to follow for skilled OT needs when pt able to participate.          Alber Edwards, OT Pharmacy Medication History     Source of Information: PATIENT    Additional concerns with the patient's PTA list. ~~~~PATIENT STATES THAT HE HASN'T TAKEN ANY OF HIS MEDICATIONS BECAUSE HE DIDN'T HAVE ANY REFILLS~~~~    The following updates were made to the Prior to Admission medication list:     Medications ADDED:   N/A  Medications CHANGED:  N/A  Medications REMOVED:   N/A  Medications NOT TAKING:   N/A    Allergy reviewed : Yes    Meds 2 Beds : Yes    Outpatient pharmacy confirmed and updated in chart : Yes    Ph[facility] name: [facility]    The list below reflectives the updated PTA list. Please review each medication in order reconciliation for additional clarification and justification.    Prior to Admission Medications   Prescriptions Last Dose Informant     B complex-vitamin C-folic acid (Nephrocaps) 1 mg capsule       Sig: Take 1 capsule by mouth once daily.                OLANZapine (ZyPREXA) 5 mg tablet       Sig: Take 1 tablet (5 mg) by mouth once daily at bedtime.   QUEtiapine (SEROquel) 25 mg tablet       Sig: Take 1 tablet (25 mg) by mouth 2 times daily (morning and late afternoon).   acetaminophen (Tylenol) 325 mg tablet       Sig: Take 2 tablets (650 mg) by mouth every 8 hours if needed for mild pain (1 - 3).   albuterol 90 mcg/actuation aerosol powdr breath activated inhaler       Sig: Inhale 2 puffs every 6 hours if needed for wheezing or shortness of breath.   apixaban (Eliquis) 2.5 mg tablet       Sig: Take 1 tablet (2.5 mg) by mouth 2 times a day.   aspirin 81 mg EC tablet       Sig: Take 1 tablet (81 mg) by mouth once daily.   calcitriol (Rocaltrol) 0.25 mcg capsule       Sig: Take 1 capsule (0.25 mcg) by mouth once daily.             calcium acetate (Phoslo) 667 mg capsule       Sig: Take 2 capsules (1,334 mg) by mouth 3 times a day. WITH MEALS.   ergocalciferol (Vitamin D-2) 1.25 MG (00056 UT) capsule       Sig: Take 1 capsule (1,250 mcg) by mouth 1 (one) time per week. MONDAY NEED AN REFILL    escitalopram (Lexapro) 20 mg tablet       Sig: Take 1 tablet (20 mg) by mouth once daily.   finasteride (Proscar) 5 mg tablet       Sig: Take 1 tablet (5 mg) by mouth once daily. Do not crush, chew, or split.   melatonin 3 mg capsule       Sig: Take 1 capsule by mouth once daily at bedtime.                metoprolol succinate XL (Toprol-XL) 25 mg 24 hr tablet       Sig: Take 1 tablet (25 mg) by mouth once daily. Do not crush or chew.   midodrine (Proamatine) 10 mg tablet       Sig: Take 1 tablet (10 mg) by mouth if needed (for HD with SBP <90).   Patient taking differently: Take 0.5 tablets (5 mg) by mouth 3 times daily (morning, midday, late afternoon).   ondansetron (Zofran) 4 mg tablet       Sig: Take 1 tablet (4 mg) by mouth every 12 hours if needed for nausea or vomiting.   pantoprazole (ProtoNix) 40 mg EC tablet       Sig: Take 1 tablet (40 mg) by mouth once daily in the morning. Take before meals. Do not crush, chew, or split.   rosuvastatin (Crestor) 20 mg tablet       Sig: Take 1 tablet (20 mg) by mouth once daily at bedtime.   sevelamer carbonate (Renvela) 800 mg tablet       Sig: Take 1 tablet (800 mg) by mouth 3 times daily (morning, midday, late afternoon). Swallow tablet whole; do not crush, break, or chew.   tamsulosin (Flomax) 0.4 mg 24 hr capsule       Sig: Take 1 capsule (0.4 mg) by mouth once daily.      Facility-Administered Medications: None       The list below reflectives the updated allergy list. Please review each documented allergy for additional clarification and justification.    No Known Allergies       03/05/25 at 2:54 PM - Анна Rivers

## 2025-04-05 ENCOUNTER — APPOINTMENT (OUTPATIENT)
Dept: CT IMAGING | Age: 60
End: 2025-04-05
Payer: MEDICAID

## 2025-04-05 ENCOUNTER — APPOINTMENT (OUTPATIENT)
Dept: GENERAL RADIOLOGY | Age: 60
End: 2025-04-05
Payer: MEDICAID

## 2025-04-05 ENCOUNTER — HOSPITAL ENCOUNTER (EMERGENCY)
Age: 60
Discharge: HOME | End: 2025-04-05
Attending: EMERGENCY MEDICINE
Payer: MEDICAID

## 2025-04-05 VITALS
OXYGEN SATURATION: 98 % | DIASTOLIC BLOOD PRESSURE: 87 MMHG | WEIGHT: 160 LBS | SYSTOLIC BLOOD PRESSURE: 150 MMHG | RESPIRATION RATE: 17 BRPM | TEMPERATURE: 97.8 F | HEART RATE: 65 BPM | BODY MASS INDEX: 23.62 KG/M2

## 2025-04-05 DIAGNOSIS — J18.9 PNEUMONIA OF RIGHT LOWER LOBE DUE TO INFECTIOUS ORGANISM: Primary | ICD-10-CM

## 2025-04-05 LAB
ANION GAP SERPL CALCULATED.3IONS-SCNC: 11 MMOL/L (ref 9–16)
BASOPHILS # BLD: 0.09 K/UL (ref 0–0.2)
BASOPHILS NFR BLD: 1 % (ref 0–2)
BNP SERPL-MCNC: 153 PG/ML (ref 0–125)
BUN SERPL-MCNC: 11 MG/DL (ref 8–23)
CALCIUM SERPL-MCNC: 9.6 MG/DL (ref 8.6–10.4)
CHLORIDE SERPL-SCNC: 105 MMOL/L (ref 98–107)
CO2 SERPL-SCNC: 24 MMOL/L (ref 20–31)
CREAT SERPL-MCNC: 0.9 MG/DL (ref 0.7–1.2)
EOSINOPHIL # BLD: 0.25 K/UL (ref 0–0.44)
EOSINOPHILS RELATIVE PERCENT: 2 % (ref 1–4)
ERYTHROCYTE [DISTWIDTH] IN BLOOD BY AUTOMATED COUNT: 13 % (ref 11.8–14.4)
GFR, ESTIMATED: >90 ML/MIN/1.73M2
GLUCOSE SERPL-MCNC: 80 MG/DL (ref 74–99)
HCT VFR BLD AUTO: 41.4 % (ref 40.7–50.3)
HGB BLD-MCNC: 13.7 G/DL (ref 13–17)
IMM GRANULOCYTES # BLD AUTO: 0.03 K/UL (ref 0–0.3)
IMM GRANULOCYTES NFR BLD: 0 %
LYMPHOCYTES NFR BLD: 2.66 K/UL (ref 1.1–3.7)
LYMPHOCYTES RELATIVE PERCENT: 26 % (ref 24–43)
MCH RBC QN AUTO: 29.3 PG (ref 25.2–33.5)
MCHC RBC AUTO-ENTMCNC: 33.1 G/DL (ref 28.4–34.8)
MCV RBC AUTO: 88.7 FL (ref 82.6–102.9)
MONOCYTES NFR BLD: 0.85 K/UL (ref 0.1–1.2)
MONOCYTES NFR BLD: 8 % (ref 3–12)
NEUTROPHILS NFR BLD: 63 % (ref 36–65)
NEUTS SEG NFR BLD: 6.55 K/UL (ref 1.5–8.1)
NRBC BLD-RTO: 0 PER 100 WBC
PLATELET # BLD AUTO: 379 K/UL (ref 138–453)
PMV BLD AUTO: 9.2 FL (ref 8.1–13.5)
POTASSIUM SERPL-SCNC: 3.9 MMOL/L (ref 3.7–5.3)
RBC # BLD AUTO: 4.67 M/UL (ref 4.21–5.77)
SODIUM SERPL-SCNC: 140 MMOL/L (ref 136–145)
TROPONIN I SERPL HS-MCNC: 13 NG/L (ref 0–22)
TROPONIN I SERPL HS-MCNC: 17 NG/L (ref 0–22)
WBC OTHER # BLD: 10.4 K/UL (ref 3.5–11.3)

## 2025-04-05 PROCEDURE — 71250 CT THORAX DX C-: CPT

## 2025-04-05 PROCEDURE — 83880 ASSAY OF NATRIURETIC PEPTIDE: CPT

## 2025-04-05 PROCEDURE — 85025 COMPLETE CBC W/AUTO DIFF WBC: CPT

## 2025-04-05 PROCEDURE — 71045 X-RAY EXAM CHEST 1 VIEW: CPT

## 2025-04-05 PROCEDURE — 93005 ELECTROCARDIOGRAM TRACING: CPT

## 2025-04-05 PROCEDURE — 6370000000 HC RX 637 (ALT 250 FOR IP)

## 2025-04-05 PROCEDURE — 99285 EMERGENCY DEPT VISIT HI MDM: CPT

## 2025-04-05 PROCEDURE — 84484 ASSAY OF TROPONIN QUANT: CPT

## 2025-04-05 PROCEDURE — 80048 BASIC METABOLIC PNL TOTAL CA: CPT

## 2025-04-05 RX ORDER — AZITHROMYCIN 250 MG/1
250 TABLET, FILM COATED ORAL DAILY
Qty: 4 TABLET | Refills: 0 | Status: SHIPPED | OUTPATIENT
Start: 2025-04-05 | End: 2025-04-09

## 2025-04-05 RX ORDER — AZITHROMYCIN 250 MG/1
500 TABLET, FILM COATED ORAL ONCE
Status: COMPLETED | OUTPATIENT
Start: 2025-04-05 | End: 2025-04-05

## 2025-04-05 RX ADMIN — AMOXICILLIN AND CLAVULANATE POTASSIUM 1 TABLET: 875; 125 TABLET, FILM COATED ORAL at 16:33

## 2025-04-05 RX ADMIN — AZITHROMYCIN DIHYDRATE 500 MG: 250 TABLET ORAL at 16:33

## 2025-04-05 ASSESSMENT — PAIN DESCRIPTION - ORIENTATION: ORIENTATION: LEFT

## 2025-04-05 ASSESSMENT — PAIN DESCRIPTION - PAIN TYPE: TYPE: ACUTE PAIN

## 2025-04-05 ASSESSMENT — PAIN DESCRIPTION - FREQUENCY: FREQUENCY: CONTINUOUS

## 2025-04-05 ASSESSMENT — PAIN DESCRIPTION - LOCATION: LOCATION: CHEST

## 2025-04-05 ASSESSMENT — PAIN DESCRIPTION - DESCRIPTORS: DESCRIPTORS: SORE

## 2025-04-05 ASSESSMENT — PAIN - FUNCTIONAL ASSESSMENT: PAIN_FUNCTIONAL_ASSESSMENT: 0-10

## 2025-04-05 ASSESSMENT — PAIN SCALES - GENERAL: PAINLEVEL_OUTOF10: 4

## 2025-04-05 NOTE — ED PROVIDER NOTES
[facility] EMERGENCY DEPARTMENT  Emergency Department Encounter  Emergency Medicine Resident     Pt Name:Efrain Bowman  MRN: [id number]  Birthdate 1965  Date of evaluation: 4/5/25  PCP:  Jose De Jesus Doherty MD  Note Started: 4:29 PM EDT      CHIEF COMPLAINT       Chief Complaint   Patient presents with    Cough       HISTORY OF PRESENT ILLNESS  (Location/Symptom, Timing/Onset, Context/Setting, Quality, Duration, Modifying Factors, Severity.)      Efrain Bowman is a 60 y.o. male who presents with cough.  Patient states he has had a cough for the last 2 to 3 weeks.  He states it is nonproductive.  He states he has some mild right sided chest pain associated with the cough but does not have chest pain at rest.  He has history of COPD, active smoker, hypertension, denies any significant cardiac history.  Denies any substernal chest pain, leg pain, leg swelling, history of DVT, PE or any other complaints.    PAST MEDICAL / SURGICAL / SOCIAL / FAMILY HISTORY      has a past medical history of Acute respiratory failure, Alcohol abuse, Anxiety, Arthritis, Cardiomyopathy, Closed fracture of left wrist, Closed fracture of multiple ribs, Conjunctivitis, COPD (chronic obstructive pulmonary disease) (HCC), DDD (degenerative disc disease), lumbar, Depression, Dyslipidemia, Elevated C-reactive protein, Fever, Heart burn, Hemorrhoids, History of recent hospitalization, HTN (hypertension), Hypokalemia, Hyponatremia, Ilioinguinal neuralgia of right side, Infected hardware in right leg, Leukocytosis, Lives in group home, MRSA bacteremia, Psychiatric problem, Rhabdomyolysis, Seizures (HCC), Severe malnutrition, Superficial venous thrombosis of left arm, Syncope and collapse, Wears glasses, and Wellness examination.       has a past surgical history that includes hernia repair; Toe Surgery; Hemorrhoid surgery (03/19/2015); Nerve Block (Right, 10/10/2016); other surgical history (Right, 04/10/2017); Anesthesia Nerve Block

## 2025-04-05 NOTE — ED PROVIDER NOTES
[facility] EMERGENCY DEPARTMENT     Emergency Department     Faculty Attestation        I performed a history and physical examination of the patient and discussed management with the resident. I reviewed the resident’s note and agree with the documented findings and plan of care. Any areas of disagreement are noted on the chart. I was personally present for the key portions of any procedures. I have documented in the chart those procedures where I was not present during the key portions. I have reviewed the emergency nurses triage note. I agree with the chief complaint, past medical history, past surgical history, allergies, medications, social and family history as documented unless otherwise noted below.  For Physician Assistant/ Nurse Practitioner cases/documentation I have personally evaluated this patient and have completed at least one if not all key elements of the E/M (history, physical exam, and MDM). Additional findings are as noted.      Vital Signs: BP: 137/85  Pulse: 76  Respirations: 17  Temp: 97.8 °F (36.6 °C) SpO2: 100 %  PCP:  Jose De Jesus Doherty MD  Note Started: 4/5/25, 1:35 PM EDT    Pertinent Comments:           EKG Interpretation    Interpreted by emergency department physician    Rhythm: normal sinus   Rate: normal 75 bpm  Axis: normal  Conduction: normal  ST Segments: normal  T Waves: normal  Q Waves: normal    Clinical Impression: Normal EKG.       Critical Care  None      (Please note that portions of this note were completed with a voice recognition program. Efforts were made to edit the dictations but occasionally words are mis-transcribed. Whenever words are used in this note in any gender, they shall be construed as though they were used in the gender appropriate to the circumstances; and whenever words are used in this note in the singular or plural form, they shall be construed as though they were used in the form appropriate to the

## 2025-04-05 NOTE — DISCHARGE INSTRUCTIONS
LOV: 8/21/19  LR: 10/21/19   You were seen today in the emergency department for your cough.  We have evaluated you and determined that you likely have pneumonia.  We now feel you are safe for discharge home.    Please return to the emergency department immediately if develop any new or worsening concerns including chest pain, shortness of breath, abdominal pain, nausea, vomiting, diarrhea, weakness, loss consciousness, fever, chills, or any other concerns.    Please call your PCP and schedule appointment within the next 24 to 48 hours for follow-up.

## 2025-04-06 LAB
EKG ATRIAL RATE: 75 BPM
EKG P AXIS: 52 DEGREES
EKG P-R INTERVAL: 172 MS
EKG Q-T INTERVAL: 402 MS
EKG QRS DURATION: 84 MS
EKG QTC CALCULATION (BAZETT): 448 MS
EKG R AXIS: 2 DEGREES
EKG T AXIS: 31 DEGREES
EKG VENTRICULAR RATE: 75 BPM

## 2025-04-06 PROCEDURE — 93010 ELECTROCARDIOGRAM REPORT: CPT | Performed by: INTERNAL MEDICINE

## 2025-04-10 ENCOUNTER — OFFICE VISIT (OUTPATIENT)
Dept: FAMILY MEDICINE CLINIC | Age: 60
End: 2025-04-10
Payer: MEDICAID

## 2025-04-10 VITALS
HEART RATE: 89 BPM | DIASTOLIC BLOOD PRESSURE: 81 MMHG | BODY MASS INDEX: 22.69 KG/M2 | OXYGEN SATURATION: 98 % | HEIGHT: 69 IN | WEIGHT: 153.2 LBS | SYSTOLIC BLOOD PRESSURE: 116 MMHG

## 2025-04-10 DIAGNOSIS — L30.8 OTHER ECZEMA: ICD-10-CM

## 2025-04-10 DIAGNOSIS — F32.A DEPRESSION, UNSPECIFIED DEPRESSION TYPE: ICD-10-CM

## 2025-04-10 DIAGNOSIS — M54.51 VERTEBROGENIC LOW BACK PAIN: ICD-10-CM

## 2025-04-10 DIAGNOSIS — J18.9 PNEUMONIA OF RIGHT LOWER LOBE DUE TO INFECTIOUS ORGANISM: Primary | ICD-10-CM

## 2025-04-10 DIAGNOSIS — J44.9 CHRONIC OBSTRUCTIVE PULMONARY DISEASE, UNSPECIFIED COPD TYPE (HCC): ICD-10-CM

## 2025-04-10 DIAGNOSIS — K21.9 GASTROESOPHAGEAL REFLUX DISEASE WITHOUT ESOPHAGITIS: ICD-10-CM

## 2025-04-10 DIAGNOSIS — L23.0 ALLERGIC CONTACT DERMATITIS DUE TO METALS: ICD-10-CM

## 2025-04-10 PROCEDURE — 99213 OFFICE O/P EST LOW 20 MIN: CPT

## 2025-04-10 RX ORDER — TRIAMCINOLONE ACETONIDE 1 MG/G
CREAM TOPICAL
Qty: 454 G | Refills: 1 | Status: SHIPPED | OUTPATIENT
Start: 2025-04-10

## 2025-04-10 RX ORDER — CLOBETASOL PROPIONATE 0.5 MG/G
OINTMENT TOPICAL
Qty: 60 G | Refills: 2 | Status: SHIPPED | OUTPATIENT
Start: 2025-04-10

## 2025-04-10 RX ORDER — CARBAMAZEPINE 200 MG/1
200 TABLET, EXTENDED RELEASE ORAL 2 TIMES DAILY
Qty: 60 TABLET | Refills: 3 | Status: SHIPPED | OUTPATIENT
Start: 2025-04-10

## 2025-04-10 RX ORDER — PANTOPRAZOLE SODIUM 40 MG/1
40 TABLET, DELAYED RELEASE ORAL
Qty: 42 TABLET | Refills: 0 | Status: SHIPPED | OUTPATIENT
Start: 2025-04-10 | End: 2025-05-22

## 2025-04-10 RX ORDER — ALBUTEROL SULFATE 90 UG/1
2 INHALANT RESPIRATORY (INHALATION) EVERY 6 HOURS PRN
Qty: 18 G | Refills: 3 | Status: SHIPPED | OUTPATIENT
Start: 2025-04-10

## 2025-04-10 RX ORDER — CALCIUM CARBONATE/VITAMIN D3 600 MG-10
1 TABLET ORAL DAILY
Qty: 30 TABLET | Refills: 0 | Status: SHIPPED | OUTPATIENT
Start: 2025-04-10

## 2025-04-10 RX ORDER — GUAIFENESIN 600 MG/1
600 TABLET, EXTENDED RELEASE ORAL 2 TIMES DAILY
Qty: 30 TABLET | Refills: 0 | Status: SHIPPED | OUTPATIENT
Start: 2025-04-10 | End: 2025-04-25

## 2025-04-10 RX ORDER — IBUPROFEN 800 MG/1
800 TABLET, FILM COATED ORAL EVERY 8 HOURS PRN
Qty: 90 TABLET | Refills: 0 | Status: SHIPPED | OUTPATIENT
Start: 2025-04-10 | End: 2025-05-10

## 2025-04-10 NOTE — PROGRESS NOTES
Attending Physician Statement  I  have discussed the care of Efrain Bowman including pertinent history and exam findings with the resident. I agree with the assessment, plan and orders as documented by the resident.      /81 (BP Site: Left Upper Arm, Patient Position: Sitting, BP Cuff Size: Medium Adult)   Pulse 89   Ht 1.753 m (5' 9.02\")   Wt 69.5 kg (153 lb 3.2 oz)   SpO2 98%   BMI 22.61 kg/m²    BP Readings from Last 3 Encounters:   04/10/25 116/81   04/05/25 (!) 150/87   01/23/25 110/82     Wt Readings from Last 3 Encounters:   04/10/25 69.5 kg (153 lb 3.2 oz)   04/05/25 72.6 kg (160 lb)   01/23/25 70.9 kg (156 lb 3.2 oz)          Diagnosis Orders   1. Pneumonia of right lower lobe due to infectious organism  guaiFENesin (MUCINEX) 600 MG extended release tablet      2. Chronic obstructive pulmonary disease, unspecified COPD type (HCC)  albuterol sulfate HFA (PROVENTIL;VENTOLIN;PROAIR) 108 (90 Base) MCG/ACT inhaler    guaiFENesin (MUCINEX) 600 MG extended release tablet      3. 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 Other eczema  triamcinolone (KENALOG) 0.1 % cream    clobetasol (TEMOVATE) 0.05 % ointment      5. Depression, unspecified depression type  carBAMazepine (TEGRETOL-XR) 200 MG extended release tablet      6. Gastroesophageal reflux disease without esophagitis  pantoprazole (PROTONIX) 40 MG tablet      7. Vertebrogenic low back pain  Thiamine Mononitrate (B1) 100 MG TABS    ibuprofen (ADVIL;MOTRIN) 800 MG tablet              Juan Diego Cheney DO 4/11/2025 3:08 PM      
Visit Information    Have you changed or started any medications since your last visit including any over-the-counter medicines, vitamins, or herbal medicines? no   Have you stopped taking any of your medications? Is so, why? -  no  Are you having any side effects from any of your medications? - no    Have you seen any other physician or provider since your last visit?  yes    Have you had any other diagnostic tests since your last visit?  yes    Have you been seen in the emergency room and/or had an admission in a hospital since we last saw you?  yes   Have you had your routine dental cleaning in the past 6 months?  no     Do you have an active MyChart account? If no, what is the barrier?  Yes    Patient Care Team:  Jose De Jesus Doherty MD as PCP - General (Family Medicine)  Emilie Love DO as Consulting Physician (General Surgery)  Va Hernandez RN as Nurse Navigator  Edwin Capone MD as Consulting Physician (Infectious Diseases)  Hermann Vitale MD as Consulting Physician (Pulmonary Disease)    Medical History Review  Past Medical, Family, and Social History reviewed and does not contribute to the patient presenting condition    Health Maintenance   Topic Date Due    Shingles vaccine (2 of 3) 12/03/2019    Respiratory Syncytial Virus (RSV) Pregnant or age 60 yrs+ (1 - Risk 60-74 years 1-dose series) Never done    COVID-19 Vaccine (3 - 2024-25 season) 09/01/2024    Colorectal Cancer Screen  12/30/2024    A1C test (Diabetic or Prediabetic)  08/13/2025    Depression Monitoring  01/23/2026    Lung Cancer Screening &/or Counseling  04/05/2026    DTaP/Tdap/Td vaccine (2 - Td or Tdap) 08/16/2026    Lipids  12/17/2027    Flu vaccine  Completed    Pneumococcal 50+ years Vaccine  Completed    Hepatitis C screen  Completed    HIV screen  Completed    Hepatitis A vaccine  Aged Out    Hepatitis B vaccine  Aged Out    Hib vaccine  Aged Out    Polio vaccine  Aged Out    Meningococcal (ACWY) vaccine  Aged Out    
at this house.  Will get back to us once his living situation changes.  - Thiamine Mononitrate (B1) 100 MG TABS; Take 1 tablet by mouth daily  Dispense: 30 tablet; Refill: 0  - ibuprofen (ADVIL;MOTRIN) 800 MG tablet; Take 1 tablet by mouth every 8 hours as needed for Pain  Dispense: 90 tablet; Refill: 0          Requested Prescriptions     Signed Prescriptions Disp Refills    triamcinolone (KENALOG) 0.1 % cream 454 g 1     Sig: Apply to rash twice daily (not face, armpit or groin). Needs 1 lb jar/30 days for full body rash    Thiamine Mononitrate (B1) 100 MG TABS 30 tablet 0     Sig: Take 1 tablet by mouth daily    ibuprofen (ADVIL;MOTRIN) 800 MG tablet 90 tablet 0     Sig: Take 1 tablet by mouth every 8 hours as needed for Pain    clobetasol (TEMOVATE) 0.05 % ointment 60 g 2     Sig: Apply to rash twice daily (not face, armpit or groin)    carBAMazepine (TEGRETOL-XR) 200 MG extended release tablet 60 tablet 3     Sig: Take 1 tablet by mouth 2 times daily    albuterol sulfate HFA (PROVENTIL;VENTOLIN;PROAIR) 108 (90 Base) MCG/ACT inhaler 18 g 3     Sig: Inhale 2 puffs into the lungs every 6 hours as needed for Wheezing    guaiFENesin (MUCINEX) 600 MG extended release tablet 30 tablet 0     Sig: Take 1 tablet by mouth 2 times daily for 15 days    pantoprazole (PROTONIX) 40 MG tablet 42 tablet 0     Sig: Take 1 tablet by mouth every morning (before breakfast)       Medications Discontinued During This Encounter   Medication Reason    clobetasol (TEMOVATE) 0.05 % ointment REORDER    carBAMazepine (TEGRETOL-XR) 200 MG extended release tablet REORDER    triamcinolone (KENALOG) 0.1 % cream REORDER    pantoprazole (PROTONIX) 40 MG tablet REORDER    albuterol sulfate HFA (PROVENTIL;VENTOLIN;PROAIR) 108 (90 Base) MCG/ACT inhaler REORDER    ibuprofen (ADVIL;MOTRIN) 800 MG tablet REORDER    Thiamine Mononitrate (B1) 100 MG TABS REORDER       Efrain received counseling on the following healthy behaviors: nutrition, exercise and

## 2025-04-10 NOTE — PATIENT INSTRUCTIONS
Thank you for letting us take care of you today. We hope all your questions were addressed. If a question was overlooked or something else comes to mind after you return home, please contact a member of your Care Team listed below.      Your Care Team at [facility] is Team #2  Michelle Knutson M.D. (Faculty)  Jose Fischer M.D. (Resident)  Jessica Rai M.D. (Resident)  Rodolfo Javier M.D. (Resident)  Kym Davies M.D. (Resident)  Kina Varela M.D. (Resident)  Ja Wisdom, [facility]  Sarah June, [facility]  Humaira Selby,  [facility]  Estela Hylton, [facility]  Radha Jorgensen, [facility]  Salina Naqvi, [facility]  Radha Davila (LJ) Wang [facility]ER[facility]E (Clinical Practice Manager)  Malena Nayak [facility] (Clinical Pharmacist)     Office phone number: [phone]    If you need to get in right away due to illness, please be advised we have \"Same Day\" appointments available Monday-Friday. Please call us at [phone] option #3 to schedule your \"Same Day\" appointment.

## 2025-05-17 ENCOUNTER — HOSPITAL ENCOUNTER (EMERGENCY)
Age: 60
Discharge: HOME | End: 2025-05-17
Attending: EMERGENCY MEDICINE
Payer: MEDICAID

## 2025-05-17 VITALS
DIASTOLIC BLOOD PRESSURE: 87 MMHG | HEART RATE: 71 BPM | RESPIRATION RATE: 16 BRPM | SYSTOLIC BLOOD PRESSURE: 148 MMHG | TEMPERATURE: 98.3 F | OXYGEN SATURATION: 100 %

## 2025-05-17 DIAGNOSIS — L03.316 CELLULITIS OF UMBILICUS: ICD-10-CM

## 2025-05-17 DIAGNOSIS — L30.9 DERMATITIS: Primary | ICD-10-CM

## 2025-05-17 PROCEDURE — 6370000000 HC RX 637 (ALT 250 FOR IP): Performed by: STUDENT IN AN ORGANIZED HEALTH CARE EDUCATION/TRAINING PROGRAM

## 2025-05-17 PROCEDURE — 99283 EMERGENCY DEPT VISIT LOW MDM: CPT

## 2025-05-17 RX ORDER — CETIRIZINE HYDROCHLORIDE 10 MG/1
5 TABLET ORAL ONCE
Status: COMPLETED | OUTPATIENT
Start: 2025-05-17 | End: 2025-05-17

## 2025-05-17 RX ORDER — CEPHALEXIN 500 MG/1
500 CAPSULE ORAL 4 TIMES DAILY
Qty: 28 CAPSULE | Refills: 0 | Status: SHIPPED | OUTPATIENT
Start: 2025-05-17 | End: 2025-05-24

## 2025-05-17 RX ORDER — PREDNISONE 20 MG/1
20 TABLET ORAL 2 TIMES DAILY
Qty: 10 TABLET | Refills: 0 | Status: SHIPPED | OUTPATIENT
Start: 2025-05-17 | End: 2025-05-22

## 2025-05-17 RX ORDER — PREDNISONE 20 MG/1
40 TABLET ORAL ONCE
Status: COMPLETED | OUTPATIENT
Start: 2025-05-17 | End: 2025-05-17

## 2025-05-17 RX ORDER — CETIRIZINE HYDROCHLORIDE 10 MG/1
5 TABLET ORAL DAILY
Qty: 15 TABLET | Refills: 0 | Status: SHIPPED | OUTPATIENT
Start: 2025-05-17

## 2025-05-17 RX ORDER — CEPHALEXIN 500 MG/1
500 CAPSULE ORAL ONCE
Status: COMPLETED | OUTPATIENT
Start: 2025-05-17 | End: 2025-05-17

## 2025-05-17 RX ADMIN — CETIRIZINE HYDROCHLORIDE 5 MG: 10 TABLET, FILM COATED ORAL at 10:42

## 2025-05-17 RX ADMIN — CEPHALEXIN 500 MG: 500 CAPSULE ORAL at 10:42

## 2025-05-17 RX ADMIN — PREDNISONE 40 MG: 20 TABLET ORAL at 10:42

## 2025-05-17 NOTE — DISCHARGE INSTRUCTIONS
You were evaluated due to a rash.  You are likely suffering from a bacterial infection on top of your dermatitis to nickel.  We recommend you take the steroid as prescribed orally.  You may also take Zyrtec to help reduce your itching.  Please take the antibiotic as prescribed for the entirety of the course.  We recommend that you avoid contact with nickel as it may be making your dermatitis worse.    Please schedule appointment with a dermatologist listed, you have been referred to their office.    Please return to the ED if you have worsening pain, rash, difficulty urinating, blood in your stool or urine, fever, chills, nausea, vomiting or for any other concern.

## 2025-05-17 NOTE — ED NOTES
Pt is A+Ox4  Pt presents to the ED with complaints of rash for \"months\"  Pt states he rash is painful with pruritus  Pt states he has a nickel allergy and states the rash developed from the nickel on his pants  Pt ambulates with a steady gait from triage to room 26  All questions answered and needs met at this time

## 2025-05-17 NOTE — ED PROVIDER NOTES
[facility] EMERGENCY DEPARTMENT     Emergency Department     Faculty Attestation    I performed a history and physical examination of the patient and discussed management with the resident. I reviewed the resident’s note and agree with the documented findings and plan of care. Any areas of disagreement are noted on the chart. I was personally present for the key portions of any procedures. I have documented in the chart those procedures where I was not present during the key portions. I have reviewed the emergency nurses triage note. I agree with the chief complaint, past medical history, past surgical history, allergies, medications, social and family history as documented unless otherwise noted below. For Physician Assistant/ Nurse Practitioner cases/documentation I have personally evaluated this patient and have completed at least one if not all key elements of the E/M (history, physical exam, and MDM). Additional findings are as noted.    Note Started: 9:46 AM EDT    Patient here with rash chronic to his lower abdomen.  Had seen dermatology in the past was provided prescriptions that his family doctor has maintained for him.  He states it seems to be getting worse is more pruritic he has been scratching.  No other complaints no other new symptoms other than just not improving.  On exam dermatitic rash with several areas of worsening erythema concern for secondary infection.  No crepitus abdomen is otherwise soft nontender.  Will treat with antibiotics oral steroids follow-up with his PCP in provide dermatology option      Critical Care     none    Guicho Trevino MD, FACEP  Attending Emergency  Physician           Guicho Trevino MD  05/17/25 [id number]

## 2025-05-17 NOTE — ED PROVIDER NOTES
[facility] EMERGENCY DEPARTMENT  Emergency Department Encounter  Emergency Medicine Resident     Pt Name:Efrain Bowman  MRN: [id number]  Birthdate 1965  Date of evaluation: 5/17/25  PCP:  Jose De Jesus Doherty MD  Note Started: 3:26 PM EDT      CHIEF COMPLAINT       Chief Complaint   Patient presents with    Rash     abdomen       HISTORY OF PRESENT ILLNESS  (Location/Symptom, Timing/Onset, Context/Setting, Quality, Duration, Modifying Factors, Severity.)      Efrain Bowman is a 60 y.o. male who presents with rash to the abdomen.  Patient reports that he has long suffered from a rash that occurs near his pants button and that he usually uses topical steroid and it gets better but he reports that for the past couple of weeks he has been using his topical steroids without improvement and it seems to be worsening rather than improving.  He reports that is very itchy and burns and is painful.  He reports that it extends from his bellybutton down to his perineum.  He reports that there are some areas that appear to be coming to ahead.  He reports that it is very itchy and very hard for him to stop itching when he starts.  He reports he has not seen a dermatologist recently.  He reports that he tries to cover his buttons in nail polish to prevent him from getting a  reaction.  He denies any previous history of shingles.    PAST MEDICAL / SURGICAL / SOCIAL / FAMILY HISTORY      has a past medical history of Acute respiratory failure (HCC), Alcohol abuse, Anxiety, Arthritis, Cardiomyopathy (HCC), Closed fracture of left wrist, Closed fracture of multiple ribs, Conjunctivitis, COPD (chronic obstructive pulmonary disease) (HCC), DDD (degenerative disc disease), lumbar, Depression, Dyslipidemia, Elevated C-reactive protein, Fever, Heart burn, Hemorrhoids, History of recent hospitalization, HTN (hypertension), Hypokalemia, Hyponatremia, Ilioinguinal neuralgia of right side, Infected hardware in right leg, Leukocytosis,

## 2025-06-14 ENCOUNTER — HOSPITAL ENCOUNTER (EMERGENCY)
Age: 60
Discharge: HOME | End: 2025-06-14
Attending: EMERGENCY MEDICINE
Payer: MEDICAID

## 2025-06-14 VITALS
BODY MASS INDEX: 23.91 KG/M2 | TEMPERATURE: 97.3 F | DIASTOLIC BLOOD PRESSURE: 90 MMHG | RESPIRATION RATE: 15 BRPM | HEART RATE: 64 BPM | SYSTOLIC BLOOD PRESSURE: 144 MMHG | OXYGEN SATURATION: 100 % | WEIGHT: 162 LBS

## 2025-06-14 DIAGNOSIS — R21 RASH AND OTHER NONSPECIFIC SKIN ERUPTION: Primary | ICD-10-CM

## 2025-06-14 PROCEDURE — 99283 EMERGENCY DEPT VISIT LOW MDM: CPT

## 2025-06-14 PROCEDURE — 6370000000 HC RX 637 (ALT 250 FOR IP): Performed by: STUDENT IN AN ORGANIZED HEALTH CARE EDUCATION/TRAINING PROGRAM

## 2025-06-14 RX ORDER — CLOTRIMAZOLE AND BETAMETHASONE DIPROPIONATE 10; .64 MG/G; MG/G
CREAM TOPICAL
Qty: 45 G | Refills: 1 | Status: SHIPPED | OUTPATIENT
Start: 2025-06-14

## 2025-06-14 RX ORDER — CLOTRIMAZOLE AND BETAMETHASONE DIPROPIONATE 10; .64 MG/G; MG/G
CREAM TOPICAL 2 TIMES DAILY
Status: DISCONTINUED | OUTPATIENT
Start: 2025-06-14 | End: 2025-06-14 | Stop reason: HOSPADM

## 2025-06-14 RX ORDER — HYDROXYZINE HYDROCHLORIDE 25 MG/1
25 TABLET, FILM COATED ORAL EVERY 6 HOURS PRN
Qty: 12 TABLET | Refills: 0 | Status: SHIPPED | OUTPATIENT
Start: 2025-06-14 | End: 2025-06-24

## 2025-06-14 RX ADMIN — CLOTRIMAZOLE AND BETAMETHASONE DIPROPIONATE: 10; .5 CREAM TOPICAL at 10:02

## 2025-06-14 ASSESSMENT — PAIN SCALES - GENERAL: PAINLEVEL_OUTOF10: 2

## 2025-06-14 ASSESSMENT — ENCOUNTER SYMPTOMS
ABDOMINAL PAIN: 0
EYE REDNESS: 0
SHORTNESS OF BREATH: 0

## 2025-06-14 ASSESSMENT — PAIN - FUNCTIONAL ASSESSMENT: PAIN_FUNCTIONAL_ASSESSMENT: 0-10

## 2025-06-14 NOTE — ED PROVIDER NOTES
[facility]     Emergency Department     Faculty Attestation    I performed a history and physical examination of the patient and discussed management with the resident. I reviewed the resident's note and agree with the documented findings and plan of care. Any areas of disagreement are noted on the chart. I was personally present for the key portions of any procedures. I have documented in the chart those procedures where I was not present during the key portions. I have reviewed the emergency nurses triage note. I agree with the chief complaint, past medical history, past surgical history, allergies, medications, social and family history as documented unless otherwise noted below. For Physician Assistant/ Nurse Practitioner cases/documentation I have personally evaluated this patient and have completed at least one if not all key elements of the E/M (history, physical exam, and MDM). Additional findings are as noted.    Chronic rash suprapubic and groin, not responding well to triamcinolone.     Domenico Mcdaniel MD  06/14/25 [id number]

## 2025-06-14 NOTE — ED PROVIDER NOTES
[facility] EMERGENCY DEPARTMENT  Emergency Department Encounter  Emergency Medicine Resident     Pt Name:Efrain Bowman  MRN: [id number]  Birthdate 1965  Date of evaluation: 6/14/25  PCP:  Jose De Jesus Doherty MD  Note Started: 9:27 AM EDT      CHIEF COMPLAINT       Chief Complaint   Patient presents with    Rash       HISTORY OF PRESENT ILLNESS  (Location/Symptom, Timing/Onset, Context/Setting, Quality, Duration, Modifying Factors, Severity.)      Efrain Bowman is a 60 y.o. male with PMH including COPD, HTN, history of MRSA bacteremia, history of alcohol abuse, and cardiomyopathy who presents emergency department with an abdominal rash.  Patient states that this rash is chronic and has been there for several years.  He states that he has attempted multiple different creams and ointments along with steroids and antibiotics.  He specifically states that steroids do improve the rash the most.  States the rash is extremely pruritic and he does itch the rash often, sometimes until he bleeds.  The rash is located just below his umbilical region and runs to his pubic region.  It is erythematous and slightly raised.  Denies rash spreading to his groin, genitals, or flanks.  Patient is wearing a nickel belt with jeans.  Discussed the possibility of contact dermatitis.  Patient states that he has discussed this with providers in the past and he attempted changing his belt buckle with minimal improvement.  Patient was last seen in our emergency department on 5/17/2025 for the same complaint.  He was discharged home with prednisone, Keflex, and Zyrtec.  Patient states that the prednisone did help him considerably.  He completed the Keflex prescription.  Patient denies any other complaints including fevers, chills, shakes, chest discomfort, abdominal pains, current open wounds, purulent drainage from rash.    PAST MEDICAL / SURGICAL / SOCIAL / FAMILY HISTORY      has a past medical history of Acute respiratory  (PROVENTIL;VENTOLIN;PROAIR) 108 (90 Base) MCG/ACT inhaler Inhale 2 puffs into the lungs every 6 hours as needed for Wheezing 4/10/25   Chichi Zaldivar MD   pantoprazole (PROTONIX) 40 MG tablet Take 1 tablet by mouth every morning (before breakfast) 4/10/25 5/22/25  Chichi Zaldivar MD   escitalopram (LEXAPRO) 10 MG tablet TAKE 1 TABLET BY MOUTH ONCE DAILY  Patient not taking: Reported on 4/10/2025 9/9/24   Jose De Jesus Doherty MD   tiotropium (SPIRIVA HANDIHALER) 18 MCG inhalation capsule Inhale 1 capsule into the lungs daily 8/13/24   Jose De Jesus Doherty MD       REVIEW OF SYSTEMS       Review of Systems   Constitutional:  Negative for chills, fatigue and fever.   HENT:  Negative for congestion.    Eyes:  Negative for redness.   Respiratory:  Negative for shortness of breath.    Cardiovascular:  Negative for chest pain.   Gastrointestinal:  Negative for abdominal pain.   Skin:  Positive for rash. Negative for wound.   Neurological:  Negative for numbness.   Psychiatric/Behavioral:  Negative for agitation and confusion. The patient is not nervous/anxious.        PHYSICAL EXAM      INITIAL VITALS:   BP (!) 144/90   Pulse 64   Temp 97.3 °F (36.3 °C) (Oral)   Resp 15   Wt 73.5 kg (162 lb)   SpO2 100%   BMI 23.91 kg/m²     Physical Exam  Constitutional:       General: He is not in acute distress.     Appearance: Normal appearance. He is normal weight. He is not ill-appearing, toxic-appearing or diaphoretic.   HENT:      Head: Normocephalic and atraumatic.      Right Ear: Ear canal and external ear normal.      Left Ear: Ear canal and external ear normal.      Nose: Nose normal. No congestion.      Mouth/Throat:      Mouth: Mucous membranes are moist.      Pharynx: Oropharynx is clear. No posterior oropharyngeal erythema.      Comments: No intraoral lesions  Eyes:      General:         Right eye: No discharge.         Left eye: No discharge.      Extraocular Movements: Extraocular movements intact.      Conjunctiva/sclera:

## 2025-06-14 NOTE — ED NOTES
Pt to ED c/o rash to lower abdomen down to perineum. Pt states he had this rash before and was given topical creams, a steroid, and antibiotic. Pt states when he finished the antibiotic it came back. Pt states he was told it could be an allergy to nickel and stemming from the button on his jeans. Pt states he saw a dermatologist, but they didn't know for sure what is was. Pt states it is painful and itchy. Pt denies any new exposures. Pt alert and oriented x4, talking in complete sentences, respirations even and unlabored. Pt acting age appropriate. Will continue to plan of care.

## 2025-06-14 NOTE — DISCHARGE INSTRUCTIONS
Seen in the emergency department for abdominal rash.  This is chronic and has been ongoing for several years.  You have tried creams, ointments, antibiotics, steroids in the past with minimal relief.  He states that steroids has benefited you the most.  Given the duration of the symptoms, concerns at there is a fungal component to the rash.  Will treat with Lotrisone..  Please use this as prescribed twice daily for the next 2 weeks.  Please follow-up with your primary care first thing Monday morning on 6/16/2025 in order to discuss further options.  If your rash begins to worsen while using this ointment, please discontinue and return to your primary care or return to the emergency department.  Please return to the emergency department if you develop fevers, chills, shakes, chest pain, shortness of breath, numbness and tingling, purulent drainage from the rash, or any other concerning symptoms.    Please discontinue other ointments/creams/lotions while using Lotrisone.  Given Atarax prescription to treat itchiness.  Please take this as prescribed

## 2025-06-24 ENCOUNTER — OFFICE VISIT (OUTPATIENT)
Age: 60
End: 2025-06-24
Payer: MEDICAID

## 2025-06-24 VITALS
DIASTOLIC BLOOD PRESSURE: 73 MMHG | HEIGHT: 69 IN | WEIGHT: 158.8 LBS | BODY MASS INDEX: 23.52 KG/M2 | OXYGEN SATURATION: 96 % | HEART RATE: 84 BPM | SYSTOLIC BLOOD PRESSURE: 122 MMHG

## 2025-06-24 DIAGNOSIS — K21.9 GASTROESOPHAGEAL REFLUX DISEASE WITHOUT ESOPHAGITIS: ICD-10-CM

## 2025-06-24 DIAGNOSIS — M54.51 VERTEBROGENIC LOW BACK PAIN: ICD-10-CM

## 2025-06-24 DIAGNOSIS — N52.9 ERECTILE DISORDER: ICD-10-CM

## 2025-06-24 DIAGNOSIS — J30.2 SEASONAL ALLERGIC RHINITIS, UNSPECIFIED TRIGGER: ICD-10-CM

## 2025-06-24 DIAGNOSIS — J44.9 CHRONIC OBSTRUCTIVE PULMONARY DISEASE, UNSPECIFIED COPD TYPE (HCC): ICD-10-CM

## 2025-06-24 DIAGNOSIS — R21 RASH: Primary | ICD-10-CM

## 2025-06-24 PROCEDURE — 99213 OFFICE O/P EST LOW 20 MIN: CPT

## 2025-06-24 PROCEDURE — 99212 OFFICE O/P EST SF 10 MIN: CPT

## 2025-06-24 RX ORDER — CALCIUM CARBONATE/VITAMIN D3 600 MG-10
1 TABLET ORAL DAILY
Qty: 30 TABLET | Refills: 0 | Status: SHIPPED | OUTPATIENT
Start: 2025-06-24

## 2025-06-24 RX ORDER — IBUPROFEN 800 MG/1
800 TABLET, FILM COATED ORAL EVERY 8 HOURS PRN
Qty: 90 TABLET | Refills: 0 | Status: SHIPPED | OUTPATIENT
Start: 2025-06-24 | End: 2025-07-24

## 2025-06-24 RX ORDER — MUPIROCIN 2 %
OINTMENT (GRAM) TOPICAL
Qty: 15 G | Refills: 2 | Status: SHIPPED | OUTPATIENT
Start: 2025-06-24 | End: 2025-07-01

## 2025-06-24 RX ORDER — SILDENAFIL 50 MG/1
50 TABLET, FILM COATED ORAL DAILY PRN
Qty: 10 TABLET | Refills: 0 | Status: SHIPPED | OUTPATIENT
Start: 2025-06-24

## 2025-06-24 RX ORDER — TIOTROPIUM BROMIDE 18 UG/1
18 CAPSULE ORAL; RESPIRATORY (INHALATION) DAILY
Qty: 90 CAPSULE | Refills: 3 | Status: SHIPPED | OUTPATIENT
Start: 2025-06-24

## 2025-06-24 RX ORDER — PANTOPRAZOLE SODIUM 40 MG/1
40 TABLET, DELAYED RELEASE ORAL
Qty: 42 TABLET | Refills: 0 | Status: SHIPPED | OUTPATIENT
Start: 2025-06-24 | End: 2025-08-05

## 2025-06-24 RX ORDER — CETIRIZINE HYDROCHLORIDE 10 MG/1
5 TABLET ORAL DAILY
Qty: 15 TABLET | Refills: 0 | Status: SHIPPED | OUTPATIENT
Start: 2025-06-24

## 2025-06-24 NOTE — PROGRESS NOTES
[facility] Residency Program - Outpatient Note      Subjective:    Efrain Bowman is a 60 y.o. male with  has a past medical history of Acute respiratory failure (HCC), Alcohol abuse, Anxiety, Arthritis, Cardiomyopathy (HCC), Closed fracture of left wrist, Closed fracture of multiple ribs, Conjunctivitis, COPD (chronic obstructive pulmonary disease) ([facility]), DDD (degenerative disc disease), lumbar, Depression, Dyslipidemia, Elevated C-reactive protein, Fever, Heart burn, Hemorrhoids, History of recent hospitalization, HTN (hypertension), Hypokalemia, Hyponatremia, Ilioinguinal neuralgia of right side, Infected hardware in right leg, Leukocytosis, Lives in group home, MRSA bacteremia, Psychiatric problem, Rhabdomyolysis, Seizures (HCC), Severe malnutrition, Superficial venous thrombosis of left arm, Syncope and collapse, Wears glasses, and Wellness examination.    Presented to the office today for:  Chief Complaint   Patient presents with    Allergies     Patient would like to discuss being allergic to something     Cough     Patient would like to discuss a cough        HPI  Patient here for abdominal rash and allergic system.    Abdominal Rash  Patient reports abdominal rash present for 2 years, associated with itching.  Has seen dermatology who diagnosed nickel allergy and eczema.  Has tried triamcinolone and clobetasol without resolution.  Rash is not progressing but persists.  On exam there is erythema, mild crusting and is nontender.      Allergic Rhinitis  Reports cough for 2 weeks, productive of clear to whitish sputum, with nasal congestion.  Denies fever, chills, chest congestion.  Patient smokes cigarettes.  No known sick contacts.      Erectile Dysfunction:  Reports difficulty achieving and maintaining erection for the past year, with gradual worsening and more frequent episodes lately.  Does have normal morning erections.  Denies problems with libido or ejaculation.  No

## 2025-06-24 NOTE — PATIENT INSTRUCTIONS
Thank you for letting us take care of you today. We hope all your questions were addressed. If a question was overlooked or something else comes to mind after you return home, please contact a member of your Care Team listed below.      Your Care Team at [facility] is Team #  Guicho Hector M.D. (Faculty)  Karol Grey M.D. (Resident)  Sumit Valerio M.D. (Resident)   Chichi Zaldivar M.D. (Resident)  Jose De Jesus Doherty M.D. (Resident)  Galina Sesay M.D. (Resident)  Humaira Selby., [facility]  Milagros Wisdom, [facility]  Salina Naqvi, [facility]  Ja June, [facility]  Estela Hylton, [facility]  Radha Jorgensen, [facility]  Radha Davila (LJ) MARNI Piña (Clinical Practice Manager)  Malena Nayak [facility] (Clinical Pharmacist)     Office phone number: [phone]    If you need to get in right away due to illness, please be advised we have \"Same Day\" appointments available Monday-Friday. Please call us at [phone] option #3 to schedule your \"Same Day\" appointment.

## 2025-06-24 NOTE — PROGRESS NOTES
Attending Physician Statement  I  have discussed the care of Efrain Bowman including pertinent history and exam findings with the resident. I agree with the assessment, plan and orders as documented by the resident.      /73 (BP Site: Left Upper Arm, Patient Position: Sitting, BP Cuff Size: Medium Adult)   Pulse 84   Ht 1.753 m (5' 9\")   Wt 72 kg (158 lb 12.8 oz)   SpO2 96%   BMI 23.45 kg/m²    BP Readings from Last 3 Encounters:   06/24/25 122/73   06/14/25 (!) 144/90   05/17/25 (!) 148/87     Wt Readings from Last 3 Encounters:   06/24/25 72 kg (158 lb 12.8 oz)   06/14/25 73.5 kg (162 lb)   04/10/25 69.5 kg (153 lb 3.2 oz)          Diagnosis Orders   1. Rash        2. Seasonal allergic rhinitis, unspecified trigger        3. Erectile disorder  sildenafil (VIAGRA) 50 MG tablet      4. Gastroesophageal reflux disease without esophagitis  pantoprazole (PROTONIX) 40 MG tablet      5. Chronic obstructive pulmonary disease, unspecified COPD type (HCC)  tiotropium (SPIRIVA HANDIHALER) 18 MCG inhalation capsule      6. Vertebrogenic low back pain  Thiamine Mononitrate (B1) 100 MG TABS    ibuprofen (ADVIL;MOTRIN) 800 MG tablet        Rash-chronic allergic contact dermatitis with generalized Dermatitis, previously following with dermatology last follow-up 2022-previously on triamcinolone and clobetasol with can for ointment; continue      Juan Diego Cheney DO 6/30/2025 11:27 AM

## 2025-06-24 NOTE — PROGRESS NOTES
Visit Information    Have you changed or started any medications since your last visit including any over-the-counter medicines, vitamins, or herbal medicines? no   Have you stopped taking any of your medications? Is so, why? -  no  Are you having any side effects from any of your medications? - no    Have you seen any other physician or provider since your last visit?  no   Have you had any other diagnostic tests since your last visit?  yes - labs   Have you been seen in the emergency room and/or had an admission in a hospital since we last saw you?  yes - [facility]   Have you had your routine dental cleaning in the past 6 months?  no     Do you have an active MyChart account? If no, what is the barrier? yes    Patient Care Team:  Jose De Jesus Doherty MD as PCP - General (Family Medicine)  Emilie Love DO as Consulting Physician (General Surgery)  Va Hernandez RN as Nurse Navigator  Edwin Capone MD as Consulting Physician (Infectious Diseases)  Hermann Vitale MD as Consulting Physician (Pulmonary Disease)    Medical History Review  Past Medical, Family, and Social History reviewed and does not contribute to the patient presenting condition    Health Maintenance   Topic Date Due    Shingles vaccine (2 of 3) 12/03/2019    Respiratory Syncytial Virus (RSV) Pregnant or age 60 yrs+ (1 - Risk 60-74 years 1-dose series) Never done    COVID-19 Vaccine (3 - 2024-25 season) 09/01/2024    Colorectal Cancer Screen  12/30/2024    A1C test (Diabetic or Prediabetic)  08/13/2025    Depression Monitoring  01/23/2026    Lung Cancer Screening &/or Counseling  04/05/2026    DTaP/Tdap/Td vaccine (2 - Td or Tdap) 08/16/2026    Lipids  12/17/2027    Flu vaccine  Completed    Pneumococcal 50+ years Vaccine  Completed    Hepatitis C screen  Completed    HIV screen  Completed    Hepatitis A vaccine  Aged Out    Hepatitis B vaccine  Aged Out    Hib vaccine  Aged Out    Polio vaccine  Aged Out    Meningococcal (ACWY) vaccine  Aged

## 2025-07-20 ENCOUNTER — HOSPITAL ENCOUNTER (EMERGENCY)
Age: 60
Discharge: HOME | End: 2025-07-20
Attending: EMERGENCY MEDICINE
Payer: MEDICAID

## 2025-07-20 VITALS
HEART RATE: 73 BPM | DIASTOLIC BLOOD PRESSURE: 82 MMHG | TEMPERATURE: 97.7 F | RESPIRATION RATE: 18 BRPM | OXYGEN SATURATION: 100 % | SYSTOLIC BLOOD PRESSURE: 141 MMHG

## 2025-07-20 DIAGNOSIS — T63.441A LOCAL REACTION TO BEE STING, ACCIDENTAL OR UNINTENTIONAL, INITIAL ENCOUNTER: Primary | ICD-10-CM

## 2025-07-20 PROCEDURE — 99283 EMERGENCY DEPT VISIT LOW MDM: CPT

## 2025-07-20 PROCEDURE — 6360000002 HC RX W HCPCS: Performed by: STUDENT IN AN ORGANIZED HEALTH CARE EDUCATION/TRAINING PROGRAM

## 2025-07-20 RX ORDER — CETIRIZINE HYDROCHLORIDE 10 MG/1
10 TABLET ORAL DAILY
Qty: 30 TABLET | Refills: 0 | Status: SHIPPED | OUTPATIENT
Start: 2025-07-20 | End: 2025-08-19

## 2025-07-20 RX ORDER — TRIAMCINOLONE ACETONIDE 0.25 MG/G
OINTMENT TOPICAL
Qty: 15 G | Refills: 1 | Status: SHIPPED | OUTPATIENT
Start: 2025-07-20 | End: 2025-07-27

## 2025-07-20 RX ORDER — DEXAMETHASONE 4 MG/1
10 TABLET ORAL ONCE
Status: COMPLETED | OUTPATIENT
Start: 2025-07-20 | End: 2025-07-20

## 2025-07-20 RX ADMIN — DEXAMETHASONE 10 MG: 4 TABLET ORAL at 11:23

## 2025-07-20 ASSESSMENT — PAIN - FUNCTIONAL ASSESSMENT: PAIN_FUNCTIONAL_ASSESSMENT: 0-10

## 2025-07-20 ASSESSMENT — PAIN SCALES - GENERAL: PAINLEVEL_OUTOF10: 3

## 2025-07-20 ASSESSMENT — LIFESTYLE VARIABLES
HOW OFTEN DO YOU HAVE A DRINK CONTAINING ALCOHOL: NEVER
HOW MANY STANDARD DRINKS CONTAINING ALCOHOL DO YOU HAVE ON A TYPICAL DAY: PATIENT DOES NOT DRINK

## 2025-07-20 NOTE — ED NOTES
Pt was stung by a week ago. States itching to his right elbow and that redness has worsened at the spot on his back. Pt denies any other needs or complaints.

## 2025-07-20 NOTE — ED PROVIDER NOTES
[facility] EMERGENCY DEPARTMENT  Emergency Department Encounter  Emergency Medicine Resident     Pt Name:Efrain Bowman  MRN: [id number]  Birthdate 1965  Date of evaluation: 7/20/25  PCP:  Jose De Jesus Doherty MD  Note Started: 11:01 AM EDT      CHIEF COMPLAINT       Chief Complaint   Patient presents with    Insect Bite     Stung last week, the right flank is painful and red . Not sure if he had chicken pox as a child      HISTORY OF PRESENT ILLNESS  (Location/Symptom, Timing/Onset, Context/Setting, Quality, Duration, Modifying Factors, Severity.)      Efrain Bowman is a 60 y.o. male who presents with 2 bee stings.  He states that he was stung by bees approximately a week ago.  He is still having some itching on the underside of his right arm.  His biggest concern is the bite on the right side of his back which he states is becoming more painful and the red area seems to be growing.  He denies any prior allergies to bee stings.  He states he is taken occasional 800 mg ibuprofen which has not done anything to change the pain.  He was concerned that the stinger might still be present.    PAST MEDICAL / SURGICAL / SOCIAL / FAMILY HISTORY      has a past medical history of Acute respiratory failure (HCC), Alcohol abuse, Anxiety, Arthritis, Cardiomyopathy (HCC), Closed fracture of left wrist, Closed fracture of multiple ribs, Conjunctivitis, COPD (chronic obstructive pulmonary disease) (HCC), DDD (degenerative disc disease), lumbar, Depression, Dyslipidemia, Elevated C-reactive protein, Fever, Heart burn, Hemorrhoids, History of recent hospitalization, HTN (hypertension), Hypokalemia, Hyponatremia, Ilioinguinal neuralgia of right side, Infected hardware in right leg, Leukocytosis, Lives in group home, MRSA bacteremia, Psychiatric problem, Rhabdomyolysis, Seizures (HCC), Severe malnutrition, Superficial venous thrombosis of left arm, Syncope and collapse, Wears glasses, and Wellness examination.     has a past

## 2025-07-20 NOTE — DISCHARGE INSTRUCTIONS
Even emerged from today due to concern for bee stings.  Based on exam this looks like continue local reaction.  We gave you a dose of steroids to help with the inflammatory response and your pain.  I am writing a prescription for topical steroid which should help with itching and inflammation.  I would also recommend getting a over-the-counter Benadryl spray which can help with the histamine response and your body's reaction to the bee sting.  Come back to the emergency department right away if your symptoms are not improving, if the area of erythema is spreading, if you develop fever or chills.  Otherwise follow-up with your primary care doctor in the next week or so to ensure symptoms are improving.  You can continue to take ibuprofen and Tylenol as needed for pain.  You may also use ice over the areas as needed.  Make sure you do not put the ice directly on your skin to avoid frostbite.

## 2025-07-20 NOTE — ED PROVIDER NOTES
[facility] EMERGENCY DEPARTMENT  eMERGENCY dEPARTMENT eNCOUnter   Attending Attestation     Pt Name: Efrain Bowman  MRN: [id number]  Birthdate 1965  Date of evaluation: 7/20/25       Efrain Bowman is a 60 y.o. male who presents with Insect Bite (Stung last week, the right flank is painful and red . Not sure if he had chicken pox as a child )      11:55 AM EDT      History: Patient presents with insect bite to the right posterior chest as well as the right arm.  Patient says they both itch however the one on his back is painful and feels like a stabbing sensation he is concerned that the stinger is still in place.    Exam: I do not see any sign of an insect sting or bite on the arm however the right back over the ribs there is a erythematous patch that is flat, erythematous, not particularly tender, there is a central punctate area that appears to be where a stinger was located.    Given the area is not thick or cellulitic in appearance I believe this is more of a histaminergic reaction.  Recommend antihistamines, steroids, discharge, recommend return if worse or for any other concern.  I do not believe the patient requires antibiotics at this time though it was a consideration.  I feel the patient may need them if this gets worse or symptoms do not improve.    I performed a history and physical examination of the patient and discussed management with the resident. I reviewed the resident’s note and agree with the documented findings and plan of care. Any areas of disagreement are noted on the chart. I was personally present for the key portions of any procedures. I have documented in the chart those procedures where I was not present during the key portions. I have personally reviewed all images and agree with the resident's interpretation. I have reviewed the emergency nurses triage note. I agree with the chief complaint, past medical history, past surgical history, allergies, medications, social and

## 2025-08-09 ENCOUNTER — HOSPITAL ENCOUNTER (EMERGENCY)
Age: 60
Discharge: HOME | End: 2025-08-09
Attending: EMERGENCY MEDICINE
Payer: MEDICAID

## 2025-08-09 VITALS
SYSTOLIC BLOOD PRESSURE: 145 MMHG | OXYGEN SATURATION: 100 % | RESPIRATION RATE: 14 BRPM | TEMPERATURE: 97.3 F | DIASTOLIC BLOOD PRESSURE: 92 MMHG | HEART RATE: 63 BPM

## 2025-08-09 DIAGNOSIS — H57.89 EYE IRRITATION: Primary | ICD-10-CM

## 2025-08-09 PROCEDURE — 99283 EMERGENCY DEPT VISIT LOW MDM: CPT | Performed by: EMERGENCY MEDICINE

## 2025-08-09 PROCEDURE — 6370000000 HC RX 637 (ALT 250 FOR IP)

## 2025-08-09 RX ORDER — TETRACAINE HYDROCHLORIDE 5 MG/ML
1 SOLUTION OPHTHALMIC ONCE
Status: COMPLETED | OUTPATIENT
Start: 2025-08-09 | End: 2025-08-09

## 2025-08-09 RX ORDER — CETIRIZINE HYDROCHLORIDE 10 MG/1
10 TABLET ORAL DAILY PRN
Qty: 10 TABLET | Refills: 0 | Status: SHIPPED | OUTPATIENT
Start: 2025-08-09

## 2025-08-09 RX ORDER — FLUORESCEIN SODIUM 1 MG/MG
1 STRIP OPHTHALMIC ONCE
Status: COMPLETED | OUTPATIENT
Start: 2025-08-09 | End: 2025-08-09

## 2025-08-09 RX ORDER — ERYTHROMYCIN 5 MG/G
OINTMENT OPHTHALMIC
Qty: 1 G | Refills: 0 | Status: SHIPPED | OUTPATIENT
Start: 2025-08-09 | End: 2025-08-19

## 2025-08-09 RX ADMIN — FLUORESCEIN SODIUM 1 MG: 1 STRIP OPHTHALMIC at 09:25

## 2025-08-09 RX ADMIN — TETRACAINE HYDROCHLORIDE 1 DROP: 5 SOLUTION OPHTHALMIC at 09:25

## 2025-08-21 ENCOUNTER — HOSPITAL ENCOUNTER (EMERGENCY)
Age: 60
Discharge: HOME | End: 2025-08-21
Attending: EMERGENCY MEDICINE
Payer: MEDICAID

## 2025-08-21 VITALS
SYSTOLIC BLOOD PRESSURE: 136 MMHG | TEMPERATURE: 97.7 F | HEART RATE: 71 BPM | DIASTOLIC BLOOD PRESSURE: 96 MMHG | RESPIRATION RATE: 18 BRPM | OXYGEN SATURATION: 98 %

## 2025-08-21 DIAGNOSIS — S60.561A INSECT BITE OF RIGHT HAND, INITIAL ENCOUNTER: Primary | ICD-10-CM
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
## 2025-08-21 PROCEDURE — 99284 EMERGENCY DEPT VISIT MOD MDM: CPT

## 2025-08-21 PROCEDURE — 6370000000 HC RX 637 (ALT 250 FOR IP)

## 2025-08-21 RX ORDER — CEPHALEXIN 500 MG/1
500 CAPSULE ORAL ONCE
Status: COMPLETED | OUTPATIENT
Start: 2025-08-21 | End: 2025-08-21

## 2025-08-21 RX ORDER — CEPHALEXIN 500 MG/1
500 CAPSULE ORAL 2 TIMES DAILY
Qty: 13 CAPSULE | Refills: 0 | Status: SHIPPED | OUTPATIENT
Start: 2025-08-21 | End: 2025-08-28

## 2025-08-21 RX ORDER — CETIRIZINE HYDROCHLORIDE 10 MG/1
10 TABLET ORAL DAILY
Qty: 30 TABLET | Refills: 0 | Status: SHIPPED | OUTPATIENT
Start: 2025-08-21

## 2025-08-21 RX ORDER — CETIRIZINE HYDROCHLORIDE 10 MG/1
10 TABLET ORAL DAILY
Status: DISCONTINUED | OUTPATIENT
Start: 2025-08-21 | End: 2025-08-21 | Stop reason: HOSPADM

## 2025-08-21 RX ADMIN — CEPHALEXIN 500 MG: 500 CAPSULE ORAL at 15:50

## 2025-08-21 RX ADMIN — CETIRIZINE HYDROCHLORIDE 10 MG: 10 TABLET, FILM COATED ORAL at 15:17

## (undated) DEVICE — BNDG,ELSTC,MATRIX,STRL,2"X5YD,LF,HOOK&LP: Brand: MEDLINE

## (undated) DEVICE — GARMENT,MEDLINE,DVT,INT,CALF,MED, GEN2: Brand: MEDLINE

## (undated) DEVICE — ZIMMER® STERILE DISPOSABLE TOURNIQUET CUFF WITH PROTECTIVE SLEEVE AND PLC, DUAL PORT, SINGLE BLADDER, 24 IN. (61 CM)

## (undated) DEVICE — DRAPE,U/ SHT,SPLIT,PLAS,STERIL: Brand: MEDLINE

## (undated) DEVICE — ORTHO EXT PK

## (undated) DEVICE — GLOVE ORANGE PI 8   MSG9080

## (undated) DEVICE — SUTURE NONABSORBABLE MONOFILAMENT 3-0 PS-1 18 IN BLK ETHILON 1663H

## (undated) DEVICE — GOWN,SIRUS,NONRNF,SETINSLV,XL,20/CS: Brand: MEDLINE

## (undated) DEVICE — GLOVE ORANGE PI 8 1/2   MSG9085

## (undated) DEVICE — INTENDED FOR TISSUE SEPARATION, AND OTHER PROCEDURES THAT REQUIRE A SHARP SURGICAL BLADE TO PUNCTURE OR CUT.: Brand: BARD-PARKER ® CARBON RIB-BACK BLADES

## (undated) DEVICE — C-ARM: Brand: UNBRANDED

## (undated) DEVICE — APPLICATOR MEDICATED 26 CC SOLUTION HI LT ORNG CHLORAPREP

## (undated) DEVICE — GLOVE SURG SZ 65 THK91MIL LTX FREE SYN POLYISOPRENE

## (undated) DEVICE — BANDAGE GZ W2XL75IN ST RAYON POLY CNFRM STRTCH LTWT

## (undated) DEVICE — BANDAGE COBAN 4 IN COMPR W4INXL5YD FOAM COHESIVE QUIK STK SELF ADH SFT

## (undated) DEVICE — C-ARMOR C-ARM EQUIPMENT COVERS CLEAR STERILE UNIVERSAL FIT 12 PER CASE: Brand: C-ARMOR